# Patient Record
Sex: FEMALE | Race: WHITE | NOT HISPANIC OR LATINO | Employment: OTHER | URBAN - METROPOLITAN AREA
[De-identification: names, ages, dates, MRNs, and addresses within clinical notes are randomized per-mention and may not be internally consistent; named-entity substitution may affect disease eponyms.]

---

## 2022-01-01 ENCOUNTER — ANESTHESIA (OUTPATIENT)
Dept: ENDOSCOPY | Facility: HOSPITAL | Age: 67
End: 2022-01-01
Payer: MEDICARE

## 2022-01-01 ENCOUNTER — TELEPHONE (OUTPATIENT)
Dept: HEPATOLOGY | Facility: CLINIC | Age: 67
End: 2022-01-01
Payer: MEDICARE

## 2022-01-01 ENCOUNTER — HOSPITAL ENCOUNTER (OUTPATIENT)
Dept: RADIOLOGY | Facility: HOSPITAL | Age: 67
Discharge: HOME | End: 2022-05-05
Attending: INTERNAL MEDICINE
Payer: MEDICARE

## 2022-01-01 ENCOUNTER — TELEPHONE (OUTPATIENT)
Dept: ENDOSCOPY | Facility: HOSPITAL | Age: 67
End: 2022-01-01
Payer: MEDICARE

## 2022-01-01 ENCOUNTER — TELEPHONE (OUTPATIENT)
Dept: HEPATOLOGY | Facility: CLINIC | Age: 67
End: 2022-01-01

## 2022-01-01 ENCOUNTER — TELEPHONE (OUTPATIENT)
Dept: TRANSPLANT | Facility: CLINIC | Age: 67
End: 2022-01-01
Payer: MEDICARE

## 2022-01-01 ENCOUNTER — OFFICE VISIT (OUTPATIENT)
Dept: HEPATOLOGY | Facility: CLINIC | Age: 67
End: 2022-01-01
Payer: MEDICARE

## 2022-01-01 ENCOUNTER — HOSPITAL ENCOUNTER (OUTPATIENT)
Dept: RADIOLOGY | Facility: HOSPITAL | Age: 67
Discharge: HOME | End: 2022-05-10
Attending: INTERNAL MEDICINE
Payer: MEDICARE

## 2022-01-01 ENCOUNTER — HOSPITAL ENCOUNTER (INPATIENT)
Facility: HOSPITAL | Age: 67
LOS: 14 days | Discharge: HOSPICE | DRG: 871 | End: 2022-09-02
Attending: EMERGENCY MEDICINE | Admitting: HOSPITALIST
Payer: MEDICARE

## 2022-01-01 ENCOUNTER — PATIENT MESSAGE (OUTPATIENT)
Dept: PHARMACY | Facility: HOSPITAL | Age: 67
End: 2022-01-01
Payer: MEDICARE

## 2022-01-01 ENCOUNTER — TELEPHONE (OUTPATIENT)
Dept: PHARMACY | Facility: CLINIC | Age: 67
End: 2022-01-01
Payer: MEDICARE

## 2022-01-01 ENCOUNTER — ANESTHESIA EVENT (OUTPATIENT)
Dept: ENDOSCOPY | Facility: HOSPITAL | Age: 67
End: 2022-01-01
Payer: MEDICARE

## 2022-01-01 ENCOUNTER — HOSPITAL ENCOUNTER (INPATIENT)
Facility: HOSPITAL | Age: 67
LOS: 38 days | Discharge: NURSING, REHAB OR LONG-TERM CARE | DRG: 432 | End: 2022-08-05
Attending: EMERGENCY MEDICINE | Admitting: INTERNAL MEDICINE
Payer: MEDICARE

## 2022-01-01 ENCOUNTER — HOSPITAL ENCOUNTER (OUTPATIENT)
Facility: HOSPITAL | Age: 67
Discharge: HOME | End: 2022-06-23
Attending: INTERNAL MEDICINE | Admitting: INTERNAL MEDICINE
Payer: MEDICARE

## 2022-01-01 VITALS
DIASTOLIC BLOOD PRESSURE: 56 MMHG | HEIGHT: 60 IN | TEMPERATURE: 98 F | WEIGHT: 179 LBS | BODY MASS INDEX: 35.14 KG/M2 | RESPIRATION RATE: 18 BRPM | OXYGEN SATURATION: 99 % | SYSTOLIC BLOOD PRESSURE: 110 MMHG | HEART RATE: 80 BPM

## 2022-01-01 VITALS
OXYGEN SATURATION: 100 % | BODY MASS INDEX: 23.6 KG/M2 | DIASTOLIC BLOOD PRESSURE: 53 MMHG | RESPIRATION RATE: 14 BRPM | SYSTOLIC BLOOD PRESSURE: 101 MMHG | TEMPERATURE: 97 F | WEIGHT: 125 LBS | HEART RATE: 61 BPM | HEIGHT: 61 IN

## 2022-01-01 VITALS
TEMPERATURE: 98 F | HEIGHT: 60 IN | SYSTOLIC BLOOD PRESSURE: 140 MMHG | BODY MASS INDEX: 41.62 KG/M2 | WEIGHT: 212 LBS | HEART RATE: 73 BPM | RESPIRATION RATE: 18 BRPM | OXYGEN SATURATION: 97 % | DIASTOLIC BLOOD PRESSURE: 63 MMHG

## 2022-01-01 VITALS
TEMPERATURE: 99 F | DIASTOLIC BLOOD PRESSURE: 65 MMHG | HEART RATE: 104 BPM | OXYGEN SATURATION: 96 % | SYSTOLIC BLOOD PRESSURE: 141 MMHG | BODY MASS INDEX: 36.14 KG/M2 | HEIGHT: 60 IN | WEIGHT: 184.06 LBS | RESPIRATION RATE: 18 BRPM

## 2022-01-01 DIAGNOSIS — M79.89 LEG SWELLING: ICD-10-CM

## 2022-01-01 DIAGNOSIS — N17.9 AKI (ACUTE KIDNEY INJURY): ICD-10-CM

## 2022-01-01 DIAGNOSIS — R65.21 SEPTIC SHOCK: ICD-10-CM

## 2022-01-01 DIAGNOSIS — R60.1 ANASARCA: Primary | ICD-10-CM

## 2022-01-01 DIAGNOSIS — D63.8 ANEMIA, CHRONIC DISEASE: ICD-10-CM

## 2022-01-01 DIAGNOSIS — R06.02 SOB (SHORTNESS OF BREATH): ICD-10-CM

## 2022-01-01 DIAGNOSIS — R68.89 SUSPECTED DEEP TISSUE INJURY: ICD-10-CM

## 2022-01-01 DIAGNOSIS — K76.82 HEPATIC ENCEPHALOPATHY: ICD-10-CM

## 2022-01-01 DIAGNOSIS — R29.898 WEAKNESS OF BOTH ARMS: Primary | ICD-10-CM

## 2022-01-01 DIAGNOSIS — K76.9 LIVER DISEASE, UNSPECIFIED: ICD-10-CM

## 2022-01-01 DIAGNOSIS — R07.9 CHEST PAIN: ICD-10-CM

## 2022-01-01 DIAGNOSIS — K70.30 ALCOHOLIC CIRRHOSIS, UNSPECIFIED WHETHER ASCITES PRESENT: ICD-10-CM

## 2022-01-01 DIAGNOSIS — K74.60 CIRRHOSIS: ICD-10-CM

## 2022-01-01 DIAGNOSIS — R74.8 ELEVATED LIVER ENZYMES: Primary | ICD-10-CM

## 2022-01-01 DIAGNOSIS — M62.84 SARCOPENIA: ICD-10-CM

## 2022-01-01 DIAGNOSIS — R41.82 ALTERED MENTAL STATUS, UNSPECIFIED ALTERED MENTAL STATUS TYPE: ICD-10-CM

## 2022-01-01 DIAGNOSIS — K74.60 HEPATIC CIRRHOSIS, UNSPECIFIED HEPATIC CIRRHOSIS TYPE, UNSPECIFIED WHETHER ASCITES PRESENT: Primary | ICD-10-CM

## 2022-01-01 DIAGNOSIS — F10.10 ALCOHOL ABUSE: ICD-10-CM

## 2022-01-01 DIAGNOSIS — K70.31 ALCOHOLIC CIRRHOSIS OF LIVER WITH ASCITES: ICD-10-CM

## 2022-01-01 DIAGNOSIS — K76.6 PORTAL HYPERTENSION: ICD-10-CM

## 2022-01-01 DIAGNOSIS — G93.40 ACUTE ENCEPHALOPATHY: ICD-10-CM

## 2022-01-01 DIAGNOSIS — I50.20 HFREF (HEART FAILURE WITH REDUCED EJECTION FRACTION): ICD-10-CM

## 2022-01-01 DIAGNOSIS — R53.81 DEBILITY: ICD-10-CM

## 2022-01-01 DIAGNOSIS — R60.9 SWELLING: ICD-10-CM

## 2022-01-01 DIAGNOSIS — A41.9 SEPTIC SHOCK: ICD-10-CM

## 2022-01-01 LAB
ABO + RH BLD: NORMAL
ALBUMIN SERPL BCP-MCNC: 1.4 G/DL (ref 3.5–5.2)
ALBUMIN SERPL BCP-MCNC: 1.5 G/DL (ref 3.5–5.2)
ALBUMIN SERPL BCP-MCNC: 1.6 G/DL (ref 3.5–5.2)
ALBUMIN SERPL BCP-MCNC: 1.7 G/DL (ref 3.5–5.2)
ALBUMIN SERPL BCP-MCNC: 1.8 G/DL (ref 3.5–5.2)
ALBUMIN SERPL BCP-MCNC: 1.8 G/DL (ref 3.5–5.2)
ALBUMIN SERPL BCP-MCNC: 1.9 G/DL (ref 3.5–5.2)
ALBUMIN SERPL BCP-MCNC: 2 G/DL (ref 3.5–5.2)
ALBUMIN SERPL BCP-MCNC: 2 G/DL (ref 3.5–5.2)
ALBUMIN SERPL BCP-MCNC: 2.1 G/DL (ref 3.5–5.2)
ALBUMIN SERPL BCP-MCNC: 2.2 G/DL (ref 3.5–5.2)
ALBUMIN SERPL BCP-MCNC: 2.2 G/DL (ref 3.5–5.2)
ALBUMIN SERPL BCP-MCNC: 2.3 G/DL (ref 3.5–5.2)
ALBUMIN SERPL BCP-MCNC: 2.4 G/DL (ref 3.5–5.2)
ALBUMIN SERPL BCP-MCNC: 2.5 G/DL (ref 3.5–5.2)
ALBUMIN SERPL BCP-MCNC: 2.6 G/DL (ref 3.5–5.2)
ALBUMIN SERPL BCP-MCNC: 2.7 G/DL (ref 3.5–5.2)
ALBUMIN SERPL BCP-MCNC: 2.7 G/DL (ref 3.5–5.2)
ALLENS TEST: ABNORMAL
ALP SERPL-CCNC: 102 U/L (ref 55–135)
ALP SERPL-CCNC: 104 U/L (ref 55–135)
ALP SERPL-CCNC: 105 U/L (ref 55–135)
ALP SERPL-CCNC: 109 U/L (ref 55–135)
ALP SERPL-CCNC: 113 U/L (ref 55–135)
ALP SERPL-CCNC: 132 U/L (ref 55–135)
ALP SERPL-CCNC: 147 U/L (ref 55–135)
ALP SERPL-CCNC: 160 U/L (ref 55–135)
ALP SERPL-CCNC: 45 U/L (ref 55–135)
ALP SERPL-CCNC: 56 U/L (ref 55–135)
ALP SERPL-CCNC: 58 U/L (ref 55–135)
ALP SERPL-CCNC: 60 U/L (ref 55–135)
ALP SERPL-CCNC: 62 U/L (ref 55–135)
ALP SERPL-CCNC: 62 U/L (ref 55–135)
ALP SERPL-CCNC: 63 U/L (ref 55–135)
ALP SERPL-CCNC: 64 U/L (ref 55–135)
ALP SERPL-CCNC: 65 U/L (ref 55–135)
ALP SERPL-CCNC: 67 U/L (ref 55–135)
ALP SERPL-CCNC: 68 U/L (ref 55–135)
ALP SERPL-CCNC: 70 U/L (ref 55–135)
ALP SERPL-CCNC: 71 U/L (ref 55–135)
ALP SERPL-CCNC: 71 U/L (ref 55–135)
ALP SERPL-CCNC: 72 U/L (ref 55–135)
ALP SERPL-CCNC: 73 U/L (ref 55–135)
ALP SERPL-CCNC: 74 U/L (ref 55–135)
ALP SERPL-CCNC: 74 U/L (ref 55–135)
ALP SERPL-CCNC: 75 U/L (ref 55–135)
ALP SERPL-CCNC: 81 U/L (ref 55–135)
ALP SERPL-CCNC: 82 U/L (ref 55–135)
ALP SERPL-CCNC: 83 U/L (ref 55–135)
ALP SERPL-CCNC: 84 U/L (ref 55–135)
ALP SERPL-CCNC: 84 U/L (ref 55–135)
ALP SERPL-CCNC: 86 U/L (ref 55–135)
ALP SERPL-CCNC: 86 U/L (ref 55–135)
ALP SERPL-CCNC: 88 U/L (ref 55–135)
ALP SERPL-CCNC: 88 U/L (ref 55–135)
ALP SERPL-CCNC: 89 U/L (ref 55–135)
ALP SERPL-CCNC: 91 U/L (ref 55–135)
ALP SERPL-CCNC: 91 U/L (ref 55–135)
ALP SERPL-CCNC: 93 U/L (ref 55–135)
ALP SERPL-CCNC: 94 U/L (ref 55–135)
ALP SERPL-CCNC: 95 U/L (ref 55–135)
ALP SERPL-CCNC: 95 U/L (ref 55–135)
ALT SERPL W/O P-5'-P-CCNC: 15 U/L (ref 10–44)
ALT SERPL W/O P-5'-P-CCNC: 17 U/L (ref 10–44)
ALT SERPL W/O P-5'-P-CCNC: 18 U/L (ref 10–44)
ALT SERPL W/O P-5'-P-CCNC: 19 U/L (ref 10–44)
ALT SERPL W/O P-5'-P-CCNC: 20 U/L (ref 10–44)
ALT SERPL W/O P-5'-P-CCNC: 20 U/L (ref 10–44)
ALT SERPL W/O P-5'-P-CCNC: 21 U/L (ref 10–44)
ALT SERPL W/O P-5'-P-CCNC: 22 U/L (ref 10–44)
ALT SERPL W/O P-5'-P-CCNC: 23 U/L (ref 10–44)
ALT SERPL W/O P-5'-P-CCNC: 24 U/L (ref 10–44)
ALT SERPL W/O P-5'-P-CCNC: 25 U/L (ref 10–44)
ALT SERPL W/O P-5'-P-CCNC: 26 U/L (ref 10–44)
ALT SERPL W/O P-5'-P-CCNC: 27 U/L (ref 10–44)
ALT SERPL W/O P-5'-P-CCNC: 28 U/L (ref 10–44)
ALT SERPL W/O P-5'-P-CCNC: 28 U/L (ref 10–44)
ALT SERPL W/O P-5'-P-CCNC: 29 U/L (ref 10–44)
ALT SERPL W/O P-5'-P-CCNC: 29 U/L (ref 10–44)
ALT SERPL W/O P-5'-P-CCNC: 32 U/L (ref 10–44)
ALT SERPL W/O P-5'-P-CCNC: 32 U/L (ref 10–44)
ALT SERPL W/O P-5'-P-CCNC: 44 U/L (ref 10–44)
ALT SERPL W/O P-5'-P-CCNC: 57 U/L (ref 10–44)
ALT SERPL W/O P-5'-P-CCNC: 69 U/L (ref 10–44)
AMMONIA PLAS-SCNC: 105 UMOL/L (ref 10–50)
AMMONIA PLAS-SCNC: 31 UMOL/L (ref 10–50)
AMMONIA PLAS-SCNC: 43 UMOL/L (ref 10–50)
AMMONIA PLAS-SCNC: 47 UMOL/L (ref 10–50)
AMMONIA PLAS-SCNC: 51 UMOL/L (ref 10–50)
AMMONIA PLAS-SCNC: 58 UMOL/L (ref 10–50)
AMMONIA PLAS-SCNC: 66 UMOL/L (ref 10–50)
AMPHET+METHAMPHET UR QL: NEGATIVE
ANA SER QL IF: NORMAL
ANION GAP SERPL CALC-SCNC: 10 MMOL/L (ref 8–16)
ANION GAP SERPL CALC-SCNC: 10 MMOL/L (ref 8–16)
ANION GAP SERPL CALC-SCNC: 11 MMOL/L (ref 8–16)
ANION GAP SERPL CALC-SCNC: 11 MMOL/L (ref 8–16)
ANION GAP SERPL CALC-SCNC: 3 MMOL/L (ref 8–16)
ANION GAP SERPL CALC-SCNC: 4 MMOL/L (ref 8–16)
ANION GAP SERPL CALC-SCNC: 4 MMOL/L (ref 8–16)
ANION GAP SERPL CALC-SCNC: 5 MMOL/L (ref 8–16)
ANION GAP SERPL CALC-SCNC: 6 MMOL/L (ref 8–16)
ANION GAP SERPL CALC-SCNC: 7 MMOL/L (ref 8–16)
ANION GAP SERPL CALC-SCNC: 8 MMOL/L (ref 8–16)
ANION GAP SERPL CALC-SCNC: 9 MMOL/L (ref 8–16)
ANISOCYTOSIS BLD QL SMEAR: SLIGHT
APPEARANCE FLD: CLEAR
APPEARANCE FLD: NORMAL
APTT BLDCRRT: 82.2 SEC (ref 21–32)
ASCENDING AORTA: 3.15 CM
ASCENDING AORTA: 3.18 CM
AST SERPL-CCNC: 30 U/L (ref 10–40)
AST SERPL-CCNC: 30 U/L (ref 10–40)
AST SERPL-CCNC: 31 U/L (ref 10–40)
AST SERPL-CCNC: 32 U/L (ref 10–40)
AST SERPL-CCNC: 34 U/L (ref 10–40)
AST SERPL-CCNC: 35 U/L (ref 10–40)
AST SERPL-CCNC: 36 U/L (ref 10–40)
AST SERPL-CCNC: 38 U/L (ref 10–40)
AST SERPL-CCNC: 40 U/L (ref 10–40)
AST SERPL-CCNC: 41 U/L (ref 10–40)
AST SERPL-CCNC: 41 U/L (ref 10–40)
AST SERPL-CCNC: 42 U/L (ref 10–40)
AST SERPL-CCNC: 42 U/L (ref 10–40)
AST SERPL-CCNC: 43 U/L (ref 10–40)
AST SERPL-CCNC: 44 U/L (ref 10–40)
AST SERPL-CCNC: 45 U/L (ref 10–40)
AST SERPL-CCNC: 45 U/L (ref 10–40)
AST SERPL-CCNC: 46 U/L (ref 10–40)
AST SERPL-CCNC: 47 U/L (ref 10–40)
AST SERPL-CCNC: 48 U/L (ref 10–40)
AST SERPL-CCNC: 48 U/L (ref 10–40)
AST SERPL-CCNC: 54 U/L (ref 10–40)
AST SERPL-CCNC: 54 U/L (ref 10–40)
AST SERPL-CCNC: 56 U/L (ref 10–40)
AST SERPL-CCNC: 61 U/L (ref 10–40)
AST SERPL-CCNC: 62 U/L (ref 10–40)
AST SERPL-CCNC: 63 U/L (ref 10–40)
AST SERPL-CCNC: 64 U/L (ref 10–40)
AST SERPL-CCNC: 73 U/L (ref 10–40)
AST SERPL-CCNC: 82 U/L (ref 10–40)
AST SERPL-CCNC: 94 U/L (ref 10–40)
AST SERPL-CCNC: 99 U/L (ref 10–40)
AV INDEX (PROSTH): 0.45
AV INDEX (PROSTH): 0.58
AV MEAN GRADIENT: 6 MMHG
AV MEAN GRADIENT: 9 MMHG
AV PEAK GRADIENT: 13 MMHG
AV PEAK GRADIENT: 17 MMHG
AV VALVE AREA: 1.42 CM2
AV VALVE AREA: 2.33 CM2
AV VELOCITY RATIO: 0.39
AV VELOCITY RATIO: 0.6
BACTERIA #/AREA URNS AUTO: ABNORMAL /HPF
BACTERIA BLD CULT: ABNORMAL
BACTERIA BLD CULT: NORMAL
BACTERIA CSF CULT: NO GROWTH
BACTERIA FLD CULT: NORMAL
BACTERIA SPEC AEROBE CULT: NO GROWTH
BACTERIA SPEC ANAEROBE CULT: NORMAL
BACTERIA STL CULT: NORMAL
BACTERIA STL CULT: NORMAL
BACTERIA UR CULT: ABNORMAL
BARBITURATES UR QL SCN>200 NG/ML: NEGATIVE
BASO STIPL BLD QL SMEAR: ABNORMAL
BASOPHILS # BLD AUTO: 0.02 K/UL (ref 0–0.2)
BASOPHILS # BLD AUTO: 0.02 K/UL (ref 0–0.2)
BASOPHILS # BLD AUTO: 0.03 K/UL (ref 0–0.2)
BASOPHILS # BLD AUTO: 0.03 K/UL (ref 0–0.2)
BASOPHILS # BLD AUTO: 0.04 K/UL (ref 0–0.2)
BASOPHILS # BLD AUTO: 0.05 K/UL (ref 0–0.2)
BASOPHILS # BLD AUTO: 0.06 K/UL (ref 0–0.2)
BASOPHILS # BLD AUTO: 0.07 K/UL (ref 0–0.2)
BASOPHILS # BLD AUTO: 0.08 K/UL (ref 0–0.2)
BASOPHILS # BLD AUTO: 0.08 K/UL (ref 0–0.2)
BASOPHILS NFR BLD: 0.2 % (ref 0–1.9)
BASOPHILS NFR BLD: 0.4 % (ref 0–1.9)
BASOPHILS NFR BLD: 0.5 % (ref 0–1.9)
BASOPHILS NFR BLD: 0.6 % (ref 0–1.9)
BASOPHILS NFR BLD: 0.7 % (ref 0–1.9)
BASOPHILS NFR BLD: 0.8 % (ref 0–1.9)
BASOPHILS NFR BLD: 0.9 % (ref 0–1.9)
BASOPHILS NFR BLD: 1 % (ref 0–1.9)
BASOPHILS NFR BLD: 1 % (ref 0–1.9)
BASOPHILS NFR BLD: 1.1 % (ref 0–1.9)
BENZODIAZ UR QL SCN>200 NG/ML: ABNORMAL
BILIRUB SERPL-MCNC: 0.8 MG/DL (ref 0.1–1)
BILIRUB SERPL-MCNC: 1.1 MG/DL (ref 0.1–1)
BILIRUB SERPL-MCNC: 1.2 MG/DL (ref 0.1–1)
BILIRUB SERPL-MCNC: 1.3 MG/DL (ref 0.1–1)
BILIRUB SERPL-MCNC: 1.4 MG/DL (ref 0.1–1)
BILIRUB SERPL-MCNC: 1.4 MG/DL (ref 0.1–1)
BILIRUB SERPL-MCNC: 1.5 MG/DL (ref 0.1–1)
BILIRUB SERPL-MCNC: 1.6 MG/DL (ref 0.1–1)
BILIRUB SERPL-MCNC: 1.7 MG/DL (ref 0.1–1)
BILIRUB SERPL-MCNC: 1.8 MG/DL (ref 0.1–1)
BILIRUB SERPL-MCNC: 1.8 MG/DL (ref 0.1–1)
BILIRUB SERPL-MCNC: 1.9 MG/DL (ref 0.1–1)
BILIRUB SERPL-MCNC: 2 MG/DL (ref 0.1–1)
BILIRUB SERPL-MCNC: 2.1 MG/DL (ref 0.1–1)
BILIRUB SERPL-MCNC: 2.2 MG/DL (ref 0.1–1)
BILIRUB SERPL-MCNC: 2.3 MG/DL (ref 0.1–1)
BILIRUB SERPL-MCNC: 2.3 MG/DL (ref 0.1–1)
BILIRUB SERPL-MCNC: 2.4 MG/DL (ref 0.1–1)
BILIRUB SERPL-MCNC: 2.5 MG/DL (ref 0.1–1)
BILIRUB SERPL-MCNC: 3.2 MG/DL (ref 0.1–1)
BILIRUB UR QL STRIP: NEGATIVE
BLD GP AB SCN CELLS X3 SERPL QL: NORMAL
BLD PROD TYP BPU: NORMAL
BLOOD UNIT EXPIRATION DATE: NORMAL
BLOOD UNIT TYPE CODE: 600
BLOOD UNIT TYPE CODE: 6200
BLOOD UNIT TYPE: NORMAL
BNP SERPL-MCNC: 2659 PG/ML (ref 0–99)
BNP SERPL-MCNC: 57 PG/ML (ref 0–99)
BODY FLD TYPE: NORMAL
BODY FLD TYPE: NORMAL
BODY FLUID SOURCE, LDH: NORMAL
BSA FOR ECHO PROCEDURE: 1.56 M2
BSA FOR ECHO PROCEDURE: 1.99 M2
BUN SERPL-MCNC: 10 MG/DL (ref 8–23)
BUN SERPL-MCNC: 12 MG/DL (ref 8–23)
BUN SERPL-MCNC: 14 MG/DL (ref 8–23)
BUN SERPL-MCNC: 15 MG/DL (ref 8–23)
BUN SERPL-MCNC: 16 MG/DL (ref 8–23)
BUN SERPL-MCNC: 17 MG/DL (ref 8–23)
BUN SERPL-MCNC: 17 MG/DL (ref 8–23)
BUN SERPL-MCNC: 18 MG/DL (ref 8–23)
BUN SERPL-MCNC: 19 MG/DL (ref 8–23)
BUN SERPL-MCNC: 20 MG/DL (ref 8–23)
BUN SERPL-MCNC: 21 MG/DL (ref 8–23)
BUN SERPL-MCNC: 21 MG/DL (ref 8–23)
BUN SERPL-MCNC: 22 MG/DL (ref 8–23)
BUN SERPL-MCNC: 23 MG/DL (ref 8–23)
BUN SERPL-MCNC: 24 MG/DL (ref 8–23)
BUN SERPL-MCNC: 24 MG/DL (ref 8–23)
BUN SERPL-MCNC: 25 MG/DL (ref 8–23)
BUN SERPL-MCNC: 25 MG/DL (ref 8–23)
BUN SERPL-MCNC: 26 MG/DL (ref 8–23)
BUN SERPL-MCNC: 26 MG/DL (ref 8–23)
BUN SERPL-MCNC: 27 MG/DL (ref 8–23)
BUN SERPL-MCNC: 29 MG/DL (ref 8–23)
BUN SERPL-MCNC: 30 MG/DL (ref 8–23)
BUN SERPL-MCNC: 31 MG/DL (ref 8–23)
BUN SERPL-MCNC: 31 MG/DL (ref 8–23)
BUN SERPL-MCNC: 38 MG/DL (ref 8–23)
BUN SERPL-MCNC: 40 MG/DL (ref 8–23)
BUN SERPL-MCNC: 41 MG/DL (ref 8–23)
BUN SERPL-MCNC: 58 MG/DL (ref 8–23)
BUN SERPL-MCNC: 72 MG/DL (ref 8–23)
BUN SERPL-MCNC: 9 MG/DL (ref 8–23)
BUN SERPL-MCNC: 91 MG/DL (ref 8–23)
BURR CELLS BLD QL SMEAR: ABNORMAL
BURR CELLS BLD QL SMEAR: ABNORMAL
BZE UR QL SCN: NEGATIVE
C DIFF GDH STL QL: NEGATIVE
C DIFF TOX A+B STL QL IA: NEGATIVE
CALCIUM SERPL-MCNC: 6.2 MG/DL (ref 8.7–10.5)
CALCIUM SERPL-MCNC: 7.3 MG/DL (ref 8.7–10.5)
CALCIUM SERPL-MCNC: 7.6 MG/DL (ref 8.7–10.5)
CALCIUM SERPL-MCNC: 7.7 MG/DL (ref 8.7–10.5)
CALCIUM SERPL-MCNC: 7.7 MG/DL (ref 8.7–10.5)
CALCIUM SERPL-MCNC: 7.8 MG/DL (ref 8.7–10.5)
CALCIUM SERPL-MCNC: 7.8 MG/DL (ref 8.7–10.5)
CALCIUM SERPL-MCNC: 7.9 MG/DL (ref 8.7–10.5)
CALCIUM SERPL-MCNC: 8 MG/DL (ref 8.7–10.5)
CALCIUM SERPL-MCNC: 8.1 MG/DL (ref 8.7–10.5)
CALCIUM SERPL-MCNC: 8.2 MG/DL (ref 8.7–10.5)
CALCIUM SERPL-MCNC: 8.3 MG/DL (ref 8.7–10.5)
CALCIUM SERPL-MCNC: 8.4 MG/DL (ref 8.7–10.5)
CALCIUM SERPL-MCNC: 8.4 MG/DL (ref 8.7–10.5)
CALCIUM SERPL-MCNC: 8.5 MG/DL (ref 8.7–10.5)
CALCIUM SERPL-MCNC: 8.5 MG/DL (ref 8.7–10.5)
CALCIUM SERPL-MCNC: 8.6 MG/DL (ref 8.7–10.5)
CALCIUM SERPL-MCNC: 8.7 MG/DL (ref 8.7–10.5)
CALCIUM SERPL-MCNC: 8.8 MG/DL (ref 8.7–10.5)
CALCIUM SERPL-MCNC: 8.8 MG/DL (ref 8.7–10.5)
CALCIUM SERPL-MCNC: 8.9 MG/DL (ref 8.7–10.5)
CALCIUM SERPL-MCNC: 8.9 MG/DL (ref 8.7–10.5)
CALCIUM SERPL-MCNC: 9 MG/DL (ref 8.7–10.5)
CALCIUM SERPL-MCNC: 9.1 MG/DL (ref 8.7–10.5)
CALCIUM SERPL-MCNC: 9.2 MG/DL (ref 8.7–10.5)
CALCIUM SERPL-MCNC: 9.3 MG/DL (ref 8.7–10.5)
CALCIUM SERPL-MCNC: 9.5 MG/DL (ref 8.7–10.5)
CALCIUM SERPL-MCNC: 9.7 MG/DL (ref 8.7–10.5)
CANNABINOIDS UR QL SCN: NEGATIVE
CHLORIDE SERPL-SCNC: 100 MMOL/L (ref 95–110)
CHLORIDE SERPL-SCNC: 101 MMOL/L (ref 95–110)
CHLORIDE SERPL-SCNC: 102 MMOL/L (ref 95–110)
CHLORIDE SERPL-SCNC: 103 MMOL/L (ref 95–110)
CHLORIDE SERPL-SCNC: 104 MMOL/L (ref 95–110)
CHLORIDE SERPL-SCNC: 105 MMOL/L (ref 95–110)
CHLORIDE SERPL-SCNC: 106 MMOL/L (ref 95–110)
CHLORIDE SERPL-SCNC: 107 MMOL/L (ref 95–110)
CHLORIDE SERPL-SCNC: 109 MMOL/L (ref 95–110)
CHLORIDE SERPL-SCNC: 113 MMOL/L (ref 95–110)
CHLORIDE SERPL-SCNC: 115 MMOL/L (ref 95–110)
CHLORIDE SERPL-SCNC: 96 MMOL/L (ref 95–110)
CHLORIDE SERPL-SCNC: 96 MMOL/L (ref 95–110)
CHLORIDE SERPL-SCNC: 97 MMOL/L (ref 95–110)
CHLORIDE SERPL-SCNC: 98 MMOL/L (ref 95–110)
CHLORIDE SERPL-SCNC: 99 MMOL/L (ref 95–110)
CK SERPL-CCNC: 76 U/L (ref 20–180)
CLARITY CSF: CLEAR
CLARITY UR REFRACT.AUTO: ABNORMAL
CO2 SERPL-SCNC: 12 MMOL/L (ref 23–29)
CO2 SERPL-SCNC: 19 MMOL/L (ref 23–29)
CO2 SERPL-SCNC: 19 MMOL/L (ref 23–29)
CO2 SERPL-SCNC: 21 MMOL/L (ref 23–29)
CO2 SERPL-SCNC: 22 MMOL/L (ref 23–29)
CO2 SERPL-SCNC: 23 MMOL/L (ref 23–29)
CO2 SERPL-SCNC: 24 MMOL/L (ref 23–29)
CO2 SERPL-SCNC: 25 MMOL/L (ref 23–29)
CO2 SERPL-SCNC: 26 MMOL/L (ref 23–29)
CO2 SERPL-SCNC: 27 MMOL/L (ref 23–29)
CO2 SERPL-SCNC: 28 MMOL/L (ref 23–29)
CO2 SERPL-SCNC: 29 MMOL/L (ref 23–29)
CO2 SERPL-SCNC: 30 MMOL/L (ref 23–29)
CODING SYSTEM: NORMAL
COLOR CSF: COLORLESS
COLOR FLD: YELLOW
COLOR FLD: YELLOW
COLOR UR AUTO: ABNORMAL
COLOR UR AUTO: YELLOW
CORTIS SERPL-MCNC: 12.1 UG/DL (ref 4.3–22.4)
CREAT SERPL-MCNC: 0.7 MG/DL (ref 0.5–1.4)
CREAT SERPL-MCNC: 0.9 MG/DL (ref 0.5–1.4)
CREAT SERPL-MCNC: 1 MG/DL (ref 0.5–1.4)
CREAT SERPL-MCNC: 1.1 MG/DL (ref 0.5–1.4)
CREAT SERPL-MCNC: 1.2 MG/DL (ref 0.5–1.4)
CREAT SERPL-MCNC: 1.3 MG/DL (ref 0.5–1.4)
CREAT SERPL-MCNC: 1.4 MG/DL (ref 0.5–1.4)
CREAT SERPL-MCNC: 1.5 MG/DL (ref 0.5–1.4)
CREAT SERPL-MCNC: 1.5 MG/DL (ref 0.5–1.4)
CREAT SERPL-MCNC: 1.6 MG/DL (ref 0.5–1.4)
CREAT SERPL-MCNC: 1.8 MG/DL (ref 0.5–1.4)
CREAT SERPL-MCNC: 1.9 MG/DL (ref 0.5–1.4)
CREAT UR-MCNC: 20 MG/DL (ref 15–325)
CTP QC/QA: YES
CV ECHO LV RWT: 0.34 CM
CV ECHO LV RWT: 0.43 CM
DELSYS: ABNORMAL
DIFFERENTIAL METHOD: ABNORMAL
DISPENSE STATUS: NORMAL
DOP CALC AO PEAK VEL: 1.78 M/S
DOP CALC AO PEAK VEL: 2.09 M/S
DOP CALC AO VTI: 36.74 CM
DOP CALC AO VTI: 48.65 CM
DOP CALC LVOT AREA: 3.1 CM2
DOP CALC LVOT AREA: 4 CM2
DOP CALC LVOT DIAMETER: 2 CM
DOP CALC LVOT DIAMETER: 2.26 CM
DOP CALC LVOT PEAK VEL: 0.69 M/S
DOP CALC LVOT PEAK VEL: 1.26 M/S
DOP CALC LVOT STROKE VOLUME: 113.51 CM3
DOP CALC LVOT STROKE VOLUME: 52.19 CM3
DOP CALCLVOT PEAK VEL VTI: 16.62 CM
DOP CALCLVOT PEAK VEL VTI: 28.31 CM
E WAVE DECELERATION TIME: 237.92 MSEC
E WAVE DECELERATION TIME: 240.41 MSEC
E/A RATIO: 0.78
E/A RATIO: 1.14
E/E' RATIO: 12.89 M/S
E/E' RATIO: 14 M/S
ECHO LV POSTERIOR WALL: 0.86 CM (ref 0.6–1.1)
ECHO LV POSTERIOR WALL: 1.08 CM (ref 0.6–1.1)
EJECTION FRACTION: 52 %
EJECTION FRACTION: 65 %
EOSINOPHIL # BLD AUTO: 0.1 K/UL (ref 0–0.5)
EOSINOPHIL # BLD AUTO: 0.2 K/UL (ref 0–0.5)
EOSINOPHIL # BLD AUTO: 0.3 K/UL (ref 0–0.5)
EOSINOPHIL # BLD AUTO: 0.4 K/UL (ref 0–0.5)
EOSINOPHIL # BLD AUTO: 0.5 K/UL (ref 0–0.5)
EOSINOPHIL # BLD AUTO: 0.6 K/UL (ref 0–0.5)
EOSINOPHIL NFR BLD: 0.7 % (ref 0–8)
EOSINOPHIL NFR BLD: 0.8 % (ref 0–8)
EOSINOPHIL NFR BLD: 1 % (ref 0–8)
EOSINOPHIL NFR BLD: 1.6 % (ref 0–8)
EOSINOPHIL NFR BLD: 2 % (ref 0–8)
EOSINOPHIL NFR BLD: 2 % (ref 0–8)
EOSINOPHIL NFR BLD: 2.3 % (ref 0–8)
EOSINOPHIL NFR BLD: 2.4 % (ref 0–8)
EOSINOPHIL NFR BLD: 2.4 % (ref 0–8)
EOSINOPHIL NFR BLD: 2.5 % (ref 0–8)
EOSINOPHIL NFR BLD: 2.6 % (ref 0–8)
EOSINOPHIL NFR BLD: 3.2 % (ref 0–8)
EOSINOPHIL NFR BLD: 3.3 % (ref 0–8)
EOSINOPHIL NFR BLD: 3.3 % (ref 0–8)
EOSINOPHIL NFR BLD: 3.4 % (ref 0–8)
EOSINOPHIL NFR BLD: 3.4 % (ref 0–8)
EOSINOPHIL NFR BLD: 3.6 % (ref 0–8)
EOSINOPHIL NFR BLD: 3.8 % (ref 0–8)
EOSINOPHIL NFR BLD: 3.9 % (ref 0–8)
EOSINOPHIL NFR BLD: 4 % (ref 0–8)
EOSINOPHIL NFR BLD: 4 % (ref 0–8)
EOSINOPHIL NFR BLD: 4.1 % (ref 0–8)
EOSINOPHIL NFR BLD: 4.1 % (ref 0–8)
EOSINOPHIL NFR BLD: 4.2 % (ref 0–8)
EOSINOPHIL NFR BLD: 4.2 % (ref 0–8)
EOSINOPHIL NFR BLD: 4.5 % (ref 0–8)
EOSINOPHIL NFR BLD: 4.6 % (ref 0–8)
EOSINOPHIL NFR BLD: 4.6 % (ref 0–8)
EOSINOPHIL NFR BLD: 4.8 % (ref 0–8)
EOSINOPHIL NFR BLD: 4.9 % (ref 0–8)
EOSINOPHIL NFR BLD: 5 % (ref 0–8)
EOSINOPHIL NFR BLD: 5 % (ref 0–8)
EOSINOPHIL NFR BLD: 5.2 % (ref 0–8)
EOSINOPHIL NFR BLD: 5.3 % (ref 0–8)
EOSINOPHIL NFR BLD: 5.4 % (ref 0–8)
EOSINOPHIL NFR BLD: 5.5 % (ref 0–8)
EOSINOPHIL NFR BLD: 5.5 % (ref 0–8)
EOSINOPHIL NFR BLD: 5.6 % (ref 0–8)
EOSINOPHIL NFR BLD: 5.6 % (ref 0–8)
EOSINOPHIL NFR BLD: 5.8 % (ref 0–8)
EOSINOPHIL NFR BLD: 6.1 % (ref 0–8)
EOSINOPHIL NFR BLD: 6.2 % (ref 0–8)
EOSINOPHIL NFR BLD: 6.5 % (ref 0–8)
EOSINOPHIL NFR BLD: 6.7 % (ref 0–8)
EOSINOPHIL NFR BLD: 6.8 % (ref 0–8)
EOSINOPHIL NFR BLD: 8 % (ref 0–8)
ERYTHROCYTE [DISTWIDTH] IN BLOOD BY AUTOMATED COUNT: 16.8 % (ref 11.5–14.5)
ERYTHROCYTE [DISTWIDTH] IN BLOOD BY AUTOMATED COUNT: 17.1 % (ref 11.5–14.5)
ERYTHROCYTE [DISTWIDTH] IN BLOOD BY AUTOMATED COUNT: 17.2 % (ref 11.5–14.5)
ERYTHROCYTE [DISTWIDTH] IN BLOOD BY AUTOMATED COUNT: 17.3 % (ref 11.5–14.5)
ERYTHROCYTE [DISTWIDTH] IN BLOOD BY AUTOMATED COUNT: 17.3 % (ref 11.5–14.5)
ERYTHROCYTE [DISTWIDTH] IN BLOOD BY AUTOMATED COUNT: 17.4 % (ref 11.5–14.5)
ERYTHROCYTE [DISTWIDTH] IN BLOOD BY AUTOMATED COUNT: 17.5 % (ref 11.5–14.5)
ERYTHROCYTE [DISTWIDTH] IN BLOOD BY AUTOMATED COUNT: 17.6 % (ref 11.5–14.5)
ERYTHROCYTE [DISTWIDTH] IN BLOOD BY AUTOMATED COUNT: 17.7 % (ref 11.5–14.5)
ERYTHROCYTE [DISTWIDTH] IN BLOOD BY AUTOMATED COUNT: 17.7 % (ref 11.5–14.5)
ERYTHROCYTE [DISTWIDTH] IN BLOOD BY AUTOMATED COUNT: 17.8 % (ref 11.5–14.5)
ERYTHROCYTE [DISTWIDTH] IN BLOOD BY AUTOMATED COUNT: 17.9 % (ref 11.5–14.5)
ERYTHROCYTE [DISTWIDTH] IN BLOOD BY AUTOMATED COUNT: 17.9 % (ref 11.5–14.5)
ERYTHROCYTE [DISTWIDTH] IN BLOOD BY AUTOMATED COUNT: 18 % (ref 11.5–14.5)
ERYTHROCYTE [DISTWIDTH] IN BLOOD BY AUTOMATED COUNT: 18 % (ref 11.5–14.5)
ERYTHROCYTE [DISTWIDTH] IN BLOOD BY AUTOMATED COUNT: 18.1 % (ref 11.5–14.5)
ERYTHROCYTE [DISTWIDTH] IN BLOOD BY AUTOMATED COUNT: 18.2 % (ref 11.5–14.5)
ERYTHROCYTE [DISTWIDTH] IN BLOOD BY AUTOMATED COUNT: 18.4 % (ref 11.5–14.5)
ERYTHROCYTE [DISTWIDTH] IN BLOOD BY AUTOMATED COUNT: 18.6 % (ref 11.5–14.5)
ERYTHROCYTE [DISTWIDTH] IN BLOOD BY AUTOMATED COUNT: 18.6 % (ref 11.5–14.5)
ERYTHROCYTE [DISTWIDTH] IN BLOOD BY AUTOMATED COUNT: 18.8 % (ref 11.5–14.5)
ERYTHROCYTE [DISTWIDTH] IN BLOOD BY AUTOMATED COUNT: 18.8 % (ref 11.5–14.5)
ERYTHROCYTE [DISTWIDTH] IN BLOOD BY AUTOMATED COUNT: 18.9 % (ref 11.5–14.5)
ERYTHROCYTE [DISTWIDTH] IN BLOOD BY AUTOMATED COUNT: 19 % (ref 11.5–14.5)
ERYTHROCYTE [DISTWIDTH] IN BLOOD BY AUTOMATED COUNT: 19 % (ref 11.5–14.5)
ERYTHROCYTE [DISTWIDTH] IN BLOOD BY AUTOMATED COUNT: 19.1 % (ref 11.5–14.5)
ERYTHROCYTE [DISTWIDTH] IN BLOOD BY AUTOMATED COUNT: 19.2 % (ref 11.5–14.5)
ERYTHROCYTE [DISTWIDTH] IN BLOOD BY AUTOMATED COUNT: 19.2 % (ref 11.5–14.5)
ERYTHROCYTE [DISTWIDTH] IN BLOOD BY AUTOMATED COUNT: 19.3 % (ref 11.5–14.5)
ERYTHROCYTE [DISTWIDTH] IN BLOOD BY AUTOMATED COUNT: 19.3 % (ref 11.5–14.5)
ERYTHROCYTE [DISTWIDTH] IN BLOOD BY AUTOMATED COUNT: 19.4 % (ref 11.5–14.5)
ERYTHROCYTE [DISTWIDTH] IN BLOOD BY AUTOMATED COUNT: 19.4 % (ref 11.5–14.5)
ERYTHROCYTE [DISTWIDTH] IN BLOOD BY AUTOMATED COUNT: 19.7 % (ref 11.5–14.5)
ERYTHROCYTE [DISTWIDTH] IN BLOOD BY AUTOMATED COUNT: 19.8 % (ref 11.5–14.5)
ERYTHROCYTE [DISTWIDTH] IN BLOOD BY AUTOMATED COUNT: 19.9 % (ref 11.5–14.5)
ERYTHROCYTE [DISTWIDTH] IN BLOOD BY AUTOMATED COUNT: 21 % (ref 11.5–14.5)
ERYTHROCYTE [SEDIMENTATION RATE] IN BLOOD BY PHOTOMETRIC METHOD: 62 MM/HR (ref 0–36)
ERYTHROCYTE [SEDIMENTATION RATE] IN BLOOD BY WESTERGREN METHOD: 24 MM/H
EST. GFR  (AFRICAN AMERICAN): 31 ML/MIN/1.73 M^2
EST. GFR  (AFRICAN AMERICAN): 38.2 ML/MIN/1.73 M^2
EST. GFR  (AFRICAN AMERICAN): 41.3 ML/MIN/1.73 M^2
EST. GFR  (AFRICAN AMERICAN): 44.9 ML/MIN/1.73 M^2
EST. GFR  (AFRICAN AMERICAN): 49.1 ML/MIN/1.73 M^2
EST. GFR  (AFRICAN AMERICAN): 54 ML/MIN/1.73 M^2
EST. GFR  (AFRICAN AMERICAN): >60 ML/MIN/1.73 M^2
EST. GFR  (NO RACE VARIABLE): 30.5 ML/MIN/1.73 M^2
EST. GFR  (NO RACE VARIABLE): 38 ML/MIN/1.73 M^2
EST. GFR  (NO RACE VARIABLE): 41.2 ML/MIN/1.73 M^2
EST. GFR  (NO RACE VARIABLE): 41.2 ML/MIN/1.73 M^2
EST. GFR  (NO RACE VARIABLE): 45.1 ML/MIN/1.73 M^2
EST. GFR  (NO RACE VARIABLE): 49.6 ML/MIN/1.73 M^2
EST. GFR  (NO RACE VARIABLE): 55.1 ML/MIN/1.73 M^2
EST. GFR  (NO RACE VARIABLE): 55.1 ML/MIN/1.73 M^2
EST. GFR  (NO RACE VARIABLE): >60 ML/MIN/1.73 M^2
EST. GFR  (NON AFRICAN AMERICAN): 26.9 ML/MIN/1.73 M^2
EST. GFR  (NON AFRICAN AMERICAN): 33.1 ML/MIN/1.73 M^2
EST. GFR  (NON AFRICAN AMERICAN): 35.8 ML/MIN/1.73 M^2
EST. GFR  (NON AFRICAN AMERICAN): 38.9 ML/MIN/1.73 M^2
EST. GFR  (NON AFRICAN AMERICAN): 42.6 ML/MIN/1.73 M^2
EST. GFR  (NON AFRICAN AMERICAN): 46.9 ML/MIN/1.73 M^2
EST. GFR  (NON AFRICAN AMERICAN): 52.1 ML/MIN/1.73 M^2
EST. GFR  (NON AFRICAN AMERICAN): 58.4 ML/MIN/1.73 M^2
ETHANOL SERPL-MCNC: <10 MG/DL
FERRITIN SERPL-MCNC: 41 NG/ML (ref 20–300)
FIBRINOGEN PPP-MCNC: 112 MG/DL (ref 182–400)
FINAL PATHOLOGIC DIAGNOSIS: NORMAL
FLOW: 5
FRACTIONAL SHORTENING: 25 % (ref 28–44)
FRACTIONAL SHORTENING: 38 % (ref 28–44)
GLUCOSE CSF-MCNC: 83 MG/DL (ref 40–70)
GLUCOSE SERPL-MCNC: 100 MG/DL (ref 70–110)
GLUCOSE SERPL-MCNC: 100 MG/DL (ref 70–110)
GLUCOSE SERPL-MCNC: 101 MG/DL (ref 70–110)
GLUCOSE SERPL-MCNC: 102 MG/DL (ref 70–110)
GLUCOSE SERPL-MCNC: 104 MG/DL (ref 70–110)
GLUCOSE SERPL-MCNC: 105 MG/DL (ref 70–110)
GLUCOSE SERPL-MCNC: 106 MG/DL (ref 70–110)
GLUCOSE SERPL-MCNC: 108 MG/DL (ref 70–110)
GLUCOSE SERPL-MCNC: 108 MG/DL (ref 70–110)
GLUCOSE SERPL-MCNC: 109 MG/DL (ref 70–110)
GLUCOSE SERPL-MCNC: 111 MG/DL (ref 70–110)
GLUCOSE SERPL-MCNC: 111 MG/DL (ref 70–110)
GLUCOSE SERPL-MCNC: 112 MG/DL (ref 70–110)
GLUCOSE SERPL-MCNC: 112 MG/DL (ref 70–110)
GLUCOSE SERPL-MCNC: 113 MG/DL (ref 70–110)
GLUCOSE SERPL-MCNC: 114 MG/DL (ref 70–110)
GLUCOSE SERPL-MCNC: 115 MG/DL (ref 70–110)
GLUCOSE SERPL-MCNC: 115 MG/DL (ref 70–110)
GLUCOSE SERPL-MCNC: 116 MG/DL (ref 70–110)
GLUCOSE SERPL-MCNC: 116 MG/DL (ref 70–110)
GLUCOSE SERPL-MCNC: 117 MG/DL (ref 70–110)
GLUCOSE SERPL-MCNC: 119 MG/DL (ref 70–110)
GLUCOSE SERPL-MCNC: 119 MG/DL (ref 70–110)
GLUCOSE SERPL-MCNC: 120 MG/DL (ref 70–110)
GLUCOSE SERPL-MCNC: 121 MG/DL (ref 70–110)
GLUCOSE SERPL-MCNC: 122 MG/DL (ref 70–110)
GLUCOSE SERPL-MCNC: 122 MG/DL (ref 70–110)
GLUCOSE SERPL-MCNC: 127 MG/DL (ref 70–110)
GLUCOSE SERPL-MCNC: 144 MG/DL (ref 70–110)
GLUCOSE SERPL-MCNC: 147 MG/DL (ref 70–110)
GLUCOSE SERPL-MCNC: 152 MG/DL (ref 70–110)
GLUCOSE SERPL-MCNC: 155 MG/DL (ref 70–110)
GLUCOSE SERPL-MCNC: 159 MG/DL (ref 70–110)
GLUCOSE SERPL-MCNC: 163 MG/DL (ref 70–110)
GLUCOSE SERPL-MCNC: 75 MG/DL (ref 70–110)
GLUCOSE SERPL-MCNC: 79 MG/DL (ref 70–110)
GLUCOSE SERPL-MCNC: 79 MG/DL (ref 70–110)
GLUCOSE SERPL-MCNC: 86 MG/DL (ref 70–110)
GLUCOSE SERPL-MCNC: 89 MG/DL (ref 70–110)
GLUCOSE SERPL-MCNC: 94 MG/DL (ref 70–110)
GLUCOSE SERPL-MCNC: 96 MG/DL (ref 70–110)
GLUCOSE SERPL-MCNC: 97 MG/DL (ref 70–110)
GLUCOSE SERPL-MCNC: 98 MG/DL (ref 70–110)
GLUCOSE SERPL-MCNC: 99 MG/DL (ref 70–110)
GLUCOSE UR QL STRIP: NEGATIVE
GRAM STN SPEC: NORMAL
HCO3 UR-SCNC: 19.3 MMOL/L (ref 24–28)
HCO3 UR-SCNC: 21.5 MMOL/L (ref 24–28)
HCO3 UR-SCNC: 21.9 MMOL/L (ref 24–28)
HCO3 UR-SCNC: 26 MMOL/L (ref 24–28)
HCO3 UR-SCNC: 30 MMOL/L (ref 24–28)
HCT VFR BLD AUTO: 18.5 % (ref 37–48.5)
HCT VFR BLD AUTO: 19.1 % (ref 37–48.5)
HCT VFR BLD AUTO: 20.1 % (ref 37–48.5)
HCT VFR BLD AUTO: 21 % (ref 37–48.5)
HCT VFR BLD AUTO: 21.2 % (ref 37–48.5)
HCT VFR BLD AUTO: 21.5 % (ref 37–48.5)
HCT VFR BLD AUTO: 22.1 % (ref 37–48.5)
HCT VFR BLD AUTO: 22.4 % (ref 37–48.5)
HCT VFR BLD AUTO: 22.6 % (ref 37–48.5)
HCT VFR BLD AUTO: 22.6 % (ref 37–48.5)
HCT VFR BLD AUTO: 22.7 % (ref 37–48.5)
HCT VFR BLD AUTO: 23 % (ref 37–48.5)
HCT VFR BLD AUTO: 23.1 % (ref 37–48.5)
HCT VFR BLD AUTO: 23.3 % (ref 37–48.5)
HCT VFR BLD AUTO: 23.4 % (ref 37–48.5)
HCT VFR BLD AUTO: 23.4 % (ref 37–48.5)
HCT VFR BLD AUTO: 23.5 % (ref 37–48.5)
HCT VFR BLD AUTO: 23.9 % (ref 37–48.5)
HCT VFR BLD AUTO: 23.9 % (ref 37–48.5)
HCT VFR BLD AUTO: 24 % (ref 37–48.5)
HCT VFR BLD AUTO: 24.1 % (ref 37–48.5)
HCT VFR BLD AUTO: 24.1 % (ref 37–48.5)
HCT VFR BLD AUTO: 24.2 % (ref 37–48.5)
HCT VFR BLD AUTO: 24.2 % (ref 37–48.5)
HCT VFR BLD AUTO: 24.5 % (ref 37–48.5)
HCT VFR BLD AUTO: 24.5 % (ref 37–48.5)
HCT VFR BLD AUTO: 24.6 % (ref 37–48.5)
HCT VFR BLD AUTO: 24.7 % (ref 37–48.5)
HCT VFR BLD AUTO: 24.8 % (ref 37–48.5)
HCT VFR BLD AUTO: 25 % (ref 37–48.5)
HCT VFR BLD AUTO: 25.1 % (ref 37–48.5)
HCT VFR BLD AUTO: 25.3 % (ref 37–48.5)
HCT VFR BLD AUTO: 25.4 % (ref 37–48.5)
HCT VFR BLD AUTO: 25.5 % (ref 37–48.5)
HCT VFR BLD AUTO: 25.6 % (ref 37–48.5)
HCT VFR BLD AUTO: 25.8 % (ref 37–48.5)
HCT VFR BLD AUTO: 25.9 % (ref 37–48.5)
HCT VFR BLD AUTO: 25.9 % (ref 37–48.5)
HCT VFR BLD AUTO: 26.1 % (ref 37–48.5)
HCT VFR BLD AUTO: 26.2 % (ref 37–48.5)
HCT VFR BLD AUTO: 26.5 % (ref 37–48.5)
HCT VFR BLD AUTO: 27.2 % (ref 37–48.5)
HCT VFR BLD AUTO: 27.5 % (ref 37–48.5)
HCT VFR BLD AUTO: 28 % (ref 37–48.5)
HCT VFR BLD AUTO: 28.7 % (ref 37–48.5)
HCT VFR BLD AUTO: 29.6 % (ref 37–48.5)
HCT VFR BLD AUTO: 30.5 % (ref 37–48.5)
HCT VFR BLD AUTO: 30.7 % (ref 37–48.5)
HGB BLD-MCNC: 10.1 G/DL (ref 12–16)
HGB BLD-MCNC: 6.2 G/DL (ref 12–16)
HGB BLD-MCNC: 6.2 G/DL (ref 12–16)
HGB BLD-MCNC: 6.3 G/DL (ref 12–16)
HGB BLD-MCNC: 6.7 G/DL (ref 12–16)
HGB BLD-MCNC: 6.9 G/DL (ref 12–16)
HGB BLD-MCNC: 6.9 G/DL (ref 12–16)
HGB BLD-MCNC: 7 G/DL (ref 12–16)
HGB BLD-MCNC: 7.3 G/DL (ref 12–16)
HGB BLD-MCNC: 7.4 G/DL (ref 12–16)
HGB BLD-MCNC: 7.5 G/DL (ref 12–16)
HGB BLD-MCNC: 7.7 G/DL (ref 12–16)
HGB BLD-MCNC: 7.7 G/DL (ref 12–16)
HGB BLD-MCNC: 7.8 G/DL (ref 12–16)
HGB BLD-MCNC: 7.8 G/DL (ref 12–16)
HGB BLD-MCNC: 7.9 G/DL (ref 12–16)
HGB BLD-MCNC: 8 G/DL (ref 12–16)
HGB BLD-MCNC: 8 G/DL (ref 12–16)
HGB BLD-MCNC: 8.1 G/DL (ref 12–16)
HGB BLD-MCNC: 8.2 G/DL (ref 12–16)
HGB BLD-MCNC: 8.3 G/DL (ref 12–16)
HGB BLD-MCNC: 8.4 G/DL (ref 12–16)
HGB BLD-MCNC: 8.5 G/DL (ref 12–16)
HGB BLD-MCNC: 8.6 G/DL (ref 12–16)
HGB BLD-MCNC: 8.9 G/DL (ref 12–16)
HGB BLD-MCNC: 8.9 G/DL (ref 12–16)
HGB BLD-MCNC: 9 G/DL (ref 12–16)
HGB BLD-MCNC: 9.1 G/DL (ref 12–16)
HGB BLD-MCNC: 9.6 G/DL (ref 12–16)
HGB BLD-MCNC: 9.6 G/DL (ref 12–16)
HGB BLD-MCNC: 9.8 G/DL (ref 12–16)
HGB UR QL STRIP: ABNORMAL
HGB UR QL STRIP: NEGATIVE
HSV1, PCR, CSF: NEGATIVE
HSV2, PCR, CSF: NEGATIVE
HYALINE CASTS UR QL AUTO: 0 /LPF
HYALINE CASTS UR QL AUTO: 0 /LPF
HYALINE CASTS UR QL AUTO: 49 /LPF
HYALINE CASTS UR QL AUTO: 916 /LPF
HYPOCHROMIA BLD QL SMEAR: ABNORMAL
IMM GRANULOCYTES # BLD AUTO: 0.01 K/UL (ref 0–0.04)
IMM GRANULOCYTES # BLD AUTO: 0.02 K/UL (ref 0–0.04)
IMM GRANULOCYTES # BLD AUTO: 0.03 K/UL (ref 0–0.04)
IMM GRANULOCYTES # BLD AUTO: 0.04 K/UL (ref 0–0.04)
IMM GRANULOCYTES # BLD AUTO: 0.05 K/UL (ref 0–0.04)
IMM GRANULOCYTES # BLD AUTO: 0.06 K/UL (ref 0–0.04)
IMM GRANULOCYTES # BLD AUTO: 0.06 K/UL (ref 0–0.04)
IMM GRANULOCYTES NFR BLD AUTO: 0.2 % (ref 0–0.5)
IMM GRANULOCYTES NFR BLD AUTO: 0.3 % (ref 0–0.5)
IMM GRANULOCYTES NFR BLD AUTO: 0.4 % (ref 0–0.5)
IMM GRANULOCYTES NFR BLD AUTO: 0.5 % (ref 0–0.5)
IMM GRANULOCYTES NFR BLD AUTO: 0.6 % (ref 0–0.5)
IMM GRANULOCYTES NFR BLD AUTO: 0.7 % (ref 0–0.5)
IMM GRANULOCYTES NFR BLD AUTO: 0.8 % (ref 0–0.5)
INR PPP: 1.5 (ref 0.8–1.2)
INR PPP: 1.6 (ref 0.8–1.2)
INR PPP: 1.7 (ref 0.8–1.2)
INR PPP: 1.8 (ref 0.8–1.2)
INR PPP: 1.9 (ref 0.8–1.2)
INR PPP: 1.9 (ref 0.8–1.2)
INR PPP: 2.1 (ref 0.8–1.2)
INTERVENTRICULAR SEPTUM: 0.95 CM (ref 0.6–1.1)
INTERVENTRICULAR SEPTUM: 1.05 CM (ref 0.6–1.1)
IRON SERPL-MCNC: 29 UG/DL (ref 30–160)
KETONES UR QL STRIP: ABNORMAL
KETONES UR QL STRIP: NEGATIVE
LA MAJOR: 5.06 CM
LA MAJOR: 5.51 CM
LA MINOR: 5.04 CM
LA MINOR: 5.22 CM
LA WIDTH: 3.17 CM
LA WIDTH: 3.96 CM
LACTATE SERPL-SCNC: 1.6 MMOL/L (ref 0.5–2.2)
LACTATE SERPL-SCNC: 1.9 MMOL/L (ref 0.5–2.2)
LACTATE SERPL-SCNC: 1.9 MMOL/L (ref 0.5–2.2)
LACTATE SERPL-SCNC: 2.1 MMOL/L (ref 0.5–2.2)
LACTATE SERPL-SCNC: 2.3 MMOL/L (ref 0.5–2.2)
LACTATE SERPL-SCNC: 2.3 MMOL/L (ref 0.5–2.2)
LACTATE SERPL-SCNC: 2.8 MMOL/L (ref 0.5–2.2)
LACTATE SERPL-SCNC: 3.3 MMOL/L (ref 0.5–2.2)
LACTATE SERPL-SCNC: 3.3 MMOL/L (ref 0.5–2.2)
LACTATE SERPL-SCNC: 3.5 MMOL/L (ref 0.5–2.2)
LACTATE SERPL-SCNC: 5.1 MMOL/L (ref 0.5–2.2)
LACTATE SERPL-SCNC: 6 MMOL/L (ref 0.5–2.2)
LACTATE SERPL-SCNC: 6.6 MMOL/L (ref 0.5–2.2)
LACTATE SERPL-SCNC: 8.4 MMOL/L (ref 0.5–2.2)
LDH FLD L TO P-CCNC: 35 U/L
LDH SERPL L TO P-CCNC: 187 U/L (ref 110–260)
LEFT ATRIUM SIZE: 3.16 CM
LEFT ATRIUM SIZE: 3.72 CM
LEFT ATRIUM VOLUME INDEX MOD: 19.8 ML/M2
LEFT ATRIUM VOLUME INDEX: 28.9 ML/M2
LEFT ATRIUM VOLUME INDEX: 34 ML/M2
LEFT ATRIUM VOLUME MOD: 37.43 CM3
LEFT ATRIUM VOLUME: 44.83 CM3
LEFT ATRIUM VOLUME: 64.35 CM3
LEFT INTERNAL DIMENSION IN SYSTOLE: 3.08 CM (ref 2.1–4)
LEFT INTERNAL DIMENSION IN SYSTOLE: 3.75 CM (ref 2.1–4)
LEFT VENTRICLE DIASTOLIC VOLUME INDEX: 62.65 ML/M2
LEFT VENTRICLE DIASTOLIC VOLUME INDEX: 77.17 ML/M2
LEFT VENTRICLE DIASTOLIC VOLUME: 118.41 ML
LEFT VENTRICLE DIASTOLIC VOLUME: 119.62 ML
LEFT VENTRICLE MASS INDEX: 104 G/M2
LEFT VENTRICLE MASS INDEX: 105 G/M2
LEFT VENTRICLE SYSTOLIC VOLUME INDEX: 19.8 ML/M2
LEFT VENTRICLE SYSTOLIC VOLUME INDEX: 38.8 ML/M2
LEFT VENTRICLE SYSTOLIC VOLUME: 37.37 ML
LEFT VENTRICLE SYSTOLIC VOLUME: 60.12 ML
LEFT VENTRICULAR INTERNAL DIMENSION IN DIASTOLE: 5 CM (ref 3.5–6)
LEFT VENTRICULAR INTERNAL DIMENSION IN DIASTOLE: 5.03 CM (ref 3.5–6)
LEFT VENTRICULAR MASS: 160.97 G
LEFT VENTRICULAR MASS: 198.17 G
LEUKOCYTE ESTERASE UR QL STRIP: ABNORMAL
LEVETIRACETAM SERPL-MCNC: 58.4 UG/ML (ref 3–60)
LEVETIRACETAM SERPL-MCNC: 98.9 UG/ML (ref 3–60)
LV LATERAL E/E' RATIO: 12.89 M/S
LV LATERAL E/E' RATIO: 13 M/S
LV SEPTAL E/E' RATIO: 12.89 M/S
LV SEPTAL E/E' RATIO: 15.17 M/S
LYMPHOCYTES # BLD AUTO: 0.2 K/UL (ref 1–4.8)
LYMPHOCYTES # BLD AUTO: 1 K/UL (ref 1–4.8)
LYMPHOCYTES # BLD AUTO: 1.3 K/UL (ref 1–4.8)
LYMPHOCYTES # BLD AUTO: 1.4 K/UL (ref 1–4.8)
LYMPHOCYTES # BLD AUTO: 1.5 K/UL (ref 1–4.8)
LYMPHOCYTES # BLD AUTO: 1.6 K/UL (ref 1–4.8)
LYMPHOCYTES # BLD AUTO: 1.7 K/UL (ref 1–4.8)
LYMPHOCYTES # BLD AUTO: 1.7 K/UL (ref 1–4.8)
LYMPHOCYTES # BLD AUTO: 1.8 K/UL (ref 1–4.8)
LYMPHOCYTES # BLD AUTO: 1.9 K/UL (ref 1–4.8)
LYMPHOCYTES # BLD AUTO: 2 K/UL (ref 1–4.8)
LYMPHOCYTES # BLD AUTO: 2.1 K/UL (ref 1–4.8)
LYMPHOCYTES NFR BLD: 15.9 % (ref 18–48)
LYMPHOCYTES NFR BLD: 19 % (ref 18–48)
LYMPHOCYTES NFR BLD: 19.1 % (ref 18–48)
LYMPHOCYTES NFR BLD: 19.2 % (ref 18–48)
LYMPHOCYTES NFR BLD: 19.7 % (ref 18–48)
LYMPHOCYTES NFR BLD: 2 % (ref 18–48)
LYMPHOCYTES NFR BLD: 20 % (ref 18–48)
LYMPHOCYTES NFR BLD: 20.7 % (ref 18–48)
LYMPHOCYTES NFR BLD: 20.8 % (ref 18–48)
LYMPHOCYTES NFR BLD: 20.9 % (ref 18–48)
LYMPHOCYTES NFR BLD: 21.5 % (ref 18–48)
LYMPHOCYTES NFR BLD: 21.5 % (ref 18–48)
LYMPHOCYTES NFR BLD: 21.8 % (ref 18–48)
LYMPHOCYTES NFR BLD: 21.8 % (ref 18–48)
LYMPHOCYTES NFR BLD: 21.9 % (ref 18–48)
LYMPHOCYTES NFR BLD: 22 % (ref 18–48)
LYMPHOCYTES NFR BLD: 22.1 % (ref 18–48)
LYMPHOCYTES NFR BLD: 22.6 % (ref 18–48)
LYMPHOCYTES NFR BLD: 22.7 % (ref 18–48)
LYMPHOCYTES NFR BLD: 23.1 % (ref 18–48)
LYMPHOCYTES NFR BLD: 23.9 % (ref 18–48)
LYMPHOCYTES NFR BLD: 24 % (ref 18–48)
LYMPHOCYTES NFR BLD: 24.3 % (ref 18–48)
LYMPHOCYTES NFR BLD: 24.5 % (ref 18–48)
LYMPHOCYTES NFR BLD: 24.6 % (ref 18–48)
LYMPHOCYTES NFR BLD: 24.8 % (ref 18–48)
LYMPHOCYTES NFR BLD: 24.8 % (ref 18–48)
LYMPHOCYTES NFR BLD: 24.9 % (ref 18–48)
LYMPHOCYTES NFR BLD: 25 % (ref 18–48)
LYMPHOCYTES NFR BLD: 25.1 % (ref 18–48)
LYMPHOCYTES NFR BLD: 25.5 % (ref 18–48)
LYMPHOCYTES NFR BLD: 25.5 % (ref 18–48)
LYMPHOCYTES NFR BLD: 25.6 % (ref 18–48)
LYMPHOCYTES NFR BLD: 25.7 % (ref 18–48)
LYMPHOCYTES NFR BLD: 25.8 % (ref 18–48)
LYMPHOCYTES NFR BLD: 25.9 % (ref 18–48)
LYMPHOCYTES NFR BLD: 26 % (ref 18–48)
LYMPHOCYTES NFR BLD: 26.3 % (ref 18–48)
LYMPHOCYTES NFR BLD: 27.9 % (ref 18–48)
LYMPHOCYTES NFR BLD: 28 % (ref 18–48)
LYMPHOCYTES NFR BLD: 28.6 % (ref 18–48)
LYMPHOCYTES NFR BLD: 28.7 % (ref 18–48)
LYMPHOCYTES NFR BLD: 28.8 % (ref 18–48)
LYMPHOCYTES NFR BLD: 29.6 % (ref 18–48)
LYMPHOCYTES NFR BLD: 30.4 % (ref 18–48)
LYMPHOCYTES NFR BLD: 32.7 % (ref 18–48)
LYMPHOCYTES NFR BLD: 33 % (ref 18–48)
LYMPHOCYTES NFR BLD: 34.4 % (ref 18–48)
LYMPHOCYTES NFR CSF MANUAL: 8 % (ref 40–80)
LYMPHOCYTES NFR FLD MANUAL: 46 %
LYMPHOCYTES NFR FLD MANUAL: 64 %
Lab: NORMAL
MAGNESIUM SERPL-MCNC: 0.9 MG/DL (ref 1.6–2.6)
MAGNESIUM SERPL-MCNC: 1.1 MG/DL (ref 1.6–2.6)
MAGNESIUM SERPL-MCNC: 1.3 MG/DL (ref 1.6–2.6)
MAGNESIUM SERPL-MCNC: 1.4 MG/DL (ref 1.6–2.6)
MAGNESIUM SERPL-MCNC: 1.5 MG/DL (ref 1.6–2.6)
MAGNESIUM SERPL-MCNC: 1.6 MG/DL (ref 1.6–2.6)
MAGNESIUM SERPL-MCNC: 1.7 MG/DL (ref 1.6–2.6)
MAGNESIUM SERPL-MCNC: 1.8 MG/DL (ref 1.6–2.6)
MAGNESIUM SERPL-MCNC: 1.8 MG/DL (ref 1.6–2.6)
MAGNESIUM SERPL-MCNC: 2 MG/DL (ref 1.6–2.6)
MAGNESIUM SERPL-MCNC: 2.1 MG/DL (ref 1.6–2.6)
MAGNESIUM SERPL-MCNC: 2.2 MG/DL (ref 1.6–2.6)
MAGNESIUM SERPL-MCNC: 2.3 MG/DL (ref 1.6–2.6)
MAGNESIUM SERPL-MCNC: 2.4 MG/DL (ref 1.6–2.6)
MAGNESIUM SERPL-MCNC: 2.5 MG/DL (ref 1.6–2.6)
MAGNESIUM SERPL-MCNC: 2.7 MG/DL (ref 1.6–2.6)
MAGNESIUM SERPL-MCNC: 2.7 MG/DL (ref 1.6–2.6)
MAGNESIUM SERPL-MCNC: 3 MG/DL (ref 1.6–2.6)
MCH RBC QN AUTO: 29.2 PG (ref 27–31)
MCH RBC QN AUTO: 29.7 PG (ref 27–31)
MCH RBC QN AUTO: 29.8 PG (ref 27–31)
MCH RBC QN AUTO: 29.9 PG (ref 27–31)
MCH RBC QN AUTO: 30.1 PG (ref 27–31)
MCH RBC QN AUTO: 30.2 PG (ref 27–31)
MCH RBC QN AUTO: 30.3 PG (ref 27–31)
MCH RBC QN AUTO: 30.4 PG (ref 27–31)
MCH RBC QN AUTO: 30.5 PG (ref 27–31)
MCH RBC QN AUTO: 30.6 PG (ref 27–31)
MCH RBC QN AUTO: 30.6 PG (ref 27–31)
MCH RBC QN AUTO: 30.7 PG (ref 27–31)
MCH RBC QN AUTO: 30.8 PG (ref 27–31)
MCH RBC QN AUTO: 30.9 PG (ref 27–31)
MCH RBC QN AUTO: 31 PG (ref 27–31)
MCH RBC QN AUTO: 31.1 PG (ref 27–31)
MCH RBC QN AUTO: 31.2 PG (ref 27–31)
MCH RBC QN AUTO: 31.3 PG (ref 27–31)
MCH RBC QN AUTO: 31.5 PG (ref 27–31)
MCH RBC QN AUTO: 32.1 PG (ref 27–31)
MCHC RBC AUTO-ENTMCNC: 31.2 G/DL (ref 32–36)
MCHC RBC AUTO-ENTMCNC: 31.3 G/DL (ref 32–36)
MCHC RBC AUTO-ENTMCNC: 31.5 G/DL (ref 32–36)
MCHC RBC AUTO-ENTMCNC: 31.6 G/DL (ref 32–36)
MCHC RBC AUTO-ENTMCNC: 31.6 G/DL (ref 32–36)
MCHC RBC AUTO-ENTMCNC: 31.8 G/DL (ref 32–36)
MCHC RBC AUTO-ENTMCNC: 32.1 G/DL (ref 32–36)
MCHC RBC AUTO-ENTMCNC: 32.2 G/DL (ref 32–36)
MCHC RBC AUTO-ENTMCNC: 32.2 G/DL (ref 32–36)
MCHC RBC AUTO-ENTMCNC: 32.3 G/DL (ref 32–36)
MCHC RBC AUTO-ENTMCNC: 32.3 G/DL (ref 32–36)
MCHC RBC AUTO-ENTMCNC: 32.4 G/DL (ref 32–36)
MCHC RBC AUTO-ENTMCNC: 32.5 G/DL (ref 32–36)
MCHC RBC AUTO-ENTMCNC: 32.5 G/DL (ref 32–36)
MCHC RBC AUTO-ENTMCNC: 32.7 G/DL (ref 32–36)
MCHC RBC AUTO-ENTMCNC: 32.8 G/DL (ref 32–36)
MCHC RBC AUTO-ENTMCNC: 32.9 G/DL (ref 32–36)
MCHC RBC AUTO-ENTMCNC: 33 G/DL (ref 32–36)
MCHC RBC AUTO-ENTMCNC: 33 G/DL (ref 32–36)
MCHC RBC AUTO-ENTMCNC: 33.1 G/DL (ref 32–36)
MCHC RBC AUTO-ENTMCNC: 33.2 G/DL (ref 32–36)
MCHC RBC AUTO-ENTMCNC: 33.2 G/DL (ref 32–36)
MCHC RBC AUTO-ENTMCNC: 33.3 G/DL (ref 32–36)
MCHC RBC AUTO-ENTMCNC: 33.4 G/DL (ref 32–36)
MCHC RBC AUTO-ENTMCNC: 33.5 G/DL (ref 32–36)
MCHC RBC AUTO-ENTMCNC: 33.6 G/DL (ref 32–36)
MCHC RBC AUTO-ENTMCNC: 33.9 G/DL (ref 32–36)
MCHC RBC AUTO-ENTMCNC: 34.2 G/DL (ref 32–36)
MCHC RBC AUTO-ENTMCNC: 34.7 G/DL (ref 32–36)
MCHC RBC AUTO-ENTMCNC: 34.9 G/DL (ref 32–36)
MCHC RBC AUTO-ENTMCNC: 35 G/DL (ref 32–36)
MCV RBC AUTO: 87 FL (ref 82–98)
MCV RBC AUTO: 88 FL (ref 82–98)
MCV RBC AUTO: 90 FL (ref 82–98)
MCV RBC AUTO: 91 FL (ref 82–98)
MCV RBC AUTO: 92 FL (ref 82–98)
MCV RBC AUTO: 93 FL (ref 82–98)
MCV RBC AUTO: 94 FL (ref 82–98)
MCV RBC AUTO: 95 FL (ref 82–98)
MCV RBC AUTO: 96 FL (ref 82–98)
MCV RBC AUTO: 97 FL (ref 82–98)
MESOTHL CELL NFR FLD MANUAL: 3 %
METHADONE UR QL SCN>300 NG/ML: NEGATIVE
MICROSCOPIC COMMENT: ABNORMAL
MODE: ABNORMAL
MODE: ABNORMAL
MONOCYTES # BLD AUTO: 0 K/UL (ref 0.3–1)
MONOCYTES # BLD AUTO: 0.6 K/UL (ref 0.3–1)
MONOCYTES # BLD AUTO: 0.7 K/UL (ref 0.3–1)
MONOCYTES # BLD AUTO: 0.8 K/UL (ref 0.3–1)
MONOCYTES # BLD AUTO: 0.9 K/UL (ref 0.3–1)
MONOCYTES # BLD AUTO: 1 K/UL (ref 0.3–1)
MONOCYTES # BLD AUTO: 1.1 K/UL (ref 0.3–1)
MONOCYTES # BLD AUTO: 1.2 K/UL (ref 0.3–1)
MONOCYTES # BLD AUTO: 1.3 K/UL (ref 0.3–1)
MONOCYTES # BLD AUTO: 1.4 K/UL (ref 0.3–1)
MONOCYTES # BLD AUTO: 1.5 K/UL (ref 0.3–1)
MONOCYTES # BLD AUTO: 1.6 K/UL (ref 0.3–1)
MONOCYTES # BLD AUTO: 1.7 K/UL (ref 0.3–1)
MONOCYTES # BLD AUTO: 1.7 K/UL (ref 0.3–1)
MONOCYTES NFR BLD: 0.2 % (ref 4–15)
MONOCYTES NFR BLD: 10 % (ref 4–15)
MONOCYTES NFR BLD: 10.3 % (ref 4–15)
MONOCYTES NFR BLD: 10.5 % (ref 4–15)
MONOCYTES NFR BLD: 11.1 % (ref 4–15)
MONOCYTES NFR BLD: 11.5 % (ref 4–15)
MONOCYTES NFR BLD: 11.5 % (ref 4–15)
MONOCYTES NFR BLD: 11.7 % (ref 4–15)
MONOCYTES NFR BLD: 12 % (ref 4–15)
MONOCYTES NFR BLD: 12.2 % (ref 4–15)
MONOCYTES NFR BLD: 12.5 % (ref 4–15)
MONOCYTES NFR BLD: 13.2 % (ref 4–15)
MONOCYTES NFR BLD: 13.4 % (ref 4–15)
MONOCYTES NFR BLD: 13.4 % (ref 4–15)
MONOCYTES NFR BLD: 13.6 % (ref 4–15)
MONOCYTES NFR BLD: 13.8 % (ref 4–15)
MONOCYTES NFR BLD: 14 % (ref 4–15)
MONOCYTES NFR BLD: 14 % (ref 4–15)
MONOCYTES NFR BLD: 14.1 % (ref 4–15)
MONOCYTES NFR BLD: 14.3 % (ref 4–15)
MONOCYTES NFR BLD: 14.4 % (ref 4–15)
MONOCYTES NFR BLD: 14.9 % (ref 4–15)
MONOCYTES NFR BLD: 15.1 % (ref 4–15)
MONOCYTES NFR BLD: 15.2 % (ref 4–15)
MONOCYTES NFR BLD: 15.2 % (ref 4–15)
MONOCYTES NFR BLD: 15.6 % (ref 4–15)
MONOCYTES NFR BLD: 16.2 % (ref 4–15)
MONOCYTES NFR BLD: 16.4 % (ref 4–15)
MONOCYTES NFR BLD: 16.5 % (ref 4–15)
MONOCYTES NFR BLD: 17.1 % (ref 4–15)
MONOCYTES NFR BLD: 17.4 % (ref 4–15)
MONOCYTES NFR BLD: 17.6 % (ref 4–15)
MONOCYTES NFR BLD: 18 % (ref 4–15)
MONOCYTES NFR BLD: 18.1 % (ref 4–15)
MONOCYTES NFR BLD: 18.3 % (ref 4–15)
MONOCYTES NFR BLD: 18.3 % (ref 4–15)
MONOCYTES NFR BLD: 18.4 % (ref 4–15)
MONOCYTES NFR BLD: 18.5 % (ref 4–15)
MONOCYTES NFR BLD: 18.6 % (ref 4–15)
MONOCYTES NFR BLD: 18.8 % (ref 4–15)
MONOCYTES NFR BLD: 19 % (ref 4–15)
MONOCYTES NFR BLD: 19 % (ref 4–15)
MONOCYTES NFR BLD: 19.1 % (ref 4–15)
MONOCYTES NFR BLD: 19.3 % (ref 4–15)
MONOCYTES NFR BLD: 19.6 % (ref 4–15)
MONOCYTES NFR BLD: 19.9 % (ref 4–15)
MONOCYTES NFR BLD: 20.6 % (ref 4–15)
MONOCYTES NFR BLD: 20.9 % (ref 4–15)
MONOS+MACROS NFR CSF MANUAL: 11 % (ref 15–45)
MONOS+MACROS NFR FLD MANUAL: 19 %
MONOS+MACROS NFR FLD MANUAL: 30 %
MV A" WAVE DURATION": 12.94 MSEC
MV PEAK A VEL: 1.02 M/S
MV PEAK A VEL: 1.16 M/S
MV PEAK E VEL: 0.91 M/S
MV PEAK E VEL: 1.16 M/S
MV STENOSIS PRESSURE HALF TIME: 69 MS
MV STENOSIS PRESSURE HALF TIME: 69.72 MS
MV VALVE AREA P 1/2 METHOD: 3.16 CM2
MV VALVE AREA P 1/2 METHOD: 3.19 CM2
NEUTROPHILS # BLD AUTO: 2.5 K/UL (ref 1.8–7.7)
NEUTROPHILS # BLD AUTO: 2.6 K/UL (ref 1.8–7.7)
NEUTROPHILS # BLD AUTO: 2.8 K/UL (ref 1.8–7.7)
NEUTROPHILS # BLD AUTO: 2.9 K/UL (ref 1.8–7.7)
NEUTROPHILS # BLD AUTO: 3 K/UL (ref 1.8–7.7)
NEUTROPHILS # BLD AUTO: 3.1 K/UL (ref 1.8–7.7)
NEUTROPHILS # BLD AUTO: 3.2 K/UL (ref 1.8–7.7)
NEUTROPHILS # BLD AUTO: 3.3 K/UL (ref 1.8–7.7)
NEUTROPHILS # BLD AUTO: 3.3 K/UL (ref 1.8–7.7)
NEUTROPHILS # BLD AUTO: 3.4 K/UL (ref 1.8–7.7)
NEUTROPHILS # BLD AUTO: 3.4 K/UL (ref 1.8–7.7)
NEUTROPHILS # BLD AUTO: 3.5 K/UL (ref 1.8–7.7)
NEUTROPHILS # BLD AUTO: 3.5 K/UL (ref 1.8–7.7)
NEUTROPHILS # BLD AUTO: 3.6 K/UL (ref 1.8–7.7)
NEUTROPHILS # BLD AUTO: 3.7 K/UL (ref 1.8–7.7)
NEUTROPHILS # BLD AUTO: 3.8 K/UL (ref 1.8–7.7)
NEUTROPHILS # BLD AUTO: 3.9 K/UL (ref 1.8–7.7)
NEUTROPHILS # BLD AUTO: 4 K/UL (ref 1.8–7.7)
NEUTROPHILS # BLD AUTO: 4 K/UL (ref 1.8–7.7)
NEUTROPHILS # BLD AUTO: 4.1 K/UL (ref 1.8–7.7)
NEUTROPHILS # BLD AUTO: 4.1 K/UL (ref 1.8–7.7)
NEUTROPHILS # BLD AUTO: 4.2 K/UL (ref 1.8–7.7)
NEUTROPHILS # BLD AUTO: 4.3 K/UL (ref 1.8–7.7)
NEUTROPHILS # BLD AUTO: 4.4 K/UL (ref 1.8–7.7)
NEUTROPHILS # BLD AUTO: 4.5 K/UL (ref 1.8–7.7)
NEUTROPHILS # BLD AUTO: 4.5 K/UL (ref 1.8–7.7)
NEUTROPHILS # BLD AUTO: 4.6 K/UL (ref 1.8–7.7)
NEUTROPHILS # BLD AUTO: 4.6 K/UL (ref 1.8–7.7)
NEUTROPHILS # BLD AUTO: 4.7 K/UL (ref 1.8–7.7)
NEUTROPHILS # BLD AUTO: 4.8 K/UL (ref 1.8–7.7)
NEUTROPHILS # BLD AUTO: 4.9 K/UL (ref 1.8–7.7)
NEUTROPHILS # BLD AUTO: 5 K/UL (ref 1.8–7.7)
NEUTROPHILS # BLD AUTO: 5.3 K/UL (ref 1.8–7.7)
NEUTROPHILS # BLD AUTO: 5.4 K/UL (ref 1.8–7.7)
NEUTROPHILS # BLD AUTO: 7.8 K/UL (ref 1.8–7.7)
NEUTROPHILS NFR BLD: 44.2 % (ref 38–73)
NEUTROPHILS NFR BLD: 46.2 % (ref 38–73)
NEUTROPHILS NFR BLD: 47.9 % (ref 38–73)
NEUTROPHILS NFR BLD: 48 % (ref 38–73)
NEUTROPHILS NFR BLD: 49.3 % (ref 38–73)
NEUTROPHILS NFR BLD: 49.7 % (ref 38–73)
NEUTROPHILS NFR BLD: 49.8 % (ref 38–73)
NEUTROPHILS NFR BLD: 50.1 % (ref 38–73)
NEUTROPHILS NFR BLD: 50.2 % (ref 38–73)
NEUTROPHILS NFR BLD: 50.4 % (ref 38–73)
NEUTROPHILS NFR BLD: 50.6 % (ref 38–73)
NEUTROPHILS NFR BLD: 50.7 % (ref 38–73)
NEUTROPHILS NFR BLD: 51.1 % (ref 38–73)
NEUTROPHILS NFR BLD: 51.3 % (ref 38–73)
NEUTROPHILS NFR BLD: 51.4 % (ref 38–73)
NEUTROPHILS NFR BLD: 51.9 % (ref 38–73)
NEUTROPHILS NFR BLD: 52.1 % (ref 38–73)
NEUTROPHILS NFR BLD: 52.1 % (ref 38–73)
NEUTROPHILS NFR BLD: 52.2 % (ref 38–73)
NEUTROPHILS NFR BLD: 52.2 % (ref 38–73)
NEUTROPHILS NFR BLD: 53 % (ref 38–73)
NEUTROPHILS NFR BLD: 53 % (ref 38–73)
NEUTROPHILS NFR BLD: 53.3 % (ref 38–73)
NEUTROPHILS NFR BLD: 53.5 % (ref 38–73)
NEUTROPHILS NFR BLD: 53.9 % (ref 38–73)
NEUTROPHILS NFR BLD: 53.9 % (ref 38–73)
NEUTROPHILS NFR BLD: 54.1 % (ref 38–73)
NEUTROPHILS NFR BLD: 54.2 % (ref 38–73)
NEUTROPHILS NFR BLD: 54.6 % (ref 38–73)
NEUTROPHILS NFR BLD: 54.9 % (ref 38–73)
NEUTROPHILS NFR BLD: 54.9 % (ref 38–73)
NEUTROPHILS NFR BLD: 55 % (ref 38–73)
NEUTROPHILS NFR BLD: 55.4 % (ref 38–73)
NEUTROPHILS NFR BLD: 55.7 % (ref 38–73)
NEUTROPHILS NFR BLD: 56 % (ref 38–73)
NEUTROPHILS NFR BLD: 56.1 % (ref 38–73)
NEUTROPHILS NFR BLD: 56.4 % (ref 38–73)
NEUTROPHILS NFR BLD: 57.3 % (ref 38–73)
NEUTROPHILS NFR BLD: 57.4 % (ref 38–73)
NEUTROPHILS NFR BLD: 58.1 % (ref 38–73)
NEUTROPHILS NFR BLD: 58.7 % (ref 38–73)
NEUTROPHILS NFR BLD: 59.1 % (ref 38–73)
NEUTROPHILS NFR BLD: 59.9 % (ref 38–73)
NEUTROPHILS NFR BLD: 60.5 % (ref 38–73)
NEUTROPHILS NFR BLD: 61.4 % (ref 38–73)
NEUTROPHILS NFR BLD: 61.8 % (ref 38–73)
NEUTROPHILS NFR BLD: 62.2 % (ref 38–73)
NEUTROPHILS NFR BLD: 63 % (ref 38–73)
NEUTROPHILS NFR BLD: 63.8 % (ref 38–73)
NEUTROPHILS NFR BLD: 63.8 % (ref 38–73)
NEUTROPHILS NFR BLD: 68.9 % (ref 38–73)
NEUTROPHILS NFR BLD: 96 % (ref 38–73)
NEUTROPHILS NFR CSF MANUAL: 81 % (ref 0–6)
NEUTROPHILS NFR FLD MANUAL: 14 %
NEUTROPHILS NFR FLD MANUAL: 24 %
NITRITE UR QL STRIP: NEGATIVE
NITRITE UR QL STRIP: POSITIVE
NRBC BLD-RTO: 0 /100 WBC
NUM UNITS TRANS PACKED RBC: NORMAL
O+P STL MICRO: NORMAL
OPIATES UR QL SCN: NEGATIVE
OVALOCYTES BLD QL SMEAR: ABNORMAL
PATH REV BLD -IMP: NORMAL
PCO2 BLDA: 31.2 MMHG (ref 35–45)
PCO2 BLDA: 32.2 MMHG (ref 35–45)
PCO2 BLDA: 39.1 MMHG (ref 35–45)
PCO2 BLDA: 44.2 MMHG (ref 35–45)
PCO2 BLDA: 44.3 MMHG (ref 35–45)
PCP UR QL SCN>25 NG/ML: NEGATIVE
PETH 16:0/18.1 (POPETH): <10 NG/ML
PETH 16:0/18.2 (PLPETH): <10 NG/ML
PH SMN: 7.29 [PH] (ref 7.35–7.45)
PH SMN: 7.36 [PH] (ref 7.35–7.45)
PH SMN: 7.38 [PH] (ref 7.35–7.45)
PH SMN: 7.4 [PH] (ref 7.35–7.45)
PH SMN: 7.58 [PH] (ref 7.35–7.45)
PH UR STRIP: 5 [PH] (ref 5–8)
PH UR STRIP: 5 [PH] (ref 5–8)
PH UR STRIP: 6 [PH] (ref 5–8)
PH UR STRIP: 7 [PH] (ref 5–8)
PH UR STRIP: 7 [PH] (ref 5–8)
PHOSPHATE SERPL-MCNC: 2 MG/DL (ref 2.7–4.5)
PHOSPHATE SERPL-MCNC: 2.1 MG/DL (ref 2.7–4.5)
PHOSPHATE SERPL-MCNC: 2.3 MG/DL (ref 2.7–4.5)
PHOSPHATE SERPL-MCNC: 2.7 MG/DL (ref 2.7–4.5)
PHOSPHATE SERPL-MCNC: 2.9 MG/DL (ref 2.7–4.5)
PHOSPHATE SERPL-MCNC: 3 MG/DL (ref 2.7–4.5)
PHOSPHATE SERPL-MCNC: 3.1 MG/DL (ref 2.7–4.5)
PHOSPHATE SERPL-MCNC: 3.2 MG/DL (ref 2.7–4.5)
PHOSPHATE SERPL-MCNC: 3.2 MG/DL (ref 2.7–4.5)
PHOSPHATE SERPL-MCNC: 3.3 MG/DL (ref 2.7–4.5)
PHOSPHATE SERPL-MCNC: 3.4 MG/DL (ref 2.7–4.5)
PHOSPHATE SERPL-MCNC: 3.5 MG/DL (ref 2.7–4.5)
PHOSPHATE SERPL-MCNC: 3.6 MG/DL (ref 2.7–4.5)
PHOSPHATE SERPL-MCNC: 3.6 MG/DL (ref 2.7–4.5)
PHOSPHATE SERPL-MCNC: 3.7 MG/DL (ref 2.7–4.5)
PHOSPHATE SERPL-MCNC: 3.8 MG/DL (ref 2.7–4.5)
PHOSPHATE SERPL-MCNC: 3.9 MG/DL (ref 2.7–4.5)
PHOSPHATE SERPL-MCNC: 4 MG/DL (ref 2.7–4.5)
PHOSPHATE SERPL-MCNC: 4.1 MG/DL (ref 2.7–4.5)
PHOSPHATE SERPL-MCNC: 4.1 MG/DL (ref 2.7–4.5)
PHOSPHATE SERPL-MCNC: 4.3 MG/DL (ref 2.7–4.5)
PHOSPHATE SERPL-MCNC: 4.4 MG/DL (ref 2.7–4.5)
PHOSPHATE SERPL-MCNC: 4.5 MG/DL (ref 2.7–4.5)
PISA TR MAX VEL: 2.39 M/S
PISA TR MAX VEL: 2.56 M/S
PLATELET # BLD AUTO: 100 K/UL (ref 150–450)
PLATELET # BLD AUTO: 100 K/UL (ref 150–450)
PLATELET # BLD AUTO: 101 K/UL (ref 150–450)
PLATELET # BLD AUTO: 102 K/UL (ref 150–450)
PLATELET # BLD AUTO: 102 K/UL (ref 150–450)
PLATELET # BLD AUTO: 103 K/UL (ref 150–450)
PLATELET # BLD AUTO: 105 K/UL (ref 150–450)
PLATELET # BLD AUTO: 108 K/UL (ref 150–450)
PLATELET # BLD AUTO: 111 K/UL (ref 150–450)
PLATELET # BLD AUTO: 112 K/UL (ref 150–450)
PLATELET # BLD AUTO: 113 K/UL (ref 150–450)
PLATELET # BLD AUTO: 117 K/UL (ref 150–450)
PLATELET # BLD AUTO: 118 K/UL (ref 150–450)
PLATELET # BLD AUTO: 123 K/UL (ref 150–450)
PLATELET # BLD AUTO: 128 K/UL (ref 150–450)
PLATELET # BLD AUTO: 135 K/UL (ref 150–450)
PLATELET # BLD AUTO: 154 K/UL (ref 150–450)
PLATELET # BLD AUTO: 55 K/UL (ref 150–450)
PLATELET # BLD AUTO: 55 K/UL (ref 150–450)
PLATELET # BLD AUTO: 56 K/UL (ref 150–450)
PLATELET # BLD AUTO: 58 K/UL (ref 150–450)
PLATELET # BLD AUTO: 60 K/UL (ref 150–450)
PLATELET # BLD AUTO: 61 K/UL (ref 150–450)
PLATELET # BLD AUTO: 64 K/UL (ref 150–450)
PLATELET # BLD AUTO: 64 K/UL (ref 150–450)
PLATELET # BLD AUTO: 65 K/UL (ref 150–450)
PLATELET # BLD AUTO: 67 K/UL (ref 150–450)
PLATELET # BLD AUTO: 67 K/UL (ref 150–450)
PLATELET # BLD AUTO: 72 K/UL (ref 150–450)
PLATELET # BLD AUTO: 74 K/UL (ref 150–450)
PLATELET # BLD AUTO: 79 K/UL (ref 150–450)
PLATELET # BLD AUTO: 79 K/UL (ref 150–450)
PLATELET # BLD AUTO: 80 K/UL (ref 150–450)
PLATELET # BLD AUTO: 82 K/UL (ref 150–450)
PLATELET # BLD AUTO: 82 K/UL (ref 150–450)
PLATELET # BLD AUTO: 85 K/UL (ref 150–450)
PLATELET # BLD AUTO: 86 K/UL (ref 150–450)
PLATELET # BLD AUTO: 87 K/UL (ref 150–450)
PLATELET # BLD AUTO: 87 K/UL (ref 150–450)
PLATELET # BLD AUTO: 89 K/UL (ref 150–450)
PLATELET # BLD AUTO: 89 K/UL (ref 150–450)
PLATELET # BLD AUTO: 91 K/UL (ref 150–450)
PLATELET # BLD AUTO: 94 K/UL (ref 150–450)
PLATELET # BLD AUTO: 95 K/UL (ref 150–450)
PLATELET # BLD AUTO: 96 K/UL (ref 150–450)
PLATELET # BLD AUTO: 97 K/UL (ref 150–450)
PLATELET # BLD AUTO: 98 K/UL (ref 150–450)
PLATELET BLD QL SMEAR: ABNORMAL
PMV BLD AUTO: 10 FL (ref 9.2–12.9)
PMV BLD AUTO: 10.1 FL (ref 9.2–12.9)
PMV BLD AUTO: 10.2 FL (ref 9.2–12.9)
PMV BLD AUTO: 10.3 FL (ref 9.2–12.9)
PMV BLD AUTO: 10.3 FL (ref 9.2–12.9)
PMV BLD AUTO: 10.4 FL (ref 9.2–12.9)
PMV BLD AUTO: 10.5 FL (ref 9.2–12.9)
PMV BLD AUTO: 10.6 FL (ref 9.2–12.9)
PMV BLD AUTO: 10.6 FL (ref 9.2–12.9)
PMV BLD AUTO: 10.7 FL (ref 9.2–12.9)
PMV BLD AUTO: 10.8 FL (ref 9.2–12.9)
PMV BLD AUTO: 10.9 FL (ref 9.2–12.9)
PMV BLD AUTO: 10.9 FL (ref 9.2–12.9)
PMV BLD AUTO: 11 FL (ref 9.2–12.9)
PMV BLD AUTO: 11.1 FL (ref 9.2–12.9)
PMV BLD AUTO: 11.1 FL (ref 9.2–12.9)
PMV BLD AUTO: 11.3 FL (ref 9.2–12.9)
PMV BLD AUTO: 11.3 FL (ref 9.2–12.9)
PMV BLD AUTO: 11.4 FL (ref 9.2–12.9)
PMV BLD AUTO: 11.5 FL (ref 9.2–12.9)
PMV BLD AUTO: 11.6 FL (ref 9.2–12.9)
PMV BLD AUTO: 11.7 FL (ref 9.2–12.9)
PMV BLD AUTO: 11.9 FL (ref 9.2–12.9)
PMV BLD AUTO: 12.2 FL (ref 9.2–12.9)
PMV BLD AUTO: 12.5 FL (ref 9.2–12.9)
PMV BLD AUTO: 9.3 FL (ref 9.2–12.9)
PMV BLD AUTO: 9.4 FL (ref 9.2–12.9)
PMV BLD AUTO: 9.5 FL (ref 9.2–12.9)
PMV BLD AUTO: 9.5 FL (ref 9.2–12.9)
PMV BLD AUTO: 9.6 FL (ref 9.2–12.9)
PMV BLD AUTO: 9.7 FL (ref 9.2–12.9)
PMV BLD AUTO: 9.7 FL (ref 9.2–12.9)
PMV BLD AUTO: 9.8 FL (ref 9.2–12.9)
PMV BLD AUTO: 9.9 FL (ref 9.2–12.9)
PMV BLD AUTO: 9.9 FL (ref 9.2–12.9)
PO2 BLDA: 102 MMHG (ref 80–100)
PO2 BLDA: 144 MMHG (ref 80–100)
PO2 BLDA: 43 MMHG (ref 40–60)
PO2 BLDA: 58 MMHG (ref 80–100)
PO2 BLDA: 59 MMHG (ref 80–100)
POC BE: -4 MMOL/L
POC BE: -5 MMOL/L
POC BE: -6 MMOL/L
POC BE: 1 MMOL/L
POC BE: 8 MMOL/L
POC SATURATED O2: 73 % (ref 95–100)
POC SATURATED O2: 89 % (ref 95–100)
POC SATURATED O2: 94 % (ref 95–100)
POC SATURATED O2: 98 % (ref 95–100)
POC SATURATED O2: 99 % (ref 95–100)
POC TCO2: 20 MMOL/L (ref 23–27)
POC TCO2: 23 MMOL/L (ref 23–27)
POC TCO2: 23 MMOL/L (ref 24–29)
POC TCO2: 27 MMOL/L (ref 23–27)
POC TCO2: 31 MMOL/L (ref 23–27)
POCT GLUCOSE: 103 MG/DL (ref 70–110)
POCT GLUCOSE: 104 MG/DL (ref 70–110)
POCT GLUCOSE: 107 MG/DL (ref 70–110)
POCT GLUCOSE: 109 MG/DL (ref 70–110)
POCT GLUCOSE: 110 MG/DL (ref 70–110)
POCT GLUCOSE: 110 MG/DL (ref 70–110)
POCT GLUCOSE: 111 MG/DL (ref 70–110)
POCT GLUCOSE: 111 MG/DL (ref 70–110)
POCT GLUCOSE: 114 MG/DL (ref 70–110)
POCT GLUCOSE: 114 MG/DL (ref 70–110)
POCT GLUCOSE: 115 MG/DL (ref 70–110)
POCT GLUCOSE: 116 MG/DL (ref 70–110)
POCT GLUCOSE: 117 MG/DL (ref 70–110)
POCT GLUCOSE: 117 MG/DL (ref 70–110)
POCT GLUCOSE: 118 MG/DL (ref 70–110)
POCT GLUCOSE: 120 MG/DL (ref 70–110)
POCT GLUCOSE: 120 MG/DL (ref 70–110)
POCT GLUCOSE: 121 MG/DL (ref 70–110)
POCT GLUCOSE: 122 MG/DL (ref 70–110)
POCT GLUCOSE: 123 MG/DL (ref 70–110)
POCT GLUCOSE: 125 MG/DL (ref 70–110)
POCT GLUCOSE: 126 MG/DL (ref 70–110)
POCT GLUCOSE: 127 MG/DL (ref 70–110)
POCT GLUCOSE: 132 MG/DL (ref 70–110)
POCT GLUCOSE: 133 MG/DL (ref 70–110)
POCT GLUCOSE: 136 MG/DL (ref 70–110)
POCT GLUCOSE: 137 MG/DL (ref 70–110)
POCT GLUCOSE: 137 MG/DL (ref 70–110)
POCT GLUCOSE: 140 MG/DL (ref 70–110)
POCT GLUCOSE: 141 MG/DL (ref 70–110)
POCT GLUCOSE: 142 MG/DL (ref 70–110)
POCT GLUCOSE: 142 MG/DL (ref 70–110)
POCT GLUCOSE: 144 MG/DL (ref 70–110)
POCT GLUCOSE: 144 MG/DL (ref 70–110)
POCT GLUCOSE: 148 MG/DL (ref 70–110)
POCT GLUCOSE: 150 MG/DL (ref 70–110)
POCT GLUCOSE: 150 MG/DL (ref 70–110)
POCT GLUCOSE: 151 MG/DL (ref 70–110)
POCT GLUCOSE: 151 MG/DL (ref 70–110)
POCT GLUCOSE: 157 MG/DL (ref 70–110)
POCT GLUCOSE: 168 MG/DL (ref 70–110)
POCT GLUCOSE: 170 MG/DL (ref 70–110)
POCT GLUCOSE: 172 MG/DL (ref 70–110)
POCT GLUCOSE: 173 MG/DL (ref 70–110)
POCT GLUCOSE: 175 MG/DL (ref 70–110)
POCT GLUCOSE: 209 MG/DL (ref 70–110)
POCT GLUCOSE: 216 MG/DL (ref 70–110)
POCT GLUCOSE: 63 MG/DL (ref 70–110)
POCT GLUCOSE: 74 MG/DL (ref 70–110)
POCT GLUCOSE: 87 MG/DL (ref 70–110)
POCT GLUCOSE: 88 MG/DL (ref 70–110)
POCT GLUCOSE: 90 MG/DL (ref 70–110)
POCT GLUCOSE: 90 MG/DL (ref 70–110)
POCT GLUCOSE: 91 MG/DL (ref 70–110)
POCT GLUCOSE: 92 MG/DL (ref 70–110)
POCT GLUCOSE: 95 MG/DL (ref 70–110)
POCT GLUCOSE: 99 MG/DL (ref 70–110)
POIKILOCYTOSIS BLD QL SMEAR: SLIGHT
POLYCHROMASIA BLD QL SMEAR: ABNORMAL
POTASSIUM SERPL-SCNC: 2.8 MMOL/L (ref 3.5–5.1)
POTASSIUM SERPL-SCNC: 3.2 MMOL/L (ref 3.5–5.1)
POTASSIUM SERPL-SCNC: 3.6 MMOL/L (ref 3.5–5.1)
POTASSIUM SERPL-SCNC: 3.6 MMOL/L (ref 3.5–5.1)
POTASSIUM SERPL-SCNC: 3.7 MMOL/L (ref 3.5–5.1)
POTASSIUM SERPL-SCNC: 3.8 MMOL/L (ref 3.5–5.1)
POTASSIUM SERPL-SCNC: 3.9 MMOL/L (ref 3.5–5.1)
POTASSIUM SERPL-SCNC: 4 MMOL/L (ref 3.5–5.1)
POTASSIUM SERPL-SCNC: 4.1 MMOL/L (ref 3.5–5.1)
POTASSIUM SERPL-SCNC: 4.1 MMOL/L (ref 3.5–5.1)
POTASSIUM SERPL-SCNC: 4.2 MMOL/L (ref 3.5–5.1)
POTASSIUM SERPL-SCNC: 4.3 MMOL/L (ref 3.5–5.1)
POTASSIUM SERPL-SCNC: 4.4 MMOL/L (ref 3.5–5.1)
POTASSIUM SERPL-SCNC: 4.5 MMOL/L (ref 3.5–5.1)
POTASSIUM SERPL-SCNC: 4.5 MMOL/L (ref 3.5–5.1)
POTASSIUM SERPL-SCNC: 4.6 MMOL/L (ref 3.5–5.1)
POTASSIUM SERPL-SCNC: 4.7 MMOL/L (ref 3.5–5.1)
POTASSIUM SERPL-SCNC: 4.8 MMOL/L (ref 3.5–5.1)
POTASSIUM SERPL-SCNC: 4.8 MMOL/L (ref 3.5–5.1)
POTASSIUM SERPL-SCNC: 4.9 MMOL/L (ref 3.5–5.1)
POTASSIUM SERPL-SCNC: 5 MMOL/L (ref 3.5–5.1)
POTASSIUM SERPL-SCNC: 5.1 MMOL/L (ref 3.5–5.1)
POTASSIUM SERPL-SCNC: 5.2 MMOL/L (ref 3.5–5.1)
PROT CSF-MCNC: 39 MG/DL (ref 15–40)
PROT FLD-MCNC: 1 G/DL
PROT SERPL-MCNC: 4.5 G/DL (ref 6–8.4)
PROT SERPL-MCNC: 4.6 G/DL (ref 6–8.4)
PROT SERPL-MCNC: 4.7 G/DL (ref 6–8.4)
PROT SERPL-MCNC: 4.9 G/DL (ref 6–8.4)
PROT SERPL-MCNC: 5.1 G/DL (ref 6–8.4)
PROT SERPL-MCNC: 5.1 G/DL (ref 6–8.4)
PROT SERPL-MCNC: 5.2 G/DL (ref 6–8.4)
PROT SERPL-MCNC: 5.3 G/DL (ref 6–8.4)
PROT SERPL-MCNC: 5.4 G/DL (ref 6–8.4)
PROT SERPL-MCNC: 5.5 G/DL (ref 6–8.4)
PROT SERPL-MCNC: 5.6 G/DL (ref 6–8.4)
PROT SERPL-MCNC: 5.7 G/DL (ref 6–8.4)
PROT SERPL-MCNC: 5.7 G/DL (ref 6–8.4)
PROT SERPL-MCNC: 5.8 G/DL (ref 6–8.4)
PROT SERPL-MCNC: 5.9 G/DL (ref 6–8.4)
PROT SERPL-MCNC: 5.9 G/DL (ref 6–8.4)
PROT SERPL-MCNC: 6 G/DL (ref 6–8.4)
PROT SERPL-MCNC: 6 G/DL (ref 6–8.4)
PROT SERPL-MCNC: 6.1 G/DL (ref 6–8.4)
PROT SERPL-MCNC: 6.2 G/DL (ref 6–8.4)
PROT SERPL-MCNC: 6.3 G/DL (ref 6–8.4)
PROT SERPL-MCNC: 6.4 G/DL (ref 6–8.4)
PROT SERPL-MCNC: 6.5 G/DL (ref 6–8.4)
PROT SERPL-MCNC: 6.7 G/DL (ref 6–8.4)
PROT SERPL-MCNC: 6.8 G/DL (ref 6–8.4)
PROT SERPL-MCNC: 6.9 G/DL (ref 6–8.4)
PROT SERPL-MCNC: 6.9 G/DL (ref 6–8.4)
PROT SERPL-MCNC: 7.2 G/DL (ref 6–8.4)
PROT UR QL STRIP: ABNORMAL
PROT UR QL STRIP: NEGATIVE
PROT UR QL STRIP: NEGATIVE
PROTHROMBIN TIME: 15.5 SEC (ref 9–12.5)
PROTHROMBIN TIME: 15.6 SEC (ref 9–12.5)
PROTHROMBIN TIME: 15.6 SEC (ref 9–12.5)
PROTHROMBIN TIME: 15.8 SEC (ref 9–12.5)
PROTHROMBIN TIME: 15.9 SEC (ref 9–12.5)
PROTHROMBIN TIME: 16.8 SEC (ref 9–12.5)
PROTHROMBIN TIME: 16.8 SEC (ref 9–12.5)
PROTHROMBIN TIME: 16.9 SEC (ref 9–12.5)
PROTHROMBIN TIME: 17 SEC (ref 9–12.5)
PROTHROMBIN TIME: 17 SEC (ref 9–12.5)
PROTHROMBIN TIME: 17.3 SEC (ref 9–12.5)
PROTHROMBIN TIME: 17.7 SEC (ref 9–12.5)
PROTHROMBIN TIME: 18.4 SEC (ref 9–12.5)
PROTHROMBIN TIME: 18.8 SEC (ref 9–12.5)
PROTHROMBIN TIME: 18.8 SEC (ref 9–12.5)
PROTHROMBIN TIME: 20.7 SEC (ref 9–12.5)
PULM VEIN S/D RATIO: 2.09
PV PEAK D VEL: 0.35 M/S
PV PEAK S VEL: 0.73 M/S
QEF: 48 %
RA MAJOR: 4.53 CM
RA MAJOR: 5.04 CM
RA PRESSURE: 3 MMHG
RA PRESSURE: 3 MMHG
RA WIDTH: 3.67 CM
RA WIDTH: 4.01 CM
RBC # BLD AUTO: 2 M/UL (ref 4–5.4)
RBC # BLD AUTO: 2.05 M/UL (ref 4–5.4)
RBC # BLD AUTO: 2.06 M/UL (ref 4–5.4)
RBC # BLD AUTO: 2.21 M/UL (ref 4–5.4)
RBC # BLD AUTO: 2.22 M/UL (ref 4–5.4)
RBC # BLD AUTO: 2.23 M/UL (ref 4–5.4)
RBC # BLD AUTO: 2.35 M/UL (ref 4–5.4)
RBC # BLD AUTO: 2.38 M/UL (ref 4–5.4)
RBC # BLD AUTO: 2.42 M/UL (ref 4–5.4)
RBC # BLD AUTO: 2.42 M/UL (ref 4–5.4)
RBC # BLD AUTO: 2.43 M/UL (ref 4–5.4)
RBC # BLD AUTO: 2.46 M/UL (ref 4–5.4)
RBC # BLD AUTO: 2.47 M/UL (ref 4–5.4)
RBC # BLD AUTO: 2.48 M/UL (ref 4–5.4)
RBC # BLD AUTO: 2.53 M/UL (ref 4–5.4)
RBC # BLD AUTO: 2.53 M/UL (ref 4–5.4)
RBC # BLD AUTO: 2.54 M/UL (ref 4–5.4)
RBC # BLD AUTO: 2.54 M/UL (ref 4–5.4)
RBC # BLD AUTO: 2.55 M/UL (ref 4–5.4)
RBC # BLD AUTO: 2.56 M/UL (ref 4–5.4)
RBC # BLD AUTO: 2.57 M/UL (ref 4–5.4)
RBC # BLD AUTO: 2.59 M/UL (ref 4–5.4)
RBC # BLD AUTO: 2.59 M/UL (ref 4–5.4)
RBC # BLD AUTO: 2.6 M/UL (ref 4–5.4)
RBC # BLD AUTO: 2.62 M/UL (ref 4–5.4)
RBC # BLD AUTO: 2.62 M/UL (ref 4–5.4)
RBC # BLD AUTO: 2.63 M/UL (ref 4–5.4)
RBC # BLD AUTO: 2.65 M/UL (ref 4–5.4)
RBC # BLD AUTO: 2.66 M/UL (ref 4–5.4)
RBC # BLD AUTO: 2.68 M/UL (ref 4–5.4)
RBC # BLD AUTO: 2.7 M/UL (ref 4–5.4)
RBC # BLD AUTO: 2.71 M/UL (ref 4–5.4)
RBC # BLD AUTO: 2.71 M/UL (ref 4–5.4)
RBC # BLD AUTO: 2.72 M/UL (ref 4–5.4)
RBC # BLD AUTO: 2.73 M/UL (ref 4–5.4)
RBC # BLD AUTO: 2.74 M/UL (ref 4–5.4)
RBC # BLD AUTO: 2.75 M/UL (ref 4–5.4)
RBC # BLD AUTO: 2.76 M/UL (ref 4–5.4)
RBC # BLD AUTO: 2.77 M/UL (ref 4–5.4)
RBC # BLD AUTO: 2.77 M/UL (ref 4–5.4)
RBC # BLD AUTO: 2.78 M/UL (ref 4–5.4)
RBC # BLD AUTO: 2.78 M/UL (ref 4–5.4)
RBC # BLD AUTO: 2.82 M/UL (ref 4–5.4)
RBC # BLD AUTO: 2.82 M/UL (ref 4–5.4)
RBC # BLD AUTO: 2.86 M/UL (ref 4–5.4)
RBC # BLD AUTO: 2.96 M/UL (ref 4–5.4)
RBC # BLD AUTO: 3.02 M/UL (ref 4–5.4)
RBC # BLD AUTO: 3.04 M/UL (ref 4–5.4)
RBC # BLD AUTO: 3.07 M/UL (ref 4–5.4)
RBC # BLD AUTO: 3.15 M/UL (ref 4–5.4)
RBC # BLD AUTO: 3.16 M/UL (ref 4–5.4)
RBC # BLD AUTO: 3.32 M/UL (ref 4–5.4)
RBC # CSF: 248 /CU MM
RBC #/AREA URNS AUTO: 1 /HPF (ref 0–4)
RBC #/AREA URNS AUTO: 17 /HPF (ref 0–4)
RBC #/AREA URNS AUTO: 37 /HPF (ref 0–4)
RBC #/AREA URNS AUTO: >100 /HPF (ref 0–4)
RBC #/AREA URNS AUTO: >100 /HPF (ref 0–4)
RIGHT VENTRICULAR END-DIASTOLIC DIMENSION: 3.09 CM
RIGHT VENTRICULAR END-DIASTOLIC DIMENSION: 3.2 CM
SAMPLE: ABNORMAL
SARS-COV-2 RDRP RESP QL NAA+PROBE: NEGATIVE
SARS-COV-2 RDRP RESP QL NAA+PROBE: NEGATIVE
SATURATED IRON: 19 % (ref 20–50)
SCHISTOCYTES BLD QL SMEAR: ABNORMAL
SCHISTOCYTES BLD QL SMEAR: PRESENT
SINUS: 2.63 CM
SINUS: 2.83 CM
SITE: ABNORMAL
SODIUM SERPL-SCNC: 125 MMOL/L (ref 136–145)
SODIUM SERPL-SCNC: 128 MMOL/L (ref 136–145)
SODIUM SERPL-SCNC: 129 MMOL/L (ref 136–145)
SODIUM SERPL-SCNC: 130 MMOL/L (ref 136–145)
SODIUM SERPL-SCNC: 131 MMOL/L (ref 136–145)
SODIUM SERPL-SCNC: 132 MMOL/L (ref 136–145)
SODIUM SERPL-SCNC: 133 MMOL/L (ref 136–145)
SODIUM SERPL-SCNC: 134 MMOL/L (ref 136–145)
SODIUM SERPL-SCNC: 135 MMOL/L (ref 136–145)
SODIUM SERPL-SCNC: 136 MMOL/L (ref 136–145)
SODIUM SERPL-SCNC: 137 MMOL/L (ref 136–145)
SODIUM SERPL-SCNC: 138 MMOL/L (ref 136–145)
SODIUM SERPL-SCNC: 138 MMOL/L (ref 136–145)
SODIUM SERPL-SCNC: 139 MMOL/L (ref 136–145)
SODIUM SERPL-SCNC: 139 MMOL/L (ref 136–145)
SP GR UR STRIP: 1 (ref 1–1.03)
SP GR UR STRIP: 1.01 (ref 1–1.03)
SP GR UR STRIP: 1.01 (ref 1–1.03)
SP GR UR STRIP: 1.02 (ref 1–1.03)
SP GR UR STRIP: 1.02 (ref 1–1.03)
SP02: 91
SP02: 96
SPECIMEN SOURCE: NORMAL
SPECIMEN VOL CSF: 3.5 ML
SPHEROCYTES BLD QL SMEAR: ABNORMAL
SQUAMOUS #/AREA URNS AUTO: 6 /HPF
STJ: 2.62 CM
STJ: 2.7 CM
STRONGYLOIDES ANTIBODY IGG: NEGATIVE
T4 FREE SERPL-MCNC: 0.8 NG/DL (ref 0.71–1.51)
T4 FREE SERPL-MCNC: 0.96 NG/DL (ref 0.71–1.51)
TARGETS BLD QL SMEAR: ABNORMAL
TDI LATERAL: 0.07 M/S
TDI LATERAL: 0.09 M/S
TDI SEPTAL: 0.06 M/S
TDI SEPTAL: 0.09 M/S
TDI: 0.07 M/S
TDI: 0.09 M/S
TOBRAMYCIN SERPL-MCNC: 7.1 UG/ML
TOTAL IRON BINDING CAPACITY: 152 UG/DL (ref 250–450)
TOXICOLOGY INFORMATION: ABNORMAL
TR MAX PG: 23 MMHG
TR MAX PG: 26 MMHG
TRANS ERYTHROCYTES VOL PATIENT: NORMAL ML
TRANS ERYTHROCYTES VOL PATIENT: NORMAL ML
TRANSFERRIN SERPL-MCNC: 103 MG/DL (ref 200–375)
TRICUSPID ANNULAR PLANE SYSTOLIC EXCURSION: 2.44 CM
TRICUSPID ANNULAR PLANE SYSTOLIC EXCURSION: 2.57 CM
TROPONIN I SERPL DL<=0.01 NG/ML-MCNC: 0.02 NG/ML (ref 0–0.03)
TSH SERPL DL<=0.005 MIU/L-ACNC: 5.64 UIU/ML (ref 0.4–4)
TSH SERPL DL<=0.005 MIU/L-ACNC: 6.54 UIU/ML (ref 0.4–4)
TV REST PULMONARY ARTERY PRESSURE: 26 MMHG
TV REST PULMONARY ARTERY PRESSURE: 29 MMHG
UNIT NUMBER: NORMAL
URN SPEC COLLECT METH UR: ABNORMAL
VANCOMYCIN SERPL-MCNC: 10.9 UG/ML
VANCOMYCIN SERPL-MCNC: 25.6 UG/ML
VANCOMYCIN TROUGH SERPL-MCNC: 18.6 UG/ML (ref 10–22)
VIT B12 SERPL-MCNC: 1202 PG/ML (ref 210–950)
WBC # BLD AUTO: 21.02 K/UL (ref 3.9–12.7)
WBC # BLD AUTO: 26.31 K/UL (ref 3.9–12.7)
WBC # BLD AUTO: 32.42 K/UL (ref 3.9–12.7)
WBC # BLD AUTO: 32.55 K/UL (ref 3.9–12.7)
WBC # BLD AUTO: 35.45 K/UL (ref 3.9–12.7)
WBC # BLD AUTO: 4.86 K/UL (ref 3.9–12.7)
WBC # BLD AUTO: 5.46 K/UL (ref 3.9–12.7)
WBC # BLD AUTO: 5.5 K/UL (ref 3.9–12.7)
WBC # BLD AUTO: 5.64 K/UL (ref 3.9–12.7)
WBC # BLD AUTO: 5.66 K/UL (ref 3.9–12.7)
WBC # BLD AUTO: 5.69 K/UL (ref 3.9–12.7)
WBC # BLD AUTO: 5.69 K/UL (ref 3.9–12.7)
WBC # BLD AUTO: 50.45 K/UL (ref 3.9–12.7)
WBC # BLD AUTO: 55.16 K/UL (ref 3.9–12.7)
WBC # BLD AUTO: 6 K/UL (ref 3.9–12.7)
WBC # BLD AUTO: 6.02 K/UL (ref 3.9–12.7)
WBC # BLD AUTO: 6.1 K/UL (ref 3.9–12.7)
WBC # BLD AUTO: 6.27 K/UL (ref 3.9–12.7)
WBC # BLD AUTO: 6.31 K/UL (ref 3.9–12.7)
WBC # BLD AUTO: 6.45 K/UL (ref 3.9–12.7)
WBC # BLD AUTO: 6.51 K/UL (ref 3.9–12.7)
WBC # BLD AUTO: 6.67 K/UL (ref 3.9–12.7)
WBC # BLD AUTO: 6.69 K/UL (ref 3.9–12.7)
WBC # BLD AUTO: 6.75 K/UL (ref 3.9–12.7)
WBC # BLD AUTO: 6.78 K/UL (ref 3.9–12.7)
WBC # BLD AUTO: 6.8 K/UL (ref 3.9–12.7)
WBC # BLD AUTO: 6.88 K/UL (ref 3.9–12.7)
WBC # BLD AUTO: 6.89 K/UL (ref 3.9–12.7)
WBC # BLD AUTO: 6.94 K/UL (ref 3.9–12.7)
WBC # BLD AUTO: 6.99 K/UL (ref 3.9–12.7)
WBC # BLD AUTO: 7.04 K/UL (ref 3.9–12.7)
WBC # BLD AUTO: 7.06 K/UL (ref 3.9–12.7)
WBC # BLD AUTO: 7.15 K/UL (ref 3.9–12.7)
WBC # BLD AUTO: 7.15 K/UL (ref 3.9–12.7)
WBC # BLD AUTO: 7.3 K/UL (ref 3.9–12.7)
WBC # BLD AUTO: 7.33 K/UL (ref 3.9–12.7)
WBC # BLD AUTO: 7.38 K/UL (ref 3.9–12.7)
WBC # BLD AUTO: 7.41 K/UL (ref 3.9–12.7)
WBC # BLD AUTO: 7.41 K/UL (ref 3.9–12.7)
WBC # BLD AUTO: 7.46 K/UL (ref 3.9–12.7)
WBC # BLD AUTO: 7.54 K/UL (ref 3.9–12.7)
WBC # BLD AUTO: 7.56 K/UL (ref 3.9–12.7)
WBC # BLD AUTO: 7.57 K/UL (ref 3.9–12.7)
WBC # BLD AUTO: 7.85 K/UL (ref 3.9–12.7)
WBC # BLD AUTO: 7.94 K/UL (ref 3.9–12.7)
WBC # BLD AUTO: 8.06 K/UL (ref 3.9–12.7)
WBC # BLD AUTO: 8.08 K/UL (ref 3.9–12.7)
WBC # BLD AUTO: 8.12 K/UL (ref 3.9–12.7)
WBC # BLD AUTO: 8.16 K/UL (ref 3.9–12.7)
WBC # BLD AUTO: 8.2 K/UL (ref 3.9–12.7)
WBC # BLD AUTO: 8.21 K/UL (ref 3.9–12.7)
WBC # BLD AUTO: 8.24 K/UL (ref 3.9–12.7)
WBC # BLD AUTO: 8.25 K/UL (ref 3.9–12.7)
WBC # BLD AUTO: 8.4 K/UL (ref 3.9–12.7)
WBC # BLD AUTO: 8.54 K/UL (ref 3.9–12.7)
WBC # BLD AUTO: 8.75 K/UL (ref 3.9–12.7)
WBC # BLD AUTO: 8.79 K/UL (ref 3.9–12.7)
WBC # BLD AUTO: 9.04 K/UL (ref 3.9–12.7)
WBC # BLD AUTO: 9.13 K/UL (ref 3.9–12.7)
WBC # CSF: 2 /CU MM (ref 0–5)
WBC # FLD: 33 /CU MM
WBC # FLD: 53 /CU MM
WBC #/AREA URNS AUTO: 20 /HPF (ref 0–5)
WBC #/AREA URNS AUTO: >100 /HPF (ref 0–5)
WBC CLUMPS UR QL AUTO: ABNORMAL
YEAST UR QL AUTO: ABNORMAL

## 2022-01-01 PROCEDURE — 83615 LACTATE (LD) (LDH) ENZYME: CPT

## 2022-01-01 PROCEDURE — 99233 PR SUBSEQUENT HOSPITAL CARE,LEVL III: ICD-10-PCS | Mod: NTX,,, | Performed by: NURSE PRACTITIONER

## 2022-01-01 PROCEDURE — 25000003 PHARM REV CODE 250

## 2022-01-01 PROCEDURE — 25000003 PHARM REV CODE 250: Performed by: STUDENT IN AN ORGANIZED HEALTH CARE EDUCATION/TRAINING PROGRAM

## 2022-01-01 PROCEDURE — 36415 COLL VENOUS BLD VENIPUNCTURE: CPT

## 2022-01-01 PROCEDURE — 27000207 HC ISOLATION

## 2022-01-01 PROCEDURE — 97110 THERAPEUTIC EXERCISES: CPT | Mod: CQ

## 2022-01-01 PROCEDURE — 80053 COMPREHEN METABOLIC PANEL: CPT | Mod: NTX

## 2022-01-01 PROCEDURE — 80053 COMPREHEN METABOLIC PANEL: CPT | Performed by: INTERNAL MEDICINE

## 2022-01-01 PROCEDURE — 85025 COMPLETE CBC W/AUTO DIFF WBC: CPT | Performed by: INTERNAL MEDICINE

## 2022-01-01 PROCEDURE — 63600175 PHARM REV CODE 636 W HCPCS: Performed by: STUDENT IN AN ORGANIZED HEALTH CARE EDUCATION/TRAINING PROGRAM

## 2022-01-01 PROCEDURE — 27000207 HC ISOLATION: Mod: NTX

## 2022-01-01 PROCEDURE — 99233 PR SUBSEQUENT HOSPITAL CARE,LEVL III: ICD-10-PCS | Mod: GC,NTX,, | Performed by: HOSPITALIST

## 2022-01-01 PROCEDURE — 25000003 PHARM REV CODE 250: Mod: NTX

## 2022-01-01 PROCEDURE — 25000003 PHARM REV CODE 250: Performed by: INTERNAL MEDICINE

## 2022-01-01 PROCEDURE — 80053 COMPREHEN METABOLIC PANEL: CPT

## 2022-01-01 PROCEDURE — 84466 ASSAY OF TRANSFERRIN: CPT

## 2022-01-01 PROCEDURE — 63600175 PHARM REV CODE 636 W HCPCS

## 2022-01-01 PROCEDURE — 84100 ASSAY OF PHOSPHORUS: CPT | Performed by: INTERNAL MEDICINE

## 2022-01-01 PROCEDURE — 74170 CT ABD WO CNTRST FLWD CNTRST: CPT | Mod: TC

## 2022-01-01 PROCEDURE — 27000221 HC OXYGEN, UP TO 24 HOURS: Mod: NTX

## 2022-01-01 PROCEDURE — P9021 RED BLOOD CELLS UNIT: HCPCS

## 2022-01-01 PROCEDURE — 36415 COLL VENOUS BLD VENIPUNCTURE: CPT | Performed by: INTERNAL MEDICINE

## 2022-01-01 PROCEDURE — 87040 BLOOD CULTURE FOR BACTERIA: CPT | Mod: 59,NTX | Performed by: HOSPITALIST

## 2022-01-01 PROCEDURE — 36415 COLL VENOUS BLD VENIPUNCTURE: CPT | Mod: NTX | Performed by: HOSPITALIST

## 2022-01-01 PROCEDURE — 99291 PR CRITICAL CARE, E/M 30-74 MINUTES: ICD-10-PCS | Mod: GC,NTX,, | Performed by: INTERNAL MEDICINE

## 2022-01-01 PROCEDURE — 94761 N-INVAS EAR/PLS OXIMETRY MLT: CPT | Mod: NTX

## 2022-01-01 PROCEDURE — 20600001 HC STEP DOWN PRIVATE ROOM

## 2022-01-01 PROCEDURE — 86850 RBC ANTIBODY SCREEN: CPT | Performed by: INTERNAL MEDICINE

## 2022-01-01 PROCEDURE — 81001 URINALYSIS AUTO W/SCOPE: CPT | Performed by: EMERGENCY MEDICINE

## 2022-01-01 PROCEDURE — 97530 THERAPEUTIC ACTIVITIES: CPT | Mod: CQ

## 2022-01-01 PROCEDURE — 37799 UNLISTED PX VASCULAR SURGERY: CPT | Mod: NTX

## 2022-01-01 PROCEDURE — 20000000 HC ICU ROOM: Mod: NTX

## 2022-01-01 PROCEDURE — 63600175 PHARM REV CODE 636 W HCPCS: Performed by: NURSE PRACTITIONER

## 2022-01-01 PROCEDURE — 99291 PR CRITICAL CARE, E/M 30-74 MINUTES: ICD-10-PCS | Mod: ,,, | Performed by: INTERNAL MEDICINE

## 2022-01-01 PROCEDURE — 99239 HOSP IP/OBS DSCHRG MGMT >30: CPT | Mod: GC,,, | Performed by: HOSPITALIST

## 2022-01-01 PROCEDURE — 99232 PR SUBSEQUENT HOSPITAL CARE,LEVL II: ICD-10-PCS | Mod: GC,,, | Performed by: HOSPITALIST

## 2022-01-01 PROCEDURE — 83735 ASSAY OF MAGNESIUM: CPT | Mod: NTX

## 2022-01-01 PROCEDURE — 84100 ASSAY OF PHOSPHORUS: CPT | Mod: NTX

## 2022-01-01 PROCEDURE — 99233 SBSQ HOSP IP/OBS HIGH 50: CPT | Mod: GC,,, | Performed by: HOSPITALIST

## 2022-01-01 PROCEDURE — 30200315 PPD INTRADERMAL TEST REV CODE 302: Performed by: INTERNAL MEDICINE

## 2022-01-01 PROCEDURE — 99233 PR SUBSEQUENT HOSPITAL CARE,LEVL III: ICD-10-PCS | Mod: ,,, | Performed by: INTERNAL MEDICINE

## 2022-01-01 PROCEDURE — 87040 BLOOD CULTURE FOR BACTERIA: CPT | Mod: NTX

## 2022-01-01 PROCEDURE — 63600175 PHARM REV CODE 636 W HCPCS: Mod: NTX

## 2022-01-01 PROCEDURE — 85025 COMPLETE CBC W/AUTO DIFF WBC: CPT | Mod: 91 | Performed by: INTERNAL MEDICINE

## 2022-01-01 PROCEDURE — 99999 PR PBB SHADOW E&M-EST. PATIENT-LVL IV: ICD-10-PCS | Mod: PBBFAC,,, | Performed by: INTERNAL MEDICINE

## 2022-01-01 PROCEDURE — P9047 ALBUMIN (HUMAN), 25%, 50ML: HCPCS | Mod: JG | Performed by: FAMILY MEDICINE

## 2022-01-01 PROCEDURE — 87449 NOS EACH ORGANISM AG IA: CPT | Mod: NTX

## 2022-01-01 PROCEDURE — 99232 PR SUBSEQUENT HOSPITAL CARE,LEVL II: ICD-10-PCS | Mod: ,,, | Performed by: INTERNAL MEDICINE

## 2022-01-01 PROCEDURE — 81001 URINALYSIS AUTO W/SCOPE: CPT

## 2022-01-01 PROCEDURE — C9113 INJ PANTOPRAZOLE SODIUM, VIA: HCPCS

## 2022-01-01 PROCEDURE — 87070 CULTURE OTHR SPECIMN AEROBIC: CPT | Mod: NTX | Performed by: STUDENT IN AN ORGANIZED HEALTH CARE EDUCATION/TRAINING PROGRAM

## 2022-01-01 PROCEDURE — 99223 PR INITIAL HOSPITAL CARE,LEVL III: ICD-10-PCS | Mod: ,,, | Performed by: INTERNAL MEDICINE

## 2022-01-01 PROCEDURE — 63700000 PHARM REV CODE 250 ALT 637 W/O HCPCS: Performed by: INTERNAL MEDICINE

## 2022-01-01 PROCEDURE — 94761 N-INVAS EAR/PLS OXIMETRY MLT: CPT

## 2022-01-01 PROCEDURE — 96372 THER/PROPH/DIAG INJ SC/IM: CPT

## 2022-01-01 PROCEDURE — 99233 SBSQ HOSP IP/OBS HIGH 50: CPT | Mod: ,,, | Performed by: INTERNAL MEDICINE

## 2022-01-01 PROCEDURE — 76705 ECHO EXAM OF ABDOMEN: CPT | Mod: TC

## 2022-01-01 PROCEDURE — 99900035 HC TECH TIME PER 15 MIN (STAT)

## 2022-01-01 PROCEDURE — 99285 EMERGENCY DEPT VISIT HI MDM: CPT | Mod: GC,CS,, | Performed by: EMERGENCY MEDICINE

## 2022-01-01 PROCEDURE — 85610 PROTHROMBIN TIME: CPT

## 2022-01-01 PROCEDURE — 99233 PR SUBSEQUENT HOSPITAL CARE,LEVL III: ICD-10-PCS | Mod: GC,,, | Performed by: HOSPITALIST

## 2022-01-01 PROCEDURE — 93005 ELECTROCARDIOGRAM TRACING: CPT | Mod: NTX

## 2022-01-01 PROCEDURE — 97535 SELF CARE MNGMENT TRAINING: CPT

## 2022-01-01 PROCEDURE — 97530 THERAPEUTIC ACTIVITIES: CPT

## 2022-01-01 PROCEDURE — 99223 1ST HOSP IP/OBS HIGH 75: CPT | Mod: GC,NTX,, | Performed by: INTERNAL MEDICINE

## 2022-01-01 PROCEDURE — 85025 COMPLETE CBC W/AUTO DIFF WBC: CPT

## 2022-01-01 PROCEDURE — 74170 CT ABD WO CNTRST FLWD CNTRST: CPT | Mod: 26,,, | Performed by: RADIOLOGY

## 2022-01-01 PROCEDURE — 63600175 PHARM REV CODE 636 W HCPCS: Mod: JG

## 2022-01-01 PROCEDURE — 51702 INSERT TEMP BLADDER CATH: CPT | Mod: NTX

## 2022-01-01 PROCEDURE — 25500020 PHARM REV CODE 255: Performed by: INTERNAL MEDICINE

## 2022-01-01 PROCEDURE — 99291 CRITICAL CARE FIRST HOUR: CPT | Mod: NTX,,, | Performed by: INTERNAL MEDICINE

## 2022-01-01 PROCEDURE — P9047 ALBUMIN (HUMAN), 25%, 50ML: HCPCS | Mod: JG

## 2022-01-01 PROCEDURE — 97116 GAIT TRAINING THERAPY: CPT

## 2022-01-01 PROCEDURE — 85025 COMPLETE CBC W/AUTO DIFF WBC: CPT | Mod: 91,NTX

## 2022-01-01 PROCEDURE — 99233 SBSQ HOSP IP/OBS HIGH 50: CPT | Mod: NTX,,, | Performed by: INTERNAL MEDICINE

## 2022-01-01 PROCEDURE — 95816 EEG AWAKE AND DROWSY: CPT | Mod: NTX

## 2022-01-01 PROCEDURE — 99232 PR SUBSEQUENT HOSPITAL CARE,LEVL II: ICD-10-PCS | Mod: GC,NTX,, | Performed by: HOSPITALIST

## 2022-01-01 PROCEDURE — 80202 ASSAY OF VANCOMYCIN: CPT | Mod: NTX | Performed by: INTERNAL MEDICINE

## 2022-01-01 PROCEDURE — 87177 OVA AND PARASITES SMEARS: CPT | Mod: NTX | Performed by: INTERNAL MEDICINE

## 2022-01-01 PROCEDURE — 84100 ASSAY OF PHOSPHORUS: CPT

## 2022-01-01 PROCEDURE — 86682 HELMINTH ANTIBODY: CPT | Mod: NTX | Performed by: INTERNAL MEDICINE

## 2022-01-01 PROCEDURE — 83735 ASSAY OF MAGNESIUM: CPT | Mod: NTX | Performed by: HOSPITALIST

## 2022-01-01 PROCEDURE — 99232 SBSQ HOSP IP/OBS MODERATE 35: CPT | Mod: ,,, | Performed by: PSYCHIATRY & NEUROLOGY

## 2022-01-01 PROCEDURE — 95720 EEG PHY/QHP EA INCR W/VEEG: CPT | Mod: NTX,,, | Performed by: PSYCHIATRY & NEUROLOGY

## 2022-01-01 PROCEDURE — 63600175 PHARM REV CODE 636 W HCPCS: Performed by: INTERNAL MEDICINE

## 2022-01-01 PROCEDURE — 86920 COMPATIBILITY TEST SPIN: CPT

## 2022-01-01 PROCEDURE — 99291 PR CRITICAL CARE, E/M 30-74 MINUTES: ICD-10-PCS | Mod: 25,GC,NTX, | Performed by: INTERNAL MEDICINE

## 2022-01-01 PROCEDURE — 36600 WITHDRAWAL OF ARTERIAL BLOOD: CPT

## 2022-01-01 PROCEDURE — 80048 BASIC METABOLIC PNL TOTAL CA: CPT | Mod: NTX | Performed by: HOSPITALIST

## 2022-01-01 PROCEDURE — 36415 COLL VENOUS BLD VENIPUNCTURE: CPT | Mod: NTX

## 2022-01-01 PROCEDURE — 74170 CT ABDOMEN W WO CONTRAST: ICD-10-PCS | Mod: 26,,, | Performed by: RADIOLOGY

## 2022-01-01 PROCEDURE — 97162 PT EVAL MOD COMPLEX 30 MIN: CPT | Mod: NTX

## 2022-01-01 PROCEDURE — 99223 PR INITIAL HOSPITAL CARE,LEVL III: ICD-10-PCS | Mod: GC,NTX,, | Performed by: INTERNAL MEDICINE

## 2022-01-01 PROCEDURE — 87186 SC STD MICRODIL/AGAR DIL: CPT | Performed by: STUDENT IN AN ORGANIZED HEALTH CARE EDUCATION/TRAINING PROGRAM

## 2022-01-01 PROCEDURE — 87070 CULTURE OTHR SPECIMN AEROBIC: CPT

## 2022-01-01 PROCEDURE — 99223 PR INITIAL HOSPITAL CARE,LEVL III: ICD-10-PCS | Mod: NTX,,, | Performed by: PSYCHIATRY & NEUROLOGY

## 2022-01-01 PROCEDURE — 93010 ELECTROCARDIOGRAM REPORT: CPT | Mod: NTX,,, | Performed by: INTERNAL MEDICINE

## 2022-01-01 PROCEDURE — 51701 INSERT BLADDER CATHETER: CPT | Mod: NTX

## 2022-01-01 PROCEDURE — 95714 VEEG EA 12-26 HR UNMNTR: CPT

## 2022-01-01 PROCEDURE — 51702 INSERT TEMP BLADDER CATH: CPT

## 2022-01-01 PROCEDURE — 63600175 PHARM REV CODE 636 W HCPCS: Mod: NTX | Performed by: NURSE PRACTITIONER

## 2022-01-01 PROCEDURE — 95720 EEG PHY/QHP EA INCR W/VEEG: CPT | Mod: ,,, | Performed by: PSYCHIATRY & NEUROLOGY

## 2022-01-01 PROCEDURE — 80048 BASIC METABOLIC PNL TOTAL CA: CPT | Mod: NTX,XB | Performed by: STUDENT IN AN ORGANIZED HEALTH CARE EDUCATION/TRAINING PROGRAM

## 2022-01-01 PROCEDURE — 83735 ASSAY OF MAGNESIUM: CPT | Mod: 91 | Performed by: NURSE PRACTITIONER

## 2022-01-01 PROCEDURE — 80048 BASIC METABOLIC PNL TOTAL CA: CPT | Mod: XB | Performed by: INTERNAL MEDICINE

## 2022-01-01 PROCEDURE — 99233 SBSQ HOSP IP/OBS HIGH 50: CPT | Mod: GC,,, | Performed by: INTERNAL MEDICINE

## 2022-01-01 PROCEDURE — 87077 CULTURE AEROBIC IDENTIFY: CPT | Mod: NTX | Performed by: EMERGENCY MEDICINE

## 2022-01-01 PROCEDURE — 99232 SBSQ HOSP IP/OBS MODERATE 35: CPT | Mod: ,,, | Performed by: INTERNAL MEDICINE

## 2022-01-01 PROCEDURE — 82140 ASSAY OF AMMONIA: CPT

## 2022-01-01 PROCEDURE — 43235 EGD DIAGNOSTIC BRUSH WASH: CPT | Mod: ,,, | Performed by: INTERNAL MEDICINE

## 2022-01-01 PROCEDURE — 85652 RBC SED RATE AUTOMATED: CPT | Mod: NTX | Performed by: HOSPITALIST

## 2022-01-01 PROCEDURE — 87088 URINE BACTERIA CULTURE: CPT | Performed by: STUDENT IN AN ORGANIZED HEALTH CARE EDUCATION/TRAINING PROGRAM

## 2022-01-01 PROCEDURE — 81001 URINALYSIS AUTO W/SCOPE: CPT | Mod: NTX

## 2022-01-01 PROCEDURE — 99232 SBSQ HOSP IP/OBS MODERATE 35: CPT | Mod: GC,NTX,, | Performed by: EMERGENCY MEDICINE

## 2022-01-01 PROCEDURE — 99900035 HC TECH TIME PER 15 MIN (STAT): Mod: NTX

## 2022-01-01 PROCEDURE — 99233 PR SUBSEQUENT HOSPITAL CARE,LEVL III: ICD-10-PCS | Mod: NTX,,, | Performed by: INTERNAL MEDICINE

## 2022-01-01 PROCEDURE — 25000003 PHARM REV CODE 250: Performed by: HOSPITALIST

## 2022-01-01 PROCEDURE — 85027 COMPLETE CBC AUTOMATED: CPT | Mod: NTX

## 2022-01-01 PROCEDURE — 89051 BODY FLUID CELL COUNT: CPT

## 2022-01-01 PROCEDURE — 11000001 HC ACUTE MED/SURG PRIVATE ROOM: Mod: NTX

## 2022-01-01 PROCEDURE — 25000003 PHARM REV CODE 250: Mod: NTX | Performed by: INTERNAL MEDICINE

## 2022-01-01 PROCEDURE — 89051 BODY FLUID CELL COUNT: CPT | Performed by: HOSPITALIST

## 2022-01-01 PROCEDURE — 83735 ASSAY OF MAGNESIUM: CPT | Mod: NTX | Performed by: INTERNAL MEDICINE

## 2022-01-01 PROCEDURE — 99222 1ST HOSP IP/OBS MODERATE 55: CPT | Mod: ,,, | Performed by: NURSE PRACTITIONER

## 2022-01-01 PROCEDURE — G0378 HOSPITAL OBSERVATION PER HR: HCPCS | Mod: NTX

## 2022-01-01 PROCEDURE — 99233 SBSQ HOSP IP/OBS HIGH 50: CPT | Mod: NTX,,, | Performed by: REGISTERED NURSE

## 2022-01-01 PROCEDURE — 93010 EKG 12-LEAD: ICD-10-PCS | Mod: NTX,,, | Performed by: INTERNAL MEDICINE

## 2022-01-01 PROCEDURE — 87086 URINE CULTURE/COLONY COUNT: CPT | Mod: NTX

## 2022-01-01 PROCEDURE — 85025 COMPLETE CBC W/AUTO DIFF WBC: CPT | Performed by: NURSE PRACTITIONER

## 2022-01-01 PROCEDURE — 25000242 PHARM REV CODE 250 ALT 637 W/ HCPCS: Mod: NTX | Performed by: STUDENT IN AN ORGANIZED HEALTH CARE EDUCATION/TRAINING PROGRAM

## 2022-01-01 PROCEDURE — 83735 ASSAY OF MAGNESIUM: CPT

## 2022-01-01 PROCEDURE — 63600175 PHARM REV CODE 636 W HCPCS: Mod: NTX | Performed by: STUDENT IN AN ORGANIZED HEALTH CARE EDUCATION/TRAINING PROGRAM

## 2022-01-01 PROCEDURE — 95700 EEG CONT REC W/VID EEG TECH: CPT

## 2022-01-01 PROCEDURE — 25000242 PHARM REV CODE 250 ALT 637 W/ HCPCS

## 2022-01-01 PROCEDURE — 82550 ASSAY OF CK (CPK): CPT | Mod: NTX | Performed by: HOSPITALIST

## 2022-01-01 PROCEDURE — 85025 COMPLETE CBC W/AUTO DIFF WBC: CPT | Performed by: EMERGENCY MEDICINE

## 2022-01-01 PROCEDURE — 36556 INSERT NON-TUNNEL CV CATH: CPT | Mod: NTX

## 2022-01-01 PROCEDURE — 87186 SC STD MICRODIL/AGAR DIL: CPT | Mod: NTX | Performed by: EMERGENCY MEDICINE

## 2022-01-01 PROCEDURE — 83605 ASSAY OF LACTIC ACID: CPT | Mod: NTX | Performed by: STUDENT IN AN ORGANIZED HEALTH CARE EDUCATION/TRAINING PROGRAM

## 2022-01-01 PROCEDURE — 36430 TRANSFUSION BLD/BLD COMPNT: CPT

## 2022-01-01 PROCEDURE — 97112 NEUROMUSCULAR REEDUCATION: CPT

## 2022-01-01 PROCEDURE — 87040 BLOOD CULTURE FOR BACTERIA: CPT | Mod: 59,NTX

## 2022-01-01 PROCEDURE — 82140 ASSAY OF AMMONIA: CPT | Mod: NTX | Performed by: HOSPITALIST

## 2022-01-01 PROCEDURE — 94640 AIRWAY INHALATION TREATMENT: CPT | Mod: NTX

## 2022-01-01 PROCEDURE — 99231 SBSQ HOSP IP/OBS SF/LOW 25: CPT | Mod: GC,,, | Performed by: HOSPITALIST

## 2022-01-01 PROCEDURE — 97535 SELF CARE MNGMENT TRAINING: CPT | Mod: NTX

## 2022-01-01 PROCEDURE — 85025 COMPLETE CBC W/AUTO DIFF WBC: CPT | Mod: 91

## 2022-01-01 PROCEDURE — 63600175 PHARM REV CODE 636 W HCPCS: Mod: NTX | Performed by: INTERNAL MEDICINE

## 2022-01-01 PROCEDURE — 93976 US LIVER WITH DOPPLER: ICD-10-PCS | Mod: 26,,, | Performed by: STUDENT IN AN ORGANIZED HEALTH CARE EDUCATION/TRAINING PROGRAM

## 2022-01-01 PROCEDURE — 99223 1ST HOSP IP/OBS HIGH 75: CPT | Mod: NTX,,, | Performed by: NURSE PRACTITIONER

## 2022-01-01 PROCEDURE — C9113 INJ PANTOPRAZOLE SODIUM, VIA: HCPCS | Performed by: INTERNAL MEDICINE

## 2022-01-01 PROCEDURE — 99291 PR CRITICAL CARE, E/M 30-74 MINUTES: ICD-10-PCS | Mod: NTX,,, | Performed by: INTERNAL MEDICINE

## 2022-01-01 PROCEDURE — 83735 ASSAY OF MAGNESIUM: CPT | Performed by: INTERNAL MEDICINE

## 2022-01-01 PROCEDURE — 99222 1ST HOSP IP/OBS MODERATE 55: CPT | Mod: GC,,, | Performed by: PSYCHIATRY & NEUROLOGY

## 2022-01-01 PROCEDURE — 82140 ASSAY OF AMMONIA: CPT | Mod: NTX

## 2022-01-01 PROCEDURE — 63600175 PHARM REV CODE 636 W HCPCS: Mod: JG | Performed by: FAMILY MEDICINE

## 2022-01-01 PROCEDURE — 87086 URINE CULTURE/COLONY COUNT: CPT

## 2022-01-01 PROCEDURE — 92610 EVALUATE SWALLOWING FUNCTION: CPT

## 2022-01-01 PROCEDURE — 84443 ASSAY THYROID STIM HORMONE: CPT | Performed by: STUDENT IN AN ORGANIZED HEALTH CARE EDUCATION/TRAINING PROGRAM

## 2022-01-01 PROCEDURE — 63600175 PHARM REV CODE 636 W HCPCS: Performed by: HOSPITALIST

## 2022-01-01 PROCEDURE — 99356 PR PROLONGED SERV,INPATIENT,1ST HR: CPT | Mod: NTX,,, | Performed by: EMERGENCY MEDICINE

## 2022-01-01 PROCEDURE — 85347 COAGULATION TIME ACTIVATED: CPT | Mod: NTX

## 2022-01-01 PROCEDURE — 99233 SBSQ HOSP IP/OBS HIGH 50: CPT | Mod: NTX,,, | Performed by: NURSE PRACTITIONER

## 2022-01-01 PROCEDURE — 11000001 HC ACUTE MED/SURG PRIVATE ROOM

## 2022-01-01 PROCEDURE — 85610 PROTHROMBIN TIME: CPT | Performed by: STUDENT IN AN ORGANIZED HEALTH CARE EDUCATION/TRAINING PROGRAM

## 2022-01-01 PROCEDURE — 99499 NO LOS: ICD-10-PCS | Mod: NTX,,, | Performed by: NURSE PRACTITIONER

## 2022-01-01 PROCEDURE — 36555 INSERT NON-TUNNEL CV CATH: CPT | Mod: NTX

## 2022-01-01 PROCEDURE — 83605 ASSAY OF LACTIC ACID: CPT | Mod: 91,NTX | Performed by: STUDENT IN AN ORGANIZED HEALTH CARE EDUCATION/TRAINING PROGRAM

## 2022-01-01 PROCEDURE — 84100 ASSAY OF PHOSPHORUS: CPT | Mod: NTX | Performed by: INTERNAL MEDICINE

## 2022-01-01 PROCEDURE — 95813 EEG EXTND MNTR 61-119 MIN: CPT

## 2022-01-01 PROCEDURE — 99223 PR INITIAL HOSPITAL CARE,LEVL III: ICD-10-PCS | Mod: NTX,,, | Performed by: NURSE PRACTITIONER

## 2022-01-01 PROCEDURE — 95816 EEG AWAKE AND DROWSY: CPT | Mod: 26,NTX,, | Performed by: PSYCHIATRY & NEUROLOGY

## 2022-01-01 PROCEDURE — 85025 COMPLETE CBC W/AUTO DIFF WBC: CPT | Mod: NTX

## 2022-01-01 PROCEDURE — 95718 PR EEG, W/VIDEO, CONT RECORD, I&R, 2-12 HRS: ICD-10-PCS | Mod: ,,, | Performed by: PSYCHIATRY & NEUROLOGY

## 2022-01-01 PROCEDURE — P9016 RBC LEUKOCYTES REDUCED: HCPCS | Mod: NTX

## 2022-01-01 PROCEDURE — 93976 VASCULAR STUDY: CPT | Mod: TC

## 2022-01-01 PROCEDURE — 87077 CULTURE AEROBIC IDENTIFY: CPT | Performed by: STUDENT IN AN ORGANIZED HEALTH CARE EDUCATION/TRAINING PROGRAM

## 2022-01-01 PROCEDURE — 97110 THERAPEUTIC EXERCISES: CPT

## 2022-01-01 PROCEDURE — 94799 UNLISTED PULMONARY SVC/PX: CPT | Mod: NTX

## 2022-01-01 PROCEDURE — 80048 BASIC METABOLIC PNL TOTAL CA: CPT | Mod: XB

## 2022-01-01 PROCEDURE — 63700000 PHARM REV CODE 250 ALT 637 W/O HCPCS

## 2022-01-01 PROCEDURE — 99231 PR SUBSEQUENT HOSPITAL CARE,LEVL I: ICD-10-PCS | Mod: GC,,, | Performed by: HOSPITALIST

## 2022-01-01 PROCEDURE — 88108 CYTOPATH CONCENTRATE TECH: CPT | Mod: 26,NTX,, | Performed by: PATHOLOGY

## 2022-01-01 PROCEDURE — P9012 CRYOPRECIPITATE EACH UNIT: HCPCS | Performed by: STUDENT IN AN ORGANIZED HEALTH CARE EDUCATION/TRAINING PROGRAM

## 2022-01-01 PROCEDURE — 82803 BLOOD GASES ANY COMBINATION: CPT | Mod: NTX

## 2022-01-01 PROCEDURE — 96374 THER/PROPH/DIAG INJ IV PUSH: CPT

## 2022-01-01 PROCEDURE — 99285 EMERGENCY DEPT VISIT HI MDM: CPT | Mod: CS,,, | Performed by: EMERGENCY MEDICINE

## 2022-01-01 PROCEDURE — 99223 1ST HOSP IP/OBS HIGH 75: CPT | Mod: NTX,,, | Performed by: PSYCHIATRY & NEUROLOGY

## 2022-01-01 PROCEDURE — 76705 ECHO EXAM OF ABDOMEN: CPT | Mod: 26,XS,, | Performed by: STUDENT IN AN ORGANIZED HEALTH CARE EDUCATION/TRAINING PROGRAM

## 2022-01-01 PROCEDURE — 83735 ASSAY OF MAGNESIUM: CPT | Mod: 91,NTX

## 2022-01-01 PROCEDURE — 25500020 PHARM REV CODE 255: Performed by: HOSPITALIST

## 2022-01-01 PROCEDURE — 85025 COMPLETE CBC W/AUTO DIFF WBC: CPT | Performed by: STUDENT IN AN ORGANIZED HEALTH CARE EDUCATION/TRAINING PROGRAM

## 2022-01-01 PROCEDURE — 84100 ASSAY OF PHOSPHORUS: CPT | Performed by: STUDENT IN AN ORGANIZED HEALTH CARE EDUCATION/TRAINING PROGRAM

## 2022-01-01 PROCEDURE — 25000003 PHARM REV CODE 250: Mod: NTX | Performed by: NURSE PRACTITIONER

## 2022-01-01 PROCEDURE — 99291 CRITICAL CARE FIRST HOUR: CPT | Mod: ,,, | Performed by: INTERNAL MEDICINE

## 2022-01-01 PROCEDURE — 82728 ASSAY OF FERRITIN: CPT

## 2022-01-01 PROCEDURE — 99205 OFFICE O/P NEW HI 60 MIN: CPT | Mod: S$PBB,,, | Performed by: INTERNAL MEDICINE

## 2022-01-01 PROCEDURE — U0002 COVID-19 LAB TEST NON-CDC: HCPCS | Performed by: STUDENT IN AN ORGANIZED HEALTH CARE EDUCATION/TRAINING PROGRAM

## 2022-01-01 PROCEDURE — 99231 PR SUBSEQUENT HOSPITAL CARE,LEVL I: ICD-10-PCS | Mod: NTX,,, | Performed by: INTERNAL MEDICINE

## 2022-01-01 PROCEDURE — 99232 PR SUBSEQUENT HOSPITAL CARE,LEVL II: ICD-10-PCS | Mod: GC,NTX,, | Performed by: EMERGENCY MEDICINE

## 2022-01-01 PROCEDURE — 82140 ASSAY OF AMMONIA: CPT | Performed by: EMERGENCY MEDICINE

## 2022-01-01 PROCEDURE — 80053 COMPREHEN METABOLIC PANEL: CPT | Mod: NTX | Performed by: INTERNAL MEDICINE

## 2022-01-01 PROCEDURE — 37000008 HC ANESTHESIA 1ST 15 MINUTES: Performed by: INTERNAL MEDICINE

## 2022-01-01 PROCEDURE — 99232 SBSQ HOSP IP/OBS MODERATE 35: CPT | Mod: ,,, | Performed by: NURSE PRACTITIONER

## 2022-01-01 PROCEDURE — 99222 PR INITIAL HOSPITAL CARE,LEVL II: ICD-10-PCS | Mod: ,,, | Performed by: NURSE PRACTITIONER

## 2022-01-01 PROCEDURE — 95813 EEG EXTND MNTR 61-119 MIN: CPT | Mod: 26,,, | Performed by: PSYCHIATRY & NEUROLOGY

## 2022-01-01 PROCEDURE — 80053 COMPREHEN METABOLIC PANEL: CPT | Mod: 91 | Performed by: NURSE PRACTITIONER

## 2022-01-01 PROCEDURE — 99356 PR PROLONGED SERV,INPATIENT,1ST HR: ICD-10-PCS | Mod: NTX,,, | Performed by: EMERGENCY MEDICINE

## 2022-01-01 PROCEDURE — 95718 EEG PHYS/QHP 2-12 HR W/VEEG: CPT | Mod: ,,, | Performed by: PSYCHIATRY & NEUROLOGY

## 2022-01-01 PROCEDURE — 25500020 PHARM REV CODE 255: Mod: NTX | Performed by: EMERGENCY MEDICINE

## 2022-01-01 PROCEDURE — 85384 FIBRINOGEN ACTIVITY: CPT | Performed by: STUDENT IN AN ORGANIZED HEALTH CARE EDUCATION/TRAINING PROGRAM

## 2022-01-01 PROCEDURE — 99499 UNLISTED E&M SERVICE: CPT | Mod: NTX,,, | Performed by: NURSE PRACTITIONER

## 2022-01-01 PROCEDURE — 99291 CRITICAL CARE FIRST HOUR: CPT | Mod: 25,GC,NTX, | Performed by: INTERNAL MEDICINE

## 2022-01-01 PROCEDURE — 87075 CULTR BACTERIA EXCEPT BLOOD: CPT | Performed by: HOSPITALIST

## 2022-01-01 PROCEDURE — 99285 PR EMERGENCY DEPT VISIT,LEVEL V: ICD-10-PCS | Mod: CS,,, | Performed by: EMERGENCY MEDICINE

## 2022-01-01 PROCEDURE — 87088 URINE BACTERIA CULTURE: CPT

## 2022-01-01 PROCEDURE — 82803 BLOOD GASES ANY COMBINATION: CPT

## 2022-01-01 PROCEDURE — 87086 URINE CULTURE/COLONY COUNT: CPT | Performed by: EMERGENCY MEDICINE

## 2022-01-01 PROCEDURE — 83735 ASSAY OF MAGNESIUM: CPT | Mod: 91,NTX | Performed by: STUDENT IN AN ORGANIZED HEALTH CARE EDUCATION/TRAINING PROGRAM

## 2022-01-01 PROCEDURE — 99232 SBSQ HOSP IP/OBS MODERATE 35: CPT | Mod: GC,,, | Performed by: HOSPITALIST

## 2022-01-01 PROCEDURE — 80200 ASSAY OF TOBRAMYCIN: CPT | Mod: NTX | Performed by: INTERNAL MEDICINE

## 2022-01-01 PROCEDURE — 93976 VASCULAR STUDY: CPT | Mod: 26,,, | Performed by: STUDENT IN AN ORGANIZED HEALTH CARE EDUCATION/TRAINING PROGRAM

## 2022-01-01 PROCEDURE — 99205 PR OFFICE/OUTPT VISIT, NEW, LEVL V, 60-74 MIN: ICD-10-PCS | Mod: S$PBB,,, | Performed by: INTERNAL MEDICINE

## 2022-01-01 PROCEDURE — 83605 ASSAY OF LACTIC ACID: CPT | Performed by: STUDENT IN AN ORGANIZED HEALTH CARE EDUCATION/TRAINING PROGRAM

## 2022-01-01 PROCEDURE — 87205 SMEAR GRAM STAIN: CPT | Mod: 59,NTX | Performed by: STUDENT IN AN ORGANIZED HEALTH CARE EDUCATION/TRAINING PROGRAM

## 2022-01-01 PROCEDURE — 99223 1ST HOSP IP/OBS HIGH 75: CPT | Mod: NTX,,, | Performed by: EMERGENCY MEDICINE

## 2022-01-01 PROCEDURE — 36415 COLL VENOUS BLD VENIPUNCTURE: CPT | Performed by: STUDENT IN AN ORGANIZED HEALTH CARE EDUCATION/TRAINING PROGRAM

## 2022-01-01 PROCEDURE — 84484 ASSAY OF TROPONIN QUANT: CPT | Mod: NTX | Performed by: STUDENT IN AN ORGANIZED HEALTH CARE EDUCATION/TRAINING PROGRAM

## 2022-01-01 PROCEDURE — 86901 BLOOD TYPING SEROLOGIC RH(D): CPT | Performed by: STUDENT IN AN ORGANIZED HEALTH CARE EDUCATION/TRAINING PROGRAM

## 2022-01-01 PROCEDURE — 85027 COMPLETE CBC AUTOMATED: CPT | Mod: 91,NTX

## 2022-01-01 PROCEDURE — 99232 PR SUBSEQUENT HOSPITAL CARE,LEVL II: ICD-10-PCS | Mod: ,,, | Performed by: PSYCHIATRY & NEUROLOGY

## 2022-01-01 PROCEDURE — 99222 1ST HOSP IP/OBS MODERATE 55: CPT | Mod: ,,, | Performed by: INTERNAL MEDICINE

## 2022-01-01 PROCEDURE — 87106 FUNGI IDENTIFICATION YEAST: CPT

## 2022-01-01 PROCEDURE — 80202 ASSAY OF VANCOMYCIN: CPT | Mod: NTX | Performed by: EMERGENCY MEDICINE

## 2022-01-01 PROCEDURE — 99233 PR SUBSEQUENT HOSPITAL CARE,LEVL III: ICD-10-PCS | Mod: GC,,, | Performed by: INTERNAL MEDICINE

## 2022-01-01 PROCEDURE — 99291 CRITICAL CARE FIRST HOUR: CPT | Mod: GC,NTX,, | Performed by: INTERNAL MEDICINE

## 2022-01-01 PROCEDURE — 87088 URINE BACTERIA CULTURE: CPT | Mod: NTX | Performed by: EMERGENCY MEDICINE

## 2022-01-01 PROCEDURE — 36556 PR INSERT NON-TUNNEL CV CATH 5+ YRS OLD: ICD-10-PCS | Mod: GC,NTX,, | Performed by: INTERNAL MEDICINE

## 2022-01-01 PROCEDURE — 99291 PR CRITICAL CARE, E/M 30-74 MINUTES: ICD-10-PCS | Mod: GC,NTX,, | Performed by: EMERGENCY MEDICINE

## 2022-01-01 PROCEDURE — 99000 SPECIMEN HANDLING OFFICE-LAB: CPT | Mod: NTX | Performed by: STUDENT IN AN ORGANIZED HEALTH CARE EDUCATION/TRAINING PROGRAM

## 2022-01-01 PROCEDURE — 83605 ASSAY OF LACTIC ACID: CPT | Mod: 91 | Performed by: INTERNAL MEDICINE

## 2022-01-01 PROCEDURE — 20000000 HC ICU ROOM

## 2022-01-01 PROCEDURE — 97164 PT RE-EVAL EST PLAN CARE: CPT

## 2022-01-01 PROCEDURE — 99223 1ST HOSP IP/OBS HIGH 75: CPT | Mod: ,,, | Performed by: INTERNAL MEDICINE

## 2022-01-01 PROCEDURE — 86920 COMPATIBILITY TEST SPIN: CPT | Mod: NTX

## 2022-01-01 PROCEDURE — 97168 OT RE-EVAL EST PLAN CARE: CPT

## 2022-01-01 PROCEDURE — 85027 COMPLETE CBC AUTOMATED: CPT | Mod: NTX | Performed by: INTERNAL MEDICINE

## 2022-01-01 PROCEDURE — 80053 COMPREHEN METABOLIC PANEL: CPT | Performed by: EMERGENCY MEDICINE

## 2022-01-01 PROCEDURE — 25000003 PHARM REV CODE 250: Mod: NTX | Performed by: STUDENT IN AN ORGANIZED HEALTH CARE EDUCATION/TRAINING PROGRAM

## 2022-01-01 PROCEDURE — 95816 PR EEG,W/AWAKE & DROWSY RECORD: ICD-10-PCS | Mod: 26,NTX,, | Performed by: PSYCHIATRY & NEUROLOGY

## 2022-01-01 PROCEDURE — 84439 ASSAY OF FREE THYROXINE: CPT | Performed by: STUDENT IN AN ORGANIZED HEALTH CARE EDUCATION/TRAINING PROGRAM

## 2022-01-01 PROCEDURE — 95720 PR EEG, W/VIDEO, CONT RECORD, I&R, >12<26 HRS: ICD-10-PCS | Mod: ,,, | Performed by: PSYCHIATRY & NEUROLOGY

## 2022-01-01 PROCEDURE — 80177 DRUG SCRN QUAN LEVETIRACETAM: CPT | Mod: NTX

## 2022-01-01 PROCEDURE — 82800 BLOOD PH: CPT | Mod: NTX

## 2022-01-01 PROCEDURE — 36415 COLL VENOUS BLD VENIPUNCTURE: CPT | Performed by: HOSPITALIST

## 2022-01-01 PROCEDURE — 81001 URINALYSIS AUTO W/SCOPE: CPT | Performed by: STUDENT IN AN ORGANIZED HEALTH CARE EDUCATION/TRAINING PROGRAM

## 2022-01-01 PROCEDURE — 84157 ASSAY OF PROTEIN OTHER: CPT

## 2022-01-01 PROCEDURE — 36620 INSERTION CATHETER ARTERY: CPT | Mod: 59,GC,NTX, | Performed by: INTERNAL MEDICINE

## 2022-01-01 PROCEDURE — 99233 PR SUBSEQUENT HOSPITAL CARE,LEVL III: ICD-10-PCS | Mod: ,,,

## 2022-01-01 PROCEDURE — 97530 THERAPEUTIC ACTIVITIES: CPT | Mod: NTX

## 2022-01-01 PROCEDURE — 87086 URINE CULTURE/COLONY COUNT: CPT | Mod: NTX | Performed by: EMERGENCY MEDICINE

## 2022-01-01 PROCEDURE — C1751 CATH, INF, PER/CENT/MIDLINE: HCPCS | Mod: NTX

## 2022-01-01 PROCEDURE — 83605 ASSAY OF LACTIC ACID: CPT | Mod: NTX

## 2022-01-01 PROCEDURE — 86965 POOLING BLOOD PLATELETS: CPT | Performed by: STUDENT IN AN ORGANIZED HEALTH CARE EDUCATION/TRAINING PROGRAM

## 2022-01-01 PROCEDURE — 25500020 PHARM REV CODE 255: Mod: NTX | Performed by: SURGERY

## 2022-01-01 PROCEDURE — 88108 PR  CYTOPATH FLUIDS,CONCENTRATN,INTERP: ICD-10-PCS | Mod: 26,NTX,, | Performed by: PATHOLOGY

## 2022-01-01 PROCEDURE — 87205 SMEAR GRAM STAIN: CPT

## 2022-01-01 PROCEDURE — 87077 CULTURE AEROBIC IDENTIFY: CPT | Mod: 59,NTX

## 2022-01-01 PROCEDURE — 89051 BODY FLUID CELL COUNT: CPT | Mod: NTX | Performed by: STUDENT IN AN ORGANIZED HEALTH CARE EDUCATION/TRAINING PROGRAM

## 2022-01-01 PROCEDURE — 27000221 HC OXYGEN, UP TO 24 HOURS

## 2022-01-01 PROCEDURE — 93005 ELECTROCARDIOGRAM TRACING: CPT

## 2022-01-01 PROCEDURE — 87088 URINE BACTERIA CULTURE: CPT | Performed by: EMERGENCY MEDICINE

## 2022-01-01 PROCEDURE — 99222 PR INITIAL HOSPITAL CARE,LEVL II: ICD-10-PCS | Mod: GC,,, | Performed by: PSYCHIATRY & NEUROLOGY

## 2022-01-01 PROCEDURE — 27000249 HC VAPOTHERM CIRCUIT: Mod: NTX

## 2022-01-01 PROCEDURE — 99233 SBSQ HOSP IP/OBS HIGH 50: CPT | Mod: GC,NTX,, | Performed by: HOSPITALIST

## 2022-01-01 PROCEDURE — 99233 SBSQ HOSP IP/OBS HIGH 50: CPT | Mod: ,,, | Performed by: PSYCHIATRY & NEUROLOGY

## 2022-01-01 PROCEDURE — 25500020 PHARM REV CODE 255: Mod: NTX | Performed by: HOSPITALIST

## 2022-01-01 PROCEDURE — 82945 GLUCOSE OTHER FLUID: CPT | Mod: NTX | Performed by: STUDENT IN AN ORGANIZED HEALTH CARE EDUCATION/TRAINING PROGRAM

## 2022-01-01 PROCEDURE — 99233 SBSQ HOSP IP/OBS HIGH 50: CPT | Mod: ,,,

## 2022-01-01 PROCEDURE — 87186 SC STD MICRODIL/AGAR DIL: CPT | Mod: NTX

## 2022-01-01 PROCEDURE — 93010 EKG 12-LEAD: ICD-10-PCS | Mod: ,,, | Performed by: INTERNAL MEDICINE

## 2022-01-01 PROCEDURE — 80321 ALCOHOLS BIOMARKERS 1OR 2: CPT | Performed by: HOSPITALIST

## 2022-01-01 PROCEDURE — 99231 SBSQ HOSP IP/OBS SF/LOW 25: CPT | Mod: NTX,,, | Performed by: INTERNAL MEDICINE

## 2022-01-01 PROCEDURE — 88108 CYTOPATH CONCENTRATE TECH: CPT | Mod: NTX | Performed by: PATHOLOGY

## 2022-01-01 PROCEDURE — 94760 N-INVAS EAR/PLS OXIMETRY 1: CPT

## 2022-01-01 PROCEDURE — 99285 EMERGENCY DEPT VISIT HI MDM: CPT | Mod: 25

## 2022-01-01 PROCEDURE — 97112 NEUROMUSCULAR REEDUCATION: CPT | Mod: CQ

## 2022-01-01 PROCEDURE — 95813 PR EEG, EXTENDED, 61-119 MINS: ICD-10-PCS | Mod: 26,,, | Performed by: PSYCHIATRY & NEUROLOGY

## 2022-01-01 PROCEDURE — 37000009 HC ANESTHESIA EA ADD 15 MINS: Performed by: INTERNAL MEDICINE

## 2022-01-01 PROCEDURE — 87046 STOOL CULTR AEROBIC BACT EA: CPT | Mod: 59,NTX | Performed by: INTERNAL MEDICINE

## 2022-01-01 PROCEDURE — 36430 TRANSFUSION BLD/BLD COMPNT: CPT | Mod: NTX

## 2022-01-01 PROCEDURE — 85610 PROTHROMBIN TIME: CPT | Performed by: EMERGENCY MEDICINE

## 2022-01-01 PROCEDURE — A9585 GADOBUTROL INJECTION: HCPCS | Mod: NTX | Performed by: EMERGENCY MEDICINE

## 2022-01-01 PROCEDURE — 87205 SMEAR GRAM STAIN: CPT | Performed by: HOSPITALIST

## 2022-01-01 PROCEDURE — 87045 FECES CULTURE AEROBIC BACT: CPT | Mod: NTX | Performed by: INTERNAL MEDICINE

## 2022-01-01 PROCEDURE — 83735 ASSAY OF MAGNESIUM: CPT | Performed by: STUDENT IN AN ORGANIZED HEALTH CARE EDUCATION/TRAINING PROGRAM

## 2022-01-01 PROCEDURE — 85060 BLOOD SMEAR INTERPRETATION: CPT | Mod: NTX,,, | Performed by: PATHOLOGY

## 2022-01-01 PROCEDURE — 99223 1ST HOSP IP/OBS HIGH 75: CPT | Mod: AI,GC,NTX, | Performed by: HOSPITALIST

## 2022-01-01 PROCEDURE — 43235 EGD DIAGNOSTIC BRUSH WASH: CPT | Performed by: INTERNAL MEDICINE

## 2022-01-01 PROCEDURE — 99232 SBSQ HOSP IP/OBS MODERATE 35: CPT | Mod: GC,NTX,, | Performed by: HOSPITALIST

## 2022-01-01 PROCEDURE — 82077 ASSAY SPEC XCP UR&BREATH IA: CPT | Performed by: EMERGENCY MEDICINE

## 2022-01-01 PROCEDURE — 83880 ASSAY OF NATRIURETIC PEPTIDE: CPT | Performed by: EMERGENCY MEDICINE

## 2022-01-01 PROCEDURE — 99222 PR INITIAL HOSPITAL CARE,LEVL II: ICD-10-PCS | Mod: ,,, | Performed by: PSYCHIATRY & NEUROLOGY

## 2022-01-01 PROCEDURE — 99232 PR SUBSEQUENT HOSPITAL CARE,LEVL II: ICD-10-PCS | Mod: ,,, | Performed by: NURSE PRACTITIONER

## 2022-01-01 PROCEDURE — 85730 THROMBOPLASTIN TIME PARTIAL: CPT | Performed by: STUDENT IN AN ORGANIZED HEALTH CARE EDUCATION/TRAINING PROGRAM

## 2022-01-01 PROCEDURE — 36620 PR INSERT CATH,ART,PERCUT,SHORTTERM: ICD-10-PCS | Mod: 59,GC,NTX, | Performed by: INTERNAL MEDICINE

## 2022-01-01 PROCEDURE — 92526 ORAL FUNCTION THERAPY: CPT

## 2022-01-01 PROCEDURE — A9585 GADOBUTROL INJECTION: HCPCS | Mod: NTX | Performed by: HOSPITALIST

## 2022-01-01 PROCEDURE — 83605 ASSAY OF LACTIC ACID: CPT | Mod: 91,NTX

## 2022-01-01 PROCEDURE — 80307 DRUG TEST PRSMV CHEM ANLYZR: CPT | Mod: NTX | Performed by: EMERGENCY MEDICINE

## 2022-01-01 PROCEDURE — 86580 TB INTRADERMAL TEST: CPT | Performed by: INTERNAL MEDICINE

## 2022-01-01 PROCEDURE — 97161 PT EVAL LOW COMPLEX 20 MIN: CPT

## 2022-01-01 PROCEDURE — 63600175 PHARM REV CODE 636 W HCPCS: Performed by: NURSE ANESTHETIST, CERTIFIED REGISTERED

## 2022-01-01 PROCEDURE — 87529 HSV DNA AMP PROBE: CPT | Mod: NTX | Performed by: STUDENT IN AN ORGANIZED HEALTH CARE EDUCATION/TRAINING PROGRAM

## 2022-01-01 PROCEDURE — 99999 PR PBB SHADOW E&M-EST. PATIENT-LVL IV: CPT | Mod: PBBFAC,,, | Performed by: INTERNAL MEDICINE

## 2022-01-01 PROCEDURE — 86901 BLOOD TYPING SEROLOGIC RH(D): CPT

## 2022-01-01 PROCEDURE — 99223 PR INITIAL HOSPITAL CARE,LEVL III: ICD-10-PCS | Mod: NTX,,, | Performed by: EMERGENCY MEDICINE

## 2022-01-01 PROCEDURE — 87040 BLOOD CULTURE FOR BACTERIA: CPT | Performed by: INTERNAL MEDICINE

## 2022-01-01 PROCEDURE — 36556 INSERT NON-TUNNEL CV CATH: CPT | Mod: GC,NTX,, | Performed by: INTERNAL MEDICINE

## 2022-01-01 PROCEDURE — 87070 CULTURE OTHR SPECIMN AEROBIC: CPT | Performed by: HOSPITALIST

## 2022-01-01 PROCEDURE — 99239 PR HOSPITAL DISCHARGE DAY,>30 MIN: ICD-10-PCS | Mod: GC,,, | Performed by: HOSPITALIST

## 2022-01-01 PROCEDURE — 87086 URINE CULTURE/COLONY COUNT: CPT | Performed by: STUDENT IN AN ORGANIZED HEALTH CARE EDUCATION/TRAINING PROGRAM

## 2022-01-01 PROCEDURE — 87040 BLOOD CULTURE FOR BACTERIA: CPT | Performed by: STUDENT IN AN ORGANIZED HEALTH CARE EDUCATION/TRAINING PROGRAM

## 2022-01-01 PROCEDURE — 25500020 PHARM REV CODE 255: Mod: NTX | Performed by: INTERNAL MEDICINE

## 2022-01-01 PROCEDURE — 99291 CRITICAL CARE FIRST HOUR: CPT | Mod: GC,NTX,, | Performed by: EMERGENCY MEDICINE

## 2022-01-01 PROCEDURE — 76705 US LIVER WITH DOPPLER: ICD-10-PCS | Mod: 26,XS,, | Performed by: STUDENT IN AN ORGANIZED HEALTH CARE EDUCATION/TRAINING PROGRAM

## 2022-01-01 PROCEDURE — P9045 ALBUMIN (HUMAN), 5%, 250 ML: HCPCS | Mod: JG

## 2022-01-01 PROCEDURE — 99222 PR INITIAL HOSPITAL CARE,LEVL II: ICD-10-PCS | Mod: ,,, | Performed by: INTERNAL MEDICINE

## 2022-01-01 PROCEDURE — 83615 LACTATE (LD) (LDH) ENZYME: CPT | Performed by: INTERNAL MEDICINE

## 2022-01-01 PROCEDURE — 43235 PR EGD, FLEX, DIAGNOSTIC: ICD-10-PCS | Mod: ,,, | Performed by: INTERNAL MEDICINE

## 2022-01-01 PROCEDURE — 99285 PR EMERGENCY DEPT VISIT,LEVEL V: ICD-10-PCS | Mod: GC,CS,, | Performed by: EMERGENCY MEDICINE

## 2022-01-01 PROCEDURE — 99222 1ST HOSP IP/OBS MODERATE 55: CPT | Mod: ,,, | Performed by: PSYCHIATRY & NEUROLOGY

## 2022-01-01 PROCEDURE — 95720 PR EEG, W/VIDEO, CONT RECORD, I&R, >12<26 HRS: ICD-10-PCS | Mod: NTX,,, | Performed by: PSYCHIATRY & NEUROLOGY

## 2022-01-01 PROCEDURE — 82140 ASSAY OF AMMONIA: CPT | Performed by: STUDENT IN AN ORGANIZED HEALTH CARE EDUCATION/TRAINING PROGRAM

## 2022-01-01 PROCEDURE — 99232 SBSQ HOSP IP/OBS MODERATE 35: CPT | Mod: ,,, | Performed by: FAMILY MEDICINE

## 2022-01-01 PROCEDURE — 99233 SBSQ HOSP IP/OBS HIGH 50: CPT | Mod: GC,NTX,, | Performed by: EMERGENCY MEDICINE

## 2022-01-01 PROCEDURE — 99223 PR INITIAL HOSPITAL CARE,LEVL III: ICD-10-PCS | Mod: AI,GC,, | Performed by: HOSPITALIST

## 2022-01-01 PROCEDURE — 99233 PR SUBSEQUENT HOSPITAL CARE,LEVL III: ICD-10-PCS | Mod: NTX,,, | Performed by: REGISTERED NURSE

## 2022-01-01 PROCEDURE — 87427 SHIGA-LIKE TOXIN AG IA: CPT | Mod: 59,NTX | Performed by: INTERNAL MEDICINE

## 2022-01-01 PROCEDURE — 27100171 HC OXYGEN HIGH FLOW UP TO 24 HOURS: Mod: NTX

## 2022-01-01 PROCEDURE — 99233 PR SUBSEQUENT HOSPITAL CARE,LEVL III: ICD-10-PCS | Mod: ,,, | Performed by: PSYCHIATRY & NEUROLOGY

## 2022-01-01 PROCEDURE — 87088 URINE BACTERIA CULTURE: CPT | Mod: NTX

## 2022-01-01 PROCEDURE — 83880 ASSAY OF NATRIURETIC PEPTIDE: CPT | Mod: NTX | Performed by: STUDENT IN AN ORGANIZED HEALTH CARE EDUCATION/TRAINING PROGRAM

## 2022-01-01 PROCEDURE — 82140 ASSAY OF AMMONIA: CPT | Performed by: HOSPITALIST

## 2022-01-01 PROCEDURE — 86038 ANTINUCLEAR ANTIBODIES: CPT | Mod: NTX | Performed by: HOSPITALIST

## 2022-01-01 PROCEDURE — 84439 ASSAY OF FREE THYROXINE: CPT | Mod: NTX

## 2022-01-01 PROCEDURE — 63600175 PHARM REV CODE 636 W HCPCS: Mod: NTX | Performed by: EMERGENCY MEDICINE

## 2022-01-01 PROCEDURE — 99232 PR SUBSEQUENT HOSPITAL CARE,LEVL II: ICD-10-PCS | Mod: ,,, | Performed by: FAMILY MEDICINE

## 2022-01-01 PROCEDURE — 99214 OFFICE O/P EST MOD 30 MIN: CPT | Mod: PBBFAC | Performed by: INTERNAL MEDICINE

## 2022-01-01 PROCEDURE — 85610 PROTHROMBIN TIME: CPT | Performed by: INTERNAL MEDICINE

## 2022-01-01 PROCEDURE — 93010 ELECTROCARDIOGRAM REPORT: CPT | Mod: ,,, | Performed by: INTERNAL MEDICINE

## 2022-01-01 PROCEDURE — 83605 ASSAY OF LACTIC ACID: CPT | Mod: 91,NTX | Performed by: HOSPITALIST

## 2022-01-01 PROCEDURE — 99223 PR INITIAL HOSPITAL CARE,LEVL III: ICD-10-PCS | Mod: AI,GC,NTX, | Performed by: HOSPITALIST

## 2022-01-01 PROCEDURE — 97165 OT EVAL LOW COMPLEX 30 MIN: CPT

## 2022-01-01 PROCEDURE — 62270 PR SPINAL PUNCTURE,LUMBAR,DIAGNOSTIC: ICD-10-PCS | Mod: GC,NTX,, | Performed by: INTERNAL MEDICINE

## 2022-01-01 PROCEDURE — 84157 ASSAY OF PROTEIN OTHER: CPT | Mod: NTX | Performed by: STUDENT IN AN ORGANIZED HEALTH CARE EDUCATION/TRAINING PROGRAM

## 2022-01-01 PROCEDURE — 99285 EMERGENCY DEPT VISIT HI MDM: CPT | Mod: 25,NTX

## 2022-01-01 PROCEDURE — 97167 OT EVAL HIGH COMPLEX 60 MIN: CPT | Mod: NTX

## 2022-01-01 PROCEDURE — 84443 ASSAY THYROID STIM HORMONE: CPT | Mod: NTX

## 2022-01-01 PROCEDURE — 99233 PR SUBSEQUENT HOSPITAL CARE,LEVL III: ICD-10-PCS | Mod: GC,NTX,, | Performed by: EMERGENCY MEDICINE

## 2022-01-01 PROCEDURE — 82607 VITAMIN B-12: CPT

## 2022-01-01 PROCEDURE — 82533 TOTAL CORTISOL: CPT | Mod: NTX | Performed by: HOSPITALIST

## 2022-01-01 PROCEDURE — 87209 SMEAR COMPLEX STAIN: CPT | Mod: NTX | Performed by: INTERNAL MEDICINE

## 2022-01-01 PROCEDURE — 62270 DX LMBR SPI PNXR: CPT | Mod: GC,NTX,, | Performed by: INTERNAL MEDICINE

## 2022-01-01 PROCEDURE — 95714 VEEG EA 12-26 HR UNMNTR: CPT | Mod: NTX

## 2022-01-01 PROCEDURE — 99223 1ST HOSP IP/OBS HIGH 75: CPT | Mod: AI,GC,, | Performed by: HOSPITALIST

## 2022-01-01 PROCEDURE — 86850 RBC ANTIBODY SCREEN: CPT | Mod: NTX

## 2022-01-01 PROCEDURE — 25000003 PHARM REV CODE 250: Performed by: NURSE PRACTITIONER

## 2022-01-01 PROCEDURE — 81001 URINALYSIS AUTO W/SCOPE: CPT | Mod: NTX | Performed by: EMERGENCY MEDICINE

## 2022-01-01 PROCEDURE — 85060 PATHOLOGIST REVIEW: ICD-10-PCS | Mod: NTX,,, | Performed by: PATHOLOGY

## 2022-01-01 PROCEDURE — 95700 EEG CONT REC W/VID EEG TECH: CPT | Mod: NTX

## 2022-01-01 PROCEDURE — U0002 COVID-19 LAB TEST NON-CDC: HCPCS | Mod: NTX | Performed by: EMERGENCY MEDICINE

## 2022-01-01 RX ORDER — SODIUM CHLORIDE 0.9 % (FLUSH) 0.9 %
10 SYRINGE (ML) INJECTION EVERY 12 HOURS PRN
Status: DISCONTINUED | OUTPATIENT
Start: 2022-01-01 | End: 2022-01-01 | Stop reason: HOSPADM

## 2022-01-01 RX ORDER — ACETAMINOPHEN 650 MG/1
650 SUPPOSITORY RECTAL EVERY 6 HOURS PRN
Status: DISCONTINUED | OUTPATIENT
Start: 2022-01-01 | End: 2022-01-01 | Stop reason: HOSPADM

## 2022-01-01 RX ORDER — ALBUMIN HUMAN 250 G/1000ML
SOLUTION INTRAVENOUS
Status: COMPLETED | OUTPATIENT
Start: 2022-01-01 | End: 2022-01-01

## 2022-01-01 RX ORDER — FUROSEMIDE 40 MG/1
40 TABLET ORAL DAILY
Status: DISCONTINUED | OUTPATIENT
Start: 2022-01-01 | End: 2022-01-01

## 2022-01-01 RX ORDER — IPRATROPIUM BROMIDE AND ALBUTEROL SULFATE 2.5; .5 MG/3ML; MG/3ML
3 SOLUTION RESPIRATORY (INHALATION)
Status: DISCONTINUED | OUTPATIENT
Start: 2022-01-01 | End: 2022-01-01 | Stop reason: HOSPADM

## 2022-01-01 RX ORDER — POLYETHYLENE GLYCOL 3350 17 G/17G
17 POWDER, FOR SOLUTION ORAL 2 TIMES DAILY
Status: DISCONTINUED | OUTPATIENT
Start: 2022-01-01 | End: 2022-01-01

## 2022-01-01 RX ORDER — LACTULOSE 10 G/15ML
30 SOLUTION ORAL 3 TIMES DAILY
Status: DISCONTINUED | OUTPATIENT
Start: 2022-01-01 | End: 2022-01-01

## 2022-01-01 RX ORDER — LACTULOSE 10 G/15ML
20 SOLUTION ORAL 3 TIMES DAILY
Status: DISCONTINUED | OUTPATIENT
Start: 2022-01-01 | End: 2022-01-01

## 2022-01-01 RX ORDER — POLYETHYLENE GLYCOL 3350 17 G/17G
17 POWDER, FOR SOLUTION ORAL
Status: DISCONTINUED | OUTPATIENT
Start: 2022-01-01 | End: 2022-01-01

## 2022-01-01 RX ORDER — ENOXAPARIN SODIUM 100 MG/ML
40 INJECTION SUBCUTANEOUS EVERY 24 HOURS
Status: DISCONTINUED | OUTPATIENT
Start: 2022-01-01 | End: 2022-01-01

## 2022-01-01 RX ORDER — PANTOPRAZOLE SODIUM 40 MG/10ML
80 INJECTION, POWDER, LYOPHILIZED, FOR SOLUTION INTRAVENOUS ONCE
Status: COMPLETED | OUTPATIENT
Start: 2022-01-01 | End: 2022-01-01

## 2022-01-01 RX ORDER — LEVETIRACETAM 750 MG/1
750 TABLET ORAL 2 TIMES DAILY
Status: DISCONTINUED | OUTPATIENT
Start: 2022-01-01 | End: 2022-01-01

## 2022-01-01 RX ORDER — OXYCODONE HYDROCHLORIDE 5 MG/1
5 TABLET ORAL EVERY 8 HOURS PRN
Status: DISCONTINUED | OUTPATIENT
Start: 2022-01-01 | End: 2022-01-01 | Stop reason: HOSPADM

## 2022-01-01 RX ORDER — NALOXONE HCL 0.4 MG/ML
0.02 VIAL (ML) INJECTION
Status: DISCONTINUED | OUTPATIENT
Start: 2022-01-01 | End: 2022-01-01 | Stop reason: HOSPADM

## 2022-01-01 RX ORDER — INSULIN ASPART 100 [IU]/ML
0-5 INJECTION, SOLUTION INTRAVENOUS; SUBCUTANEOUS
Status: DISCONTINUED | OUTPATIENT
Start: 2022-01-01 | End: 2022-01-01 | Stop reason: HOSPADM

## 2022-01-01 RX ORDER — MEROPENEM AND SODIUM CHLORIDE 1 G/50ML
1 INJECTION, SOLUTION INTRAVENOUS ONCE
Status: COMPLETED | OUTPATIENT
Start: 2022-01-01 | End: 2022-01-01

## 2022-01-01 RX ORDER — PANTOPRAZOLE SODIUM 40 MG/10ML
40 INJECTION, POWDER, LYOPHILIZED, FOR SOLUTION INTRAVENOUS EVERY 12 HOURS
Status: DISCONTINUED | OUTPATIENT
Start: 2022-01-01 | End: 2022-01-01

## 2022-01-01 RX ORDER — NYSTATIN 100000 [USP'U]/G
POWDER TOPICAL
Status: ON HOLD | COMMUNITY
Start: 2022-01-01 | End: 2022-01-01

## 2022-01-01 RX ORDER — LACTULOSE 10 G/15ML
20 SOLUTION ORAL 2 TIMES DAILY
Status: DISCONTINUED | OUTPATIENT
Start: 2022-01-01 | End: 2022-01-01

## 2022-01-01 RX ORDER — LEVETIRACETAM 750 MG/1
750 TABLET ORAL 2 TIMES DAILY
Qty: 60 TABLET | Refills: 11 | OUTPATIENT
Start: 2022-01-01 | End: 2023-07-22

## 2022-01-01 RX ORDER — ALUMINUM HYDROXIDE, MAGNESIUM HYDROXIDE, AND SIMETHICONE 2400; 240; 2400 MG/30ML; MG/30ML; MG/30ML
30 SUSPENSION ORAL EVERY 6 HOURS PRN
Qty: 100 ML | Refills: 0 | OUTPATIENT
Start: 2022-01-01 | End: 2023-07-22

## 2022-01-01 RX ORDER — HYDROCODONE BITARTRATE AND ACETAMINOPHEN 500; 5 MG/1; MG/1
TABLET ORAL
Status: DISCONTINUED | OUTPATIENT
Start: 2022-01-01 | End: 2022-01-01 | Stop reason: HOSPADM

## 2022-01-01 RX ORDER — MAGNESIUM SULFATE HEPTAHYDRATE 40 MG/ML
2 INJECTION, SOLUTION INTRAVENOUS ONCE
Status: COMPLETED | OUTPATIENT
Start: 2022-01-01 | End: 2022-01-01

## 2022-01-01 RX ORDER — FUROSEMIDE 40 MG/1
40 TABLET ORAL 2 TIMES DAILY
Qty: 60 TABLET | Refills: 2 | Status: ON HOLD
Start: 2022-01-01 | End: 2022-01-01

## 2022-01-01 RX ORDER — PANTOPRAZOLE SODIUM 40 MG/1
40 TABLET, DELAYED RELEASE ORAL DAILY
Status: DISCONTINUED | OUTPATIENT
Start: 2022-01-01 | End: 2022-01-01

## 2022-01-01 RX ORDER — SODIUM CHLORIDE 9 MG/ML
INJECTION, SOLUTION INTRAVENOUS CONTINUOUS
Status: DISCONTINUED | OUTPATIENT
Start: 2022-01-01 | End: 2022-01-01 | Stop reason: HOSPADM

## 2022-01-01 RX ORDER — MINOCYCLINE HYDROCHLORIDE 100 MG/1
100 CAPSULE ORAL 2 TIMES DAILY
Status: ON HOLD | COMMUNITY
End: 2022-01-01

## 2022-01-01 RX ORDER — PROPOFOL 10 MG/ML
VIAL (ML) INTRAVENOUS
Status: DISCONTINUED | OUTPATIENT
Start: 2022-01-01 | End: 2022-01-01

## 2022-01-01 RX ORDER — FUROSEMIDE 40 MG/1
40 TABLET ORAL 2 TIMES DAILY
Status: DISCONTINUED | OUTPATIENT
Start: 2022-01-01 | End: 2022-01-01

## 2022-01-01 RX ORDER — PROCHLORPERAZINE EDISYLATE 5 MG/ML
10 INJECTION INTRAMUSCULAR; INTRAVENOUS EVERY 6 HOURS PRN
Status: DISCONTINUED | OUTPATIENT
Start: 2022-01-01 | End: 2022-01-01 | Stop reason: HOSPADM

## 2022-01-01 RX ORDER — POTASSIUM CHLORIDE 20 MEQ/1
40 TABLET, EXTENDED RELEASE ORAL ONCE
Status: COMPLETED | OUTPATIENT
Start: 2022-01-01 | End: 2022-01-01

## 2022-01-01 RX ORDER — ONDANSETRON 8 MG/1
8 TABLET, ORALLY DISINTEGRATING ORAL EVERY 8 HOURS PRN
Status: DISCONTINUED | OUTPATIENT
Start: 2022-01-01 | End: 2022-01-01 | Stop reason: HOSPADM

## 2022-01-01 RX ORDER — LACTULOSE 10 G/15ML
20 SOLUTION ORAL DAILY
Status: CANCELLED | OUTPATIENT
Start: 2022-01-01

## 2022-01-01 RX ORDER — AMOXICILLIN 250 MG
1 CAPSULE ORAL 2 TIMES DAILY
Status: DISCONTINUED | OUTPATIENT
Start: 2022-01-01 | End: 2022-01-01

## 2022-01-01 RX ORDER — MAGNESIUM SULFATE HEPTAHYDRATE 40 MG/ML
2 INJECTION, SOLUTION INTRAVENOUS ONCE
Status: DISCONTINUED | OUTPATIENT
Start: 2022-01-01 | End: 2022-01-01

## 2022-01-01 RX ORDER — GLUCAGON 1 MG
1 KIT INJECTION
Status: DISCONTINUED | OUTPATIENT
Start: 2022-01-01 | End: 2022-01-01 | Stop reason: HOSPADM

## 2022-01-01 RX ORDER — FUROSEMIDE 10 MG/ML
40 INJECTION INTRAMUSCULAR; INTRAVENOUS ONCE
Status: COMPLETED | OUTPATIENT
Start: 2022-01-01 | End: 2022-01-01

## 2022-01-01 RX ORDER — CLOBAZAM 2.5 MG/ML
10 SUSPENSION ORAL DAILY
Status: DISCONTINUED | OUTPATIENT
Start: 2022-01-01 | End: 2022-01-01

## 2022-01-01 RX ORDER — BUMETANIDE 1 MG/1
2 TABLET ORAL 3 TIMES DAILY
Status: DISCONTINUED | OUTPATIENT
Start: 2022-01-01 | End: 2022-01-01

## 2022-01-01 RX ORDER — CLOBAZAM 10 MG/1
10 TABLET ORAL DAILY
Status: DISCONTINUED | OUTPATIENT
Start: 2022-01-01 | End: 2022-01-01 | Stop reason: HOSPADM

## 2022-01-01 RX ORDER — ACETAMINOPHEN 500 MG
5 TABLET ORAL NIGHTLY
Status: DISCONTINUED | OUTPATIENT
Start: 2022-01-01 | End: 2022-01-01

## 2022-01-01 RX ORDER — LACTULOSE 10 G/15ML
20 SOLUTION ORAL 2 TIMES DAILY
Status: DISCONTINUED | OUTPATIENT
Start: 2022-01-01 | End: 2022-01-01 | Stop reason: HOSPADM

## 2022-01-01 RX ORDER — HYDROCODONE BITARTRATE AND ACETAMINOPHEN 500; 5 MG/1; MG/1
TABLET ORAL
Status: DISCONTINUED | OUTPATIENT
Start: 2022-01-01 | End: 2022-01-01

## 2022-01-01 RX ORDER — LORAZEPAM 2 MG/ML
2 INJECTION INTRAMUSCULAR ONCE
Status: DISCONTINUED | OUTPATIENT
Start: 2022-01-01 | End: 2022-01-01

## 2022-01-01 RX ORDER — FUROSEMIDE 10 MG/ML
40 INJECTION INTRAMUSCULAR; INTRAVENOUS
Status: DISCONTINUED | OUTPATIENT
Start: 2022-01-01 | End: 2022-01-01

## 2022-01-01 RX ORDER — PHENYLEPHRINE HCL IN 0.9% NACL 20MG/250ML
PLASTIC BAG, INJECTION (ML) INTRAVENOUS
Status: COMPLETED
Start: 2022-01-01 | End: 2022-01-01

## 2022-01-01 RX ORDER — ACETAMINOPHEN 500 MG
5 TABLET ORAL NIGHTLY
COMMUNITY
Start: 2022-01-01 | End: 2022-01-01 | Stop reason: HOSPADM

## 2022-01-01 RX ORDER — IBUPROFEN 200 MG
16 TABLET ORAL
Status: DISCONTINUED | OUTPATIENT
Start: 2022-01-01 | End: 2022-01-01

## 2022-01-01 RX ORDER — CIPROFLOXACIN 500 MG/1
500 TABLET ORAL EVERY 12 HOURS
Status: COMPLETED | OUTPATIENT
Start: 2022-01-01 | End: 2022-01-01

## 2022-01-01 RX ORDER — MUPIROCIN 20 MG/G
OINTMENT TOPICAL 2 TIMES DAILY
Status: COMPLETED | OUTPATIENT
Start: 2022-01-01 | End: 2022-01-01

## 2022-01-01 RX ORDER — SODIUM,POTASSIUM PHOSPHATES 280-250MG
2 POWDER IN PACKET (EA) ORAL EVERY 4 HOURS
Status: COMPLETED | OUTPATIENT
Start: 2022-01-01 | End: 2022-01-01

## 2022-01-01 RX ORDER — FUROSEMIDE 10 MG/ML
40 INJECTION INTRAMUSCULAR; INTRAVENOUS DAILY
Status: DISCONTINUED | OUTPATIENT
Start: 2022-01-01 | End: 2022-01-01

## 2022-01-01 RX ORDER — CLOBAZAM 2.5 MG/ML
10 SUSPENSION ORAL 2 TIMES DAILY
Status: DISCONTINUED | OUTPATIENT
Start: 2022-01-01 | End: 2022-01-01

## 2022-01-01 RX ORDER — SCOLOPAMINE TRANSDERMAL SYSTEM 1 MG/1
1 PATCH, EXTENDED RELEASE TRANSDERMAL
Status: DISCONTINUED | OUTPATIENT
Start: 2022-01-01 | End: 2022-01-01 | Stop reason: HOSPADM

## 2022-01-01 RX ORDER — HEPARIN SODIUM 5000 [USP'U]/ML
5000 INJECTION, SOLUTION INTRAVENOUS; SUBCUTANEOUS 2 TIMES DAILY
Status: DISCONTINUED | OUTPATIENT
Start: 2022-01-01 | End: 2022-01-01 | Stop reason: HOSPADM

## 2022-01-01 RX ORDER — ONDANSETRON 2 MG/ML
8 INJECTION INTRAMUSCULAR; INTRAVENOUS EVERY 8 HOURS PRN
Qty: 120 ML | Refills: 0 | Status: SHIPPED | OUTPATIENT
Start: 2022-01-01 | End: 2022-09-09

## 2022-01-01 RX ORDER — FUROSEMIDE 10 MG/ML
40 INJECTION INTRAMUSCULAR; INTRAVENOUS 3 TIMES DAILY
Status: DISCONTINUED | OUTPATIENT
Start: 2022-01-01 | End: 2022-01-01

## 2022-01-01 RX ORDER — LEVETIRACETAM 500 MG/5ML
750 INJECTION, SOLUTION, CONCENTRATE INTRAVENOUS EVERY 12 HOURS
Status: DISCONTINUED | OUTPATIENT
Start: 2022-01-01 | End: 2022-01-01

## 2022-01-01 RX ORDER — BUMETANIDE 1 MG/1
1 TABLET ORAL 2 TIMES DAILY
Qty: 60 TABLET | Refills: 11
Start: 2022-01-01 | End: 2022-01-01 | Stop reason: HOSPADM

## 2022-01-01 RX ORDER — CHOLESTYRAMINE 4 G/4.8G
4 POWDER, FOR SUSPENSION ORAL DAILY
Status: ON HOLD | COMMUNITY
Start: 2022-01-01 | End: 2022-01-01

## 2022-01-01 RX ORDER — LACTULOSE 10 G/15ML
20 SOLUTION ORAL; RECTAL 3 TIMES DAILY
Status: ON HOLD | COMMUNITY
Start: 2022-01-01 | End: 2022-01-01 | Stop reason: HOSPADM

## 2022-01-01 RX ORDER — LANOLIN ALCOHOL/MO/W.PET/CERES
800 CREAM (GRAM) TOPICAL ONCE
Status: COMPLETED | OUTPATIENT
Start: 2022-01-01 | End: 2022-01-01

## 2022-01-01 RX ORDER — GADOBUTROL 604.72 MG/ML
9 INJECTION INTRAVENOUS
Status: COMPLETED | OUTPATIENT
Start: 2022-01-01 | End: 2022-01-01

## 2022-01-01 RX ORDER — NOREPINEPHRINE BITARTRATE 1 MG/ML
INJECTION, SOLUTION INTRAVENOUS
Status: DISPENSED
Start: 2022-01-01 | End: 2022-01-01

## 2022-01-01 RX ORDER — IPRATROPIUM BROMIDE AND ALBUTEROL SULFATE 2.5; .5 MG/3ML; MG/3ML
3 SOLUTION RESPIRATORY (INHALATION)
Status: DISCONTINUED | OUTPATIENT
Start: 2022-01-01 | End: 2022-01-01

## 2022-01-01 RX ORDER — LEVETIRACETAM 750 MG/1
750 TABLET ORAL 2 TIMES DAILY
Qty: 60 TABLET | Refills: 11 | Status: CANCELLED | OUTPATIENT
Start: 2022-01-01 | End: 2023-08-01

## 2022-01-01 RX ORDER — MAGNESIUM SULFATE HEPTAHYDRATE 40 MG/ML
2 INJECTION, SOLUTION INTRAVENOUS
Status: COMPLETED | OUTPATIENT
Start: 2022-01-01 | End: 2022-01-01

## 2022-01-01 RX ORDER — LEVETIRACETAM 750 MG/1
750 TABLET ORAL 2 TIMES DAILY
Qty: 60 TABLET | Refills: 2 | Status: ON HOLD
Start: 2022-01-01 | End: 2022-01-01

## 2022-01-01 RX ORDER — IBUPROFEN 200 MG
24 TABLET ORAL
Status: DISCONTINUED | OUTPATIENT
Start: 2022-01-01 | End: 2022-01-01

## 2022-01-01 RX ORDER — THIAMINE HCL 100 MG
100 TABLET ORAL DAILY
Status: DISCONTINUED | OUTPATIENT
Start: 2022-01-01 | End: 2022-01-01

## 2022-01-01 RX ORDER — CLOBAZAM 10 MG/1
10 TABLET ORAL DAILY
Status: DISCONTINUED | OUTPATIENT
Start: 2022-01-01 | End: 2022-01-01

## 2022-01-01 RX ORDER — BUMETANIDE 1 MG/1
1 TABLET ORAL 2 TIMES DAILY
Status: DISCONTINUED | OUTPATIENT
Start: 2022-01-01 | End: 2022-01-01

## 2022-01-01 RX ORDER — FLUCONAZOLE 200 MG/1
200 TABLET ORAL DAILY
Qty: 5 TABLET | Refills: 0
Start: 2022-01-01 | End: 2022-01-01

## 2022-01-01 RX ORDER — PROPOFOL 10 MG/ML
VIAL (ML) INTRAVENOUS CONTINUOUS PRN
Status: DISCONTINUED | OUTPATIENT
Start: 2022-01-01 | End: 2022-01-01

## 2022-01-01 RX ORDER — LACTULOSE 10 G/15ML
30 SOLUTION ORAL 3 TIMES DAILY
Qty: 496 ML | Refills: 0
Start: 2022-01-01 | End: 2022-01-01 | Stop reason: HOSPADM

## 2022-01-01 RX ORDER — LIDOCAINE HYDROCHLORIDE 10 MG/ML
INJECTION INFILTRATION; PERINEURAL
Status: DISPENSED
Start: 2022-01-01 | End: 2022-01-01

## 2022-01-01 RX ORDER — CLOBAZAM 10 MG/1
10 TABLET ORAL DAILY
Qty: 30 EACH | Refills: 5 | Status: ON HOLD
Start: 2022-01-01 | End: 2022-01-01

## 2022-01-01 RX ORDER — SPIRONOLACTONE 100 MG/1
100 TABLET, FILM COATED ORAL DAILY
Status: DISCONTINUED | OUTPATIENT
Start: 2022-01-01 | End: 2022-01-01

## 2022-01-01 RX ORDER — SPIRONOLACTONE 50 MG/1
100 TABLET, FILM COATED ORAL ONCE
Status: COMPLETED | OUTPATIENT
Start: 2022-01-01 | End: 2022-01-01

## 2022-01-01 RX ORDER — TALC
6 POWDER (GRAM) TOPICAL NIGHTLY PRN
Status: DISCONTINUED | OUTPATIENT
Start: 2022-01-01 | End: 2022-01-01 | Stop reason: HOSPADM

## 2022-01-01 RX ORDER — LANOLIN ALCOHOL/MO/W.PET/CERES
400 CREAM (GRAM) TOPICAL
Status: COMPLETED | OUTPATIENT
Start: 2022-01-01 | End: 2022-01-01

## 2022-01-01 RX ORDER — IBUPROFEN 200 MG
16 TABLET ORAL
Status: DISCONTINUED | OUTPATIENT
Start: 2022-01-01 | End: 2022-01-01 | Stop reason: HOSPADM

## 2022-01-01 RX ORDER — SPIRONOLACTONE 50 MG/1
150 TABLET, FILM COATED ORAL DAILY
Qty: 90 TABLET | Refills: 11
Start: 2022-01-01 | End: 2022-01-01 | Stop reason: HOSPADM

## 2022-01-01 RX ORDER — FOLIC ACID 1 MG/1
1 TABLET ORAL DAILY
Status: DISCONTINUED | OUTPATIENT
Start: 2022-01-01 | End: 2022-01-01

## 2022-01-01 RX ORDER — FUROSEMIDE 20 MG/1
20 TABLET ORAL 2 TIMES DAILY
Status: ON HOLD | COMMUNITY
End: 2022-01-01 | Stop reason: HOSPADM

## 2022-01-01 RX ORDER — LACTULOSE 10 G/15ML
10 SOLUTION ORAL 3 TIMES DAILY
Status: DISCONTINUED | OUTPATIENT
Start: 2022-01-01 | End: 2022-01-01

## 2022-01-01 RX ORDER — FLUDROCORTISONE ACETATE 0.1 MG/1
100 TABLET ORAL DAILY
Status: DISCONTINUED | OUTPATIENT
Start: 2022-01-01 | End: 2022-01-01

## 2022-01-01 RX ORDER — FLUCONAZOLE 200 MG/1
200 TABLET ORAL DAILY
Refills: 0
Start: 2022-01-01 | End: 2022-01-01 | Stop reason: SDUPTHER

## 2022-01-01 RX ORDER — SPIRONOLACTONE 50 MG/1
100 TABLET, FILM COATED ORAL DAILY
Status: DISCONTINUED | OUTPATIENT
Start: 2022-01-01 | End: 2022-01-01 | Stop reason: HOSPADM

## 2022-01-01 RX ORDER — LIDOCAINE HYDROCHLORIDE 10 MG/ML
INJECTION INFILTRATION; PERINEURAL
Status: COMPLETED
Start: 2022-01-01 | End: 2022-01-01

## 2022-01-01 RX ORDER — FUROSEMIDE 40 MG/1
40 TABLET ORAL 2 TIMES DAILY
Qty: 60 TABLET | Refills: 11 | Status: CANCELLED | OUTPATIENT
Start: 2022-01-01 | End: 2023-08-02

## 2022-01-01 RX ORDER — LACTULOSE 10 G/15ML
20 SOLUTION ORAL DAILY
Status: DISCONTINUED | OUTPATIENT
Start: 2022-01-01 | End: 2022-01-01

## 2022-01-01 RX ORDER — OMEPRAZOLE 40 MG/1
40 CAPSULE, DELAYED RELEASE ORAL DAILY
Status: ON HOLD | COMMUNITY
End: 2022-01-01

## 2022-01-01 RX ORDER — PANTOPRAZOLE SODIUM 40 MG/1
40 TABLET, DELAYED RELEASE ORAL DAILY
Status: DISCONTINUED | OUTPATIENT
Start: 2022-01-01 | End: 2022-01-01 | Stop reason: HOSPADM

## 2022-01-01 RX ORDER — MAGNESIUM SULFATE 1 G/100ML
1 INJECTION INTRAVENOUS ONCE
Status: DISCONTINUED | OUTPATIENT
Start: 2022-01-01 | End: 2022-01-01

## 2022-01-01 RX ORDER — ONDANSETRON 2 MG/ML
8 INJECTION INTRAMUSCULAR; INTRAVENOUS EVERY 8 HOURS PRN
Status: DISCONTINUED | OUTPATIENT
Start: 2022-01-01 | End: 2022-01-01 | Stop reason: HOSPADM

## 2022-01-01 RX ORDER — LEVETIRACETAM 100 MG/ML
750 SOLUTION ORAL 2 TIMES DAILY
Status: DISCONTINUED | OUTPATIENT
Start: 2022-01-01 | End: 2022-01-01

## 2022-01-01 RX ORDER — LACTULOSE 10 G/15ML
20 SOLUTION ORAL 2 TIMES DAILY
Status: ON HOLD
Start: 2022-01-01 | End: 2022-01-01

## 2022-01-01 RX ORDER — NOREPINEPHRINE BITARTRATE/D5W 4MG/250ML
0-3 PLASTIC BAG, INJECTION (ML) INTRAVENOUS CONTINUOUS
Status: DISCONTINUED | OUTPATIENT
Start: 2022-01-01 | End: 2022-01-01

## 2022-01-01 RX ORDER — ALBUMIN HUMAN 50 G/1000ML
25 SOLUTION INTRAVENOUS 2 TIMES DAILY
Status: DISCONTINUED | OUTPATIENT
Start: 2022-01-01 | End: 2022-01-01

## 2022-01-01 RX ORDER — SPIRONOLACTONE 100 MG/1
100 TABLET, FILM COATED ORAL DAILY
Qty: 30 TABLET | Refills: 2 | Status: ON HOLD
Start: 2022-01-01 | End: 2022-01-01

## 2022-01-01 RX ORDER — ALBUMIN HUMAN 250 G/1000ML
25 SOLUTION INTRAVENOUS 2 TIMES DAILY
Status: DISCONTINUED | OUTPATIENT
Start: 2022-01-01 | End: 2022-01-01

## 2022-01-01 RX ORDER — CLOBAZAM 10 MG/1
10 TABLET ORAL DAILY
Qty: 30 EACH | Refills: 5 | Status: CANCELLED | OUTPATIENT
Start: 2022-01-01 | End: 2023-01-29

## 2022-01-01 RX ORDER — MEROPENEM AND SODIUM CHLORIDE 1 G/50ML
1 INJECTION, SOLUTION INTRAVENOUS
Status: DISCONTINUED | OUTPATIENT
Start: 2022-01-01 | End: 2022-01-01

## 2022-01-01 RX ORDER — SPIRONOLACTONE 50 MG/1
150 TABLET, FILM COATED ORAL DAILY
Status: DISCONTINUED | OUTPATIENT
Start: 2022-01-01 | End: 2022-01-01

## 2022-01-01 RX ORDER — NAPROXEN SODIUM 220 MG/1
81 TABLET, FILM COATED ORAL DAILY
Status: DISCONTINUED | OUTPATIENT
Start: 2022-01-01 | End: 2022-01-01

## 2022-01-01 RX ORDER — NALOXONE HCL 0.4 MG/ML
0.02 VIAL (ML) INJECTION
Status: DISCONTINUED | OUTPATIENT
Start: 2022-01-01 | End: 2022-01-01

## 2022-01-01 RX ORDER — PANTOPRAZOLE SODIUM 40 MG/1
40 FOR SUSPENSION ORAL DAILY
Status: DISCONTINUED | OUTPATIENT
Start: 2022-01-01 | End: 2022-01-01

## 2022-01-01 RX ORDER — LACTULOSE 10 G/15ML
10 SOLUTION ORAL 2 TIMES DAILY
Status: DISCONTINUED | OUTPATIENT
Start: 2022-01-01 | End: 2022-01-01

## 2022-01-01 RX ORDER — FOLIC ACID 1 MG/1
1 TABLET ORAL DAILY
Status: DISCONTINUED | OUTPATIENT
Start: 2022-01-01 | End: 2022-01-01 | Stop reason: HOSPADM

## 2022-01-01 RX ORDER — ACETAMINOPHEN 500 MG
5 TABLET ORAL NIGHTLY
Status: ON HOLD | COMMUNITY
Start: 2022-01-01 | End: 2022-01-01 | Stop reason: SDUPTHER

## 2022-01-01 RX ORDER — LORAZEPAM 2 MG/ML
0.5 INJECTION INTRAMUSCULAR EVERY 30 MIN PRN
Status: DISCONTINUED | OUTPATIENT
Start: 2022-01-01 | End: 2022-01-01 | Stop reason: HOSPADM

## 2022-01-01 RX ORDER — CIPROFLOXACIN 500 MG/1
500 TABLET ORAL EVERY 12 HOURS
Qty: 4 TABLET | Refills: 1 | Status: CANCELLED | OUTPATIENT
Start: 2022-01-01 | End: 2022-01-01

## 2022-01-01 RX ORDER — FUROSEMIDE 80 MG/1
80 TABLET ORAL 3 TIMES DAILY
Status: DISCONTINUED | OUTPATIENT
Start: 2022-01-01 | End: 2022-01-01

## 2022-01-01 RX ORDER — SODIUM,POTASSIUM PHOSPHATES 280-250MG
2 POWDER IN PACKET (EA) ORAL ONCE
Status: COMPLETED | OUTPATIENT
Start: 2022-01-01 | End: 2022-01-01

## 2022-01-01 RX ORDER — NOREPINEPHRINE BITARTRATE/D5W 4MG/250ML
PLASTIC BAG, INJECTION (ML) INTRAVENOUS
Status: COMPLETED
Start: 2022-01-01 | End: 2022-01-01

## 2022-01-01 RX ORDER — CHOLESTYRAMINE 4 G/9G
1 POWDER, FOR SUSPENSION ORAL DAILY
Status: DISCONTINUED | OUTPATIENT
Start: 2022-01-01 | End: 2022-01-01

## 2022-01-01 RX ORDER — SPIRONOLACTONE 50 MG/1
100 TABLET, FILM COATED ORAL DAILY
Status: DISCONTINUED | OUTPATIENT
Start: 2022-01-01 | End: 2022-01-01

## 2022-01-01 RX ORDER — FLUCONAZOLE 200 MG/1
200 TABLET ORAL DAILY
Qty: 7 TABLET | Refills: 0 | Status: CANCELLED | OUTPATIENT
Start: 2022-01-01 | End: 2022-01-01

## 2022-01-01 RX ORDER — TALC
6 POWDER (GRAM) TOPICAL NIGHTLY
Status: DISCONTINUED | OUTPATIENT
Start: 2022-01-01 | End: 2022-01-01

## 2022-01-01 RX ORDER — ONDANSETRON 8 MG/1
8 TABLET, ORALLY DISINTEGRATING ORAL EVERY 8 HOURS PRN
Status: DISCONTINUED | OUTPATIENT
Start: 2022-01-01 | End: 2022-01-01

## 2022-01-01 RX ORDER — POLYETHYLENE GLYCOL 3350 17 G/17G
17 POWDER, FOR SOLUTION ORAL DAILY
Status: DISCONTINUED | OUTPATIENT
Start: 2022-01-01 | End: 2022-01-01

## 2022-01-01 RX ORDER — GADOBUTROL 604.72 MG/ML
6 INJECTION INTRAVENOUS
Status: COMPLETED | OUTPATIENT
Start: 2022-01-01 | End: 2022-01-01

## 2022-01-01 RX ORDER — POTASSIUM CHLORIDE 20 MEQ/1
40 TABLET, EXTENDED RELEASE ORAL
Status: COMPLETED | OUTPATIENT
Start: 2022-01-01 | End: 2022-01-01

## 2022-01-01 RX ORDER — LIDOCAINE HYDROCHLORIDE 20 MG/ML
INJECTION, SOLUTION EPIDURAL; INFILTRATION; INTRACAUDAL; PERINEURAL
Status: DISCONTINUED | OUTPATIENT
Start: 2022-01-01 | End: 2022-01-01

## 2022-01-01 RX ORDER — TALC
6 POWDER (GRAM) TOPICAL NIGHTLY PRN
Status: DISCONTINUED | OUTPATIENT
Start: 2022-01-01 | End: 2022-01-01

## 2022-01-01 RX ORDER — ACETAMINOPHEN 325 MG/1
650 TABLET ORAL DAILY PRN
Status: ON HOLD | COMMUNITY
End: 2022-01-01 | Stop reason: HOSPADM

## 2022-01-01 RX ORDER — PANTOPRAZOLE SODIUM 40 MG/10ML
40 INJECTION, POWDER, LYOPHILIZED, FOR SOLUTION INTRAVENOUS 2 TIMES DAILY
Status: DISCONTINUED | OUTPATIENT
Start: 2022-01-01 | End: 2022-01-01

## 2022-01-01 RX ORDER — MAGNESIUM SULFATE HEPTAHYDRATE 40 MG/ML
2 INJECTION, SOLUTION INTRAVENOUS
Status: DISPENSED | OUTPATIENT
Start: 2022-01-01 | End: 2022-01-01

## 2022-01-01 RX ORDER — DIAZEPAM 10 MG/2ML
10 INJECTION INTRAMUSCULAR ONCE
Status: COMPLETED | OUTPATIENT
Start: 2022-01-01 | End: 2022-01-01

## 2022-01-01 RX ORDER — GLUCAGON 1 MG
1 KIT INJECTION
Status: DISCONTINUED | OUTPATIENT
Start: 2022-01-01 | End: 2022-01-01

## 2022-01-01 RX ORDER — NOREPINEPHRINE BITARTRATE/D5W 4MG/250ML
0-.2 PLASTIC BAG, INJECTION (ML) INTRAVENOUS CONTINUOUS
Status: DISCONTINUED | OUTPATIENT
Start: 2022-01-01 | End: 2022-01-01

## 2022-01-01 RX ORDER — CLOBAZAM 2.5 MG/ML
10 SUSPENSION ORAL ONCE
Status: COMPLETED | OUTPATIENT
Start: 2022-01-01 | End: 2022-01-01

## 2022-01-01 RX ORDER — LANOLIN ALCOHOL/MO/W.PET/CERES
100 CREAM (GRAM) TOPICAL DAILY
Status: ON HOLD | COMMUNITY
End: 2022-01-01

## 2022-01-01 RX ORDER — FUROSEMIDE 40 MG/1
40 TABLET ORAL 2 TIMES DAILY
Status: DISCONTINUED | OUTPATIENT
Start: 2022-01-01 | End: 2022-01-01 | Stop reason: HOSPADM

## 2022-01-01 RX ORDER — LACTULOSE 10 G/15ML
30 SOLUTION ORAL
Status: DISCONTINUED | OUTPATIENT
Start: 2022-01-01 | End: 2022-01-01

## 2022-01-01 RX ORDER — ALUMINUM HYDROXIDE, MAGNESIUM HYDROXIDE, AND SIMETHICONE 2400; 240; 2400 MG/30ML; MG/30ML; MG/30ML
30 SUSPENSION ORAL EVERY 6 HOURS PRN
Status: DISCONTINUED | OUTPATIENT
Start: 2022-01-01 | End: 2022-01-01 | Stop reason: HOSPADM

## 2022-01-01 RX ORDER — ACETAMINOPHEN 325 MG/1
650 TABLET ORAL EVERY 4 HOURS PRN
Status: DISCONTINUED | OUTPATIENT
Start: 2022-01-01 | End: 2022-01-01 | Stop reason: HOSPADM

## 2022-01-01 RX ORDER — HEPARIN SODIUM 5000 [USP'U]/ML
5000 INJECTION, SOLUTION INTRAVENOUS; SUBCUTANEOUS EVERY 8 HOURS
Status: DISCONTINUED | OUTPATIENT
Start: 2022-01-01 | End: 2022-01-01

## 2022-01-01 RX ORDER — LEVETIRACETAM 500 MG/5ML
1000 INJECTION, SOLUTION, CONCENTRATE INTRAVENOUS ONCE
Status: COMPLETED | OUTPATIENT
Start: 2022-01-01 | End: 2022-01-01

## 2022-01-01 RX ORDER — CALCIUM GLUCONATE 20 MG/ML
1 INJECTION, SOLUTION INTRAVENOUS ONCE
Status: COMPLETED | OUTPATIENT
Start: 2022-01-01 | End: 2022-01-01

## 2022-01-01 RX ORDER — MAGNESIUM SULFATE HEPTAHYDRATE 40 MG/ML
2 INJECTION, SOLUTION INTRAVENOUS
Status: ACTIVE | OUTPATIENT
Start: 2022-01-01 | End: 2022-01-01

## 2022-01-01 RX ORDER — AMOXICILLIN 250 MG
1 CAPSULE ORAL DAILY PRN
Status: DISCONTINUED | OUTPATIENT
Start: 2022-01-01 | End: 2022-01-01

## 2022-01-01 RX ORDER — SODIUM CHLORIDE 0.9 % (FLUSH) 0.9 %
10 SYRINGE (ML) INJECTION
Status: DISCONTINUED | OUTPATIENT
Start: 2022-01-01 | End: 2022-01-01

## 2022-01-01 RX ORDER — FLUCONAZOLE 100 MG/1
200 TABLET ORAL DAILY
Status: DISCONTINUED | OUTPATIENT
Start: 2022-01-01 | End: 2022-01-01 | Stop reason: HOSPADM

## 2022-01-01 RX ORDER — FUROSEMIDE 10 MG/ML
80 INJECTION INTRAMUSCULAR; INTRAVENOUS
Status: COMPLETED | OUTPATIENT
Start: 2022-01-01 | End: 2022-01-01

## 2022-01-01 RX ORDER — LEVETIRACETAM 250 MG/1
750 TABLET ORAL 2 TIMES DAILY
Status: DISCONTINUED | OUTPATIENT
Start: 2022-01-01 | End: 2022-01-01 | Stop reason: HOSPADM

## 2022-01-01 RX ORDER — SODIUM CHLORIDE 0.9 % (FLUSH) 0.9 %
10 SYRINGE (ML) INJECTION EVERY 12 HOURS PRN
Status: DISCONTINUED | OUTPATIENT
Start: 2022-01-01 | End: 2022-01-01

## 2022-01-01 RX ORDER — PROCHLORPERAZINE EDISYLATE 5 MG/ML
5 INJECTION INTRAMUSCULAR; INTRAVENOUS EVERY 6 HOURS PRN
Status: DISCONTINUED | OUTPATIENT
Start: 2022-01-01 | End: 2022-01-01

## 2022-01-01 RX ORDER — MORPHINE SULFATE 2 MG/ML
2 INJECTION, SOLUTION INTRAMUSCULAR; INTRAVENOUS
Status: DISCONTINUED | OUTPATIENT
Start: 2022-01-01 | End: 2022-01-01 | Stop reason: HOSPADM

## 2022-01-01 RX ORDER — BUMETANIDE 1 MG/1
2 TABLET ORAL 2 TIMES DAILY
Status: DISCONTINUED | OUTPATIENT
Start: 2022-01-01 | End: 2022-01-01

## 2022-01-01 RX ORDER — THIAMINE HCL 100 MG
100 TABLET ORAL DAILY
Status: DISCONTINUED | OUTPATIENT
Start: 2022-01-01 | End: 2022-01-01 | Stop reason: HOSPADM

## 2022-01-01 RX ORDER — FLUCONAZOLE 2 MG/ML
200 INJECTION, SOLUTION INTRAVENOUS
Status: DISCONTINUED | OUTPATIENT
Start: 2022-01-01 | End: 2022-01-01

## 2022-01-01 RX ORDER — SCOLOPAMINE TRANSDERMAL SYSTEM 1 MG/1
1 PATCH, EXTENDED RELEASE TRANSDERMAL
Qty: 4 PATCH | Refills: 0 | Status: SHIPPED | OUTPATIENT
Start: 2022-01-01

## 2022-01-01 RX ORDER — IBUPROFEN 200 MG
24 TABLET ORAL
Status: DISCONTINUED | OUTPATIENT
Start: 2022-01-01 | End: 2022-01-01 | Stop reason: HOSPADM

## 2022-01-01 RX ORDER — ALBUMIN HUMAN 250 G/1000ML
SOLUTION INTRAVENOUS
Status: DISPENSED
Start: 2022-01-01 | End: 2022-01-01

## 2022-01-01 RX ORDER — PROCHLORPERAZINE EDISYLATE 5 MG/ML
5 INJECTION INTRAMUSCULAR; INTRAVENOUS EVERY 6 HOURS PRN
Status: DISCONTINUED | OUTPATIENT
Start: 2022-01-01 | End: 2022-01-01 | Stop reason: HOSPADM

## 2022-01-01 RX ORDER — POTASSIUM CHLORIDE 20 MEQ/1
40 TABLET, EXTENDED RELEASE ORAL 2 TIMES DAILY
Status: DISCONTINUED | OUTPATIENT
Start: 2022-01-01 | End: 2022-01-01

## 2022-01-01 RX ORDER — LACTULOSE 10 G/15ML
15 SOLUTION ORAL 2 TIMES DAILY
Status: DISCONTINUED | OUTPATIENT
Start: 2022-01-01 | End: 2022-01-01

## 2022-01-01 RX ORDER — LACTULOSE 10 G/15ML
30 SOLUTION ORAL EVERY 6 HOURS
Status: DISCONTINUED | OUTPATIENT
Start: 2022-01-01 | End: 2022-01-01

## 2022-01-01 RX ORDER — SODIUM CHLORIDE 0.9 % (FLUSH) 0.9 %
10 SYRINGE (ML) INJECTION
Status: DISCONTINUED | OUTPATIENT
Start: 2022-01-01 | End: 2022-01-01 | Stop reason: HOSPADM

## 2022-01-01 RX ORDER — LACTULOSE 10 G/15ML
15 SOLUTION ORAL 3 TIMES DAILY
Status: DISCONTINUED | OUTPATIENT
Start: 2022-01-01 | End: 2022-01-01

## 2022-01-01 RX ORDER — INSULIN ASPART 100 [IU]/ML
0-5 INJECTION, SOLUTION INTRAVENOUS; SUBCUTANEOUS
Status: DISCONTINUED | OUTPATIENT
Start: 2022-01-01 | End: 2022-01-01

## 2022-01-01 RX ORDER — SODIUM,POTASSIUM PHOSPHATES 280-250MG
1 POWDER IN PACKET (EA) ORAL
Status: DISCONTINUED | OUTPATIENT
Start: 2022-01-01 | End: 2022-01-01

## 2022-01-01 RX ORDER — PROCHLORPERAZINE EDISYLATE 5 MG/ML
10 INJECTION INTRAMUSCULAR; INTRAVENOUS EVERY 6 HOURS PRN
Qty: 80 ML | Refills: 0 | Status: SHIPPED | OUTPATIENT
Start: 2022-01-01 | End: 2022-09-19

## 2022-01-01 RX ADMIN — Medication 6 MG: at 08:08

## 2022-01-01 RX ADMIN — FOLIC ACID 1 MG: 1 TABLET ORAL at 08:07

## 2022-01-01 RX ADMIN — MEROPENEM 2 G: 1 INJECTION INTRAVENOUS at 08:08

## 2022-01-01 RX ADMIN — LACTULOSE 20 G: 10 SOLUTION ORAL at 08:08

## 2022-01-01 RX ADMIN — ENOXAPARIN SODIUM 40 MG: 100 INJECTION SUBCUTANEOUS at 04:07

## 2022-01-01 RX ADMIN — PANTOPRAZOLE SODIUM 40 MG: 40 GRANULE, DELAYED RELEASE ORAL at 09:07

## 2022-01-01 RX ADMIN — HYDROCORTISONE SODIUM SUCCINATE 100 MG: 100 INJECTION, POWDER, FOR SOLUTION INTRAMUSCULAR; INTRAVENOUS at 02:08

## 2022-01-01 RX ADMIN — LEVETIRACETAM 750 MG: 250 TABLET, FILM COATED ORAL at 10:08

## 2022-01-01 RX ADMIN — BUMETANIDE 2 MG: 1 TABLET ORAL at 02:07

## 2022-01-01 RX ADMIN — CIPROFLOXACIN HYDROCHLORIDE 500 MG: 500 TABLET, FILM COATED ORAL at 09:08

## 2022-01-01 RX ADMIN — FUROSEMIDE 40 MG: 40 TABLET ORAL at 09:07

## 2022-01-01 RX ADMIN — CHOLESTYRAMINE 4 G: 4 POWDER, FOR SUSPENSION ORAL at 09:07

## 2022-01-01 RX ADMIN — FUROSEMIDE 40 MG: 10 INJECTION, SOLUTION INTRAMUSCULAR; INTRAVENOUS at 04:06

## 2022-01-01 RX ADMIN — FUROSEMIDE 40 MG: 40 TABLET ORAL at 06:07

## 2022-01-01 RX ADMIN — ALBUMIN HUMAN 25 G: 0.25 SOLUTION INTRAVENOUS at 09:07

## 2022-01-01 RX ADMIN — CHLOROTHIAZIDE SODIUM 500 MG: 500 INJECTION, POWDER, LYOPHILIZED, FOR SOLUTION INTRAVENOUS at 12:06

## 2022-01-01 RX ADMIN — RIFAXIMIN 550 MG: 550 TABLET ORAL at 09:07

## 2022-01-01 RX ADMIN — LACTULOSE 10 G: 10 SOLUTION ORAL at 08:07

## 2022-01-01 RX ADMIN — LACTULOSE 30 G: 20 SOLUTION ORAL at 11:07

## 2022-01-01 RX ADMIN — LACTULOSE 15 G: 20 SOLUTION ORAL at 09:07

## 2022-01-01 RX ADMIN — PANTOPRAZOLE SODIUM 40 MG: 40 TABLET, DELAYED RELEASE ORAL at 11:07

## 2022-01-01 RX ADMIN — LACTULOSE 15 G: 20 SOLUTION ORAL at 03:07

## 2022-01-01 RX ADMIN — HEPARIN SODIUM 5000 UNITS: 5000 INJECTION INTRAVENOUS; SUBCUTANEOUS at 08:08

## 2022-01-01 RX ADMIN — LEVETIRACETAM 750 MG: 100 INJECTION, SOLUTION, CONCENTRATE INTRAVENOUS at 11:07

## 2022-01-01 RX ADMIN — MELATONIN TAB 3 MG 6 MG: 3 TAB at 10:07

## 2022-01-01 RX ADMIN — LACTULOSE 10 G: 10 SOLUTION ORAL at 09:07

## 2022-01-01 RX ADMIN — PANTOPRAZOLE SODIUM 40 MG: 40 TABLET, DELAYED RELEASE ORAL at 09:08

## 2022-01-01 RX ADMIN — HEPARIN SODIUM 5000 UNITS: 5000 INJECTION INTRAVENOUS; SUBCUTANEOUS at 05:08

## 2022-01-01 RX ADMIN — SENNOSIDES AND DOCUSATE SODIUM 1 TABLET: 50; 8.6 TABLET ORAL at 08:07

## 2022-01-01 RX ADMIN — MUPIROCIN: 20 OINTMENT TOPICAL at 08:07

## 2022-01-01 RX ADMIN — THIAMINE HCL TAB 100 MG 100 MG: 100 TAB at 09:07

## 2022-01-01 RX ADMIN — FOLIC ACID 1 MG: 1 TABLET ORAL at 08:06

## 2022-01-01 RX ADMIN — NOREPINEPHRINE BITARTRATE 0.08 MCG/KG/MIN: 4 INJECTION, SOLUTION INTRAVENOUS at 05:08

## 2022-01-01 RX ADMIN — CLOBAZAM 10 MG: 10 TABLET ORAL at 11:08

## 2022-01-01 RX ADMIN — ENOXAPARIN SODIUM 40 MG: 100 INJECTION SUBCUTANEOUS at 05:07

## 2022-01-01 RX ADMIN — HEPARIN SODIUM 5000 UNITS: 5000 INJECTION INTRAVENOUS; SUBCUTANEOUS at 10:08

## 2022-01-01 RX ADMIN — FUROSEMIDE 40 MG: 40 TABLET ORAL at 09:08

## 2022-01-01 RX ADMIN — RIFAXIMIN 550 MG: 550 TABLET ORAL at 12:07

## 2022-01-01 RX ADMIN — RIFAXIMIN 550 MG: 550 TABLET ORAL at 08:08

## 2022-01-01 RX ADMIN — LEVETIRACETAM 750 MG: 250 TABLET, FILM COATED ORAL at 09:07

## 2022-01-01 RX ADMIN — MUPIROCIN: 20 OINTMENT TOPICAL at 09:07

## 2022-01-01 RX ADMIN — LACTULOSE 20 G: 10 SOLUTION ORAL at 03:08

## 2022-01-01 RX ADMIN — POLYETHYLENE GLYCOL 3350 17 G: 17 POWDER, FOR SOLUTION ORAL at 08:07

## 2022-01-01 RX ADMIN — RIFAXIMIN 550 MG: 550 TABLET ORAL at 09:08

## 2022-01-01 RX ADMIN — FUROSEMIDE 80 MG: 80 TABLET ORAL at 08:07

## 2022-01-01 RX ADMIN — PIPERACILLIN SODIUM AND TAZOBACTAM SODIUM 4.5 G: 4; .5 INJECTION, POWDER, LYOPHILIZED, FOR SOLUTION INTRAVENOUS at 05:08

## 2022-01-01 RX ADMIN — SENNOSIDES AND DOCUSATE SODIUM 1 TABLET: 50; 8.6 TABLET ORAL at 09:07

## 2022-01-01 RX ADMIN — CLOBAZAM 10 MG: 10 TABLET ORAL at 10:08

## 2022-01-01 RX ADMIN — LEVETIRACETAM 750 MG: 750 TABLET, FILM COATED ORAL at 08:07

## 2022-01-01 RX ADMIN — RIFAXIMIN 550 MG: 550 TABLET ORAL at 08:07

## 2022-01-01 RX ADMIN — NOREPINEPHRINE BITARTRATE 0.16 MCG/KG/MIN: 4 INJECTION, SOLUTION INTRAVENOUS at 02:08

## 2022-01-01 RX ADMIN — CLOBAZAM 10 MG: 10 TABLET ORAL at 11:07

## 2022-01-01 RX ADMIN — AMPICILLIN 2 G: 2 INJECTION, POWDER, FOR SOLUTION INTRAMUSCULAR; INTRAVENOUS at 01:07

## 2022-01-01 RX ADMIN — POLYETHYLENE GLYCOL 3350 17 G: 17 POWDER, FOR SOLUTION ORAL at 09:07

## 2022-01-01 RX ADMIN — THIAMINE HCL TAB 100 MG 100 MG: 100 TAB at 09:08

## 2022-01-01 RX ADMIN — HEPARIN SODIUM 5000 UNITS: 5000 INJECTION INTRAVENOUS; SUBCUTANEOUS at 11:07

## 2022-01-01 RX ADMIN — NOREPINEPHRINE BITARTRATE 0.08 MCG/KG/MIN: 4 INJECTION, SOLUTION INTRAVENOUS at 04:08

## 2022-01-01 RX ADMIN — VASOPRESSIN 0.04 UNITS/MIN: 20 INJECTION INTRAVENOUS at 05:08

## 2022-01-01 RX ADMIN — CHOLESTYRAMINE 4 G: 4 POWDER, FOR SUSPENSION ORAL at 08:07

## 2022-01-01 RX ADMIN — LACTULOSE 20 G: 20 SOLUTION ORAL at 08:08

## 2022-01-01 RX ADMIN — LACTULOSE 30 G: 20 SOLUTION ORAL at 09:07

## 2022-01-01 RX ADMIN — HEPARIN SODIUM 5000 UNITS: 5000 INJECTION INTRAVENOUS; SUBCUTANEOUS at 09:07

## 2022-01-01 RX ADMIN — FOLIC ACID 1 MG: 1 TABLET ORAL at 10:08

## 2022-01-01 RX ADMIN — Medication 6 MG: at 08:07

## 2022-01-01 RX ADMIN — CHOLESTYRAMINE 4 G: 4 POWDER, FOR SUSPENSION ORAL at 04:07

## 2022-01-01 RX ADMIN — LACTULOSE 30 G: 20 SOLUTION ORAL at 08:07

## 2022-01-01 RX ADMIN — FUROSEMIDE 40 MG: 40 TABLET ORAL at 05:08

## 2022-01-01 RX ADMIN — MELATONIN TAB 3 MG 6 MG: 3 TAB at 09:07

## 2022-01-01 RX ADMIN — MAGNESIUM SULFATE HEPTAHYDRATE 2 G: 40 INJECTION, SOLUTION INTRAVENOUS at 11:07

## 2022-01-01 RX ADMIN — FUROSEMIDE 40 MG: 40 TABLET ORAL at 08:08

## 2022-01-01 RX ADMIN — SENNOSIDES AND DOCUSATE SODIUM 1 TABLET: 50; 8.6 TABLET ORAL at 10:07

## 2022-01-01 RX ADMIN — IOHEXOL 100 ML: 350 INJECTION, SOLUTION INTRAVENOUS at 09:07

## 2022-01-01 RX ADMIN — FLUCONAZOLE 200 MG: 100 TABLET ORAL at 09:08

## 2022-01-01 RX ADMIN — RIFAXIMIN 550 MG: 550 TABLET ORAL at 11:08

## 2022-01-01 RX ADMIN — FUROSEMIDE 40 MG: 40 TABLET ORAL at 06:08

## 2022-01-01 RX ADMIN — DIAZEPAM 10 MG: 5 INJECTION, SOLUTION INTRAMUSCULAR; INTRAVENOUS at 06:07

## 2022-01-01 RX ADMIN — MELATONIN TAB 3 MG 6 MG: 3 TAB at 12:07

## 2022-01-01 RX ADMIN — LEVETIRACETAM 750 MG: 500 SOLUTION ORAL at 08:08

## 2022-01-01 RX ADMIN — FUROSEMIDE 40 MG: 10 INJECTION, SOLUTION INTRAMUSCULAR; INTRAVENOUS at 11:07

## 2022-01-01 RX ADMIN — PANTOPRAZOLE SODIUM 40 MG: 40 TABLET, DELAYED RELEASE ORAL at 08:07

## 2022-01-01 RX ADMIN — MUPIROCIN: 20 OINTMENT TOPICAL at 09:08

## 2022-01-01 RX ADMIN — MELATONIN TAB 3 MG 6 MG: 3 TAB at 08:07

## 2022-01-01 RX ADMIN — PANTOPRAZOLE SODIUM 40 MG: 40 TABLET, DELAYED RELEASE ORAL at 09:07

## 2022-01-01 RX ADMIN — CLOBAZAM 10 MG: 10 TABLET ORAL at 09:08

## 2022-01-01 RX ADMIN — PHYTONADIONE 10 MG: 10 INJECTION, EMULSION INTRAMUSCULAR; INTRAVENOUS; SUBCUTANEOUS at 08:07

## 2022-01-01 RX ADMIN — SPIRONOLACTONE 150 MG: 100 TABLET ORAL at 08:07

## 2022-01-01 RX ADMIN — PANTOPRAZOLE SODIUM 40 MG: 40 INJECTION, POWDER, FOR SOLUTION INTRAVENOUS at 08:07

## 2022-01-01 RX ADMIN — PANTOPRAZOLE SODIUM 40 MG: 40 TABLET, DELAYED RELEASE ORAL at 08:08

## 2022-01-01 RX ADMIN — LACTULOSE 30 G: 20 SOLUTION ORAL at 02:07

## 2022-01-01 RX ADMIN — MAGNESIUM SULFATE HEPTAHYDRATE 2 G: 40 INJECTION, SOLUTION INTRAVENOUS at 06:07

## 2022-01-01 RX ADMIN — MUPIROCIN: 20 OINTMENT TOPICAL at 10:08

## 2022-01-01 RX ADMIN — LEVETIRACETAM 750 MG: 750 TABLET, FILM COATED ORAL at 09:07

## 2022-01-01 RX ADMIN — LACTULOSE 20 G: 10 SOLUTION ORAL at 10:08

## 2022-01-01 RX ADMIN — FLUCONAZOLE 200 MG: 100 TABLET ORAL at 11:08

## 2022-01-01 RX ADMIN — LEVETIRACETAM 750 MG: 750 TABLET, FILM COATED ORAL at 08:08

## 2022-01-01 RX ADMIN — Medication 100 MG: at 09:08

## 2022-01-01 RX ADMIN — IPRATROPIUM BROMIDE AND ALBUTEROL SULFATE 3 ML: 2.5; .5 SOLUTION RESPIRATORY (INHALATION) at 01:08

## 2022-01-01 RX ADMIN — POTASSIUM CHLORIDE 40 MEQ: 1500 TABLET, EXTENDED RELEASE ORAL at 12:07

## 2022-01-01 RX ADMIN — PIPERACILLIN SODIUM AND TAZOBACTAM SODIUM 4.5 G: 4; .5 INJECTION, POWDER, LYOPHILIZED, FOR SOLUTION INTRAVENOUS at 12:08

## 2022-01-01 RX ADMIN — NOREPINEPHRINE BITARTRATE 0.28 MCG/KG/MIN: 4 INJECTION, SOLUTION INTRAVENOUS at 01:08

## 2022-01-01 RX ADMIN — LEVETIRACETAM 750 MG: 500 INJECTION, SOLUTION INTRAVENOUS at 09:07

## 2022-01-01 RX ADMIN — FUROSEMIDE 40 MG: 10 INJECTION, SOLUTION INTRAMUSCULAR; INTRAVENOUS at 12:06

## 2022-01-01 RX ADMIN — LACTULOSE 20 G: 20 SOLUTION ORAL at 02:08

## 2022-01-01 RX ADMIN — FUROSEMIDE 40 MG: 10 INJECTION, SOLUTION INTRAMUSCULAR; INTRAVENOUS at 09:06

## 2022-01-01 RX ADMIN — MAGNESIUM SULFATE HEPTAHYDRATE 2 G: 40 INJECTION, SOLUTION INTRAVENOUS at 09:08

## 2022-01-01 RX ADMIN — FOLIC ACID 1 MG: 1 TABLET ORAL at 09:07

## 2022-01-01 RX ADMIN — CLOBAZAM 10 MG: 10 TABLET ORAL at 09:07

## 2022-01-01 RX ADMIN — SPIRONOLACTONE 100 MG: 50 TABLET, FILM COATED ORAL at 05:07

## 2022-01-01 RX ADMIN — HEPARIN SODIUM 5000 UNITS: 5000 INJECTION INTRAVENOUS; SUBCUTANEOUS at 08:07

## 2022-01-01 RX ADMIN — BUMETANIDE 1 MG: 1 TABLET ORAL at 10:07

## 2022-01-01 RX ADMIN — CHLOROTHIAZIDE SODIUM 500 MG: 500 INJECTION, POWDER, LYOPHILIZED, FOR SOLUTION INTRAVENOUS at 10:06

## 2022-01-01 RX ADMIN — HYDROCORTISONE SODIUM SUCCINATE 100 MG: 100 INJECTION, POWDER, FOR SOLUTION INTRAMUSCULAR; INTRAVENOUS at 01:08

## 2022-01-01 RX ADMIN — ENOXAPARIN SODIUM 40 MG: 100 INJECTION SUBCUTANEOUS at 05:08

## 2022-01-01 RX ADMIN — LACTULOSE 15 G: 20 SOLUTION ORAL at 08:07

## 2022-01-01 RX ADMIN — FOLIC ACID 1 MG: 1 TABLET ORAL at 08:08

## 2022-01-01 RX ADMIN — PANTOPRAZOLE SODIUM 40 MG: 40 TABLET, DELAYED RELEASE ORAL at 09:06

## 2022-01-01 RX ADMIN — PANTOPRAZOLE SODIUM 40 MG: 40 TABLET, DELAYED RELEASE ORAL at 10:07

## 2022-01-01 RX ADMIN — HEPARIN SODIUM 5000 UNITS: 5000 INJECTION INTRAVENOUS; SUBCUTANEOUS at 09:08

## 2022-01-01 RX ADMIN — FUROSEMIDE 40 MG: 40 TABLET ORAL at 11:08

## 2022-01-01 RX ADMIN — ERTAPENEM 1 G: 1 INJECTION INTRAMUSCULAR; INTRAVENOUS at 12:07

## 2022-01-01 RX ADMIN — BUMETANIDE 1 MG: 1 TABLET ORAL at 09:07

## 2022-01-01 RX ADMIN — THIAMINE HCL TAB 100 MG 100 MG: 100 TAB at 11:07

## 2022-01-01 RX ADMIN — ALBUMIN (HUMAN) 50 G: 25 SOLUTION INTRAVENOUS at 02:07

## 2022-01-01 RX ADMIN — HYDROCORTISONE SODIUM SUCCINATE 100 MG: 100 INJECTION, POWDER, FOR SOLUTION INTRAMUSCULAR; INTRAVENOUS at 05:08

## 2022-01-01 RX ADMIN — MEROPENEM AND SODIUM CHLORIDE 1 G: 1 INJECTION, SOLUTION INTRAVENOUS at 07:08

## 2022-01-01 RX ADMIN — SPIRONOLACTONE 100 MG: 50 TABLET, FILM COATED ORAL at 11:07

## 2022-01-01 RX ADMIN — HYDROCORTISONE SODIUM SUCCINATE 100 MG: 100 INJECTION, POWDER, FOR SOLUTION INTRAMUSCULAR; INTRAVENOUS at 06:08

## 2022-01-01 RX ADMIN — LACTULOSE 15 G: 20 SOLUTION ORAL at 09:08

## 2022-01-01 RX ADMIN — SPIRONOLACTONE 100 MG: 50 TABLET ORAL at 08:08

## 2022-01-01 RX ADMIN — NOREPINEPHRINE BITARTRATE 0.06 MCG/KG/MIN: 4 INJECTION, SOLUTION INTRAVENOUS at 11:08

## 2022-01-01 RX ADMIN — SODIUM CHLORIDE 500 ML: 0.9 INJECTION, SOLUTION INTRAVENOUS at 06:08

## 2022-01-01 RX ADMIN — PANTOPRAZOLE SODIUM 40 MG: 40 GRANULE, DELAYED RELEASE ORAL at 10:07

## 2022-01-01 RX ADMIN — MAGNESIUM SULFATE 2 G: 2 INJECTION INTRAVENOUS at 12:08

## 2022-01-01 RX ADMIN — MORPHINE SULFATE 2 MG: 2 INJECTION, SOLUTION INTRAMUSCULAR; INTRAVENOUS at 04:09

## 2022-01-01 RX ADMIN — RIFAXIMIN 550 MG: 550 TABLET ORAL at 11:07

## 2022-01-01 RX ADMIN — ENOXAPARIN SODIUM 40 MG: 100 INJECTION SUBCUTANEOUS at 04:08

## 2022-01-01 RX ADMIN — AMPICILLIN 2 G: 2 INJECTION, POWDER, FOR SOLUTION INTRAMUSCULAR; INTRAVENOUS at 12:07

## 2022-01-01 RX ADMIN — LEVETIRACETAM 750 MG: 750 TABLET, FILM COATED ORAL at 09:08

## 2022-01-01 RX ADMIN — VASOPRESSIN 0.04 UNITS/MIN: 20 INJECTION INTRAVENOUS at 01:08

## 2022-01-01 RX ADMIN — MEROPENEM AND SODIUM CHLORIDE 1 G: 1 INJECTION, SOLUTION INTRAVENOUS at 08:08

## 2022-01-01 RX ADMIN — FUROSEMIDE 40 MG: 40 TABLET ORAL at 08:07

## 2022-01-01 RX ADMIN — LACTULOSE 15 G: 20 SOLUTION ORAL at 11:07

## 2022-01-01 RX ADMIN — DEXTROSE 125 ML: 10 SOLUTION INTRAVENOUS at 05:08

## 2022-01-01 RX ADMIN — CLOBAZAM 10 MG: 2.5 SUSPENSION ORAL at 08:07

## 2022-01-01 RX ADMIN — LEVETIRACETAM 750 MG: 750 TABLET, FILM COATED ORAL at 10:07

## 2022-01-01 RX ADMIN — MAGNESIUM SULFATE HEPTAHYDRATE 2 G: 2 INJECTION, SOLUTION INTRAVENOUS at 02:07

## 2022-01-01 RX ADMIN — OXYCODONE 5 MG: 5 TABLET ORAL at 10:07

## 2022-01-01 RX ADMIN — MORPHINE SULFATE 2 MG: 2 INJECTION, SOLUTION INTRAMUSCULAR; INTRAVENOUS at 08:09

## 2022-01-01 RX ADMIN — CLOBAZAM 10 MG: 2.5 SUSPENSION ORAL at 09:07

## 2022-01-01 RX ADMIN — HYDROCORTISONE SODIUM SUCCINATE 100 MG: 100 INJECTION, POWDER, FOR SOLUTION INTRAMUSCULAR; INTRAVENOUS at 11:08

## 2022-01-01 RX ADMIN — SPIRONOLACTONE 100 MG: 50 TABLET, FILM COATED ORAL at 09:07

## 2022-01-01 RX ADMIN — LACTULOSE 30 G: 20 SOLUTION ORAL at 03:07

## 2022-01-01 RX ADMIN — ENOXAPARIN SODIUM 40 MG: 100 INJECTION SUBCUTANEOUS at 06:07

## 2022-01-01 RX ADMIN — PANTOPRAZOLE SODIUM 40 MG: 40 GRANULE, DELAYED RELEASE ORAL at 08:08

## 2022-01-01 RX ADMIN — SODIUM CHLORIDE, SODIUM LACTATE, POTASSIUM CHLORIDE, AND CALCIUM CHLORIDE 500 ML: .6; .31; .03; .02 INJECTION, SOLUTION INTRAVENOUS at 12:07

## 2022-01-01 RX ADMIN — LEVETIRACETAM 750 MG: 250 TABLET, FILM COATED ORAL at 11:08

## 2022-01-01 RX ADMIN — RIFAXIMIN 550 MG: 550 TABLET ORAL at 10:08

## 2022-01-01 RX ADMIN — Medication 0.14 MCG/KG/MIN: at 06:08

## 2022-01-01 RX ADMIN — FOLIC ACID 1 MG: 1 TABLET ORAL at 09:08

## 2022-01-01 RX ADMIN — Medication 100 MG: at 08:08

## 2022-01-01 RX ADMIN — FLUDROCORTISONE ACETATE 100 MCG: 0.1 TABLET ORAL at 08:08

## 2022-01-01 RX ADMIN — Medication 6 MG: at 10:07

## 2022-01-01 RX ADMIN — GADOBUTROL 9 ML: 604.72 INJECTION INTRAVENOUS at 08:08

## 2022-01-01 RX ADMIN — HEPARIN SODIUM 5000 UNITS: 5000 INJECTION INTRAVENOUS; SUBCUTANEOUS at 02:08

## 2022-01-01 RX ADMIN — FOLIC ACID 1 MG: 1 TABLET ORAL at 10:07

## 2022-01-01 RX ADMIN — AMPICILLIN 2 G: 2 INJECTION, POWDER, FOR SOLUTION INTRAMUSCULAR; INTRAVENOUS at 11:07

## 2022-01-01 RX ADMIN — LACTULOSE 20 G: 10 SOLUTION ORAL at 04:08

## 2022-01-01 RX ADMIN — POTASSIUM BICARBONATE 25 MEQ: 978 TABLET, EFFERVESCENT ORAL at 09:07

## 2022-01-01 RX ADMIN — FUROSEMIDE 40 MG: 10 INJECTION, SOLUTION INTRAMUSCULAR; INTRAVENOUS at 09:07

## 2022-01-01 RX ADMIN — LEVETIRACETAM 750 MG: 250 TABLET, FILM COATED ORAL at 10:07

## 2022-01-01 RX ADMIN — SCOPALAMINE 1 PATCH: 1 PATCH, EXTENDED RELEASE TRANSDERMAL at 03:08

## 2022-01-01 RX ADMIN — SODIUM CHLORIDE, SODIUM LACTATE, POTASSIUM CHLORIDE, AND CALCIUM CHLORIDE 1000 ML: .6; .31; .03; .02 INJECTION, SOLUTION INTRAVENOUS at 06:08

## 2022-01-01 RX ADMIN — LEVETIRACETAM 750 MG: 500 INJECTION, SOLUTION INTRAVENOUS at 08:07

## 2022-01-01 RX ADMIN — THIAMINE HCL TAB 100 MG 100 MG: 100 TAB at 10:07

## 2022-01-01 RX ADMIN — MAGNESIUM SULFATE 2 G: 2 INJECTION INTRAVENOUS at 11:08

## 2022-01-01 RX ADMIN — ERTAPENEM 1 G: 1 INJECTION INTRAMUSCULAR; INTRAVENOUS at 11:08

## 2022-01-01 RX ADMIN — LACTULOSE 20 G: 20 SOLUTION ORAL at 09:08

## 2022-01-01 RX ADMIN — PANTOPRAZOLE SODIUM 40 MG: 40 GRANULE, DELAYED RELEASE ORAL at 11:07

## 2022-01-01 RX ADMIN — LACTULOSE 20 G: 10 SOLUTION ORAL at 09:08

## 2022-01-01 RX ADMIN — POLYETHYLENE GLYCOL 3350 17 G: 17 POWDER, FOR SOLUTION ORAL at 09:08

## 2022-01-01 RX ADMIN — LACTULOSE 30 G: 20 SOLUTION ORAL at 12:07

## 2022-01-01 RX ADMIN — SPIRONOLACTONE 100 MG: 100 TABLET ORAL at 09:07

## 2022-01-01 RX ADMIN — HEPARIN SODIUM 5000 UNITS: 5000 INJECTION INTRAVENOUS; SUBCUTANEOUS at 11:08

## 2022-01-01 RX ADMIN — LEVETIRACETAM 750 MG: 100 INJECTION, SOLUTION, CONCENTRATE INTRAVENOUS at 10:07

## 2022-01-01 RX ADMIN — FLUDROCORTISONE ACETATE 100 MCG: 0.1 TABLET ORAL at 09:08

## 2022-01-01 RX ADMIN — POTASSIUM BICARBONATE 50 MEQ: 978 TABLET, EFFERVESCENT ORAL at 12:07

## 2022-01-01 RX ADMIN — HYDROCORTISONE SODIUM SUCCINATE 100 MG: 100 INJECTION, POWDER, FOR SOLUTION INTRAMUSCULAR; INTRAVENOUS at 10:08

## 2022-01-01 RX ADMIN — BUMETANIDE 1 MG: 1 TABLET ORAL at 08:07

## 2022-01-01 RX ADMIN — PANTOPRAZOLE SODIUM 40 MG: 40 GRANULE, DELAYED RELEASE ORAL at 09:08

## 2022-01-01 RX ADMIN — PANTOPRAZOLE SODIUM 40 MG: 40 INJECTION, POWDER, FOR SOLUTION INTRAVENOUS at 09:07

## 2022-01-01 RX ADMIN — LACTULOSE 20 G: 20 SOLUTION ORAL at 09:06

## 2022-01-01 RX ADMIN — LEVETIRACETAM 750 MG: 500 INJECTION, SOLUTION INTRAVENOUS at 10:07

## 2022-01-01 RX ADMIN — FOLIC ACID 1 MG: 1 TABLET ORAL at 11:08

## 2022-01-01 RX ADMIN — TOBRAMYCIN SULFATE 335 MG: 40 INJECTION, SOLUTION INTRAMUSCULAR; INTRAVENOUS at 02:08

## 2022-01-01 RX ADMIN — MELATONIN TAB 3 MG 6 MG: 3 TAB at 09:06

## 2022-01-01 RX ADMIN — MUPIROCIN: 20 OINTMENT TOPICAL at 08:08

## 2022-01-01 RX ADMIN — MEROPENEM AND SODIUM CHLORIDE 1 G: 1 INJECTION, SOLUTION INTRAVENOUS at 10:08

## 2022-01-01 RX ADMIN — SPIRONOLACTONE 100 MG: 50 TABLET ORAL at 11:08

## 2022-01-01 RX ADMIN — POLYETHYLENE GLYCOL 3350 17 G: 17 POWDER, FOR SOLUTION ORAL at 06:07

## 2022-01-01 RX ADMIN — VASOPRESSIN 0.04 UNITS/MIN: 20 INJECTION INTRAVENOUS at 08:08

## 2022-01-01 RX ADMIN — BUMETANIDE 2 MG: 1 TABLET ORAL at 08:07

## 2022-01-01 RX ADMIN — PIPERACILLIN SODIUM AND TAZOBACTAM SODIUM 4.5 G: 4; .5 INJECTION, POWDER, LYOPHILIZED, FOR SOLUTION INTRAVENOUS at 11:08

## 2022-01-01 RX ADMIN — POTASSIUM & SODIUM PHOSPHATES POWDER PACK 280-160-250 MG 2 PACKET: 280-160-250 PACK at 04:08

## 2022-01-01 RX ADMIN — THIAMINE HCL TAB 100 MG 100 MG: 100 TAB at 09:06

## 2022-01-01 RX ADMIN — SODIUM CHLORIDE 500 ML: 0.9 INJECTION, SOLUTION INTRAVENOUS at 05:08

## 2022-01-01 RX ADMIN — ACETAMINOPHEN 650 MG: 325 TABLET ORAL at 09:07

## 2022-01-01 RX ADMIN — SPIRONOLACTONE 100 MG: 100 TABLET ORAL at 09:06

## 2022-01-01 RX ADMIN — FLUCONAZOLE IN SODIUM CHLORIDE 200 MG: 2 INJECTION, SOLUTION INTRAVENOUS at 11:07

## 2022-01-01 RX ADMIN — POTASSIUM BICARBONATE 50 MEQ: 978 TABLET, EFFERVESCENT ORAL at 05:08

## 2022-01-01 RX ADMIN — MEROPENEM AND SODIUM CHLORIDE 1 G: 1 INJECTION, SOLUTION INTRAVENOUS at 05:08

## 2022-01-01 RX ADMIN — FUROSEMIDE 80 MG: 80 TABLET ORAL at 09:07

## 2022-01-01 RX ADMIN — POTASSIUM CHLORIDE 40 MEQ: 1500 TABLET, EXTENDED RELEASE ORAL at 02:06

## 2022-01-01 RX ADMIN — THIAMINE HCL TAB 100 MG 100 MG: 100 TAB at 08:07

## 2022-01-01 RX ADMIN — LACTULOSE 20 G: 20 SOLUTION ORAL at 11:08

## 2022-01-01 RX ADMIN — LEVETIRACETAM 750 MG: 500 SOLUTION ORAL at 09:08

## 2022-01-01 RX ADMIN — CLOBAZAM 10 MG: 10 TABLET ORAL at 10:07

## 2022-01-01 RX ADMIN — SPIRONOLACTONE 100 MG: 50 TABLET ORAL at 10:08

## 2022-01-01 RX ADMIN — FUROSEMIDE 80 MG: 10 INJECTION, SOLUTION INTRAMUSCULAR; INTRAVENOUS at 02:06

## 2022-01-01 RX ADMIN — HYDROCORTISONE SODIUM SUCCINATE 100 MG: 100 INJECTION, POWDER, FOR SOLUTION INTRAMUSCULAR; INTRAVENOUS at 09:08

## 2022-01-01 RX ADMIN — MAGNESIUM SULFATE 2 G: 2 INJECTION INTRAVENOUS at 06:08

## 2022-01-01 RX ADMIN — ACETAMINOPHEN 650 MG: 650 SUPPOSITORY RECTAL at 06:08

## 2022-01-01 RX ADMIN — NOREPINEPHRINE BITARTRATE 0.2 MCG/KG/MIN: 4 INJECTION, SOLUTION INTRAVENOUS at 07:08

## 2022-01-01 RX ADMIN — PIPERACILLIN SODIUM AND TAZOBACTAM SODIUM 4.5 G: 4; .5 INJECTION, POWDER, LYOPHILIZED, FOR SOLUTION INTRAVENOUS at 06:08

## 2022-01-01 RX ADMIN — NOREPINEPHRINE BITARTRATE 0.14 MCG/KG/MIN: 4 INJECTION, SOLUTION INTRAVENOUS at 06:08

## 2022-01-01 RX ADMIN — NOREPINEPHRINE BITARTRATE 0.1 MCG/KG/MIN: 4 INJECTION, SOLUTION INTRAVENOUS at 12:08

## 2022-01-01 RX ADMIN — LACTULOSE 30 G: 20 SOLUTION ORAL at 06:07

## 2022-01-01 RX ADMIN — THIAMINE HCL TAB 100 MG 100 MG: 100 TAB at 11:08

## 2022-01-01 RX ADMIN — CIPROFLOXACIN HYDROCHLORIDE 500 MG: 500 TABLET, FILM COATED ORAL at 09:07

## 2022-01-01 RX ADMIN — FUROSEMIDE 40 MG: 10 INJECTION, SOLUTION INTRAMUSCULAR; INTRAVENOUS at 02:07

## 2022-01-01 RX ADMIN — CHOLESTYRAMINE 4 G: 4 POWDER, FOR SUSPENSION ORAL at 10:07

## 2022-01-01 RX ADMIN — NOREPINEPHRINE BITARTRATE 0.08 MCG/KG/MIN: 4 INJECTION, SOLUTION INTRAVENOUS at 08:08

## 2022-01-01 RX ADMIN — LACTULOSE 15 G: 20 SOLUTION ORAL at 10:08

## 2022-01-01 RX ADMIN — PROPOFOL 50 MG: 10 INJECTION, EMULSION INTRAVENOUS at 03:06

## 2022-01-01 RX ADMIN — MAGNESIUM SULFATE HEPTAHYDRATE 2 G: 40 INJECTION, SOLUTION INTRAVENOUS at 09:07

## 2022-01-01 RX ADMIN — MELATONIN TAB 3 MG 6 MG: 3 TAB at 11:07

## 2022-01-01 RX ADMIN — SPIRONOLACTONE 100 MG: 50 TABLET ORAL at 09:08

## 2022-01-01 RX ADMIN — LACTULOSE 20 G: 20 SOLUTION ORAL at 08:07

## 2022-01-01 RX ADMIN — PROCHLORPERAZINE EDISYLATE 5 MG: 5 INJECTION INTRAMUSCULAR; INTRAVENOUS at 02:07

## 2022-01-01 RX ADMIN — HEPARIN SODIUM 5000 UNITS: 5000 INJECTION INTRAVENOUS; SUBCUTANEOUS at 06:08

## 2022-01-01 RX ADMIN — FUROSEMIDE 40 MG: 10 INJECTION, SOLUTION INTRAMUSCULAR; INTRAVENOUS at 01:06

## 2022-01-01 RX ADMIN — IPRATROPIUM BROMIDE AND ALBUTEROL SULFATE 3 ML: .5; 2.5 SOLUTION RESPIRATORY (INHALATION) at 01:08

## 2022-01-01 RX ADMIN — Medication 125 MCG/KG/MIN: at 03:06

## 2022-01-01 RX ADMIN — SODIUM ZIRCONIUM CYCLOSILICATE 10 G: 10 POWDER, FOR SUSPENSION ORAL at 08:07

## 2022-01-01 RX ADMIN — CEFTRIAXONE 2 G: 2 INJECTION, SOLUTION INTRAVENOUS at 03:07

## 2022-01-01 RX ADMIN — HEPARIN SODIUM 5000 UNITS: 5000 INJECTION INTRAVENOUS; SUBCUTANEOUS at 01:08

## 2022-01-01 RX ADMIN — Medication 800 MG: at 02:07

## 2022-01-01 RX ADMIN — SENNOSIDES AND DOCUSATE SODIUM 1 TABLET: 50; 8.6 TABLET ORAL at 09:06

## 2022-01-01 RX ADMIN — MAGNESIUM SULFATE HEPTAHYDRATE 2 G: 40 INJECTION, SOLUTION INTRAVENOUS at 05:07

## 2022-01-01 RX ADMIN — FLUCONAZOLE 200 MG: 100 TABLET ORAL at 10:07

## 2022-01-01 RX ADMIN — SPIRONOLACTONE 100 MG: 100 TABLET ORAL at 08:06

## 2022-01-01 RX ADMIN — LIDOCAINE HYDROCHLORIDE: 10 INJECTION, SOLUTION INFILTRATION; PERINEURAL at 01:07

## 2022-01-01 RX ADMIN — SODIUM CHLORIDE 500 ML: 0.9 INJECTION, SOLUTION INTRAVENOUS at 09:07

## 2022-01-01 RX ADMIN — AMPICILLIN 2 G: 2 INJECTION, POWDER, FOR SOLUTION INTRAMUSCULAR; INTRAVENOUS at 02:07

## 2022-01-01 RX ADMIN — BUMETANIDE 2 MG: 1 TABLET ORAL at 09:07

## 2022-01-01 RX ADMIN — Medication 400 MG: at 01:07

## 2022-01-01 RX ADMIN — LEVETIRACETAM 750 MG: 250 TABLET, FILM COATED ORAL at 08:08

## 2022-01-01 RX ADMIN — LACTULOSE 15 G: 20 SOLUTION ORAL at 10:07

## 2022-01-01 RX ADMIN — FUROSEMIDE 40 MG: 40 TABLET ORAL at 11:07

## 2022-01-01 RX ADMIN — CEFTRIAXONE 1 G: 1 INJECTION, SOLUTION INTRAVENOUS at 12:07

## 2022-01-01 RX ADMIN — LACTULOSE 30 G: 20 SOLUTION ORAL at 10:07

## 2022-01-01 RX ADMIN — MAGNESIUM SULFATE 2 G: 2 INJECTION INTRAVENOUS at 04:08

## 2022-01-01 RX ADMIN — FUROSEMIDE 40 MG: 40 TABLET ORAL at 10:07

## 2022-01-01 RX ADMIN — FLUCONAZOLE 200 MG: 100 TABLET ORAL at 08:08

## 2022-01-01 RX ADMIN — NOREPINEPHRINE BITARTRATE 0.1 MCG/KG/MIN: 4 INJECTION, SOLUTION INTRAVENOUS at 06:08

## 2022-01-01 RX ADMIN — FUROSEMIDE 40 MG: 40 TABLET ORAL at 05:07

## 2022-01-01 RX ADMIN — LACTULOSE 20 G: 10 SOLUTION ORAL at 08:07

## 2022-01-01 RX ADMIN — ALBUMIN HUMAN 25 G: 0.25 SOLUTION INTRAVENOUS at 08:07

## 2022-01-01 RX ADMIN — SENNOSIDES AND DOCUSATE SODIUM 1 TABLET: 50; 8.6 TABLET ORAL at 11:07

## 2022-01-01 RX ADMIN — MEROPENEM 2 G: 1 INJECTION INTRAVENOUS at 11:08

## 2022-01-01 RX ADMIN — FLUCONAZOLE IN SODIUM CHLORIDE 200 MG: 2 INJECTION, SOLUTION INTRAVENOUS at 10:07

## 2022-01-01 RX ADMIN — SPIRONOLACTONE 100 MG: 50 TABLET, FILM COATED ORAL at 08:07

## 2022-01-01 RX ADMIN — SPIRONOLACTONE 150 MG: 100 TABLET ORAL at 09:07

## 2022-01-01 RX ADMIN — IOHEXOL 75 ML: 350 INJECTION, SOLUTION INTRAVENOUS at 05:08

## 2022-01-01 RX ADMIN — LACTULOSE 20 G: 10 SOLUTION ORAL at 09:07

## 2022-01-01 RX ADMIN — MEROPENEM 2 G: 1 INJECTION INTRAVENOUS at 10:08

## 2022-01-01 RX ADMIN — RIFAXIMIN 550 MG: 550 TABLET ORAL at 10:07

## 2022-01-01 RX ADMIN — MEROPENEM AND SODIUM CHLORIDE 1 G: 1 INJECTION, SOLUTION INTRAVENOUS at 11:08

## 2022-01-01 RX ADMIN — FUROSEMIDE 40 MG: 10 INJECTION, SOLUTION INTRAMUSCULAR; INTRAVENOUS at 05:06

## 2022-01-01 RX ADMIN — THIAMINE HCL TAB 100 MG 100 MG: 100 TAB at 08:08

## 2022-01-01 RX ADMIN — TUBERCULIN PURIFIED PROTEIN DERIVATIVE 5 UNITS: 5 INJECTION, SOLUTION INTRADERMAL at 10:07

## 2022-01-01 RX ADMIN — CLOBAZAM 10 MG: 2.5 SUSPENSION ORAL at 10:07

## 2022-01-01 RX ADMIN — LEVETIRACETAM 750 MG: 750 TABLET, FILM COATED ORAL at 11:07

## 2022-01-01 RX ADMIN — SENNOSIDES AND DOCUSATE SODIUM 1 TABLET: 50; 8.6 TABLET ORAL at 08:06

## 2022-01-01 RX ADMIN — CIPROFLOXACIN HYDROCHLORIDE 500 MG: 500 TABLET, FILM COATED ORAL at 02:07

## 2022-01-01 RX ADMIN — VANCOMYCIN HYDROCHLORIDE 750 MG: 750 INJECTION, POWDER, LYOPHILIZED, FOR SOLUTION INTRAVENOUS at 08:08

## 2022-01-01 RX ADMIN — SCOPALAMINE 1 PATCH: 1 PATCH, EXTENDED RELEASE TRANSDERMAL at 04:09

## 2022-01-01 RX ADMIN — LEVETIRACETAM 750 MG: 750 TABLET, FILM COATED ORAL at 10:08

## 2022-01-01 RX ADMIN — FUROSEMIDE 40 MG: 10 INJECTION, SOLUTION INTRAMUSCULAR; INTRAVENOUS at 08:07

## 2022-01-01 RX ADMIN — LIDOCAINE HYDROCHLORIDE 50 MG: 20 INJECTION, SOLUTION EPIDURAL; INFILTRATION; INTRACAUDAL; PERINEURAL at 03:06

## 2022-01-01 RX ADMIN — POTASSIUM & SODIUM PHOSPHATES POWDER PACK 280-160-250 MG 2 PACKET: 280-160-250 PACK at 06:08

## 2022-01-01 RX ADMIN — FOLIC ACID 1 MG: 1 TABLET ORAL at 11:07

## 2022-01-01 RX ADMIN — SPIRONOLACTONE 100 MG: 100 TABLET ORAL at 08:07

## 2022-01-01 RX ADMIN — IOHEXOL 75 ML: 350 INJECTION, SOLUTION INTRAVENOUS at 09:05

## 2022-01-01 RX ADMIN — MAGNESIUM SULFATE HEPTAHYDRATE 2 G: 40 INJECTION, SOLUTION INTRAVENOUS at 11:08

## 2022-01-01 RX ADMIN — POTASSIUM & SODIUM PHOSPHATES POWDER PACK 280-160-250 MG 2 PACKET: 280-160-250 PACK at 10:08

## 2022-01-01 RX ADMIN — Medication 6 MG: at 09:07

## 2022-01-01 RX ADMIN — MAGNESIUM SULFATE HEPTAHYDRATE 2 G: 40 INJECTION, SOLUTION INTRAVENOUS at 11:06

## 2022-01-01 RX ADMIN — CLOBAZAM 10 MG: 10 TABLET ORAL at 08:08

## 2022-01-01 RX ADMIN — ERTAPENEM 1 G: 1 INJECTION INTRAMUSCULAR; INTRAVENOUS at 04:07

## 2022-01-01 RX ADMIN — MAGNESIUM SULFATE 2 G: 2 INJECTION INTRAVENOUS at 05:08

## 2022-01-01 RX ADMIN — FUROSEMIDE 80 MG: 80 TABLET ORAL at 02:07

## 2022-01-01 RX ADMIN — LACTULOSE 20 G: 20 SOLUTION ORAL at 10:08

## 2022-01-01 RX ADMIN — MEROPENEM 2 G: 1 INJECTION INTRAVENOUS at 09:08

## 2022-01-01 RX ADMIN — FUROSEMIDE 40 MG: 10 INJECTION, SOLUTION INTRAMUSCULAR; INTRAVENOUS at 10:07

## 2022-01-01 RX ADMIN — SPIRONOLACTONE 100 MG: 50 TABLET ORAL at 10:07

## 2022-01-01 RX ADMIN — ERTAPENEM 1 G: 1 INJECTION INTRAMUSCULAR; INTRAVENOUS at 09:08

## 2022-01-01 RX ADMIN — MAGNESIUM SULFATE HEPTAHYDRATE 2 G: 2 INJECTION, SOLUTION INTRAVENOUS at 12:07

## 2022-01-01 RX ADMIN — MELATONIN TAB 3 MG 6 MG: 3 TAB at 08:06

## 2022-01-01 RX ADMIN — POTASSIUM CHLORIDE 40 MEQ: 1500 TABLET, EXTENDED RELEASE ORAL at 02:07

## 2022-01-01 RX ADMIN — MAGNESIUM SULFATE 2 G: 2 INJECTION INTRAVENOUS at 02:08

## 2022-01-01 RX ADMIN — POTASSIUM & SODIUM PHOSPHATES POWDER PACK 280-160-250 MG 1 PACKET: 280-160-250 PACK at 10:08

## 2022-01-01 RX ADMIN — VANCOMYCIN HYDROCHLORIDE 2000 MG: 500 INJECTION, POWDER, LYOPHILIZED, FOR SOLUTION INTRAVENOUS at 09:07

## 2022-01-01 RX ADMIN — PANTOPRAZOLE SODIUM 40 MG: 40 TABLET, DELAYED RELEASE ORAL at 08:06

## 2022-01-01 RX ADMIN — MUPIROCIN: 20 OINTMENT TOPICAL at 10:07

## 2022-01-01 RX ADMIN — INSULIN ASPART 2 UNITS: 100 INJECTION, SOLUTION INTRAVENOUS; SUBCUTANEOUS at 11:08

## 2022-01-01 RX ADMIN — CLOBAZAM 10 MG: 10 TABLET ORAL at 08:07

## 2022-01-01 RX ADMIN — LACTULOSE 10 G: 10 SOLUTION ORAL at 11:07

## 2022-01-01 RX ADMIN — MAGNESIUM SULFATE 2 G: 2 INJECTION INTRAVENOUS at 01:08

## 2022-01-01 RX ADMIN — VANCOMYCIN HYDROCHLORIDE 1250 MG: 1.25 INJECTION, POWDER, LYOPHILIZED, FOR SOLUTION INTRAVENOUS at 09:07

## 2022-01-01 RX ADMIN — MAGNESIUM SULFATE 2 G: 2 INJECTION INTRAVENOUS at 01:07

## 2022-01-01 RX ADMIN — FUROSEMIDE 40 MG: 10 INJECTION, SOLUTION INTRAMUSCULAR; INTRAVENOUS at 08:06

## 2022-01-01 RX ADMIN — VANCOMYCIN HYDROCHLORIDE 750 MG: 750 INJECTION, POWDER, LYOPHILIZED, FOR SOLUTION INTRAVENOUS at 02:08

## 2022-01-01 RX ADMIN — PANTOPRAZOLE SODIUM 80 MG: 40 INJECTION, POWDER, FOR SOLUTION INTRAVENOUS at 10:07

## 2022-01-01 RX ADMIN — LACTULOSE 20 G: 20 SOLUTION ORAL at 08:06

## 2022-01-01 RX ADMIN — BUMETANIDE 2 MG: 1 TABLET ORAL at 11:07

## 2022-01-01 RX ADMIN — GADOBUTROL 6 ML: 604.72 INJECTION INTRAVENOUS at 09:08

## 2022-01-01 RX ADMIN — PANTOPRAZOLE SODIUM 40 MG: 40 TABLET, DELAYED RELEASE ORAL at 10:08

## 2022-01-01 RX ADMIN — FUROSEMIDE 40 MG: 40 TABLET ORAL at 10:08

## 2022-01-01 RX ADMIN — POTASSIUM CHLORIDE 40 MEQ: 1500 TABLET, EXTENDED RELEASE ORAL at 12:06

## 2022-01-01 RX ADMIN — PSYLLIUM HUSK 1 PACKET: 3.4 POWDER ORAL at 09:07

## 2022-01-01 RX ADMIN — CEFTRIAXONE 1 G: 1 INJECTION, SOLUTION INTRAVENOUS at 10:08

## 2022-01-01 RX ADMIN — PSYLLIUM HUSK 1 PACKET: 3.4 POWDER ORAL at 11:07

## 2022-01-01 RX ADMIN — CLOBAZAM 10 MG: 2.5 SUSPENSION ORAL at 11:07

## 2022-01-01 RX ADMIN — SPIRONOLACTONE 100 MG: 50 TABLET ORAL at 09:07

## 2022-01-01 RX ADMIN — Medication 800 MG: at 10:07

## 2022-01-01 RX ADMIN — FUROSEMIDE 40 MG: 10 INJECTION, SOLUTION INTRAMUSCULAR; INTRAVENOUS at 02:06

## 2022-01-01 RX ADMIN — ALUMINUM HYDROXIDE, MAGNESIUM HYDROXIDE, AND DIMETHICONE 30 ML: 400; 400; 40 SUSPENSION ORAL at 09:07

## 2022-01-01 RX ADMIN — VANCOMYCIN HYDROCHLORIDE 750 MG: 750 INJECTION, POWDER, LYOPHILIZED, FOR SOLUTION INTRAVENOUS at 03:08

## 2022-01-01 RX ADMIN — FLUCONAZOLE 200 MG: 100 TABLET ORAL at 09:07

## 2022-01-01 RX ADMIN — MEROPENEM AND SODIUM CHLORIDE 1 G: 1 INJECTION, SOLUTION INTRAVENOUS at 12:08

## 2022-01-01 RX ADMIN — ERTAPENEM 1 G: 1 INJECTION INTRAMUSCULAR; INTRAVENOUS at 10:08

## 2022-01-01 RX ADMIN — CIPROFLOXACIN HYDROCHLORIDE 500 MG: 500 TABLET, FILM COATED ORAL at 10:08

## 2022-01-01 RX ADMIN — POTASSIUM BICARBONATE 50 MEQ: 978 TABLET, EFFERVESCENT ORAL at 04:08

## 2022-01-01 RX ADMIN — PANTOPRAZOLE SODIUM 40 MG: 40 TABLET, DELAYED RELEASE ORAL at 11:08

## 2022-01-01 RX ADMIN — MEROPENEM AND SODIUM CHLORIDE 1 G: 1 INJECTION, SOLUTION INTRAVENOUS at 03:08

## 2022-01-01 RX ADMIN — MORPHINE SULFATE 2 MG: 2 INJECTION, SOLUTION INTRAMUSCULAR; INTRAVENOUS at 07:09

## 2022-01-01 RX ADMIN — MELATONIN TAB 3 MG 6 MG: 3 TAB at 04:06

## 2022-01-01 RX ADMIN — LEVETIRACETAM 1000 MG: 500 INJECTION, SOLUTION INTRAVENOUS at 10:07

## 2022-01-01 RX ADMIN — FLUCONAZOLE 200 MG: 100 TABLET ORAL at 10:08

## 2022-01-01 RX ADMIN — FOLIC ACID 1 MG: 1 TABLET ORAL at 09:06

## 2022-01-01 RX ADMIN — MAGNESIUM SULFATE HEPTAHYDRATE 2 G: 2 INJECTION, SOLUTION INTRAVENOUS at 04:07

## 2022-01-01 RX ADMIN — LEVETIRACETAM 750 MG: 500 INJECTION, SOLUTION INTRAVENOUS at 11:07

## 2022-01-01 RX ADMIN — CEFTRIAXONE 1 G: 1 INJECTION, SOLUTION INTRAVENOUS at 02:07

## 2022-01-01 RX ADMIN — LACTULOSE 20 G: 20 SOLUTION ORAL at 10:07

## 2022-01-01 RX ADMIN — PANTOPRAZOLE SODIUM 40 MG: 40 TABLET, DELAYED RELEASE ORAL at 01:07

## 2022-01-01 RX ADMIN — LEVETIRACETAM 750 MG: 500 SOLUTION ORAL at 10:08

## 2022-01-01 RX ADMIN — ACETAMINOPHEN 650 MG: 325 TABLET ORAL at 03:07

## 2022-01-01 RX ADMIN — SODIUM CHLORIDE: 0.9 INJECTION, SOLUTION INTRAVENOUS at 01:06

## 2022-01-01 RX ADMIN — LEVETIRACETAM 750 MG: 100 INJECTION, SOLUTION, CONCENTRATE INTRAVENOUS at 09:07

## 2022-01-01 RX ADMIN — LACTULOSE 20 G: 20 SOLUTION ORAL at 03:08

## 2022-01-01 RX ADMIN — LACTULOSE 30 G: 20 SOLUTION ORAL at 04:07

## 2022-01-01 RX ADMIN — LEVETIRACETAM 750 MG: 250 TABLET, FILM COATED ORAL at 11:07

## 2022-01-01 RX ADMIN — CALCIUM GLUCONATE 1 G: 20 INJECTION, SOLUTION INTRAVENOUS at 05:08

## 2022-01-01 RX ADMIN — NOREPINEPHRINE BITARTRATE 0.24 MCG/KG/MIN: 4 INJECTION, SOLUTION INTRAVENOUS at 12:08

## 2022-01-01 RX ADMIN — POLYETHYLENE GLYCOL 3350 17 G: 17 POWDER, FOR SOLUTION ORAL at 08:06

## 2022-01-01 RX ADMIN — Medication 400 MG: at 03:07

## 2022-01-01 RX ADMIN — IOHEXOL 15 ML: 350 INJECTION, SOLUTION INTRAVENOUS at 03:08

## 2022-01-01 RX ADMIN — MORPHINE SULFATE 2 MG: 2 INJECTION, SOLUTION INTRAMUSCULAR; INTRAVENOUS at 12:09

## 2022-01-01 RX ADMIN — LEVETIRACETAM 750 MG: 250 TABLET, FILM COATED ORAL at 09:08

## 2022-01-01 RX ADMIN — LACTULOSE 20 G: 10 SOLUTION ORAL at 01:07

## 2022-01-01 RX ADMIN — HEPARIN SODIUM 5000 UNITS: 5000 INJECTION INTRAVENOUS; SUBCUTANEOUS at 10:07

## 2022-01-01 RX ADMIN — THIAMINE HCL TAB 100 MG 100 MG: 100 TAB at 08:06

## 2022-01-01 RX ADMIN — LACTULOSE 10 G: 10 SOLUTION ORAL at 10:07

## 2022-01-01 RX ADMIN — FUROSEMIDE 40 MG: 10 INJECTION, SOLUTION INTRAMUSCULAR; INTRAVENOUS at 05:07

## 2022-01-01 RX ADMIN — IOHEXOL 15 ML: 350 INJECTION, SOLUTION INTRAVENOUS at 02:08

## 2022-01-01 RX ADMIN — VANCOMYCIN HYDROCHLORIDE 1250 MG: 1.25 INJECTION, POWDER, LYOPHILIZED, FOR SOLUTION INTRAVENOUS at 08:08

## 2022-01-01 RX ADMIN — NOREPINEPHRINE BITARTRATE 0.07 MCG/KG/MIN: 4 INJECTION, SOLUTION INTRAVENOUS at 05:08

## 2022-01-01 RX ADMIN — MEROPENEM AND SODIUM CHLORIDE 1 G: 1 INJECTION, SOLUTION INTRAVENOUS at 12:09

## 2022-01-01 RX ADMIN — POTASSIUM & SODIUM PHOSPHATES POWDER PACK 280-160-250 MG 2 PACKET: 280-160-250 PACK at 02:08

## 2022-01-01 RX ADMIN — THIAMINE HCL TAB 100 MG 100 MG: 100 TAB at 10:08

## 2022-01-01 RX ADMIN — ALBUMIN (HUMAN) 25 G: 12.5 SOLUTION INTRAVENOUS at 10:07

## 2022-01-01 RX ADMIN — PIPERACILLIN SODIUM AND TAZOBACTAM SODIUM 4.5 G: 4; .5 INJECTION, POWDER, LYOPHILIZED, FOR SOLUTION INTRAVENOUS at 07:08

## 2022-01-01 RX ADMIN — LEVETIRACETAM 750 MG: 100 INJECTION, SOLUTION, CONCENTRATE INTRAVENOUS at 08:07

## 2022-01-01 RX ADMIN — LACTULOSE 20 G: 10 SOLUTION ORAL at 05:08

## 2022-01-01 RX ADMIN — POLYETHYLENE GLYCOL 3350 17 G: 17 POWDER, FOR SOLUTION ORAL at 08:08

## 2022-01-01 RX ADMIN — PSYLLIUM HUSK 1 PACKET: 3.4 POWDER ORAL at 01:07

## 2022-01-01 RX ADMIN — LACTULOSE 20 G: 20 SOLUTION ORAL at 11:06

## 2022-04-01 NOTE — LETTER
April 1, 2022    Aleyda Floyd  [address] [facility]  [place] [address]      Dear Aleyda Floyd:    Your doctor has referred you to the [facility]. We are sending this letter to remind you to make an appointment with us to complete the referral process.     Please call us at [phone] to schedule an appointment. We look forward to seeing you soon.     If you received a call and have been scheduled, please disregard this letter.       Sincerely,        [facility]   [address] [place] [id number]  [phone]

## 2022-04-01 NOTE — LETTER
April 1, 2022    Aleyda Floyd  [address] [facility]  [place] [address]      Dear Aleyda Floyd:    Your doctor has referred you to the [facility]. We are sending this letter to remind you to make an appointment with us to complete the referral process.     Please call us at [phone] to schedule an appointment. We look forward to seeing you soon.     If you received a call and have been scheduled, please disregard this letter.       Sincerely,        [facility]   [address] [place] [id number]  [phone]

## 2022-04-01 NOTE — TELEPHONE ENCOUNTER
----- Message from Jennifer Ramirez Miller sent at 4/1/2022 12:07 PM CDT -----  Regarding: Hepatology Referral  Good Afternoon,    Dr. Cage would like to refer the following patient to the Hepatology department. The patients diagnosis is cirrhosis. I have scanned the patients referral and records into .     Please let me know if I can help schedule in any way. Patient and  both deaf I added the sisters number in chart to call to make appt.      Thank you,   [facility] Luis

## 2022-04-01 NOTE — TELEPHONE ENCOUNTER
Pt referred by Dr Cage for elevated lfts ? Cirrhosis.  Called pt , she is deaf and uses video relay for hearing impaired. LVM on that system. Called pts sister, she will give the pt the message. Letter mailed.  Pt can be schedule in the HEPATOLOGY appt.  Recent hospital discharge   Elevated. Lfts.

## 2022-04-07 NOTE — TELEPHONE ENCOUNTER
Pt schd to see Dr. Rodriguez on 4/28/2022. Appt confirmed with pt's sister. Language services requested. COLLETTE OH

## 2022-04-19 NOTE — TELEPHONE ENCOUNTER
----- Message from Sandy Sheldon sent at 4/19/2022  1:06 PM CDT -----  Regarding: FW: EGD/COLONSCOPY    ----- Message -----  From: Re Danielson RN  Sent: 4/19/2022  12:43 PM CDT  To: Sandy Sheldon  Subject: RE: EGD/COLONSCOPY                               There is no referral in system to our department.     Thanks,  CARA Grimaldo  ----- Message -----  From: Sandy Sheldon  Sent: 4/19/2022  12:31 PM CDT  To: [facility] Schedulers  Subject: EGD/COLONSCOPY                                   Pts sister called about scheduling from referral in  to [facility] for EGD AND COLONSCOPY.         Requesting Call back number:[phone] María Elena

## 2022-04-27 NOTE — PROGRESS NOTES
Hepatology Note    PATIENT: Aleyda Floyd  MRN: [id number]  DATE: 4/28/2022    Provider: Hepatologist - Dr Rodriguez  Urgency of review: non-urgent  Referring provider: No ref. provider found    Diagnosis:   1. Elevated liver enzymes    2. Alcohol abuse    3. Hepatic encephalopathy    4. Portal hypertension    5. Sarcopenia    6. Alcoholic cirrhosis, unspecified whether ascites present        Chief complaint:   Chief Complaint   Patient presents with    Hepatic Disease       Subjective:    Initial History: Aleyda Floyd is a 67 y.o. female who was referred to Hepatology Clinic for consultation of ETOH related decompensated cirrhosis    Diagnosis of cirrhosis in 2016 now decompensated with HE. She was told to have fatty liver before. Patient had multiple recent admission in [facility] with UTI and sepsis and Hepatic encephalopathy. Patient was seen by GI while inpatient for management of Cirrhosis. Patient has been recovering from SNIF      4/27/22  She denies recent hematemesis, coffee ground emesis, melena, hematochezia, jaundice, confusion, LE edema, abdominal distension.    Sober since 2/2022    Prior Relevant History:    She  denies hepatotoxic medication    Review of systems:  A review of 12+ systems was conducted with pertinent positive and negative findings documented in HPI with all other systems reviewed and negative.      PFSH:  Past medical, family, and social history reviewed as documented in chart with pertinent positive medical, family, and social history detailed in HPI.    Past Medical History:   Past Medical History:   Diagnosis Date    Hypercholesterolemia     Hypertension        Past Surgical HIstory:   Past Surgical History:   Procedure Laterality Date    DILATION AND CURETTAGE OF UTERUS         Family History:   Family History   Problem Relation Age of Onset    Colon cancer Neg Hx     Ovarian cancer Neg Hx     Breast cancer Maternal Grandmother      She has no known family history of  liver disease     Social History:  reports that she quit smoking about 23 years ago. She has a 15.00 pack-year smoking history. She does not have any smokeless tobacco history on file. She reports current alcohol use.    She has significant history of Alcohol. Quit     She denies history of IV drug use/Tatto  She  denies high-risk sexual contacts, no raw seafood, no sick contacts      Allergies:  Review of patient's allergies indicates:  No Known Allergies    Medications:  Current Outpatient Medications   Medication Sig Dispense Refill    aspirin 81 MG Chew Take 81 mg by mouth once daily.      cholecalciferol, vitamin D3, (VITAMIN D3) 50 mcg (2,000 unit) Cap capsule Take 1 capsule by mouth once daily.      cholestyramine-aspartame (QUESTRAN LIGHT) 4 gram PwPk Take 4 g by mouth.      folic acid (FOLVITE) 1 MG tablet Take 1 mg by mouth once daily.       hydrochlorothiazide (HYDRODIURIL) 25 MG tablet Take 12.5 mg by mouth once daily.       lactulose (CHRONULAC) 10 gram/15 mL solution Take 20 g by mouth.      losartan (COZAAR) 50 MG tablet Take 50 mg by mouth once daily.       melatonin (MELATIN) 5 mg Take 5 mg by mouth nightly.      minocycline (MINOCIN,DYNACIN) 100 MG capsule Take 100 mg by mouth 2 (two) times daily.      nystatin (MYCOSTATIN) powder Please apply to skin folds beneath breasts.      omeprazole (PRILOSEC) 40 MG capsule Take 40 mg by mouth once daily.      thiamine (VITAMIN B-1) 100 MG tablet Take 100 mg by mouth once daily.      wound dressings Pste Apply 35 application topically.      oxycodone-acetaminophen 5-325 mg (PERCOCET) 5-325 mg per tablet Take 1 tablet by mouth every 4 (four) hours as needed. (Patient not taking: No sig reported) 30 tablet 0    simvastatin (ZOCOR) 10 MG tablet Take 10 mg by mouth once daily.        No current facility-administered medications for this visit.       Review of Systems   Constitutional: Negative for fatigue, fever and unexpected weight change.    HENT: Negative for ear pain, nosebleeds and trouble swallowing.    Eyes: Negative for discharge and redness.   Respiratory: Negative for cough and shortness of breath.    Cardiovascular: Negative for palpitations and leg swelling.   Gastrointestinal: Negative for abdominal distention, abdominal pain, diarrhea and vomiting.   Endocrine: Negative for cold intolerance and polyuria.   Genitourinary: Negative for flank pain and hematuria.   Musculoskeletal: Negative for back pain.   Skin: Negative for pallor.   Neurological: Negative for seizures and headaches.   Hematological: Does not bruise/bleed easily.   Psychiatric/Behavioral: Negative for confusion and hallucinations.       Computed MELD-Na score unavailable. Necessary lab results were not found in the last year.  Computed MELD score unavailable. Necessary lab results were not found in the last year.     Objective:      Vitals:   Vitals:    04/28/22 0847   BP: (!) 141/65   BP Location: Right arm   Patient Position: Sitting   BP Method: Medium (Automatic)   Pulse: 104   Resp: 18   Temp: 98.6 °F (37 °C)   TempSrc: Oral   SpO2: 96%   Weight: 83.5 kg (184 lb 1.4 oz)   Height: 5' (1.524 m)       Physical Exam  Constitutional:       Appearance: Normal appearance.   HENT:      Head: Normocephalic and atraumatic.      Right Ear: External ear normal.      Left Ear: External ear normal.      Mouth/Throat:      Mouth: Mucous membranes are moist.   Eyes:      Extraocular Movements: Extraocular movements intact.      Pupils: Pupils are equal, round, and reactive to light.   Cardiovascular:      Rate and Rhythm: Normal rate and regular rhythm.      Pulses: Normal pulses.      Heart sounds: Normal heart sounds.   Pulmonary:      Effort: Pulmonary effort is normal.      Breath sounds: Normal breath sounds.   Abdominal:      General: Bowel sounds are normal. There is no distension.      Palpations: Abdomen is soft. There is no mass.      Tenderness: There is no abdominal  tenderness.   Musculoskeletal:         General: Normal range of motion.      Cervical back: Normal range of motion and neck supple.   Neurological:      General: No focal deficit present.      Mental Status: She is alert and oriented to person, place, and time.         Laboratory Data:  No visits with results within 1 Week(s) from this visit.   Latest known visit with results is:   Hospital Outpatient Visit on 12/05/2013   Component Date Value Ref Range Status    Sodium 12/05/2013 141  136 - 145 mmol/L Final    Potassium 12/05/2013 3.8  3.5 - 5.1 mmol/L Final    Chloride 12/05/2013 101  95 - 110 mmol/L Final    CO2 12/05/2013 30 (A) 23 - 29 mmol/L Final    Glucose 12/05/2013 89  70 - 110 mg/dL Final    BUN 12/05/2013 11  6 - 20 mg/dL Final    Creatinine 12/05/2013 0.7  0.5 - 1.4 mg/dL Final    Calcium 12/05/2013 10.4  8.7 - 10.5 mg/dL Final    Anion Gap 12/05/2013 10  8 - 16 mmol/L Final    eGFR if African American 12/05/2013 >60  >60 mL/min/1.73 m^2 Final    eGFR if non African American 12/05/2013 >60  >60 mL/min/1.73 m^2 Final    Comment: Calculation used to obtain the estimated glomerular filtration                           rate (eGFR) is the CKD-EPI equation. Since race is unknown                            in our information system, the eGFR values for                            -American and Non--American patients are given                            for each creatinine result.    WBC 12/05/2013 8.63  3.90 - 12.70 K/uL Final    RBC 12/05/2013 4.40  4.00 - 5.40 M/uL Final    Hemoglobin 12/05/2013 14.5  12.0 - 16.0 g/dL Final    Hematocrit 12/05/2013 43.6  37.0 - 48.5 % Final    MCV 12/05/2013 99 (A) 82 - 98 fL Final    MCH 12/05/2013 33.0 (A) 27.0 - 31.0 pg Final    MCHC 12/05/2013 33.3  32.0 - 36.0 % Final    RDW 12/05/2013 12.8  11.5 - 14.5 % Final    Platelets 12/05/2013 178  150 - 350 K/uL Final    MPV 12/05/2013 9.6  9.2 - 12.9 fL Final    Gran # (ANC) 12/05/2013 5.8   1.8 - 7.7 K/uL Final    Lymph # 12/05/2013 1.9  1.0 - 4.8 K/uL Final    Mono # 12/05/2013 0.7  0.3 - 1.0 K/uL Final    Eos # 12/05/2013 0.2  0.0 - 0.5 K/uL Final    Baso # 12/05/2013 0.04  0.00 - 0.20 K/uL Final    Gran % 12/05/2013 66.8  38.0 - 73.0 % Final    Lymph % 12/05/2013 22.2  18.0 - 48.0 % Final    Mono % 12/05/2013 8.5  4.0 - 15.0 % Final    Eosinophil % 12/05/2013 1.9  0.0 - 8.0 % Final    Basophil % 12/05/2013 0.5  0.0 - 1.9 % Final    Differential Method 12/05/2013 Automated   Final          I personally reviewed imaging studies and outside records..    Assessment:       1. Elevated liver enzymes    2. Alcohol abuse    3. Hepatic encephalopathy    4. Portal hypertension    5. Sarcopenia    6. Alcoholic cirrhosis, unspecified whether ascites present                 Plan:     Iris was seen today for hepatic disease.    Diagnoses and all orders for this visit:    Elevated liver enzymes    Alcohol abuse    Hepatic encephalopathy  -     AFP Tumor Marker; Future; Expected date: 04/28/2022  -     CBC Auto Differential; Future; Expected date: 04/28/2022  -     Comprehensive Metabolic Panel; Future; Expected date: 04/28/2022  -     US Abdomen Complete with Doppler (xpd); Future; Expected date: 04/28/2022  -     Protime-INR; Future; Expected date: 04/28/2022  -     Alpha-1-Antitrypsin; Future; Expected date: 04/28/2022  -     RASHARD Screen w/Reflex; Future; Expected date: 04/28/2022  -     Antimitochondrial Antibody; Future; Expected date: 04/28/2022  -     Ceruloplasmin; Future; Expected date: 04/28/2022  -     Hepatitis C Antibody; Future; Expected date: 04/28/2022  -     Hepatitis B Surface Antigen; Future; Expected date: 04/28/2022  -     Hepatitis B Core Antibody, Total; Future; Expected date: 04/28/2022  -     Phosphatidylethanol (PETH); Future; Expected date: 04/28/2022  -     Tissue Transglutaminase, IgA; Future; Expected date: 04/28/2022  -     Case Request Endoscopy: EGD  (ESOPHAGOGASTRODUODENOSCOPY)    Portal hypertension  -     AFP Tumor Marker; Future; Expected date: 04/28/2022  -     CBC Auto Differential; Future; Expected date: 04/28/2022  -     Comprehensive Metabolic Panel; Future; Expected date: 04/28/2022  -     US Abdomen Complete with Doppler (xpd); Future; Expected date: 04/28/2022  -     Protime-INR; Future; Expected date: 04/28/2022  -     Alpha-1-Antitrypsin; Future; Expected date: 04/28/2022  -     RASHARD Screen w/Reflex; Future; Expected date: 04/28/2022  -     Antimitochondrial Antibody; Future; Expected date: 04/28/2022  -     Ceruloplasmin; Future; Expected date: 04/28/2022  -     Hepatitis C Antibody; Future; Expected date: 04/28/2022  -     Hepatitis B Surface Antigen; Future; Expected date: 04/28/2022  -     Hepatitis B Core Antibody, Total; Future; Expected date: 04/28/2022  -     Phosphatidylethanol (PETH); Future; Expected date: 04/28/2022  -     Tissue Transglutaminase, IgA; Future; Expected date: 04/28/2022    Sarcopenia    Alcoholic cirrhosis, unspecified whether ascites present  -     AFP Tumor Marker; Future; Expected date: 04/28/2022  -     CBC Auto Differential; Future; Expected date: 04/28/2022  -     Comprehensive Metabolic Panel; Future; Expected date: 04/28/2022  -     US Abdomen Complete with Doppler (xpd); Future; Expected date: 04/28/2022  -     Protime-INR; Future; Expected date: 04/28/2022    Other orders  -     melatonin (MELATIN) 5 mg; Take 5 mg by mouth nightly.  -     nystatin (MYCOSTATIN) powder; Please apply to skin folds beneath breasts.  -     wound dressings Pste; Apply 35 application topically.  -     lactulose (CHRONULAC) 10 gram/15 mL solution; Take 20 g by mouth.  -     cholestyramine-aspartame (QUESTRAN LIGHT) 4 gram PwPk; Take 4 g by mouth.  -     thiamine (VITAMIN B-1) 100 MG tablet; Take 100 mg by mouth once daily.  -     minocycline (MINOCIN,DYNACIN) 100 MG capsule; Take 100 mg by mouth 2 (two) times daily.  -     omeprazole  (PRILOSEC) 40 MG capsule; Take 40 mg by mouth once daily.          --Plan checking serologies to rule out other causes of chronic liver diseases for the sake of thoroughness in work up      --Cirrhosis    Liver disease:                   Alcohol/SMILEY    MELD-Na:                         21  Child-Allred Class:             C    Cirrhosis is decompensated with:    Ascites:                                                      no  Spontaneous bacterial peritonitis:              no  Hepatic Encephalopathy:                           yes  Portal bleeding:                                          no  Hepatocellular carcinoma:                          no    Hepatorenal syndrome:                              no  Hyponatremia:                                            no  Muscle wasting:                                          yes   Portal vein thrombosis:                               no      Transplant status:             not under evaluation  Transplant evaluation not warranted given improving MELD.       UNOS Patient Status  Functional Status: 70% - Cares for self: unable to carry on normal activity or active work  Physical Capacity: No Limitations      --Ascites/Edema: 2 gm Na diet.. SBP Prophylaxis no    - Encephalopathy: Continue lactulose. Titrate to maintain 3-4 bowel movements per day. Continue rifaximin 550mg BID.        - Varices: EGD surveillance as per GI recommendation. Beta blockers no    -Cirrhosis Care  - HCC screening:  Abdominal US and AFP for HCC surveillance every 6 months.  - Immunizations: Recommend HAV and HBV vaccinations if not immune.  - Dexa Scan every 2-3 years    Rest Health Maintenance as per Primary care Physician      Because you have cirrhosis, it is important to attend clinic visits every 6 months with an Ultrasound and blood tests every 6 months to screen for liver cancer (you are at risk of developing liver cancer due to scar tissue in the liver)     Signs and symptoms of worsening liver  disease include jaundice, fluid in the belly (ascites), and confusion/disorientation/slowed thought processes due to hepatic encephalopathy (toxins building up because of liver problems).   You should seek medical attention if any of these things occur.    Also, possible bleeding from esophageal varices (blood vessels in the stomach and foodpipe can burst and cause fatal bleeding).  Therefore, if you have symptoms of vomiting blood, blood in your stool, dark or black stools or vomiting coffee ground vomit, YOU SHOULD GO TO THE EMERGENCY ROOM IMMEDIATELY.      Cirrhosis can increase the risk of liver cancer, liver failure, and death. However, we will watch your liver function score (MELD score) closely with each clinic visit. A normal MELD score is 6, highest is 40. We will check this with every clinic visit. A MELD 15 or higher is when we start to consider transplant because MELD 15 or higher indicates that the liver is not functioning as well        Cirrhosis Counseling  - strict abstinence of alcohol use  - compliance with lactulose and rifaximin if you have developed hepatic encephalopathy (confusion due to high ammonia level)  - avoid non-steroidal anti-inflammatory drugs (NSAIDs) such as ibuprofen, Motrin, naprosyn, Alleve due to the risk of kidney damage  - can take acetaminophen (Tylenol), no more than 2000 mg per day  - low sodium (salt) 2 gram per day diet  - nutrition: 25-30kcal (calorie per body body weight in kilogram) per day  - no need to restrict protein in diet  - high protein diet: 1.2-1.5 gram/kg (protein per body weight in kilogram) per day to prevent muscle mass loss  - avoid fasting  -I recommend you do NOT drive a car or operate machinery currently considering risk of Hepatic encephalopathy  - Late night snack with 50 gram of complex carbohydrates and protein  - resistance exercises for muscle strength  - avoid raw seafoods due to the risk of fatal Vibrio vulnificus infection  - ultrasound or  MRI of the liver every 6 months for liver cancer screening (you are at risk of developing liver cancer due to scar tissue in the liver)  - Upper endoscopy every 1-2 years to screen for varices in the stomach and foodpipe which can burst and cause fatal bleeding        Return to clinic in 6  months.    I have sent communication to the referring physician and/or primary care provider.      Time Statement  A total of 60 minutes were spent   Time Includes preparing to see patient, reviewing  diagnostic studies and records, direct face-to-face visit, completing orders, medications , reconciliation, prescription management, and care coordination    We discussed in depth the nature of the patient's disease, the management plan in details. I have provided the patient with an opportunity to ask questions and have all questions answered to his satisfaction.       Papo Rodriguez MD  Transplant Hepatologist and Gastroenterologist  Hepatology and Liver Transplant  [facility]  [facility]

## 2022-04-28 NOTE — PATIENT INSTRUCTIONS
Because you have cirrhosis, it is important to attend clinic visits every 6 months with an Ultrasound and blood tests every 6 months to screen for liver cancer (you are at risk of developing liver cancer due to scar tissue in the liver)     Signs and symptoms of worsening liver disease include jaundice, fluid in the belly (ascites), and confusion/disorientation/slowed thought processes due to hepatic encephalopathy (toxins building up because of liver problems).   You should seek medical attention if any of these things occur.    Also, possible bleeding from esophageal varices (blood vessels in the stomach and foodpipe can burst and cause fatal bleeding).  Therefore, if you have symptoms of vomiting blood, blood in your stool, dark or black stools or vomiting coffee ground vomit, YOU SHOULD GO TO THE EMERGENCY ROOM IMMEDIATELY.      Cirrhosis can increase the risk of liver cancer, liver failure, and death. However, we will watch your liver function score (MELD score) closely with each clinic visit. A normal MELD score is 6, highest is 40. We will check this with every clinic visit. A MELD 15 or higher is when we start to consider transplant because MELD 15 or higher indicates that the liver is not functioning as well   Cirrhosis Counseling  - strict abstinence of alcohol use  - compliance with lactulose and rifaximin if you have developed hepatic encephalopathy (confusion due to high ammonia level)  - avoid non-steroidal anti-inflammatory drugs (NSAIDs) such as ibuprofen, Motrin, naprosyn, Alleve due to the risk of kidney damage  - can take acetaminophen (Tylenol), no more than 2000 mg per day  - low sodium (salt) 2 gram per day diet  - nutrition: 25-30kcal (calorie per body body weight in kilogram) per day  - no need to restrict protein in diet  - high protein diet: 1.2-1.5 gram/kg (protein per body weight in kilogram) per day to prevent muscle mass loss  - avoid fasting  -I recommend you do NOT drive a car or  operate machinery currently considering risk of Hepatic encephalopathy  - Late night snack with 50 gram of complex carbohydrates and protein  - resistance exercises for muscle strength  - avoid raw seafoods due to the risk of fatal Vibrio vulnificus infection  - ultrasound or MRI of the liver every 6 months for liver cancer screening (you are at risk of developing liver cancer due to scar tissue in the liver)  - Upper endoscopy every 1-2 years to screen for varices in the stomach and foodpipe which can burst and cause fatal bleeding

## 2022-04-28 NOTE — TELEPHONE ENCOUNTER
A sign  was requested today for the patients upcoming ultrasound and labs on 5/5/2022 on the [facility]

## 2022-05-05 NOTE — TELEPHONE ENCOUNTER
----- Message from Quinn Ortega sent at 5/5/2022 11:00 AM CDT -----  Regarding: schedule appt  Patient's sister (Lay) calling to schedule appt for the morning of 5/10/2022         Call: [phone] (Lay)

## 2022-05-05 NOTE — TELEPHONE ENCOUNTER
----- Message from Papo Rodriguez MD sent at 5/5/2022 10:44 AM CDT -----  Please inform that patient needs CT liver

## 2022-05-10 NOTE — TELEPHONE ENCOUNTER
----- Message from Papo Rodriguez MD sent at 5/10/2022 12:37 PM CDT -----  Results reviewed. Plan US in 6 months

## 2022-05-18 NOTE — TELEPHONE ENCOUNTER
----- Message from Flynn Pruett sent at 5/18/2022  4:07 PM CDT -----  Regarding: Speak with office  Contact: Lay Solitario(Sister)  Pt sister is calling to see if appt will be set-up to go over results.        Lay#[phone]

## 2022-05-18 NOTE — TELEPHONE ENCOUNTER
Sister also wanted to know how to move forward about fluid built up from pt's chest on down she's having trouble walking and also moving wanted to know if pt will need a fluid prescription to relieve this issue

## 2022-05-18 NOTE — TELEPHONE ENCOUNTER
----- Message from Flynn Pruett sent at 5/18/2022  4:07 PM CDT -----  Regarding: Speak with office  Contact: Lay Solitario(Sister)  Pt sister is calling to see if appt will be set-up to go over results.        Lay#[phone]

## 2022-05-18 NOTE — TELEPHONE ENCOUNTER
MA contacted pt sis , she wanted to know if pt would have to follow up in order to get results or if they can get a call

## 2022-06-06 NOTE — TELEPHONE ENCOUNTER
----- Message from Quinn Ortega sent at 6/6/2022 11:00 AM CDT -----  Regarding: Patient advice  Patient's sister, Lay, calling to speak to staff for advice regarding patient as she has gained 20 lbs in about a week. There is swelling in left arm and hand. Sister is asking if she needs to go to ER or schedule a paracentesis. A CT scan was done early May to see if she needed Rx but no Rx was given.        Call: [phone] (Mobile)

## 2022-06-07 NOTE — TELEPHONE ENCOUNTER
----- Message from Sonia Oliver sent at 6/7/2022  9:54 AM CDT -----  Regarding: Lay/ /  [phone]  Type: Patient Call Back    Who called: Sister    What is the request in detail:  Patients sister is needing a itzel back for an appt for patient she stated referral was sent by Gilberto.  Thank you    Would the patient rather a call back or a response via [facility]?  Call back    Best call back number:  [phone]        Thank you

## 2022-06-09 NOTE — TELEPHONE ENCOUNTER
----- Message from Re Danielson RN sent at 6/9/2022  3:15 PM CDT -----  Regarding: RE: patient care  Patient is calling in regards to wanting a Colonoscopy order. I spoke to patient sister already. There ar no orders for a colonoscopy just an EGD and she would like colonoscopy orders. She was reaching out to Dr. Rodriguez office in reference to this.    Thanks,  CARA Grimaldo  ----- Message -----  From: Ying Griffith RN  Sent: 6/9/2022   2:52 PM CDT  To: [facility] Endo Schedulers  Subject: FW: patient care                                 Call returned to the sisterLay at [phone]. No Answer. Left VM message.  Checking if the patient is still in [facility] or discharge.   Result notes from [facility] in Care Everywhere.  Will forward to Endoscopy Dept for scheduling, ordered in April 2022.    Ying Theodore  ----- Message -----  From: Addy Porter  Sent: 6/9/2022   2:05 PM CDT  To: [facility] Staff  Subject: patient care                                     Patient sister is calling to speak with staff in regards to her sister. She says provide mentioned an Endoscopy, says that another provider ordered a Colonoscopy as well. Checking to see if Dr Rodriguez is interested in that test as well. Patient suffered severe blood lost, had to received a transfusion.    Contact: [phone]

## 2022-06-09 NOTE — TELEPHONE ENCOUNTER
Call returned to Lay, sister of the patient at [phone]. Aleyda was discharged the same day from the ER visit at [facility]. They treated her for the extra fluid she had and sent home.    I was calling because I want her to have a Colonoscopy as well as an EGD. My PCP at [facility] ordered one when her papers were faxed over.  I think it said she needed a Hepatology Consult at [facility] or [facility], and she needs a GI Consult.    There is an Order be Dr Rodriguez for an EGD only.  This was ordered after the Office Visit.  I will have to send him a message for an Order for a Colonoscopy.    Dr Fernandes at [facility] said he sent a consult in for a Colonoscopy.  Lay said I have the Phone number. Lay given the fax number to GI also.    Dr Michael Chun will be leaving [facility]. You will have to be placed with another Provider in the Clinic. I did not know that. So we will have to do that soon.    I will send the message to  Dr Rodriguez.

## 2022-06-10 NOTE — TELEPHONE ENCOUNTER
Call placed to Lay, sister of the patient at [phone].  Message relayed to Lay no colonoscopy orders. The EGD has been ordered per Dr Rodriguez.    The appointment for Iris to establish care with a new Provider will be on Wed 7/13/22 at 8 am.           Lay asked if we had a later appt? Yes we do, but it will be in Aug. No, just leave this appt and I will let them know.  Lay voiced understanding.     requested for the above appt date and time.  Appt reminder placed in the mail.

## 2022-06-10 NOTE — TELEPHONE ENCOUNTER
Call placed to Lay, sister of the patient at [phone].  Message relayed to Lay no colonoscopy orders. The EGD has been ordered per Dr Rodriguez.     The appointment for Iris to establish care with a new Provider will be on Wed 7/13/22 at 8 am.           Lay asked if we had a later appt? Yes we do, but it will be in Aug. No, just leave this appt and I will let them know.  Lay voiced understanding.      requested for the above appt date and time.  Appt reminder placed in the mail.

## 2022-06-23 NOTE — TRANSFER OF CARE
Anesthesia Transfer of Care Note    Patient: Aleyda LEE Fraluis manuelineaalfonso    Procedure(s) Performed: Procedure(s) (LRB):  EGD (ESOPHAGOGASTRODUODENOSCOPY) (N/A)    Patient location: PACU    Anesthesia Type: general    Transport from OR: Transported from OR on room air with adequate spontaneous ventilation    Post pain: adequate analgesia    Post assessment: no apparent anesthetic complications    Post vital signs: stable    Level of consciousness: awake    Nausea/Vomiting: no nausea/vomiting    Complications: none    Transfer of care protocol was followed      Last vitals:   Visit Vitals  BP )99/54 (BP Location: Left arm, Patient Position: Lying)   Pulse 90   Temp 36.2 °C (97.2 °F) (Temporal)   Resp 18   Ht 5' (1.524 m)   Wt 96.2 kg (212 lb)   SpO2 100%   Breastfeeding No   BMI 41.40 kg/m²

## 2022-06-23 NOTE — ANESTHESIA PREPROCEDURE EVALUATION
06/23/2022  Pre-operative evaluation for Procedure(s) (LRB):  EGD (ESOPHAGOGASTRODUODENOSCOPY) (N/A)    Aleyda Floyd is a 67 y.o. female     Patient Active Problem List   Diagnosis    Deaf- written communication    Postmenopausal bleeding       Review of patient's allergies indicates:  No Known Allergies    No current facility-administered medications on file prior to encounter.     Current Outpatient Medications on File Prior to Encounter   Medication Sig Dispense Refill    aspirin 81 MG Chew Take 81 mg by mouth once daily.      cholecalciferol, vitamin D3, (VITAMIN D3) 50 mcg (2,000 unit) Cap capsule Take 1 capsule by mouth once daily.      cholestyramine-aspartame (QUESTRAN LIGHT) 4 gram PwPk Take 4 g by mouth.      folic acid (FOLVITE) 1 MG tablet Take 1 mg by mouth once daily.       hydrochlorothiazide (HYDRODIURIL) 25 MG tablet Take 12.5 mg by mouth once daily.       lactulose (CHRONULAC) 10 gram/15 mL solution Take 20 g by mouth.      losartan (COZAAR) 50 MG tablet Take 50 mg by mouth once daily.       melatonin (MELATIN) 5 mg Take 5 mg by mouth nightly.      minocycline (MINOCIN,DYNACIN) 100 MG capsule Take 100 mg by mouth 2 (two) times daily.      nystatin (MYCOSTATIN) powder Please apply to skin folds beneath breasts.      omeprazole (PRILOSEC) 40 MG capsule Take 40 mg by mouth once daily.      oxycodone-acetaminophen 5-325 mg (PERCOCET) 5-325 mg per tablet Take 1 tablet by mouth every 4 (four) hours as needed. (Patient not taking: No sig reported) 30 tablet 0    simvastatin (ZOCOR) 10 MG tablet Take 10 mg by mouth once daily.       thiamine (VITAMIN B-1) 100 MG tablet Take 100 mg by mouth once daily.      wound dressings Pste Apply 35 application topically.         Past Surgical History:   Procedure Laterality Date    DILATION AND CURETTAGE OF UTERUS         Social History      Socioeconomic History    Marital status:    Tobacco Use    Smoking status: Former Smoker     Packs/day: 1.00     Years: 15.00     Pack years: 15.00     Quit date: 1998     Years since quittin.5   Substance and Sexual Activity    Alcohol use: Yes     Comment: 3-4 weekly         CBC:   Recent Labs     22  1154   WBC 5.86   RBC 2.78*   HGB 8.2*   HCT 25.0*   *   MCV 90   MCH 29.5   MCHC 32.8       CMP: No results for input(s): NA, K, CL, CO2, BUN, CREATININE, GLU, MG, PHOS, CALCIUM, ALBUMIN, PROT, ALKPHOS, ALT, AST, BILITOT in the last 72 hours.    INR  No results for input(s): PT, INR, PROTIME, APTT in the last 72 hours.        Diagnostic Studies:      EKD Echo:  Nuclear Stress Test 3/2022  FINDINGS:     Baseline heart rate was 86 beats per minute. Baseline blood pressure was 101/56 mmHg. The patient did not experience any chest pain or shortness of breath during the Lexiscan injection.     The control electrocardiogram shows normal sinus rhythm with q waves present in V1-V5 concerning for old anteroseptal infarct. During the study no abnormal ST segment shifts were seen. No arrhythmias occurred.     On scintigraphic imaging, there is normal and homogeneous perfusion of all left ventricular wall segments at rest as well as with stress. On the quantitative gated SPECT study, there is normal myocardial thickening and contractility of all left ventricular wall segments. The calculated left ventricular ejection fraction is 70%.        Pre-op Assessment    I have reviewed the Patient Summary Reports.     I have reviewed the Nursing Notes. I have reviewed the NPO Status.      Review of Systems  Social:  Social Alcohol Use, Former Smoker    EENT/Dental:EENT/Dental Normal   Cardiovascular:   Hypertension hyperlipidemia    Pulmonary:  Pulmonary Normal    Renal/:  Renal/ Normal     Hepatic/GI:   Liver Disease, (ETOH cirrhosis, portal htn, hx of hepatic encephalopathy)     Musculoskeletal:  Musculoskeletal Normal    Neurological:  Neurology Normal    Endocrine:  Obesity / BMI > 30  Psych:  Psychiatric Normal           Physical Exam  General: Well nourished, Cooperative, Alert and Oriented    Airway:  Mallampati: III   Mouth Opening: Normal  Neck ROM: Normal ROM    Dental:  Intact    Skin:  Petechiae, Hematoma, Decreased Turgor  Non pitting edema bl upper extremities.       Anesthesia Plan  Type of Anesthesia, risks & benefits discussed:    Anesthesia Type: Gen Natural Airway  Intra-op Monitoring Plan: Standard ASA Monitors  Induction:  IV  Informed Consent: Informed consent signed with the Patient and all parties understand the risks and agree with anesthesia plan.  All questions answered.   ASA Score: 3  Day of Surgery Review of History & Physical: H&P Update referred to the surgeon/provider.  Anesthesia Plan Notes: Difficult IV access 2/2 non pitting edema and decreased skin turgor.     Ready For Surgery From Anesthesia Perspective.     .

## 2022-06-23 NOTE — PROVATION PATIENT INSTRUCTIONS
Discharge Summary/Instructions after an Endoscopic Procedure  Patient Name: Aleyda Floyd  Patient MRN: [id number]  Patient YOB: 1955  Thursday, June 23, 2022  Sean Perry MD  Dear patient,  As a result of recent federal legislation (The Federal Cures Act), you may   receive lab or pathology results from your procedure in your [facility]   account before your physician is able to contact you. Your physician or   their representative will relay the results to you with their   recommendations at their soonest availability.  Thank you,  RESTRICTIONS:  During your procedure today, you received medications for sedation.  These   medications may affect your judgment, balance and coordination.  Therefore,   for 24 hours, you have the following restrictions:   - DO NOT drive a car, operate machinery, make legal/financial decisions,   sign important papers or drink alcohol.    ACTIVITY:  Today: no heavy lifting, straining or running due to procedural   sedation/anesthesia.  The following day: return to full activity including work.  DIET:  Eat and drink normally unless instructed otherwise.     TREATMENT FOR COMMON SIDE EFFECTS:  - Mild abdominal pain, nausea, belching, bloating or excessive gas:  rest,   eat lightly and use a heating pad.  - Sore Throat: treat with throat lozenges and/or gargle with warm salt   water.  - Because air was used during the procedure, expelling large amounts of air   from your rectum or belching is normal.  - If a bowel prep was taken, you may not have a bowel movement for 1-3 days.    This is normal.  SYMPTOMS TO WATCH FOR AND REPORT TO YOUR PHYSICIAN:  1. Abdominal pain or bloating, other than gas cramps.  2. Chest pain.  3. Back pain.  4. Signs of infection such as: chills or fever occurring within 24 hours   after the procedure.  5. Rectal bleeding, which would show as bright red, maroon, or black stools.   (A tablespoon of blood from the rectum is not serious, especially  if   hemorrhoids are present.)  6. Vomiting.  7. Weakness or dizziness.  GO DIRECTLY TO THE NEAREST EMERGENCY ROOM IF YOU HAVE ANY OF THE FOLLOWING:      Difficulty breathing              Chills and/or fever over 101 F   Persistent vomiting and/or vomiting blood   Severe abdominal pain   Severe chest pain   Black, tarry stools   Bleeding- more than one tablespoon   Any other symptom or condition that you feel may need urgent attention  Your doctor recommends these additional instructions:  If any biopsies were taken, your doctors clinic will contact you in 1 to 2   weeks with any results.  - Patient has a contact number available for emergencies.  The signs and   symptoms of potential delayed complications were discussed with the   patient.  Return to normal activities tomorrow.  Written discharge   instructions were provided to the patient.   - Discharge patient to home.   - Resume previous diet.   - Continue present medications.   - Repeat upper endoscopy in 1 year for surveillance.   For questions, problems or results please call your physician - Sean Perry MD at Work:  [phone].  [facility] [place] [facility], EMERGENCY ROOM PHONE NUMBER: [phone]  IF A COMPLICATION OR EMERGENCY SITUATION ARISES AND YOU ARE UNABLE TO REACH   YOUR PHYSICIAN - GO DIRECTLY TO THE EMERGENCY ROOM.  Sean Perry MD  6/23/2022 3:13:45 PM  This report has been verified and signed electronically.  Dear patient,  As a result of recent federal legislation (The Federal Cures Act), you may   receive lab or pathology results from your procedure in your [facility]   account before your physician is able to contact you. Your physician or   their representative will relay the results to you with their   recommendations at their soonest availability.  Thank you,  PROVATION

## 2022-06-23 NOTE — ANESTHESIA POSTPROCEDURE EVALUATION
Anesthesia Post Evaluation    Patient: Aleyda LEE Framurphy    Procedure(s) Performed: Procedure(s) (LRB):  EGD (ESOPHAGOGASTRODUODENOSCOPY) (N/A)    Final Anesthesia Type: general      Patient location during evaluation: GI PACU  Patient participation: Yes- Able to Participate  Level of consciousness: awake and alert  Post-procedure vital signs: reviewed and stable  Pain management: adequate  Airway patency: patent    PONV status at discharge: No PONV  Anesthetic complications: no      Cardiovascular status: blood pressure returned to baseline  Respiratory status: unassisted  Hydration status: euvolemic  Follow-up not needed.          Vitals Value Taken Time   /63 06/23/22 1551   Temp 36.6 °C (97.9 °F) 06/23/22 1518   Pulse 73 06/23/22 1551   Resp 18 06/23/22 1551   SpO2 97 % 06/23/22 1551         Event Time   Out of Recovery 16:08:14         Pain/Rhonda Score: Rhonda Score: 10 (6/23/2022  3:18 PM)

## 2022-06-23 NOTE — PLAN OF CARE
Written and verbal discharge instructions given to patient.  Patient verbalizes understanding and has no questions at this time.

## 2022-06-23 NOTE — H&P
Short Stay Endoscopy History and Physical    PCP - Elvie Fernandes MD    Procedure - EGD  Sedation: GA  ASA - per anesthesia  Mallampati - per anesthesia  History of Anesthesia problems - no  Family history Anesthesia problems -  no     HPI:  This is a 67 y.o. female here for evaluation of : Portal hypertension, Cirrhosis    Reflux - no  Dysphagia - no  Abdominal pain - no  Diarrhea - no    ROS:  Constitutional: No fevers, chills, No weight loss  ENT: No allergies  CV: No chest pain  Pulm: No cough, No shortness of breath  Ophtho: No vision changes  GI: see HPI  Medical History:  has a past medical history of Hypercholesterolemia and Hypertension.    Surgical History:  has a past surgical history that includes Dilation and curettage of uterus.    Family History: family history includes Breast cancer in her maternal grandmother.. Otherwise no colon cancer, inflammatory bowel disease, or GI malignancies.    Social History:  reports that she quit smoking about 23 years ago. She has a 15.00 pack-year smoking history. She does not have any smokeless tobacco history on file. She reports current alcohol use.    Review of patient's allergies indicates:  No Known Allergies    Medications:   No medications prior to admission.       Objective Findings:    Vital Signs: Per nursing notes.    Physical Exam:  General Appearance: Well appearing in no acute distress  Head:   Normocephalic, without obvious abnormality  Eyes:    No scleral icterus  Airway: Open  Neck: No restriction in mobility  Lungs: CTA bilaterally in anterior and posterior fields, no wheezes, no crackles.  Heart:  Regular rate and rhythm, S1, S2 normal, no murmurs heard  Abdomen: Soft, non tender, non distended      Labs:  Lab Results   Component Value Date    WBC 6.11 05/05/2022    HGB 9.6 (L) 05/05/2022    HCT 29.2 (L) 05/05/2022     (L) 05/05/2022    ALT 27 05/05/2022    AST 42 (H) 05/05/2022     (L) 05/05/2022    K 3.3 (L) 05/05/2022      05/05/2022    CREATININE 1.3 05/05/2022    BUN 8 05/05/2022    CO2 22 (L) 05/05/2022    INR 1.8 (H) 05/05/2022    HGBA1C 4.2 (L) 03/04/2022         I have explained the risks and benefits of endoscopy procedures to the patient including but not limited to bleeding, perforation, infection, and death.    Thank you so much for allowing me to participate in the care of Aleyda Perry MD

## 2022-06-28 PROBLEM — I10 PRIMARY HYPERTENSION: Status: ACTIVE | Noted: 2022-01-01

## 2022-06-28 PROBLEM — K70.31 ALCOHOLIC CIRRHOSIS OF LIVER WITH ASCITES: Status: ACTIVE | Noted: 2022-01-01

## 2022-06-28 PROBLEM — D63.8 ANEMIA, CHRONIC DISEASE: Status: ACTIVE | Noted: 2022-01-01

## 2022-06-28 PROBLEM — D69.6 THROMBOCYTOPENIA: Status: ACTIVE | Noted: 2022-01-01

## 2022-06-28 PROBLEM — R60.1 ANASARCA: Status: ACTIVE | Noted: 2022-01-01

## 2022-06-28 NOTE — SUBJECTIVE & OBJECTIVE
Past Medical History:   Diagnosis Date    Hypercholesterolemia     Hypertension        Past Surgical History:   Procedure Laterality Date    DILATION AND CURETTAGE OF UTERUS      ESOPHAGOGASTRODUODENOSCOPY N/A 6/23/2022    Procedure: EGD (ESOPHAGOGASTRODUODENOSCOPY);  Surgeon: Sean Perry MD;  Location: [address];  Service: Endoscopy;  Laterality: N/A;  cirrhosis, variceal screening  labs prior  -request sent 6/14  fully vaccinated, instructions emailed to [email or web address]       Review of patient's allergies indicates:  No Known Allergies    No current facility-administered medications on file prior to encounter.     Current Outpatient Medications on File Prior to Encounter   Medication Sig    furosemide (LASIX) 20 MG tablet Take 20 mg by mouth 2 (two) times daily.    lactulose (CHRONULAC) 10 gram/15 mL solution Take 20 g by mouth.    aspirin 81 MG Chew Take 81 mg by mouth once daily.    cholecalciferol, vitamin D3, (VITAMIN D3) 50 mcg (2,000 unit) Cap capsule Take 1 capsule by mouth once daily.    cholestyramine-aspartame (QUESTRAN LIGHT) 4 gram PwPk Take 4 g by mouth.    folic acid (FOLVITE) 1 MG tablet Take 1 mg by mouth once daily.     hydrochlorothiazide (HYDRODIURIL) 25 MG tablet Take 12.5 mg by mouth once daily.     melatonin (MELATIN) 5 mg Take 5 mg by mouth nightly.    minocycline (MINOCIN,DYNACIN) 100 MG capsule Take 100 mg by mouth 2 (two) times daily.    nystatin (MYCOSTATIN) powder Please apply to skin folds beneath breasts.    omeprazole (PRILOSEC) 40 MG capsule Take 40 mg by mouth once daily.    thiamine (VITAMIN B-1) 100 MG tablet Take 100 mg by mouth once daily.    wound dressings Pste Apply 35 application topically.    [DISCONTINUED] losartan (COZAAR) 50 MG tablet Take 50 mg by mouth once daily.     [DISCONTINUED] oxycodone-acetaminophen 5-325 mg (PERCOCET) 5-325 mg per tablet Take 1 tablet by mouth every 4 (four) hours as needed. (Patient not  taking: No sig reported)    [DISCONTINUED] simvastatin (ZOCOR) 10 MG tablet Take 10 mg by mouth once daily.      Family History       Problem Relation (Age of Onset)    Breast cancer Maternal Grandmother          Tobacco Use    Smoking status: Former Smoker     Packs/day: 1.00     Years: 15.00     Pack years: 15.00     Quit date: 1998     Years since quittin.5    Smokeless tobacco: Never Used   Substance and Sexual Activity    Alcohol use: Not Currently    Drug use: Not Currently    Sexual activity: Not on file     Review of Systems   Constitutional:  Negative for chills, fatigue and fever.   HENT:  Negative for ear pain and sinus pain.    Eyes:  Negative for photophobia, pain and visual disturbance.   Respiratory:  Negative for cough and shortness of breath.    Cardiovascular:  Negative for chest pain and leg swelling.   Gastrointestinal:  Positive for abdominal distention. Negative for abdominal pain, blood in stool, constipation, diarrhea, nausea and vomiting.   Endocrine: Negative for polyuria.   Genitourinary:  Negative for difficulty urinating, dysuria, flank pain, pelvic pain and vaginal pain.   Musculoskeletal:  Negative for back pain and neck pain.        Swelling in all extremities   Skin:  Negative for color change and rash.   Neurological:  Positive for weakness. Negative for dizziness, light-headedness, numbness and headaches.   Psychiatric/Behavioral:  Negative for confusion and sleep disturbance.    Objective:     Vital Signs (Most Recent):  Temp: 97.4 °F (36.3 °C) (22 0300)  Pulse: 90 (22 0233)  Resp: 14 (22 0233)  BP: (!) 145/86 (22 0233)  SpO2: 99 % (22 0233)   Vital Signs (24h Range):  Temp:  [97.4 °F (36.3 °C)-98 °F (36.7 °C)] 97.4 °F (36.3 °C)  Pulse:  [88-93] 90  Resp:  [14-20] 14  SpO2:  [98 %-100 %] 99 %  BP: (145-209)/(74-86) 145/86     Weight: 96.2 kg (212 lb)  Body mass index is 38.78 kg/m².    Physical Exam  Constitutional:       General: She is not  in acute distress.     Appearance: She is obese. She is not ill-appearing or diaphoretic.   HENT:      Head: Normocephalic and atraumatic.      Right Ear: External ear normal.      Left Ear: External ear normal.      Nose: Nose normal. No congestion or rhinorrhea.      Mouth/Throat:      Mouth: Mucous membranes are moist.      Pharynx: Oropharynx is clear. No oropharyngeal exudate or posterior oropharyngeal erythema.   Eyes:      General: No scleral icterus.     Extraocular Movements: Extraocular movements intact.      Conjunctiva/sclera: Conjunctivae normal.   Neck:      Vascular: No carotid bruit.   Cardiovascular:      Rate and Rhythm: Normal rate and regular rhythm.      Pulses: Normal pulses.      Heart sounds: Normal heart sounds. No murmur heard.    No friction rub.   Pulmonary:      Effort: Pulmonary effort is normal. No respiratory distress.      Breath sounds: Normal breath sounds. No stridor. No wheezing.   Chest:      Chest wall: No tenderness.   Abdominal:      General: Bowel sounds are normal. There is distension.      Palpations: There is no mass.      Tenderness: There is no right CVA tenderness, left CVA tenderness or guarding.      Comments: Distended with positive fluid wave. No tenderness to palpation   Musculoskeletal:         General: Swelling present. No tenderness or deformity. Normal range of motion.      Cervical back: Normal range of motion. No rigidity or tenderness.      Right lower leg: Edema present.      Left lower leg: Edema present.      Comments: Diffuse edema with some weeping noted in right upper extremity   Skin:     General: Skin is warm and dry.      Capillary Refill: Capillary refill takes less than 2 seconds.      Coloration: Skin is not jaundiced or pale.      Findings: Bruising present. No erythema.      Comments: Bruising noted on bilateral forearms   Neurological:      General: No focal deficit present.      Mental Status: She is alert and oriented to person, place, and  time. Mental status is at baseline.      Motor: No weakness.      Coordination: Coordination normal.   Psychiatric:         Mood and Affect: Mood normal.         Behavior: Behavior normal.         Thought Content: Thought content normal.           Significant Labs: All pertinent labs within the past 24 hours have been reviewed.  CBC:   Recent Labs   Lab 06/28/22 0042   WBC 7.06   HGB 8.5*   HCT 26.2*   *     CMP:   Recent Labs   Lab 06/28/22 0042   *   K 4.3      CO2 19*   *   BUN 9   CREATININE 1.0   CALCIUM 8.6*   PROT 6.9   ALBUMIN 1.9*   BILITOT 1.8*   ALKPHOS 91   AST 45*   ALT 25   ANIONGAP 5*   EGFRNONAA 58.4*       Significant Imaging: I have reviewed all pertinent imaging results/findings within the past 24 hours.

## 2022-06-28 NOTE — CONSULTS
[facility] - Emergency Dept  Gastroenterology  Consult Note    Patient Name: Aleyda Floyd  MRN: [id number]  Admission Date: 6/27/2022  Hospital Length of Stay: 0 days  Code Status: Full Code   Attending Provider: Sosa Morrow MD   Consulting Provider: Vladimir Pritchard MD  Primary Care Physician: Elvie Fernandes MD  Principal Problem:Anasarca    Inpatient consult to Hepatology  Consult performed by: Vladimir Pritchard MD  Consult ordered by: Yaakov Castro MD        Subjective:     HPI:  67 F w/ PMH decompensated EtOH cirrhosis c/b Ascites, deafness, HTN, HLD admitted with anasarca and gross volume overload.  Patient was diagnosed with decompensated cirrhosis 2/2022 and since that time, has been dealing with volume overload since diagnosis.  She reports compliance with diuretics but reports her urine output is not significantly increased.  Reported to ED approximately 40lbs weight gain over the past few months.  In ED, HDS and afebrile.  Labs notable for Cr 1.0, Albumin 1.9, Bili 1.8, AST 45, ALT 25.  Patient was given IV lasix and admitted to hospital medicine for further management of volume overload.  Hepatology consulted for h/o decompensated cirrhosis.      Past Medical History:   Diagnosis Date    Hypercholesterolemia     Hypertension        Past Surgical History:   Procedure Laterality Date    DILATION AND CURETTAGE OF UTERUS      ESOPHAGOGASTRODUODENOSCOPY N/A 6/23/2022    Procedure: EGD (ESOPHAGOGASTRODUODENOSCOPY);  Surgeon: Sean Perry MD;  Location: [address]);  Service: Endoscopy;  Laterality: N/A;  cirrhosis, variceal screening  labs prior  -request sent 6/14  fully vaccinated, instructions emailed to [email or web address]       Review of patient's allergies indicates:  No Known Allergies  Family History       Problem Relation (Age of Onset)    Breast cancer Maternal Grandmother          Tobacco Use    Smoking status: Former Smoker     Packs/day: 1.00      Years: 15.00     Pack years: 15.00     Quit date: 1998     Years since quittin.5    Smokeless tobacco: Never Used   Substance and Sexual Activity    Alcohol use: Not Currently    Drug use: Not Currently    Sexual activity: Not on file     Review of Systems   Constitutional: Negative.    HENT: Negative.     Eyes: Negative.    Respiratory:  Positive for shortness of breath.    Cardiovascular:  Positive for leg swelling.   Gastrointestinal:  Positive for abdominal distention.   Endocrine: Negative.    Genitourinary: Negative.    Musculoskeletal: Negative.    Skin: Negative.    Allergic/Immunologic: Negative.    Neurological: Negative.    Hematological: Negative.    Psychiatric/Behavioral: Negative.     Objective:     Vital Signs (Most Recent):  Temp: 98.3 °F (36.8 °C) (22 1330)  Pulse: 85 (22 1330)  Resp: 20 (22 1330)  BP: (!) 121/55 (22 1330)  SpO2: 100 % (22 1330)   Vital Signs (24h Range):  Temp:  [97.4 °F (36.3 °C)-98.3 °F (36.8 °C)] 98.3 °F (36.8 °C)  Pulse:  [84-93] 85  Resp:  [14-20] 20  SpO2:  [98 %-100 %] 100 %  BP: (121-209)/(54-86) 121/55     Weight: 96.2 kg (212 lb) (22)  Body mass index is 38.78 kg/m².      Intake/Output Summary (Last 24 hours) at 2022 1549  Last data filed at 2022 1501  Gross per 24 hour   Intake --   Output 4850 ml   Net -4850 ml       Lines/Drains/Airways       Peripheral Intravenous Line  Duration                  Peripheral IV - Single Lumen 22 0052 20 G Left Antecubital <1 day                    Physical Exam    Gen: NAD, lying comfortably  HENT: NCAT, oropharynx clear  Eyes: anicteric sclerae, EOMI grossly  Neck: supple, no visible masses/goiter  Cardiac: RRR, no M/R/G, S1/S2 present  Lungs: CTAB, no crackles, no wheezes  Abd: soft, NT/ND, normoactive BS, no rebound  Ext: 3+ LE edema, warm, well perfused  Skin: skin intact on exposed body parts, no visible rashes, lesions  Neuro: A&Ox4, neuro exam grossly  intact, moves all extremities  Psych: appropriate mood, affect      Significant Labs:  CBC:   Recent Labs   Lab 06/28/22 0042   WBC 7.06   HGB 8.5*   HCT 26.2*   *     CMP:   Recent Labs   Lab 06/28/22 0042   *   CALCIUM 8.6*   ALBUMIN 1.9*   PROT 6.9   *   K 4.3   CO2 19*      BUN 9   CREATININE 1.0   ALKPHOS 91   ALT 25   AST 45*   BILITOT 1.8*     Coagulation:   Recent Labs   Lab 06/28/22 0042   INR 1.7*       Significant Imaging:  Imaging results within the past 24 hours have been reviewed.    Assessment/Plan:     Alcoholic cirrhosis of liver with ascites  Patient with decompensated EtOH cirrhosis c/b ascites and volume overload admitted for IV diuresis.  S/p paracentesis with volume removal.    Recommendations:  -f/u paracentesis labs  -trend CBC, CMP, INR  -IV lasix  -consider IV albumin to assist diuresis  -monitor I&Os  -monitor electrolytes while diuresing        Thank you for your consult. I will follow-up with patient. Please contact us if you have any additional questions.    Vladimir Pritchard MD  Gastroenterology  [facility] - Emergency Dept

## 2022-06-28 NOTE — HPI
67 F w/ PMH decompensated EtOH cirrhosis c/b Ascites, deafness, HTN, HLD admitted with anasarca and gross volume overload.  Patient was diagnosed with decompensated cirrhosis 2/2022 and since that time, has been dealing with volume overload since diagnosis.  She reports compliance with diuretics but reports her urine output is not significantly increased.  Reported to ED approximately 40lbs weight gain over the past few months.  In ED, HDS and afebrile.  Labs notable for Cr 1.0, Albumin 1.9, Bili 1.8, AST 45, ALT 25.  Patient was given IV lasix and admitted to hospital medicine for further management of volume overload.  Hepatology consulted for h/o decompensated cirrhosis.

## 2022-06-28 NOTE — ASSESSMENT & PLAN NOTE
67 yoF with alcoholic liver cirrhosis and hypertension admitted for generalized anasarca. Patient's albumin on admission was 1.9. Likely edema related to poor intravascular oncotic pressure 2/2 alcoholic cirrhosis. Patient adherent to home medication. Given history and exam, patient would benefit from diuresis. Dry weight reported as 170s. Patient is currently 212 lbs.     - will diurese with lasix 40 IV BID and aldactone 100 mg qd  - strict I/Os  - daily weights  - low sodium diet with 1500 ml fluid restriction

## 2022-06-28 NOTE — ASSESSMENT & PLAN NOTE
Patient was diagnosed in February. Previous history of chronic alcohol abuse and states she drank about 4-5 glasses of wine per night for over 20 years. Patient is seeing hepatology outpatient. Labs shown in care everywhere.     MELD-Na score: 18 at 6/28/2022 12:42 AM  MELD score: 15 at 6/28/2022 12:42 AM  Calculated from:  Serum Creatinine: 1.0 mg/dL at 6/28/2022 12:42 AM  Serum Sodium: 133 mmol/L at 6/28/2022 12:42 AM  Total Bilirubin: 1.8 mg/dL at 6/28/2022 12:42 AM  INR(ratio): 1.7 at 6/28/2022 12:42 AM  Age: 67 years    - consulted hepatology, appreciate recommendations  - continue lactulose, thiamine, and folate  - IR consulted for paracentesis  - continue to monitor for signs of liver failure  - f/u daily CMPs

## 2022-06-28 NOTE — ED TRIAGE NOTES
Aleyda Fengluis manuelalpeshalfonso, a 67 y.o. female presents to the ED w/ complaint of leg swelling.  Pt recently has had increased swelling that has gotten progressively worse.  Pt was prescribed diuretics but they have not helped.    Triage note:  Chief Complaint   Patient presents with    Leg Swelling     States swelling to legs and arms, Liver cirrhosis patient, had EGD on 6/23. Pt states normally approx 170 pounds but now 212. Denies cp and sob     Review of patient's allergies indicates:  No Known Allergies  Past Medical History:   Diagnosis Date    Hypercholesterolemia     Hypertension

## 2022-06-28 NOTE — ASSESSMENT & PLAN NOTE
Patient with decompensated EtOH cirrhosis c/b ascites and volume overload admitted for IV diuresis.  S/p paracentesis with volume removal.    Recommendations:  -f/u paracentesis labs  -trend CBC, CMP, INR  -IV lasix  -consider IV albumin to assist diuresis  -monitor I&Os  -monitor electrolytes while diuresing

## 2022-06-28 NOTE — HPI
This is a 67 year old deaf lady with alcoholic cirrhosis, and hypertension who presented with generalized swelling.  used to obtain history. Patient states that she had the swelling since her diagnosis of cirrhosis in February. Patient states that 2 days ago, she was able to ambulate but with some difficulty due to the swelling but now  worsening to the point where she is unable to ambulate by herself. Of note, patient had a recent EGD performed earlier last week where they had issues with her IV resulting is significant bruising. Patient also notes that whenever she scratches her skin with her nails, she notices clear fluid coming from the cuts. Patient denies fever, chills, cough, hemoptysis, nausea, vomiting, abdominal pain, yellowing of skin, constipation, dysuria, hematuria, and black/ bloody stools. Patient has baseline loose stools from her lactulose.  She reports compliance with all medications including her lactulose and Lasix. Patient and family declined any confusion or altered mental status. She denies any recent alcohol use with last use in February when she was diagnosed with alcoholic cirrhosis. Patient states that she used to weigh around 170s which she believes is her dry weight and is currently in the 200s.     Upon chart review, patient had an echo at an OSH on 2/25/22 which showed EF 60-65% and some diastolic dysfunction.    In the ED, patient was found to have Hgb of 8.5 and platelets of 113 around baseline. Patient had a UA which showed 1+ leuks and many bacteria. Patient did not complain of dysuria.

## 2022-06-28 NOTE — PROCEDURES
Radiology Post-Procedure Note    Pre Op Diagnosis: Ascites  Post Op Diagnosis: Same    Procedure: Paracentesis    Procedure performed by: Spencer Carroll MD    Written Informed Consent Obtained: Yes  Specimen Removed: YES - serous ascites  Estimated Blood Loss: Minimal    Findings:   Successful paracentesis.  Albumin administered PRN per protocol.    Patient tolerated procedure well.    Liliane Mujica MD  Department of Radiology, PGY-4  Pager: [phone]

## 2022-06-28 NOTE — PLAN OF CARE
Paracentesis complete. Removed 4200 ml. No albumin given. Patient AAOx4, no distress noted, respirations even and unlabored. Pt stable for discharge back to room at this time. Report called to floor RN.

## 2022-06-28 NOTE — ED PROVIDER NOTES
Encounter Date: 6/27/2022       History     Chief Complaint   Patient presents with    Leg Swelling     States swelling to legs and arms, Liver cirrhosis patient, had EGD on 6/23. Pt states normally approx 170 pounds but now 212. Denies cp and sob      67-year-old female with PMH of alcoholic cirrhosis and hypertension presents with anasarca. The body edema is acute on chronic, worse over the past week or 2 following an EGD, constant, progressive, without known relieving factors, and without associated shortness of breath or chest pain. Patient denies fever, chills, cough, hemoptysis, nausea, vomiting, abdominal pain, yellowing of skin, constipation, dysuria, hematuria, and black/ bloody stools.  Patient has baseline loose stools from her lactulose.  She reports compliance with all medications including her lactulose and Lasix.  Patient and family declined any confusion or altered mental status. For the past 2 days, the patient has been unable to ambulate because the swelling so significant.  She also states that she is unable to wipe herself when she goes to the restroom.  Per chart review, the patient weighed 160 lb in 02/2022 and now weighs 212lbs, which she attributes to the swelling. She denies any recent alcohol use.     The history is provided by the patient and a relative. A  was used (ASL).     Review of patient's allergies indicates:  No Known Allergies  Past Medical History:   Diagnosis Date    Hypercholesterolemia     Hypertension      Past Surgical History:   Procedure Laterality Date    DILATION AND CURETTAGE OF UTERUS      ESOPHAGOGASTRODUODENOSCOPY N/A 6/23/2022    Procedure: EGD (ESOPHAGOGASTRODUODENOSCOPY);  Surgeon: Sean Perry MD;  Location: [address]);  Service: Endoscopy;  Laterality: N/A;  cirrhosis, variceal screening  labs prior  -request sent 6/14  fully vaccinated, instructions emailed to [email or web address]      Family History   Problem Relation Age of Onset    Colon cancer Neg Hx     Ovarian cancer Neg Hx     Breast cancer Maternal Grandmother      Social History     Tobacco Use    Smoking status: Former Smoker     Packs/day: 1.00     Years: 15.00     Pack years: 15.00     Quit date: 1998     Years since quittin.5    Smokeless tobacco: Never Used   Substance Use Topics    Alcohol use: Not Currently    Drug use: Not Currently     Review of Systems   Constitutional: Negative for chills and fever.   HENT: Positive for congestion. Negative for sinus pain.    Eyes: Negative for pain and visual disturbance.   Respiratory: Negative for cough and shortness of breath.    Cardiovascular: Positive for leg swelling. Negative for chest pain.   Gastrointestinal: Negative for abdominal pain, constipation, diarrhea, nausea and vomiting.   Endocrine: Negative for polydipsia and polyuria.   Genitourinary: Negative for dysuria and hematuria.   Musculoskeletal: Positive for gait problem. Negative for arthralgias and back pain.   Skin: Negative for color change and rash.        BUE/BLE swelling   Neurological: Negative for dizziness and headaches.       Physical Exam     Initial Vitals [22]   BP Pulse Resp Temp SpO2   (!) 209/82 93 20 98 °F (36.7 °C) 100 %      MAP       --         Physical Exam    Nursing note and vitals reviewed.  Constitutional: She appears well-developed and well-nourished. She is not diaphoretic. No distress.   HENT:   Head: Normocephalic and atraumatic.   Eyes: Conjunctivae and EOM are normal. Pupils are equal, round, and reactive to light.   Neck: Neck supple.   Normal range of motion.  Cardiovascular: Normal rate, regular rhythm, normal heart sounds and intact distal pulses.   No murmur heard.  Pulmonary/Chest: Breath sounds normal. No respiratory distress. She has no wheezes.   No increased WOB or tachypnea. B/l crackles in lower lungs   Abdominal: Abdomen is soft. She exhibits  distension. There is no abdominal tenderness. There is no rebound and no guarding.   Musculoskeletal:         General: Edema present. No tenderness.      Cervical back: Normal range of motion and neck supple.      Comments: Significant edema to b/l upper and lower extremities.      Neurological: She is alert and oriented to person, place, and time. GCS score is 15. GCS eye subscore is 4. GCS verbal subscore is 5. GCS motor subscore is 6.   Skin: Skin is warm and dry. Capillary refill takes less than 2 seconds.   Multiple bruises on b/l arms from prior IV access         ED Course   Procedures  Labs Reviewed   CBC W/ AUTO DIFFERENTIAL - Abnormal; Notable for the following components:       Result Value    RBC 2.82 (*)     Hemoglobin 8.5 (*)     Hematocrit 26.2 (*)     RDW 18.2 (*)     Platelets 113 (*)     Mono # 1.4 (*)     Mono % 19.3 (*)     All other components within normal limits   COMPREHENSIVE METABOLIC PANEL - Abnormal; Notable for the following components:    Sodium 133 (*)     CO2 19 (*)     Glucose 111 (*)     Calcium 8.6 (*)     Albumin 1.9 (*)     Total Bilirubin 1.8 (*)     AST 45 (*)     Anion Gap 5 (*)     eGFR if non  58.4 (*)     All other components within normal limits   URINALYSIS, REFLEX TO URINE CULTURE - Abnormal; Notable for the following components:    Appearance, UA Hazy (*)     Leukocytes, UA 1+ (*)     All other components within normal limits    Narrative:     Specimen Source->Urine   PROTIME-INR - Abnormal; Notable for the following components:    Prothrombin Time 16.8 (*)     INR 1.7 (*)     All other components within normal limits   AMMONIA - Abnormal; Notable for the following components:    Ammonia 51 (*)     All other components within normal limits   URINALYSIS MICROSCOPIC - Abnormal; Notable for the following components:    WBC, UA 20 (*)     Bacteria Many (*)     Hyaline Casts, UA 49 (*)     All other components within normal limits    Narrative:     Specimen  Source->Urine   CULTURE, URINE   ALCOHOL,MEDICAL (ETHANOL)   B-TYPE NATRIURETIC PEPTIDE   MAGNESIUM   PHOSPHORUS   SARS-COV-2 RDRP GENE    Narrative:     This test utilizes isothermal nucleic acid amplification   technology to detect the SARS-CoV-2 RdRp nucleic acid segment.   The analytical sensitivity (limit of detection) is 125 genome   equivalents/mL.   A POSITIVE result implies infection with the SARS-CoV-2 virus;   the patient is presumed to be contagious.     A NEGATIVE result means that SARS-CoV-2 nucleic acids are not   present above the limit of detection. A NEGATIVE result should be   treated as presumptive. It does not rule out the possibility of   COVID-19 and should not be the sole basis for treatment decisions.   If COVID-19 is strongly suspected based on clinical and exposure   history, re-testing using an alternate molecular assay should be   considered.   This test is only for use under the Food and Drug   Administration s Emergency Use Authorization (EUA).   Commercial kits are provided by [employer].   Performance characteristics of the EUA have been independently   verified by [facility] Department of   Pathology and Laboratory Medicine.   _________________________________________________________________   The authorized Fact Sheet for Healthcare Providers and the authorized Fact   Sheet for Patients of the ID NOW COVID-19 are available on the FDA   website:     https://www.fda.gov/media/272750/download  https://www.fda.gov/media/451599/download                  Imaging Results          X-Ray Chest AP Portable (Final result)  Result time 06/28/22 01:02:42    Final result by Matthew Navarrete MD (06/28/22 01:02:42)                 Impression:      Low lung volumes with probable small bilateral pleural effusions and adjacent passive atelectasis, right side greater than left.      Electronically signed by: Matthew Navarrete MD  Date:    06/28/2022  Time:    01:02              "Narrative:    EXAMINATION:  XR CHEST AP PORTABLE    CLINICAL HISTORY:  Provided history is "Chest Pain;  ".    TECHNIQUE:  One view of the chest.    COMPARISON:  11/30/2010.    FINDINGS:  Cardiac wires overlie the chest.  Cardiomediastinal silhouette is magnified by portable technique but is not felt to be enlarged.  Atherosclerotic calcifications overlie the aortic arch.  Right hemidiaphragm is mildly elevated.  There are probable small bilateral pleural effusions with adjacent passive atelectasis, right side greater than left.  Upper lung fields are relatively clear.  No distinct pneumothorax.                                 Medications   furosemide injection 80 mg (80 mg Intravenous Given 6/28/22 0210)     Medical Decision Making:   Initial Assessment:   67 PMH cirrhosis w/acute-on-chronic swelling without associated symptoms, newly unable to ambulate  - labs  - cxr  - admission   Differential Diagnosis:   Decompensated liver failure, CHF, pneumonia, covid, PITO, vascular insufficiency  Clinical Tests:   Lab Tests: Ordered and Reviewed  Radiological Study: Ordered and Reviewed  Medical Tests: Ordered and Reviewed  ED Management:  See ED course            Attending Attestation:   Physician Attestation Statement for Resident:  As the supervising MD   Physician Attestation Statement: I have personally seen and examined this patient.   I agree with the above history. -:   As the supervising MD I agree with the above PE.    As the supervising MD I agree with the above treatment, course, plan, and disposition.  I have reviewed and agree with the residents interpretation of the following: lab data and x-rays.  I have reviewed the following: old records at this facility.                ED Course as of 06/28/22 0252   Tue Jun 28, 2022   0111 CBC auto differential(!)  CBC shows baseline anemia with hgb 8.5 without leukocytosis. Mild thrombocytopenia of 113 [BD]   0116 SARS-CoV-2 RNA, Amplification, Qual: Negative [BD] "   0119 INR(!): 1.7  Stably elevated INR [BD]   0119 X-Ray Chest AP Portable  CXR shows no consolidation consistent with pneumonia but b/l small pleural effusions are present [BD]   0140 Magnesium: 1.7 [BD]   0140 BNP: 57  CHF is unlikely [BD]   0140 Phosphorus: 3.5 [BD]   0221 Ammonia(!): 51 [BD]   0221 Comprehensive metabolic panel(!)  CMP shows no significant electrolyte derangement or impaired renal function. LFTs baseline elevated but not significant [BD]   0222 Alcohol, Serum: <10 [BD]   0251 Pt's workup appears baseline, but given her new inability to ambulate 2/2 anasarca, we will admit to  for additional diuresis and stabilization. Patient agrees with and is comfortable with the plan. Remains hemodynamically stable.  [BD]      ED Course User Index  [BD] Tai العلي MD             Clinical Impression:   Final diagnoses:  [M79.89] Leg swelling  [R60.1] Anasarca (Primary)  [K70.30] Alcoholic cirrhosis, unspecified whether ascites present          ED Disposition Condition    Admit               Tai العلي MD  Resident  06/28/22 0252       Nena Hernandez MD  06/28/22 0311

## 2022-06-28 NOTE — PLAN OF CARE
Patient arrived to [facility] for paracentesis. AAOx4, no distress noted, respirations even and unlabored, will continue to monitor. Allergies reviewed. Waiting for eval and consent for paracentesis procedure.  used.

## 2022-06-28 NOTE — H&P
Radiology History & Physical      SUBJECTIVE:     Chief Complaint: ascites    History of Present Illness:  Aleyda Floyd is a 67 y.o. female who presents for paracentesis.    Past Medical History:   Diagnosis Date    Hypercholesterolemia     Hypertension      Past Surgical History:   Procedure Laterality Date    DILATION AND CURETTAGE OF UTERUS      ESOPHAGOGASTRODUODENOSCOPY N/A 6/23/2022    Procedure: EGD (ESOPHAGOGASTRODUODENOSCOPY);  Surgeon: Sean Perry MD;  Location: [address]);  Service: Endoscopy;  Laterality: N/A;  cirrhosis, variceal screening  labs prior  -request sent 6/14  fully vaccinated, instructions emailed to [email or web address]       Home Meds:   Prior to Admission medications    Medication Sig Start Date End Date Taking? Authorizing Provider   furosemide (LASIX) 20 MG tablet Take 20 mg by mouth 2 (two) times daily.   Yes Historical Provider   lactulose (CHRONULAC) 10 gram/15 mL solution Take 20 g by mouth. 3/31/22 6/29/22 Yes Historical Provider   aspirin 81 MG Chew Take 81 mg by mouth once daily.    Historical Provider   cholecalciferol, vitamin D3, (VITAMIN D3) 50 mcg (2,000 unit) Cap capsule Take 1 capsule by mouth once daily.    Historical Provider   cholestyramine-aspartame (QUESTRAN LIGHT) 4 gram PwPk Take 4 g by mouth. 3/31/22   Historical Provider   folic acid (FOLVITE) 1 MG tablet Take 1 mg by mouth once daily.  7/7/13   Historical Provider   hydrochlorothiazide (HYDRODIURIL) 25 MG tablet Take 12.5 mg by mouth once daily.  10/4/13   Historical Provider   melatonin (MELATIN) 5 mg Take 5 mg by mouth nightly. 4/24/22   Historical Provider   minocycline (MINOCIN,DYNACIN) 100 MG capsule Take 100 mg by mouth 2 (two) times daily.    Historical Provider   nystatin (MYCOSTATIN) powder Please apply to skin folds beneath breasts. 3/31/22 3/31/23  Historical Provider   omeprazole (PRILOSEC) 40 MG capsule Take 40 mg by mouth once daily.     Historical Provider   thiamine (VITAMIN B-1) 100 MG tablet Take 100 mg by mouth once daily.    Historical Provider   wound dressings Pste Apply 35 application topically. 3/31/22 6/29/22  Historical Provider   losartan (COZAAR) 50 MG tablet Take 50 mg by mouth once daily.  8/21/13 6/28/22  Historical Provider   oxycodone-acetaminophen 5-325 mg (PERCOCET) 5-325 mg per tablet Take 1 tablet by mouth every 4 (four) hours as needed.  Patient not taking: No sig reported 12/11/13 6/28/22  Bijal Olvera MD   simvastatin (ZOCOR) 10 MG tablet Take 10 mg by mouth once daily.  10/6/13 6/28/22  Historical Provider     Anticoagulants/Antiplatelets: no anticoagulation    Allergies: Review of patient's allergies indicates:  No Known Allergies  Sedation History:  no adverse reactions    Review of Systems:   Hematological: no known coagulopathies  Respiratory: no shortness of breath  Cardiovascular: no chest pain  Gastrointestinal: no abdominal pain  Genito-Urinary: no dysuria  Musculoskeletal: negative  Neurological: no TIA or stroke symptoms         OBJECTIVE:     Vital Signs (Most Recent)  Temp: 97.8 °F (36.6 °C) (06/28/22 0800)  Pulse: 84 (06/28/22 0800)  Resp: 20 (06/28/22 0800)  BP: (!) 137/54 (06/28/22 0800)  SpO2: 100 % (06/28/22 0800)    Physical Exam:  ASA: n/a  Mallampati: n/a    General: no acute distress  Mental Status: alert and oriented to person, place and time  HEENT: normocephalic, atraumatic  Chest: unlabored breathing  Heart: regular heart rate  Abdomen: distended  Extremity: moves all extremities    Laboratory  Lab Results   Component Value Date    INR 1.7 (H) 06/28/2022       Lab Results   Component Value Date    WBC 7.06 06/28/2022    HGB 8.5 (L) 06/28/2022    HCT 26.2 (L) 06/28/2022    MCV 93 06/28/2022     (L) 06/28/2022      Lab Results   Component Value Date     (H) 06/28/2022     (L) 06/28/2022    K 4.3 06/28/2022     06/28/2022    CO2 19 (L) 06/28/2022    BUN 9 06/28/2022     CREATININE 1.0 06/28/2022    CALCIUM 8.6 (L) 06/28/2022    MG 1.7 06/28/2022    ALT 25 06/28/2022    AST 45 (H) 06/28/2022    ALBUMIN 1.9 (L) 06/28/2022    BILITOT 1.8 (H) 06/28/2022       ASSESSMENT/PLAN:     Sedation Plan: local   Patient will undergo paracentesis.    Liliane Mujica MD  Department of Radiology, PGY-4  Pager: [phone]

## 2022-06-28 NOTE — ASSESSMENT & PLAN NOTE
Patient with Hgb of 8.5 on admit. Upon review of care everywhere, patient had Hgb of 8.4 in all of 2022. Likely due to chronic disease and stable.     - continue to monitor

## 2022-06-28 NOTE — ASSESSMENT & PLAN NOTE
Patient was discontinued from losartan per hepatologist.     - continue to monitor and can add when clinically indicated

## 2022-06-28 NOTE — SUBJECTIVE & OBJECTIVE
Past Medical History:   Diagnosis Date    Hypercholesterolemia     Hypertension        Past Surgical History:   Procedure Laterality Date    DILATION AND CURETTAGE OF UTERUS      ESOPHAGOGASTRODUODENOSCOPY N/A 2022    Procedure: EGD (ESOPHAGOGASTRODUODENOSCOPY);  Surgeon: Sean Perry MD;  Location: [address];  Service: Endoscopy;  Laterality: N/A;  cirrhosis, variceal screening  labs prior  -request sent   fully vaccinated, instructions emailed to [email or web address]       Review of patient's allergies indicates:  No Known Allergies  Family History       Problem Relation (Age of Onset)    Breast cancer Maternal Grandmother          Tobacco Use    Smoking status: Former Smoker     Packs/day: 1.00     Years: 15.00     Pack years: 15.00     Quit date: 1998     Years since quittin.5    Smokeless tobacco: Never Used   Substance and Sexual Activity    Alcohol use: Not Currently    Drug use: Not Currently    Sexual activity: Not on file     Review of Systems   Constitutional: Negative.    HENT: Negative.     Eyes: Negative.    Respiratory:  Positive for shortness of breath.    Cardiovascular:  Positive for leg swelling.   Gastrointestinal:  Positive for abdominal distention.   Endocrine: Negative.    Genitourinary: Negative.    Musculoskeletal: Negative.    Skin: Negative.    Allergic/Immunologic: Negative.    Neurological: Negative.    Hematological: Negative.    Psychiatric/Behavioral: Negative.     Objective:     Vital Signs (Most Recent):  Temp: 98.3 °F (36.8 °C) (22 1330)  Pulse: 85 (22 1330)  Resp: 20 (22 1330)  BP: (!) 121/55 (22 1330)  SpO2: 100 % (22 1330)   Vital Signs (24h Range):  Temp:  [97.4 °F (36.3 °C)-98.3 °F (36.8 °C)] 98.3 °F (36.8 °C)  Pulse:  [84-93] 85  Resp:  [14-20] 20  SpO2:  [98 %-100 %] 100 %  BP: (121-209)/(54-86) 121/55     Weight: 96.2 kg (212 lb) (22)  Body mass index is 38.78  kg/m².      Intake/Output Summary (Last 24 hours) at 6/28/2022 1549  Last data filed at 6/28/2022 1501  Gross per 24 hour   Intake --   Output 4850 ml   Net -4850 ml       Lines/Drains/Airways       Peripheral Intravenous Line  Duration                  Peripheral IV - Single Lumen 06/28/22 0052 20 G Left Antecubital <1 day                    Physical Exam    Gen: NAD, lying comfortably  HENT: NCAT, oropharynx clear  Eyes: anicteric sclerae, EOMI grossly  Neck: supple, no visible masses/goiter  Cardiac: RRR, no M/R/G, S1/S2 present  Lungs: CTAB, no crackles, no wheezes  Abd: soft, NT/ND, normoactive BS, no rebound  Ext: 3+ LE edema, warm, well perfused  Skin: skin intact on exposed body parts, no visible rashes, lesions  Neuro: A&Ox4, neuro exam grossly intact, moves all extremities  Psych: appropriate mood, affect      Significant Labs:  CBC:   Recent Labs   Lab 06/28/22 0042   WBC 7.06   HGB 8.5*   HCT 26.2*   *     CMP:   Recent Labs   Lab 06/28/22 0042   *   CALCIUM 8.6*   ALBUMIN 1.9*   PROT 6.9   *   K 4.3   CO2 19*      BUN 9   CREATININE 1.0   ALKPHOS 91   ALT 25   AST 45*   BILITOT 1.8*     Coagulation:   Recent Labs   Lab 06/28/22 0042   INR 1.7*       Significant Imaging:  Imaging results within the past 24 hours have been reviewed.

## 2022-06-29 NOTE — ASSESSMENT & PLAN NOTE
Patient was diagnosed in February. Previous history of chronic alcohol abuse and states she drank about 4-5 glasses of wine per night for over 20 years. Patient is seeing hepatology outpatient. Labs shown in care everywhere.     MELD-Na score: 20 at 6/29/2022  4:51 AM  MELD score: 17 at 6/29/2022  4:51 AM  Calculated from:  Serum Creatinine: 1.2 mg/dL at 6/29/2022  4:51 AM  Serum Sodium: 133 mmol/L at 6/29/2022  4:51 AM  Total Bilirubin: 1.9 mg/dL at 6/29/2022  4:51 AM  INR(ratio): 1.7 at 6/28/2022 12:42 AM  Age: 67 years    - consulted hepatology, appreciate recommendations  - continue lactulose, thiamine, and folate  - S/p paracentesis with 4.2L removed  - continue to monitor for signs of liver failure  - f/u daily CMPs

## 2022-06-29 NOTE — PLAN OF CARE
[facility] Med Surg  Initial Discharge Assessment       Primary Care Provider: Elvie Fernandes MD    Admission Diagnosis: Anasarca [R60.1]  Leg swelling [M79.89]  Chest pain [R07.9]  Alcoholic cirrhosis, unspecified whether ascites present [K70.30]    Admission Date: 6/27/2022  Expected Discharge Date:     Discharge Barriers Identified: None    Payor: MEDICARE / Plan: MEDICARE PART A & B / Product Type: Government /     Extended Emergency Contact Information  Primary Emergency Contact: Lay Solitario   [facility]  Home Phone: [phone]  Mobile Phone: [phone]  Relation: Sister    Discharge Plan A: Home Health  Discharge Plan B: Home with family      [id number] - [place] - [address] [facility]  [address] [address]  Phone: [phone] Fax: [phone]      Initial Assessment (most recent)     Adult Discharge Assessment - 06/29/22 1325        Discharge Assessment    Assessment Type Discharge Planning Assessment     Confirmed/corrected address, phone number and insurance Yes     Confirmed Demographics Correct on Facesheet     Source of Information family     Reason For Admission Elbert     Lives With spouse     Facility Arrived From: home     Do you expect to return to your current living situation? Yes     Do you have help at home or someone to help you manage your care at home? Yes     Who are your caregiver(s) and their phone number(s)?  Jaime [phone]     Prior to hospitilization cognitive status: Alert/Oriented     Current cognitive status: Alert/Oriented     Walking or Climbing Stairs Difficulty ambulation difficulty, requires equipment     Dressing/Bathing Difficulty bathing difficulty, requires equipment     Equipment Currently Used at Home cane, straight;walker, rolling;bath bench     Patient currently being followed by outpatient case management? No     Do you currently have service(s) that help you manage your care at home? No     Who is going  to help you get home at discharge?  Jaime     How do you get to doctors appointments? family or friend will provide     Are you on dialysis? No     Do you take coumadin? No     Discharge Plan A Home Health     Discharge Plan B Home with family     DME Needed Upon Discharge  other (see comments)   TBD    Discharge Barriers Identified None               CM spoke with patient's sister for DISCHARGE PLANNING ASSESSMENT. Per sister, pt and pt's  are both deaf. They live together in a single family home with 0 steps to porch and point of entry.  Patient was independent with ADLS and DID use DME or in-home assistive equipment (see above).  Pt is not on dialysis or Coumadin, takes medications as prescribed / keeps refilled / has resources for all daily and prescriptive needs.  Preferred pharmacy is [facility]-Agreeable to bedside delivery.  Will have help from  and other immediate family upon discharge.  All questions addressed.  Will continue to follow for course of hospitalization.    *Pt's  will provide transportation home at time of d/c.    Ale Daigle RN   [phone]  Case Management

## 2022-06-29 NOTE — PLAN OF CARE
Patient alert but nonverbal. Patient understands written English. Spouse at bedside. Patient awaitng ultra sound. Awaiting procedure. Denies pain. Call light in reach.

## 2022-06-29 NOTE — PROGRESS NOTES
[facility] Medicine  Progress Note    Patient Name: Aleyda Floyd  MRN: [id number]  Patient Class: IP- Inpatient   Admission Date: 6/27/2022  Length of Stay: 1 days  Attending Physician: Sosa Morrow MD  Primary Care Provider: Elvie Fernandes MD        Subjective:     Principal Problem:Anasarca        HPI:  This is a 67 year old deaf lady with alcoholic cirrhosis, and hypertension who presented with generalized swelling.  used to obtain history. Patient states that she had the swelling since her diagnosis of cirrhosis in February. Patient states that 2 days ago, she was able to ambulate but with some difficulty due to the swelling but now  worsening to the point where she is unable to ambulate by herself. Of note, patient had a recent EGD performed earlier last week where they had issues with her IV resulting is significant bruising. Patient also notes that whenever she scratches her skin with her nails, she notices clear fluid coming from the cuts. Patient denies fever, chills, cough, hemoptysis, nausea, vomiting, abdominal pain, yellowing of skin, constipation, dysuria, hematuria, and black/ bloody stools. Patient has baseline loose stools from her lactulose.  She reports compliance with all medications including her lactulose and Lasix. Patient and family declined any confusion or altered mental status. She denies any recent alcohol use with last use in February when she was diagnosed with alcoholic cirrhosis. Patient states that she used to weigh around 170s which she believes is her dry weight and is currently in the 200s.     Upon chart review, patient had an echo at an OSH on 2/25/22 which showed EF 60-65% and some diastolic dysfunction.    In the ED, patient was found to have Hgb of 8.5 and platelets of 113 around baseline. Patient had a UA which showed 1+ leuks and many bacteria. Patient did not complain of dysuria.       Overview/Hospital Course:  Admitted for  decompensated cirrhosis and anasarca. Began diuresing with IV Lasix and spironolactone. Hepatology involved in care. Paracentesis of -4.2L; ruled out SBP. Increasing diuresis with Diuril.       Interval History: NAEON. Yesterday, paracentesis was performed with 4.2L removed. Diuresis with IV Lasix and spironolactone with suboptimal urine output. Increasing Lasix frequency and adding a dose of Diuril. Inflamed PIV noted and removed.    Review of Systems   Constitutional:  Negative for chills, fatigue and fever.   HENT:  Negative for ear pain and sinus pain.    Eyes:  Negative for photophobia, pain and visual disturbance.   Respiratory:  Negative for cough and shortness of breath.    Cardiovascular:  Negative for chest pain and leg swelling.   Gastrointestinal:  Positive for abdominal distention. Negative for abdominal pain, blood in stool, constipation, diarrhea, nausea and vomiting.   Endocrine: Negative for polyuria.   Genitourinary:  Negative for difficulty urinating, dysuria, flank pain, pelvic pain and vaginal pain.   Musculoskeletal:  Negative for back pain and neck pain.        Swelling in all extremities   Skin:  Negative for color change and rash.   Neurological:  Positive for weakness. Negative for dizziness, light-headedness, numbness and headaches.   Psychiatric/Behavioral:  Negative for confusion and sleep disturbance.    Objective:     Vital Signs (Most Recent):  Temp: 98.2 °F (36.8 °C) (06/29/22 1131)  Pulse: 84 (06/29/22 1131)  Resp: 18 (06/29/22 1131)  BP: 139/60 (06/29/22 1131)  SpO2: 98 % (06/29/22 1131) Vital Signs (24h Range):  Temp:  [97.6 °F (36.4 °C)-98.3 °F (36.8 °C)] 98.2 °F (36.8 °C)  Pulse:  [76-86] 84  Resp:  [18-20] 18  SpO2:  [95 %-100 %] 98 %  BP: (107-148)/(54-64) 139/60     Weight: 94.2 kg (207 lb 10.8 oz)  Body mass index is 40.56 kg/m².    Intake/Output Summary (Last 24 hours) at 6/29/2022 1251  Last data filed at 6/29/2022 1129  Gross per 24 hour   Intake 120 ml   Output 5800 ml    Net -5680 ml      Physical Exam  Constitutional:       General: She is not in acute distress.     Appearance: She is obese. She is not ill-appearing or diaphoretic.   HENT:      Head: Normocephalic and atraumatic.      Right Ear: External ear normal.      Left Ear: External ear normal.      Nose: Nose normal. No congestion or rhinorrhea.      Mouth/Throat:      Mouth: Mucous membranes are moist.      Pharynx: Oropharynx is clear. No oropharyngeal exudate or posterior oropharyngeal erythema.   Eyes:      General: No scleral icterus.     Extraocular Movements: Extraocular movements intact.      Conjunctiva/sclera: Conjunctivae normal.   Neck:      Vascular: No carotid bruit.   Cardiovascular:      Rate and Rhythm: Normal rate and regular rhythm.      Pulses: Normal pulses.      Heart sounds: Normal heart sounds. No murmur heard.    No friction rub.   Pulmonary:      Effort: Pulmonary effort is normal. No respiratory distress.      Breath sounds: Normal breath sounds. No stridor. No wheezing.   Chest:      Chest wall: No tenderness.   Abdominal:      General: Bowel sounds are normal. There is distension.      Palpations: There is no mass.      Tenderness: There is no right CVA tenderness, left CVA tenderness or guarding.      Comments: Distended with positive fluid wave. No tenderness to palpation   Musculoskeletal:         General: Swelling present. No tenderness or deformity. Normal range of motion.      Cervical back: Normal range of motion. No rigidity or tenderness.      Right lower leg: Edema present.      Left lower leg: Edema present.      Comments: Diffuse edema with some weeping noted in right upper extremity   Skin:     General: Skin is warm and dry.      Capillary Refill: Capillary refill takes less than 2 seconds.      Coloration: Skin is not jaundiced or pale.      Findings: Bruising present. No erythema.      Comments: Bruising noted on bilateral forearms   Neurological:      General: No focal deficit  present.      Mental Status: She is alert and oriented to person, place, and time. Mental status is at baseline.      Motor: No weakness.      Coordination: Coordination normal.   Psychiatric:         Mood and Affect: Mood normal.         Behavior: Behavior normal.         Thought Content: Thought content normal.       Significant Labs: All pertinent labs within the past 24 hours have been reviewed.  CBC:   Recent Labs   Lab 06/28/22  0042 06/29/22  0451   WBC 7.06 5.64   HGB 8.5* 7.9*   HCT 26.2* 23.9*   * 86*     CMP:   Recent Labs   Lab 06/28/22  0042 06/29/22  0451   * 133*   K 4.3 3.8    106   CO2 19* 24   * 105   BUN 9 10   CREATININE 1.0 1.2   CALCIUM 8.6* 8.2*   PROT 6.9 5.7*   ALBUMIN 1.9* 1.6*   BILITOT 1.8* 1.9*   ALKPHOS 91 71   AST 45* 34   ALT 25 20   ANIONGAP 5* 3*   EGFRNONAA 58.4* 46.9*       Significant Imaging: I have reviewed all pertinent imaging results/findings within the past 24 hours.      Assessment/Plan:      * Anasarca  67 yoF with alcoholic liver cirrhosis and hypertension admitted for generalized anasarca. Patient's albumin on admission was 1.9. Likely edema related to poor intravascular oncotic pressure 2/2 alcoholic cirrhosis. Patient adherent to home medication. Given history and exam, patient would benefit from diuresis. Dry weight reported as 170s. Patient is currently 212 lbs.     - will diurese with lasix 40mg IV BID and aldactone 100 mg qd  - add 1x lasix 40mg IV with diuril 500mg this afternoon and assess response  - strict I/Os  - daily weights  - low sodium diet with 1500 ml fluid restriction    Thrombocytopenia  Chronic, patient at baseline    Anemia, chronic disease  Patient with Hgb of 8.5 on admit. Upon review of care everywhere, patient had Hgb of 8.4 in all of 2022. Likely due to chronic disease and stable.     - continue to monitor    Alcoholic cirrhosis of liver with ascites  Patient was diagnosed in February. Previous history of chronic  alcohol abuse and states she drank about 4-5 glasses of wine per night for over 20 years. Patient is seeing hepatology outpatient. Labs shown in care everywhere.     MELD-Na score: 20 at 6/29/2022  4:51 AM  MELD score: 17 at 6/29/2022  4:51 AM  Calculated from:  Serum Creatinine: 1.2 mg/dL at 6/29/2022  4:51 AM  Serum Sodium: 133 mmol/L at 6/29/2022  4:51 AM  Total Bilirubin: 1.9 mg/dL at 6/29/2022  4:51 AM  INR(ratio): 1.7 at 6/28/2022 12:42 AM  Age: 67 years    - consulted hepatology, appreciate recommendations  - continue lactulose, thiamine, and folate  - S/p paracentesis with 4.2L removed  - continue to monitor for signs of liver failure  - f/u daily CMPs    Primary hypertension  Patient was discontinued from losartan per hepatologist.     - continue to monitor and can add when clinically indicated    Deaf- written communication  ASL  used for interaction      VTE Risk Mitigation (From admission, onward)         Ordered     IP VTE HIGH RISK PATIENT  Once         06/28/22 0419     Place sequential compression device  Until discontinued         06/28/22 0419     Place sequential compression device  Until discontinued         06/28/22 0415                Discharge Planning   REMA:      Code Status: Full Code   Is the patient medically ready for discharge?:     Reason for patient still in hospital (select all that apply): Treatment                     Charissa Adames MD  Department of Hospital Medicine   [facility]

## 2022-06-29 NOTE — HOSPITAL COURSE
Admitted for decompensated cirrhosis and anasarca. Diuresis began with IV Lasix and spironolactone. Paracentesis of -4.2L; ruled out SBP. Pt stabilized for few days and was ready to be dc'ed when she had a change in mental status. She was found unresponsive with fixed gaze and no pupil response. Code stroke called, CT neg for stroke. Eeg showed seizure-like activity, started on keppra and onfi. Pt transitioned to MICU for higher level of care. On step down, had a UTI and PITO, both of which were treated. She received a repeat paracentesis with 7.5L removed. Her creatinine started improve with administration of albumin and holding diuretics. Diuretics were then restarted and transitioned to Lasix 40mg PO and Aldactone 100mg PO. Patient started to have mild difficulty with mentation again with concerns for recurring UTI. Repeat UCX positive for candida albicans; she was started on 200mg fluconazole on 7/30. Paracentesis labs negative for SBP but treated empirically with rocephin because paracentesis was performed after giving rocephin. Rocephin 1g was transitioned to ciprofloxacin 500mg PO bid.  Patient improved on fluconazole and EEG was negative for any new epileptiform activity.     Dispo to [facility] and follow up with PCP, hepatology, neurology.

## 2022-06-29 NOTE — SUBJECTIVE & OBJECTIVE
Interval History: NAEON. Yesterday, paracentesis was performed with 4.2L removed. Diuresis with IV Lasix and spironolactone with suboptimal urine output. Increasing Lasix frequency and adding a dose of Diuril. Inflamed PIV noted and removed.    Review of Systems   Constitutional:  Negative for chills, fatigue and fever.   HENT:  Negative for ear pain and sinus pain.    Eyes:  Negative for photophobia, pain and visual disturbance.   Respiratory:  Negative for cough and shortness of breath.    Cardiovascular:  Negative for chest pain and leg swelling.   Gastrointestinal:  Positive for abdominal distention. Negative for abdominal pain, blood in stool, constipation, diarrhea, nausea and vomiting.   Endocrine: Negative for polyuria.   Genitourinary:  Negative for difficulty urinating, dysuria, flank pain, pelvic pain and vaginal pain.   Musculoskeletal:  Negative for back pain and neck pain.        Swelling in all extremities   Skin:  Negative for color change and rash.   Neurological:  Positive for weakness. Negative for dizziness, light-headedness, numbness and headaches.   Psychiatric/Behavioral:  Negative for confusion and sleep disturbance.    Objective:     Vital Signs (Most Recent):  Temp: 98.2 °F (36.8 °C) (06/29/22 1131)  Pulse: 84 (06/29/22 1131)  Resp: 18 (06/29/22 1131)  BP: 139/60 (06/29/22 1131)  SpO2: 98 % (06/29/22 1131) Vital Signs (24h Range):  Temp:  [97.6 °F (36.4 °C)-98.3 °F (36.8 °C)] 98.2 °F (36.8 °C)  Pulse:  [76-86] 84  Resp:  [18-20] 18  SpO2:  [95 %-100 %] 98 %  BP: (107-148)/(54-64) 139/60     Weight: 94.2 kg (207 lb 10.8 oz)  Body mass index is 40.56 kg/m².    Intake/Output Summary (Last 24 hours) at 6/29/2022 1251  Last data filed at 6/29/2022 1129  Gross per 24 hour   Intake 120 ml   Output 5800 ml   Net -5680 ml      Physical Exam  Constitutional:       General: She is not in acute distress.     Appearance: She is obese. She is not ill-appearing or diaphoretic.   HENT:      Head:  Normocephalic and atraumatic.      Right Ear: External ear normal.      Left Ear: External ear normal.      Nose: Nose normal. No congestion or rhinorrhea.      Mouth/Throat:      Mouth: Mucous membranes are moist.      Pharynx: Oropharynx is clear. No oropharyngeal exudate or posterior oropharyngeal erythema.   Eyes:      General: No scleral icterus.     Extraocular Movements: Extraocular movements intact.      Conjunctiva/sclera: Conjunctivae normal.   Neck:      Vascular: No carotid bruit.   Cardiovascular:      Rate and Rhythm: Normal rate and regular rhythm.      Pulses: Normal pulses.      Heart sounds: Normal heart sounds. No murmur heard.    No friction rub.   Pulmonary:      Effort: Pulmonary effort is normal. No respiratory distress.      Breath sounds: Normal breath sounds. No stridor. No wheezing.   Chest:      Chest wall: No tenderness.   Abdominal:      General: Bowel sounds are normal. There is distension.      Palpations: There is no mass.      Tenderness: There is no right CVA tenderness, left CVA tenderness or guarding.      Comments: Distended with positive fluid wave. No tenderness to palpation   Musculoskeletal:         General: Swelling present. No tenderness or deformity. Normal range of motion.      Cervical back: Normal range of motion. No rigidity or tenderness.      Right lower leg: Edema present.      Left lower leg: Edema present.      Comments: Diffuse edema with some weeping noted in right upper extremity   Skin:     General: Skin is warm and dry.      Capillary Refill: Capillary refill takes less than 2 seconds.      Coloration: Skin is not jaundiced or pale.      Findings: Bruising present. No erythema.      Comments: Bruising noted on bilateral forearms   Neurological:      General: No focal deficit present.      Mental Status: She is alert and oriented to person, place, and time. Mental status is at baseline.      Motor: No weakness.      Coordination: Coordination normal.    Psychiatric:         Mood and Affect: Mood normal.         Behavior: Behavior normal.         Thought Content: Thought content normal.       Significant Labs: All pertinent labs within the past 24 hours have been reviewed.  CBC:   Recent Labs   Lab 06/28/22 0042 06/29/22  0451   WBC 7.06 5.64   HGB 8.5* 7.9*   HCT 26.2* 23.9*   * 86*     CMP:   Recent Labs   Lab 06/28/22 0042 06/29/22 0451   * 133*   K 4.3 3.8    106   CO2 19* 24   * 105   BUN 9 10   CREATININE 1.0 1.2   CALCIUM 8.6* 8.2*   PROT 6.9 5.7*   ALBUMIN 1.9* 1.6*   BILITOT 1.8* 1.9*   ALKPHOS 91 71   AST 45* 34   ALT 25 20   ANIONGAP 5* 3*   EGFRNONAA 58.4* 46.9*       Significant Imaging: I have reviewed all pertinent imaging results/findings within the past 24 hours.

## 2022-06-29 NOTE — PHARMACY MED REC
"Admission Medication History     The home medication history was taken by Tesha Soto.    You may go to "Admission" then "Reconcile Home Medications" tabs to review and/or act upon these items.      The home medication list has been updated by the Pharmacy department.    Please read ALL comments highlighted in yellow.    Please address this information as you see fit.     Feel free to contact us if you have any questions or require assistance.      The medications listed below were removed from the home medication list. Please reorder if appropriate:  Patient reports no longer taking the following medication(s):   HYDROCHLOROTHIAZIDE 25MG   LOSARTAN 50MG   MINOCYCLINE 100MG   OXYCODONE-ACETAMINOPHEN 5-325MG   SIMVASTATIN 10MG    Medications listed below were obtained from: Patient/family  PTA Medications   Medication Sig    acetaminophen (TYLENOL) 325 MG tablet   Take 650 mg by mouth daily as needed for Pain.    aspirin 81 MG Chew   Take 81 mg by mouth once daily.    cholecalciferol, vitamin D3, (VITAMIN D3) 50 mcg (2,000 unit) Cap capsule   Take 1 capsule by mouth once daily.    cholestyramine-aspartame (QUESTRAN LIGHT) 4 gram PwPk   Take 4 g by mouth once daily.    folic acid (FOLVITE) 1 MG tablet   Take 1 mg by mouth once daily.     furosemide (LASIX) 20 MG tablet   Take 20 mg by mouth 2 (two) times daily.    lactulose (CHRONULAC) 10 gram/15 mL solution   Take 20 g by mouth 3 (three) times daily.    melatonin (MELATIN) 5 mg   Take 5 mg by mouth nightly.    nystatin (MYCOSTATIN) powder   Apply to skin folds beneath breasts as needed    omeprazole (PRILOSEC) 40 MG capsule   Take 40 mg by mouth once daily.    thiamine 100 MG tablet   Take 100 mg by mouth once daily.    wound dressings Pste Apply 35 application topically.         Tesha Soto  [id number]                .          "

## 2022-06-29 NOTE — ASSESSMENT & PLAN NOTE
67 yoF with alcoholic liver cirrhosis and hypertension admitted for generalized anasarca. Patient's albumin on admission was 1.9. Likely edema related to poor intravascular oncotic pressure 2/2 alcoholic cirrhosis. Patient adherent to home medication. Given history and exam, patient would benefit from diuresis. Dry weight reported as 170s. Patient is currently 212 lbs.     - will diurese with lasix 40mg IV BID and aldactone 100 mg qd  - add 1x lasix 40mg IV with diuril 500mg this afternoon and assess response  - f/u echo to assess cardiac function  - strict I/Os  - daily weights  - low sodium diet with 1500 ml fluid restriction

## 2022-06-30 NOTE — SUBJECTIVE & OBJECTIVE
Interval History: NAEON. Better UOP with increased lasix and diuril. Repeat lasix tid and diuril today. Swelling improving significantly.    Review of Systems   Constitutional:  Negative for chills, fatigue and fever.   HENT:  Negative for ear pain and sinus pain.    Eyes:  Negative for photophobia, pain and visual disturbance.   Respiratory:  Negative for cough and shortness of breath.    Cardiovascular:  Negative for chest pain and leg swelling.   Gastrointestinal:  Positive for abdominal distention. Negative for abdominal pain, blood in stool, constipation, diarrhea, nausea and vomiting.   Endocrine: Negative for polyuria.   Genitourinary:  Negative for difficulty urinating, dysuria, flank pain, pelvic pain and vaginal pain.   Musculoskeletal:  Negative for back pain and neck pain.        Swelling in all extremities   Skin:  Negative for color change and rash.   Neurological:  Positive for weakness. Negative for dizziness, light-headedness, numbness and headaches.   Psychiatric/Behavioral:  Negative for confusion and sleep disturbance.    Objective:     Vital Signs (Most Recent):  Temp: 98 °F (36.7 °C) (06/30/22 0700)  Pulse: 88 (06/30/22 0700)  Resp: 16 (06/30/22 0700)  BP: (!) 159/70 (06/30/22 0700)  SpO2: 97 % (06/30/22 0700) Vital Signs (24h Range):  Temp:  [96.3 °F (35.7 °C)-98 °F (36.7 °C)] 98 °F (36.7 °C)  Pulse:  [78-88] 88  Resp:  [16-20] 16  SpO2:  [97 %-99 %] 97 %  BP: (116-159)/(54-70) 159/70     Weight: 93.9 kg (207 lb)  Body mass index is 40.43 kg/m².    Intake/Output Summary (Last 24 hours) at 6/30/2022 1308  Last data filed at 6/30/2022 1200  Gross per 24 hour   Intake --   Output 1700 ml   Net -1700 ml        Physical Exam  Constitutional:       General: She is not in acute distress.     Appearance: She is obese. She is not ill-appearing or diaphoretic.   HENT:      Head: Normocephalic and atraumatic.      Right Ear: External ear normal.      Left Ear: External ear normal.      Nose: Nose normal.  No congestion or rhinorrhea.      Mouth/Throat:      Mouth: Mucous membranes are moist.      Pharynx: Oropharynx is clear. No oropharyngeal exudate or posterior oropharyngeal erythema.   Eyes:      General: No scleral icterus.     Extraocular Movements: Extraocular movements intact.      Conjunctiva/sclera: Conjunctivae normal.   Neck:      Vascular: No carotid bruit.   Cardiovascular:      Rate and Rhythm: Normal rate and regular rhythm.      Pulses: Normal pulses.      Heart sounds: Normal heart sounds. No murmur heard.    No friction rub.   Pulmonary:      Effort: Pulmonary effort is normal. No respiratory distress.      Breath sounds: Normal breath sounds. No stridor. No wheezing.   Chest:      Chest wall: No tenderness.   Abdominal:      General: Bowel sounds are normal. There is distension.      Palpations: There is no mass.      Tenderness: There is no right CVA tenderness, left CVA tenderness or guarding.      Comments: Distended with positive fluid wave. No tenderness to palpation   Musculoskeletal:         General: Swelling present. No tenderness or deformity. Normal range of motion.      Cervical back: Normal range of motion. No rigidity or tenderness.      Right lower leg: Edema present.      Left lower leg: Edema present.      Comments: Diffuse edema with some weeping noted in right upper extremity   Skin:     General: Skin is warm and dry.      Capillary Refill: Capillary refill takes less than 2 seconds.      Coloration: Skin is not jaundiced or pale.      Findings: Bruising present. No erythema.      Comments: Bruising noted on bilateral forearms   Neurological:      General: No focal deficit present.      Mental Status: She is alert and oriented to person, place, and time. Mental status is at baseline.      Motor: No weakness.      Coordination: Coordination normal.   Psychiatric:         Mood and Affect: Mood normal.         Behavior: Behavior normal.         Thought Content: Thought content  normal.       Significant Labs: All pertinent labs within the past 24 hours have been reviewed.  CBC:   Recent Labs   Lab 06/29/22  0451 06/30/22  0330   WBC 5.64 6.00   HGB 7.9* 7.4*   HCT 23.9* 22.1*   PLT 86* 87*       CMP:   Recent Labs   Lab 06/29/22  0451 06/30/22  0330   * 133*   K 3.8 3.2*    105   CO2 24 22*    119*   BUN 10 10   CREATININE 1.2 1.2   CALCIUM 8.2* 7.8*   PROT 5.7* 5.1*   ALBUMIN 1.6* 1.5*   BILITOT 1.9* 1.2*   ALKPHOS 71 95   AST 34 32   ALT 20 19   ANIONGAP 3* 6*   EGFRNONAA 46.9* 46.9*         Significant Imaging: I have reviewed all pertinent imaging results/findings within the past 24 hours.

## 2022-06-30 NOTE — PLAN OF CARE
Problem: Adult Inpatient Plan of Care  Goal: Plan of Care Review  Outcome: Ongoing, Progressing  Goal: Patient-Specific Goal (Individualized)  Outcome: Ongoing, Progressing  Goal: Absence of Hospital-Acquired Illness or Injury  Outcome: Ongoing, Progressing  Goal: Optimal Comfort and Wellbeing  Outcome: Ongoing, Progressing  Goal: Readiness for Transition of Care  Outcome: Ongoing, Progressing     Problem: Skin Injury Risk Increased  Goal: Skin Health and Integrity  Outcome: Ongoing, Progressing     Problem: Bariatric Environmental Safety  Goal: Safety Maintained with Care  Outcome: Ongoing, Progressing     Problem: Fall Injury Risk  Goal: Absence of Fall and Fall-Related Injury  Outcome: Ongoing, Progressing

## 2022-06-30 NOTE — ASSESSMENT & PLAN NOTE
67 yoF with alcoholic liver cirrhosis and hypertension admitted for generalized anasarca. Patient's albumin on admission was 1.9. Likely edema related to poor intravascular oncotic pressure 2/2 alcoholic cirrhosis. Patient adherent to home medication. Given history and exam, patient would benefit from diuresis. Dry weight reported as 170s. Patient is currently 212 lbs.     - Better response. Continue Lasix 40 mg IV TID and Diuril 500 mg IV x1 today  - f/u echo unremarkable  - strict I/Os  - daily weights  - low sodium diet with 1500 ml fluid restriction

## 2022-06-30 NOTE — PROGRESS NOTES
[facility] Medicine  Progress Note    Patient Name: Aleyda Floyd  MRN: [id number]  Patient Class: IP- Inpatient   Admission Date: 6/27/2022  Length of Stay: 2 days  Attending Physician: Jessenia Foster MD  Primary Care Provider: Elvie Fernandes MD        Subjective:     Principal Problem:Anasarca        HPI:  This is a 67 year old deaf lady with alcoholic cirrhosis, and hypertension who presented with generalized swelling.  used to obtain history. Patient states that she had the swelling since her diagnosis of cirrhosis in February. Patient states that 2 days ago, she was able to ambulate but with some difficulty due to the swelling but now  worsening to the point where she is unable to ambulate by herself. Of note, patient had a recent EGD performed earlier last week where they had issues with her IV resulting is significant bruising. Patient also notes that whenever she scratches her skin with her nails, she notices clear fluid coming from the cuts. Patient denies fever, chills, cough, hemoptysis, nausea, vomiting, abdominal pain, yellowing of skin, constipation, dysuria, hematuria, and black/ bloody stools. Patient has baseline loose stools from her lactulose.  She reports compliance with all medications including her lactulose and Lasix. Patient and family declined any confusion or altered mental status. She denies any recent alcohol use with last use in February when she was diagnosed with alcoholic cirrhosis. Patient states that she used to weigh around 170s which she believes is her dry weight and is currently in the 200s.     Upon chart review, patient had an echo at an OSH on 2/25/22 which showed EF 60-65% and some diastolic dysfunction.    In the ED, patient was found to have Hgb of 8.5 and platelets of 113 around baseline. Patient had a UA which showed 1+ leuks and many bacteria. Patient did not complain of dysuria.       Overview/Hospital Course:  Admitted for  decompensated cirrhosis and anasarca. Began diuresing with IV Lasix and spironolactone. Hepatology involved in care. Paracentesis of -4.2L; ruled out SBP. Increasing diuresis with Diuril.       Interval History: NAEON. Better UOP with increased lasix and diuril. Repeat lasix tid and diuril today. Swelling improving significantly.    Review of Systems   Constitutional:  Negative for chills, fatigue and fever.   HENT:  Negative for ear pain and sinus pain.    Eyes:  Negative for photophobia, pain and visual disturbance.   Respiratory:  Negative for cough and shortness of breath.    Cardiovascular:  Negative for chest pain and leg swelling.   Gastrointestinal:  Positive for abdominal distention. Negative for abdominal pain, blood in stool, constipation, diarrhea, nausea and vomiting.   Endocrine: Negative for polyuria.   Genitourinary:  Negative for difficulty urinating, dysuria, flank pain, pelvic pain and vaginal pain.   Musculoskeletal:  Negative for back pain and neck pain.        Swelling in all extremities   Skin:  Negative for color change and rash.   Neurological:  Positive for weakness. Negative for dizziness, light-headedness, numbness and headaches.   Psychiatric/Behavioral:  Negative for confusion and sleep disturbance.    Objective:     Vital Signs (Most Recent):  Temp: 98 °F (36.7 °C) (06/30/22 0700)  Pulse: 88 (06/30/22 0700)  Resp: 16 (06/30/22 0700)  BP: (!) 159/70 (06/30/22 0700)  SpO2: 97 % (06/30/22 0700) Vital Signs (24h Range):  Temp:  [96.3 °F (35.7 °C)-98 °F (36.7 °C)] 98 °F (36.7 °C)  Pulse:  [78-88] 88  Resp:  [16-20] 16  SpO2:  [97 %-99 %] 97 %  BP: (116-159)/(54-70) 159/70     Weight: 93.9 kg (207 lb)  Body mass index is 40.43 kg/m².    Intake/Output Summary (Last 24 hours) at 6/30/2022 1308  Last data filed at 6/30/2022 1200  Gross per 24 hour   Intake --   Output 1700 ml   Net -1700 ml        Physical Exam  Constitutional:       General: She is not in acute distress.     Appearance: She is  obese. She is not ill-appearing or diaphoretic.   HENT:      Head: Normocephalic and atraumatic.      Right Ear: External ear normal.      Left Ear: External ear normal.      Nose: Nose normal. No congestion or rhinorrhea.      Mouth/Throat:      Mouth: Mucous membranes are moist.      Pharynx: Oropharynx is clear. No oropharyngeal exudate or posterior oropharyngeal erythema.   Eyes:      General: No scleral icterus.     Extraocular Movements: Extraocular movements intact.      Conjunctiva/sclera: Conjunctivae normal.   Neck:      Vascular: No carotid bruit.   Cardiovascular:      Rate and Rhythm: Normal rate and regular rhythm.      Pulses: Normal pulses.      Heart sounds: Normal heart sounds. No murmur heard.    No friction rub.   Pulmonary:      Effort: Pulmonary effort is normal. No respiratory distress.      Breath sounds: Normal breath sounds. No stridor. No wheezing.   Chest:      Chest wall: No tenderness.   Abdominal:      General: Bowel sounds are normal. There is distension.      Palpations: There is no mass.      Tenderness: There is no right CVA tenderness, left CVA tenderness or guarding.      Comments: Distended with positive fluid wave. No tenderness to palpation   Musculoskeletal:         General: Swelling present. No tenderness or deformity. Normal range of motion.      Cervical back: Normal range of motion. No rigidity or tenderness.      Right lower leg: Edema present.      Left lower leg: Edema present.      Comments: Diffuse edema with some weeping noted in right upper extremity   Skin:     General: Skin is warm and dry.      Capillary Refill: Capillary refill takes less than 2 seconds.      Coloration: Skin is not jaundiced or pale.      Findings: Bruising present. No erythema.      Comments: Bruising noted on bilateral forearms   Neurological:      General: No focal deficit present.      Mental Status: She is alert and oriented to person, place, and time. Mental status is at baseline.       Motor: No weakness.      Coordination: Coordination normal.   Psychiatric:         Mood and Affect: Mood normal.         Behavior: Behavior normal.         Thought Content: Thought content normal.       Significant Labs: All pertinent labs within the past 24 hours have been reviewed.  CBC:   Recent Labs   Lab 06/29/22 0451 06/30/22  0330   WBC 5.64 6.00   HGB 7.9* 7.4*   HCT 23.9* 22.1*   PLT 86* 87*       CMP:   Recent Labs   Lab 06/29/22 0451 06/30/22  0330   * 133*   K 3.8 3.2*    105   CO2 24 22*    119*   BUN 10 10   CREATININE 1.2 1.2   CALCIUM 8.2* 7.8*   PROT 5.7* 5.1*   ALBUMIN 1.6* 1.5*   BILITOT 1.9* 1.2*   ALKPHOS 71 95   AST 34 32   ALT 20 19   ANIONGAP 3* 6*   EGFRNONAA 46.9* 46.9*         Significant Imaging: I have reviewed all pertinent imaging results/findings within the past 24 hours.      Assessment/Plan:      * Anasarca  67 yoF with alcoholic liver cirrhosis and hypertension admitted for generalized anasarca. Patient's albumin on admission was 1.9. Likely edema related to poor intravascular oncotic pressure 2/2 alcoholic cirrhosis. Patient adherent to home medication. Given history and exam, patient would benefit from diuresis. Dry weight reported as 170s. Patient is currently 212 lbs.     - Better response. Continue Lasix 40 mg IV TID and Diuril 500 mg IV x1 today  - f/u echo unremarkable  - strict I/Os  - daily weights  - low sodium diet with 1500 ml fluid restriction    Thrombocytopenia  Chronic, patient at baseline    Anemia, chronic disease  Patient with Hgb of 8.5 on admit. Upon review of care everywhere, patient had Hgb of 8.4 in all of 2022. Likely due to chronic disease and stable.     - continue to monitor    Alcoholic cirrhosis of liver with ascites  Patient was diagnosed in February. Previous history of chronic alcohol abuse and states she drank about 4-5 glasses of wine per night for over 20 years. Patient is seeing hepatology outpatient. Labs shown in care  everywhere.     MELD-Na score: 18 at 6/30/2022  3:30 AM  MELD score: 15 at 6/30/2022  3:30 AM  Calculated from:  Serum Creatinine: 1.2 mg/dL at 6/30/2022  3:30 AM  Serum Sodium: 133 mmol/L at 6/30/2022  3:30 AM  Total Bilirubin: 1.2 mg/dL at 6/30/2022  3:30 AM  INR(ratio): 1.7 at 6/28/2022 12:42 AM  Age: 67 years    - consulted hepatology, appreciate recommendations for diuresis, labs, f/u outpatient with her hepatologist  - continue lactulose, thiamine, and folate  - S/p paracentesis with 4.2L removed  - continue to monitor for signs of liver failure  - f/u daily CMPs    Primary hypertension  Patient was discontinued from losartan per hepatologist.     - continue to monitor and can add when clinically indicated    Deaf- written communication  ASL  used for interaction      VTE Risk Mitigation (From admission, onward)         Ordered     IP VTE HIGH RISK PATIENT  Once         06/28/22 0419     Place sequential compression device  Until discontinued         06/28/22 0419     Place sequential compression device  Until discontinued         06/28/22 0415                Discharge Planning   REMA: 7/1/2022     Code Status: Full Code   Is the patient medically ready for discharge?:     Reason for patient still in hospital (select all that apply): Patient trending condition  Discharge Plan A: Home Health                  Elly Mendez DO  Department of Hospital Medicine   [facility]

## 2022-06-30 NOTE — ASSESSMENT & PLAN NOTE
Patient was diagnosed in February. Previous history of chronic alcohol abuse and states she drank about 4-5 glasses of wine per night for over 20 years. Patient is seeing hepatology outpatient. Labs shown in care everywhere.     MELD-Na score: 18 at 6/30/2022  3:30 AM  MELD score: 15 at 6/30/2022  3:30 AM  Calculated from:  Serum Creatinine: 1.2 mg/dL at 6/30/2022  3:30 AM  Serum Sodium: 133 mmol/L at 6/30/2022  3:30 AM  Total Bilirubin: 1.2 mg/dL at 6/30/2022  3:30 AM  INR(ratio): 1.7 at 6/28/2022 12:42 AM  Age: 67 years    - consulted hepatology, appreciate recommendations for diuresis, labs, f/u outpatient with her hepatologist  - continue lactulose, thiamine, and folate  - S/p paracentesis with 4.2L removed  - continue to monitor for signs of liver failure  - f/u daily CMPs

## 2022-07-01 NOTE — PLAN OF CARE
Marco Antonio anika - [facility]  Discharge Reassessment    Primary Care Provider: Elvie Fernandes MD    Expected Discharge Date: 7/2/2022    Reassessment (most recent)     Discharge Reassessment - 07/01/22 0859        Discharge Reassessment    Assessment Type Discharge Planning Reassessment     Did the patient's condition or plan change since previous assessment? No     Discharge Plan discussed with: Patient;Spouse/sig other     Communicated REMA with patient/caregiver Yes     Discharge Plan A Home Health     Discharge Plan B Home with family     DME Needed Upon Discharge  none     Discharge Barriers Identified None     Why the patient remains in the hospital Requires continued medical care        Post-Acute Status    Discharge Delays None known at this time

## 2022-07-01 NOTE — PROGRESS NOTES
[facility] Medicine  Progress Note    Patient Name: Aleyda Floyd  MRN: [id number]  Patient Class: IP- Inpatient   Admission Date: 6/27/2022  Length of Stay: 3 days  Attending Physician: Jessenia Foster MD  Primary Care Provider: Elvie Fernandes MD        Subjective:     Principal Problem:Anasarca        HPI:  This is a 67 year old deaf lady with alcoholic cirrhosis, and hypertension who presented with generalized swelling.  used to obtain history. Patient states that she had the swelling since her diagnosis of cirrhosis in February. Patient states that 2 days ago, she was able to ambulate but with some difficulty due to the swelling but now  worsening to the point where she is unable to ambulate by herself. Of note, patient had a recent EGD performed earlier last week where they had issues with her IV resulting is significant bruising. Patient also notes that whenever she scratches her skin with her nails, she notices clear fluid coming from the cuts. Patient denies fever, chills, cough, hemoptysis, nausea, vomiting, abdominal pain, yellowing of skin, constipation, dysuria, hematuria, and black/ bloody stools. Patient has baseline loose stools from her lactulose.  She reports compliance with all medications including her lactulose and Lasix. Patient and family declined any confusion or altered mental status. She denies any recent alcohol use with last use in February when she was diagnosed with alcoholic cirrhosis. Patient states that she used to weigh around 170s which she believes is her dry weight and is currently in the 200s.     Upon chart review, patient had an echo at an OSH on 2/25/22 which showed EF 60-65% and some diastolic dysfunction.    In the ED, patient was found to have Hgb of 8.5 and platelets of 113 around baseline. Patient had a UA which showed 1+ leuks and many bacteria. Patient did not complain of dysuria.       Overview/Hospital Course:  Admitted for  decompensated cirrhosis and anasarca. Began diuresing with IV Lasix and spironolactone. Hepatology involved in care. Paracentesis of -4.2L; ruled out SBP. Increasing diuresis with Diuril. D/c diuril but continue IV lasix. Repleated K and Mag.      Interval History: NAEON. Pt communicated that she is doing better than yesterday. Swelling continues to improve.    Review of Systems   Constitutional:  Negative for chills, fatigue and fever.   HENT:  Negative for ear pain and sinus pain.    Eyes:  Negative for photophobia, pain and visual disturbance.   Respiratory:  Negative for cough and shortness of breath.    Cardiovascular:  Negative for chest pain and leg swelling.   Gastrointestinal:  Positive for abdominal distention. Negative for abdominal pain, blood in stool, constipation, diarrhea, nausea and vomiting.   Endocrine: Negative for polyuria.   Genitourinary:  Negative for difficulty urinating, dysuria, flank pain, pelvic pain and vaginal pain.   Musculoskeletal:  Negative for back pain and neck pain.        Swelling in all extremities   Skin:  Negative for color change and rash.   Neurological:  Negative for dizziness, weakness, light-headedness, numbness and headaches.   Psychiatric/Behavioral:  Negative for confusion and sleep disturbance.    Objective:     Vital Signs (Most Recent):  Temp: 98.2 °F (36.8 °C) (07/01/22 1136)  Pulse: 88 (07/01/22 1136)  Resp: 18 (07/01/22 1136)  BP: (!) 148/63 (07/01/22 1136)  SpO2: 97 % (07/01/22 1136)   Vital Signs (24h Range):  Temp:  [97.9 °F (36.6 °C)-98.6 °F (37 °C)] 98.2 °F (36.8 °C)  Pulse:  [80-94] 88  Resp:  [16-20] 18  SpO2:  [93 %-99 %] 97 %  BP: (134-162)/(60-68) 148/63     Weight: 95.1 kg (209 lb 10.5 oz)  Body mass index is 40.95 kg/m².    Intake/Output Summary (Last 24 hours) at 7/1/2022 1417  Last data filed at 7/1/2022 0800  Gross per 24 hour   Intake 100 ml   Output 1300 ml   Net -1200 ml      Physical Exam  Constitutional:       General: She is not in acute  distress.     Appearance: She is obese. She is not ill-appearing or diaphoretic.   HENT:      Head: Normocephalic and atraumatic.      Mouth/Throat:      Mouth: Mucous membranes are moist.      Pharynx: Oropharynx is clear. No oropharyngeal exudate or posterior oropharyngeal erythema.   Eyes:      General: No scleral icterus.     Extraocular Movements: Extraocular movements intact.      Conjunctiva/sclera: Conjunctivae normal.   Neck:      Vascular: No carotid bruit.   Cardiovascular:      Rate and Rhythm: Normal rate and regular rhythm.      Pulses: Normal pulses.      Heart sounds: Normal heart sounds. No murmur heard.    No friction rub.   Pulmonary:      Effort: Pulmonary effort is normal. No respiratory distress.      Breath sounds: Normal breath sounds. No stridor. No wheezing.   Chest:      Chest wall: No tenderness.   Abdominal:      General: Bowel sounds are normal. There is distension.      Palpations: There is no mass.      Tenderness: There is no right CVA tenderness, left CVA tenderness or guarding.      Comments: Distended. No tenderness to palpation   Musculoskeletal:         General: Swelling present. No tenderness or deformity. Normal range of motion.      Cervical back: Normal range of motion. No rigidity or tenderness.      Right lower leg: Edema present.      Left lower leg: Edema present.      Comments: Diffuse edema with some weeping noted in right upper extremity   Skin:     General: Skin is warm and dry.      Coloration: Skin is not jaundiced or pale.      Findings: No bruising or erythema.      Comments: Bruising noted on bilateral forearms   Neurological:      General: No focal deficit present.      Mental Status: She is alert and oriented to person, place, and time. Mental status is at baseline.      Motor: No weakness.      Coordination: Coordination normal.   Psychiatric:         Mood and Affect: Mood normal.         Behavior: Behavior normal.         Thought Content: Thought content  normal.       Significant Labs: All pertinent labs within the past 24 hours have been reviewed.  CBC:   Recent Labs   Lab 06/30/22  0330 07/01/22  0534   WBC 6.00 6.78   HGB 7.4* 8.2*   HCT 22.1* 24.0*   PLT 87* 100*     CMP:   Recent Labs   Lab 06/30/22  0330 07/01/22  0534   * 134*   K 3.2* 3.6    103   CO2 22* 25   * 106   BUN 10 10   CREATININE 1.2 1.0   CALCIUM 7.8* 8.1*   PROT 5.1* 5.5*   ALBUMIN 1.5* 1.5*   BILITOT 1.2* 1.4*   ALKPHOS 95 113   AST 32 36   ALT 19 21   ANIONGAP 6* 6*   EGFRNONAA 46.9* 58.4*     Recent Lab Results         07/01/22  0534        Albumin 1.5       Alkaline Phosphatase 113       ALT 21       Anion Gap 6       AST 36       Baso # 0.05       Basophil % 0.7       BILIRUBIN TOTAL 1.4  Comment: For infants and newborns, interpretation of results should be based  on gestational age, weight and in agreement with clinical  observations.    Premature Infant recommended reference ranges:  Up to 24 hours.............<8.0 mg/dL  Up to 48 hours............<12.0 mg/dL  3-5 days..................<15.0 mg/dL  6-29 days.................<15.0 mg/dL         BUN 10       Calcium 8.1       Chloride 103       CO2 25       Creatinine 1.0       Differential Method Automated       eGFR if  >60.0       eGFR if non  58.4  Comment: Calculation used to obtain the estimated glomerular filtration  rate (eGFR) is the CKD-EPI equation.          Eos # 0.3       Eosinophil % 4.6       Glucose 106       Gran # (ANC) 3.7       Gran % 54.9       Hematocrit 24.0       Hemoglobin 8.2       Immature Grans (Abs) 0.03  Comment: Mild elevation in immature granulocytes is non specific and   can be seen in a variety of conditions including stress response,   acute inflammation, trauma and pregnancy. Correlation with other   laboratory and clinical findings is essential.         Immature Granulocytes 0.4       Lymph # 1.5       Lymph % 21.8       Magnesium 1.4       MCH 30.1        MCHC 34.2       MCV 88       Mono # 1.2       Mono % 17.6       MPV 10.2       nRBC 0       Phosphorus 3.6       Platelets 100       Potassium 3.6       PROTEIN TOTAL 5.5       RBC 2.72       RDW 18.1       Sodium 134       WBC 6.78               Significant Imaging: I have reviewed all pertinent imaging results/findings within the past 24 hours.      Assessment/Plan:      * Anasarca  67 yoF with alcoholic liver cirrhosis and hypertension admitted for generalized anasarca. Patient's albumin on admission was 1.9. Likely edema related to poor intravascular oncotic pressure 2/2 alcoholic cirrhosis. Patient adherent to home medication. Given history and exam, patient would benefit from diuresis. Dry weight reported as 170s. Patient is currently 209 lbs (down from 212 lbs).     - Better response. Continue Lasix 40 mg IV TID and d/c Diuril 500 mg IV   - f/u echo unremarkable  - strict I/Os  - daily weights  - low sodium diet with 1500 ml fluid restriction    Thrombocytopenia  Chronic, patient at baseline    Anemia, chronic disease  Patient with Hgb of 8.5 on admit. Upon review of care everywhere, patient had Hgb of 8.4 in all of 2022. Likely due to chronic disease and stable. Currently Hgb of 8.2.    - continue to monitor    Alcoholic cirrhosis of liver with ascites  Patient was diagnosed in February. Previous history of chronic alcohol abuse and states she drank about 4-5 glasses of wine per night for over 20 years. Patient is seeing hepatology outpatient. Labs shown in care everywhere.     MELD-Na score: 18 at 6/30/2022  3:30 AM  MELD score: 15 at 6/30/2022  3:30 AM  Calculated from:  Serum Creatinine: 1.2 mg/dL at 6/30/2022  3:30 AM  Serum Sodium: 133 mmol/L at 6/30/2022  3:30 AM  Total Bilirubin: 1.2 mg/dL at 6/30/2022  3:30 AM  INR(ratio): 1.7 at 6/28/2022 12:42 AM  Age: 67 years    - consulted hepatology, appreciate recommendations for diuresis, labs, f/u outpatient with her hepatologist  - continue lactulose,  thiamine, and folate  - S/p paracentesis with 4.2L removed  - continue to monitor for signs of liver failure  - f/u daily CMPs    Primary hypertension  Patient was discontinued from losartan per hepatologist.     - continue to monitor and can add when clinically indicated    Deaf- written communication  ASL  used for interaction as well as written communication    VTE Risk Mitigation (From admission, onward)         Ordered     IP VTE HIGH RISK PATIENT  Once         06/28/22 0419     Place sequential compression device  Until discontinued         06/28/22 0419     Place sequential compression device  Until discontinued         06/28/22 0415                Discharge Planning   REMA: 7/2/2022     Code Status: Full Code   Is the patient medically ready for discharge?:     Reason for patient still in hospital (select all that apply): Treatment  Discharge Plan A: Home Health   Discharge Delays: None known at this time              Juan Miguel MD  Department of Hospital Medicine   [facility]

## 2022-07-01 NOTE — PLAN OF CARE
Patient alert and  able to voice needs. No sign of distress. Respirations even and unlabored. No increase edema. Denies pain. Call light in reach.

## 2022-07-01 NOTE — ASSESSMENT & PLAN NOTE
67 yoF with alcoholic liver cirrhosis and hypertension admitted for generalized anasarca. Patient's albumin on admission was 1.9. Likely edema related to poor intravascular oncotic pressure 2/2 alcoholic cirrhosis. Patient adherent to home medication. Given history and exam, patient would benefit from diuresis. Dry weight reported as 170s. Patient is currently 209 lbs (down from 212 lbs).     - Better response. Continue Lasix 40 mg IV TID and d/c Diuril 500 mg IV   - f/u echo unremarkable  - strict I/Os  - daily weights  - low sodium diet with 1500 ml fluid restriction

## 2022-07-01 NOTE — ASSESSMENT & PLAN NOTE
Patient with Hgb of 8.5 on admit. Upon review of care everywhere, patient had Hgb of 8.4 in all of 2022. Likely due to chronic disease and stable. Currently Hgb of 8.2.    - continue to monitor

## 2022-07-01 NOTE — SUBJECTIVE & OBJECTIVE
Interval History: NAEON. Pt communicated that she is doing better than yesterday. Swelling continues to improve.    Review of Systems   Constitutional:  Negative for chills, fatigue and fever.   HENT:  Negative for ear pain and sinus pain.    Eyes:  Negative for photophobia, pain and visual disturbance.   Respiratory:  Negative for cough and shortness of breath.    Cardiovascular:  Negative for chest pain and leg swelling.   Gastrointestinal:  Positive for abdominal distention. Negative for abdominal pain, blood in stool, constipation, diarrhea, nausea and vomiting.   Endocrine: Negative for polyuria.   Genitourinary:  Negative for difficulty urinating, dysuria, flank pain, pelvic pain and vaginal pain.   Musculoskeletal:  Negative for back pain and neck pain.        Swelling in all extremities   Skin:  Negative for color change and rash.   Neurological:  Negative for dizziness, weakness, light-headedness, numbness and headaches.   Psychiatric/Behavioral:  Negative for confusion and sleep disturbance.    Objective:     Vital Signs (Most Recent):  Temp: 98.2 °F (36.8 °C) (07/01/22 1136)  Pulse: 88 (07/01/22 1136)  Resp: 18 (07/01/22 1136)  BP: (!) 148/63 (07/01/22 1136)  SpO2: 97 % (07/01/22 1136)   Vital Signs (24h Range):  Temp:  [97.9 °F (36.6 °C)-98.6 °F (37 °C)] 98.2 °F (36.8 °C)  Pulse:  [80-94] 88  Resp:  [16-20] 18  SpO2:  [93 %-99 %] 97 %  BP: (134-162)/(60-68) 148/63     Weight: 95.1 kg (209 lb 10.5 oz)  Body mass index is 40.95 kg/m².    Intake/Output Summary (Last 24 hours) at 7/1/2022 1417  Last data filed at 7/1/2022 0800  Gross per 24 hour   Intake 100 ml   Output 1300 ml   Net -1200 ml      Physical Exam  Constitutional:       General: She is not in acute distress.     Appearance: She is obese. She is not ill-appearing or diaphoretic.   HENT:      Head: Normocephalic and atraumatic.      Mouth/Throat:      Mouth: Mucous membranes are moist.      Pharynx: Oropharynx is clear. No oropharyngeal exudate  or posterior oropharyngeal erythema.   Eyes:      General: No scleral icterus.     Extraocular Movements: Extraocular movements intact.      Conjunctiva/sclera: Conjunctivae normal.   Neck:      Vascular: No carotid bruit.   Cardiovascular:      Rate and Rhythm: Normal rate and regular rhythm.      Pulses: Normal pulses.      Heart sounds: Normal heart sounds. No murmur heard.    No friction rub.   Pulmonary:      Effort: Pulmonary effort is normal. No respiratory distress.      Breath sounds: Normal breath sounds. No stridor. No wheezing.   Chest:      Chest wall: No tenderness.   Abdominal:      General: Bowel sounds are normal. There is distension.      Palpations: There is no mass.      Tenderness: There is no right CVA tenderness, left CVA tenderness or guarding.      Comments: Distended. No tenderness to palpation   Musculoskeletal:         General: Swelling present. No tenderness or deformity. Normal range of motion.      Cervical back: Normal range of motion. No rigidity or tenderness.      Right lower leg: Edema present.      Left lower leg: Edema present.      Comments: Diffuse edema with some weeping noted in right upper extremity   Skin:     General: Skin is warm and dry.      Coloration: Skin is not jaundiced or pale.      Findings: No bruising or erythema.      Comments: Bruising noted on bilateral forearms   Neurological:      General: No focal deficit present.      Mental Status: She is alert and oriented to person, place, and time. Mental status is at baseline.      Motor: No weakness.      Coordination: Coordination normal.   Psychiatric:         Mood and Affect: Mood normal.         Behavior: Behavior normal.         Thought Content: Thought content normal.       Significant Labs: All pertinent labs within the past 24 hours have been reviewed.  CBC:   Recent Labs   Lab 06/30/22  0330 07/01/22  0534   WBC 6.00 6.78   HGB 7.4* 8.2*   HCT 22.1* 24.0*   PLT 87* 100*     CMP:   Recent Labs   Lab  06/30/22  0330 07/01/22  0534   * 134*   K 3.2* 3.6    103   CO2 22* 25   * 106   BUN 10 10   CREATININE 1.2 1.0   CALCIUM 7.8* 8.1*   PROT 5.1* 5.5*   ALBUMIN 1.5* 1.5*   BILITOT 1.2* 1.4*   ALKPHOS 95 113   AST 32 36   ALT 19 21   ANIONGAP 6* 6*   EGFRNONAA 46.9* 58.4*     Recent Lab Results         07/01/22  0534        Albumin 1.5       Alkaline Phosphatase 113       ALT 21       Anion Gap 6       AST 36       Baso # 0.05       Basophil % 0.7       BILIRUBIN TOTAL 1.4  Comment: For infants and newborns, interpretation of results should be based  on gestational age, weight and in agreement with clinical  observations.    Premature Infant recommended reference ranges:  Up to 24 hours.............<8.0 mg/dL  Up to 48 hours............<12.0 mg/dL  3-5 days..................<15.0 mg/dL  6-29 days.................<15.0 mg/dL         BUN 10       Calcium 8.1       Chloride 103       CO2 25       Creatinine 1.0       Differential Method Automated       eGFR if  >60.0       eGFR if non  58.4  Comment: Calculation used to obtain the estimated glomerular filtration  rate (eGFR) is the CKD-EPI equation.          Eos # 0.3       Eosinophil % 4.6       Glucose 106       Gran # (ANC) 3.7       Gran % 54.9       Hematocrit 24.0       Hemoglobin 8.2       Immature Grans (Abs) 0.03  Comment: Mild elevation in immature granulocytes is non specific and   can be seen in a variety of conditions including stress response,   acute inflammation, trauma and pregnancy. Correlation with other   laboratory and clinical findings is essential.         Immature Granulocytes 0.4       Lymph # 1.5       Lymph % 21.8       Magnesium 1.4       MCH 30.1       MCHC 34.2       MCV 88       Mono # 1.2       Mono % 17.6       MPV 10.2       nRBC 0       Phosphorus 3.6       Platelets 100       Potassium 3.6       PROTEIN TOTAL 5.5       RBC 2.72       RDW 18.1       Sodium 134       WBC 6.78                Significant Imaging: I have reviewed all pertinent imaging results/findings within the past 24 hours.

## 2022-07-02 NOTE — PLAN OF CARE
Patient alert but nonverbal. Communicate by paper and pen. Family at bedside. No significant changes this shift.

## 2022-07-02 NOTE — PROGRESS NOTES
[facility] Medicine  Progress Note    Patient Name: Aleyda Floyd  MRN: [id number]  Patient Class: IP- Inpatient   Admission Date: 6/27/2022  Length of Stay: 4 days  Attending Physician: Jessenia Foster MD  Primary Care Provider: Elvie Fernandes MD        Subjective:     Principal Problem:Anasarca        HPI:  This is a 67 year old deaf lady with alcoholic cirrhosis, and hypertension who presented with generalized swelling.  used to obtain history. Patient states that she had the swelling since her diagnosis of cirrhosis in February. Patient states that 2 days ago, she was able to ambulate but with some difficulty due to the swelling but now  worsening to the point where she is unable to ambulate by herself. Of note, patient had a recent EGD performed earlier last week where they had issues with her IV resulting is significant bruising. Patient also notes that whenever she scratches her skin with her nails, she notices clear fluid coming from the cuts. Patient denies fever, chills, cough, hemoptysis, nausea, vomiting, abdominal pain, yellowing of skin, constipation, dysuria, hematuria, and black/ bloody stools. Patient has baseline loose stools from her lactulose.  She reports compliance with all medications including her lactulose and Lasix. Patient and family declined any confusion or altered mental status. She denies any recent alcohol use with last use in February when she was diagnosed with alcoholic cirrhosis. Patient states that she used to weigh around 170s which she believes is her dry weight and is currently in the 200s.     Upon chart review, patient had an echo at an OSH on 2/25/22 which showed EF 60-65% and some diastolic dysfunction.    In the ED, patient was found to have Hgb of 8.5 and platelets of 113 around baseline. Patient had a UA which showed 1+ leuks and many bacteria. Patient did not complain of dysuria.       Overview/Hospital Course:  Admitted for  decompensated cirrhosis and anasarca. Began diuresing with IV Lasix and spironolactone. Hepatology involved in care. Paracentesis of -4.2L; ruled out SBP. Increasing diuresis with Diuril. D/c diuril but continue IV lasix. Repleated K and Mag. 1 BM 7/1 enema ordered 7/2      Interval History: NAEON. Pt complained of gassiness and anal pain this AM. Possibly due to constipation/fecal impaction. Plan for patient to have BM and enema ordered.    Review of Systems   Constitutional:  Negative for chills, fatigue and fever.   Eyes:  Negative for photophobia, pain and visual disturbance.   Respiratory:  Negative for cough and shortness of breath.    Cardiovascular:  Negative for chest pain and leg swelling.   Gastrointestinal:  Positive for abdominal distention and rectal pain. Negative for abdominal pain, blood in stool, constipation, diarrhea, nausea and vomiting.   Endocrine: Negative for polyuria.   Genitourinary:  Negative for difficulty urinating, dysuria, flank pain and vaginal pain.   Musculoskeletal:  Negative for back pain and neck pain.        Swelling in all extremities   Skin:  Negative for color change and rash.   Neurological:  Negative for dizziness, weakness, light-headedness, numbness and headaches.   Psychiatric/Behavioral:  Negative for confusion and sleep disturbance.    Objective:     Vital Signs (Most Recent):  Temp: 98.1 °F (36.7 °C) (07/02/22 1211)  Pulse: 91 (07/02/22 1211)  Resp: 16 (07/02/22 1211)  BP: 137/61 (07/02/22 1211)  SpO2: 98 % (07/02/22 1211) Vital Signs (24h Range):  Temp:  [96.3 °F (35.7 °C)-98.6 °F (37 °C)] 98.1 °F (36.7 °C)  Pulse:  [85-99] 91  Resp:  [16-20] 16  SpO2:  [95 %-98 %] 98 %  BP: (121-148)/(55-68) 137/61     Weight: 90 kg (198 lb 6.6 oz)  Body mass index is 38.75 kg/m².    Intake/Output Summary (Last 24 hours) at 7/2/2022 1530  Last data filed at 7/2/2022 0900  Gross per 24 hour   Intake 100 ml   Output 2050 ml   Net -1950 ml      Physical Exam  Constitutional:        General: She is not in acute distress.     Appearance: She is obese. She is not ill-appearing or diaphoretic.   HENT:      Head: Normocephalic and atraumatic.      Mouth/Throat:      Mouth: Mucous membranes are moist.      Pharynx: Oropharynx is clear. No oropharyngeal exudate or posterior oropharyngeal erythema.   Eyes:      General: No scleral icterus.     Extraocular Movements: Extraocular movements intact.   Neck:      Vascular: No carotid bruit.   Cardiovascular:      Rate and Rhythm: Normal rate and regular rhythm.      Pulses: Normal pulses.      Heart sounds: Normal heart sounds. No murmur heard.    No friction rub.   Pulmonary:      Effort: Pulmonary effort is normal. No respiratory distress.      Breath sounds: Normal breath sounds. No stridor. No wheezing.   Chest:      Chest wall: No tenderness.   Abdominal:      General: Bowel sounds are normal. There is distension.      Palpations: There is no mass.      Tenderness: There is no right CVA tenderness, left CVA tenderness or guarding.      Hernia: A hernia is present.      Comments: Distended. No tenderness to palpation  Small supraumbilical hernia   Musculoskeletal:         General: Swelling present. No tenderness or deformity. Normal range of motion.      Cervical back: Normal range of motion. No rigidity or tenderness.      Right lower leg: Edema present.      Left lower leg: Edema present.      Comments: Diffuse edema with some weeping noted in right upper extremity   Skin:     General: Skin is warm and dry.      Coloration: Skin is not jaundiced or pale.      Findings: No bruising or erythema.      Comments: Bruising noted on bilateral forearms   Neurological:      General: No focal deficit present.      Mental Status: She is alert and oriented to person, place, and time. Mental status is at baseline.      Motor: No weakness.      Coordination: Coordination normal.   Psychiatric:         Mood and Affect: Mood normal.         Behavior: Behavior  normal.         Thought Content: Thought content normal.       Significant Labs: All pertinent labs within the past 24 hours have been reviewed.  CBC:   Recent Labs   Lab 07/01/22  0534 07/02/22  0405   WBC 6.78 8.54   HGB 8.2* 8.6*   HCT 24.0* 25.9*   * 111*     CMP:   Recent Labs   Lab 07/01/22  0534 07/02/22  0405   * 136   K 3.6 3.9    102   CO2 25 27    121*   BUN 10 12   CREATININE 1.0 1.1   CALCIUM 8.1* 8.2*   PROT 5.5* 6.2   ALBUMIN 1.5* 1.7*   BILITOT 1.4* 1.9*   ALKPHOS 113 105   AST 36 42*   ALT 21 22   ANIONGAP 6* 7*   EGFRNONAA 58.4* 52.1*     Recent Lab Results         07/02/22  0405        Albumin 1.7       Alkaline Phosphatase 105       ALT 22       Anion Gap 7       AST 42       Baso # 0.05       Basophil % 0.6       BILIRUBIN TOTAL 1.9  Comment: For infants and newborns, interpretation of results should be based  on gestational age, weight and in agreement with clinical  observations.    Premature Infant recommended reference ranges:  Up to 24 hours.............<8.0 mg/dL  Up to 48 hours............<12.0 mg/dL  3-5 days..................<15.0 mg/dL  6-29 days.................<15.0 mg/dL         BUN 12       Calcium 8.2       Chloride 102       CO2 27       Creatinine 1.1       Differential Method Automated       eGFR if  >60.0       eGFR if non  52.1  Comment: Calculation used to obtain the estimated glomerular filtration  rate (eGFR) is the CKD-EPI equation.          Eos # 0.3       Eosinophil % 4.0       Glucose 121       Gran # (ANC) 5.3       Gran % 61.8       Hematocrit 25.9       Hemoglobin 8.6       Immature Grans (Abs) 0.02  Comment: Mild elevation in immature granulocytes is non specific and   can be seen in a variety of conditions including stress response,   acute inflammation, trauma and pregnancy. Correlation with other   laboratory and clinical findings is essential.         Immature Granulocytes 0.2       Lymph # 1.6        Lymph % 19.0       Magnesium 1.7       MCH 30.1       MCHC 33.2       MCV 91       Mono # 1.2       Mono % 14.4       MPV 9.6       nRBC 0       Phosphorus 3.4       Platelets 111       Potassium 3.9       PROTEIN TOTAL 6.2       RBC 2.86       RDW 18.6       Sodium 136       WBC 8.54               Significant Imaging: I have reviewed all pertinent imaging results/findings within the past 24 hours.      Assessment/Plan:      * Anasarca  67 yoF with alcoholic liver cirrhosis and hypertension admitted for generalized anasarca. Patient's albumin on admission was 1.9. Likely edema related to poor intravascular oncotic pressure 2/2 alcoholic cirrhosis. Patient adherent to home medication. Given history and exam, patient would benefit from diuresis. Dry weight reported as 170s. Patient is currently 198 lbs (down from 212 lbs).     - Better response. Continue Lasix 40 mg IV TID and d/c Diuril 500 mg IV   - f/u echo unremarkable  - strict I/Os  - daily weights  - low sodium diet with 1500 ml fluid restriction    Thrombocytopenia  Chronic, patient at baseline    Anemia, chronic disease  Patient with Hgb of 8.5 on admit. Upon review of care everywhere, patient had Hgb of 8.4 in all of 2022. Likely due to chronic disease and stable. Currently Hgb of 8.6    - continue to monitor    Alcoholic cirrhosis of liver with ascites  Patient was diagnosed in February. Previous history of chronic alcohol abuse and states she drank about 4-5 glasses of wine per night for over 20 years. Patient is seeing hepatology outpatient. Labs shown in care everywhere.     MELD-Na score: 18 at 6/30/2022  3:30 AM  MELD score: 15 at 6/30/2022  3:30 AM  Calculated from:  Serum Creatinine: 1.2 mg/dL at 6/30/2022  3:30 AM  Serum Sodium: 133 mmol/L at 6/30/2022  3:30 AM  Total Bilirubin: 1.2 mg/dL at 6/30/2022  3:30 AM  INR(ratio): 1.7 at 6/28/2022 12:42 AM  Age: 67 years    - consulted hepatology, appreciate recommendations for diuresis, labs, f/u  outpatient with her hepatologist  - continue lactulose, thiamine, and folate  - S/p paracentesis with 4.2L removed  - continue to monitor for signs of liver failure  - f/u daily CMPs    Primary hypertension  Patient was discontinued from losartan per hepatologist.     - continue to monitor and can add when clinically indicated    Deaf- written communication  ASL  used for interaction as well as written communication      VTE Risk Mitigation (From admission, onward)         Ordered     IP VTE HIGH RISK PATIENT  Once         06/28/22 0419     Place sequential compression device  Until discontinued         06/28/22 0419     Place sequential compression device  Until discontinued         06/28/22 0415                Discharge Planning   REMA: 7/3/2022     Code Status: Full Code   Is the patient medically ready for discharge?:     Reason for patient still in hospital (select all that apply): Treatment  Discharge Plan A: Home Health   Discharge Delays: None known at this time              Juan Miguel MD  Department of Hospital Medicine   [facility]

## 2022-07-02 NOTE — ASSESSMENT & PLAN NOTE
Patient with Hgb of 8.5 on admit. Upon review of care everywhere, patient had Hgb of 8.4 in all of 2022. Likely due to chronic disease and stable. Currently Hgb of 8.6    - continue to monitor

## 2022-07-02 NOTE — SUBJECTIVE & OBJECTIVE
Interval History: NAEON. Pt complained of gassiness and anal pain this AM. Possibly due to constipation/fecal impaction. Plan for patient to have BM and enema ordered.    Review of Systems   Constitutional:  Negative for chills, fatigue and fever.   Eyes:  Negative for photophobia, pain and visual disturbance.   Respiratory:  Negative for cough and shortness of breath.    Cardiovascular:  Negative for chest pain and leg swelling.   Gastrointestinal:  Positive for abdominal distention and rectal pain. Negative for abdominal pain, blood in stool, constipation, diarrhea, nausea and vomiting.   Endocrine: Negative for polyuria.   Genitourinary:  Negative for difficulty urinating, dysuria, flank pain and vaginal pain.   Musculoskeletal:  Negative for back pain and neck pain.        Swelling in all extremities   Skin:  Negative for color change and rash.   Neurological:  Negative for dizziness, weakness, light-headedness, numbness and headaches.   Psychiatric/Behavioral:  Negative for confusion and sleep disturbance.    Objective:     Vital Signs (Most Recent):  Temp: 98.1 °F (36.7 °C) (07/02/22 1211)  Pulse: 91 (07/02/22 1211)  Resp: 16 (07/02/22 1211)  BP: 137/61 (07/02/22 1211)  SpO2: 98 % (07/02/22 1211) Vital Signs (24h Range):  Temp:  [96.3 °F (35.7 °C)-98.6 °F (37 °C)] 98.1 °F (36.7 °C)  Pulse:  [85-99] 91  Resp:  [16-20] 16  SpO2:  [95 %-98 %] 98 %  BP: (121-148)/(55-68) 137/61     Weight: 90 kg (198 lb 6.6 oz)  Body mass index is 38.75 kg/m².    Intake/Output Summary (Last 24 hours) at 7/2/2022 1530  Last data filed at 7/2/2022 0900  Gross per 24 hour   Intake 100 ml   Output 2050 ml   Net -1950 ml      Physical Exam  Constitutional:       General: She is not in acute distress.     Appearance: She is obese. She is not ill-appearing or diaphoretic.   HENT:      Head: Normocephalic and atraumatic.      Mouth/Throat:      Mouth: Mucous membranes are moist.      Pharynx: Oropharynx is clear. No oropharyngeal exudate  or posterior oropharyngeal erythema.   Eyes:      General: No scleral icterus.     Extraocular Movements: Extraocular movements intact.   Neck:      Vascular: No carotid bruit.   Cardiovascular:      Rate and Rhythm: Normal rate and regular rhythm.      Pulses: Normal pulses.      Heart sounds: Normal heart sounds. No murmur heard.    No friction rub.   Pulmonary:      Effort: Pulmonary effort is normal. No respiratory distress.      Breath sounds: Normal breath sounds. No stridor. No wheezing.   Chest:      Chest wall: No tenderness.   Abdominal:      General: Bowel sounds are normal. There is distension.      Palpations: There is no mass.      Tenderness: There is no right CVA tenderness, left CVA tenderness or guarding.      Hernia: A hernia is present.      Comments: Distended. No tenderness to palpation  Small supraumbilical hernia   Musculoskeletal:         General: Swelling present. No tenderness or deformity. Normal range of motion.      Cervical back: Normal range of motion. No rigidity or tenderness.      Right lower leg: Edema present.      Left lower leg: Edema present.      Comments: Diffuse edema with some weeping noted in right upper extremity   Skin:     General: Skin is warm and dry.      Coloration: Skin is not jaundiced or pale.      Findings: No bruising or erythema.      Comments: Bruising noted on bilateral forearms   Neurological:      General: No focal deficit present.      Mental Status: She is alert and oriented to person, place, and time. Mental status is at baseline.      Motor: No weakness.      Coordination: Coordination normal.   Psychiatric:         Mood and Affect: Mood normal.         Behavior: Behavior normal.         Thought Content: Thought content normal.       Significant Labs: All pertinent labs within the past 24 hours have been reviewed.  CBC:   Recent Labs   Lab 07/01/22  0534 07/02/22  0405   WBC 6.78 8.54   HGB 8.2* 8.6*   HCT 24.0* 25.9*   * 111*     CMP:   Recent  Labs   Lab 07/01/22  0534 07/02/22  0405   * 136   K 3.6 3.9    102   CO2 25 27    121*   BUN 10 12   CREATININE 1.0 1.1   CALCIUM 8.1* 8.2*   PROT 5.5* 6.2   ALBUMIN 1.5* 1.7*   BILITOT 1.4* 1.9*   ALKPHOS 113 105   AST 36 42*   ALT 21 22   ANIONGAP 6* 7*   EGFRNONAA 58.4* 52.1*     Recent Lab Results         07/02/22  0405        Albumin 1.7       Alkaline Phosphatase 105       ALT 22       Anion Gap 7       AST 42       Baso # 0.05       Basophil % 0.6       BILIRUBIN TOTAL 1.9  Comment: For infants and newborns, interpretation of results should be based  on gestational age, weight and in agreement with clinical  observations.    Premature Infant recommended reference ranges:  Up to 24 hours.............<8.0 mg/dL  Up to 48 hours............<12.0 mg/dL  3-5 days..................<15.0 mg/dL  6-29 days.................<15.0 mg/dL         BUN 12       Calcium 8.2       Chloride 102       CO2 27       Creatinine 1.1       Differential Method Automated       eGFR if  >60.0       eGFR if non  52.1  Comment: Calculation used to obtain the estimated glomerular filtration  rate (eGFR) is the CKD-EPI equation.          Eos # 0.3       Eosinophil % 4.0       Glucose 121       Gran # (ANC) 5.3       Gran % 61.8       Hematocrit 25.9       Hemoglobin 8.6       Immature Grans (Abs) 0.02  Comment: Mild elevation in immature granulocytes is non specific and   can be seen in a variety of conditions including stress response,   acute inflammation, trauma and pregnancy. Correlation with other   laboratory and clinical findings is essential.         Immature Granulocytes 0.2       Lymph # 1.6       Lymph % 19.0       Magnesium 1.7       MCH 30.1       MCHC 33.2       MCV 91       Mono # 1.2       Mono % 14.4       MPV 9.6       nRBC 0       Phosphorus 3.4       Platelets 111       Potassium 3.9       PROTEIN TOTAL 6.2       RBC 2.86       RDW 18.6       Sodium 136       WBC 8.54                Significant Imaging: I have reviewed all pertinent imaging results/findings within the past 24 hours.

## 2022-07-02 NOTE — ASSESSMENT & PLAN NOTE
67 yoF with alcoholic liver cirrhosis and hypertension admitted for generalized anasarca. Patient's albumin on admission was 1.9. Likely edema related to poor intravascular oncotic pressure 2/2 alcoholic cirrhosis. Patient adherent to home medication. Given history and exam, patient would benefit from diuresis. Dry weight reported as 170s. Patient is currently 198 lbs (down from 212 lbs).     - Better response. Continue Lasix 40 mg IV TID and d/c Diuril 500 mg IV   - f/u echo unremarkable  - strict I/Os  - daily weights  - low sodium diet with 1500 ml fluid restriction

## 2022-07-03 PROBLEM — R53.81 DEBILITY: Status: ACTIVE | Noted: 2022-01-01

## 2022-07-03 NOTE — PROGRESS NOTES
[facility] Medicine  Progress Note    Patient Name: Aleyda Floyd  MRN: [id number]  Patient Class: IP- Inpatient   Admission Date: 6/27/2022  Length of Stay: 5 days  Attending Physician: Jessenia Foster MD  Primary Care Provider: Elvie Fernandes MD        Subjective:     Principal Problem:Anasarca        HPI:  This is a 67 year old deaf lady with alcoholic cirrhosis, and hypertension who presented with generalized swelling.  used to obtain history. Patient states that she had the swelling since her diagnosis of cirrhosis in February. Patient states that 2 days ago, she was able to ambulate but with some difficulty due to the swelling but now  worsening to the point where she is unable to ambulate by herself. Of note, patient had a recent EGD performed earlier last week where they had issues with her IV resulting is significant bruising. Patient also notes that whenever she scratches her skin with her nails, she notices clear fluid coming from the cuts. Patient denies fever, chills, cough, hemoptysis, nausea, vomiting, abdominal pain, yellowing of skin, constipation, dysuria, hematuria, and black/ bloody stools. Patient has baseline loose stools from her lactulose.  She reports compliance with all medications including her lactulose and Lasix. Patient and family declined any confusion or altered mental status. She denies any recent alcohol use with last use in February when she was diagnosed with alcoholic cirrhosis. Patient states that she used to weigh around 170s which she believes is her dry weight and is currently in the 200s.     Upon chart review, patient had an echo at an OSH on 2/25/22 which showed EF 60-65% and some diastolic dysfunction.    In the ED, patient was found to have Hgb of 8.5 and platelets of 113 around baseline. Patient had a UA which showed 1+ leuks and many bacteria. Patient did not complain of dysuria.       Overview/Hospital Course:  Admitted for  decompensated cirrhosis and anasarca. Began diuresing with IV Lasix and spironolactone. Hepatology involved in care. Paracentesis of -4.2L; ruled out SBP. Increasing diuresis with Diuril. D/c diuril but continue IV lasix. Repleated K and Mag. 1 BM 7/1 enema ordered 7/2. Prior to enema pt spontaneously had 1 BM with brown stool and bright red streaks of blood. Denied rectal pain. Inadequate response with lasix PO so started on Bumex 2 mg TID.      Interval History:  Overnight, patient reportedly had one bloody bowel movement, however she reports noticing bright red streaks of blood with brown stool. Denied rectal pain, but complained of L arm swelling which did not have much improvement from day prior.    Review of Systems   Constitutional:  Negative for chills, fatigue and fever.   Eyes:  Negative for photophobia, pain and visual disturbance.   Respiratory:  Negative for cough and shortness of breath.    Cardiovascular:  Negative for chest pain and leg swelling.   Gastrointestinal:  Positive for abdominal distention and rectal pain. Negative for abdominal pain, blood in stool, constipation, diarrhea, nausea and vomiting.        Brown BM with streaks of red blood   Endocrine: Negative for polyuria.   Genitourinary:  Negative for difficulty urinating, dysuria, flank pain and vaginal pain.   Musculoskeletal:  Negative for back pain and neck pain.        Swelling in all extremities   Skin:  Negative for color change and rash.   Neurological:  Negative for dizziness, weakness, light-headedness, numbness and headaches.   Psychiatric/Behavioral:  Negative for confusion and sleep disturbance.    Objective:     Vital Signs (Most Recent):  Temp: 98 °F (36.7 °C) (07/03/22 1538)  Pulse: 79 (07/03/22 1538)  Resp: 16 (07/03/22 1538)  BP: 127/60 (07/03/22 1538)  SpO2: 98 % (07/03/22 1538) Vital Signs (24h Range):  Temp:  [96.1 °F (35.6 °C)-98.7 °F (37.1 °C)] 98 °F (36.7 °C)  Pulse:  [79-95] 79  Resp:  [16-20] 16  SpO2:  [94 %-98  %] 98 %  BP: (127-158)/(60-70) 127/60     Weight: 89.1 kg (196 lb 6.9 oz)  Body mass index is 38.36 kg/m².    Intake/Output Summary (Last 24 hours) at 7/3/2022 1629  Last data filed at 7/3/2022 0516  Gross per 24 hour   Intake 120 ml   Output 901 ml   Net -781 ml      Physical Exam  Vitals reviewed.   Constitutional:       General: She is not in acute distress.     Appearance: She is obese. She is not ill-appearing or diaphoretic.   HENT:      Head: Normocephalic and atraumatic.      Mouth/Throat:      Mouth: Mucous membranes are moist.      Pharynx: Oropharynx is clear. No oropharyngeal exudate or posterior oropharyngeal erythema.   Eyes:      General: No scleral icterus.     Extraocular Movements: Extraocular movements intact.   Neck:      Vascular: No carotid bruit.   Cardiovascular:      Rate and Rhythm: Normal rate and regular rhythm.      Pulses: Normal pulses.      Heart sounds: Normal heart sounds. No murmur heard.    No friction rub.   Pulmonary:      Effort: Pulmonary effort is normal. No respiratory distress.      Breath sounds: Normal breath sounds. No stridor. No wheezing.   Chest:      Chest wall: No tenderness.   Abdominal:      General: Bowel sounds are normal. There is distension.      Palpations: There is no mass.      Tenderness: There is no right CVA tenderness, left CVA tenderness or guarding.      Hernia: A hernia is present.      Comments: Distended. No tenderness to palpation  Small supraumbilical hernia   Musculoskeletal:         General: Swelling present. No tenderness or deformity. Normal range of motion.      Cervical back: Normal range of motion. No rigidity or tenderness.      Right lower leg: Edema present.      Left lower leg: Edema present.      Comments: Diffuse edema   L arm worse than right   Skin:     General: Skin is warm and dry.      Coloration: Skin is not jaundiced or pale.      Findings: No bruising or erythema.      Comments: Bruising noted on bilateral forearms    Neurological:      General: No focal deficit present.      Mental Status: She is alert and oriented to person, place, and time. Mental status is at baseline.      Motor: No weakness.      Coordination: Coordination normal.   Psychiatric:         Mood and Affect: Mood normal.         Behavior: Behavior normal.         Thought Content: Thought content normal.       Significant Labs: All pertinent labs within the past 24 hours have been reviewed.  CBC:   Recent Labs   Lab 07/02/22  0405 07/03/22  0238   WBC 8.54 8.75   HGB 8.6* 8.1*   HCT 25.9* 24.5*   * 101*     CMP:   Recent Labs   Lab 07/02/22  0405 07/03/22  0238    135*   K 3.9 4.0    102   CO2 27 26   * 106   BUN 12 14   CREATININE 1.1 1.2   CALCIUM 8.2* 8.1*   PROT 6.2 5.9*   ALBUMIN 1.7* 1.6*   BILITOT 1.9* 2.0*   ALKPHOS 105 88   AST 42* 41*   ALT 22 21   ANIONGAP 7* 7*   EGFRNONAA 52.1* 46.9*     Recent Lab Results         07/03/22 0238        Albumin 1.6       Alkaline Phosphatase 88       ALT 21       Anion Gap 7       AST 41       Baso # 0.04       Basophil % 0.5       BILIRUBIN TOTAL 2.0  Comment: For infants and newborns, interpretation of results should be based  on gestational age, weight and in agreement with clinical  observations.    Premature Infant recommended reference ranges:  Up to 24 hours.............<8.0 mg/dL  Up to 48 hours............<12.0 mg/dL  3-5 days..................<15.0 mg/dL  6-29 days.................<15.0 mg/dL         BUN 14       Calcium 8.1       Chloride 102       CO2 26       Creatinine 1.2       Differential Method Automated       eGFR if African American 54.0       eGFR if non  46.9  Comment: Calculation used to obtain the estimated glomerular filtration  rate (eGFR) is the CKD-EPI equation.          Eos # 0.5       Eosinophil % 5.5       Glucose 106       Gran # (ANC) 4.7       Gran % 53.9       Hematocrit 24.5       Hemoglobin 8.1       Immature Grans (Abs) 0.03  Comment:  Mild elevation in immature granulocytes is non specific and   can be seen in a variety of conditions including stress response,   acute inflammation, trauma and pregnancy. Correlation with other   laboratory and clinical findings is essential.         Immature Granulocytes 0.3       Lymph # 2.0       Lymph % 22.7       Magnesium 1.6       MCH 29.7       MCHC 33.1       MCV 90       Mono # 1.5       Mono % 17.1       MPV 9.9       nRBC 0       Phosphorus 3.1       Platelets 101       Potassium 4.0       PROTEIN TOTAL 5.9       RBC 2.73       RDW 18.6       Sodium 135       WBC 8.75               Significant Imaging: I have reviewed all pertinent imaging results/findings within the past 24 hours.      Assessment/Plan:      * Anasarca  67 yoF with alcoholic liver cirrhosis and hypertension admitted for generalized anasarca. Patient's albumin on admission was 1.9. Likely edema related to poor intravascular oncotic pressure 2/2 alcoholic cirrhosis. Patient adherent to home medication. Given history and exam, patient would benefit from diuresis. Dry weight reported as 170s. Patient is currently 198 lbs (down from 212 lbs).     - Better response. d/c Diuril 500 mg IV   - f/u echo unremarkable  - strict I/Os  - daily weights  - low sodium diet with 1500 ml fluid restriction  - Lasix 40 mg IV TID switched to 80 mg PO TID on 7/2 however inadequate response, so dc'd lasix and started Bumex 2 mg TID    Debility  -Pt non-mobile for some time in hospital, so PT/OT order placed      Thrombocytopenia  Chronic, patient at baseline    Anemia, chronic disease  Patient with Hgb of 8.5 on admit. Upon review of care everywhere, patient had Hgb of 8.4 in all of 2022. Likely due to chronic disease and stable. Currently Hgb of 8.1    -Bloody Bm but not too concerned about GI bleed  -Attempted to check for rectal bleeding but patient was sleeping, will revisit in the am  - continue to monitor    Alcoholic cirrhosis of liver with  ascites  Patient was diagnosed in February. Previous history of chronic alcohol abuse and states she drank about 4-5 glasses of wine per night for over 20 years. Patient is seeing hepatology outpatient. Labs shown in care everywhere.     MELD-Na score: 18 at 6/30/2022  3:30 AM  MELD score: 15 at 6/30/2022  3:30 AM  Calculated from:  Serum Creatinine: 1.2 mg/dL at 6/30/2022  3:30 AM  Serum Sodium: 133 mmol/L at 6/30/2022  3:30 AM  Total Bilirubin: 1.2 mg/dL at 6/30/2022  3:30 AM  INR(ratio): 1.7 at 6/28/2022 12:42 AM  Age: 67 years    - consulted hepatology, appreciate recommendations for diuresis, labs, f/u outpatient with her hepatologist  - continue lactulose, thiamine, and folate  - S/p paracentesis with 4.2L removed  - continue to monitor for signs of liver failure  - f/u daily CMPs    Primary hypertension  Patient was discontinued from losartan per hepatologist.     - continue to monitor and can add when clinically indicated    Deaf- written communication  ASL  used for interaction as well as written communication      VTE Risk Mitigation (From admission, onward)         Ordered     enoxaparin injection 40 mg  Daily         07/02/22 1613     IP VTE HIGH RISK PATIENT  Once         06/28/22 0419     Place sequential compression device  Until discontinued         06/28/22 0419     Place sequential compression device  Until discontinued         06/28/22 0415                Discharge Planning   REMA: 7/4/2022     Code Status: Full Code   Is the patient medically ready for discharge?:     Reason for patient still in hospital (select all that apply): Treatment  Discharge Plan A: Home Health   Discharge Delays: None known at this time              Juan Miguel MD  Department of Hospital Medicine   [facility]

## 2022-07-03 NOTE — SUBJECTIVE & OBJECTIVE
Interval History:  Overnight, patient reportedly had one bloody bowel movement, however she reports noticing bright red streaks of blood with brown stool. Denied rectal pain, but complained of L arm swelling which did not have much improvement from day prior.    Review of Systems   Constitutional:  Negative for chills, fatigue and fever.   Eyes:  Negative for photophobia, pain and visual disturbance.   Respiratory:  Negative for cough and shortness of breath.    Cardiovascular:  Negative for chest pain and leg swelling.   Gastrointestinal:  Positive for abdominal distention and rectal pain. Negative for abdominal pain, blood in stool, constipation, diarrhea, nausea and vomiting.        Brown BM with streaks of red blood   Endocrine: Negative for polyuria.   Genitourinary:  Negative for difficulty urinating, dysuria, flank pain and vaginal pain.   Musculoskeletal:  Negative for back pain and neck pain.        Swelling in all extremities   Skin:  Negative for color change and rash.   Neurological:  Negative for dizziness, weakness, light-headedness, numbness and headaches.   Psychiatric/Behavioral:  Negative for confusion and sleep disturbance.    Objective:     Vital Signs (Most Recent):  Temp: 98 °F (36.7 °C) (07/03/22 1538)  Pulse: 79 (07/03/22 1538)  Resp: 16 (07/03/22 1538)  BP: 127/60 (07/03/22 1538)  SpO2: 98 % (07/03/22 1538) Vital Signs (24h Range):  Temp:  [96.1 °F (35.6 °C)-98.7 °F (37.1 °C)] 98 °F (36.7 °C)  Pulse:  [79-95] 79  Resp:  [16-20] 16  SpO2:  [94 %-98 %] 98 %  BP: (127-158)/(60-70) 127/60     Weight: 89.1 kg (196 lb 6.9 oz)  Body mass index is 38.36 kg/m².    Intake/Output Summary (Last 24 hours) at 7/3/2022 1628  Last data filed at 7/3/2022 0516  Gross per 24 hour   Intake 120 ml   Output 901 ml   Net -781 ml      Physical Exam  Vitals reviewed.   Constitutional:       General: She is not in acute distress.     Appearance: She is obese. She is not ill-appearing or diaphoretic.   HENT:       Head: Normocephalic and atraumatic.      Mouth/Throat:      Mouth: Mucous membranes are moist.      Pharynx: Oropharynx is clear. No oropharyngeal exudate or posterior oropharyngeal erythema.   Eyes:      General: No scleral icterus.     Extraocular Movements: Extraocular movements intact.   Neck:      Vascular: No carotid bruit.   Cardiovascular:      Rate and Rhythm: Normal rate and regular rhythm.      Pulses: Normal pulses.      Heart sounds: Normal heart sounds. No murmur heard.    No friction rub.   Pulmonary:      Effort: Pulmonary effort is normal. No respiratory distress.      Breath sounds: Normal breath sounds. No stridor. No wheezing.   Chest:      Chest wall: No tenderness.   Abdominal:      General: Bowel sounds are normal. There is distension.      Palpations: There is no mass.      Tenderness: There is no right CVA tenderness, left CVA tenderness or guarding.      Hernia: A hernia is present.      Comments: Distended. No tenderness to palpation  Small supraumbilical hernia   Musculoskeletal:         General: Swelling present. No tenderness or deformity. Normal range of motion.      Cervical back: Normal range of motion. No rigidity or tenderness.      Right lower leg: Edema present.      Left lower leg: Edema present.      Comments: Diffuse edema   L arm worse than right   Skin:     General: Skin is warm and dry.      Coloration: Skin is not jaundiced or pale.      Findings: No bruising or erythema.      Comments: Bruising noted on bilateral forearms   Neurological:      General: No focal deficit present.      Mental Status: She is alert and oriented to person, place, and time. Mental status is at baseline.      Motor: No weakness.      Coordination: Coordination normal.   Psychiatric:         Mood and Affect: Mood normal.         Behavior: Behavior normal.         Thought Content: Thought content normal.       Significant Labs: All pertinent labs within the past 24 hours have been reviewed.  CBC:    Recent Labs   Lab 07/02/22  0405 07/03/22  0238   WBC 8.54 8.75   HGB 8.6* 8.1*   HCT 25.9* 24.5*   * 101*     CMP:   Recent Labs   Lab 07/02/22 0405 07/03/22 0238    135*   K 3.9 4.0    102   CO2 27 26   * 106   BUN 12 14   CREATININE 1.1 1.2   CALCIUM 8.2* 8.1*   PROT 6.2 5.9*   ALBUMIN 1.7* 1.6*   BILITOT 1.9* 2.0*   ALKPHOS 105 88   AST 42* 41*   ALT 22 21   ANIONGAP 7* 7*   EGFRNONAA 52.1* 46.9*     Recent Lab Results         07/03/22 0238        Albumin 1.6       Alkaline Phosphatase 88       ALT 21       Anion Gap 7       AST 41       Baso # 0.04       Basophil % 0.5       BILIRUBIN TOTAL 2.0  Comment: For infants and newborns, interpretation of results should be based  on gestational age, weight and in agreement with clinical  observations.    Premature Infant recommended reference ranges:  Up to 24 hours.............<8.0 mg/dL  Up to 48 hours............<12.0 mg/dL  3-5 days..................<15.0 mg/dL  6-29 days.................<15.0 mg/dL         BUN 14       Calcium 8.1       Chloride 102       CO2 26       Creatinine 1.2       Differential Method Automated       eGFR if African American 54.0       eGFR if non  46.9  Comment: Calculation used to obtain the estimated glomerular filtration  rate (eGFR) is the CKD-EPI equation.          Eos # 0.5       Eosinophil % 5.5       Glucose 106       Gran # (ANC) 4.7       Gran % 53.9       Hematocrit 24.5       Hemoglobin 8.1       Immature Grans (Abs) 0.03  Comment: Mild elevation in immature granulocytes is non specific and   can be seen in a variety of conditions including stress response,   acute inflammation, trauma and pregnancy. Correlation with other   laboratory and clinical findings is essential.         Immature Granulocytes 0.3       Lymph # 2.0       Lymph % 22.7       Magnesium 1.6       MCH 29.7       MCHC 33.1       MCV 90       Mono # 1.5       Mono % 17.1       MPV 9.9       nRBC 0       Phosphorus  3.1       Platelets 101       Potassium 4.0       PROTEIN TOTAL 5.9       RBC 2.73       RDW 18.6       Sodium 135       WBC 8.75               Significant Imaging: I have reviewed all pertinent imaging results/findings within the past 24 hours.

## 2022-07-03 NOTE — TREATMENT PLAN
Hepatology Treatment Plan    Aleyda Floyd is a 67 y.o. female admitted to hospital 6/27/2022 (Hospital Day: 7) due to Anasarca.     Interval History  Overnight, patient reportedly had one bloody bowel movement, however from speaking to patient at bedside she reports experiencing noticing bright red streaks of blood with brown stool. She does not typically look at her stools and is unsure if she's experienced prior episodes; she denies abdominal or rectal pain, nausea, vomiting, dizziness, or lightheadedness. She reports improving extremity swelling since admission. Vital signs normal on room air. Labs notable for stable normocytic anemia (Hgb 8.1 from 8.6) and normal LFT's. On review of I/O's patient is down 6 kg since admission.     Objective  Temp:  [96.1 °F (35.6 °C)-98.7 °F (37.1 °C)] 96.4 °F (35.8 °C) (07/03 1128)  Pulse:  [79-95] 79 (07/03 1128)  BP: (132-158)/(60-70) 137/64 (07/03 1128)  Resp:  [16-20] 16 (07/03 1128)  SpO2:  [94 %-98 %] 98 % (07/03 1128)    General: Alert, Oriented x3, no distress  Neurologic: Asterixis absent  Abdomen: Normoactive bowel sounds. Non-distended. Normal tympany. Soft. Non-tender. No peritoneal signs.    Laboratory    Lab Results   Component Value Date    WBC 8.75 07/03/2022    HGB 8.1 (L) 07/03/2022    HCT 24.5 (L) 07/03/2022    MCV 90 07/03/2022     (L) 07/03/2022       Lab Results   Component Value Date     (L) 07/03/2022    K 4.0 07/03/2022     07/03/2022    CO2 26 07/03/2022    BUN 14 07/03/2022    CREATININE 1.2 07/03/2022    CALCIUM 8.1 (L) 07/03/2022       Lab Results   Component Value Date    ALBUMIN 1.6 (L) 07/03/2022    ALT 21 07/03/2022    AST 41 (H) 07/03/2022    ALKPHOS 88 07/03/2022    BILITOT 2.0 (H) 07/03/2022       Lab Results   Component Value Date    INR 1.7 (H) 06/28/2022    INR 1.7 (H) 06/23/2022    INR 1.8 (H) 05/05/2022       MELD-Na score: 18 at 6/30/2022  3:30 AM  MELD score: 15 at 6/30/2022  3:30 AM  Calculated from:  Serum  Creatinine: 1.2 mg/dL at 6/30/2022  3:30 AM  Serum Sodium: 133 mmol/L at 6/30/2022  3:30 AM  Total Bilirubin: 1.2 mg/dL at 6/30/2022  3:30 AM  INR(ratio): 1.7 at 6/28/2022 12:42 AM  Age: 67 years    Assessment  This is a 67 year old deaf female with PMH significant for ETOH cirrhosis (associated with ascites) who was admitted to [facility] on 06/27 for management of diffuse volume overload. She is status post paracentesis on admission with fluid studies negative for SBP. She has no evidence of DVT or CHF on ECHO contributing to edema. She is status post initiation of IV diuresis with improvement in volume status and weight since admission. She reportedly had an episode of hematochezia on 07/02, however without change in Hgb; etiology likely hemorrhoidal.     Recommendations  - Continue diuresis with Lasix and Spironolactone 100 mg.   - Continue Lactulose TID and Rifaximin BID to maintain 3 soft bowel movements daily.   - Strict I/O's and daily standing weights.   - Low sodium diet and 1.5L fluid restriction.   - Please obtain daily CBC, BMP, LFT, INR      Thank you for involving us in the care of Aleyda Floyd. Please call with any additional concerns or questions.    Gamaliel Estes MD, PGY-V  Gastroenterology Fellow  [facility]

## 2022-07-03 NOTE — ASSESSMENT & PLAN NOTE
67 yoF with alcoholic liver cirrhosis and hypertension admitted for generalized anasarca. Patient's albumin on admission was 1.9. Likely edema related to poor intravascular oncotic pressure 2/2 alcoholic cirrhosis. Patient adherent to home medication. Given history and exam, patient would benefit from diuresis. Dry weight reported as 170s. Patient is currently 198 lbs (down from 212 lbs).     - Better response. d/c Diuril 500 mg IV   - f/u echo unremarkable  - strict I/Os  - daily weights  - low sodium diet with 1500 ml fluid restriction  - Lasix 40 mg IV TID switched to 80 mg PO TID on 7/2 however inadequate response, so dc'd lasix and started Bumex 2 mg TID

## 2022-07-03 NOTE — ASSESSMENT & PLAN NOTE
Patient with Hgb of 8.5 on admit. Upon review of care everywhere, patient had Hgb of 8.4 in all of 2022. Likely due to chronic disease and stable. Currently Hgb of 8.1    -Bloody Bm but not too concerned about GI bleed  -Attempted to check for rectal bleeding but patient was sleeping, will revisit in the am  - continue to monitor

## 2022-07-04 NOTE — ASSESSMENT & PLAN NOTE
67 yoF with alcoholic liver cirrhosis and hypertension admitted for generalized anasarca. Patient's albumin on admission was 1.9. Likely edema related to poor intravascular oncotic pressure 2/2 alcoholic cirrhosis. Patient adherent to home medication. Given history and exam, patient would benefit from diuresis. Dry weight reported as 170s. Patient is currently 198 lbs (down from 212 lbs).     - Better response. d/c Diuril 500 mg IV   - f/u echo unremarkable  - strict I/Os  - daily weights  - low sodium diet with 1500 ml fluid restriction  - Lasix 40 mg IV TID switched to 80 mg PO TID on 7/2 however inadequate response, so dc'd lasix and started Bumex 2 mg TID  - Changed Bumex to 2mg BID and increased spironolactone to 150mg qd

## 2022-07-04 NOTE — PROGRESS NOTES
[facility]  Progress Note    Patient Name: Aleyda Floyd  MRN: [id number]  Patient Class: IP- Inpatient   Admission Date: 6/27/2022  Length of Stay: 6 days  Attending Physician: Jessenia Foster MD  Primary Care Provider: Elvie Fernandes MD        Subjective:     Principal Problem:Anasarca        HPI:  This is a 67 year old deaf lady with alcoholic cirrhosis, and hypertension who presented with generalized swelling.  used to obtain history. Patient states that she had the swelling since her diagnosis of cirrhosis in February. Patient states that 2 days ago, she was able to ambulate but with some difficulty due to the swelling but now  worsening to the point where she is unable to ambulate by herself. Of note, patient had a recent EGD performed earlier last week where they had issues with her IV resulting is significant bruising. Patient also notes that whenever she scratches her skin with her nails, she notices clear fluid coming from the cuts. Patient denies fever, chills, cough, hemoptysis, nausea, vomiting, abdominal pain, yellowing of skin, constipation, dysuria, hematuria, and black/ bloody stools. Patient has baseline loose stools from her lactulose.  She reports compliance with all medications including her lactulose and Lasix. Patient and family declined any confusion or altered mental status. She denies any recent alcohol use with last use in February when she was diagnosed with alcoholic cirrhosis. Patient states that she used to weigh around 170s which she believes is her dry weight and is currently in the 200s.     Upon chart review, patient had an echo at an OSH on 2/25/22 which showed EF 60-65% and some diastolic dysfunction.    In the ED, patient was found to have Hgb of 8.5 and platelets of 113 around baseline. Patient had a UA which showed 1+ leuks and many bacteria. Patient did not complain of dysuria.       Overview/Hospital Course:  Admitted for  decompensated cirrhosis and anasarca. Began diuresing with IV Lasix and spironolactone. Hepatology involved in care. Paracentesis of -4.2L; ruled out SBP. Increasing diuresis with Diuril. D/c diuril but continue IV lasix. Repleated K and Mag. 1 BM 7/1 enema ordered 7/2. Prior to enema pt spontaneously had 1 BM with brown stool and bright red streaks of blood. Denied rectal pain. Inadequate response with lasix PO so started on Bumex 2 mg TID. Edema stable. Will attempt to deescalate Bumex to 2mg BID and increase spironolactone to 150mg po qd. OT/PT consulted and hope for d/c home soon      Interval History: NAEON. Pt denies pain but did complain of rash possibly related to catheter. Counseled pt on what is appropriate for fluid intake post d/c.    Review of Systems   Constitutional:  Negative for chills, fatigue and fever.   Eyes:  Negative for photophobia, pain and visual disturbance.   Respiratory:  Negative for cough and shortness of breath.    Cardiovascular:  Negative for chest pain and leg swelling.   Gastrointestinal:  Positive for abdominal distention. Negative for abdominal pain, blood in stool, constipation, diarrhea, nausea, rectal pain and vomiting.   Endocrine: Negative for polyuria.   Genitourinary:  Negative for difficulty urinating, dysuria, flank pain and vaginal pain.   Musculoskeletal:  Negative for back pain and neck pain.        Swelling in all extremities   Skin:  Negative for color change and rash.   Neurological:  Negative for dizziness, weakness, light-headedness, numbness and headaches.   Psychiatric/Behavioral:  Negative for confusion and sleep disturbance.    Objective:     Vital Signs (Most Recent):  Temp: 98.1 °F (36.7 °C) (07/04/22 1122)  Pulse: 80 (07/04/22 1122)  Resp: 16 (07/04/22 1122)  BP: 134/60 (07/04/22 1122)  SpO2: 97 % (07/04/22 1122) Vital Signs (24h Range):  Temp:  [97.6 °F (36.4 °C)-98.1 °F (36.7 °C)] 98.1 °F (36.7 °C)  Pulse:  [79-90] 80  Resp:  [16-20] 16  SpO2:  [93 %-98  %] 97 %  BP: (127-157)/(60-64) 134/60     Weight: 89.6 kg (197 lb 8.5 oz)  Body mass index is 38.58 kg/m².    Intake/Output Summary (Last 24 hours) at 7/4/2022 1151  Last data filed at 7/4/2022 0700  Gross per 24 hour   Intake 200 ml   Output 1000 ml   Net -800 ml      Physical Exam  Vitals reviewed.   Constitutional:       General: She is not in acute distress.     Appearance: She is obese. She is not ill-appearing or diaphoretic.   HENT:      Head: Normocephalic and atraumatic.      Mouth/Throat:      Mouth: Mucous membranes are moist.      Pharynx: Oropharynx is clear. No oropharyngeal exudate or posterior oropharyngeal erythema.   Eyes:      General: No scleral icterus.     Extraocular Movements: Extraocular movements intact.   Neck:      Vascular: No carotid bruit.   Cardiovascular:      Rate and Rhythm: Normal rate and regular rhythm.      Pulses: Normal pulses.      Heart sounds: Normal heart sounds. No murmur heard.    No friction rub.   Pulmonary:      Effort: Pulmonary effort is normal. No respiratory distress.      Breath sounds: Normal breath sounds. No stridor. No wheezing.   Chest:      Chest wall: No tenderness.   Abdominal:      General: Bowel sounds are normal. There is distension.      Palpations: There is no mass.      Tenderness: There is no right CVA tenderness, left CVA tenderness or guarding.      Hernia: A hernia is present.      Comments: Distended. No tenderness to palpation  Small supraumbilical hernia   Musculoskeletal:         General: Swelling present. No tenderness or deformity. Normal range of motion.      Cervical back: Normal range of motion. No rigidity or tenderness.      Right lower leg: Edema present.      Left lower leg: Edema present.      Comments: Diffuse edema   L arm worse than right   Skin:     General: Skin is warm and dry.      Coloration: Skin is not jaundiced or pale.      Findings: No bruising or erythema.      Comments: Bruising noted on bilateral forearms    Neurological:      General: No focal deficit present.      Mental Status: She is alert and oriented to person, place, and time. Mental status is at baseline.      Motor: No weakness.      Coordination: Coordination normal.   Psychiatric:         Mood and Affect: Mood normal.         Behavior: Behavior normal.         Thought Content: Thought content normal.       Significant Labs: All pertinent labs within the past 24 hours have been reviewed.  CBC:   Recent Labs   Lab 07/03/22 0238 07/04/22  0533   WBC 8.75 8.40   HGB 8.1* 8.3*   HCT 24.5* 23.9*   * 94*     CMP:   Recent Labs   Lab 07/03/22  0238 07/04/22  0533   * 132*   K 4.0 4.2    101   CO2 26 26    105   BUN 14 15   CREATININE 1.2 1.2   CALCIUM 8.1* 8.2*   PROT 5.9* 6.1   ALBUMIN 1.6* 1.7*   BILITOT 2.0* 2.0*   ALKPHOS 88 89   AST 41* 43*   ALT 21 24   ANIONGAP 7* 5*   EGFRNONAA 46.9* 46.9*     Recent Lab Results         07/04/22  0533   07/03/22  2323        Albumin 1.7         Alkaline Phosphatase 89         ALT 24         Anion Gap 5         AST 43         Baso # 0.08         Basophil % 1.0         BILIRUBIN TOTAL 2.0  Comment: For infants and newborns, interpretation of results should be based  on gestational age, weight and in agreement with clinical  observations.    Premature Infant recommended reference ranges:  Up to 24 hours.............<8.0 mg/dL  Up to 48 hours............<12.0 mg/dL  3-5 days..................<15.0 mg/dL  6-29 days.................<15.0 mg/dL           BUN 15         Calcium 8.2         Chloride 101         CO2 26         Creatinine 1.2         Differential Method Automated         eGFR if  54.0         eGFR if non  46.9  Comment: Calculation used to obtain the estimated glomerular filtration  rate (eGFR) is the CKD-EPI equation.            Eos # 0.5         Eosinophil % 5.6         Glucose 105         Gran # (ANC) 4.2         Gran % 50.4         Hematocrit 23.9          Hemoglobin 8.3         Immature Grans (Abs) 0.04  Comment: Mild elevation in immature granulocytes is non specific and   can be seen in a variety of conditions including stress response,   acute inflammation, trauma and pregnancy. Correlation with other   laboratory and clinical findings is essential.           Immature Granulocytes 0.5         Lymph # 2.0         Lymph % 24.0         Magnesium 1.6         MCH 30.2         MCHC 34.7         MCV 87         Mono # 1.6         Mono % 18.5         MPV 10.4         nRBC 0         Phosphorus 3.0         Platelets 94         POCT Glucose   111       Potassium 4.2         PROTEIN TOTAL 6.1         RBC 2.75         RDW 18.0         Sodium 132         WBC 8.40                 Significant Imaging: I have reviewed all pertinent imaging results/findings within the past 24 hours.      Assessment/Plan:      * Anasarca  67 yoF with alcoholic liver cirrhosis and hypertension admitted for generalized anasarca. Patient's albumin on admission was 1.9. Likely edema related to poor intravascular oncotic pressure 2/2 alcoholic cirrhosis. Patient adherent to home medication. Given history and exam, patient would benefit from diuresis. Dry weight reported as 170s. Patient is currently 198 lbs (down from 212 lbs).     - Better response. d/c Diuril 500 mg IV   - f/u echo unremarkable  - strict I/Os  - daily weights  - low sodium diet with 1500 ml fluid restriction  - Lasix 40 mg IV TID switched to 80 mg PO TID on 7/2 however inadequate response, so dc'd lasix and started Bumex 2 mg TID  - Changed Bumex to 2mg BID and increased spironolactone to 150mg qd    Debility  -Pt non-mobile for some time in hospital, so PT/OT order placed  -Pt advised to move around in bed but to only get up with help      Thrombocytopenia  Chronic, patient at baseline    Anemia, chronic disease  Patient with Hgb of 8.5 on admit. Upon review of care everywhere, patient had Hgb of 8.4 in all of 2022. Likely due to  chronic disease and stable. Currently Hgb of 8.3    -Bloody Bm 7/2 but not too concerned about GI bleed  - continue to monitor    Alcoholic cirrhosis of liver with ascites  Patient was diagnosed in February. Previous history of chronic alcohol abuse and states she drank about 4-5 glasses of wine per night for over 20 years. Patient is seeing hepatology outpatient. Labs shown in care everywhere.     MELD-Na score: 18 at 6/30/2022  3:30 AM  MELD score: 15 at 6/30/2022  3:30 AM  Calculated from:  Serum Creatinine: 1.2 mg/dL at 6/30/2022  3:30 AM  Serum Sodium: 133 mmol/L at 6/30/2022  3:30 AM  Total Bilirubin: 1.2 mg/dL at 6/30/2022  3:30 AM  INR(ratio): 1.7 at 6/28/2022 12:42 AM  Age: 67 years    - consulted hepatology, appreciate recommendations for diuresis, labs, f/u outpatient with her hepatologist  - continue lactulose, thiamine, and folate  - S/p paracentesis with 4.2L removed  - continue to monitor for signs of liver failure  - f/u daily CMPs    Primary hypertension  Patient was discontinued from losartan per hepatologist.     - continue to monitor and can add when clinically indicated    Deaf- written communication  ASL  used for interaction as well as written communication      VTE Risk Mitigation (From admission, onward)         Ordered     enoxaparin injection 40 mg  Daily         07/02/22 1613     IP VTE HIGH RISK PATIENT  Once         06/28/22 0419     Place sequential compression device  Until discontinued         06/28/22 0419     Place sequential compression device  Until discontinued         06/28/22 0415                Discharge Planning   REMA: 7/5/2022     Code Status: Full Code   Is the patient medically ready for discharge?:     Reason for patient still in hospital (select all that apply): Treatment  Discharge Plan A: Home Health   Discharge Delays: None known at this time              Juan Miguel MD  Department of Hospital Medicine   [facility]

## 2022-07-04 NOTE — SUBJECTIVE & OBJECTIVE
Interval History: NAEON. Pt denies pain but did complain of rash possibly related to catheter. Counseled pt on what is appropriate for fluid intake post d/c.    Review of Systems   Constitutional:  Negative for chills, fatigue and fever.   Eyes:  Negative for photophobia, pain and visual disturbance.   Respiratory:  Negative for cough and shortness of breath.    Cardiovascular:  Negative for chest pain and leg swelling.   Gastrointestinal:  Positive for abdominal distention. Negative for abdominal pain, blood in stool, constipation, diarrhea, nausea, rectal pain and vomiting.   Endocrine: Negative for polyuria.   Genitourinary:  Negative for difficulty urinating, dysuria, flank pain and vaginal pain.   Musculoskeletal:  Negative for back pain and neck pain.        Swelling in all extremities   Skin:  Negative for color change and rash.   Neurological:  Negative for dizziness, weakness, light-headedness, numbness and headaches.   Psychiatric/Behavioral:  Negative for confusion and sleep disturbance.    Objective:     Vital Signs (Most Recent):  Temp: 98.1 °F (36.7 °C) (07/04/22 1122)  Pulse: 80 (07/04/22 1122)  Resp: 16 (07/04/22 1122)  BP: 134/60 (07/04/22 1122)  SpO2: 97 % (07/04/22 1122) Vital Signs (24h Range):  Temp:  [97.6 °F (36.4 °C)-98.1 °F (36.7 °C)] 98.1 °F (36.7 °C)  Pulse:  [79-90] 80  Resp:  [16-20] 16  SpO2:  [93 %-98 %] 97 %  BP: (127-157)/(60-64) 134/60     Weight: 89.6 kg (197 lb 8.5 oz)  Body mass index is 38.58 kg/m².    Intake/Output Summary (Last 24 hours) at 7/4/2022 1151  Last data filed at 7/4/2022 0700  Gross per 24 hour   Intake 200 ml   Output 1000 ml   Net -800 ml      Physical Exam  Vitals reviewed.   Constitutional:       General: She is not in acute distress.     Appearance: She is obese. She is not ill-appearing or diaphoretic.   HENT:      Head: Normocephalic and atraumatic.      Mouth/Throat:      Mouth: Mucous membranes are moist.      Pharynx: Oropharynx is clear. No oropharyngeal  exudate or posterior oropharyngeal erythema.   Eyes:      General: No scleral icterus.     Extraocular Movements: Extraocular movements intact.   Neck:      Vascular: No carotid bruit.   Cardiovascular:      Rate and Rhythm: Normal rate and regular rhythm.      Pulses: Normal pulses.      Heart sounds: Normal heart sounds. No murmur heard.    No friction rub.   Pulmonary:      Effort: Pulmonary effort is normal. No respiratory distress.      Breath sounds: Normal breath sounds. No stridor. No wheezing.   Chest:      Chest wall: No tenderness.   Abdominal:      General: Bowel sounds are normal. There is distension.      Palpations: There is no mass.      Tenderness: There is no right CVA tenderness, left CVA tenderness or guarding.      Hernia: A hernia is present.      Comments: Distended. No tenderness to palpation  Small supraumbilical hernia   Musculoskeletal:         General: Swelling present. No tenderness or deformity. Normal range of motion.      Cervical back: Normal range of motion. No rigidity or tenderness.      Right lower leg: Edema present.      Left lower leg: Edema present.      Comments: Diffuse edema   L arm worse than right   Skin:     General: Skin is warm and dry.      Coloration: Skin is not jaundiced or pale.      Findings: No bruising or erythema.      Comments: Bruising noted on bilateral forearms   Neurological:      General: No focal deficit present.      Mental Status: She is alert and oriented to person, place, and time. Mental status is at baseline.      Motor: No weakness.      Coordination: Coordination normal.   Psychiatric:         Mood and Affect: Mood normal.         Behavior: Behavior normal.         Thought Content: Thought content normal.       Significant Labs: All pertinent labs within the past 24 hours have been reviewed.  CBC:   Recent Labs   Lab 07/03/22  0238 07/04/22  0533   WBC 8.75 8.40   HGB 8.1* 8.3*   HCT 24.5* 23.9*   * 94*     CMP:   Recent Labs   Lab  07/03/22  0238 07/04/22  0533   * 132*   K 4.0 4.2    101   CO2 26 26    105   BUN 14 15   CREATININE 1.2 1.2   CALCIUM 8.1* 8.2*   PROT 5.9* 6.1   ALBUMIN 1.6* 1.7*   BILITOT 2.0* 2.0*   ALKPHOS 88 89   AST 41* 43*   ALT 21 24   ANIONGAP 7* 5*   EGFRNONAA 46.9* 46.9*     Recent Lab Results         07/04/22  0533   07/03/22  2323        Albumin 1.7         Alkaline Phosphatase 89         ALT 24         Anion Gap 5         AST 43         Baso # 0.08         Basophil % 1.0         BILIRUBIN TOTAL 2.0  Comment: For infants and newborns, interpretation of results should be based  on gestational age, weight and in agreement with clinical  observations.    Premature Infant recommended reference ranges:  Up to 24 hours.............<8.0 mg/dL  Up to 48 hours............<12.0 mg/dL  3-5 days..................<15.0 mg/dL  6-29 days.................<15.0 mg/dL           BUN 15         Calcium 8.2         Chloride 101         CO2 26         Creatinine 1.2         Differential Method Automated         eGFR if  54.0         eGFR if non  46.9  Comment: Calculation used to obtain the estimated glomerular filtration  rate (eGFR) is the CKD-EPI equation.            Eos # 0.5         Eosinophil % 5.6         Glucose 105         Gran # (ANC) 4.2         Gran % 50.4         Hematocrit 23.9         Hemoglobin 8.3         Immature Grans (Abs) 0.04  Comment: Mild elevation in immature granulocytes is non specific and   can be seen in a variety of conditions including stress response,   acute inflammation, trauma and pregnancy. Correlation with other   laboratory and clinical findings is essential.           Immature Granulocytes 0.5         Lymph # 2.0         Lymph % 24.0         Magnesium 1.6         MCH 30.2         MCHC 34.7         MCV 87         Mono # 1.6         Mono % 18.5         MPV 10.4         nRBC 0         Phosphorus 3.0         Platelets 94         POCT Glucose   111        Potassium 4.2         PROTEIN TOTAL 6.1         RBC 2.75         RDW 18.0         Sodium 132         WBC 8.40                 Significant Imaging: I have reviewed all pertinent imaging results/findings within the past 24 hours.

## 2022-07-04 NOTE — PLAN OF CARE
Patient had no significant changes during the shift. Patient rested comfortably. Patient had a large bowel movement with a small amount of bright red blood from her rectum.

## 2022-07-04 NOTE — PLAN OF CARE
Problem: Occupational Therapy  Goal: Occupational Therapy Goal  Description: Goals to be met by: 7/18/2022     Patient will increase functional independence with ADLs by performing:    Grooming while standing with Minimal Assistance.  Toileting from bedside commode with Minimal Assistance for hygiene and clothing management.   Supine to sit with Minimal Assistance.  Step transfer with Minimal Assistance using RW.  Toilet transfer to bedside commode with Minimal Assistance using RW.    Outcome: Ongoing, Progressing     Evaluation complete; goals and POC established. Recommending SNF upon discharge to return to OF.

## 2022-07-04 NOTE — ASSESSMENT & PLAN NOTE
Patient with Hgb of 8.5 on admit. Upon review of care everywhere, patient had Hgb of 8.4 in all of 2022. Likely due to chronic disease and stable. Currently Hgb of 8.3    -Bloody Bm 7/2 but not too concerned about GI bleed  - continue to monitor

## 2022-07-04 NOTE — PT/OT/SLP EVAL
Occupational Therapy   Evaluation & Treatment    Language Line used 2/2 pt speaks American Sign Language (ASL).   : Walter      Name: Aleyda Floyd  MRN: [id number]  Admitting Diagnosis:  Anasarca    Length of Stay: 6 days    Recommendations:     Discharge Recommendations: nursing facility, skilled  Discharge Equipment Recommendations:  none (TBD at next level of care)  Barriers to discharge:  Other (Comment) (increased assistance)    Plan:     Patient to be seen 4 x/week to address the above listed problems via self-care/home management, therapeutic activities, therapeutic exercises  · Plan of Care Expires: 08/03/22  · Plan of Care Reviewed with: patient      Assessment:     Aleyda Floyd is a 67 y.o. female with a medical diagnosis of Anasarca.  She presents with the following performance deficits affecting function: weakness, impaired endurance, impaired self care skills, impaired functional mobilty, gait instability, impaired balance, impaired fine motor.      Pt mainly limited by generalized deconditioning and fear of falling on this date. Pt requiring moderate - maximal assistance for bed mobility, supervision for sitting EOB x10 mins, and maximal assistance for ADLs (toileting, UE dressing) while at bed level. Pt with good motivation and fair tolerance for therapy. Pt would benefit from SNF OT upon D/C to return to PLOF.    Rehab Prognosis: Good; patient would benefit from acute skilled OT services to address these deficits and reach maximum level of function.         Subjective     Communicated with: CARA Penaloza prior to session.  Patient found HOB elevated with telemetry, PureWick and no family present upon OT entry to room.    Chief Complaint: Leg Swelling (States swelling to legs and arms, Liver cirrhosis patient, had EGD on 6/23. Pt states normally approx 170 pounds but now 212. Denies cp and sob)    Patient/Family Comments/goals: Pt agreed she may benefit from  SNF.    Pain/Comfort:  Pain Rating 1: 0/10  Pain Rating Post-Intervention 1: 0/10    Patients cultural, spiritual, Sabianist conflicts given the current situation: no    Occupational Profile:  Living Environment: Pt lives with her  in a SSH with 0 JESSICA. Pt's bathroom has a WIS with a shower chair in place.  Prior Level of Function: Patient reports needing some assist from her  with mobility & with ADLs. She states that in the home, he typically helps her out of bed and helps her stand up, and is then able to walk using a RW with him behind her (SBA). She uses a wheelchair for community distances. She is able to complete most ADLs herself, though she states he assists with bathing and dressing (fasteners).  Patient uses DME as follows: shower chair, walker, rolling, wheelchair.   DME owned (not currently used): single point cane.  Roles/Responsibilities:   Work: no.   Drive: no.   Managing Medicines/Managing Home: some.   Equipment Used at Home:  cane, straight, shower chair, walker, rolling, wheelchair    Patient reports they will have assistance from  (who is retired) upon discharge.      Objective:     Patient found with: telemetry, PureWick   General Precautions: Standard, deaf, fall, contact   Orthopedic Precautions:N/A   Braces: N/A   Oxygen Device: Room Air  Vitals: /60 (BP Location: Right arm, Patient Position: Lying)   Pulse 80   Temp 98.1 °F (36.7 °C) (Oral)   Resp 16   Ht 5' (1.524 m)   Wt 89.6 kg (197 lb 8.5 oz)   SpO2 97%   Breastfeeding No   BMI 38.58 kg/m²     Cognitive and Psychosocial Function:   · AxOx -- Not formally tested, no signs of disorientation noted   · Follows Commands/attention:follows multi-step commands  · Communication:  clear/fluent  · Memory: No Deficits noted  · Safety awareness/insight to disability: intact   · Mood/Affect/Coping skills/emotional control: Appropriate to situation and Pleasant    Hearing: Impaired: Deaf (speaks ASL)    Vision:   wears glasses     Physical Exam:  Postural examination/scapula alignment:    -       Rounded shoulders     Left UE Right UE   UE Edema absent absent   UE ROM AROM WFL AROM WFL   UE Strength adequate ROM, adequate strength adequate ROM, adequate strength    Strength grasp WFL grasp WFL   Sensation LUE INTACT:WFL RUE INTACT: WFL   Fine Motor Coordination:  LUE IMPAIRED: manipulation of objects RUE IMPAIRED: manipulation of objects   Gross Motor Coordination: LUE INTACT: WFL, limited by fatigue and impaired functional endurance RUE INTACT:WFL, limited by fatigue and impaired functional endurance     Occupational Performance:  Bed Mobility:    · Scooting to HOB in supine: maximal assistance via drawsheet + bridging technique  · Rolling: Stand by assistance with pt performing x2 trials toward L and x1 trial toward R  · Patient completed Supine to Sit with maximal assistance on left side of bed  · Patient completed Sit to Supine with moderate assistance on left side of bed    Functional Mobility/Transfers:   Static Sitting EOB: Supervision  o Pt maintained x10 mins    Activities of Daily Living:  · Upper Body Dressing: maximal assistance to doff/don gown while supine with pt assisting by threading BUEs  · Lower Body Dressing: maximal assistance to don clean brief while supine with pt assisting by rolling  · Toileting: maximal assistance following BM in brief with OT performing pericare and clothing management and pt assisting by rolling      AMPAC 6 Click ADL:  AMPAC Total Score: 16    Treatment & Education:  - Educated on role of OT, POC, and goals for this hospital stay  - Emphasized importance of OOB ax and level of staff assistance required for transfers and functional mobility (maximal assistance for bed mobility)  - Encouraged pt to alert OT of personal self-care goals and/or comfort-related concerns during future OT sessions  - Pt denies any further questions, concerns, or requests at this time  - Whiteboard  updated        Patient left HOB elevated with all lines intact, call button in reach, bed alarm on and  present    GOALS:   Multidisciplinary Problems     Occupational Therapy Goals        Problem: Occupational Therapy    Goal Priority Disciplines Outcome Interventions   Occupational Therapy Goal     OT, PT/OT Ongoing, Progressing    Description: Goals to be met by: 7/18/2022     Patient will increase functional independence with ADLs by performing:    Grooming while standing with Minimal Assistance.  Toileting from bedside commode with Minimal Assistance for hygiene and clothing management.   Supine to sit with Minimal Assistance.  Step transfer with Minimal Assistance using RW.  Toilet transfer to bedside commode with Minimal Assistance using RW.                       History:     Past Medical History:   Diagnosis Date    Hypercholesterolemia     Hypertension          Past Surgical History:   Procedure Laterality Date    DILATION AND CURETTAGE OF UTERUS      ESOPHAGOGASTRODUODENOSCOPY N/A 6/23/2022    Procedure: EGD (ESOPHAGOGASTRODUODENOSCOPY);  Surgeon: Sean Perry MD;  Location: [address]);  Service: Endoscopy;  Laterality: N/A;  cirrhosis, variceal screening  labs prior  -request sent 6/14  fully vaccinated, instructions emailed to [email or web address]         Time Tracking:     OT Date of Treatment: 07/04/22  OT Start Time: 1236  OT Stop Time: 1331  OT Total Time (min): 55 min  Additional staff present: N/A      Billable Minutes:Evaluation 15  Self Care/Home Management 30  Therapeutic Activity 10      7/4/2022

## 2022-07-04 NOTE — ASSESSMENT & PLAN NOTE
-Pt non-mobile for some time in hospital, so PT/OT order placed  -Pt advised to move around in bed but to only get up with help

## 2022-07-05 NOTE — SUBJECTIVE & OBJECTIVE
Interval History: Anasarca continues to improve. Good UOP. Keep Bumex 2 mg BID and spironolactone 150 mg daily. PT /OT recs SNF, placement pending.    Review of Systems   Constitutional:  Negative for chills, fatigue and fever.   Eyes:  Negative for photophobia, pain and visual disturbance.   Respiratory:  Negative for cough and shortness of breath.    Cardiovascular:  Negative for chest pain and leg swelling.   Gastrointestinal:  Positive for abdominal distention. Negative for abdominal pain, blood in stool, constipation, diarrhea, nausea, rectal pain and vomiting.   Endocrine: Negative for polyuria.   Genitourinary:  Negative for difficulty urinating, dysuria, flank pain and vaginal pain.   Musculoskeletal:  Negative for back pain and neck pain.        Swelling in all extremities   Skin:  Negative for color change and rash.   Neurological:  Negative for dizziness, weakness, light-headedness, numbness and headaches.   Psychiatric/Behavioral:  Negative for confusion and sleep disturbance.    Objective:     Vital Signs (Most Recent):  Temp: 98.2 °F (36.8 °C) (07/05/22 0836)  Pulse: 98 (07/05/22 0836)  Resp: 18 (07/05/22 0836)  BP: (!) 119/57 (07/05/22 0836)  SpO2: (!) 92 % (07/05/22 0836) Vital Signs (24h Range):  Temp:  [97.6 °F (36.4 °C)-98.2 °F (36.8 °C)] 98.2 °F (36.8 °C)  Pulse:  [83-98] 98  Resp:  [16-20] 18  SpO2:  [92 %-97 %] 92 %  BP: (119-150)/(57-72) 119/57     Weight: 89.6 kg (197 lb 8.5 oz)  Body mass index is 38.58 kg/m².    Intake/Output Summary (Last 24 hours) at 7/5/2022 1420  Last data filed at 7/5/2022 0700  Gross per 24 hour   Intake 300 ml   Output 1160 ml   Net -860 ml        Physical Exam  Vitals reviewed.   Constitutional:       General: She is not in acute distress.     Appearance: She is obese. She is not ill-appearing or diaphoretic.   HENT:      Head: Normocephalic and atraumatic.      Mouth/Throat:      Mouth: Mucous membranes are moist.      Pharynx: Oropharynx is clear. No oropharyngeal  exudate or posterior oropharyngeal erythema.   Eyes:      General: No scleral icterus.     Extraocular Movements: Extraocular movements intact.   Neck:      Vascular: No carotid bruit.   Cardiovascular:      Rate and Rhythm: Normal rate and regular rhythm.      Pulses: Normal pulses.      Heart sounds: Normal heart sounds. No murmur heard.    No friction rub.   Pulmonary:      Effort: Pulmonary effort is normal. No respiratory distress.      Breath sounds: Normal breath sounds. No stridor. No wheezing.   Chest:      Chest wall: No tenderness.   Abdominal:      General: Bowel sounds are normal. There is distension.      Palpations: There is no mass.      Tenderness: There is no right CVA tenderness, left CVA tenderness or guarding.      Hernia: A hernia is present.      Comments: Distended. No tenderness to palpation  Small supraumbilical hernia   Musculoskeletal:         General: Swelling present. No tenderness or deformity. Normal range of motion.      Cervical back: Normal range of motion. No rigidity or tenderness.      Right lower leg: Edema present.      Left lower leg: Edema present.      Comments: Diffuse edema   L arm worse than right   Skin:     General: Skin is warm and dry.      Coloration: Skin is not jaundiced or pale.      Findings: No bruising or erythema.      Comments: Bruising noted on bilateral forearms   Neurological:      General: No focal deficit present.      Mental Status: She is alert and oriented to person, place, and time. Mental status is at baseline.      Motor: No weakness.      Coordination: Coordination normal.   Psychiatric:         Mood and Affect: Mood normal.         Behavior: Behavior normal.         Thought Content: Thought content normal.       Significant Labs: All pertinent labs within the past 24 hours have been reviewed.  CBC:   Recent Labs   Lab 07/04/22  0533 07/05/22  0511   WBC 8.40 8.25   HGB 8.3* 7.8*   HCT 23.9* 24.2*   PLT 94* 102*       CMP:   Recent Labs   Lab  07/04/22  0533 07/05/22  0511   * 134*   K 4.2 4.1    100   CO2 26 27    104   BUN 15 16   CREATININE 1.2 1.3   CALCIUM 8.2* 8.3*   PROT 6.1 6.0   ALBUMIN 1.7* 1.6*   BILITOT 2.0* 1.9*   ALKPHOS 89 86   AST 43* 36   ALT 24 21   ANIONGAP 5* 7*   EGFRNONAA 46.9* 42.6*       Recent Lab Results         07/05/22  0511        Albumin 1.6       Alkaline Phosphatase 86       ALT 21       Anion Gap 7       AST 36       Baso # 0.05       Basophil % 0.6       BILIRUBIN TOTAL 1.9  Comment: For infants and newborns, interpretation of results should be based  on gestational age, weight and in agreement with clinical  observations.    Premature Infant recommended reference ranges:  Up to 24 hours.............<8.0 mg/dL  Up to 48 hours............<12.0 mg/dL  3-5 days..................<15.0 mg/dL  6-29 days.................<15.0 mg/dL         BUN 16       Calcium 8.3       Chloride 100       CO2 27       Creatinine 1.3       Differential Method Automated       eGFR if African American 49.1       eGFR if non  42.6  Comment: Calculation used to obtain the estimated glomerular filtration  rate (eGFR) is the CKD-EPI equation.          Eos # 0.4       Eosinophil % 5.0       Glucose 104       Gran # (ANC) 4.2       Gran % 50.7       Hematocrit 24.2       Hemoglobin 7.8       Immature Grans (Abs) 0.02  Comment: Mild elevation in immature granulocytes is non specific and   can be seen in a variety of conditions including stress response,   acute inflammation, trauma and pregnancy. Correlation with other   laboratory and clinical findings is essential.         Immature Granulocytes 0.2       Lymph # 2.0       Lymph % 23.9       Magnesium 1.6       MCH 29.8       MCHC 32.2       MCV 92       Mono # 1.6       Mono % 19.6       MPV 10.4       nRBC 0       Phosphorus 3.1       Platelets 102       Potassium 4.1       PROTEIN TOTAL 6.0       RBC 2.62       RDW 18.8       Sodium 134       WBC 8.25                Significant Imaging: I have reviewed all pertinent imaging results/findings within the past 24 hours.

## 2022-07-05 NOTE — NURSING
Patient recieved Lactulose and got a little choked up. came and gestured coughing. Went to check on patient and patient has an infrequent cough. Patient is breathing fine, no complaints of SOB and no vomiting. Med team 4 notified.  1745: Patient reassessed. Asked (wrote) if the cough was better and patient nodded yes. No further changes noted.

## 2022-07-05 NOTE — PT/OT/SLP EVAL
Physical Therapy Evaluation and Treatment     Patient Name:  Aleyda Floyd  MRN: [id number]    Admit Date: 6/27/2022  Admitting Diagnosis:  Anasarca  Length of Stay: 7 days  Recent Surgery: * No surgery found *      Recommendations:     Discharge Recommendations: Skilled Nursing Facility   Equipment recommendations: none  Barriers to discharge: None Identified     Assessment:     Aleyda Floyd is a 67 y.o. female [place] [facility] [place] with medical diagnosis of Anasarca. Pt presents with weakness, impaired endurance, impaired self care skills, impaired functional mobilty, gait instability, impaired balance, edema. These deficits effect their roles and responsibilities in which they were able to complete prior to admit.  used during session. Pt expressed gratitude as she stated that some providers have been writing phrases on paper instead of using the iPad, which she would prefer. PTA, pt was ambulating with RW at home, with occasional use of SPC. Pt able to sidestep 5 ft to L and R with RW CGA today.  Aleyda Floyd would benefit from acute PT intervention to improve quality of life, focus on recovery of impairments, provide patient/caregiver education, reduce fall risk, and maximize (I) and safety with functional mobility. Once medically stable, recommending pt discharge to Skilled Nursing Facility .  Pt is agreeable to post-acute therapy to address functional mobility deficits    Rehab Prognosis: Good    Plan:     During this hospitalization, patient to be seen 3 x/week to address the identified rehab impairments via therapeutic activities, gait training, therapeutic exercises, neuromuscular re-education and progress towards stated goals.     Plan of Care Expires:  08/04/22  Plan of Care reviewed with: patient    This plan of care has been discussed with the patient/caregiver, who was included in its development and is in agreement with the identified goals and treatment plan.  "    Subjective     Communicated with RN prior to session.  Patient found supine upon PT entry to room, agreeable to evaluation. Pt alone during session.    Chief Complaint: leg swelling      Patient/Family Comments/goals: "I don't know if I will be able to stand up but I will try"    Pain/Comfort:  · Pain Rating 1: 0/10  · Pain Rating Post-Intervention 1: 0/10    Patients cultural, spiritual, Christianity conflicts given the current situation: None identified     Patient History: information obtained from pt     Living Environment: Pt lives with  in single level home  with threshold JESSICA. Bathroom set-up: walk-in shower  Prior Level of Function: required caregiver assist for ADLs including bathing, dressing, toileting; uses a SPC occasionally in the house but primarily walks with a RW  Pt states that she cannot use RW when going into the bathroom so she holds onto the wall when walking in  DME owned: rolling walker, single point cane, shower chair and wheelchair  Support Available/Caregiver Assistance:  assists as needed with ADLs   Pt is not working or driving    Objective:     Patient found with: telemetry, PureWick, bed alarm    Recent Surgery: * No surgery found *    General Precautions: Standard, deaf, fall, contact   Orthopedic Precautions:N/A   Braces: N/A   Oxygen Device: room air      Exams:     Cognition:  · Alert  · Command following: Follows multistep verbal commands  · Communication: clear/fluent; ASL iPad  used     Sensation:   o Light touch sensation: Intact BLEs     Gross Motor Coordination: No deficits noted during functional mobility tasks      Edema/Skin Integrity: Moderate margot LE; Visible skin intact     Postural examination/scapula alignment: Rounded shoulder, Head forward and Increased kyphosis     Lower Extremity Range of Motion:  o Right Lower Extremity: WFL  o Left Lower Extremity: WFL     Lower Extremity Strength:  o Right Lower Extremity: grossly 4/5  o Left " Lower Extremity: grossly 4/5    Functional Mobility:    Bed Mobility:  · Supine > Sit with minimum assistance  · Assistance provided with trunk  · Sit > Supine with minimum assistance  · Assistance with margot LE  · Pt able to use margot UE to pull herself up in bed    Transfers:   · Sit <> Stand Transfer: Contact Guard Assistance x 1 trials from eob with RW AD              Gait:  · Distance: 5 ft R + 5 ft R  · Assistance level: Contact Guard Assistance  · Assistive Device: rolling walker  · Gait Assessment: decreased gait speed    Balance:  · Dynamic Sitting: GOOD: Maintains balance through MODERATE excursions of active trunk movement   · Sat eob for ~15 minutes - initially requiring Miriam for balance with progression to SBA  · Standing:  · Static: FAIR+: Takes MINIMAL challenges from all directions   · Dynamic: FAIR: Needs CONTACT GUARD during gait    Outcome Measure: AM-PAC 6 CLICK MOBILITY  Total Score:17     Patient/Caregiver Education:     Therapist educated pt/caregiver regarding:    PT POC and goals for therapy    Safety with mobility and fall risk    Instruction on use of call button and importance of calling nursing staff for assistance with mobility    Time provided for therapeutic counseling and discussion of current health disposition. All questions answered to satisfaction, within scope of PT practice    Discussion with pt regarding discharge options. Pt is agreeable to additional skilled therapy at discharge prior to returning home to address functional mobility deficits    Patient/caregiver able to verbalize understanding and expressed no further questions this visit; will follow-up with pt/caregiver during current admit for additional questions/concerns within scope of practice.     White board updated.     Patient left supine with all lines intact, call button in reach and bed alarm on.    GOALS:   Multidisciplinary Problems     Physical Therapy Goals        Problem: Physical Therapy    Goal Priority  Disciplines Outcome Goal Variances Interventions   Physical Therapy Goal     PT, PT/OT Ongoing, Progressing     Description: Goals to be met by: 22    Patient will increase functional independence with mobility by performin. Supine to sit with Stand-by Assistance  2. Sit to stand transfer with Supervision  3. Bed to chair transfer with Supervision using LRAD  4. Gait  x 100 feet with Stand-by Assistance using LRAD   5. Ascend/Descend 6 inch curb step with Contact Guard Assistance using LRAD  6. Lower extremity exercise program x30 reps per handout, with independence                       History:     Past Medical History:   Diagnosis Date    Hypercholesterolemia     Hypertension        Past Surgical History:   Procedure Laterality Date    DILATION AND CURETTAGE OF UTERUS      ESOPHAGOGASTRODUODENOSCOPY N/A 2022    Procedure: EGD (ESOPHAGOGASTRODUODENOSCOPY);  Surgeon: Sean Perry MD;  Location: [address]);  Service: Endoscopy;  Laterality: N/A;  cirrhosis, variceal screening  labs prior  -request sent   fully vaccinated, instructions emailed to [email or web address]       Time Tracking:     PT Received On: 22  PT Start Time: 1352     PT Stop Time: 1438  PT Total Time (min): 46 min     Billable Minutes: Evaluation 8, Gait Training 15 and Therapeutic Activity 23    2022

## 2022-07-05 NOTE — ASSESSMENT & PLAN NOTE
-Pt non-mobile for some time in hospital, so PT/OT order placed  -Pt advised to move around in bed but to only get up with help  - PT /OT recs SNF, placement pending.

## 2022-07-05 NOTE — TREATMENT PLAN
Hepatology Treatment Plan    Aleyda Floyd is a 67 y.o. female admitted to hospital 6/27/2022 (Hospital Day: 9) due to Anasarca.     Interval History  Overnight, no acute events. Patient had 2 BMs yesterday which the patient was not sure was bloody. She denies fever, chills, abdominal pain, or worsening abdominal distention.      Objective  Temp:  [97.6 °F (36.4 °C)-98.2 °F (36.8 °C)] 98.2 °F (36.8 °C) (07/05 0836)  Pulse:  [83-98] 98 (07/05 0836)  BP: (119-150)/(57-72) 119/57 (07/05 0836)  Resp:  [16-20] 18 (07/05 0836)  SpO2:  [92 %-97 %] 92 % (07/05 0836)    General: Alert, Oriented x3, no distress. Patient communicated by mouthign words since she is hearing impaired.  Neurologic: Asterixis absent  Abdomen: Normoactive bowel sounds. Non-distended. Normal tympany. Soft. Non-tender. No peritoneal signs.    Laboratory    Lab Results   Component Value Date    WBC 8.25 07/05/2022    HGB 7.8 (L) 07/05/2022    HCT 24.2 (L) 07/05/2022    MCV 92 07/05/2022     (L) 07/05/2022       Lab Results   Component Value Date     (L) 07/05/2022    K 4.1 07/05/2022     07/05/2022    CO2 27 07/05/2022    BUN 16 07/05/2022    CREATININE 1.3 07/05/2022    CALCIUM 8.3 (L) 07/05/2022       Lab Results   Component Value Date    ALBUMIN 1.6 (L) 07/05/2022    ALT 21 07/05/2022    AST 36 07/05/2022    ALKPHOS 86 07/05/2022    BILITOT 1.9 (H) 07/05/2022       Lab Results   Component Value Date    INR 1.7 (H) 06/28/2022    INR 1.7 (H) 06/23/2022    INR 1.8 (H) 05/05/2022       MELD-Na score: 18 at 6/30/2022  3:30 AM  MELD score: 15 at 6/30/2022  3:30 AM  Calculated from:  Serum Creatinine: 1.2 mg/dL at 6/30/2022  3:30 AM  Serum Sodium: 133 mmol/L at 6/30/2022  3:30 AM  Total Bilirubin: 1.2 mg/dL at 6/30/2022  3:30 AM  INR(ratio): 1.7 at 6/28/2022 12:42 AM  Age: 67 years    Assessment    #EtOH cirrhosis, decompensated  #ascitis, secondary to above  - MELD: 18  - Patient has lost 7 Kgs since admission, net -13L since  admission  - AST: 21, ALT: 36, T. Bilirubin: 1.9    #hematochezia, likely hemorrhoidal  - Hb stable    Plan  - Outpatient transplant and addiction psych evaluation  - Continue diuresis with Lasix 40 mg and Spironolactone 100 mg.   - Continue Lactulose TID and Rifaximin BID to maintain 3 soft bowel movements daily.   - Strict I/O's and daily standing weights.   - Low sodium diet and 1.5L fluid restriction.   - Please obtain daily CBC, BMP, LFT, INR      Thank you for involving us in the care of Aleyda Floyd. Please call with any additional concerns or questions.    Ricci Roman MD, PGY-IV  Gastroenterology Fellow  [facility]

## 2022-07-05 NOTE — PLAN OF CARE
[facility]  Discharge Reassessment    Primary Care Provider: Elvie Fernandes MD    Expected Discharge Date: 7/5/2022    Reassessment (most recent)     Discharge Reassessment - 07/05/22 0930        Discharge Reassessment    Assessment Type Discharge Planning Reassessment     Did the patient's condition or plan change since previous assessment? Yes     Discharge Plan discussed with: Spouse/sig other;Patient     Communicated REMA with patient/caregiver Yes     Discharge Plan A Skilled Nursing Facility     Discharge Plan B Home Health     DME Needed Upon Discharge  none     Discharge Barriers Identified None     Why the patient remains in the hospital Placement issues        Post-Acute Status    Post-Acute Authorization Placement     Post-Acute Placement Status Referrals Sent     Hospital Resources/Appts/Education Provided Post-Acute resouces added to AVS     Discharge Delays None known at this time

## 2022-07-05 NOTE — PLAN OF CARE
Pt and spouse asleep when Sw arrived to see if Pt will agree to SNF placement, will return and follow.

## 2022-07-05 NOTE — ASSESSMENT & PLAN NOTE
67 yoF with alcoholic liver cirrhosis and hypertension admitted for generalized anasarca. Patient's albumin on admission was 1.9. Likely edema related to poor intravascular oncotic pressure 2/2 alcoholic cirrhosis. Patient adherent to home medication. Given history and exam, patient would benefit from diuresis. Dry weight reported as 170s. Patient is currently 198 lbs (down from 212 lbs).     - Better response. d/c Diuril 500 mg IV   - f/u echo unremarkable  - strict I/Os  - daily weights  - low sodium diet with 1500 ml fluid restriction  - Lasix 40 mg IV TID switched to 80 mg PO TID on 7/2 however inadequate response, so dc'd lasix and started Bumex 2 mg TID  - Anasarca continues to improve. Good UOP. Keep Bumex 2 mg BID and spironolactone 150 mg daily.  - Plan for f/u with her hepatologist on d/c

## 2022-07-05 NOTE — PLAN OF CARE
PT evaluation complete - see note for details. POC and goals established.    Problem: Physical Therapy  Goal: Physical Therapy Goal  Description: Goals to be met by: 22    Patient will increase functional independence with mobility by performin. Supine to sit with Stand-by Assistance  2. Sit to stand transfer with Supervision  3. Bed to chair transfer with Supervision using LRAD  4. Gait  x 100 feet with Stand-by Assistance using LRAD   5. Ascend/Descend 6 inch curb step with Contact Guard Assistance using LRAD  6. Lower extremity exercise program x30 reps per handout, with independence    Outcome: Ongoing, Progressing     2022

## 2022-07-06 PROBLEM — N17.9 AKI (ACUTE KIDNEY INJURY): Status: ACTIVE | Noted: 2022-01-01

## 2022-07-06 NOTE — PROGRESS NOTES
[facility] Medicine  Progress Note    Patient Name: Aleyda Floyd  MRN: [id number]  Patient Class: IP- Inpatient   Admission Date: 6/27/2022  Length of Stay: 8 days  Attending Physician: Jessenia Foster MD  Primary Care Provider: Elvie Fernandes MD        Subjective:     Principal Problem:Anasarca        HPI:  This is a 67 year old deaf lady with alcoholic cirrhosis, and hypertension who presented with generalized swelling.  used to obtain history. Patient states that she had the swelling since her diagnosis of cirrhosis in February. Patient states that 2 days ago, she was able to ambulate but with some difficulty due to the swelling but now  worsening to the point where she is unable to ambulate by herself. Of note, patient had a recent EGD performed earlier last week where they had issues with her IV resulting is significant bruising. Patient also notes that whenever she scratches her skin with her nails, she notices clear fluid coming from the cuts. Patient denies fever, chills, cough, hemoptysis, nausea, vomiting, abdominal pain, yellowing of skin, constipation, dysuria, hematuria, and black/ bloody stools. Patient has baseline loose stools from her lactulose.  She reports compliance with all medications including her lactulose and Lasix. Patient and family declined any confusion or altered mental status. She denies any recent alcohol use with last use in February when she was diagnosed with alcoholic cirrhosis. Patient states that she used to weigh around 170s which she believes is her dry weight and is currently in the 200s.     Upon chart review, patient had an echo at an OSH on 2/25/22 which showed EF 60-65% and some diastolic dysfunction.    In the ED, patient was found to have Hgb of 8.5 and platelets of 113 around baseline. Patient had a UA which showed 1+ leuks and many bacteria. Patient did not complain of dysuria.       Overview/Hospital Course:  Admitted for  decompensated cirrhosis and anasarca. Began diuresing with IV Lasix and spironolactone. Hepatology involved in care. Paracentesis of -4.2L; ruled out SBP. Increasing diuresis with Diuril. D/c diuril but continue IV lasix. Repleated K and Mag. 1 BM 7/1 enema ordered 7/2. Prior to enema pt spontaneously had 1 BM with brown stool and bright red streaks of blood. Denied rectal pain. Inadequate response with lasix PO so started on Bumex 2 mg TID. Edema stable. Will attempt to deescalate Bumex to 2mg BID and increase spironolactone to 150mg po qd. Bumex deescalated to 1mg BID due to steadily rising Cr (1.4, 1.0 on admission). OT/PT consulted, and recommend d/c to SNF.      Interval History: NAEON. Pt states that she has no complaints other than her rash on her R buttocks. Explained to patient the need for d/c to SNF after hospital and pt communicated understanding.    Review of Systems   Constitutional:  Negative for chills, fatigue and fever.   Eyes:  Negative for photophobia, pain and visual disturbance.   Respiratory:  Negative for cough and shortness of breath.    Cardiovascular:  Negative for chest pain and leg swelling.   Gastrointestinal:  Positive for abdominal distention. Negative for abdominal pain, blood in stool, constipation, diarrhea, nausea, rectal pain and vomiting.   Endocrine: Negative for polyuria.   Genitourinary:  Negative for difficulty urinating, dysuria, flank pain and vaginal pain.   Musculoskeletal:  Negative for back pain and neck pain.        Swelling in all extremities   Skin:  Positive for rash. Negative for color change.   Neurological:  Negative for dizziness, weakness, light-headedness, numbness and headaches.   Psychiatric/Behavioral:  Negative for confusion and sleep disturbance.    Objective:     Vital Signs (Most Recent):  Temp: 98 °F (36.7 °C) (07/06/22 0802)  Pulse: 86 (07/06/22 0802)  Resp: 18 (07/06/22 0802)  BP: 114/66 (07/06/22 0802)  SpO2: (!) 94 % (07/06/22 0802)   Vital Signs  (24h Range):  Temp:  [98 °F (36.7 °C)-98.6 °F (37 °C)] 98 °F (36.7 °C)  Pulse:  [] 86  Resp:  [16-18] 18  SpO2:  [94 %-98 %] 94 %  BP: (114-143)/(58-66) 114/66     Weight: 90.3 kg (199 lb 1.2 oz)  Body mass index is 38.88 kg/m².    Intake/Output Summary (Last 24 hours) at 7/6/2022 1217  Last data filed at 7/6/2022 0600  Gross per 24 hour   Intake 120 ml   Output 1300 ml   Net -1180 ml      Physical Exam  Vitals reviewed.   Constitutional:       General: She is not in acute distress.     Appearance: She is obese. She is not ill-appearing or diaphoretic.   HENT:      Head: Normocephalic and atraumatic.      Mouth/Throat:      Mouth: Mucous membranes are moist.      Pharynx: Oropharynx is clear. No oropharyngeal exudate or posterior oropharyngeal erythema.   Eyes:      General: No scleral icterus.     Extraocular Movements: Extraocular movements intact.   Neck:      Vascular: No carotid bruit.   Cardiovascular:      Rate and Rhythm: Normal rate and regular rhythm.      Pulses: Normal pulses.      Heart sounds: Normal heart sounds. No murmur heard.    No friction rub.   Pulmonary:      Effort: Pulmonary effort is normal. No respiratory distress.      Breath sounds: Normal breath sounds. No stridor. No wheezing.   Chest:      Chest wall: No tenderness.   Abdominal:      General: Bowel sounds are normal. There is distension.      Palpations: There is no mass.      Tenderness: There is no right CVA tenderness, left CVA tenderness or guarding.      Hernia: A hernia is present.      Comments: Distended. No tenderness to palpation  Small supraumbilical hernia   Musculoskeletal:         General: Swelling present. No tenderness or deformity. Normal range of motion.      Cervical back: Normal range of motion. No rigidity or tenderness.      Right lower leg: Edema present.      Left lower leg: Edema present.      Comments: Diffuse edema   L arm worse than right   Skin:     General: Skin is warm and dry.      Coloration:  Skin is not jaundiced or pale.      Findings: No bruising or erythema.      Comments: Bruising noted on bilateral forearms   Neurological:      General: No focal deficit present.      Mental Status: She is alert and oriented to person, place, and time. Mental status is at baseline.      Motor: No weakness.      Coordination: Coordination normal.   Psychiatric:         Mood and Affect: Mood normal.         Behavior: Behavior normal.         Thought Content: Thought content normal.       Significant Labs: All pertinent labs within the past 24 hours have been reviewed.  CBC:   Recent Labs   Lab 07/05/22 0511 07/06/22  0303   WBC 8.25 8.08   HGB 7.8* 7.9*   HCT 24.2* 24.6*   * 105*     CMP:   Recent Labs   Lab 07/05/22 0511 07/06/22  0303   * 133*   K 4.1 4.0    100   CO2 27 28    115*   BUN 16 17   CREATININE 1.3 1.4   CALCIUM 8.3* 8.4*   PROT 6.0 6.3   ALBUMIN 1.6* 1.7*   BILITOT 1.9* 2.0*   ALKPHOS 86 88   AST 36 40   ALT 21 23   ANIONGAP 7* 5*   EGFRNONAA 42.6* 38.9*     Recent Lab Results         07/06/22 0303        Albumin 1.7       Alkaline Phosphatase 88       ALT 23       Anion Gap 5       AST 40       Baso # 0.07       Basophil % 0.9       BILIRUBIN TOTAL 2.0  Comment: For infants and newborns, interpretation of results should be based  on gestational age, weight and in agreement with clinical  observations.    Premature Infant recommended reference ranges:  Up to 24 hours.............<8.0 mg/dL  Up to 48 hours............<12.0 mg/dL  3-5 days..................<15.0 mg/dL  6-29 days.................<15.0 mg/dL         BUN 17       Calcium 8.4       Chloride 100       CO2 28       Creatinine 1.4       Differential Method Automated       eGFR if African American 44.9       eGFR if non  38.9  Comment: Calculation used to obtain the estimated glomerular filtration  rate (eGFR) is the CKD-EPI equation.          Eos # 0.4       Eosinophil % 4.8       Glucose 115        Gran # (ANC) 4.1       Gran % 50.1       Hematocrit 24.6       Hemoglobin 7.9       Immature Grans (Abs) 0.03  Comment: Mild elevation in immature granulocytes is non specific and   can be seen in a variety of conditions including stress response,   acute inflammation, trauma and pregnancy. Correlation with other   laboratory and clinical findings is essential.         Immature Granulocytes 0.4       Lymph # 2.0       Lymph % 25.0       Magnesium 1.7       MCH 29.2       MCHC 32.1       MCV 91       Mono # 1.5       Mono % 18.8       MPV 10.5       nRBC 0       Phosphorus 3.0       Platelets 105       Potassium 4.0       PROTEIN TOTAL 6.3       RBC 2.71       RDW 19.0       Sodium 133       WBC 8.08               Significant Imaging: I have reviewed all pertinent imaging results/findings within the past 24 hours.      Assessment/Plan:      * Anasarca  67 yoF with alcoholic liver cirrhosis and hypertension admitted for generalized anasarca. Patient's albumin on admission was 1.9. Likely edema related to poor intravascular oncotic pressure 2/2 alcoholic cirrhosis. Patient adherent to home medication. Given history and exam, patient would benefit from diuresis. Dry weight reported as 170s. Patient is currently 199 lbs (down from 212 lbs).     - Better response. d/c Diuril 500 mg IV   - f/u echo unremarkable  - strict I/Os  - daily weights  - low sodium diet with 1500 ml fluid restriction  - Lasix 40 mg IV TID switched to 80 mg PO TID on 7/2 however inadequate response, so dc'd lasix and started Bumex 2 mg TID  - Anasarca continues to improve. Good UOP. Bumex decreased to 1 mg BID and continue spironolactone 150 mg daily.  - Plan for f/u with her hepatologist on d/c    PITO (acute kidney injury)  - Pt Cr of 1.0 on admission and 1.4 on 7/6  - Bumex 2mg BID decreased to 1mg BID  - keep spironolactone 150mg BID  - monitor    Debility  -Pt non-mobile for some time in hospital, so PT/OT order placed  -Pt advised to move  around in bed but to only get up with help  - PT /OT recs SNF, placement pending.      Thrombocytopenia  Chronic, patient at baseline    Anemia, chronic disease  Patient with Hgb of 8.5 on admit. Upon review of care everywhere, patient had Hgb of 8.4 in all of 2022. Likely due to chronic disease and stable.    -Bloody Bm 7/2 but not too concerned about GI bleed  - continue to monitor    Alcoholic cirrhosis of liver with ascites  Patient was diagnosed in February. Previous history of chronic alcohol abuse and states she drank about 4-5 glasses of wine per night for over 20 years. Patient is seeing hepatology outpatient. Labs shown in care everywhere.     MELD-Na score: 18 at 6/30/2022  3:30 AM  MELD score: 15 at 6/30/2022  3:30 AM  Calculated from:  Serum Creatinine: 1.2 mg/dL at 6/30/2022  3:30 AM  Serum Sodium: 133 mmol/L at 6/30/2022  3:30 AM  Total Bilirubin: 1.2 mg/dL at 6/30/2022  3:30 AM  INR(ratio): 1.7 at 6/28/2022 12:42 AM  Age: 67 years    - consulted hepatology, appreciate recommendations for diuresis, labs, f/u outpatient with her hepatologist  - continue lactulose, thiamine, and folate  - S/p paracentesis with 4.2L removed  - continue to monitor for signs of liver failure  - f/u daily CMPs    Primary hypertension  Patient was discontinued from losartan per hepatologist.     - continue to monitor and can add when clinically indicated    Deaf- written communication  ASL  used for interaction as well as written communication      VTE Risk Mitigation (From admission, onward)         Ordered     enoxaparin injection 40 mg  Daily         07/02/22 1613     IP VTE HIGH RISK PATIENT  Once         06/28/22 0419     Place sequential compression device  Until discontinued         06/28/22 0419     Place sequential compression device  Until discontinued         06/28/22 0415                Discharge Planning   REMA: 7/6/2022     Code Status: Full Code   Is the patient medically ready for discharge?: Yes     Reason for patient still in hospital (select all that apply): Pending disposition  Discharge Plan A: Skilled Nursing Facility   Discharge Delays: None known at this time              Juan Miguel MD  Department of Hospital Medicine   [facility]

## 2022-07-06 NOTE — ASSESSMENT & PLAN NOTE
Patient with Hgb of 8.5 on admit. Upon review of care everywhere, patient had Hgb of 8.4 in all of 2022. Likely due to chronic disease and stable.    -Bloody Bm 7/2 but not too concerned about GI bleed  - continue to monitor

## 2022-07-06 NOTE — ASSESSMENT & PLAN NOTE
- Pt Cr of 1.0 on admission and 1.4 on 7/6  - Bumex 2mg BID decreased to 1mg BID  - keep spironolactone 150mg BID  - monitor

## 2022-07-06 NOTE — SUBJECTIVE & OBJECTIVE
Interval History: NAEON. Pt states that she has no complaints other than her rash on her R buttocks. Explained to patient the need for d/c to SNF after hospital and pt communicated understanding.    Review of Systems   Constitutional:  Negative for chills, fatigue and fever.   Eyes:  Negative for photophobia, pain and visual disturbance.   Respiratory:  Negative for cough and shortness of breath.    Cardiovascular:  Negative for chest pain and leg swelling.   Gastrointestinal:  Positive for abdominal distention. Negative for abdominal pain, blood in stool, constipation, diarrhea, nausea, rectal pain and vomiting.   Endocrine: Negative for polyuria.   Genitourinary:  Negative for difficulty urinating, dysuria, flank pain and vaginal pain.   Musculoskeletal:  Negative for back pain and neck pain.        Swelling in all extremities   Skin:  Positive for rash. Negative for color change.   Neurological:  Negative for dizziness, weakness, light-headedness, numbness and headaches.   Psychiatric/Behavioral:  Negative for confusion and sleep disturbance.    Objective:     Vital Signs (Most Recent):  Temp: 98 °F (36.7 °C) (07/06/22 0802)  Pulse: 86 (07/06/22 0802)  Resp: 18 (07/06/22 0802)  BP: 114/66 (07/06/22 0802)  SpO2: (!) 94 % (07/06/22 0802)   Vital Signs (24h Range):  Temp:  [98 °F (36.7 °C)-98.6 °F (37 °C)] 98 °F (36.7 °C)  Pulse:  [] 86  Resp:  [16-18] 18  SpO2:  [94 %-98 %] 94 %  BP: (114-143)/(58-66) 114/66     Weight: 90.3 kg (199 lb 1.2 oz)  Body mass index is 38.88 kg/m².    Intake/Output Summary (Last 24 hours) at 7/6/2022 1217  Last data filed at 7/6/2022 0600  Gross per 24 hour   Intake 120 ml   Output 1300 ml   Net -1180 ml      Physical Exam  Vitals reviewed.   Constitutional:       General: She is not in acute distress.     Appearance: She is obese. She is not ill-appearing or diaphoretic.   HENT:      Head: Normocephalic and atraumatic.      Mouth/Throat:      Mouth: Mucous membranes are moist.       Pharynx: Oropharynx is clear. No oropharyngeal exudate or posterior oropharyngeal erythema.   Eyes:      General: No scleral icterus.     Extraocular Movements: Extraocular movements intact.   Neck:      Vascular: No carotid bruit.   Cardiovascular:      Rate and Rhythm: Normal rate and regular rhythm.      Pulses: Normal pulses.      Heart sounds: Normal heart sounds. No murmur heard.    No friction rub.   Pulmonary:      Effort: Pulmonary effort is normal. No respiratory distress.      Breath sounds: Normal breath sounds. No stridor. No wheezing.   Chest:      Chest wall: No tenderness.   Abdominal:      General: Bowel sounds are normal. There is distension.      Palpations: There is no mass.      Tenderness: There is no right CVA tenderness, left CVA tenderness or guarding.      Hernia: A hernia is present.      Comments: Distended. No tenderness to palpation  Small supraumbilical hernia   Musculoskeletal:         General: Swelling present. No tenderness or deformity. Normal range of motion.      Cervical back: Normal range of motion. No rigidity or tenderness.      Right lower leg: Edema present.      Left lower leg: Edema present.      Comments: Diffuse edema   L arm worse than right   Skin:     General: Skin is warm and dry.      Coloration: Skin is not jaundiced or pale.      Findings: No bruising or erythema.      Comments: Bruising noted on bilateral forearms   Neurological:      General: No focal deficit present.      Mental Status: She is alert and oriented to person, place, and time. Mental status is at baseline.      Motor: No weakness.      Coordination: Coordination normal.   Psychiatric:         Mood and Affect: Mood normal.         Behavior: Behavior normal.         Thought Content: Thought content normal.       Significant Labs: All pertinent labs within the past 24 hours have been reviewed.  CBC:   Recent Labs   Lab 07/05/22  0511 07/06/22  0303   WBC 8.25 8.08   HGB 7.8* 7.9*   HCT 24.2* 24.6*    * 105*     CMP:   Recent Labs   Lab 07/05/22  0511 07/06/22  0303   * 133*   K 4.1 4.0    100   CO2 27 28    115*   BUN 16 17   CREATININE 1.3 1.4   CALCIUM 8.3* 8.4*   PROT 6.0 6.3   ALBUMIN 1.6* 1.7*   BILITOT 1.9* 2.0*   ALKPHOS 86 88   AST 36 40   ALT 21 23   ANIONGAP 7* 5*   EGFRNONAA 42.6* 38.9*     Recent Lab Results         07/06/22  0303        Albumin 1.7       Alkaline Phosphatase 88       ALT 23       Anion Gap 5       AST 40       Baso # 0.07       Basophil % 0.9       BILIRUBIN TOTAL 2.0  Comment: For infants and newborns, interpretation of results should be based  on gestational age, weight and in agreement with clinical  observations.    Premature Infant recommended reference ranges:  Up to 24 hours.............<8.0 mg/dL  Up to 48 hours............<12.0 mg/dL  3-5 days..................<15.0 mg/dL  6-29 days.................<15.0 mg/dL         BUN 17       Calcium 8.4       Chloride 100       CO2 28       Creatinine 1.4       Differential Method Automated       eGFR if African American 44.9       eGFR if non  38.9  Comment: Calculation used to obtain the estimated glomerular filtration  rate (eGFR) is the CKD-EPI equation.          Eos # 0.4       Eosinophil % 4.8       Glucose 115       Gran # (ANC) 4.1       Gran % 50.1       Hematocrit 24.6       Hemoglobin 7.9       Immature Grans (Abs) 0.03  Comment: Mild elevation in immature granulocytes is non specific and   can be seen in a variety of conditions including stress response,   acute inflammation, trauma and pregnancy. Correlation with other   laboratory and clinical findings is essential.         Immature Granulocytes 0.4       Lymph # 2.0       Lymph % 25.0       Magnesium 1.7       MCH 29.2       MCHC 32.1       MCV 91       Mono # 1.5       Mono % 18.8       MPV 10.5       nRBC 0       Phosphorus 3.0       Platelets 105       Potassium 4.0       PROTEIN TOTAL 6.3       RBC 2.71       RDW 19.0        Sodium 133       WBC 8.08               Significant Imaging: I have reviewed all pertinent imaging results/findings within the past 24 hours.

## 2022-07-06 NOTE — ASSESSMENT & PLAN NOTE
67 yoF with alcoholic liver cirrhosis and hypertension admitted for generalized anasarca. Patient's albumin on admission was 1.9. Likely edema related to poor intravascular oncotic pressure 2/2 alcoholic cirrhosis. Patient adherent to home medication. Given history and exam, patient would benefit from diuresis. Dry weight reported as 170s. Patient is currently 199 lbs (down from 212 lbs).     - Better response. d/c Diuril 500 mg IV   - f/u echo unremarkable  - strict I/Os  - daily weights  - low sodium diet with 1500 ml fluid restriction  - Lasix 40 mg IV TID switched to 80 mg PO TID on 7/2 however inadequate response, so dc'd lasix and started Bumex 2 mg TID  - Anasarca continues to improve. Good UOP. Bumex decreased to 1 mg BID and continue spironolactone 150 mg daily.  - Plan for f/u with her hepatologist on d/c

## 2022-07-06 NOTE — TREATMENT PLAN
Hepatology Treatment Plan    Aleyda Floyd is a 67 y.o. female admitted to hospital 6/27/2022 (Hospital Day: 10) due to Anasarca.     Interval History  Chart review shows evaluation by PT/OT. Discharge is being planned to a SNF. Cholestyramine was restarted.     Objective  Temp:  [98 °F (36.7 °C)-98.6 °F (37 °C)] 98 °F (36.7 °C) (07/06 0802)  Pulse:  [] 86 (07/06 0802)  BP: (114-143)/(58-66) 114/66 (07/06 0802)  Resp:  [16-18] 18 (07/06 0802)  SpO2:  [94 %-98 %] 94 % (07/06 0802)      Laboratory    Lab Results   Component Value Date    WBC 8.08 07/06/2022    HGB 7.9 (L) 07/06/2022    HCT 24.6 (L) 07/06/2022    MCV 91 07/06/2022     (L) 07/06/2022       Lab Results   Component Value Date     (L) 07/06/2022    K 4.0 07/06/2022     07/06/2022    CO2 28 07/06/2022    BUN 17 07/06/2022    CREATININE 1.4 07/06/2022    CALCIUM 8.4 (L) 07/06/2022       Lab Results   Component Value Date    ALBUMIN 1.7 (L) 07/06/2022    ALT 23 07/06/2022    AST 40 07/06/2022    ALKPHOS 88 07/06/2022    BILITOT 2.0 (H) 07/06/2022       Lab Results   Component Value Date    INR 1.7 (H) 06/28/2022    INR 1.7 (H) 06/23/2022    INR 1.8 (H) 05/05/2022       MELD-Na score: 18 at 6/30/2022  3:30 AM  MELD score: 15 at 6/30/2022  3:30 AM  Calculated from:  Serum Creatinine: 1.2 mg/dL at 6/30/2022  3:30 AM  Serum Sodium: 133 mmol/L at 6/30/2022  3:30 AM  Total Bilirubin: 1.2 mg/dL at 6/30/2022  3:30 AM  INR(ratio): 1.7 at 6/28/2022 12:42 AM  Age: 67 years    Assessment    #EtOH cirrhosis, decompensated  #ascitis, secondary to above  - MELD: 18  - Patient has lost 7 Kgs since admission, net -13L since admission  - AST: 40, ALT: 23, T. Bilirubin: 2.0    #hematochezia, likely hemorrhoidal  - Hb stable at 7.9    Plan  - Outpatient transplant and addiction psych evaluation  - Continue diuresis with Lasix 40 mg and Spironolactone 100 mg.   - Continue Lactulose TID and Rifaximin BID to maintain 3 soft bowel movements daily.   -  Strict I/O's and daily standing weights.   - Low sodium diet and 1.5L fluid restriction.   - Please obtain daily CBC, BMP, LFT, INR      Thank you for involving us in the care of Aleyda Floyd. Please call with any additional concerns or questions.    Ricci Roman MD, PGY-IV  Gastroenterology Fellow  [facility]

## 2022-07-06 NOTE — PT/OT/SLP PROGRESS
Occupational Therapy      Patient Name:  Aleyda Donatoineaalfonso   MRN:  [id number]    Patient not seen today secondary to Other (Comment) (treatment defered, remains appropriate for OT). Will follow-up 07/07/22.    7/6/2022

## 2022-07-07 NOTE — NURSING
Upon removing I.V in left lower arm. I noticed patient skin was dented due to pressure against skin with IV and coban. Catheter tip was still in place. Patient reported discomfort prior to doctors approval to remove IV. Consulted wound care to assess wound and possible treatment. Wound is wrapped with coban and gauze to control bleeding.

## 2022-07-07 NOTE — PLAN OF CARE
Patient had an uneventful day  Problem: Adult Inpatient Plan of Care  Goal: Plan of Care Review  Outcome: Ongoing, Progressing  Goal: Patient-Specific Goal (Individualized)  Outcome: Ongoing, Progressing  Goal: Absence of Hospital-Acquired Illness or Injury  Outcome: Ongoing, Progressing  Goal: Optimal Comfort and Wellbeing  Outcome: Ongoing, Progressing  Goal: Readiness for Transition of Care  Outcome: Ongoing, Progressing     Problem: Skin Injury Risk Increased  Goal: Skin Health and Integrity  Outcome: Ongoing, Progressing     Problem: Bariatric Environmental Safety  Goal: Safety Maintained with Care  Outcome: Ongoing, Progressing     Problem: Fall Injury Risk  Goal: Absence of Fall and Fall-Related Injury  Outcome: Ongoing, Progressing     Problem: Infection  Goal: Absence of Infection Signs and Symptoms  Outcome: Ongoing, Progressing     Problem: Fluid and Electrolyte Imbalance (Acute Kidney Injury/Impairment)  Goal: Fluid and Electrolyte Balance  Outcome: Ongoing, Progressing     Problem: Oral Intake Inadequate (Acute Kidney Injury/Impairment)  Goal: Optimal Nutrition Intake  Outcome: Ongoing, Progressing     Problem: Renal Function Impairment (Acute Kidney Injury/Impairment)  Goal: Effective Renal Function  Outcome: Ongoing, Progressing     Problem: Impaired Wound Healing  Goal: Optimal Wound Healing  Outcome: Ongoing, Progressing

## 2022-07-07 NOTE — ASSESSMENT & PLAN NOTE
- Pt Cr of 1.0 on admission and 1.4 on 7/7  - Bumex 2mg BID decreased to 1mg BID  - keep spironolactone 150mg BID  - monitor

## 2022-07-07 NOTE — TREATMENT PLAN
Hepatology Treatment Plan    Aleyda Floyd is a 67 y.o. female admitted to hospital 6/27/2022 (Hospital Day: 11) due to Anasarca.     Interval History  Chart review shows Bumex dose decreased to 1 mg BID.   Patient had an output of 1300 cc and was a net -1100 cc yesterday    Objective  Temp:  [96.2 °F (35.7 °C)-98.3 °F (36.8 °C)] 98.3 °F (36.8 °C) (07/07 0744)  Pulse:  [78-87] 87 (07/07 0744)  BP: (108-160)/(50-69) 135/62 (07/07 0744)  Resp:  [16-18] 17 (07/07 0744)  SpO2:  [92 %-97 %] 96 % (07/07 0744)      Laboratory    Lab Results   Component Value Date    WBC 7.04 07/07/2022    HGB 8.4 (L) 07/07/2022    HCT 24.8 (L) 07/07/2022    MCV 92 07/07/2022    PLT 97 (L) 07/07/2022       Lab Results   Component Value Date     (L) 07/07/2022    K 3.9 07/07/2022    CL 99 07/07/2022    CO2 28 07/07/2022    BUN 19 07/07/2022    CREATININE 1.4 07/07/2022    CALCIUM 8.4 (L) 07/07/2022       Lab Results   Component Value Date    ALBUMIN 1.6 (L) 07/07/2022    ALT 25 07/07/2022    AST 40 07/07/2022    ALKPHOS 91 07/07/2022    BILITOT 2.0 (H) 07/07/2022       Lab Results   Component Value Date    INR 1.7 (H) 06/28/2022    INR 1.7 (H) 06/23/2022    INR 1.8 (H) 05/05/2022       MELD-Na score: 18 at 6/30/2022  3:30 AM  MELD score: 15 at 6/30/2022  3:30 AM  Calculated from:  Serum Creatinine: 1.2 mg/dL at 6/30/2022  3:30 AM  Serum Sodium: 133 mmol/L at 6/30/2022  3:30 AM  Total Bilirubin: 1.2 mg/dL at 6/30/2022  3:30 AM  INR(ratio): 1.7 at 6/28/2022 12:42 AM  Age: 67 years    Assessment    #EtOH cirrhosis, decompensated  #ascitis, secondary to above  - MELD: 18  - Patient has lost 7 Kgs since admission, net -13L since admission  - AST: 40, ALT: 25, T. Bilirubin: 2.0    #hematochezia, likely hemorrhoidal  - Hb stable at 7.9>8.4    Plan  - Outpatient transplant and addiction psych evaluation  - Continue diuresis.   - Continue Lactulose TID and Rifaximin BID to maintain 3 soft bowel movements daily.   - Strict I/O's and daily  standing weights.   - Low sodium diet and 1.5L fluid restriction.   - Please obtain daily CBC, BMP, LFT, INR      Thank you for involving us in the care of Aleyda Floyd. Please call with any additional concerns or questions.    Ricci Roman MD, PGY-IV  Gastroenterology Fellow  [facility]

## 2022-07-07 NOTE — PROGRESS NOTES
[facility] Medicine  Progress Note    Patient Name: Aleyda Floyd  MRN: [id number]  Patient Class: IP- Inpatient   Admission Date: 6/27/2022  Length of Stay: 9 days  Attending Physician: Tena Brizuela MD  Primary Care Provider: Elvie Fernandes MD        Subjective:     Principal Problem:Anasarca        HPI:  This is a 67 year old deaf lady with alcoholic cirrhosis, and hypertension who presented with generalized swelling.  used to obtain history. Patient states that she had the swelling since her diagnosis of cirrhosis in February. Patient states that 2 days ago, she was able to ambulate but with some difficulty due to the swelling but now  worsening to the point where she is unable to ambulate by herself. Of note, patient had a recent EGD performed earlier last week where they had issues with her IV resulting is significant bruising. Patient also notes that whenever she scratches her skin with her nails, she notices clear fluid coming from the cuts. Patient denies fever, chills, cough, hemoptysis, nausea, vomiting, abdominal pain, yellowing of skin, constipation, dysuria, hematuria, and black/ bloody stools. Patient has baseline loose stools from her lactulose.  She reports compliance with all medications including her lactulose and Lasix. Patient and family declined any confusion or altered mental status. She denies any recent alcohol use with last use in February when she was diagnosed with alcoholic cirrhosis. Patient states that she used to weigh around 170s which she believes is her dry weight and is currently in the 200s.     Upon chart review, patient had an echo at an OSH on 2/25/22 which showed EF 60-65% and some diastolic dysfunction.    In the ED, patient was found to have Hgb of 8.5 and platelets of 113 around baseline. Patient had a UA which showed 1+ leuks and many bacteria. Patient did not complain of dysuria.       Overview/Hospital Course:  Admitted for  decompensated cirrhosis and anasarca. Began diuresing with IV Lasix and spironolactone. Hepatology involved in care. Paracentesis of -4.2L; ruled out SBP. Increasing diuresis with Diuril. D/c diuril but continue IV lasix. Repleated K and Mag. 1 BM 7/1 enema ordered 7/2. Prior to enema pt spontaneously had 1 BM with brown stool and bright red streaks of blood. Denied rectal pain. Inadequate response with lasix PO so started on Bumex 2 mg TID. Edema stable. Will attempt to deescalate Bumex to 2mg BID and increase spironolactone to 150mg po qd. Bumex deescalated to 1mg BID due to steadily rising Cr (1.4, 1.0 on admission). OT/PT consulted, and recommend d/c to SNF.      Interval History: NAEON. Pt states that she is doing well. She endorses bowel movements last night and yesterday afternoon. Pt complains of issues with IV in L arm, so nurse will remove for pt comfort.    Review of Systems   Constitutional:  Negative for chills, fatigue and fever.   Eyes:  Negative for photophobia, pain and visual disturbance.   Respiratory:  Negative for cough and shortness of breath.    Cardiovascular:  Negative for chest pain and leg swelling.   Gastrointestinal:  Positive for abdominal distention. Negative for abdominal pain, blood in stool, constipation, diarrhea, nausea, rectal pain and vomiting.   Endocrine: Negative for polyuria.   Genitourinary:  Negative for difficulty urinating, dysuria, flank pain and vaginal pain.   Musculoskeletal:  Negative for back pain and neck pain.        Swelling in all extremities   Skin:  Positive for rash. Negative for color change.   Neurological:  Negative for dizziness, weakness, light-headedness, numbness and headaches.   Psychiatric/Behavioral:  Negative for confusion and sleep disturbance.    Objective:     Vital Signs (Most Recent):  Temp: 98 °F (36.7 °C) (07/07/22 1142)  Pulse: 83 (07/07/22 1142)  Resp: 18 (07/07/22 1142)  BP: (!) 143/65 (07/07/22 1142)  SpO2: 97 % (07/07/22 1142)   Vital  Signs (24h Range):  Temp:  [96.2 °F (35.7 °C)-98.3 °F (36.8 °C)] 98 °F (36.7 °C)  Pulse:  [78-87] 83  Resp:  [16-18] 18  SpO2:  [92 %-97 %] 97 %  BP: (108-160)/(50-69) 143/65     Weight: 90.3 kg (199 lb 1.2 oz)  Body mass index is 38.88 kg/m².    Intake/Output Summary (Last 24 hours) at 7/7/2022 1402  Last data filed at 7/6/2022 1800  Gross per 24 hour   Intake --   Output 900 ml   Net -900 ml      Physical Exam  Vitals reviewed.   Constitutional:       General: She is not in acute distress.     Appearance: She is obese. She is not ill-appearing or diaphoretic.   HENT:      Head: Normocephalic and atraumatic.      Mouth/Throat:      Mouth: Mucous membranes are moist.      Pharynx: Oropharynx is clear. No oropharyngeal exudate or posterior oropharyngeal erythema.   Eyes:      General: No scleral icterus.     Extraocular Movements: Extraocular movements intact.   Neck:      Vascular: No carotid bruit.   Cardiovascular:      Rate and Rhythm: Normal rate and regular rhythm.      Pulses: Normal pulses.      Heart sounds: Normal heart sounds. No murmur heard.    No friction rub.   Pulmonary:      Effort: Pulmonary effort is normal. No respiratory distress.      Breath sounds: Normal breath sounds. No stridor. No wheezing.   Chest:      Chest wall: No tenderness.   Abdominal:      General: Bowel sounds are normal. There is distension.      Palpations: There is no mass.      Tenderness: There is no right CVA tenderness, left CVA tenderness or guarding.      Hernia: A hernia is present.      Comments: Distended. No tenderness to palpation  Small supraumbilical hernia   Musculoskeletal:         General: Swelling present. No tenderness or deformity. Normal range of motion.      Cervical back: Normal range of motion. No rigidity or tenderness.      Right lower leg: Edema present.      Left lower leg: Edema present.      Comments: Diffuse edema   L arm worse than right   Skin:     General: Skin is warm and dry.      Coloration:  Skin is not jaundiced or pale.      Findings: No bruising or erythema.      Comments: Bruising noted on bilateral forearms   Neurological:      General: No focal deficit present.      Mental Status: She is alert and oriented to person, place, and time. Mental status is at baseline.      Motor: No weakness.      Coordination: Coordination normal.   Psychiatric:         Mood and Affect: Mood normal.         Behavior: Behavior normal.         Thought Content: Thought content normal.       Significant Labs: All pertinent labs within the past 24 hours have been reviewed.  CBC:   Recent Labs   Lab 07/06/22 0303 07/07/22 0514   WBC 8.08 7.04   HGB 7.9* 8.4*   HCT 24.6* 24.8*   * 97*     CMP:   Recent Labs   Lab 07/06/22 0303 07/07/22 0514   * 133*   K 4.0 3.9    99   CO2 28 28   * 111*   BUN 17 19   CREATININE 1.4 1.4   CALCIUM 8.4* 8.4*   PROT 6.3 6.3   ALBUMIN 1.7* 1.6*   BILITOT 2.0* 2.0*   ALKPHOS 88 91   AST 40 40   ALT 23 25   ANIONGAP 5* 6*   EGFRNONAA 38.9* 38.9*       Significant Imaging: I have reviewed all pertinent imaging results/findings within the past 24 hours.      Assessment/Plan:      * Anasarca  67 yoF with alcoholic liver cirrhosis and hypertension admitted for generalized anasarca. Patient's albumin on admission was 1.9. Likely edema related to poor intravascular oncotic pressure 2/2 alcoholic cirrhosis. Patient adherent to home medication. Given history and exam, patient would benefit from diuresis. Dry weight reported as 170s. Patient is currently 199 lbs (down from 212 lbs).     - Better response. d/c Diuril 500 mg IV   - f/u echo unremarkable  - strict I/Os  - daily weights  - low sodium diet with 1500 ml fluid restriction  - Lasix 40 mg IV TID switched to 80 mg PO TID on 7/2 however inadequate response, so dc'd lasix and started Bumex 2 mg TID  - Anasarca continues to improve. Good UOP. Bumex decreased to 1 mg BID and continue spironolactone 150 mg daily.  - Plan for  f/u with her hepatologist on d/c    PITO (acute kidney injury)  - Pt Cr of 1.0 on admission and 1.4 on 7/7  - Bumex 2mg BID decreased to 1mg BID  - keep spironolactone 150mg BID  - monitor    Debility  -Pt non-mobile for some time in hospital, so PT/OT order placed  -Pt advised to move around in bed but to only get up with help  - PT /OT recs SNF, placement pending.      Thrombocytopenia  Chronic, patient at baseline    Anemia, chronic disease  Patient with Hgb of 8.5 on admit. Upon review of care everywhere, patient had Hgb of 8.4 in all of 2022. Likely due to chronic disease and stable.    -Bloody Bm 7/2 but not too concerned about GI bleed  - continue to monitor    Alcoholic cirrhosis of liver with ascites  Patient was diagnosed in February. Previous history of chronic alcohol abuse and states she drank about 4-5 glasses of wine per night for over 20 years. Patient is seeing hepatology outpatient. Labs shown in care everywhere.     MELD-Na score: 18 at 6/30/2022  3:30 AM  MELD score: 15 at 6/30/2022  3:30 AM  Calculated from:  Serum Creatinine: 1.2 mg/dL at 6/30/2022  3:30 AM  Serum Sodium: 133 mmol/L at 6/30/2022  3:30 AM  Total Bilirubin: 1.2 mg/dL at 6/30/2022  3:30 AM  INR(ratio): 1.7 at 6/28/2022 12:42 AM  Age: 67 years    - consulted hepatology, appreciate recommendations for diuresis, labs, f/u outpatient with her hepatologist  - continue lactulose, thiamine, and folate  - S/p paracentesis with 4.2L removed  - continue to monitor for signs of liver failure  - f/u daily CMPs    Primary hypertension  Patient was discontinued from losartan per hepatologist.     - continue to monitor and can add when clinically indicated    Deaf- written communication  ASL  used for interaction as well as written communication      VTE Risk Mitigation (From admission, onward)         Ordered     enoxaparin injection 40 mg  Daily         07/02/22 1613     IP VTE HIGH RISK PATIENT  Once         06/28/22 0972     Place  sequential compression device  Until discontinued         06/28/22 0419     Place sequential compression device  Until discontinued         06/28/22 0415                Discharge Planning   REMA: 7/8/2022     Code Status: Full Code   Is the patient medically ready for discharge?: Yes    Reason for patient still in hospital (select all that apply): Pending disposition  Discharge Plan A: Skilled Nursing Facility   Discharge Delays: None known at this time              Juan Miguel MD  Department of Hospital Medicine   [facility]

## 2022-07-07 NOTE — SUBJECTIVE & OBJECTIVE
Interval History: NAEON. Pt states that she is doing well. She endorses bowel movements last night and yesterday afternoon. Pt complains of issues with IV in L arm, so nurse will remove for pt comfort.    Review of Systems   Constitutional:  Negative for chills, fatigue and fever.   Eyes:  Negative for photophobia, pain and visual disturbance.   Respiratory:  Negative for cough and shortness of breath.    Cardiovascular:  Negative for chest pain and leg swelling.   Gastrointestinal:  Positive for abdominal distention. Negative for abdominal pain, blood in stool, constipation, diarrhea, nausea, rectal pain and vomiting.   Endocrine: Negative for polyuria.   Genitourinary:  Negative for difficulty urinating, dysuria, flank pain and vaginal pain.   Musculoskeletal:  Negative for back pain and neck pain.        Swelling in all extremities   Skin:  Positive for rash. Negative for color change.   Neurological:  Negative for dizziness, weakness, light-headedness, numbness and headaches.   Psychiatric/Behavioral:  Negative for confusion and sleep disturbance.    Objective:     Vital Signs (Most Recent):  Temp: 98 °F (36.7 °C) (07/07/22 1142)  Pulse: 83 (07/07/22 1142)  Resp: 18 (07/07/22 1142)  BP: (!) 143/65 (07/07/22 1142)  SpO2: 97 % (07/07/22 1142)   Vital Signs (24h Range):  Temp:  [96.2 °F (35.7 °C)-98.3 °F (36.8 °C)] 98 °F (36.7 °C)  Pulse:  [78-87] 83  Resp:  [16-18] 18  SpO2:  [92 %-97 %] 97 %  BP: (108-160)/(50-69) 143/65     Weight: 90.3 kg (199 lb 1.2 oz)  Body mass index is 38.88 kg/m².    Intake/Output Summary (Last 24 hours) at 7/7/2022 1402  Last data filed at 7/6/2022 1800  Gross per 24 hour   Intake --   Output 900 ml   Net -900 ml      Physical Exam  Vitals reviewed.   Constitutional:       General: She is not in acute distress.     Appearance: She is obese. She is not ill-appearing or diaphoretic.   HENT:      Head: Normocephalic and atraumatic.      Mouth/Throat:      Mouth: Mucous membranes are moist.       Pharynx: Oropharynx is clear. No oropharyngeal exudate or posterior oropharyngeal erythema.   Eyes:      General: No scleral icterus.     Extraocular Movements: Extraocular movements intact.   Neck:      Vascular: No carotid bruit.   Cardiovascular:      Rate and Rhythm: Normal rate and regular rhythm.      Pulses: Normal pulses.      Heart sounds: Normal heart sounds. No murmur heard.    No friction rub.   Pulmonary:      Effort: Pulmonary effort is normal. No respiratory distress.      Breath sounds: Normal breath sounds. No stridor. No wheezing.   Chest:      Chest wall: No tenderness.   Abdominal:      General: Bowel sounds are normal. There is distension.      Palpations: There is no mass.      Tenderness: There is no right CVA tenderness, left CVA tenderness or guarding.      Hernia: A hernia is present.      Comments: Distended. No tenderness to palpation  Small supraumbilical hernia   Musculoskeletal:         General: Swelling present. No tenderness or deformity. Normal range of motion.      Cervical back: Normal range of motion. No rigidity or tenderness.      Right lower leg: Edema present.      Left lower leg: Edema present.      Comments: Diffuse edema   L arm worse than right   Skin:     General: Skin is warm and dry.      Coloration: Skin is not jaundiced or pale.      Findings: No bruising or erythema.      Comments: Bruising noted on bilateral forearms   Neurological:      General: No focal deficit present.      Mental Status: She is alert and oriented to person, place, and time. Mental status is at baseline.      Motor: No weakness.      Coordination: Coordination normal.   Psychiatric:         Mood and Affect: Mood normal.         Behavior: Behavior normal.         Thought Content: Thought content normal.       Significant Labs: All pertinent labs within the past 24 hours have been reviewed.  CBC:   Recent Labs   Lab 07/06/22  0303 07/07/22  0514   WBC 8.08 7.04   HGB 7.9* 8.4*   HCT 24.6*  24.8*   * 97*     CMP:   Recent Labs   Lab 07/06/22  0303 07/07/22  0514   * 133*   K 4.0 3.9    99   CO2 28 28   * 111*   BUN 17 19   CREATININE 1.4 1.4   CALCIUM 8.4* 8.4*   PROT 6.3 6.3   ALBUMIN 1.7* 1.6*   BILITOT 2.0* 2.0*   ALKPHOS 88 91   AST 40 40   ALT 23 25   ANIONGAP 5* 6*   EGFRNONAA 38.9* 38.9*       Significant Imaging: I have reviewed all pertinent imaging results/findings within the past 24 hours.

## 2022-07-08 NOTE — ASSESSMENT & PLAN NOTE
-Diuresis  with Lasix. Now on Bumex.  -Monitor I/O.  -On Low Na with fluid restriction of 1500ml.  -Per HM note, will FU with hepatologist outpt.

## 2022-07-08 NOTE — PLAN OF CARE
Recommendation/Intervention: Continue low sodium .diet fluid restriction per MD. Education complete, RD following  Goals: PO to meet 85% of EEN by next RD fu  Nutrition Goal Status: new  Communication of RD Recs: other (comment) (POC)     Assessment and Plan  Knowledge deficit concerning low sodium diet as patient reports no previous education on the topic.     New     Plan  Nutrition education- 2 gm sodium, fluid restricted 1.5 L 7/7/22  Mineral modified diet - 2 gram sodium  Collaboration with other providers

## 2022-07-08 NOTE — PLAN OF CARE
[facility]    FACILITY TRANSFER ORDERS      Patient Name: Aleyda Floyd  YOB: 1955    PCP: Elvie Fernandes MD   PCP Address: [address] [place] [address]  PCP Phone Number: [phone]  PCP Fax: [phone]    Encounter Date: 07/08/2022    Admit to: SNF    Vital Signs:  Routine    Diagnoses:   Active Hospital Problems    Diagnosis  POA    *Anasarca [R60.1]  Unknown    PITO (acute kidney injury) [N17.9]  Unknown    Debility [R53.81]  Unknown    Primary hypertension [I10]  Unknown    Alcoholic cirrhosis of liver with ascites [K70.31]  Unknown    Anemia, chronic disease [D63.8]  Unknown    Thrombocytopenia [D69.6]  Unknown    Deaf- written communication [H91.90]  Yes      Resolved Hospital Problems   No resolved problems to display.       Allergies:Review of patient's allergies indicates:  No Known Allergies    Diet: fluid restriction: 1.5 L /24 hr and 2 gram sodium diet    Activities: Activity as tolerated    Goals of Care Treatment Preferences:  Code Status: Full Code      Nursing: vital signs per facility protocol     Labs: n/a    CONSULTS:    Physical Therapy to evaluate and treat.  and Occupational Therapy to evaluate and treat.    MISCELLANEOUS CARE:  Routine Skin for Bedridden Patients: Apply moisture barrier cream to all skin folds and wet areas in perineal area daily and after baths and all bowel movements.    WOUND CARE ORDERS  None    Medications: Review discharge medications with patient and family and provide education.      Current Discharge Medication List      START taking these medications    Details   bumetanide (BUMEX) 1 MG tablet Take 1 tablet (1 mg total) by mouth 2 (two) times a day.  Qty: 60 tablet, Refills: 11      spironolactone (ALDACTONE) 50 MG tablet Take 3 tablets (150 mg total) by mouth once daily.  Qty: 90 tablet, Refills: 11    Comments: .         CONTINUE these medications which have CHANGED    Details   lactulose  (CHRONULAC) 20 gram/30 mL Soln Take 45 mLs (30 g total) by mouth 3 (three) times daily.  Qty: 496 mL, Refills: 0      melatonin (MELATIN) 5 mg Take 1-2 tablet (5-10 mg total) by mouth nightly prn sleep.         CONTINUE these medications which have NOT CHANGED    Details   acetaminophen (TYLENOL) 325 MG tablet Take 650 mg by mouth daily as needed for Pain.      aspirin 81 MG Chew Take 81 mg by mouth once daily.      cholecalciferol, vitamin D3, (VITAMIN D3) 50 mcg (2,000 unit) Cap capsule Take 1 capsule by mouth once daily.      cholestyramine-aspartame (QUESTRAN LIGHT) 4 gram PwPk Take 4 g by mouth once daily.      folic acid (FOLVITE) 1 MG tablet Take 1 mg by mouth once daily.       nystatin (MYCOSTATIN) powder Apply to skin folds beneath breasts as needed      omeprazole (PRILOSEC) 40 MG capsule Take 40 mg by mouth once daily.      thiamine 100 MG tablet Take 100 mg by mouth once daily.         STOP taking these medications       furosemide (LASIX) 20 MG tablet Comments:   Reason for Stopping:         losartan (COZAAR) 50 MG tablet Comments:   Reason for Stopping:         oxycodone-acetaminophen 5-325 mg (PERCOCET) 5-325 mg per tablet Comments:   Reason for Stopping:         simvastatin (ZOCOR) 10 MG tablet Comments:   Reason for Stopping:         wound dressings Pste Comments:   Reason for Stopping:                  Immunizations Administered as of 7/8/2022     No immunizations on file.          This patient has had both covid vaccinations    Some patients may experience side effects after vaccination.  These may include fever, headache, muscle or joint aches.  Most symptoms resolve with 24-48 hours and do not require urgent medical evaluation unless they persist for more than 72 hours or symptoms are concerning for an unrelated medical condition.          _________________________________  Elly Mendez,   07/08/2022

## 2022-07-08 NOTE — CONSULTS
[facility]  Physical Medicine & Rehab  Consult Note    Patient Name: Aleyda Floyd  MRN: [id number]  Admission Date: 6/27/2022  Hospital Length of Stay: 10 days  Attending Physician: Tena Brizuela MD  Consults  Subjective:     Principal Problem: Anasarca    HPI: Per chart review, This is a 67 year old deaf lady with alcoholic cirrhosis, and hypertension who presented with generalized swelling.  used to obtain history. Patient states that she had the swelling since her diagnosis of cirrhosis in February. Patient states that 2 days ago, she was able to ambulate but with some difficulty due to the swelling but now  worsening to the point where she is unable to ambulate by herself. Of note, patient had a recent EGD performed earlier last week where they had issues with her IV resulting is significant bruising. Patient also notes that whenever she scratches her skin with her nails, she notices clear fluid coming from the cuts. Patient denies fever, chills, cough, hemoptysis, nausea, vomiting, abdominal pain, yellowing of skin, constipation, dysuria, hematuria, and black/ bloody stools. Patient has baseline loose stools from her lactulose.  She reports compliance with all medications including her lactulose and Lasix. Patient and family declined any confusion or altered mental status. She denies any recent alcohol use with last use in February when she was diagnosed with alcoholic cirrhosis. Pt was diuresed with Lasix and now deescalated to  Bumex and spironolactone. Pt had Paracentesis completed with 4.2 L out. PM & R was consulted to evaluate pt for rehab.    Functional History: Patient lives  with spouse in a story home with threshold  to enter.  Prior to admission, required caregiver assist for ADLs including bathing, dressing, toileting; uses a SPC occasionally in the house but primarily walks with a RW.  DME: rolling walker, single point cane, shower chair and wheelchair.      Hospital  Course: OT- 07/04    Bed Mobility:    · Scooting to HOB in supine: maximal assistance via drawsheet + bridging technique  · Rolling: Stand by assistance with pt performing x2 trials toward L and x1 trial toward R  · Patient completed Supine to Sit with maximal assistance on left side of bed  · Patient completed Sit to Supine with moderate assistance on left side of bed     Functional Mobility/Transfers:  1. Static Sitting EOB: Supervision  ? Pt maintained x10 mins     Activities of Daily Living:  · Upper Body Dressing: maximal assistance to doff/don gown while supine with pt assisting by threading BUEs  · Lower Body Dressing: maximal assistance to don clean brief while supine with pt assisting by rolling  · Toileting: maximal assistance     PT- 07/05    Bed Mobility:  1. Supine > Sit with minimum assistance  ? Assistance provided with trunk  2. Sit > Supine with minimum assistance  ? Assistance with margot LE  ? Pt able to use margot UE to pull herself up in bed     Transfers:   · Sit <> Stand Transfer: Contact Guard Assistance x 1 trials from eob with RW AD              Gait:  · Distance: 5 ft R + 5 ft R  · Assistance level: Contact Guard Assistance  · Assistive Device: rolling walker  · Gait Assessment: decreased gait speed     Balance:  1. Dynamic Sitting: GOOD: Maintains balance through MODERATE excursions of active trunk movement   § Sat eob for ~15 minutes - initially requiring Miriam for balance with progression to SBA      Past Medical History:   Diagnosis Date    Hypercholesterolemia     Hypertension      Past Surgical History:   Procedure Laterality Date    DILATION AND CURETTAGE OF UTERUS      ESOPHAGOGASTRODUODENOSCOPY N/A 6/23/2022    Procedure: EGD (ESOPHAGOGASTRODUODENOSCOPY);  Surgeon: Sean Perry MD;  Location: [address];  Service: Endoscopy;  Laterality: N/A;  cirrhosis, variceal screening  labs prior  -request sent 6/14  fully vaccinated, instructions emailed  to [email or web address]     Review of patient's allergies indicates:  No Known Allergies    Scheduled Medications:    bumetanide  1 mg Oral BID    cholestyramine  1 packet Oral Daily    enoxaparin  40 mg Subcutaneous Daily    folic acid  1 mg Oral Daily    lactulose  30 g Oral TID    melatonin  6 mg Oral Nightly    pantoprazole  40 mg Oral Daily    polyethylene glycol  17 g Oral Daily    senna-docusate 8.6-50 mg  1 tablet Oral BID    spironolactone  150 mg Oral Daily    thiamine  100 mg Oral Daily       PRN Medications: acetaminophen, dextrose 10%, dextrose 10%, glucagon (human recombinant), glucose, glucose, insulin aspart U-100, melatonin, naloxone, ondansetron, prochlorperazine, sodium chloride 0.9%, sodium chloride 0.9%    Family History       Problem Relation (Age of Onset)    Breast cancer Maternal Grandmother          Tobacco Use    Smoking status: Former Smoker     Packs/day: 1.00     Years: 15.00     Pack years: 15.00     Quit date: 1998     Years since quittin.6    Smokeless tobacco: Never Used   Substance and Sexual Activity    Alcohol use: Not Currently    Drug use: Not Currently    Sexual activity: Not on file     Review of Systems   Constitutional:  Positive for activity change.   Gastrointestinal:  Positive for constipation.   Musculoskeletal:  Positive for gait problem.   Neurological:  Negative for dizziness.   Objective:     Vital Signs (Most Recent):  Temp: 98 °F (36.7 °C) (22)  Pulse: 89 (22)  Resp: 18 (22)  BP: (!) 122/57 (22)  SpO2: 99 % (22)      Vital Signs (24h Range):  Temp:  [97.9 °F (36.6 °C)-98.1 °F (36.7 °C)] 98 °F (36.7 °C)  Pulse:  [83-90] 89  Resp:  [18-20] 18  SpO2:  [90 %-99 %] 99 %  BP: (122-157)/(57-78) 122/57     Body mass index is 38.88 kg/m².    Physical Exam  Constitutional:       Appearance: Normal appearance. She is obese.   Eyes:      Extraocular Movements: Extraocular movements  intact.   Pulmonary:      Effort: Pulmonary effort is normal.   Abdominal:      General: There is distension.      Palpations: Abdomen is soft.   Musculoskeletal:         General: Swelling present.      Cervical back: Normal range of motion and neck supple.   Neurological:      General: No focal deficit present.      Mental Status: She is alert and oriented to person, place, and time.      GCS: GCS eye subscore is 4. GCS verbal subscore is 5. GCS motor subscore is 6.      Motor: Weakness present.      Gait: Gait abnormal.   Psychiatric:         Attention and Perception: Attention normal.         Mood and Affect: Mood normal.         Behavior: Behavior normal.         Thought Content: Thought content normal.      Comments: Hearing impaired. Used  services to communicate with pt.     NEUROLOGICAL EXAMINATION:     MENTAL STATUS   Oriented to person, place, and time.     Diagnostic Results: Labs: Reviewed    Assessment/Plan:     * Anasarca  -Diuresis  with Lasix. Now on Bumex.  -Monitor I/O.  -On Low Na with fluid restriction of 1500ml.  -Per  note, will FU with hepatologist outpt.     PITO (acute kidney injury)  -Continue Spironolactone and Bumex as per .  -Monitor CMP.    Debility  See hospital course for functional status.    Recommendations  -  Encourage mobility, OOB in chair, and early ambulation as appropriate  -  PT/OT evaluate and treat  -  Pain management  -  DVT prophylaxis if appropriate   -  Monitor for and prevent skin breakdown and pressure ulcers  · Early mobility, repositioning/weight shifting every 20-30 minutes when sitting, turn patient every 2 hours, proper mattress/overlay and chair cushioning, pressure relief/heel protector boots      Thrombocytopenia  -Monitor PLT closely.     Anemia, chronic disease  - Stable H and H. Monitor CBC daily.     Alcoholic cirrhosis of liver with ascites  -S/P paracentesis with 4.2 L removed  -Monitor CMP.  -FU with out pt hepatologist.     Primary  hypertension  -Monitor BP closely.     Deaf- written communication  -ASL needed to communicate with pt.     PM&R Recommendation:     At this time, the PM&R team has reviewed this patient's ongoing medical case including inpatient diagnosis, medical history, clinical examination, labs, vitals, current social and functional history to provide the post-acute recommendation as follows:     RECOMMENDATIONS:Skilled nursing facility due to patient poor tolerance for consistent therapy/poor potential for improvement with rehabilitation, once medically stable.      We will  Sign off.    Thank you for your consult.     Hoda Tate NP  Department of Physical Medicine & Rehab  [facility]

## 2022-07-08 NOTE — CONSULTS
Inpatient consult to Physical Medicine Rehab  Consult performed by: Hoda Tate NP  Consult ordered by: Tena Brizuela MD      Consult received.  Reviewed patient history and current admission.  PM&R following.  Hoda Tate NP  Physical Medicine & Rehabilitation   07/08/2022

## 2022-07-08 NOTE — PLAN OF CARE
Sw left message with Sho with [facility]  at  to see if pt has been accepted and we can transfer today, will follow. Rehab recs's SNF, [facility] have a bed until Monday. Requested Booster Vaccine prior to dc, ordered, will follow. IM team updated.

## 2022-07-08 NOTE — PROGRESS NOTES
[facility] Medicine  Progress Note    Patient Name: Aleyda Floyd  MRN: [id number]  Patient Class: IP- Inpatient   Admission Date: 6/27/2022  Length of Stay: 10 days  Attending Physician: Tena Brizuela MD  Primary Care Provider: Elvie Fernandes MD        Subjective:     Principal Problem:Anasarca        HPI:  This is a 67 year old deaf lady with alcoholic cirrhosis, and hypertension who presented with generalized swelling.  used to obtain history. Patient states that she had the swelling since her diagnosis of cirrhosis in February. Patient states that 2 days ago, she was able to ambulate but with some difficulty due to the swelling but now  worsening to the point where she is unable to ambulate by herself. Of note, patient had a recent EGD performed earlier last week where they had issues with her IV resulting is significant bruising. Patient also notes that whenever she scratches her skin with her nails, she notices clear fluid coming from the cuts. Patient denies fever, chills, cough, hemoptysis, nausea, vomiting, abdominal pain, yellowing of skin, constipation, dysuria, hematuria, and black/ bloody stools. Patient has baseline loose stools from her lactulose.  She reports compliance with all medications including her lactulose and Lasix. Patient and family declined any confusion or altered mental status. She denies any recent alcohol use with last use in February when she was diagnosed with alcoholic cirrhosis. Patient states that she used to weigh around 170s which she believes is her dry weight and is currently in the 200s.     Upon chart review, patient had an echo at an OSH on 2/25/22 which showed EF 60-65% and some diastolic dysfunction.    In the ED, patient was found to have Hgb of 8.5 and platelets of 113 around baseline. Patient had a UA which showed 1+ leuks and many bacteria. Patient did not complain of dysuria.       Overview/Hospital Course:  Admitted for  decompensated cirrhosis and anasarca. Began diuresing with IV Lasix and spironolactone. Hepatology involved in care. Paracentesis of -4.2L; ruled out SBP. Increasing diuresis with Diuril. D/c diuril but continue IV lasix. Repleated K and Mag. 1 BM 7/1 enema ordered 7/2. Prior to enema pt spontaneously had 1 BM with brown stool and bright red streaks of blood. Denied rectal pain. Inadequate response with lasix PO so started on Bumex 2 mg TID. Edema stable. Will attempt to deescalate Bumex to 2mg BID and increase spironolactone to 150mg po qd. Bumex deescalated to 1mg BID due to steadily rising Cr (1.4, 1.0 on admission). OT/PT consulted, and recommend d/c to SNF.      Interval History: NAEON. Pt doing well with no complaints.    Review of Systems   Constitutional:  Negative for chills, fatigue and fever.   Eyes:  Negative for photophobia, pain and visual disturbance.   Respiratory:  Negative for cough and shortness of breath.    Cardiovascular:  Negative for chest pain and leg swelling.   Gastrointestinal:  Positive for abdominal distention. Negative for abdominal pain, blood in stool, constipation, diarrhea, nausea, rectal pain and vomiting.   Endocrine: Negative for polyuria.   Genitourinary:  Negative for difficulty urinating, dysuria, flank pain and vaginal pain.   Musculoskeletal:  Negative for back pain and neck pain.        Swelling in all extremities   Skin:  Positive for rash. Negative for color change.   Neurological:  Negative for dizziness, weakness, light-headedness, numbness and headaches.   Psychiatric/Behavioral:  Negative for confusion and sleep disturbance.    Objective:     Vital Signs (Most Recent):  Temp: 98.1 °F (36.7 °C) (07/08/22 1259)  Pulse: 81 (07/08/22 1259)  Resp: 18 (07/08/22 0918)  BP: (!) 126/59 (07/08/22 1259)  SpO2: 97 % (07/08/22 1259)   Vital Signs (24h Range):  Temp:  [97.9 °F (36.6 °C)-98.1 °F (36.7 °C)] 98.1 °F (36.7 °C)  Pulse:  [81-90] 81  Resp:  [18-20] 18  SpO2:  [90 %-99 %]  97 %  BP: (122-157)/(57-78) 126/59     Weight: 90.3 kg (199 lb 1.2 oz)  Body mass index is 38.88 kg/m².    Intake/Output Summary (Last 24 hours) at 7/8/2022 1536  Last data filed at 7/8/2022 1300  Gross per 24 hour   Intake --   Output 800 ml   Net -800 ml      Physical Exam  Vitals reviewed.   Constitutional:       General: She is not in acute distress.     Appearance: She is obese. She is not ill-appearing or diaphoretic.   HENT:      Head: Normocephalic and atraumatic.      Mouth/Throat:      Mouth: Mucous membranes are moist.      Pharynx: Oropharynx is clear. No oropharyngeal exudate or posterior oropharyngeal erythema.   Eyes:      General: No scleral icterus.     Extraocular Movements: Extraocular movements intact.   Neck:      Vascular: No carotid bruit.   Cardiovascular:      Rate and Rhythm: Normal rate and regular rhythm.      Pulses: Normal pulses.      Heart sounds: Normal heart sounds. No murmur heard.    No friction rub.   Pulmonary:      Effort: Pulmonary effort is normal. No respiratory distress.      Breath sounds: Normal breath sounds. No stridor. No wheezing.   Chest:      Chest wall: No tenderness.   Abdominal:      General: Bowel sounds are normal. There is distension.      Palpations: There is no mass.      Tenderness: There is no right CVA tenderness, left CVA tenderness or guarding.      Hernia: A hernia is present.      Comments: Distended. No tenderness to palpation  Small supraumbilical hernia   Musculoskeletal:         General: Swelling present. No tenderness or deformity. Normal range of motion.      Cervical back: Normal range of motion. No rigidity or tenderness.      Right lower leg: Edema present.      Left lower leg: Edema present.      Comments: Diffuse edema   L arm worse than right   Skin:     General: Skin is warm and dry.      Coloration: Skin is not jaundiced or pale.      Findings: No bruising or erythema.      Comments: Bruising noted on bilateral forearms   Neurological:       General: No focal deficit present.      Mental Status: She is alert and oriented to person, place, and time. Mental status is at baseline.      Motor: No weakness.      Coordination: Coordination normal.   Psychiatric:         Mood and Affect: Mood normal.         Behavior: Behavior normal.         Thought Content: Thought content normal.       Significant Labs: All pertinent labs within the past 24 hours have been reviewed.  CBC:   Recent Labs   Lab 07/07/22  0514 07/08/22  0349   WBC 7.04 7.41   HGB 8.4* 8.2*   HCT 24.8* 25.3*   PLT 97* 113*     CMP:   Recent Labs   Lab 07/07/22  0514 07/08/22  0349   * 133*   K 3.9 3.7   CL 99 100   CO2 28 26   * 101   BUN 19 19   CREATININE 1.4 1.3   CALCIUM 8.4* 8.5*   PROT 6.3 6.4   ALBUMIN 1.6* 1.7*   BILITOT 2.0* 2.2*   ALKPHOS 91 93   AST 40 38   ALT 25 24   ANIONGAP 6* 7*   EGFRNONAA 38.9* 42.6*     Recent Lab Results         07/08/22  0349        Albumin 1.7       Alkaline Phosphatase 93       ALT 24       Anion Gap 7       AST 38       Baso # 0.06       Basophil % 0.8       BILIRUBIN TOTAL 2.2  Comment: For infants and newborns, interpretation of results should be based  on gestational age, weight and in agreement with clinical  observations.    Premature Infant recommended reference ranges:  Up to 24 hours.............<8.0 mg/dL  Up to 48 hours............<12.0 mg/dL  3-5 days..................<15.0 mg/dL  6-29 days.................<15.0 mg/dL         BUN 19       Calcium 8.5       Chloride 100       CO2 26       Creatinine 1.3       Differential Method Automated       eGFR if African American 49.1       eGFR if non  42.6  Comment: Calculation used to obtain the estimated glomerular filtration  rate (eGFR) is the CKD-EPI equation.          Eos # 0.3       Eosinophil % 4.5       Glucose 101       Gran # (ANC) 3.6       Gran % 47.9       Hematocrit 25.3       Hemoglobin 8.2       Immature Grans (Abs) 0.03  Comment: Mild elevation in  immature granulocytes is non specific and   can be seen in a variety of conditions including stress response,   acute inflammation, trauma and pregnancy. Correlation with other   laboratory and clinical findings is essential.         Immature Granulocytes 0.4       Lymph # 1.9       Lymph % 25.5       Magnesium 1.6       MCH 29.7       MCHC 32.4       MCV 92       Mono # 1.6       Mono % 20.9       MPV 11.1       nRBC 0       Phosphorus 3.4       Platelets 113       Potassium 3.7       PROTEIN TOTAL 6.4       RBC 2.76       RDW 18.9       Sodium 133       WBC 7.41               Significant Imaging: I have reviewed all pertinent imaging results/findings within the past 24 hours.      Assessment/Plan:      * Anasarca  67 yoF with alcoholic liver cirrhosis and hypertension admitted for generalized anasarca. Patient's albumin on admission was 1.9. Likely edema related to poor intravascular oncotic pressure 2/2 alcoholic cirrhosis. Patient adherent to home medication. Given history and exam, patient would benefit from diuresis. Dry weight reported as 170s. Patient is currently 199 lbs (down from 212 lbs).     - Better response. d/c Diuril 500 mg IV   - f/u echo unremarkable  - strict I/Os  - daily weights  - low sodium diet with 1500 ml fluid restriction  - Lasix 40 mg IV TID switched to 80 mg PO TID on 7/2 however inadequate response, so dc'd lasix and started Bumex 2 mg TID  - Anasarca continues to improve. Good UOP. Bumex decreased to 1 mg BID and continue spironolactone 150 mg daily.  - Plan for f/u with her hepatologist on d/c    PITO (acute kidney injury)  - Pt Cr of 1.0 on admission and 1.4 on 7/7  - Bumex 2mg BID decreased to 1mg BID  - keep spironolactone 150mg BID  - monitor    Debility  -Pt non-mobile for some time in hospital, so PT/OT order placed  -Pt advised to move around in bed but to only get up with help  - PT /OT recs SNF, placement pending.      Thrombocytopenia  Chronic, patient at  baseline    Anemia, chronic disease  Patient with Hgb of 8.5 on admit. Upon review of care everywhere, patient had Hgb of 8.4 in all of 2022. Likely due to chronic disease and stable.    -Bloody Bm 7/2 but not too concerned about GI bleed  - continue to monitor    Alcoholic cirrhosis of liver with ascites  Patient was diagnosed in February. Previous history of chronic alcohol abuse and states she drank about 4-5 glasses of wine per night for over 20 years. Patient is seeing hepatology outpatient. Labs shown in care everywhere.     MELD-Na score: 18 at 6/30/2022  3:30 AM  MELD score: 15 at 6/30/2022  3:30 AM  Calculated from:  Serum Creatinine: 1.2 mg/dL at 6/30/2022  3:30 AM  Serum Sodium: 133 mmol/L at 6/30/2022  3:30 AM  Total Bilirubin: 1.2 mg/dL at 6/30/2022  3:30 AM  INR(ratio): 1.7 at 6/28/2022 12:42 AM  Age: 67 years    - consulted hepatology, appreciate recommendations for diuresis, labs, f/u outpatient with her hepatologist  - continue lactulose, thiamine, and folate  - S/p paracentesis with 4.2L removed  - continue to monitor for signs of liver failure  - f/u daily CMPs    Primary hypertension  Patient was discontinued from losartan per hepatologist.     - continue to monitor and can add when clinically indicated    Deaf- written communication  ASL  used for interaction as well as written communication      VTE Risk Mitigation (From admission, onward)         Ordered     enoxaparin injection 40 mg  Daily         07/02/22 1613     IP VTE HIGH RISK PATIENT  Once         06/28/22 0419     Place sequential compression device  Until discontinued         06/28/22 0419     Place sequential compression device  Until discontinued         06/28/22 0415                Discharge Planning   REMA: 7/11/2022     Code Status: Full Code   Is the patient medically ready for discharge?: Yes    Reason for patient still in hospital (select all that apply): Pending disposition  Discharge Plan A: Skilled Nursing  Facility   Discharge Delays: None known at this time              Juan Miguel MD  Department of Hospital Medicine   [facility]

## 2022-07-08 NOTE — PROGRESS NOTES
07/08/22 1020        Altered Skin Integrity 07/07/22 1416 Left anterior;lower Arm #1   Date First Assessed/Time First Assessed: 07/07/22 1416   Side: Left  Orientation: anterior;lower  Location: Arm  Wound Number: #1  Is this injury device related?: No   Wound Image    Description of Altered Skin Integrity Intact skin with non-blanchable redness of localized area   Dressing Appearance Open to air   Drainage Amount None   Tissue loss description Not applicable   Wound Length (cm) 1.2 cm   Wound Width (cm) 1 cm   Wound Depth (cm) 0 cm   Wound Volume (cm^3) 0 cm^3   Wound Surface Area (cm^2) 1.2 cm^2   Wound consult performed.  No orders at this time.  Will monitor indurated area.  Denies pain.  Utilized  to communicate with pt.  Safety maintained.

## 2022-07-08 NOTE — PT/OT/SLP PROGRESS
Occupational Therapy   Treatment     iPad  used 2/2 pt speaks American Sign Language (ASL).   : Vanesa  ID#: [id number]    Name: Aleyda Floyd  MRN: [id number]  Admitting Diagnosis:  Anasarca    Length of Stay: 10 days    Recommendations:     Discharge Recommendations: nursing facility, skilled  Discharge Equipment Recommendations:   (TBD at next level of care)  Barriers to discharge:   (increased assistance)      Plan:     Patient to be seen 4 x/week to address the above listed problems via self-care/home management, therapeutic activities, therapeutic exercises  · Plan of Care Expires: 08/03/22  · Plan of Care Reviewed with: patient      Assessment:     Aleyda Floyd is a 67 y.o. female with a medical diagnosis of Anasarca.  She presents with the following performance deficits affecting function: weakness, impaired endurance, impaired self care skills, impaired functional mobilty, gait instability, impaired balance, edema, impaired cardiopulmonary response to activity.      Pt mainly limited by impaired ax tolerance on this date. Pt requiring minimum assistance - moderate assistance for bed mobility, minimum assistance and and extra time for functional mobility (side steps) with hand-held assist, and setup assistance for ADLs (grooming) while seated EOB. Pt continues to progress toward OT goals. Pt with good motivation and fair tolerance for therapy. Pt would benefit from SNF OT upon D/C to return to [facility].    Rehab Prognosis: Good; patient would benefit from acute skilled OT services to address these deficits and reach maximum level of function.         Subjective     Communicated with: CARA Alves prior to session.  Patient found HOB elevated with Heath and RN present upon OT entry to room.    Chief Complaint: Leg Swelling (States swelling to legs and arms, Liver cirrhosis patient, had EGD on 6/23. Pt states normally approx 170 pounds but now 212. Denies cp and sob)    Patient/Family  Comments/goals: Pt stated it was hard work to get out of bed.    Pain/Comfort:  · Pain Rating 1: 0/10  · Pain Rating Post-Intervention 1: 0/10      Objective:     Patient found with: Heath   General Precautions: Standard, deaf, fall, contact   Orthopedic Precautions:N/A   Braces: N/A   Oxygen Device: Room Air  Vitals: BP (!) 122/57   Pulse 89   Temp 98 °F (36.7 °C) (Oral)   Resp 18   Ht 5' (1.524 m)   Wt 90.3 kg (199 lb 1.2 oz)   SpO2 99%   Breastfeeding No   BMI 38.88 kg/m²       Occupational Performance:  Bed Mobility:     Scooting to HOB in supine: minimum assistance via bridging technique   Patient completed Supine to Sit with moderate assistance on left side of bed   Scooting anteriorly to EOB to have both feet planted on floor: maximal assistance   Patient completed Sit to Supine with minimum assistance on left side of bed    Functional Mobility/Transfers:   Static Sitting EOB: Stand by assistance  o Pt with posterior lean requiring cueing to correct   Patient completed Sit <> Stand Transfer with minimum assistance  with  hand-held assist    Static Standing Balance: Minimum assistance   Functional Mobility: Pt ambulated 4 small side steps toward L with minimum assistance and increased time with hand-held assist   o No  or c/o dizziness   o SOB noted    Activities of Daily Living:   Grooming: setup assistance to comb hair and perform oral hygiene while seated EOB   Upper Body Dressing: moderate assistance to doff/don hospital gown while HOB elevated    AMPAC 6 Click ADL:  AMPAC Total Score: 16      Treatment & Education:  - Educated on role of OT, POC, and goals for this hospital stay  - Emphasized importance of OOB ax and level of staff assistance required for transfers and functional mobility (minimum assistance for side steps at EOB)  - Encouraged pt to alert OT of personal self-care goals and/or comfort-related concerns during future OT sessions  - Pt denies any further questions,  concerns, or requests at this time  - Whiteboard updated        Patient left HOB elevated with all lines intact, call button in reach and wound care present    GOALS:   Multidisciplinary Problems     Occupational Therapy Goals        Problem: Occupational Therapy    Goal Priority Disciplines Outcome Interventions   Occupational Therapy Goal     OT, PT/OT Ongoing, Progressing    Description: Goals to be met by: 7/18/2022     Patient will increase functional independence with ADLs by performing:    Grooming while standing with Minimal Assistance.  Toileting from bedside commode with Minimal Assistance for hygiene and clothing management.   Supine to sit with Minimal Assistance.  Step transfer with Minimal Assistance using RW.  Toilet transfer to bedside commode with Minimal Assistance using RW.                       Time Tracking:     OT Date of Treatment: 07/08/22  OT Start Time: 0950  OT Stop Time: 1020  OT Total Time (min): 30 min  Additional staff present: RN, Wound Care intermittently    Billable Minutes:Self Care/Home Management 15  Therapeutic Activity 15      7/8/2022

## 2022-07-08 NOTE — PROGRESS NOTES
[facility] Med S[facility]  Adult Nutrition  Progress Note    SUMMARY   Recommendations  Recommendation/Intervention: Continue low sodium .diet fluid restriction per MD. Education complete, RD following  Goals: PO to meet 85% of EEN by next RD fu  Nutrition Goal Status: new  Communication of RD Recs: other (comment) (POC)    Assessment and Plan  Knowledge deficit concerning low sodium diet as patient reports no previous education on the topic.    New    Plan  Nutrition education- 2 gm sodium, fluid restricted 1.5 L 7/7/22  Mineral modified diet - 2 gram sodium  Collaboration with other providers        Malnutrition Assessment 7/7/22     Skin (Micronutrient): edema, wounds unhealed  Hair/Scalp (Micronutrient): dry  Neck/Chest (Micronutrient): muscle wasting  Musculoskeletal/Lower Extremities: muscle wasting, edema           Orbital Region (Subcutaneous Fat Loss): well nourished  Upper Arm Region (Subcutaneous Fat Loss): well nourished  Thoracic and Lumbar Region: well nourished   [place] Region (Muscle Loss): mild depletion  Clavicle Bone Region (Muscle Loss): mild depletion  Clavicle and Acromion Bone Region (Muscle Loss): mild depletion  Anterior Thigh Region (Muscle Loss): mild depletion   Edema (Fluid Accumulation): 3-->moderate             Reason for Assessment    Reason For Assessment: length of stay  Diagnosis: liver disease (anasarca)  Relevant Medical History: Alcoholic Cirrhosis, obesity, deaf,HLD, HTN  Interdisciplinary Rounds: did not attend  General Information Comments: admitted with swelling, wound to arm, lives with spouse, communicates in writing. Low sodium diet presented to patient and also description on 1500 ml fluid restriciton. She does not follow any diet at home. written materials left in room. Patient lives with spouse, recent diagnosis of cirrhosis last winter. NFPE showing edema and age related changes to muscle stores in obese female.  Nutrition Discharge Planning: Dc on low sodium diet, fluid  limit per MD    Nutrition Risk Screen    Nutrition Risk Screen: no indicators present    Nutrition/Diet History    Patient Reported Diet/Restrictions/Preferences: general  Spiritual, Cultural Beliefs, Christianity Practices, Values that Affect Care: no  Food Allergies: NKFA  Factors Affecting Nutritional Intake: None identified at this time    Anthropometrics    Temp: 97.9 °F (36.6 °C)  Height Method: Stated  Height: 5' (152.4 cm)  Height (inches): 60 in  Weight Method: Bed Scale  Weight: 90.3 kg (199 lb 1.2 oz)  Weight (lb): 199.08 lb  Ideal Body Weight (IBW), Female: 100 lb  % Ideal Body Weight, Female (lb): 199.08 %  BMI (Calculated): 38.9  BMI Grade: 35 - 39.9 - obesity - grade II       Lab/Procedures/Meds    Pertinent Labs Reviewed: reviewed  Pertinent Labs Comments: Ben 20, glucose 111, Ca 8.4, Na 133, Hg 8.4, Hct 24.8  Pertinent Medications Reviewed: reviewed  Pertinent Medications Comments: folic acid, thiamine, bumetanide, lovenox, lactuose, spironolactone, senna-docusate, polyethylene glycol, pantoprazole, cholestyramine         Estimated/Assessed Needs    Weight Used For Calorie Calculations: 90.3 kg (199 lb 1.2 oz)  Energy Calorie Requirements (kcal): 1806  Energy Need Method: Kcal/kg  Protein Requirements: 45-55  Weight Used For Protein Calculations: 45.5 kg (100 lb 3.2 oz) (IBW kg 22/ obesity x 1.0-1.2 g/kg)  Fluid Requirements (mL): 1500 per MD     RDA Method (mL): 1806  CHO Requirement: -      Nutrition Prescription Ordered    Current Diet Order: 2 gram sodium,  Nutrition Order Comments: 1500 ml fluid restriction    Evaluation of Received Nutrient/Fluid Intake    I/O: -5271 to date  Energy Calories Required: meeting needs  Protein Required: meeting needs  Fluid Required: meeting needs  Comments: LBM 7/7  Tolerance: tolerating  % Intake of Estimated Energy Needs: 75 - 100 %  % Meal Intake: 75 - 100 %    Nutrition Risk    Level of Risk/Frequency of Follow-up: low (one time per week)     Monitor and  Evaluation    Food and Nutrient Adminstration: diet order  Knowledge/Beliefs/Attitudes: food and nutrition knowledge/skill  Physical Activity and Function: nutrition-related ADLs and IADLs  Anthropometric Measurements: weight change  Biochemical Data, Medical Tests and Procedures: electrolyte and renal panel, gastrointestinal profile  Nutrition-Focused Physical Findings: overall appearance, skin     Nutrition Follow-Up    RD Follow-up?: Yes

## 2022-07-08 NOTE — HPI
Per chart review, This is a 67 year old deaf lady with alcoholic cirrhosis, and hypertension who presented with generalized swelling.  used to obtain history. Patient states that she had the swelling since her diagnosis of cirrhosis in February. Patient states that 2 days ago, she was able to ambulate but with some difficulty due to the swelling but now  worsening to the point where she is unable to ambulate by herself. Of note, patient had a recent EGD performed earlier last week where they had issues with her IV resulting is significant bruising. Patient also notes that whenever she scratches her skin with her nails, she notices clear fluid coming from the cuts. Patient denies fever, chills, cough, hemoptysis, nausea, vomiting, abdominal pain, yellowing of skin, constipation, dysuria, hematuria, and black/ bloody stools. Patient has baseline loose stools from her lactulose.  She reports compliance with all medications including her lactulose and Lasix. Patient and family declined any confusion or altered mental status. She denies any recent alcohol use with last use in February when she was diagnosed with alcoholic cirrhosis. Pt was diuresed with Lasix and now deescalated to  Bumex and spironolactone. Pt had Paracentesis completed with 4.2 L out. PM & R was consulted to evaluate pt for rehab.    Functional History: Patient lives  with spouse in a story home with threshold  to enter.  Prior to admission, required caregiver assist for ADLs including bathing, dressing, toileting; uses a SPC occasionally in the house but primarily walks with a RW.  DME: rolling walker, single point cane, shower chair and wheelchair.

## 2022-07-08 NOTE — PT/OT/SLP PROGRESS
Physical Therapy   Progress Note    Patient Name:  Aleyda Floyd  MRN: [id number]    Admit Date: 6/27/2022  Admitting Diagnosis:  Anasarca  Length of Stay: 10 days  Recent Surgery: * No surgery found *      Recommendations:     Discharge Recommendations: Skilled Nursing Facility   Equipment recommendations: none  Barriers to discharge: None Identified     Assessment:     Aleyda Floyd is a 67 y.o. female admitted to [facility] on 6/27/2022 with medical diagnosis of Anasarca. Pt presents with weakness, impaired endurance, impaired self care skills, impaired functional mobilty, gait instability, impaired balance, impaired skin, edema. Pt is progressing towards goals, but has not yet reached prior level of function. Pt agreeable to therapy session.  present during session and assisted with holding iPad while ambulating so language line could be used. Increased time spent in room as pt and spouse had questions regarding potential upcoming discharge.  entered at end of session to answer questions related to discharge. Aleyda Floyd would benefit from continued acute PT intervention to improve quality of life, focus on recovery of impairments, provide patient/caregiver education, reduce fall risk, and maximize (I) and safety with functional mobility. Once medically stable, recommending pt discharge to Skilled Nursing Facility .      Rehab Prognosis: Good    Plan:     During this hospitalization, patient to be seen 3 x/week to address the identified rehab impairments via gait training, therapeutic activities, therapeutic exercises, neuromuscular re-education and progress towards stated goals.     Plan of Care Expires:  08/04/22  Plan of Care reviewed with: patient and spouse    This plan of care has been discussed with the patient/caregiver, who was included in its development and is in agreement with the identified goals and treatment plan.     Subjective     Communicated with RN prior to  "session.  Patient found supine upon PT entry to room, agreeable to therapy session. Pt's  present during session.    Patient/Family Comments/goals: "Oh I will try to walk."    Pain/Comfort:  · Pain Rating 1: 0/10  · Pain Rating Post-Intervention 1: 0/10    Patients cultural, spiritual, Shinto conflicts given the current situation: None identified     Objective:     Patient found with: [employer]    General Precautions: Standard, deaf, fall, contact   Orthopedic Precautions:N/A   Braces: N/A   Oxygen Device: room air    Cognition:   Pt is Alert during session.    Therapist provided skilled verbal and tactile cueing to facilitate the following functional mobility tasks. Listed tasks are focused on recovery of impairments and improving pt's independence and efficiency with bed mobility, transfers and ambulation as able.     Bed Mobility:  · Supine > Sit: Minimal Assistance   · Assistance provided at trunk and margot LE  · Sit > Supine: Minimal Assistance   · Assistance provided with margot LE    Transfers:   · Sit <> Stand Transfer: Contact Guard Assistance from eob with RW AD                  Gait:  · Distance: 25 ft  · Assistance level: Contact Guard Assistance with progression to SBA  · Assistive Device: rolling walker  · Gait Assessment: decreased step length  and decreased gait speed   · Encouraged pt to increase step length; pt states it is difficult due to legs feeling "heavy"    Balance:  · Dynamic Sitting: GOOD: Maintains balance through MODERATE excursions of active trunk movement  · Standing:  · Static: FAIR+: Takes MINIMAL challenges from all directions   · Dynamic: FAIR+: Needs CLOSE SUPERVISION during gait and is able to right self with minor LOB    Outcome Measure: AM-PAC 6 CLICK MOBILITY  Total Score:17     Patient/Caregiver Education and Additional Therapeutic Activities/Exercises   Please use language line for patient. Pt and spouse have expressed frustration as it is not consistently used between " providers.    Provided pt/caregiver education regarding:    PT POC and goals for therapy    Safety with mobility and fall risk    Safe management of AD as needed    Energy conservation techniques    Instruction on use of call button and importance of calling nursing staff for assistance with mobility     Patient/caregiver able to verbalize understanding; will follow-up with pt/caregiver during current admit for additional questions/concerns within scope of practice.     White board updated.     Patient left supine with call button in reach, nsg notified and  present.    Goals:     Multidisciplinary Problems     Physical Therapy Goals        Problem: Physical Therapy    Goal Priority Disciplines Outcome Goal Variances Interventions   Physical Therapy Goal     PT, PT/OT Ongoing, Progressing     Description: Goals to be met by: 22    Patient will increase functional independence with mobility by performin. Supine to sit with Stand-by Assistance - not met  2. Sit to stand transfer with Supervision - not met  3. Bed to chair transfer with Supervision using LRAD - not met  4. Gait  x 100 feet with Stand-by Assistance using LRAD - not met  5. Ascend/Descend 6 inch curb step with Contact Guard Assistance using LRAD - not met  6. Lower extremity exercise program x30 reps per handout, with independence - not met                     Time Tracking:       PT Received On: 22  PT Start Time: 1435     PT Stop Time: 1529  PT Total Time (min): 54 min     Billable Minutes: Gait Training 23 and Therapeutic Activity 31    2022

## 2022-07-08 NOTE — HOSPITAL COURSE
OT- 07/04    Bed Mobility:    Scooting to HOB in supine: maximal assistance via drawsheet + bridging technique  Rolling: Stand by assistance with pt performing x2 trials toward L and x1 trial toward R  Patient completed Supine to Sit with maximal assistance on left side of bed  Patient completed Sit to Supine with moderate assistance on left side of bed     Functional Mobility/Transfers:  Static Sitting EOB: Supervision  Pt maintained x10 mins     Activities of Daily Living:  Upper Body Dressing: maximal assistance to doff/don gown while supine with pt assisting by threading BUEs  Lower Body Dressing: maximal assistance to don clean brief while supine with pt assisting by rolling  Toileting: maximal assistance     PT- 07/05    Bed Mobility:  Supine > Sit with minimum assistance  Assistance provided with trunk  Sit > Supine with minimum assistance  Assistance with margot LE  Pt able to use margot UE to pull herself up in bed     Transfers:   Sit <> Stand Transfer: Contact Guard Assistance x 1 trials from eob with RW AD              Gait:  Distance: 5 ft R + 5 ft R  Assistance level: Contact Guard Assistance  Assistive Device: rolling walker  Gait Assessment: decreased gait speed     Balance:  Dynamic Sitting: GOOD: Maintains balance through MODERATE excursions of active trunk movement   Sat eob for ~15 minutes - initially requiring Miriam for balance with progression to SBA      7/19/22: Participated w/ OT. dependent and drawsheet pull via Trendenelenburg position- maxA. Grooming SBA

## 2022-07-08 NOTE — DISCHARGE INSTRUCTIONS
-- Changed Lasix to Bumex 1 mg twice daily. Added spironolactone 150 mg daily  -- Lactulose 45 mls (30 gm) three times daily  -- Follow up with your hepatologist in approximately 2-4 weeks or sooner if fluid accumulating /swelling again

## 2022-07-08 NOTE — PLAN OF CARE
Uneventful night. Denies any pain or discomfort  Problem: Adult Inpatient Plan of Care  Goal: Plan of Care Review  Outcome: Ongoing, Progressing  Goal: Patient-Specific Goal (Individualized)  Outcome: Ongoing, Progressing  Goal: Absence of Hospital-Acquired Illness or Injury  Outcome: Ongoing, Progressing  Goal: Optimal Comfort and Wellbeing  Outcome: Ongoing, Progressing     Problem: Skin Injury Risk Increased  Goal: Skin Health and Integrity  Outcome: Ongoing, Progressing     Problem: Bariatric Environmental Safety  Goal: Safety Maintained with Care  Outcome: Ongoing, Progressing     Problem: Fall Injury Risk  Goal: Absence of Fall and Fall-Related Injury  Outcome: Ongoing, Progressing     Problem: Infection  Goal: Absence of Infection Signs and Symptoms  Outcome: Ongoing, Progressing     Problem: Fluid and Electrolyte Imbalance (Acute Kidney Injury/Impairment)  Goal: Fluid and Electrolyte Balance  Outcome: Ongoing, Progressing     Problem: Renal Function Impairment (Acute Kidney Injury/Impairment)  Goal: Effective Renal Function  Outcome: Ongoing, Progressing   .

## 2022-07-08 NOTE — SUBJECTIVE & OBJECTIVE
Past Medical History:   Diagnosis Date    Hypercholesterolemia     Hypertension      Past Surgical History:   Procedure Laterality Date    DILATION AND CURETTAGE OF UTERUS      ESOPHAGOGASTRODUODENOSCOPY N/A 2022    Procedure: EGD (ESOPHAGOGASTRODUODENOSCOPY);  Surgeon: Sean Perry MD;  Location: [address];  Service: Endoscopy;  Laterality: N/A;  cirrhosis, variceal screening  labs prior  -request sent   fully vaccinated, instructions emailed to [email or web address]     Review of patient's allergies indicates:  No Known Allergies    Scheduled Medications:    bumetanide  1 mg Oral BID    cholestyramine  1 packet Oral Daily    enoxaparin  40 mg Subcutaneous Daily    folic acid  1 mg Oral Daily    lactulose  30 g Oral TID    melatonin  6 mg Oral Nightly    pantoprazole  40 mg Oral Daily    polyethylene glycol  17 g Oral Daily    senna-docusate 8.6-50 mg  1 tablet Oral BID    spironolactone  150 mg Oral Daily    thiamine  100 mg Oral Daily       PRN Medications: acetaminophen, dextrose 10%, dextrose 10%, glucagon (human recombinant), glucose, glucose, insulin aspart U-100, melatonin, naloxone, ondansetron, prochlorperazine, sodium chloride 0.9%, sodium chloride 0.9%    Family History       Problem Relation (Age of Onset)    Breast cancer Maternal Grandmother          Tobacco Use    Smoking status: Former Smoker     Packs/day: 1.00     Years: 15.00     Pack years: 15.00     Quit date: 1998     Years since quittin.6    Smokeless tobacco: Never Used   Substance and Sexual Activity    Alcohol use: Not Currently    Drug use: Not Currently    Sexual activity: Not on file     Review of Systems   Constitutional:  Positive for activity change.   Gastrointestinal:  Positive for constipation.   Musculoskeletal:  Positive for gait problem.   Neurological:  Negative for dizziness.   Objective:     Vital Signs (Most Recent):  Temp: 98 °F (36.7 °C) (22  0918)  Pulse: 89 (07/08/22 0918)  Resp: 18 (07/08/22 0918)  BP: (!) 122/57 (07/08/22 0918)  SpO2: 99 % (07/08/22 0918)      Vital Signs (24h Range):  Temp:  [97.9 °F (36.6 °C)-98.1 °F (36.7 °C)] 98 °F (36.7 °C)  Pulse:  [83-90] 89  Resp:  [18-20] 18  SpO2:  [90 %-99 %] 99 %  BP: (122-157)/(57-78) 122/57     Body mass index is 38.88 kg/m².    Physical Exam  Constitutional:       Appearance: Normal appearance. She is obese.   Eyes:      Extraocular Movements: Extraocular movements intact.   Pulmonary:      Effort: Pulmonary effort is normal.   Abdominal:      General: There is distension.      Palpations: Abdomen is soft.   Musculoskeletal:         General: Swelling present.      Cervical back: Normal range of motion and neck supple.   Neurological:      General: No focal deficit present.      Mental Status: She is alert and oriented to person, place, and time.      GCS: GCS eye subscore is 4. GCS verbal subscore is 5. GCS motor subscore is 6.      Motor: Weakness present.      Gait: Gait abnormal.   Psychiatric:         Attention and Perception: Attention normal.         Mood and Affect: Mood normal.         Behavior: Behavior normal.         Thought Content: Thought content normal.      Comments: Hearing impaired. Used  services to communicate with pt.     NEUROLOGICAL EXAMINATION:     MENTAL STATUS   Oriented to person, place, and time.     Diagnostic Results: Labs: Reviewed

## 2022-07-08 NOTE — SUBJECTIVE & OBJECTIVE
Interval History: NAEON. Pt doing well with no complaints.    Review of Systems   Constitutional:  Negative for chills, fatigue and fever.   Eyes:  Negative for photophobia, pain and visual disturbance.   Respiratory:  Negative for cough and shortness of breath.    Cardiovascular:  Negative for chest pain and leg swelling.   Gastrointestinal:  Positive for abdominal distention. Negative for abdominal pain, blood in stool, constipation, diarrhea, nausea, rectal pain and vomiting.   Endocrine: Negative for polyuria.   Genitourinary:  Negative for difficulty urinating, dysuria, flank pain and vaginal pain.   Musculoskeletal:  Negative for back pain and neck pain.        Swelling in all extremities   Skin:  Positive for rash. Negative for color change.   Neurological:  Negative for dizziness, weakness, light-headedness, numbness and headaches.   Psychiatric/Behavioral:  Negative for confusion and sleep disturbance.    Objective:     Vital Signs (Most Recent):  Temp: 98.1 °F (36.7 °C) (07/08/22 1259)  Pulse: 81 (07/08/22 1259)  Resp: 18 (07/08/22 0918)  BP: (!) 126/59 (07/08/22 1259)  SpO2: 97 % (07/08/22 1259)   Vital Signs (24h Range):  Temp:  [97.9 °F (36.6 °C)-98.1 °F (36.7 °C)] 98.1 °F (36.7 °C)  Pulse:  [81-90] 81  Resp:  [18-20] 18  SpO2:  [90 %-99 %] 97 %  BP: (122-157)/(57-78) 126/59     Weight: 90.3 kg (199 lb 1.2 oz)  Body mass index is 38.88 kg/m².    Intake/Output Summary (Last 24 hours) at 7/8/2022 1536  Last data filed at 7/8/2022 1300  Gross per 24 hour   Intake --   Output 800 ml   Net -800 ml      Physical Exam  Vitals reviewed.   Constitutional:       General: She is not in acute distress.     Appearance: She is obese. She is not ill-appearing or diaphoretic.   HENT:      Head: Normocephalic and atraumatic.      Mouth/Throat:      Mouth: Mucous membranes are moist.      Pharynx: Oropharynx is clear. No oropharyngeal exudate or posterior oropharyngeal erythema.   Eyes:      General: No scleral  icterus.     Extraocular Movements: Extraocular movements intact.   Neck:      Vascular: No carotid bruit.   Cardiovascular:      Rate and Rhythm: Normal rate and regular rhythm.      Pulses: Normal pulses.      Heart sounds: Normal heart sounds. No murmur heard.    No friction rub.   Pulmonary:      Effort: Pulmonary effort is normal. No respiratory distress.      Breath sounds: Normal breath sounds. No stridor. No wheezing.   Chest:      Chest wall: No tenderness.   Abdominal:      General: Bowel sounds are normal. There is distension.      Palpations: There is no mass.      Tenderness: There is no right CVA tenderness, left CVA tenderness or guarding.      Hernia: A hernia is present.      Comments: Distended. No tenderness to palpation  Small supraumbilical hernia   Musculoskeletal:         General: Swelling present. No tenderness or deformity. Normal range of motion.      Cervical back: Normal range of motion. No rigidity or tenderness.      Right lower leg: Edema present.      Left lower leg: Edema present.      Comments: Diffuse edema   L arm worse than right   Skin:     General: Skin is warm and dry.      Coloration: Skin is not jaundiced or pale.      Findings: No bruising or erythema.      Comments: Bruising noted on bilateral forearms   Neurological:      General: No focal deficit present.      Mental Status: She is alert and oriented to person, place, and time. Mental status is at baseline.      Motor: No weakness.      Coordination: Coordination normal.   Psychiatric:         Mood and Affect: Mood normal.         Behavior: Behavior normal.         Thought Content: Thought content normal.       Significant Labs: All pertinent labs within the past 24 hours have been reviewed.  CBC:   Recent Labs   Lab 07/07/22  0514 07/08/22  0349   WBC 7.04 7.41   HGB 8.4* 8.2*   HCT 24.8* 25.3*   PLT 97* 113*     CMP:   Recent Labs   Lab 07/07/22  0514 07/08/22  0349   * 133*   K 3.9 3.7   CL 99 100   CO2 28 26    * 101   BUN 19 19   CREATININE 1.4 1.3   CALCIUM 8.4* 8.5*   PROT 6.3 6.4   ALBUMIN 1.6* 1.7*   BILITOT 2.0* 2.2*   ALKPHOS 91 93   AST 40 38   ALT 25 24   ANIONGAP 6* 7*   EGFRNONAA 38.9* 42.6*     Recent Lab Results         07/08/22  0349        Albumin 1.7       Alkaline Phosphatase 93       ALT 24       Anion Gap 7       AST 38       Baso # 0.06       Basophil % 0.8       BILIRUBIN TOTAL 2.2  Comment: For infants and newborns, interpretation of results should be based  on gestational age, weight and in agreement with clinical  observations.    Premature Infant recommended reference ranges:  Up to 24 hours.............<8.0 mg/dL  Up to 48 hours............<12.0 mg/dL  3-5 days..................<15.0 mg/dL  6-29 days.................<15.0 mg/dL         BUN 19       Calcium 8.5       Chloride 100       CO2 26       Creatinine 1.3       Differential Method Automated       eGFR if African American 49.1       eGFR if non  42.6  Comment: Calculation used to obtain the estimated glomerular filtration  rate (eGFR) is the CKD-EPI equation.          Eos # 0.3       Eosinophil % 4.5       Glucose 101       Gran # (ANC) 3.6       Gran % 47.9       Hematocrit 25.3       Hemoglobin 8.2       Immature Grans (Abs) 0.03  Comment: Mild elevation in immature granulocytes is non specific and   can be seen in a variety of conditions including stress response,   acute inflammation, trauma and pregnancy. Correlation with other   laboratory and clinical findings is essential.         Immature Granulocytes 0.4       Lymph # 1.9       Lymph % 25.5       Magnesium 1.6       MCH 29.7       MCHC 32.4       MCV 92       Mono # 1.6       Mono % 20.9       MPV 11.1       nRBC 0       Phosphorus 3.4       Platelets 113       Potassium 3.7       PROTEIN TOTAL 6.4       RBC 2.76       RDW 18.9       Sodium 133       WBC 7.41               Significant Imaging: I have reviewed all pertinent imaging results/findings within  the past 24 hours.

## 2022-07-09 PROBLEM — E83.42 HYPOMAGNESEMIA: Status: ACTIVE | Noted: 2022-01-01

## 2022-07-09 NOTE — TREATMENT PLAN
Hepatology Treatment Plan    Aleyda Floyd is a 67 y.o. female admitted to hospital 6/27/2022 (Hospital Day: 13) due to Anasarca.     Interval History  - Chart review shows Bumex dose decreased to 1 mg BID.   - Patient had an output of 1400 cc  - Patient working with PT and is pending placement at [facility]       Objective  Temp:  [96.6 °F (35.9 °C)-98.1 °F (36.7 °C)] 96.7 °F (35.9 °C) (07/09 0746)  Pulse:  [81-93] 88 (07/09 0746)  BP: (122-157)/(57-69) 156/67 (07/09 0746)  Resp:  [14-18] 14 (07/09 0746)  SpO2:  [94 %-99 %] 98 % (07/09 0746)      Laboratory    Lab Results   Component Value Date    WBC 7.54 07/09/2022    HGB 7.9 (L) 07/09/2022    HCT 24.0 (L) 07/09/2022    MCV 90 07/09/2022     (L) 07/09/2022       Lab Results   Component Value Date     (L) 07/09/2022    K 3.6 07/09/2022    CL 99 07/09/2022    CO2 27 07/09/2022    BUN 20 07/09/2022    CREATININE 1.4 07/09/2022    CALCIUM 8.3 (L) 07/09/2022       Lab Results   Component Value Date    ALBUMIN 1.6 (L) 07/09/2022    ALT 25 07/09/2022    AST 36 07/09/2022    ALKPHOS 94 07/09/2022    BILITOT 2.1 (H) 07/09/2022       Lab Results   Component Value Date    INR 1.7 (H) 06/28/2022    INR 1.7 (H) 06/23/2022    INR 1.8 (H) 05/05/2022       MELD-Na score: 18 at 6/30/2022  3:30 AM  MELD score: 15 at 6/30/2022  3:30 AM  Calculated from:  Serum Creatinine: 1.2 mg/dL at 6/30/2022  3:30 AM  Serum Sodium: 133 mmol/L at 6/30/2022  3:30 AM  Total Bilirubin: 1.2 mg/dL at 6/30/2022  3:30 AM  INR(ratio): 1.7 at 6/28/2022 12:42 AM  Age: 67 years    Assessment    #EtOH cirrhosis, decompensated  #ascitis, secondary to above  - MELD: 18  - Patient has been diuresed significantly since admission.   - AST: 36, ALT: 25, T. Bilirubin: 2.1    #hematochezia, likely hemorrhoidal  - Hb stable at 7.9    Plan  - Outpatient hepatology and addiction psych evaluation.  - Continue diuresis.   - Continue Lactulose TID and Rifaximin BID to maintain 3 soft bowel movements daily.   -  Strict I/O's and daily standing weights.   - Low sodium diet and 1.5L fluid restriction.   - Please obtain daily CBC, BMP, LFT, INR      Thank you for involving us in the care of Aleyda Floyd. Please call with any additional concerns or questions.    Ricci Roman MD, PGY-IV  Gastroenterology Fellow  [facility]

## 2022-07-09 NOTE — ASSESSMENT & PLAN NOTE
- Pt Cr of 1.0 on admission   - Cr trended up to 1.4 today  - Will continue bumex and aldactone  - Monitor CMP daily

## 2022-07-09 NOTE — PROGRESS NOTES
[facility] Medicine  Progress Note    Patient Name: Aleyda Floyd  MRN: [id number]  Patient Class: IP- Inpatient   Admission Date: 6/27/2022  Length of Stay: 11 days  Attending Physician: Tena Brizuela MD  Primary Care Provider: Elvie Fernandes MD        Subjective:     Principal Problem:Anasarca        HPI:  This is a 67 year old deaf lady with alcoholic cirrhosis, and hypertension who presented with generalized swelling.  used to obtain history. Patient states that she had the swelling since her diagnosis of cirrhosis in February. Patient states that 2 days ago, she was able to ambulate but with some difficulty due to the swelling but now  worsening to the point where she is unable to ambulate by herself. Of note, patient had a recent EGD performed earlier last week where they had issues with her IV resulting is significant bruising. Patient also notes that whenever she scratches her skin with her nails, she notices clear fluid coming from the cuts. Patient denies fever, chills, cough, hemoptysis, nausea, vomiting, abdominal pain, yellowing of skin, constipation, dysuria, hematuria, and black/ bloody stools. Patient has baseline loose stools from her lactulose.  She reports compliance with all medications including her lactulose and Lasix. Patient and family declined any confusion or altered mental status. She denies any recent alcohol use with last use in February when she was diagnosed with alcoholic cirrhosis. Patient states that she used to weigh around 170s which she believes is her dry weight and is currently in the 200s.     Upon chart review, patient had an echo at an OSH on 2/25/22 which showed EF 60-65% and some diastolic dysfunction.    In the ED, patient was found to have Hgb of 8.5 and platelets of 113 around baseline. Patient had a UA which showed 1+ leuks and many bacteria. Patient did not complain of dysuria.       Overview/Hospital Course:  Admitted for  decompensated cirrhosis and anasarca. Began diuresing with IV Lasix and spironolactone. Hepatology involved in care. Paracentesis of -4.2L; ruled out SBP. Increasing diuresis with Diuril. D/c diuril but continue IV lasix. Repleated K and Mag. 1 BM 7/1 enema ordered 7/2. Prior to enema pt spontaneously had 1 BM with brown stool and bright red streaks of blood. Denied rectal pain. Inadequate response with lasix PO so started on Bumex 2 mg TID. Edema stable. Will attempt to deescalate Bumex to 2mg BID and increase spironolactone to 150mg po qd. Bumex deescalated to 1mg BID due to steadily rising Cr (1.4, 1.0 on admission). OT/PT consulted, and recommend d/c to SNF.      Interval History: No acute events over night. No changes in volume status.    Review of Systems   Constitutional:  Negative for chills, fatigue and fever.   Eyes:  Negative for photophobia, pain and visual disturbance.   Respiratory:  Negative for cough and shortness of breath.    Cardiovascular:  Negative for chest pain and leg swelling.   Gastrointestinal:  Positive for abdominal distention. Negative for abdominal pain, blood in stool, constipation, diarrhea, nausea, rectal pain and vomiting.   Endocrine: Negative for polyuria.   Genitourinary:  Negative for difficulty urinating, dysuria, flank pain and vaginal pain.   Musculoskeletal:  Negative for back pain and neck pain.        Swelling in all extremities   Skin:  Negative for color change and rash.   Neurological:  Negative for dizziness, weakness, light-headedness, numbness and headaches.   Psychiatric/Behavioral:  Negative for confusion and sleep disturbance.    Objective:     Vital Signs (Most Recent):  Temp: 96.7 °F (35.9 °C) (07/09/22 0746)  Pulse: 88 (07/09/22 0746)  Resp: 14 (07/09/22 0746)  BP: (!) 156/67 (07/09/22 0746)  SpO2: 98 % (07/09/22 0746)   Vital Signs (24h Range):  Temp:  [96.6 °F (35.9 °C)-98.1 °F (36.7 °C)] 96.7 °F (35.9 °C)  Pulse:  [81-93] 88  Resp:  [14-18] 14  SpO2:  [94  %-98 %] 98 %  BP: (126-157)/(59-69) 156/67     Weight: 90.3 kg (199 lb 1.2 oz)  Body mass index is 38.88 kg/m².    Intake/Output Summary (Last 24 hours) at 7/9/2022 1155  Last data filed at 7/9/2022 0600  Gross per 24 hour   Intake --   Output 1400 ml   Net -1400 ml      Physical Exam  Constitutional:       General: She is not in acute distress.     Appearance: She is obese. She is not ill-appearing.   HENT:      Head: Normocephalic and atraumatic.      Mouth/Throat:      Mouth: Mucous membranes are moist.   Eyes:      General: No scleral icterus.     Extraocular Movements: Extraocular movements intact.   Cardiovascular:      Rate and Rhythm: Normal rate and regular rhythm.      Pulses: Normal pulses.      Heart sounds: Normal heart sounds. No murmur heard.    No friction rub.   Pulmonary:      Effort: Pulmonary effort is normal. No respiratory distress.      Breath sounds: Normal breath sounds. No wheezing.   Abdominal:      General: Bowel sounds are normal. There is distension.      Hernia: A hernia is present.      Comments: Distended. No tenderness to palpation  Small supraumbilical hernia   Musculoskeletal:         General: Swelling present. No tenderness or deformity. Normal range of motion.      Right lower leg: Edema present.      Left lower leg: Edema present.      Comments: Diffuse edema   L arm worse than right   Skin:     General: Skin is warm and dry.      Coloration: Skin is not jaundiced or pale.      Findings: No bruising or erythema.   Neurological:      General: No focal deficit present.      Mental Status: She is alert and oriented to person, place, and time. Mental status is at baseline.      Motor: No weakness.      Coordination: Coordination normal.   Psychiatric:         Mood and Affect: Mood normal.         Behavior: Behavior normal.         Thought Content: Thought content normal.       Significant Labs: All pertinent labs within the past 24 hours have been reviewed.  CBC:   Recent Labs    Lab 07/08/22  0349 07/09/22  0626   WBC 7.41 7.54   HGB 8.2* 7.9*   HCT 25.3* 24.0*   * 108*     CMP:   Recent Labs   Lab 07/08/22  0349 07/09/22  0626   * 131*   K 3.7 3.6    99   CO2 26 27    117*   BUN 19 20   CREATININE 1.3 1.4   CALCIUM 8.5* 8.3*   PROT 6.4 6.0   ALBUMIN 1.7* 1.6*   BILITOT 2.2* 2.1*   ALKPHOS 93 94   AST 38 36   ALT 24 25   ANIONGAP 7* 5*   EGFRNONAA 42.6* 38.9*       Significant Imaging: I have reviewed all pertinent imaging results/findings within the past 24 hours.      Assessment/Plan:      * Anasarca  67 yoF with alcoholic liver cirrhosis and hypertension admitted for generalized anasarca. Patient's albumin on admission was 1.9. Likely edema related to poor intravascular oncotic pressure 2/2 alcoholic cirrhosis. Patient adherent to home medication. Given history and exam, patient would benefit from diuresis. Dry weight reported as 170s. Patient is currently 199 lbs (down from 212 lbs).     - Better response. d/c Diuril 500 mg IV   - f/u echo unremarkable  - strict I/Os  - daily weights  - low sodium diet with 1500 ml fluid restriction  - Lasix 40 mg IV TID switched to 80 mg PO TID on 7/2 however inadequate response, so dc'd lasix and started Bumex 2 mg TID  - Good UOP. Bumex decreased to 1 mg BID and continue spironolactone 150 mg daily.  - Plan for f/u with her hepatologist on d/c    Hypomagnesemia  Monitor daily and replete prn      PITO (acute kidney injury)  - Pt Cr of 1.0 on admission   - Cr trended up to 1.4 today  - Will continue bumex and aldactone  - Monitor CMP daily    Debility  -Pt non-mobile for some time in hospital, so PT/OT order placed  -Pt advised to move around in bed but to only get up with help  - PT /OT recs SNF, placement pending.      Thrombocytopenia  Chronic, patient at baseline    Anemia, chronic disease  Patient with Hgb of 8.5 on admit. Upon review of care everywhere, patient had Hgb of 8.4 in all of 2022. Likely due to chronic  disease and stable.    -Bloody Bm 7/2 but not too concerned about GI bleed  - continue to monitor    Alcoholic cirrhosis of liver with ascites  Patient was diagnosed in February. Previous history of chronic alcohol abuse and states she drank about 4-5 glasses of wine per night for over 20 years. Patient is seeing hepatology outpatient. Labs shown in care everywhere.     MELD-Na score: 18 at 6/30/2022  3:30 AM  MELD score: 15 at 6/30/2022  3:30 AM  Calculated from:  Serum Creatinine: 1.2 mg/dL at 6/30/2022  3:30 AM  Serum Sodium: 133 mmol/L at 6/30/2022  3:30 AM  Total Bilirubin: 1.2 mg/dL at 6/30/2022  3:30 AM  INR(ratio): 1.7 at 6/28/2022 12:42 AM  Age: 67 years    - consulted hepatology, appreciate recommendations for diuresis, labs, f/u outpatient with her hepatologist  - continue lactulose, thiamine, and folate  - S/p paracentesis with 4.2L removed  - continue to monitor for signs of liver failure  - f/u daily CMPs    Primary hypertension  Patient was discontinued from losartan per hepatologist.     - continue to monitor and can add when clinically indicated    Deaf- written communication  ASL  used for interaction as well as written communication      VTE Risk Mitigation (From admission, onward)         Ordered     enoxaparin injection 40 mg  Daily         07/02/22 1613     IP VTE HIGH RISK PATIENT  Once         06/28/22 0419     Place sequential compression device  Until discontinued         06/28/22 0419     Place sequential compression device  Until discontinued         06/28/22 0415                Discharge Planning   REMA: 7/11/2022     Code Status: Full Code   Is the patient medically ready for discharge?: Yes    Reason for patient still in hospital (select all that apply): Pending disposition  Discharge Plan A: Skilled Nursing Facility   Discharge Delays: None known at this time              Maris Butts DO  Department of Hospital Medicine   [facility]

## 2022-07-09 NOTE — PLAN OF CARE
Problem: Adult Inpatient Plan of Care  Goal: Plan of Care Review  Outcome: Ongoing, Progressing  Goal: Patient-Specific Goal (Individualized)  Outcome: Ongoing, Progressing  Goal: Absence of Hospital-Acquired Illness or Injury  Outcome: Ongoing, Progressing  Goal: Optimal Comfort and Wellbeing  Outcome: Ongoing, Progressing  Goal: Readiness for Transition of Care  Outcome: Ongoing, Progressing     Problem: Skin Injury Risk Increased  Goal: Skin Health and Integrity  Outcome: Ongoing, Progressing     Problem: Bariatric Environmental Safety  Goal: Safety Maintained with Care  Outcome: Ongoing, Progressing     Problem: Fall Injury Risk  Goal: Absence of Fall and Fall-Related Injury  Outcome: Ongoing, Progressing     Problem: Infection  Goal: Absence of Infection Signs and Symptoms  Outcome: Ongoing, Progressing     Problem: Fluid and Electrolyte Imbalance (Acute Kidney Injury/Impairment)  Goal: Fluid and Electrolyte Balance  Outcome: Ongoing, Progressing     Problem: Oral Intake Inadequate (Acute Kidney Injury/Impairment)  Goal: Optimal Nutrition Intake  Outcome: Ongoing, Progressing     Problem: Renal Function Impairment (Acute Kidney Injury/Impairment)  Goal: Effective Renal Function  Outcome: Ongoing, Progressing     Problem: Impaired Wound Healing  Goal: Optimal Wound Healing  Outcome: Ongoing, Progressing

## 2022-07-09 NOTE — SUBJECTIVE & OBJECTIVE
Interval History: No acute events over night. No changes in volume status.    Review of Systems   Constitutional:  Negative for chills, fatigue and fever.   Eyes:  Negative for photophobia, pain and visual disturbance.   Respiratory:  Negative for cough and shortness of breath.    Cardiovascular:  Negative for chest pain and leg swelling.   Gastrointestinal:  Positive for abdominal distention. Negative for abdominal pain, blood in stool, constipation, diarrhea, nausea, rectal pain and vomiting.   Endocrine: Negative for polyuria.   Genitourinary:  Negative for difficulty urinating, dysuria, flank pain and vaginal pain.   Musculoskeletal:  Negative for back pain and neck pain.        Swelling in all extremities   Skin:  Negative for color change and rash.   Neurological:  Negative for dizziness, weakness, light-headedness, numbness and headaches.   Psychiatric/Behavioral:  Negative for confusion and sleep disturbance.    Objective:     Vital Signs (Most Recent):  Temp: 96.7 °F (35.9 °C) (07/09/22 0746)  Pulse: 88 (07/09/22 0746)  Resp: 14 (07/09/22 0746)  BP: (!) 156/67 (07/09/22 0746)  SpO2: 98 % (07/09/22 0746)   Vital Signs (24h Range):  Temp:  [96.6 °F (35.9 °C)-98.1 °F (36.7 °C)] 96.7 °F (35.9 °C)  Pulse:  [81-93] 88  Resp:  [14-18] 14  SpO2:  [94 %-98 %] 98 %  BP: (126-157)/(59-69) 156/67     Weight: 90.3 kg (199 lb 1.2 oz)  Body mass index is 38.88 kg/m².    Intake/Output Summary (Last 24 hours) at 7/9/2022 1155  Last data filed at 7/9/2022 0600  Gross per 24 hour   Intake --   Output 1400 ml   Net -1400 ml      Physical Exam  Constitutional:       General: She is not in acute distress.     Appearance: She is obese. She is not ill-appearing.   HENT:      Head: Normocephalic and atraumatic.      Mouth/Throat:      Mouth: Mucous membranes are moist.   Eyes:      General: No scleral icterus.     Extraocular Movements: Extraocular movements intact.   Cardiovascular:      Rate and Rhythm: Normal rate and regular  rhythm.      Pulses: Normal pulses.      Heart sounds: Normal heart sounds. No murmur heard.    No friction rub.   Pulmonary:      Effort: Pulmonary effort is normal. No respiratory distress.      Breath sounds: Normal breath sounds. No wheezing.   Abdominal:      General: Bowel sounds are normal. There is distension.      Hernia: A hernia is present.      Comments: Distended. No tenderness to palpation  Small supraumbilical hernia   Musculoskeletal:         General: Swelling present. No tenderness or deformity. Normal range of motion.      Right lower leg: Edema present.      Left lower leg: Edema present.      Comments: Diffuse edema   L arm worse than right   Skin:     General: Skin is warm and dry.      Coloration: Skin is not jaundiced or pale.      Findings: No bruising or erythema.   Neurological:      General: No focal deficit present.      Mental Status: She is alert and oriented to person, place, and time. Mental status is at baseline.      Motor: No weakness.      Coordination: Coordination normal.   Psychiatric:         Mood and Affect: Mood normal.         Behavior: Behavior normal.         Thought Content: Thought content normal.       Significant Labs: All pertinent labs within the past 24 hours have been reviewed.  CBC:   Recent Labs   Lab 07/08/22  0349 07/09/22  0626   WBC 7.41 7.54   HGB 8.2* 7.9*   HCT 25.3* 24.0*   * 108*     CMP:   Recent Labs   Lab 07/08/22  0349 07/09/22  0626   * 131*   K 3.7 3.6    99   CO2 26 27    117*   BUN 19 20   CREATININE 1.3 1.4   CALCIUM 8.5* 8.3*   PROT 6.4 6.0   ALBUMIN 1.7* 1.6*   BILITOT 2.2* 2.1*   ALKPHOS 93 94   AST 38 36   ALT 24 25   ANIONGAP 7* 5*   EGFRNONAA 42.6* 38.9*       Significant Imaging: I have reviewed all pertinent imaging results/findings within the past 24 hours.

## 2022-07-09 NOTE — ASSESSMENT & PLAN NOTE
67 yoF with alcoholic liver cirrhosis and hypertension admitted for generalized anasarca. Patient's albumin on admission was 1.9. Likely edema related to poor intravascular oncotic pressure 2/2 alcoholic cirrhosis. Patient adherent to home medication. Given history and exam, patient would benefit from diuresis. Dry weight reported as 170s. Patient is currently 199 lbs (down from 212 lbs).     - Better response. d/c Diuril 500 mg IV   - f/u echo unremarkable  - strict I/Os  - daily weights  - low sodium diet with 1500 ml fluid restriction  - Lasix 40 mg IV TID switched to 80 mg PO TID on 7/2 however inadequate response, so dc'd lasix and started Bumex 2 mg TID  - Good UOP. Bumex decreased to 1 mg BID and continue spironolactone 150 mg daily.  - Plan for f/u with her hepatologist on d/c

## 2022-07-10 PROBLEM — G93.40 ACUTE ENCEPHALOPATHY: Status: ACTIVE | Noted: 2022-01-01

## 2022-07-10 NOTE — SUBJECTIVE & OBJECTIVE
Past Medical History:   Diagnosis Date    Hypercholesterolemia     Hypertension        Past Surgical History:   Procedure Laterality Date    DILATION AND CURETTAGE OF UTERUS      ESOPHAGOGASTRODUODENOSCOPY N/A 2022    Procedure: EGD (ESOPHAGOGASTRODUODENOSCOPY);  Surgeon: Sean Perry MD;  Location: [address];  Service: Endoscopy;  Laterality: N/A;  cirrhosis, variceal screening  labs prior  -request sent   fully vaccinated, instructions emailed to [email or web address]       Review of patient's allergies indicates:  No Known Allergies      No current facility-administered medications on file prior to encounter.     Current Outpatient Medications on File Prior to Encounter   Medication Sig    acetaminophen (TYLENOL) 325 MG tablet Take 650 mg by mouth daily as needed for Pain.    aspirin 81 MG Chew Take 81 mg by mouth once daily.    cholecalciferol, vitamin D3, (VITAMIN D3) 50 mcg (2,000 unit) Cap capsule Take 1 capsule by mouth once daily.    cholestyramine-aspartame (QUESTRAN LIGHT) 4 gram PwPk Take 4 g by mouth once daily.    folic acid (FOLVITE) 1 MG tablet Take 1 mg by mouth once daily.     nystatin (MYCOSTATIN) powder Apply to skin folds beneath breasts as needed    omeprazole (PRILOSEC) 40 MG capsule Take 40 mg by mouth once daily.    thiamine 100 MG tablet Take 100 mg by mouth once daily.    [DISCONTINUED] furosemide (LASIX) 20 MG tablet Take 20 mg by mouth 2 (two) times daily.    [DISCONTINUED] losartan (COZAAR) 50 MG tablet Take 50 mg by mouth once daily.     [DISCONTINUED] simvastatin (ZOCOR) 10 MG tablet Take 10 mg by mouth once daily.      Family History       Problem Relation (Age of Onset)    Breast cancer Maternal Grandmother          Tobacco Use    Smoking status: Former Smoker     Packs/day: 1.00     Years: 15.00     Pack years: 15.00     Quit date: 1998     Years since quittin.6    Smokeless tobacco: Never Used   Substance and Sexual  Activity    Alcohol use: Not Currently    Drug use: Not Currently    Sexual activity: Not on file     Review of Systems   Constitutional:  Negative for chills and fever.   HENT:  Negative for rhinorrhea and sneezing.    Eyes:  Positive for visual disturbance (chronic). Negative for discharge and redness.   Respiratory:  Negative for cough and wheezing.    Gastrointestinal:  Negative for diarrhea and vomiting.   Skin:  Negative for pallor and rash.   Neurological:  Positive for speech difficulty.   Psychiatric/Behavioral:  Positive for confusion and decreased concentration.    Objective:     Vital Signs (Most Recent):  Temp: 98.2 °F (36.8 °C) (07/10/22 0812)  Pulse: (!) 116 (07/10/22 1100)  Resp: 18 (07/10/22 1100)  BP: (!) 140/67 (07/10/22 1100)  SpO2: 100 % (07/10/22 1100)   Vital Signs (24h Range):  Temp:  [96.6 °F (35.9 °C)-98.2 °F (36.8 °C)] 98.2 °F (36.8 °C)  Pulse:  [] 116  Resp:  [16-23] 18  SpO2:  [94 %-100 %] 100 %  BP: (117-159)/(58-71) 140/67     Weight: 87.3 kg (192 lb 7.4 oz)  Body mass index is 37.59 kg/m².    Physical Exam  Constitutional:       Comments: obtunded   HENT:      Head: Normocephalic and atraumatic.      Mouth/Throat:      Mouth: Mucous membranes are moist.      Pharynx: Oropharynx is clear.   Eyes:      General: No scleral icterus.     Conjunctiva/sclera: Conjunctivae normal.   Pulmonary:      Effort: Pulmonary effort is normal.      Breath sounds: No wheezing.   Abdominal:      General: Abdomen is flat. There is no distension.      Tenderness: There is no abdominal tenderness.   Musculoskeletal:         General: No tenderness or deformity.   Skin:     General: Skin is warm and dry.      Coloration: Skin is not jaundiced.   Neurological:      Mental Status: She is disoriented.      Deep Tendon Reflexes: Reflexes normal.      Reflex Scores:       Tricep reflexes are 2+ on the right side and 2+ on the left side.       Bicep reflexes are 2+ on the right side and 2+ on the left side.        Patellar reflexes are 2+ on the right side and 2+ on the left side.      NEUROLOGICAL EXAMINATION:     MENTAL STATUS   Disoriented to person.   Disoriented to place.   Disoriented to time.   Attention: decreased. Concentration: decreased.   Speech: mute   Level of consciousness: drowsy ,  responsive to painful stimuli       Patient opening eyes spontaneously, not looking around  Not following commands or verbalizing anything       MOTOR EXAM        Responsive to painful stimuli but not locaizing or withdrawing from/to the pain     REFLEXES     Reflexes   Right biceps: 2+  Left biceps: 2+  Right triceps: 2+  Left triceps: 2+  Right patellar: 2+  Left patellar: 2+    SENSORY EXAM        Responsive to pain     Significant Labs: All pertinent lab results from the past 24 hours have been reviewed.    Significant Imaging: I have reviewed all pertinent imaging results/findings within the past 24 hours.

## 2022-07-10 NOTE — HPI
Per hospital medicine H and P on 6/28/22:   Ms. Floyd is a 68 yo deaf patient with alcoholic cirrhosis, and hypertension who presented with generalized swelling.  used to obtain history. Patient states that she had the swelling since her diagnosis of cirrhosis in February. Patient states that 2 days ago, she was able to ambulate but with some difficulty due to the swelling but now  worsening to the point where she is unable to ambulate by herself. Of note, patient had a recent EGD performed earlier last week where they had issues with her IV resulting is significant bruising. Patient also notes that whenever she scratches her skin with her nails, she notices clear fluid coming from the cuts. Patient denies fever, chills, cough, hemoptysis, nausea, vomiting, abdominal pain, yellowing of skin, constipation, dysuria, hematuria, and black/ bloody stools. Patient has baseline loose stools from her lactulose.  She reports compliance with all medications including her lactulose and Lasix. Patient and family declined any confusion or altered mental status. She denies any recent alcohol use with last use in February when she was diagnosed with alcoholic cirrhosis. Patient states that she used to weigh around 170s which she believes is her dry weight and is currently in the 200s.     Hospital course prior to ICU admission per hospital medicine : Admitted for decompensated cirrhosis and anasarca. Began diuresing with IV Lasix and spironolactone. Hepatology involved in care. Paracentesis of -4.2L; ruled out SBP. Increasing diuresis with Diuril. D/c diuril but continue IV lasix. Repleated K and Mag. 1 BM 7/1 enema ordered 7/2. Prior to enema pt spontaneously had 1 BM with brown stool and bright red streaks of blood. Denied rectal pain. Inadequate response with lasix PO so started on Bumex 2 mg TID. Edema stable. Will attempt to deescalate Bumex to 2mg BID and increase spironolactone to 150mg po qd. Bumex  deescalated to 1mg BID due to steadily rising Cr (1.4, 1.0 on admission). OT/PT consulted, and recommend d/c to SNF.      7/10: Rapid response team consulted for altered mental status. She was previously interactive and alert and this am was obtunded. Stroke code was called and CTA head and neck obtained. No evidence of acute stroke/thrombosis. She then had some unusual eye deviation and rigidity and general neurology was consulted. Due to her profound encephalopathy and concern for the need for likely aggressive seizure vs hepatic encephalopathy treatment she was admitted to the ICU.

## 2022-07-10 NOTE — ASSESSMENT & PLAN NOTE
Toxic metabolic workup, general neurology consult for further recommendation.  DD of acute ischemic stroke is low  CT head & CTA head & neck reviewed

## 2022-07-10 NOTE — HPI
Ms Floyd is a 67 yoF with bilateral chronic deafness, alcoholic cirrhosis, and hypertension who [place] [facility]. The patient and family declined any confusion or altered mental status on admission and the patient was able to communicate with a writing board over the course of her hospital course. She denied any recent alcohol use with last use in February when she was diagnosed with alcoholic cirrhosis. The patient was treated for decompensated cirrhosis and anasarca with IV Lasix, spironolactone and paracentesis. The patient was pending discharge to SNF but was noted to have had change in cognition on 7/10 AM for which neurology was consulted. On exam, the patient was noted to have be unable to communicate with writing board or follow commands. She was initially not opening eyes spontaneously on exam but was later doing so when re-evaluated while still not following commands or communicating.

## 2022-07-10 NOTE — ASSESSMENT & PLAN NOTE
Patient and family are deaf and use American Sign Language or written communication.   Please utilize  when possible to ensure accurate and efficient communication with the family.   Her sister is hearing and is listed as first contact for emergent calls but please text the  and son if they are needed or for consents.   Please do not text the  anything that should be communicated in person.

## 2022-07-10 NOTE — NURSING
Rn walked in on morning rounds, the patient was lying in bed with her head turned to the side and she appeared to be snoring. RN touched patient to wake her. Pt did not wake, RN did a sternal rub and patient made grunting noises. RN lifted pts arm and patient opened her eyes but did not make eye contact and did not appear to see RN. Staff report patient was responsive and oriented x4 yesterday-last night. MD notified, charge nurse notified, rapid response called to investigate. Code stroke was called, pt was sent to CT. Patient returned to the unit, primary team put in orders for pt to be transferred to [id number].     Report called to [facility] [place].     Patient left the unit via her bed, accompanied by rapid response team and Primary team members.

## 2022-07-10 NOTE — PROCEDURES
EEG prelim  14:04 p.m.-15:54 p.m.    Background:  Continuous, relatively symmetric, disorganized theta/delta activity with nearly continuous runs of generalized discharges with a triphasic morphology at approximately 1-1.5 hz.    Impression:  Moderate to moderate-severe encephalopathy. This pattern meets criteria for the ictal/interictal continuum.  Although nonspecific, this pattern is frequently seen in the setting of renal/hepatic dysfunction as well as a with anoxia and a variety of toxins/medications.  There are no pushbutton activations and no discrete electrographic seizures.  Depending on the clinical scenario, consider a treatment trial of 2 mg IV lorazepam and assess for evidence of electrographic and clinical improvement.  Avoid all proconvulsive medications and continue to address any underlying metabolic derangements as you are doing.    Please hit the EEG button with any clinical activity concerning for seizures and describe what you see.    Full report after the completion of the study.    Isabel Spain MD PhD  Neurology-Epilepsy  [facility].

## 2022-07-10 NOTE — HPI
67-year-old, woman with decompensated alcoholic cirrhosis , HTN, b/l deafness (can communicate through sign language), currently admitted to medicine service for anasarca receiving IV lasix/spironolactone and s/p paracentesis, pending placement to SNF.  Today around 0800 she was noted to have a change in mental status where she was drowsy but arousable & not responding to any stimuli, later around 0830, she was noted to have left sided eye deviation (eyes open/pupils equal & reactive) & R sided tonic posturing, she was also exhibiting generalized weakness and some asterixis were noted, unknown last known well (but was previously seen fine and communicating in writing & through ). Stroke code was called, NIHSS:22. CT Head -ve for any acute intracranial process & CTA head & neck -ve for any LVO. Patient intermittently was coming in & out of her ongoing spells, moving around her extremities on her own and correcting her gaze. Differential of acute ischemic stroke remained low at this point, her clinical presentation of encephalopathy/ spells may have a metabolic cause. General neurology was recommended on board.

## 2022-07-10 NOTE — CONSULTS
[facility] - Cardiac Medical ICU  Neurology  Consult Note    Patient Name: Aleyda Floyd  MRN: [id number]  Admission Date: 6/27/2022  Hospital Length of Stay: 12 days  Code Status: Full Code   Attending Provider: Tena Brizuela MD   Consulting Provider: Savage Cole MD  Primary Care Physician: Elvie Fernandes MD  Principal Problem:Anasarca    Inpatient consult to Neurology  Consult performed by: Savage Cole MD  Consult ordered by: Juan Miguel MD         Subjective:     Chief Complaint:  AMS     HPI:   Ms Floyd is a 67 yoF with bilateral chronic deafness, alcoholic cirrhosis, and hypertension who presented to [facility] [place] with generalized swelling. The patient and family declined any confusion or altered mental status on admission and the patient was able to communicate with a writing board over the course of her hospital course. She denied any recent alcohol use with last use in February when she was diagnosed with alcoholic cirrhosis. The patient was treated for decompensated cirrhosis and anasarca with IV Lasix, spironolactone and paracentesis. The patient was pending discharge to SNF but was noted to have had change in cognition on 7/10 AM for which neurology was consulted. On exam, the patient was noted to have be unable to communicate with writing board or follow commands. She was initially not opening eyes spontaneously on exam but was later doing so when re-evaluated while still not following commands or communicating.          Past Medical History:   Diagnosis Date    Hypercholesterolemia     Hypertension        Past Surgical History:   Procedure Laterality Date    DILATION AND CURETTAGE OF UTERUS      ESOPHAGOGASTRODUODENOSCOPY N/A 6/23/2022    Procedure: EGD (ESOPHAGOGASTRODUODENOSCOPY);  Surgeon: Sean Perry MD;  Location: [address]);  Service: Endoscopy;  Laterality: N/A;  cirrhosis, variceal screening  labs prior  -request sent 6/14  fully vaccinated,  instructions emailed to [email or web address]       Review of patient's allergies indicates:  No Known Allergies      No current facility-administered medications on file prior to encounter.     Current Outpatient Medications on File Prior to Encounter   Medication Sig    acetaminophen (TYLENOL) 325 MG tablet Take 650 mg by mouth daily as needed for Pain.    aspirin 81 MG Chew Take 81 mg by mouth once daily.    cholecalciferol, vitamin D3, (VITAMIN D3) 50 mcg (2,000 unit) Cap capsule Take 1 capsule by mouth once daily.    cholestyramine-aspartame (QUESTRAN LIGHT) 4 gram PwPk Take 4 g by mouth once daily.    folic acid (FOLVITE) 1 MG tablet Take 1 mg by mouth once daily.     nystatin (MYCOSTATIN) powder Apply to skin folds beneath breasts as needed    omeprazole (PRILOSEC) 40 MG capsule Take 40 mg by mouth once daily.    thiamine 100 MG tablet Take 100 mg by mouth once daily.    [DISCONTINUED] furosemide (LASIX) 20 MG tablet Take 20 mg by mouth 2 (two) times daily.    [DISCONTINUED] losartan (COZAAR) 50 MG tablet Take 50 mg by mouth once daily.     [DISCONTINUED] simvastatin (ZOCOR) 10 MG tablet Take 10 mg by mouth once daily.      Family History       Problem Relation (Age of Onset)    Breast cancer Maternal Grandmother          Tobacco Use    Smoking status: Former Smoker     Packs/day: 1.00     Years: 15.00     Pack years: 15.00     Quit date: 1998     Years since quittin.6    Smokeless tobacco: Never Used   Substance and Sexual Activity    Alcohol use: Not Currently    Drug use: Not Currently    Sexual activity: Not on file     Review of Systems   Constitutional:  Negative for chills and fever.   HENT:  Negative for rhinorrhea and sneezing.    Eyes:  Positive for visual disturbance (chronic). Negative for discharge and redness.   Respiratory:  Negative for cough and wheezing.    Gastrointestinal:  Negative for diarrhea and vomiting.   Skin:  Negative for pallor and rash.    Neurological:  Positive for speech difficulty.   Psychiatric/Behavioral:  Positive for confusion and decreased concentration.    Objective:     Vital Signs (Most Recent):  Temp: 98.2 °F (36.8 °C) (07/10/22 0812)  Pulse: (!) 116 (07/10/22 1100)  Resp: 18 (07/10/22 1100)  BP: (!) 140/67 (07/10/22 1100)  SpO2: 100 % (07/10/22 1100)   Vital Signs (24h Range):  Temp:  [96.6 °F (35.9 °C)-98.2 °F (36.8 °C)] 98.2 °F (36.8 °C)  Pulse:  [] 116  Resp:  [16-23] 18  SpO2:  [94 %-100 %] 100 %  BP: (117-159)/(58-71) 140/67     Weight: 87.3 kg (192 lb 7.4 oz)  Body mass index is 37.59 kg/m².    Physical Exam  Constitutional:       Comments: obtunded   HENT:      Head: Normocephalic and atraumatic.      Mouth/Throat:      Mouth: Mucous membranes are moist.      Pharynx: Oropharynx is clear.   Eyes:      General: No scleral icterus.     Conjunctiva/sclera: Conjunctivae normal.   Pulmonary:      Effort: Pulmonary effort is normal.      Breath sounds: No wheezing.   Abdominal:      General: Abdomen is flat. There is no distension.      Tenderness: There is no abdominal tenderness.   Musculoskeletal:         General: No tenderness or deformity.   Skin:     General: Skin is warm and dry.      Coloration: Skin is not jaundiced.   Neurological:      Mental Status: She is disoriented.      Deep Tendon Reflexes: Reflexes normal.      Reflex Scores:       Tricep reflexes are 2+ on the right side and 2+ on the left side.       Bicep reflexes are 2+ on the right side and 2+ on the left side.       Patellar reflexes are 2+ on the right side and 2+ on the left side.      NEUROLOGICAL EXAMINATION:     MENTAL STATUS   Disoriented to person.   Disoriented to place.   Disoriented to time.   Attention: decreased. Concentration: decreased.   Speech: mute   Level of consciousness: drowsy ,  responsive to painful stimuli       Patient opening eyes spontaneously, not looking around  Not following commands or verbalizing anything       MOTOR EXAM         Responsive to painful stimuli but not locaizing or withdrawing from/to the pain     REFLEXES     Reflexes   Right biceps: 2+  Left biceps: 2+  Right triceps: 2+  Left triceps: 2+  Right patellar: 2+  Left patellar: 2+    SENSORY EXAM        Responsive to pain     Significant Labs: All pertinent lab results from the past 24 hours have been reviewed.    Significant Imaging: I have reviewed all pertinent imaging results/findings within the past 24 hours.    Assessment and Plan:     * Anasarca  See above    Acute encephalopathy  Patient with deafness and alcoholic cirrhosis presented to [facility] for decompensated cirrhosis requiring fluid management over hospitalization. Patient had been able to communicate over hospitalization using board. Notable encephalopathy on 7/10 AM for which neurology was consulted    This patients sudden encephalopathy is concerning given her prior baseline. No witnessed seizure activity and no prior history of epilepsy. The patients exam is poor and could resemble a postictal state, although with lower concern at this time for nonconvulsive status epilepticus. Etiology of her current encephalopathy is most likely due to her UA findings concerning for a possible infection. She additionally has had a downtrending sodium which may be contributing as well. No other clear metabolic finding which may be contributing at this time. Prior hyperammonemia has been treated.    Recommend treating underlying UTI  Repeat NH4 and treat as indicated  MRI brain ordered, will follow up  Recommend EEG overnight following MRI completion. Techs aware.  Spoke with primary team regarding these recommendations  Please contact with any questions or concerns  Will continue to follow patient    Debility  See above    Alcoholic cirrhosis of liver with ascites  See above    Deaf- written communication  See above        VTE Risk Mitigation (From admission, onward)         Ordered     enoxaparin injection 40 mg  Daily          07/02/22 1613     IP VTE HIGH RISK PATIENT  Once         06/28/22 0419     Place sequential compression device  Until discontinued         06/28/22 0419     Place sequential compression device  Until discontinued         06/28/22 0415                Thank you for your consult. I will follow-up with patient. Please contact us if you have any additional questions.    Savage Cole MD  Neurology  [facility]

## 2022-07-10 NOTE — CONSULTS
[facility] - Cardiac Medical ICU  Vascular Neurology  Comprehensive Stroke Center  Consult Note    Inpatient consult to Vascular (Stroke) Neurology  Consult performed by: Virginia Balderrama MD  Consult ordered by: Elly Mendez DO        Assessment/Plan:     Patient is a 67 y.o. year old female with:    Acute encephalopathy  Toxic metabolic workup, general neurology consult for further recommendation.  DD of acute ischemic stroke is low  CT head & CTA head & neck reviewed     Debility  Likely due to toxic metabolic encephalopathy & decompensated cirrhosis        STROKE DOCUMENTATION     Acute Stroke Times   Last Known Normal Date: 07/10/22  Last Known Normal Time:  (unknown)  Symptom Onset Date: 07/10/22  Symptom Onset Time:  (unknown)  Stroke Team Called Date: 07/10/22  Stroke Team Called Time: 0830  Stroke Team Arrival Time: 0832  CT Interpretation Time: 0850  Alteplase Recommended: No  CTA Interpretation Time: 0858  Thrombectomy Recommended: No    NIH Scale:  Interval: baseline  1a. Level of Consciousness: 1-->Not alert, but arousable by minor stimulation to obey, answer, or respond  1b. LOC Questions: 2-->Answers neither question correctly  1c. LOC Commands: 2-->Performs neither task correctly  2. Best Gaze: 2-->Forced deviation, or total gaze paresis not overcome by the oculocephalic maneuver  3. Visual: 0-->No visual loss  4. Facial Palsy: 0-->Normal symmetrical movements  5a. Motor Arm, Left: 3-->No effort against gravity, limb falls  5b. Motor Arm, Right: 3-->No effort against gravity, limb falls  6a. Motor Leg, Left: 3-->No effort against gravity, leg falls to bed immediately  6b. Motor Leg, Right: 3-->No effort against gravity, leg falls to bed immediately  7. Limb Ataxia: 0-->Absent  8. Sensory: 0-->Normal, no sensory loss  9. Best Language: 3-->Mute, global aphasia, no usable speech or auditory comprehension  10. Dysarthria: (UN) Intubated or other physical barrier  11. Extinction and Inattention (formerly  Neglect): 0-->No abnormality  Total (NIH Stroke Scale): 22    Modified [place] Score: 2      Thrombolysis Candidate?No (unknown LKW, low DD of stroke, decompensated cirrhosis)  Delays to Thrombolysis?  N/A  Interventional Revascularization Candidate?   Is the patient eligible for mechanical endovascular reperfusion ([facility])? N/A  Delays to Thrombectomy? No LVO, not a candiadte  Hemorrhagic change of an Ischemic Stroke: Does this patient have an ischemic stroke with hemorrhagic changes? No    Subjective:     History of Present Illness:  67-year-old, woman with decompensated alcoholic cirrhosis , HTN, b/l deafness (can communicate through sign language), currently admitted to medicine service for anasarca receiving IV lasix/spironolactone and s/p paracentesis, pending placement to SNF.  Today around 0800 she was noted to have a change in mental status where she was drowsy but arousable & not responding to any stimuli, later around 0830, she was noted to have left sided eye deviation (eyes open/pupils equal & reactive) & R sided tonic posturing, she was also exhibiting generalized weakness and some asterixis were noted, unknown last known well (but was previously seen fine and communicating in writing & through ). Stroke code was called, NIHSS:22. CT Head -ve for any acute intracranial process & CTA head & neck -ve for any LVO. Patient intermittently was coming in & out of her ongoing spells, moving around her extremities on her own and correcting her gaze. Differential of acute ischemic stroke remained low at this point, her clinical presentation of encephalopathy/ spells may have a metabolic cause. General neurology was recommended on board.       Past Medical History:   Diagnosis Date    Hypercholesterolemia     Hypertension        Past Surgical History:   Procedure Laterality Date    DILATION AND CURETTAGE OF UTERUS      ESOPHAGOGASTRODUODENOSCOPY N/A 6/23/2022    Procedure: EGD  (ESOPHAGOGASTRODUODENOSCOPY);  Surgeon: Sean Perry MD;  Location: [facility] ([address] [place] [address]);  Service: Endoscopy;  Laterality: N/A;  cirrhosis, variceal screening  labs prior  -request sent   fully vaccinated, instructions emailed to [email or web address]       Review of patient's allergies indicates:  No Known Allergies      No current facility-administered medications on file prior to encounter.     Current Outpatient Medications on File Prior to Encounter   Medication Sig    acetaminophen (TYLENOL) 325 MG tablet Take 650 mg by mouth daily as needed for Pain.    aspirin 81 MG Chew Take 81 mg by mouth once daily.    cholecalciferol, vitamin D3, (VITAMIN D3) 50 mcg (2,000 unit) Cap capsule Take 1 capsule by mouth once daily.    cholestyramine-aspartame (QUESTRAN LIGHT) 4 gram PwPk Take 4 g by mouth once daily.    folic acid (FOLVITE) 1 MG tablet Take 1 mg by mouth once daily.     nystatin (MYCOSTATIN) powder Apply to skin folds beneath breasts as needed    omeprazole (PRILOSEC) 40 MG capsule Take 40 mg by mouth once daily.    thiamine 100 MG tablet Take 100 mg by mouth once daily.    [DISCONTINUED] furosemide (LASIX) 20 MG tablet Take 20 mg by mouth 2 (two) times daily.    [DISCONTINUED] losartan (COZAAR) 50 MG tablet Take 50 mg by mouth once daily.     [DISCONTINUED] simvastatin (ZOCOR) 10 MG tablet Take 10 mg by mouth once daily.      Family History       Problem Relation (Age of Onset)    Breast cancer Maternal Grandmother          Tobacco Use    Smoking status: Former Smoker     Packs/day: 1.00     Years: 15.00     Pack years: 15.00     Quit date: 1998     Years since quittin.6    Smokeless tobacco: Never Used   Substance and Sexual Activity    Alcohol use: Not Currently    Drug use: Not Currently    Sexual activity: Not on file     Review of Systems   Constitutional:  Negative for chills and fever.   HENT:  Negative for rhinorrhea and sneezing.     Eyes:  Positive for visual disturbance (chronic). Negative for discharge and redness.   Respiratory:  Negative for cough and wheezing.    Gastrointestinal:  Negative for diarrhea and vomiting.   Skin:  Negative for pallor and rash.   Neurological:  Positive for speech difficulty.   Psychiatric/Behavioral:  Positive for confusion and decreased concentration.    Objective:     Vital Signs (Most Recent):  Temp: 97.5 °F (36.4 °C) (07/10/22 1100)  Pulse: (!) 113 (07/10/22 1400)  Resp: 13 (07/10/22 1400)  BP: 134/62 (07/10/22 1400)  SpO2: 100 % (07/10/22 1400)   Vital Signs (24h Range):  Temp:  [96.6 °F (35.9 °C)-98.2 °F (36.8 °C)] 97.5 °F (36.4 °C)  Pulse:  [] 113  Resp:  [13-23] 13  SpO2:  [94 %-100 %] 100 %  BP: (117-170)/(58-81) 134/62     Weight: 87.3 kg (192 lb 7.4 oz)  Body mass index is 37.59 kg/m².    Physical Exam  Constitutional:       Comments: obtunded   HENT:      Head: Normocephalic and atraumatic.      Mouth/Throat:      Mouth: Mucous membranes are moist.      Pharynx: Oropharynx is clear.   Eyes:      General: No scleral icterus.     Conjunctiva/sclera: Conjunctivae normal.   Pulmonary:      Effort: Pulmonary effort is normal.      Breath sounds: No wheezing.   Abdominal:      General: Abdomen is flat. There is no distension.      Tenderness: There is no abdominal tenderness.   Musculoskeletal:         General: No tenderness or deformity.   Skin:     General: Skin is warm and dry.      Coloration: Skin is not jaundiced.   Neurological:      Mental Status: She is disoriented.      Deep Tendon Reflexes: Reflexes normal.      Reflex Scores:       Tricep reflexes are 2+ on the right side and 2+ on the left side.       Bicep reflexes are 2+ on the right side and 2+ on the left side.       Patellar reflexes are 2+ on the right side and 2+ on the left side.      NEUROLOGICAL EXAMINATION:     MENTAL STATUS   Disoriented to person.   Disoriented to place.   Disoriented to time.   Attention: decreased.  Concentration: decreased.   Speech: mute   Level of consciousness: drowsy ,  responsive to painful stimuli       Patient opening eyes spontaneously, not looking around  Not following commands or verbalizing anything       MOTOR EXAM        Responsive to painful stimuli but not locaizing or withdrawing from/to the pain     REFLEXES     Reflexes   Right biceps: 2+  Left biceps: 2+  Right triceps: 2+  Left triceps: 2+  Right patellar: 2+  Left patellar: 2+    SENSORY EXAM        Responsive to pain     Significant Labs: All pertinent lab results from the past 24 hours have been reviewed.    Significant Imaging: I have reviewed all pertinent imaging results/findings within the past 24 hours.      Virginia Balderrama MD  Comprehensive Stroke Center  Department of Vascular Neurology   [facility] Cardiac Medical ICU

## 2022-07-10 NOTE — SUBJECTIVE & OBJECTIVE
Past Medical History:   Diagnosis Date    Hypercholesterolemia     Hypertension        Past Surgical History:   Procedure Laterality Date    DILATION AND CURETTAGE OF UTERUS      ESOPHAGOGASTRODUODENOSCOPY N/A 2022    Procedure: EGD (ESOPHAGOGASTRODUODENOSCOPY);  Surgeon: Sean Perry MD;  Location: [address];  Service: Endoscopy;  Laterality: N/A;  cirrhosis, variceal screening  labs prior  -request sent   fully vaccinated, instructions emailed to [email or web address]       Review of patient's allergies indicates:  No Known Allergies      No current facility-administered medications on file prior to encounter.     Current Outpatient Medications on File Prior to Encounter   Medication Sig    acetaminophen (TYLENOL) 325 MG tablet Take 650 mg by mouth daily as needed for Pain.    aspirin 81 MG Chew Take 81 mg by mouth once daily.    cholecalciferol, vitamin D3, (VITAMIN D3) 50 mcg (2,000 unit) Cap capsule Take 1 capsule by mouth once daily.    cholestyramine-aspartame (QUESTRAN LIGHT) 4 gram PwPk Take 4 g by mouth once daily.    folic acid (FOLVITE) 1 MG tablet Take 1 mg by mouth once daily.     nystatin (MYCOSTATIN) powder Apply to skin folds beneath breasts as needed    omeprazole (PRILOSEC) 40 MG capsule Take 40 mg by mouth once daily.    thiamine 100 MG tablet Take 100 mg by mouth once daily.    [DISCONTINUED] furosemide (LASIX) 20 MG tablet Take 20 mg by mouth 2 (two) times daily.    [DISCONTINUED] losartan (COZAAR) 50 MG tablet Take 50 mg by mouth once daily.     [DISCONTINUED] simvastatin (ZOCOR) 10 MG tablet Take 10 mg by mouth once daily.      Family History       Problem Relation (Age of Onset)    Breast cancer Maternal Grandmother          Tobacco Use    Smoking status: Former Smoker     Packs/day: 1.00     Years: 15.00     Pack years: 15.00     Quit date: 1998     Years since quittin.6    Smokeless tobacco: Never Used    Substance and Sexual Activity    Alcohol use: Not Currently    Drug use: Not Currently    Sexual activity: Not on file     Review of Systems   Constitutional:  Negative for chills and fever.   HENT:  Negative for rhinorrhea and sneezing.    Eyes:  Positive for visual disturbance (chronic). Negative for discharge and redness.   Respiratory:  Negative for cough and wheezing.    Gastrointestinal:  Negative for diarrhea and vomiting.   Skin:  Negative for pallor and rash.   Neurological:  Positive for speech difficulty.   Psychiatric/Behavioral:  Positive for confusion and decreased concentration.    Objective:     Vital Signs (Most Recent):  Temp: 97.5 °F (36.4 °C) (07/10/22 1100)  Pulse: (!) 113 (07/10/22 1400)  Resp: 13 (07/10/22 1400)  BP: 134/62 (07/10/22 1400)  SpO2: 100 % (07/10/22 1400)   Vital Signs (24h Range):  Temp:  [96.6 °F (35.9 °C)-98.2 °F (36.8 °C)] 97.5 °F (36.4 °C)  Pulse:  [] 113  Resp:  [13-23] 13  SpO2:  [94 %-100 %] 100 %  BP: (117-170)/(58-81) 134/62     Weight: 87.3 kg (192 lb 7.4 oz)  Body mass index is 37.59 kg/m².    Physical Exam  Constitutional:       Comments: obtunded   HENT:      Head: Normocephalic and atraumatic.      Mouth/Throat:      Mouth: Mucous membranes are moist.      Pharynx: Oropharynx is clear.   Eyes:      General: No scleral icterus.     Conjunctiva/sclera: Conjunctivae normal.   Pulmonary:      Effort: Pulmonary effort is normal.      Breath sounds: No wheezing.   Abdominal:      General: Abdomen is flat. There is no distension.      Tenderness: There is no abdominal tenderness.   Musculoskeletal:         General: No tenderness or deformity.   Skin:     General: Skin is warm and dry.      Coloration: Skin is not jaundiced.   Neurological:      Mental Status: She is disoriented.      Deep Tendon Reflexes: Reflexes normal.      Reflex Scores:       Tricep reflexes are 2+ on the right side and 2+ on the left side.       Bicep reflexes are 2+ on the right side and  2+ on the left side.       Patellar reflexes are 2+ on the right side and 2+ on the left side.      NEUROLOGICAL EXAMINATION:     MENTAL STATUS   Disoriented to person.   Disoriented to place.   Disoriented to time.   Attention: decreased. Concentration: decreased.   Speech: mute   Level of consciousness: drowsy ,  responsive to painful stimuli       Patient opening eyes spontaneously, not looking around  Not following commands or verbalizing anything       MOTOR EXAM        Responsive to painful stimuli but not locaizing or withdrawing from/to the pain     REFLEXES     Reflexes   Right biceps: 2+  Left biceps: 2+  Right triceps: 2+  Left triceps: 2+  Right patellar: 2+  Left patellar: 2+    SENSORY EXAM        Responsive to pain     Significant Labs: All pertinent lab results from the past 24 hours have been reviewed.    Significant Imaging: I have reviewed all pertinent imaging results/findings within the past 24 hours.

## 2022-07-10 NOTE — RESPIRATORY THERAPY
RAPID RESPONSE RESPIRATORY THERAPY FOLLOW-UP NOTE       Followed up with patient for proactive rounding. Rapid RN called to get ABG.  ABG obtained.  Patient moved to MICU 6084. Plan of care reviewed with primary RTMarta.  Please call Rapid Response RT, Raven Webber, RRT at [phone] with any questions or concerns.

## 2022-07-10 NOTE — ASSESSMENT & PLAN NOTE
Patient with deafness and alcoholic cirrhosis presented to [facility] for decompensated cirrhosis requiring fluid management over hospitalization. Patient had been able to communicate over hospitalization using board. Notable encephalopathy on 7/10 AM for which neurology was consulted    This patients sudden encephalopathy is concerning given her prior baseline. No witnessed seizure activity and no prior history of epilepsy. The patients exam is poor and could resemble a postictal state, although with lower concern at this time for nonconvulsive status epilepticus. Etiology of her current encephalopathy is most likely due to her UA findings concerning for a possible infection. She additionally has had a downtrending sodium which may be contributing as well. No other clear metabolic finding which may be contributing at this time. Prior hyperammonemia has been treated.    Recommend treating underlying UTI  Repeat NH4 and treat as indicated  MRI brain ordered, will follow up  Recommend EEG overnight following MRI completion. Techs aware.  Spoke with primary team regarding these recommendations  Please contact with any questions or concerns  Will continue to follow patient

## 2022-07-10 NOTE — ASSESSMENT & PLAN NOTE
Hepatology has evaluated while inpatient  - plan to follow up outpatient  - will also need addiction psych eval outpatient  - ammonia is 100 today- will increase lactulose to ensure it is not contributing to her altered mental status   - continue current regimen and will increase lactulose with elevated ammonia and altered mental status

## 2022-07-10 NOTE — PLAN OF CARE
CMICU DAILY GOALS   MRI done, continuous EEG results pending. Patient non responsive, no improvement throughout shift. 2 mg of diazepam given to see if there is change in the EEG waveforms.    A: Awake    RASS: Goal -    Actual - RASS (Brizuela Agitation-Sedation Scale): -1-->drowsy   Restraint necessity:    B: Breathe   SBT: Not intubated   C: Coordinate A & B, analgesics/sedatives   Pain: managed    SAT: Not intubated  D: Delirium   CAM-ICU: Overall CAM-ICU: Positive  E: Early(intubated/ Progressive (non-intubated) Mobility   MOVE Screen: Fail   Activity: Activity Management: Patient unable to perform activities  FAS: Feeding/Nutrition   Diet order: Diet/Nutrition Received: NPO,    T: Thrombus   DVT prophylaxis: VTE Required Core Measure: Pharmacological prophylaxis initiated/maintained  H: HOB Elevation   Head of Bed (HOB) Positioning: HOB at 30-45 degrees  U: Ulcer Prophylaxis   GI: yes  G: Glucose control   managed    S: Skin   Bathing/Skin Care: electrode patches/site rotation  Device Skin Pressure Protection: absorbent pad utilized/changed, adhesive use limited, pressure points protected, skin-to-skin areas padded, skin-to-device areas padded  Pressure Reduction Devices: foam padding utilized, specialty bed utilized  Pressure Reduction Techniques: weight shift assistance provided, pressure points protected  Skin Protection: adhesive use limited, incontinence pads utilized, tubing/devices free from skin contact, skin-to-device areas padded, skin-to-skin areas padded  B: Bowel Function   no issues   I: Indwelling Catheters   Beckham necessity:     CVC necessity: No  D: De-escalation Antibiotics   Yes    Family/Goals of care/Code Status   Code Status: Full Code    24H Vital Sign Range  Temp:  [96.6 °F (35.9 °C)-98.2 °F (36.8 °C)]   Pulse:  []   Resp:  [11-23]   BP: (117-170)/(58-81)   SpO2:  [94 %-100 %]      Shift Events   See above.    VS and assessment per flow sheet, patient progressing towards goals as  tolerated, plan of care reviewed with family, all concerns addressed, will continue to monitor.    Noe Cabrera

## 2022-07-10 NOTE — RESPIRATORY THERAPY
RAPID RESPONSE RESPIRATORY THERAPY STROKE CODE NOTE             Code Status: Full Code   : 1955  Bed: 609/609 A:   MRN: [id number]  Time page Received: 0837  Time Rapid Response RT at Bedside: 08  Time Rapid Response RT left Bedside: 0847    SITUATION    Evaluated patient for: Stroke Code     BACKGROUND    Why is the patient in the hospital?: Anasarca    Patient has a past medical history of Hypercholesterolemia and Hypertension.    24 Hours Vitals Range:  Temp:  [96.6 °F (35.9 °C)-98.2 °F (36.8 °C)]   Pulse:  []   Resp:  [16-20]   BP: (138-159)/(58-68)   SpO2:  [94 %-98 %]     Labs:    Recent Labs     22  0349 22  0626 07/10/22  0535   * 131* 131*   K 3.7 3.6 3.9    99 97   CO2 26 27 26   CREATININE 1.3 1.4 1.2    117* 112*   PHOS 3.4 3.5 3.2   MG 1.6 1.4* 1.4*        No results for input(s): PH, PCO2, PO2, HCO3, POCSATURATED, BE in the last 72 hours.    ASSESSMENT/INTERVENTIONS    Last VS   Temp: 98.2 °F (36.8 °C) (07/10 0812)  Pulse: 105 (07/10 0812)  Resp: 18 (07/10 0812)  BP: 150/65 (07/10 0812)  SpO2: 95 % (07/10 0812)    Level of Consciousness: Level of Consciousness (AVPU): alert  Respiratory Effort:    Expansion/Accessory Muscle Usage: Expansion/Accessory Muscles/Retractions: expansion symmetric, no retractions, no use of accessory muscles  All Lung Field Breath Sounds: All Lung Fields Breath Sounds: Anterior:, Lateral:  SHAUNA Breath Sounds: wheezes, expiratory  RUL Breath Sounds: wheezes, expiratory  O2 Device/Concentration:  ,  , O2 Device (Oxygen Therapy): room air  NIPPV: No  Surgical airway: No  ETCO2 monitored:    Ambu at bedside: Ambu bag with the patient?: Yes, Adult Ambu    Active Orders   Respiratory Care    Pulse Oximetry Q4H     Frequency: Q4H     Number of Occurrences: Until Specified       RECOMMENDATIONS    We recommend: RRT Recs: Continue POC per primary team.    FOLLOW-UP    Please call back the Rapid Response RT, Raven Webber, RRT at x [phone] for  any questions or concerns.

## 2022-07-10 NOTE — SUBJECTIVE & OBJECTIVE
Past Medical History:   Diagnosis Date    Hypercholesterolemia     Hypertension        Past Surgical History:   Procedure Laterality Date    DILATION AND CURETTAGE OF UTERUS      ESOPHAGOGASTRODUODENOSCOPY N/A 2022    Procedure: EGD (ESOPHAGOGASTRODUODENOSCOPY);  Surgeon: Sean Perry MD;  Location: [address]);  Service: Endoscopy;  Laterality: N/A;  cirrhosis, variceal screening  labs prior  -request sent   fully vaccinated, instructions emailed to [email or web address]       Review of patient's allergies indicates:  No Known Allergies    Family History       Problem Relation (Age of Onset)    Breast cancer Maternal Grandmother          Tobacco Use    Smoking status: Former Smoker     Packs/day: 1.00     Years: 15.00     Pack years: 15.00     Quit date: 1998     Years since quittin.6    Smokeless tobacco: Never Used   Substance and Sexual Activity    Alcohol use: Not Currently    Drug use: Not Currently    Sexual activity: Not on file      Review of Systems   Unable to perform ROS: Acuity of condition   Objective:     Vital Signs (Most Recent):  Temp: 97.5 °F (36.4 °C) (07/10/22 1100)  Pulse: (!) 116 (07/10/22 1700)  Resp: 15 (07/10/22 1700)  BP: 139/61 (07/10/22 1700)  SpO2: 100 % (07/10/22 1700)   Vital Signs (24h Range):  Temp:  [96.6 °F (35.9 °C)-98.2 °F (36.8 °C)] 97.5 °F (36.4 °C)  Pulse:  [] 116  Resp:  [11-23] 15  SpO2:  [94 %-100 %] 100 %  BP: (117-170)/(58-81) 139/61   Weight: 87.3 kg (192 lb 7.4 oz)  Body mass index is 37.59 kg/m².      Intake/Output Summary (Last 24 hours) at 7/10/2022 1739  Last data filed at 7/10/2022 0600  Gross per 24 hour   Intake --   Output 600 ml   Net -600 ml       Physical Exam  Vitals and nursing note reviewed.   Constitutional:       Appearance: She is obese. She is ill-appearing. She is not toxic-appearing or diaphoretic.   HENT:      Head: Normocephalic and atraumatic.      Nose: Nose normal.       Mouth/Throat:      Mouth: Mucous membranes are dry.      Pharynx: Oropharynx is clear.   Eyes:      Pupils: Pupils are equal, round, and reactive to light.      Comments: Head turned to the left with upward eye gaze    Cardiovascular:      Rate and Rhythm: Normal rate and regular rhythm.      Pulses: Normal pulses.      Heart sounds: Murmur heard.   Pulmonary:      Effort: Pulmonary effort is normal. No respiratory distress.      Breath sounds: Normal breath sounds. No wheezing or rales.   Abdominal:      General: Bowel sounds are normal. There is distension.      Palpations: Abdomen is soft.      Tenderness: There is no abdominal tenderness. There is no guarding.   Musculoskeletal:         General: Swelling present. No tenderness or signs of injury.      Cervical back: No rigidity or tenderness (no tnederness that we can tell but moves away from touch intemittently).   Skin:     General: Skin is warm and dry.      Capillary Refill: Capillary refill takes less than 2 seconds.      Coloration: Skin is jaundiced.      Findings: No bruising or lesion.   Neurological:      Comments: Intermittent rigidity and laxity on upper extremities and neck    Psychiatric:         Mood and Affect: Mood normal.       Vents:     Lines/Drains/Airways       None                 Significant Labs:    CBC/Anemia Profile:  Recent Labs   Lab 07/10/22  0535 07/10/22  1033 07/10/22  1656   WBC 7.15 6.94 7.94   HGB 8.6* 9.1* 9.0*   HCT 25.6* 27.2* 27.5*   * 135* 154   MCV 91 90 91   RDW 19.3* 19.1* 21.0*        Chemistries:  Recent Labs   Lab 07/09/22  0626 07/10/22  0535 07/10/22  1033   * 131* 129*   K 3.6 3.9 4.3   CL 99 97 96   CO2 27 26 25   BUN 20 19 19   CREATININE 1.4 1.2 1.2   CALCIUM 8.3* 8.6* 8.5*   ALBUMIN 1.6* 1.7* 1.8*   PROT 6.0 6.7 7.2   BILITOT 2.1* 2.3* 2.5*   ALKPHOS 94 104 102   ALT 25 28 32   AST 36 41* 54*   MG 1.4* 1.4* 1.5*   PHOS 3.5 3.2  --        All pertinent labs within the past 24 hours have been  reviewed.    Significant Imaging: I have reviewed all pertinent imaging results/findings within the past 24 hours.

## 2022-07-10 NOTE — CARE UPDATE
RAPID RESPONSE NURSE STROKE CODE NOTE         Admit Date: 2022  LOS: 12  Code Status: Full Code   Date of Consult: 07/10/2022  : 1955  Age: 67 y.o.  Weight:   Wt Readings from Last 1 Encounters:   22 87.3 kg (192 lb 7.4 oz)     Sex: female  Race: White   Bed: [address] [place] [address] A:   MRN: [id number]  Time Rapid Response Team page Received: 0836  Time Rapid Response Team at Bedside: 0838  Time Rapid Response Team left Bedside: 1000  Was the patient discharged from an ICU this admission? No   Was the patient discharged from a PACU within last 24 hours? No   Did the patient receive conscious sedation/general anesthesia in last 24 hours? No  Was the patient in the ED within the past 24 hours? No  Was the patient on NIPPV within the past 24 hours? No   Did this progress into an ARC or CPA: no  Attending Physician: Tena Brizuela MD  Primary Service: [facility] 4       SITUATION    Notified by bedside RN via phone call, overhead page  Called to evaluate the patient for Neuro    BACKGROUND    Why is the patient in the hospital?: Anasaa  Patient has a past medical history of Hypercholesterolemia and Hypertension.    ASSESSMENT    Last VS: /71 (BP Location: Left arm, Patient Position: Lying)   Pulse (!) 113   Temp 98.2 °F (36.8 °C) (Oral)   Resp 18   Ht 5' (1.524 m)   Wt 87.3 kg (192 lb 7.4 oz)   SpO2 95%   Breastfeeding No   BMI 37.59 kg/m²     24H Vital Sign Range:  Temp:  [96.6 °F (35.9 °C)-98.2 °F (36.8 °C)]   Pulse:  []   Resp:  [16-20]   BP: (117-159)/(58-71)   SpO2:  [94 %-98 %]     Last know well time: unknown, AM RN started her shift with changes not mentioned in report    Glucose time:833   Glucose result: 114    Physical Exam    Time Stroke Code initiated: 838  Stroke team Arrival time:839  Stroke Code activation triggers: upward gaze, decreased responsiveness  Vascular Neurology provider you spoke with: Dr. Franco    Time arrived at CT: 845  Time CT completed:855    VAN positive  or negative: negative    tPA decision: no  tPA bolus (mg and time):  tPA infusion (mg and time):  tPA end time:    IR decision: no  IR arrival:  IR end time:     RECOMMENDATIONS    We recommend: IV started, CT negative, and neuro consulted and at bedside.  EEG, labs, and ABG ordered.  CCS called to bedside to evaluate patient. Patient transferred to MICU 6084.    FOLLOW-UP/PLAN    Call the Rapid Response Nurse, Lynda Mckay RN at [phone] for additional questions or concerns.    PHYSICIAN ESCALATION    Orders received and case discussed with Dr. Brizuela, Dr. Gonzalez    Disposition: Tx in ICU bed 6084.

## 2022-07-10 NOTE — H&P
[facility]  Critical Care Medicine  History & Physical    Patient Name: Aleyda Floyd  MRN: [id number]  Admission Date: 6/27/2022  Hospital Length of Stay: 12 days  Code Status: Full Code  Attending Physician: Lianna Gonzalez MD  Primary Care Provider: Elvie Fernandes MD   Principal Problem: Anasarca    Subjective:     HPI:  Per hospital medicine H and P on 6/28/22:   Ms. Floyd is a 68 yo deaf patient with alcoholic cirrhosis, and hypertension who presented with generalized swelling.  used to obtain history. Patient states that she had the swelling since her diagnosis of cirrhosis in February. Patient states that 2 days ago, she was able to ambulate but with some difficulty due to the swelling but now  worsening to the point where she is unable to ambulate by herself. Of note, patient had a recent EGD performed earlier last week where they had issues with her IV resulting is significant bruising. Patient also notes that whenever she scratches her skin with her nails, she notices clear fluid coming from the cuts. Patient denies fever, chills, cough, hemoptysis, nausea, vomiting, abdominal pain, yellowing of skin, constipation, dysuria, hematuria, and black/ bloody stools. Patient has baseline loose stools from her lactulose.  She reports compliance with all medications including her lactulose and Lasix. Patient and family declined any confusion or altered mental status. She denies any recent alcohol use with last use in February when she was diagnosed with alcoholic cirrhosis. Patient states that she used to weigh around 170s which she believes is her dry weight and is currently in the 200s.     Hospital course prior to ICU admission per hospital medicine : Admitted for decompensated cirrhosis and anasarca. Began diuresing with IV Lasix and spironolactone. Hepatology involved in care. Paracentesis of -4.2L; ruled out SBP. Increasing diuresis with Diuril. D/c diuril but continue  IV lasix. Repleated K and Mag. 1 BM  enema ordered . Prior to enema pt spontaneously had 1 BM with brown stool and bright red streaks of blood. Denied rectal pain. Inadequate response with lasix PO so started on Bumex 2 mg TID. Edema stable. Will attempt to deescalate Bumex to 2mg BID and increase spironolactone to 150mg po qd. Bumex deescalated to 1mg BID due to steadily rising Cr (1.4, 1.0 on admission). OT/PT consulted, and recommend d/c to SNF.      7/10: Rapid response team consulted for altered mental status. She was previously interactive and alert and this am was obtunded. Stroke code was called and CTA head and neck obtained. No evidence of acute stroke/thrombosis. She then had some unusual eye deviation and rigidity and general neurology was consulted. Due to her profound encephalopathy and concern for the need for likely aggressive seizure vs hepatic encephalopathy treatment she was admitted to the ICU.       Hospital/ICU Course:  No notes on file     Past Medical History:   Diagnosis Date    Hypercholesterolemia     Hypertension        Past Surgical History:   Procedure Laterality Date    DILATION AND CURETTAGE OF UTERUS      ESOPHAGOGASTRODUODENOSCOPY N/A 2022    Procedure: EGD (ESOPHAGOGASTRODUODENOSCOPY);  Surgeon: Sean Perry MD;  Location: [address]);  Service: Endoscopy;  Laterality: N/A;  cirrhosis, variceal screening  labs prior  -request sent   fully vaccinated, instructions emailed to [email or web address]       Review of patient's allergies indicates:  No Known Allergies    Family History       Problem Relation (Age of Onset)    Breast cancer Maternal Grandmother          Tobacco Use    Smoking status: Former Smoker     Packs/day: 1.00     Years: 15.00     Pack years: 15.00     Quit date: 1998     Years since quittin.6    Smokeless tobacco: Never Used   Substance and Sexual Activity    Alcohol use: Not Currently     Drug use: Not Currently    Sexual activity: Not on file      Review of Systems   Unable to perform ROS: Acuity of condition   Objective:     Vital Signs (Most Recent):  Temp: 97.5 °F (36.4 °C) (07/10/22 1100)  Pulse: (!) 116 (07/10/22 1700)  Resp: 15 (07/10/22 1700)  BP: 139/61 (07/10/22 1700)  SpO2: 100 % (07/10/22 1700)   Vital Signs (24h Range):  Temp:  [96.6 °F (35.9 °C)-98.2 °F (36.8 °C)] 97.5 °F (36.4 °C)  Pulse:  [] 116  Resp:  [11-23] 15  SpO2:  [94 %-100 %] 100 %  BP: (117-170)/(58-81) 139/61   Weight: 87.3 kg (192 lb 7.4 oz)  Body mass index is 37.59 kg/m².      Intake/Output Summary (Last 24 hours) at 7/10/2022 1739  Last data filed at 7/10/2022 0600  Gross per 24 hour   Intake --   Output 600 ml   Net -600 ml       Physical Exam  Vitals and nursing note reviewed.   Constitutional:       Appearance: She is obese. She is ill-appearing. She is not toxic-appearing or diaphoretic.   HENT:      Head: Normocephalic and atraumatic.      Nose: Nose normal.      Mouth/Throat:      Mouth: Mucous membranes are dry.      Pharynx: Oropharynx is clear.   Eyes:      Pupils: Pupils are equal, round, and reactive to light.      Comments: Head turned to the left with upward eye gaze    Cardiovascular:      Rate and Rhythm: Normal rate and regular rhythm.      Pulses: Normal pulses.      Heart sounds: Murmur heard.   Pulmonary:      Effort: Pulmonary effort is normal. No respiratory distress.      Breath sounds: Normal breath sounds. No wheezing or rales.   Abdominal:      General: Bowel sounds are normal. There is distension.      Palpations: Abdomen is soft.      Tenderness: There is no abdominal tenderness. There is no guarding.   Musculoskeletal:         General: Swelling present. No tenderness or signs of injury.      Cervical back: No rigidity or tenderness (no tnederness that we can tell but moves away from touch intemittently).   Skin:     General: Skin is warm and dry.      Capillary Refill: Capillary refill  takes less than 2 seconds.      Coloration: Skin is jaundiced.      Findings: No bruising or lesion.   Neurological:      Comments: Intermittent rigidity and laxity on upper extremities and neck    Psychiatric:         Mood and Affect: Mood normal.       Vents:     Lines/Drains/Airways       None                 Significant Labs:    CBC/Anemia Profile:  Recent Labs   Lab 07/10/22  0535 07/10/22  1033 07/10/22  1656   WBC 7.15 6.94 7.94   HGB 8.6* 9.1* 9.0*   HCT 25.6* 27.2* 27.5*   * 135* 154   MCV 91 90 91   RDW 19.3* 19.1* 21.0*        Chemistries:  Recent Labs   Lab 07/09/22  0626 07/10/22  0535 07/10/22  1033   * 131* 129*   K 3.6 3.9 4.3   CL 99 97 96   CO2 27 26 25   BUN 20 19 19   CREATININE 1.4 1.2 1.2   CALCIUM 8.3* 8.6* 8.5*   ALBUMIN 1.6* 1.7* 1.8*   PROT 6.0 6.7 7.2   BILITOT 2.1* 2.3* 2.5*   ALKPHOS 94 104 102   ALT 25 28 32   AST 36 41* 54*   MG 1.4* 1.4* 1.5*   PHOS 3.5 3.2  --        All pertinent labs within the past 24 hours have been reviewed.    Significant Imaging: I have reviewed all pertinent imaging results/findings within the past 24 hours.    Assessment/Plan:     Neuro  Acute encephalopathy  Concern for seizure activity currently, on continuous eeg, epilepsy is aware and following  - giving diazepam 10 mg x 1 and monitor for response.   - MRI read consistent with a possible chronic process and recommends follow up in 6 months  - no evidence of acute stroke or cerebral edema  - no evidence of new infection  - treating for possible hepatic encephalopathy as above.     ENT  Deaf- written communication  Patient and family are deaf and use American Sign Language or written communication.   Please utilize  when possible to ensure accurate and efficient communication with the family.   Her sister is hearing and is listed as first contact for emergent calls but please text the  and son if they are needed or for consents.   Please do not text the  anything that  should be communicated in person.     Cardiac/Vascular  Primary hypertension  NG tube in place, will continue home meds and monitor vitals     Renal/  PITO (acute kidney injury)  Improved since admission, monitor Is and Os     Hematology  Thrombocytopenia  Also stable for now, secondary to liver disease    Oncology  Anemia, chronic disease  Stable but will continue to monitor daily     GI  Alcoholic cirrhosis of liver with ascites  Hepatology has evaluated while inpatient  - plan to follow up outpatient  - will also need addiction psych eval outpatient  - ammonia is 100 today- will increase lactulose to ensure it is not contributing to her altered mental status   - continue current regimen and will increase lactulose with elevated ammonia and altered mental status     Other  * Anasarca  Secondary to liver disease, continue diuresis and treatment for her decompensated cirrhosis     Critical Care Time: 60 minutes  Critical secondary to Patient has a condition that poses threat to life and bodily function: acute mental status change and concern for status epilepticus      Critical care was time spent personally by me on the following activities: development of treatment plan with patient or surrogate and bedside caregivers, discussions with consultants, evaluation of patient's response to treatment, examination of patient, ordering and performing treatments and interventions, ordering and review of laboratory studies, ordering and review of radiographic studies, pulse oximetry, re-evaluation of patient's condition. This critical care time did not overlap with that of any other provider or involve time for any procedures.     Lianna Gonzalez MD  Critical Care Medicine  [facility] - Cardiac Medical ICU

## 2022-07-10 NOTE — TREATMENT PLAN
Hepatology Treatment Plan    Aleyda Floyd is a 67 y.o. female admitted to hospital 6/27/2022 (Hospital Day: 14) due to Anasarca.     Interval History  - Chart review shows Bumex switched to PO  - Patient had an output of 600 cc. Noted slight improvement in S. Cr to 1.2.   - Patient working with PT and is pending placement at        Objective  Temp:  [96.6 °F (35.9 °C)-98.2 °F (36.8 °C)] 98.2 °F (36.8 °C) (07/10 0812)  Pulse:  [] 117 (07/10 1024)  BP: (117-159)/(58-71) 124/71 (07/10 0930)  Resp:  [16-23] 23 (07/10 1024)  SpO2:  [94 %-100 %] 100 % (07/10 1024)      Laboratory    Lab Results   Component Value Date    WBC 6.94 07/10/2022    HGB 9.1 (L) 07/10/2022    HCT 27.2 (L) 07/10/2022    MCV 90 07/10/2022     (L) 07/10/2022       Lab Results   Component Value Date     (L) 07/10/2022    K 3.9 07/10/2022    CL 97 07/10/2022    CO2 26 07/10/2022    BUN 19 07/10/2022    CREATININE 1.2 07/10/2022    CALCIUM 8.6 (L) 07/10/2022       Lab Results   Component Value Date    ALBUMIN 1.7 (L) 07/10/2022    ALT 28 07/10/2022    AST 41 (H) 07/10/2022    ALKPHOS 104 07/10/2022    BILITOT 2.3 (H) 07/10/2022       Lab Results   Component Value Date    INR 1.7 (H) 06/28/2022    INR 1.7 (H) 06/23/2022    INR 1.8 (H) 05/05/2022       MELD-Na score: 18 at 6/30/2022  3:30 AM  MELD score: 15 at 6/30/2022  3:30 AM  Calculated from:  Serum Creatinine: 1.2 mg/dL at 6/30/2022  3:30 AM  Serum Sodium: 133 mmol/L at 6/30/2022  3:30 AM  Total Bilirubin: 1.2 mg/dL at 6/30/2022  3:30 AM  INR(ratio): 1.7 at 6/28/2022 12:42 AM  Age: 67 years    Assessment    #EtOH cirrhosis, decompensated  #ascitis, secondary to above  - MELD: 18  - Patient has been diuresed significantly since admission.   - Na:131, Cr: 1.2, T. Bilirubin: 2.1    #hematochezia, likely hemorrhoidal  - Hb stable at 7.9    Plan  - Outpatient hepatology and addiction psych evaluation.  - please measure INR.  - Continue diuresis.   - Continue Lactulose TID and  Rifaximin BID to maintain 3 soft bowel movements daily.   - Strict I/O's and daily standing weights.   - Low sodium diet and 1.5L fluid restriction.   - Please obtain daily CBC, BMP, LFT, INR      Thank you for involving us in the care of Aleyda ROSA Mariel. Please call with any additional concerns or questions.    Ricci Roman MD, PGY-IV  Gastroenterology Fellow  [facility]

## 2022-07-10 NOTE — ASSESSMENT & PLAN NOTE
Concern for seizure activity currently, on continuous eeg, epilepsy is aware and following  - giving diazepam 10 mg x 1 and monitor for response.   - MRI read consistent with a possible chronic process and recommends follow up in 6 months  - no evidence of acute stroke or cerebral edema  - no evidence of new infection  - treating for possible hepatic encephalopathy as above.

## 2022-07-11 PROBLEM — A49.8 GRAM-POSITIVE COCCI IN CLUSTERS: Status: ACTIVE | Noted: 2022-01-01

## 2022-07-11 PROBLEM — N39.0 UTI (URINARY TRACT INFECTION): Status: ACTIVE | Noted: 2022-01-01

## 2022-07-11 NOTE — PROGRESS NOTES
[facility]  Neurology  Progress Note    Patient Name: Aleyda Floyd  MRN: [id number]  Admission Date: 6/27/2022  Hospital Length of Stay: 13 days  Code Status: Full Code   Attending Provider: Barrie Swann MD  Primary Care Physician: Elvie Fernandes MD   Principal Problem:Anasarca    HPI:   Ms Floyd is a 67 yoF with bilateral chronic deafness, alcoholic cirrhosis, and hypertension who presented to [facility] with generalized swelling. The patient and family declined any confusion or altered mental status on admission and the patient was able to communicate with a writing board over the course of her hospital course. She denied any recent alcohol use with last use in February when she was diagnosed with alcoholic cirrhosis. The patient was treated for decompensated cirrhosis and anasarca with IV Lasix, spironolactone and paracentesis. The patient was pending discharge to SNF but was noted to have had change in cognition on 7/10 AM for which neurology was consulted. On exam, the patient was noted to have be unable to communicate with writing board or follow commands. She was initially not opening eyes spontaneously on exam but was later doing so when re-evaluated while still not following commands or communicating.         Overview/Hospital Course:  No notes on file        Subjective:     Interval History: No events overnight. Patient afebrile. EEG concerning for moderate to severe encephalopathy in the setting of hepatic dysfunction and generalized periodic discharges with triphasic morphology. Patient has NG tube.     Current Neurological Medications: Keppra 1 g load, 750mg bid, Clobazam 10mg    Current Facility-Administered Medications   Medication Dose Route Frequency Provider Last Rate Last Admin    acetaminophen tablet 650 mg  650 mg Oral Q4H PRN Yaakov Castro MD        bumetanide tablet 1 mg  1 mg Oral BID Jessenia Foster MD   1 mg at 07/11/22 0902    cefTRIAXone (ROCEPHIN) 1 g/50 mL D5W  IVPB  1 g Intravenous Q24H Avelina Pineda NP   Stopped at 07/11/22 0312    cholestyramine 4 gram packet 4 g  1 packet Oral Daily Juan Miguel MD   4 g at 07/11/22 0900    cloBAZam suspension 10 mg  10 mg Oral Daily Savage Cole MD        COVID-19 vac, sy(Pfizer)(PF) (Pfizer COVID-19) 30 mcg/0.3 mL injection 0.3 mL  0.3 mL Intramuscular vaccine x 1 dose Tena Brizuela MD        dextrose 10% bolus 125 mL  12.5 g Intravenous PRJUSTIN Castro MD        dextrose 10% bolus 250 mL  25 g Intravenous PRN Yaakov Castro MD        enoxaparin injection 40 mg  40 mg Subcutaneous Daily Elly G Penfold, DO   40 mg at 07/10/22 1753    folic acid tablet 1 mg  1 mg Oral Daily Yaakov Castro MD   1 mg at 07/11/22 0902    glucagon (human recombinant) injection 1 mg  1 mg Intramuscular PRN Yaakov Castro MD        glucose chewable tablet 16 g  16 g Oral PRN Yaakov Castro MD        glucose chewable tablet 24 g  24 g Oral PRN Yaakov Castro MD        insulin aspart U-100 pen 0-5 Units  0-5 Units Subcutaneous QID (AC + HS) PRJUSTIN Castro MD        lactulose 20 gram/30 mL solution Soln 30 g  30 g Oral TID Elly G Penfold, DO   30 g at 07/11/22 0902    levETIRAcetam injection 750 mg  750 mg Intravenous Q12H Savage Cole MD        melatonin tablet 6 mg  6 mg Oral Nightly PRN Tai العلي MD        mupirocin 2 % ointment   Nasal BID Barrie Swann MD   Given at 07/11/22 0914    naloxone 0.4 mg/mL injection 0.02 mg  0.02 mg Intravenous PRJUSTIN Castro MD        ondansetron disintegrating tablet 8 mg  8 mg Oral Q8H PRJUSTIN Castro MD        pantoprazole EC tablet 40 mg  40 mg Oral Daily Yaakov Castro MD   40 mg at 07/11/22 0902    polyethylene glycol packet 17 g  17 g Oral Daily Elly G Penfold, DO   17 g at 07/11/22 0902    prochlorperazine injection Soln 5 mg  5 mg Intravenous Q6H PRN MD Henrik Nunezna-docusate 8.6-50 mg per tablet 1 tablet  1 tablet Oral BID Elly G Penfold, DO   1 tablet at  07/11/22 0902    sodium chloride 0.9% flush 10 mL  10 mL Intravenous PRN Tai العلي MD        sodium chloride 0.9% flush 10 mL  10 mL Intravenous Q12H PRN Yaakov Castro MD        spironolactone tablet 150 mg  150 mg Oral Daily Juan Miguel MD   150 mg at 07/11/22 0902    thiamine tablet 100 mg  100 mg Oral Daily Yaakov Castro MD   100 mg at 07/11/22 0902       Review of Systems   Constitutional:  Negative for chills and fever.   HENT:  Negative for rhinorrhea and trouble swallowing.    Eyes:  Negative for discharge.   Respiratory:  Negative for cough and shortness of breath.    Cardiovascular:  Negative for chest pain and leg swelling.   Gastrointestinal:  Negative for abdominal pain and nausea.   Genitourinary:  Negative for hematuria.   Musculoskeletal:  Negative for arthralgias and myalgias.   Neurological:  Negative for tremors and headaches.   Hematological:  Bruises/bleeds easily.   Psychiatric/Behavioral:  Positive for confusion. Negative for agitation.    Objective:     Vital Signs (Most Recent):  Temp: 97.7 °F (36.5 °C) (07/11/22 0700)  Pulse: (!) 111 (07/11/22 1000)  Resp: 13 (07/11/22 1000)  BP: (!) 110/57 (07/11/22 1000)  SpO2: 100 % (07/11/22 1000)   Vital Signs (24h Range):  Temp:  [97.6 °F (36.4 °C)-98.2 °F (36.8 °C)] 97.7 °F (36.5 °C)  Pulse:  [] 111  Resp:  [10-20] 13  SpO2:  [100 %] 100 %  BP: (107-172)/(55-81) 110/57     Weight: 87 kg (191 lb 12.8 oz)  Body mass index is 37.46 kg/m².    Physical Exam  Constitutional:       Comments: Patient deaf in both ears.    HENT:      Head: Normocephalic and atraumatic.   Eyes:      Conjunctiva/sclera: Conjunctivae normal.      Pupils: Pupils are equal, round, and reactive to light.   Abdominal:      General: There is distension.   Musculoskeletal:      Cervical back: Normal range of motion.   Skin:     Findings: Bruising present.   Neurological:      Mental Status: She is disoriented.      Deep Tendon Reflexes:      Reflex Scores:        Tricep reflexes are 1+ on the right side and 1+ on the left side.       Bicep reflexes are 1+ on the right side and 1+ on the left side.       Brachioradialis reflexes are 1+ on the right side and 1+ on the left side.      NEUROLOGICAL EXAMINATION:     MENTAL STATUS        Patient is alert. Is deaf in both ears. Is able to identify a pen.      CRANIAL NERVES     CN III, IV, VI   Pupils are equal, round, and reactive to light.  Right pupil: Size: 3 mm. Shape: regular. Reactivity: brisk.   Left pupil: Size: 3 mm. Shape: regular. Reactivity: brisk.     CN V   Facial sensation intact.     CN VII   Facial expression full, symmetric.     MOTOR EXAM   Muscle bulk: normal  Overall muscle tone: normal    Strength   Right biceps: 3/5  Left biceps: 3/5  Right triceps: 3/5  Left triceps: 3/5    REFLEXES     Reflexes   Right brachioradialis: 1+  Left brachioradialis: 1+  Right biceps: 1+  Left biceps: 1+  Right triceps: 1+  Left triceps: 1+    SENSORY EXAM   Light touch normal.        Responds to light touch and painful stimuli.     GAIT AND COORDINATION        deferred     Significant Labs: All pertinent lab results from the past 24 hours have been reviewed.    Significant Imaging: I have reviewed all pertinent imaging results/findings within the past 24 hours.    Assessment and Plan:     * Anasarca  See above    Acute encephalopathy  Patient with deafness and alcoholic cirrhosis [place] [facility] [place]ens[place] requiring fluid management over hospitalization. Patient had been able to communicate over hospitalization using board. Notable encephalopathy on 7/10 AM for which neurology was consulted    This patients sudden encephalopathy is concerning given her prior baseline. No witnessed seizure activity and no prior history of epilepsy. The patients exam is poor and could resemble a postictal state, although with lower concern at this time for nonconvulsive status epilepticus. Etiology of her current encephalopathy  is most likely due to her UA findings concerning for a possible infection. She additionally has had a downtrending sodium which may be contributing as well. No other clear metabolic finding which may be contributing at this time. Prior hyperammonemia has been treated.    Plan:  - Continued treatment of underlying UTI with rocephin 1g, prior UCX 6/29 showing lactobacillus, pending UCX  - Ammonia 105, likely secondary to hepatic dysfunction, patient on spirinolactone, lactulose, and cholestyramine   - MRI brain showed possible chronic hemorrhage vs. Vascular malformation in the left thalamus, recommended f/u in 6 months  - EEG with generalized periodic discharges and triphasic morphology concering for status epilepticus  - Will start 1g keppra load with 750mg bid thereafter  - Given liver disease will also add Onfi 10mg, other common AED alternatives are contraindicated in the setting of hepatic dysfunction  - to consider LP after monitoring patient on current medication regimen     Please contact with any questions or concerns  Will continue to follow patient    Debility  See above    Alcoholic cirrhosis of liver with ascites  See above    Deaf- written communication  See above        VTE Risk Mitigation (From admission, onward)         Ordered     enoxaparin injection 40 mg  Daily         07/02/22 1613     IP VTE HIGH RISK PATIENT  Once         06/28/22 0419     Place sequential compression device  Until discontinued         06/28/22 0419     Place sequential compression device  Until discontinued         06/28/22 0415                Lele Posadas MD  Neurology  [facility] Card[facility]

## 2022-07-11 NOTE — PLAN OF CARE
[facility]  Discharge Reassessment    Primary Care Provider: Elvie Fernandes MD    Expected Discharge Date: 7/12/2022     Patient medically ready to discharge per MD. Patient's  to sign papers at [email or web address] today, and discharge tomorrow.    Reassessment (most recent)     Discharge Reassessment - 07/11/22 1044        Discharge Reassessment    Assessment Type Discharge Planning Reassessment     Did the patient's condition or plan change since previous assessment? No     Discharge Plan discussed with: Spouse/sig other;Patient     Name(s) and Number(s) Jaime Floyd () [phone]     Discharge Plan A Skilled Nursing Facility     Discharge Plan B Home with family;Home Health     DME Needed Upon Discharge  other (see comments)   TBD    Discharge Barriers Identified None        Post-Acute Status    Post-Acute Authorization Placement     Post-Acute Placement Status Pending Bed Availability     Discharge Delays None known at this time               Joselyn Bedoya RN     [phone]

## 2022-07-11 NOTE — PT/OT/SLP DISCHARGE
Occupational Therapy Discharge Summary    Aleyda Floyd  MRN: [id number]   Principal Problem: Anasarca      Patient Discharged from acute Occupational Therapy on 7/11 2* emergent transfer to ICU. New OT orders needed when medically stable to resume care.    Prev Recc: [facility]    07/11/2022

## 2022-07-11 NOTE — PLAN OF CARE
CMICU DAILY GOALS       A: Awake    RASS: Goal -    Actual - RASS (Brizuela Agitation-Sedation Scale): -1-->drowsy   Restraint necessity:    B: Breathe   SBT: Not intubated   C: Coordinate A & B, analgesics/sedatives   Pain: managed    SAT: Not intubated  D: Delirium   CAM-ICU: Overall CAM-ICU: Positive  E: Early(intubated/ Progressive (non-intubated) Mobility   MOVE Screen: Fail   Activity: Activity Management: Patient unable to perform activities  FAS: Feeding/Nutrition   Diet order: Diet/Nutrition Received: NPO,    T: Thrombus   DVT prophylaxis: VTE Required Core Measure: Pharmacological prophylaxis initiated/maintained  H: HOB Elevation   Head of Bed (HOB) Positioning: HOB at 30-45 degrees  U: Ulcer Prophylaxis   GI: no  G: Glucose control   managed    S: Skin   Bathing/Skin Care: bath, complete, dressed/undressed, incontinence care, linen changed  Device Skin Pressure Protection: absorbent pad utilized/changed, adhesive use limited, positioning supports utilized, pressure points protected, skin-to-device areas padded, skin-to-skin areas padded  Pressure Reduction Devices: specialty bed utilized, pressure-redistributing mattress utilized, positioning supports utilized, heel offloading device utilized, foam padding utilized  Pressure Reduction Techniques: weight shift assistance provided  Skin Protection: adhesive use limited, incontinence pads utilized, skin-to-device areas padded, skin-to-skin areas padded, tubing/devices free from skin contact  B: Bowel Function   no issues   I: Indwelling Catheters   Beckham necessity:      Urethral Catheter 07/10/22 1200-Reason for Continuing Urinary Catheterization: Critically ill in ICU and requiring hourly monitoring of intake/output   CVC necessity: No  D: De-escalation Antibiotics   Yes    Family/Goals of care/Code Status   Code Status: Full Code    24H Vital Sign Range  Temp:  [97.5 °F (36.4 °C)-98.2 °F (36.8 °C)]   Pulse:  []   Resp:  [10-23]   BP:  (107-172)/(55-81)   SpO2:  [95 %-100 %]      Shift Events   No acute events throughout shift. Antibiotics started. Mg replaced.     VS and assessment per flow sheet, patient progressing towards goals as tolerated, plan of care reviewed with  Aleyda Floyd , all concerns addressed, will continue to monitor.    Karen Lomeli

## 2022-07-11 NOTE — PROCEDURES
EEG REPORT      Aleyda Floyd  [id number]  1955    DATE OF SERVICE: 7/10/2022     -1    METHODOLOGY      Extended electroencephalographic recording is made while the patient is ambulatory and continuing normal daily activities.  Electrodes are placed according to the International 10-20 placement system and included T1 and T2 electrode placement.  Twenty four (24) channels of digital signal (sampling rate of 512/sec) was simultaneously recorded from the scalp including EKG and eye monitors.  Recording band pass was 0.1 to 100 hz and all data was stored digitally on the recorder.  The patient is instructed to press an event button when clinical symptoms occur and write the symptoms into a diary. Activation procedures which include photic stimulation, hyperventilation and instructing patients to perform simple task are done in selected patients.        The EEG is displayed on a monitor screen and can be reformatted into different montages for evaluation.  The entire recoding is submitted for computer assisted analysis to detect spike and electrographic seizure activity.  The entire recording is visually reviewed and the times identified by computer analysis as being spikes or seizures are reviewed again.  Compresses spectral analysis (CSA) is also performed on the activity recorded from each individual channel.  This is displayed as a power display of frequencies from 0 to 30 Hz over time.   The CSA analysis is done and displayed continuously.  This is reviewed for asymmetries in power between homologous areas of the scalp and for presence of changes in power which canbe seen when seizures occur.  Sections of suspected abnormalities on the CSA is then compared with the original EEG recording.  .     [employer] software was also utilized in the review of this study.  This software suite analyzes the EEG recording in multiple domains.  Coherence and rhythmicity is computed to identify EEG sections which may  contain organized seizures.  Each channel undergoes analysis to detect presence of spike and sharp waves which have special and morphological characteristic of epileptic activity.  The routine EEG recording is converted from spacial into frequency domain.  This is then displayed comparing homologous areas to identify areas of significant asymmetry.  Algorithm to identify non-cortically generated artifact is used to separate eye movement, EMG and other artifact from the EEG     Recording Times  Start on 7/10/2022  Stop on 7/11/2022    A total of 16:54:17 hours of EEG was recorded.      EEG FINDINGS:  Background activity:   The background rhythm was characterized by generalized polymorphic delta.   Symmetry and continuity: the background was continuous and symmetric     Sleep:   No sleep transients although there is cycling of the background consistent with state change.    Activation procedures:  NA    Abnormal activity:   There are abundant generalized periodic discharges with triphasic morphology (triphasics) which wax and wane occasionally approaching 1-2Hz.    IMPRESSION:   Abnormal EEG due to moderate generalized cerebral dysfunction as is commonly seen in a wide variety of states of toxic and metabolic derangement.  No electrographic seizures.      David Monteiro MD  Neurology-Epilepsy.  [facility].

## 2022-07-11 NOTE — SUBJECTIVE & OBJECTIVE
Subjective:     Interval History: No events overnight. Patient afebrile. EEG concerning for moderate to severe encephalopathy in the setting of hepatic dysfunction and generalized periodic discharges with triphasic morphology. Patient has NG tube.     Current Neurological Medications: Keppra 1 g load, 750mg bid, Clobazam 10mg    Current Facility-Administered Medications   Medication Dose Route Frequency Provider Last Rate Last Admin    acetaminophen tablet 650 mg  650 mg Oral Q4H PRJUSTIN Castro MD        bumetanide tablet 1 mg  1 mg Oral BID Jessenia Foster MD   1 mg at 07/11/22 0902    cefTRIAXone (ROCEPHIN) 1 g/50 mL D5W IVPB  1 g Intravenous Q24H Avelina Pineda NP   Stopped at 07/11/22 0312    cholestyramine 4 gram packet 4 g  1 packet Oral Daily Juan Miguel MD   4 g at 07/11/22 0900    cloBAZam suspension 10 mg  10 mg Oral Daily Savage Cole MD        COVID-19 vac, sy(Pfizer)(PF) (Pfizer COVID-19) 30 mcg/0.3 mL injection 0.3 mL  0.3 mL Intramuscular vaccine x 1 dose Tena Brizuela MD        dextrose 10% bolus 125 mL  12.5 g Intravenous PRJUSTIN Castro MD        dextrose 10% bolus 250 mL  25 g Intravenous PRN Yaakov Castro MD        enoxaparin injection 40 mg  40 mg Subcutaneous Daily Elly G Penfold, DO   40 mg at 07/10/22 1753    folic acid tablet 1 mg  1 mg Oral Daily Yaakov Castro MD   1 mg at 07/11/22 0902    glucagon (human recombinant) injection 1 mg  1 mg Intramuscular PRJUSTIN Castro MD        glucose chewable tablet 16 g  16 g Oral PRJUSTIN Castro MD        glucose chewable tablet 24 g  24 g Oral PRJUSTIN Castro MD        insulin aspart U-100 pen 0-5 Units  0-5 Units Subcutaneous QID (AC + HS) PRJUSTIN Castro MD        lactulose 20 gram/30 mL solution Soln 30 g  30 g Oral TID Elly G Penfold, DO   30 g at 07/11/22 0902    levETIRAcetam injection 750 mg  750 mg Intravenous Q12H Savage Cole MD        melatonin tablet 6 mg  6 mg Oral Nightly PRN Tai العلي MD         mupirocin 2 % ointment   Nasal BID Barrie Swann MD   Given at 07/11/22 0914    naloxone 0.4 mg/mL injection 0.02 mg  0.02 mg Intravenous PRN Yaakov Castro MD        ondansetron disintegrating tablet 8 mg  8 mg Oral Q8H PRN Yaakov Castro MD        pantoprazole EC tablet 40 mg  40 mg Oral Daily Yaakov Castro MD   40 mg at 07/11/22 0902    polyethylene glycol packet 17 g  17 g Oral Daily Elly G Penfold, DO   17 g at 07/11/22 0902    prochlorperazine injection Soln 5 mg  5 mg Intravenous Q6H PRN Yaakov Castro MD        senna-docusate 8.6-50 mg per tablet 1 tablet  1 tablet Oral BID Elly G Penfold, DO   1 tablet at 07/11/22 0902    sodium chloride 0.9% flush 10 mL  10 mL Intravenous PRN Tai العلي MD        sodium chloride 0.9% flush 10 mL  10 mL Intravenous Q12H PRN Yaakov Castro MD        spironolactone tablet 150 mg  150 mg Oral Daily Juan Miguel MD   150 mg at 07/11/22 0902    thiamine tablet 100 mg  100 mg Oral Daily Yaakov Castro MD   100 mg at 07/11/22 0902       Review of Systems   Constitutional:  Negative for chills and fever.   HENT:  Negative for rhinorrhea and trouble swallowing.    Eyes:  Negative for discharge.   Respiratory:  Negative for cough and shortness of breath.    Cardiovascular:  Negative for chest pain and leg swelling.   Gastrointestinal:  Negative for abdominal pain and nausea.   Genitourinary:  Negative for hematuria.   Musculoskeletal:  Negative for arthralgias and myalgias.   Neurological:  Negative for tremors and headaches.   Hematological:  Bruises/bleeds easily.   Psychiatric/Behavioral:  Positive for confusion. Negative for agitation.    Objective:     Vital Signs (Most Recent):  Temp: 97.7 °F (36.5 °C) (07/11/22 0700)  Pulse: (!) 111 (07/11/22 1000)  Resp: 13 (07/11/22 1000)  BP: (!) 110/57 (07/11/22 1000)  SpO2: 100 % (07/11/22 1000)   Vital Signs (24h Range):  Temp:  [97.6 °F (36.4 °C)-98.2 °F (36.8 °C)] 97.7 °F (36.5 °C)  Pulse:  [] 111  Resp:  [10-20] 13  SpO2:  [100  %] 100 %  BP: (107-172)/(55-81) 110/57     Weight: 87 kg (191 lb 12.8 oz)  Body mass index is 37.46 kg/m².    Physical Exam  Constitutional:       Comments: Patient deaf in both ears.    HENT:      Head: Normocephalic and atraumatic.   Eyes:      Conjunctiva/sclera: Conjunctivae normal.      Pupils: Pupils are equal, round, and reactive to light.   Abdominal:      General: There is distension.   Musculoskeletal:      Cervical back: Normal range of motion.   Skin:     Findings: Bruising present.   Neurological:      Mental Status: She is disoriented.      Deep Tendon Reflexes:      Reflex Scores:       Tricep reflexes are 1+ on the right side and 1+ on the left side.       Bicep reflexes are 1+ on the right side and 1+ on the left side.       Brachioradialis reflexes are 1+ on the right side and 1+ on the left side.      NEUROLOGICAL EXAMINATION:     MENTAL STATUS        Patient is alert. Is deaf in both ears. Is able to identify a pen.      CRANIAL NERVES     CN III, IV, VI   Pupils are equal, round, and reactive to light.  Right pupil: Size: 3 mm. Shape: regular. Reactivity: brisk.   Left pupil: Size: 3 mm. Shape: regular. Reactivity: brisk.     CN V   Facial sensation intact.     CN VII   Facial expression full, symmetric.     MOTOR EXAM   Muscle bulk: normal  Overall muscle tone: normal    Strength   Right biceps: 3/5  Left biceps: 3/5  Right triceps: 3/5  Left triceps: 3/5    REFLEXES     Reflexes   Right brachioradialis: 1+  Left brachioradialis: 1+  Right biceps: 1+  Left biceps: 1+  Right triceps: 1+  Left triceps: 1+    SENSORY EXAM   Light touch normal.        Responds to light touch and painful stimuli.     GAIT AND COORDINATION        deferred     Significant Labs: All pertinent lab results from the past 24 hours have been reviewed.    Significant Imaging: I have reviewed all pertinent imaging results/findings within the past 24 hours.

## 2022-07-11 NOTE — PLAN OF CARE
Sw updated CM/Sw on floor of dc plans, updated Sho with Demetrio MELENDEZ with contacts for both CM/Sw on the floor. 142 and pasrr added to Pt's careport chart.

## 2022-07-11 NOTE — PT/OT/SLP DISCHARGE
Physical Therapy Discharge Summary    Name: Aleyda Floyd  MRN: [id number]   Principal Problem: Elbert     Patient Discharged from acute Physical Therapy on 22 due to transfer to MICU 7/10 with AMS. Stroke code was called but negative .  Please refer to prior PT noted date on 22 for functional status.     Assessment:     Patient has not met goals.    Objective:     GOALS:   Multidisciplinary Problems     Physical Therapy Goals        Problem: Physical Therapy    Goal Priority Disciplines Outcome Goal Variances Interventions   Physical Therapy Goal     PT, PT/OT Ongoing, Progressing     Description: Goals to be met by: 22    Patient will increase functional independence with mobility by performin. Supine to sit with Stand-by Assistance - not met  2. Sit to stand transfer with Supervision - not met  3. Bed to chair transfer with Supervision using LRAD - not met  4. Gait  x 100 feet with Stand-by Assistance using LRAD - not met  5. Ascend/Descend 6 inch curb step with Contact Guard Assistance using LRAD - not met  6. Lower extremity exercise program x30 reps per handout, with independence - not met                     Reasons for Discontinuation of Therapy Services  Transfer to alternate level of care.      Plan:     Patient Discharged to: transferred to MICU with AMS. New orders will be needed when medically stable and therapist of record not available to write discharge summary. .      2022

## 2022-07-11 NOTE — PLAN OF CARE
GENA spoke with Sho [phone] at [facility]. She advised planning on tomorrow receiving patient.  going in today to sign paperwork. CM to follow that patient had Covid booster as requested by Sho at [facility]. GENA notified MD to place order for Covid Booster.        Joselyn Bedoya RN     [phone]

## 2022-07-11 NOTE — TREATMENT PLAN
Hepatology Treatment Plan    Aleyda Floyd is a 67 y.o. female admitted to hospital 6/27/2022 (Hospital Day: 15) due to Anasarca.     Interval History  - Noted acute worsening of mental status yesterday.   - CT head & CTA head & neck were negative.   - There was some concern for seizure like activity. EEG was obtained and patient received diazepam. Keppra has been started.  It was believed her altered mental status was secondary to toxic metabolic encephalopathy.   - Patient continues to receive lactulose through NG tube. Had 1 BM this AM.       Objective  Temp:  [97.6 °F (36.4 °C)-98.6 °F (37 °C)] 98.6 °F (37 °C) (07/11 1100)  Pulse:  [] 82 (07/11 1200)  BP: (107-172)/(55-76) 111/55 (07/11 1200)  Resp:  [10-20] 10 (07/11 1200)  SpO2:  [99 %-100 %] 99 % (07/11 1200)    Constitutional:   Patient deaf in both ears. She was disoriented.   HENT:      Head: Normocephalic and atraumatic.   Eyes:      Conjunctiva/sclera: Conjunctivae normal.      Pupils: Pupils are equal, round, and reactive to light.   Abdominal:      General: There is distension.   Musculoskeletal:      Cervical back: Normal range of motion.   Skin:     Findings: Bruising present.   Neurological:      Mental Status: She is disoriented. Opens eyes to painful stimuli on my exam.    Laboratory      Lab Results   Component Value Date    WBC 8.24 07/11/2022    HGB 8.4 (L) 07/11/2022    HCT 25.6 (L) 07/11/2022    MCV 92 07/11/2022     (L) 07/11/2022       Lab Results   Component Value Date     (L) 07/11/2022    K 4.0 07/11/2022    CL 99 07/11/2022    CO2 26 07/11/2022    BUN 20 07/11/2022    CREATININE 1.0 07/11/2022    CALCIUM 8.3 (L) 07/11/2022       Lab Results   Component Value Date    ALBUMIN 1.7 (L) 07/11/2022    ALT 26 07/11/2022    AST 44 (H) 07/11/2022    ALKPHOS 81 07/11/2022    BILITOT 3.2 (H) 07/11/2022       Lab Results   Component Value Date    INR 1.7 (H) 06/28/2022    INR 1.7 (H) 06/23/2022    INR 1.8 (H) 05/05/2022        MELD-Na score: 18 at 6/30/2022  3:30 AM  MELD score: 15 at 6/30/2022  3:30 AM  Calculated from:  Serum Creatinine: 1.2 mg/dL at 6/30/2022  3:30 AM  Serum Sodium: 133 mmol/L at 6/30/2022  3:30 AM  Total Bilirubin: 1.2 mg/dL at 6/30/2022  3:30 AM  INR(ratio): 1.7 at 6/28/2022 12:42 AM  Age: 67 years    Assessment    #EtOH cirrhosis, decompensated  #ascitis, secondary to above  - MELD: 18  - Patient has been diuresed significantly since admission.   - Na:131, Cr: 1.2, T. Bilirubin: 2.1    #hematochezia, likely hemorrhoidal  - Hb stable at 7.9    #AMS  - likely 2/2 toxic metabolic encephalopathy.   - CT head & CTA head & neck were negative.   - EEG with generalized periodic discharges and triphasic morphology concering for status epilepticus    Plan  - Continue Lactulose TID and Rifaximin BID to maintain 3 soft bowel movements daily.  - Appreciate recs from neurology - they have initiated clobazam and Keppra  - Continue rx of UTI   - Outpatient hepatology and addiction psych evaluation..  - Continue diuresis.     - Strict I/O's and daily standing weights.   - Low sodium diet and 1.5L fluid restriction.   - Please obtain daily CBC, BMP, LFT, INR      Thank you for involving us in the care of Aleyda Floyd. Please call with any additional concerns or questions.    Ricci Roman MD, PGY-IV  Gastroenterology Fellow  [facility]

## 2022-07-11 NOTE — PLAN OF CARE
GENA faxed current MAR to Sho at [facility] as requested. [phone] fx, [phone] ph.    Joselyn Bedoya RN     [phone]

## 2022-07-11 NOTE — ASSESSMENT & PLAN NOTE
Patient with deafness and alcoholic cirrhosis presented to [facility] for decompensated cirrhosis requiring fluid management over hospitalization. Patient had been able to communicate over hospitalization using board. Notable encephalopathy on 7/10 AM for which neurology was consulted    This patients sudden encephalopathy is concerning given her prior baseline. No witnessed seizure activity and no prior history of epilepsy. The patients exam is poor and could resemble a postictal state, although with lower concern at this time for nonconvulsive status epilepticus. Etiology of her current encephalopathy is most likely due to her UA findings concerning for a possible infection. She additionally has had a downtrending sodium which may be contributing as well. No other clear metabolic finding which may be contributing at this time. Prior hyperammonemia has been treated.    Plan:  - Continued treatment of underlying UTI with rocephin 1g, prior UCX 6/29 showing lactobacillus, pending UCX  - Ammonia 105, likely secondary to hepatic dysfunction, patient on spirinolactone, lactulose, and cholestyramine   - MRI brain showed possible chronic hemorrhage vs. 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

## 2022-07-12 NOTE — ASSESSMENT & PLAN NOTE
Elevated ammonia level   Hepatology has evaluated while inpatient  - plan to follow up outpatient  - will also need addiction psych eval outpatient  - Lactulose to 20 gm q6h and started rifaximin 550 mg twice a day  - target 3-4 bm daily    Nasogastric tube suction of bright red blood following epistaxis. Hopefully she just ingested some blood.   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 If persists, then will need to consider EV as etiology

## 2022-07-12 NOTE — PROGRESS NOTES
[facility] - Card[facility] Medical ICU  Critical Care Medicine  Progress Note    Patient Name: Aleyda Floyd  MRN: [id number]  Admission Date: 6/27/2022  Hospital Length of Stay: 13 days  Code Status: Full Code  Attending Provider: Barrie Swann MD  Primary Care Provider: Elvie Fernandes MD   Principal Problem: Anasarca    Subjective:     HPI:  Per hospital medicine H and P on 6/28/22:   Ms. Floyd is a 66 yo deaf patient with alcoholic cirrhosis, and hypertension who presented with generalized swelling.  used to obtain history. Patient states that she had the swelling since her diagnosis of cirrhosis in February. Patient states that 2 days ago, she was able to ambulate but with some difficulty due to the swelling but now  worsening to the point where she is unable to ambulate by herself. Of note, patient had a recent EGD performed earlier last week where they had issues with her IV resulting is significant bruising. Patient also notes that whenever she scratches her skin with her nails, she notices clear fluid coming from the cuts. Patient denies fever, chills, cough, hemoptysis, nausea, vomiting, abdominal pain, yellowing of skin, constipation, dysuria, hematuria, and black/ bloody stools. Patient has baseline loose stools from her lactulose.  She reports compliance with all medications including her lactulose and Lasix. Patient and family declined any confusion or altered mental status. She denies any recent alcohol use with last use in February when she was diagnosed with alcoholic cirrhosis. Patient states that she used to weigh around 170s which she believes is her dry weight and is currently in the 200s.     Hospital course prior to ICU admission per hospital medicine : Admitted for decompensated cirrhosis and anasarca. Began diuresing with IV Lasix and spironolactone. Hepatology involved in care. Paracentesis of -4.2L; ruled out SBP. Increasing diuresis with Diuril. D/c diuril but continue IV lasix.  Repleated K and Mag. 1 BM 7/1 enema ordered 7/2. Prior to enema pt spontaneously had 1 BM with brown stool and bright red streaks of blood. Denied rectal pain. Inadequate response with lasix PO so started on Bumex 2 mg TID. Edema stable. Will attempt to deescalate Bumex to 2mg BID and increase spironolactone to 150mg po qd. Bumex deescalated to 1mg BID due to steadily rising Cr (1.4, 1.0 on admission). OT/PT consulted, and recommend d/c to SNF.      7/10: Rapid response team consulted for altered mental status. She was previously interactive and alert and this am was obtunded. Stroke code was called and CTA head and neck obtained. No evidence of acute stroke/thrombosis. She then had some unusual eye deviation and rigidity and general neurology was consulted. Due to her profound encephalopathy and concern for the need for likely aggressive seizure vs hepatic encephalopathy treatment she was admitted to the ICU.       Hospital/ICU Course:  7/10/22 - Stroke Code called. CTA head and neck without evidence of acute stroke or thrombosis. Had unusual eye deviation and rigidity. Transferred to ICU for seizure vs hepatic encephalopathy. MRI brain without acute pathology. No acute CVA or cerebral edema. cEEG with disorganized theta/delta activity and continuous runs of generalized discharges with triphasic morphology. Consistent with moderate to severe encephalopathy. Meets criteria for ictal/interictal continuum seen in setting of renal/hepatic dysfunction and anoxia, toxin/meds.    7/11/22 - Arosuable and awake today. Able to interact.EEG concerning for seizure, neuro recommended Keppra and Onfi. Levophed uptitrated and patient with + BM. Had epistaxis from around the site of NG tube was turning patient, followed soon by bright red output from NG tube. Hgb remained stable. Started on PPI twice a day.          No new subjective & objective note has been filed under this hospital service since the last note was  generated.      ABG  Recent Labs   Lab 07/10/22  0941   PH 7.577*   PO2 58*   PCO2 32.2*   HCO3 30.0*   BE 8     Assessment/Plan:     Neuro  Acute encephalopathy  Suspicion of Hepatic Encephalopathy and possible Seizures. ABG with alkalosis  Concern for seizure activity on EEG with moderate to severe encephalopathy in the setting of hepatic dysfunction and generalized periodic discharges with triphasic morphology.   - MRI with possible chronic hemorrhage and  recommends follow up in 6 months  - Loaded with Keppra and started on 750 mg BID  - Onfi BID started  - on cEEG  - management of Hepatic Encephalopathy per GI section    ENT  Deaf- written communication  Epistaxis noted today from right nare, site of NG tube. Did cease.  Monitor for now    Deafness:  Patient and family are deaf and use American Sign Language or written communication.   Please utilize  when possible to ensure accurate and efficient communication with the family.   Her sister is hearing and is listed as first contact for emergent calls but please text the  and son if they are needed or for consents.     Cardiac/Vascular  Primary hypertension  NG tube in place  continue home meds and monitor vitals     Renal/  UTI (urinary tract infection)  Positive UA from new kim placed 7/10/22. Hx of ESBL E coli in Feb 2022 at OSH  Uctx growing enterococcus  - changed abx to Ertapenem for now and cover with Vancomycin  - follow up cultures    PITO (acute kidney injury)  Improved since admission, monitor Is and Os     ID  Gram-positive cocci in clusters  Noted on 1 set of aer/anae cultures from 7/10/22. 2nd set so far negative. No clear sign of infection. Could likely be contaminant.  Uctx also from enterococcus species  - start Vancomycin empirically for now and federico escalate quickly if appropriate  - repeat culture sent today    Hematology  Thrombocytopenia  Also stable for now, secondary to liver disease    Oncology  Anemia, chronic  disease  Stable but will continue to monitor daily     GI  Alcoholic cirrhosis of liver with ascites  Elevated ammonia level   Hepatology has evaluated while inpatient  - plan to follow up outpatient  - will also need addiction psych eval outpatient  - Lactulose to 20 gm q6h and started rifaximin 550 mg twice a day  - target 3-4 bm daily    Nasogastric tube suction of bright red blood following epistaxis. Hopefully she just ingested some blood.   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 If persists, then will need to consider EV as etiology        Other  * Anasarca  Secondary to liver disease, continue diuresis and treatment for her decompensated cirrhosis  - on bumex 1 mg BID and Spironolactone 150 mg daily      Critical Care Time: 45 minutes  Critical secondary to Patient has a condition that poses threat to life and bodily function: hepatic encephalopathy, seizure      Critical care was time spent personally by me on the following activities: development of treatment plan with patient or surrogate and bedside caregivers, discussions with consultants, evaluation of patient's response to treatment, examination of patient, ordering and performing treatments and interventions, ordering and review of laboratory studies, ordering and review of radiographic studies, pulse oximetry, re-evaluation of patient's condition. This critical care time did not overlap with that of any other provider or involve time for any procedures.     Barrie Swann MD  Critical Care Medicine  [facility] - Cardiac Medical ICU

## 2022-07-12 NOTE — ASSESSMENT & PLAN NOTE
Epistaxis noted today from right nare, site of NG tube. Did cease.  Monitor for now    Deafness:  Patient and family are deaf and use American Sign Language or written communication.   Please utilize  when possible to ensure accurate and efficient communication with the family.   Her sister is hearing and is listed as first contact for emergent calls but please text the  and son if they are needed or for consents.

## 2022-07-12 NOTE — ASSESSMENT & PLAN NOTE
Positive UA from new kim placed 7/10/22. Hx of ESBL E coli in Feb 2022 at OSH  Uctx growing enterococcus  - On Ertapenem for now and Vancomycin  - follow up cultures

## 2022-07-12 NOTE — TREATMENT PLAN
Hepatology Treatment Plan    Aleyda Floyd is a 67 y.o. female admitted to hospital 6/27/2022 (Hospital Day: 16) due to Anasarca.     Interval History  - Patient's mentation improved somewhat. On Keppra and Onfi.   - Had an episode of epistaxis and blood in NG tube which abated. Hb stable this AM.         Objective  Temp:  [97.5 °F (36.4 °C)-98 °F (36.7 °C)] 97.5 °F (36.4 °C) (07/12 0800)  Pulse:  [74-97] 74 (07/12 1100)  BP: ()/(53-67) 135/60 (07/12 1100)  Resp:  [10-19] 12 (07/12 1100)  SpO2:  [95 %-100 %] 99 % (07/12 1100)        Laboratory      Lab Results   Component Value Date    WBC 8.06 07/12/2022    HGB 7.9 (L) 07/12/2022    HCT 24.0 (L) 07/12/2022    MCV 94 07/12/2022     (L) 07/12/2022       Lab Results   Component Value Date     (L) 07/12/2022    K 3.7 07/12/2022    CL 98 07/12/2022    CO2 26 07/12/2022    BUN 19 07/12/2022    CREATININE 1.2 07/12/2022    CALCIUM 8.3 (L) 07/12/2022       Lab Results   Component Value Date    ALBUMIN 1.7 (L) 07/12/2022    ALT 26 07/12/2022    AST 45 (H) 07/12/2022    ALKPHOS 70 07/12/2022    BILITOT 2.4 (H) 07/12/2022       Lab Results   Component Value Date    INR 1.5 (H) 07/11/2022    INR 1.7 (H) 06/28/2022    INR 1.7 (H) 06/23/2022       MELD-Na score: 20 at 7/12/2022  4:14 AM  MELD score: 16 at 7/12/2022  4:14 AM  Calculated from:  Serum Creatinine: 1.2 mg/dL at 7/12/2022  4:14 AM  Serum Sodium: 132 mmol/L at 7/12/2022  4:14 AM  Total Bilirubin: 2.4 mg/dL at 7/12/2022  4:14 AM  INR(ratio): 1.5 at 7/11/2022  3:50 PM  Age: 67 years    Assessment    #EtOH cirrhosis, decompensated  #ascitis, secondary to above  - MELD: 18>20  - Patient has been diuresed significantly since admission.   - Na: 132, Cr: 1.2, T. Bilirubin: 2.4, INR: 1.5    #hematochezia, resolved  #blood in NG tube,single episode  - Hb stable at 7.9 g/dl      #AMS  - likely 2/2 toxic metabolic encephalopathy.   - CT head & CTA head & neck were negative.   - EEG with generalized  periodic discharges and triphasic morphology concering for status epilepticus    Plan  - Patient will eventually require outpatient liver tx eval. Outpatient hepatology and addiction psych evaluation.  - Continue IV PPI  - Continue Lactulose TID and Rifaximin BID to maintain 3 soft bowel movements daily.  - Appreciate recs from neurology - they have initiated clobazam and Keppra  - Continue rx of UTI  - Continue diuresis.   - Strict I/O's and daily standing weights.   - Low sodium diet and 1.5L fluid restriction.   - Please obtain daily CBC, BMP, LFT, INR      Thank you for involving us in the care of Aleyda Floyd. Please call with any additional concerns or questions.    Ricci Roman MD, PGY-IV  Gastroenterology Fellow  [facility]

## 2022-07-12 NOTE — SUBJECTIVE & OBJECTIVE
Subjective:     Interval History: Patient is doing better today. She is able to communicate via sign language interpretor. Patient did have episode of epistaxis from NG tube which has now resolved. Patient is afebrile. Blood culture positive for coagulase negative staphylococcus and ucx for enterococcus. Patient now on two antibiotics, ertapenem 1g and vancomycin 15mg/kg (1,250mg).     Current Neurological Medications: Keppra 750mg bid, Onfi 10mg bid    Current Facility-Administered Medications   Medication Dose Route Frequency Provider Last Rate Last Admin    acetaminophen tablet 650 mg  650 mg Oral Q4H PRJUSTIN Castro MD        bumetanide tablet 1 mg  1 mg Oral BID Jessenia Foster MD   1 mg at 07/12/22 0837    cholestyramine 4 gram packet 4 g  1 packet Oral Daily Juan Miguel MD   4 g at 07/12/22 0845    cloBAZam suspension 10 mg  10 mg Oral BID Savage Cole MD   10 mg at 07/12/22 0845    COVID-19 vac, sy(Pfizer)(PF) (Pfizer COVID-19) 30 mcg/0.3 mL injection 0.3 mL  0.3 mL Intramuscular vaccine x 1 dose Tena Brizuela MD        dextrose 10% bolus 125 mL  12.5 g Intravenous PRJUSTIN Castro MD        dextrose 10% bolus 250 mL  25 g Intravenous PRJUSTIN Castro MD        ertapenem (INVANZ) 1 g in sodium chloride 0.9% 100 mL IVPB  1 g Intravenous Q24H Barrie Swann MD   Stopped at 07/11/22 1316    folic acid tablet 1 mg  1 mg Oral Daily Yaakov Castro MD   1 mg at 07/12/22 0837    glucagon (human recombinant) injection 1 mg  1 mg Intramuscular PRJUSTIN Castro MD        glucose chewable tablet 16 g  16 g Oral RASHAD Castro MD        glucose chewable tablet 24 g  24 g Oral RASHAD Castro MD        insulin aspart U-100 pen 0-5 Units  0-5 Units Subcutaneous QID (AC + HS) RASHAD Castro MD        lactulose 20 gram/30 mL solution Soln 30 g  30 g Oral Q6H Barrie Swann MD   30 g at 07/12/22 1216    levETIRAcetam injection 750 mg  750 mg Intravenous Q12H Savage Cole MD   750 mg at 07/12/22 0838     melatonin tablet 6 mg  6 mg Oral Nightly PRN Tai العلي MD        mupirocin 2 % ointment   Nasal BID Barrie Swann MD   Given at 07/12/22 0859    naloxone 0.4 mg/mL injection 0.02 mg  0.02 mg Intravenous PRN Yaakov Castro MD        ondansetron disintegrating tablet 8 mg  8 mg Oral Q8H PRN Yaakov Castro MD        pantoprazole injection 40 mg  40 mg Intravenous BID Barrie Swann MD   40 mg at 07/12/22 0838    prochlorperazine injection Soln 5 mg  5 mg Intravenous Q6H PRN Yaakov Castro MD        rifAXIMin tablet 550 mg  550 mg Per NG tube BID Barrie Swann MD   550 mg at 07/12/22 0837    sodium chloride 0.9% flush 10 mL  10 mL Intravenous PRN Tai العلي MD        sodium chloride 0.9% flush 10 mL  10 mL Intravenous Q12H PRN Yaakov Castro MD        spironolactone tablet 150 mg  150 mg Oral Daily Juan Miguel MD   150 mg at 07/12/22 0837    thiamine tablet 100 mg  100 mg Oral Daily Yaakov Castro MD   100 mg at 07/12/22 0837    vancomycin - pharmacy to dose   Intravenous pharmacy to manage frequency Barrie Swann MD        vancomycin 1.25 g in dextrose 5% 250 mL IVPB (ready to mix)  15 mg/kg Intravenous Q24H Barrie Swann MD           Review of Systems   Constitutional:  Negative for chills and fever.   HENT:  Positive for nosebleeds. Negative for rhinorrhea and trouble swallowing.         Epistaxis now resolved.   Eyes:  Negative for discharge.   Respiratory:  Negative for cough and shortness of breath.    Cardiovascular:  Negative for chest pain and leg swelling.   Gastrointestinal:  Negative for abdominal pain and nausea.   Genitourinary:  Negative for dysuria and hematuria.   Musculoskeletal:  Negative for arthralgias and myalgias.   Neurological:  Negative for tremors and headaches.   Psychiatric/Behavioral:  Negative for agitation and confusion.    Objective:     Vital Signs (Most Recent):  Temp: 97.5 °F (36.4 °C) (07/12/22 0800)  Pulse: 74 (07/12/22 1100)  Resp: 12 (07/12/22 1100)  BP: 135/60 (07/12/22 1100)  SpO2: 99 %  (07/12/22 1100) Vital Signs (24h Range):  Temp:  [97.5 °F (36.4 °C)-98 °F (36.7 °C)] 97.5 °F (36.4 °C)  Pulse:  [74-97] 74  Resp:  [10-19] 12  SpO2:  [95 %-100 %] 99 %  BP: ()/(53-67) 135/60     Weight: 83 kg (182 lb 15.7 oz)  Body mass index is 35.74 kg/m².    Physical Exam  HENT:      Head: Normocephalic and atraumatic.   Eyes:      Extraocular Movements: Extraocular movements intact.      Conjunctiva/sclera: Conjunctivae normal.      Pupils: Pupils are equal, round, and reactive to light.   Pulmonary:      Effort: Pulmonary effort is normal.   Musculoskeletal:      Cervical back: Normal range of motion.   Skin:     Findings: Bruising present.   Neurological:      Mental Status: She is alert and oriented to person, place, and time.      Cranial Nerves: Cranial nerves 2-12 are intact.      Deep Tendon Reflexes:      Reflex Scores:       Bicep reflexes are 1+ on the right side and 1+ on the left side.       Brachioradialis reflexes are 1+ on the right side and 1+ on the left side.      NEUROLOGICAL EXAMINATION:     MENTAL STATUS   Oriented to person, place, and time.     CRANIAL NERVES   Cranial nerves II through XII intact.     CN III, IV, VI   Pupils are equal, round, and reactive to light.    MOTOR EXAM   Muscle bulk: normal  Overall muscle tone: normal    Strength   Right deltoid: 4/5  Left deltoid: 4/5  Right biceps: 4/5  Left biceps: 4/5  Right triceps: 4/5  Left triceps: 4/5    REFLEXES     Reflexes   Right brachioradialis: 1+  Left brachioradialis: 1+  Right biceps: 1+  Left biceps: 1+    SENSORY EXAM   Light touch normal.     GAIT AND COORDINATION        Not assessed      Significant Labs: All pertinent lab results from the past 24 hours have been reviewed.    Significant Imaging: I have reviewed all pertinent imaging results/findings within the past 24 hours.

## 2022-07-12 NOTE — ASSESSMENT & PLAN NOTE
Secondary to liver disease, continue diuresis and treatment for her decompensated cirrhosis  - on bumex 1 mg BID and Spironolactone 150 mg daily

## 2022-07-12 NOTE — PLAN OF CARE
GENA spoke with Sho [phone] at [facility] this am. CM gave update on patient, that patient is not medically to discharge. Patient may step down first when medically ready before discharge.       Joselyn Bedoya RN     [phone]

## 2022-07-12 NOTE — PROCEDURES
DATE: 7/11/22    EEG NUMBER: FH -2    REFERRING PHYSICIAN:  Dr. Swann      This EEG was performed to assess for subclinical seizures      ELECTROENCEPHALOGRAM REPORT     METHODOLOGY:  Electroencephalographic (EEG) recording is with electrodes placed according to the International 10-20 placement system.  Thirty two (32) channels of digital signal are simultaneously recorded from the scalp and may include EKG, EMG, and/or eye monitors.   Recording band pass was 0.1 to 512 hz.  Digital video recording of the patient is simultaneously recorded with the EEG.  The nursing staff report clinical symptoms and may press an event button when the patient has symptoms of clinical interest to the treating physicians.  EEG and video recording is stored and archived in digital format.  The entire recording is visually reviewed, and the times identified by computer analysis as being spikes or seizures are reviewed again.  Activation procedures which include photic stimulation, hyperventilation and instructing patients to perform simple task are done in selected patients.   Compresses spectral analysis (CSA) is also performed on the activity recorded from each individual channel.  This is displayed as a power display of frequencies from 0 to 30 Hz over time.   The CSA analysis is done and displayed continuously.  This is reviewed for asymmetries in power between homologous areas of the scalp and for presence of changes in power which can be seen when seizures occur.  Sections of suspected abnormalities on the CSA is then compared with the original EEG recording.                [employer] software was also utilized in the review of this study.  This software suite analyzes the EEG recording in multiple domains.  Coherence and rhythmicity is computed to identify EEG sections which may contain organized seizures.  Each channel undergoes analysis to detect presence of spike and sharp waves which have special and morphological  characteristic of epileptic activity.  The routine EEG recording is converted from spacial into frequency domain.  This is then displayed comparing homologous areas to identify areas of significant asymmetry.  Algorithm to identify non-cortically generated artifact is used to separate eye movement, EMG and other artifact from the EEG.     Recording times  Start on July 11, 2022 at hours 7 minute 1 seconds 15  End on July 12, 2022 at hours 7 minutes 0 seconds 5  The total time of EEG recording for the study was 23 hrs and 53 minutes     EEG FINDINGS:  The recording was obtained with a number of standard bipolar and referential montages during wakefulness, drowsiness and sleep.  In the alert state, the posterior background rhythm was a symmetric 5-7 Hz activity, which reacted symmetrically to eye opening.  Activation procedures were not performed.  Mixed generalized synchronous semi rhythmical delta activity was noted.  The background was punctuated by 1-2 hertz generalized triphasic morphology waves..  During drowsiness, the background rhythm waxed and waned and there were periods of slowing.  During stage II sleep, symmetric V waves and sleep spindles were noted. There were no interictal epileptiform abnormalities and no clinical or electrographic seizures were recorded.    The EKG channel revealed a sinus rhythm.     IMPRESSION:  This is an abnormal EEG during wakefulness, drowsiness and sleep. Diffuse slowing was noted.  1-2 hertz generalized triphasic morphology waves were noted which were periodic.     CLINICAL CORRELATION:  The patient is a 67 year-old female who is being evaluated for altered sensorium. The patient is currently maintained on Keppra and onfi. This is an abnormal EEG during wakefulness, drowsiness and sleep.  The overall degree of disorganization and slowing for given age is suggestive of a moderate to severe encephalopathy, likely a toxic metabolic encephalopathy.  The presence of triphasic  waves further supports the diagnosis of a generalized metabolic encephalopathy and possible boundary syndrome. There is no evidence of an epileptic process on this recording.  No seizures were recorded during this study.

## 2022-07-12 NOTE — SUBJECTIVE & OBJECTIVE
Interval History/Significant Events:   Arosuable and awake today. Able to interact.EEG concerning for seizure, neuro recommended Keppra and Onfi. Levophed uptitrated and patient with + BM. Had epistaxis from around the site of NG tube was turning patient, followed soon by bright red output from NG tube. Hgb remained stable. Started on PPI twice a day.    Review of Systems   Unable to perform ROS: Patient nonverbal   Objective:     Vital Signs (Most Recent):  Temp: 98 °F (36.7 °C) (07/11/22 1500)  Pulse: 93 (07/11/22 1800)  Resp: 10 (07/11/22 1800)  BP: 124/60 (07/11/22 1800)  SpO2: 99 % (07/11/22 1800) Vital Signs (24h Range):  Temp:  [97.7 °F (36.5 °C)-98.6 °F (37 °C)] 98 °F (36.7 °C)  Pulse:  [] 93  Resp:  [10-20] 10  SpO2:  [98 %-100 %] 99 %  BP: (107-172)/(53-76) 124/60   Weight: 87 kg (191 lb 12.8 oz)  Body mass index is 37.46 kg/m².      Intake/Output Summary (Last 24 hours) at 7/11/2022 1916  Last data filed at 7/11/2022 1800  Gross per 24 hour   Intake 583.41 ml   Output 1815 ml   Net -1231.59 ml       Physical Exam  Constitutional:       Comments: Drowsy but arosuable. EEG leads present   Eyes:      Extraocular Movements: Extraocular movements intact.      Pupils: Pupils are equal, round, and reactive to light.   Cardiovascular:      Rate and Rhythm: Normal rate and regular rhythm.   Pulmonary:      Effort: Pulmonary effort is normal.      Breath sounds: Normal breath sounds.   Abdominal:      Comments: Distended, non-tender   Musculoskeletal:      Cervical back: Normal range of motion.      Comments: 2+ edema to mid shin   Skin:     General: Skin is warm.   Neurological:      Comments: Able to move all extremities this morning       Vents:     Lines/Drains/Airways       Drain  Duration                  NG/OG Tube 07/10/22 1500 Left nostril 1 day         Urethral Catheter 07/10/22 1200 1 day         Rectal Tube 07/11/22 1400 fecal management system <1 day              Peripheral Intravenous Line   Duration                  Peripheral IV - Single Lumen 07/10/22 1500 20 G Left Forearm 1 day         Peripheral IV - Single Lumen 07/10/22 1500 20 G Right Forearm 1 day                  Significant Labs:    CBC/Anemia Profile:  Recent Labs   Lab 07/10/22  1656 07/11/22  0345 07/11/22  1550   WBC 7.94 8.24 8.79   HGB 9.0* 8.4* 8.3*   HCT 27.5* 25.6* 25.5*    117* 118*   MCV 91 92 92   RDW 21.0* 19.4* 19.9*        Chemistries:  Recent Labs   Lab 07/10/22  0535 07/10/22  1033 07/10/22  1800 07/11/22  0345   * 129* 132* 133*   K 3.9 4.3 4.0 4.0   CL 97 96 96 99   CO2 26 25 25 26   BUN 19 19 20 20   CREATININE 1.2 1.2 1.1 1.0   CALCIUM 8.6* 8.5* 8.6* 8.3*   ALBUMIN 1.7* 1.8*  --  1.7*   PROT 6.7 7.2  --  6.3   BILITOT 2.3* 2.5*  --  3.2*   ALKPHOS 104 102  --  81   ALT 28 32  --  26   AST 41* 54*  --  44*   MG 1.4* 1.5*  --  1.5*   PHOS 3.2  --   --  3.8         Significant Imaging:  I have reviewed all pertinent imaging results/findings within the past 24 hours.

## 2022-07-12 NOTE — HOSPITAL COURSE
Stroke Code called. CTA head and neck without evidence of acute stroke or thrombosis. Had unusual eye deviation and rigidity. Transferred to ICU for seizure vs hepatic encephalopathy. MRI brain without acute pathology. No acute CVA or cerebral edema. cEEG with disorganized theta/delta activity and continuous runs of generalized discharges with triphasic morphology. Consistent with moderate to severe encephalopathy. Meets criteria for ictal/interictal continuum seen in setting of renal/hepatic dysfunction and anoxia, toxin/meds. On 7/11 now arousable and awake. Able to interact.EEG concerning for seizure, neuro recommended Keppra and Onfi. Levophed uptitrated and patient with + BM. Had epistaxis from around the site of NG tube was turning patient, followed soon by bright red output from NG tube. Hgb remained stable. Started on PPI twice a day.  Remains awake and able to sign with ASL interpretor.  Stable for stepdown to  on 7/13.  Due to increasing drowsiness Neuro contacted and recommending decreasing Onfi to once daily.

## 2022-07-12 NOTE — CONSULTS
Nutrition consult received regarding TF recommendations.  RD following, please see note from 7/7 for full assessment.    Recommendations:  1) When medically feasible, initiate enteral nutrition via NGT. Rec'd Impact Peptide @ 40 mL/hr to provide 1440 kcals, 90 g of protein, 739 mL fluid.  2) RD to monitor & follow-up.    Thanks!  MS Damaris, RD, LDN

## 2022-07-12 NOTE — ASSESSMENT & PLAN NOTE
Suspicion of Hepatic Encephalopathy and possible Seizures. ABG with alkalosis  Concern for seizure activity on EEG with moderate to severe encephalopathy in the setting of hepatic dysfunction and generalized periodic discharges with triphasic morphology.   - MRI with possible chronic hemorrhage and  recommends follow up in 6 months  - Loaded with Keppra and started on 750 mg BID  - Onfi BID started  - on cEEG  - management of Hepatic Encephalopathy per GI section

## 2022-07-12 NOTE — PLAN OF CARE
[facility] DAILY GOALS       A: Awake    RASS: Goal - RASS Goal: 0-->alert and calm  Actual - RASS (Brizuela Agitation-Sedation Scale): 0-->alert and calm   Restraint necessity:    B: Breathe   SBT: not intubated   C: Coordinate A & B, analgesics/sedatives   Pain: managed    SAT: not intubated  D: Delirium   CAM-ICU: Overall CAM-ICU: Negative  E: Early(intubated/ Progressive (non-intubated) Mobility   MOVE Screen: fail   Activity: Activity Management: Patient unable to perform activities  FAS: Feeding/Nutrition   Diet order: Diet/Nutrition Received: NPO,    T: Thrombus   DVT prophylaxis: VTE Required Core Measure: Pharmacological prophylaxis initiated/maintained  H: HOB Elevation   Head of Bed (HOB) Positioning: HOB at 30-45 degrees  U: Ulcer Prophylaxis   GI: yes  G: Glucose control   managed    S: Skin   Bathing/Skin Care: bath, complete, linen changed, incontinence care  Device Skin Pressure Protection: adhesive use limited, absorbent pad utilized/changed, skin-to-skin areas padded, skin-to-device areas padded, pressure points protected  Pressure Reduction Devices: heel offloading device utilized, foam padding utilized, specialty bed utilized  Pressure Reduction Techniques: weight shift assistance provided  Skin Protection: adhesive use limited, incontinence pads utilized, transparent dressing maintained, tubing/devices free from skin contact  B: Bowel Function   Flexi seal LBM 7/12/22   I: Indwelling Catheters   Beckham necessity:      Urethral Catheter 07/10/22 1200-Reason for Continuing Urinary Catheterization: Critically ill in ICU and requiring hourly monitoring of intake/output   CVC necessity: none  D: De-escalation Antibiotics   yes    Family/Goals of care/Code Status   Code Status: Full Code    24H Vital Sign Range  Temp:  [97.6 °F (36.4 °C)-98.6 °F (37 °C)]   Pulse:  []   Resp:  [10-20]   BP: ()/(53-70)   SpO2:  [95 %-100 %]      Shift Events   NAEON, pt remained on EEG over night no signs of any  seizure activity noted. NGT bloody drainage no longer noted. Tolerating NGT meds no residual noted.       Nguyễn Matamoros, LORETAN, RN, CCRN

## 2022-07-12 NOTE — ASSESSMENT & PLAN NOTE
Patient with deafness and alcoholic cirrhosis [place] [facility] [place] requiring fluid management over hospitalization. Patient had been able to communicate over hospitalization using board. Notable encephalopathy on 7/10 AM for which neurology was consulted    This patients sudden encephalopathy is concerning given her prior baseline. No witnessed seizure activity and no prior history of epilepsy. The patients exam is poor and could resemble a postictal state, although with lower concern at this time for nonconvulsive status epilepticus. Etiology of her current encephalopathy is most likely due to her UA findings concerning for a possible infection. She additionally has had a downtrending sodium which may be contributing as well. No other clear metabolic finding which may be contributing at this time. Prior hyperammonemia has been treated.    Plan:  - bcx positive for coagulase negative staph, ucx positive for enterococcus, patient now on ertapenem 1g and vancomycin 15mg/kg bid  - Ammonia 105, likely secondary to hepatic dysfunction, patient on spirinolactone, lactulose, and cholestyramine   - MRI brain showed possible chronic hemorrhage vs. Vascular malformation in the left thalamus, recommended f/u in 6 months  - Yesterday's EEG showed generalized periodic discharges and triphasic morphology concering for status epilepticus  - Continue Keppra 750mg bid, Onfi 10mg bid  - Will not pursue LP now given improvement of patient's mentation, however will consider LP If repeat physical exam and labs are concerning for declining mentation    Please contact with any questions or concerns  Will continue to follow patient

## 2022-07-12 NOTE — PROGRESS NOTES
Pharmacokinetic Initial Assessment: IV Vancomycin    Assessment/Plan:    Initiate intravenous vancomycin with loading dose of 2000 mg once followed by a maintenance dose of vancomycin 1250 mg IV every 12 hours  Desired empiric serum trough concentration is 10 to 20 mcg/mL  Draw vancomycin trough level 60 min prior to fourth dose on 07/13/2022 at approximately 0800.  Pharmacy will continue to follow and monitor vancomycin.      Please contact pharmacy at extension [phone] with any questions regarding this assessment.     Thank you for the consult,   Jaime Benjamin       Patient brief summary:  Aleyda Floyd is a 67 y.o. female initiated on antimicrobial therapy with IV Vancomycin for treatment of suspected urinary tract infection    Drug Allergies:   Review of patient's allergies indicates:  No Known Allergies    Actual Body Weight:   87 kg    Renal Function:   Estimated Creatinine Clearance: 53.5 mL/min (based on SCr of 1 mg/dL).,     Dialysis Method (if applicable):  N/A    CBC (last 72 hours):  Recent Labs   Lab Result Units 07/09/22  0626 07/10/22  0535 07/10/22  1033 07/10/22  1656 07/11/22  0345 07/11/22  1550   WBC K/uL 7.54 7.15 6.94 7.94 8.24 8.79   Hemoglobin g/dL 7.9* 8.6* 9.1* 9.0* 8.4* 8.3*   Hematocrit % 24.0* 25.6* 27.2* 27.5* 25.6* 25.5*   Platelets K/uL 108* 123* 135* 154 117* 118*   Gran % % 50.2 51.4 58.1 59.9 54.1 57.3   Lymph % % 25.6 24.6 20.9 19.1 22.6 20.0   Mono % % 19.6* 20.6* 19.0* 18.8* 19.9* 19.1*   Eosinophil % % 3.4 2.0 0.7 0.8 2.4 2.3   Basophil % % 0.7 0.8 0.7 0.9 0.8 0.7   Differential Method  Automated Automated Automated Automated Automated Automated       Metabolic Panel (last 72 hours):  Recent Labs   Lab Result Units 07/09/22  0626 07/10/22  0535 07/10/22  1033 07/10/22  1104 07/10/22  1800 07/11/22  0345   Sodium mmol/L 131* 131* 129*  --  132* 133*   Potassium mmol/L 3.6 3.9 4.3  --  4.0 4.0   Chloride mmol/L 99 97 96  --  96 99   CO2 mmol/L 27 26 25  --  25 26    Glucose mg/dL 117* 112* 115*  --  106 104   Glucose, UA   --   --   --  Negative  --   --    BUN mg/dL 20 19 19  --  20 20   Creatinine mg/dL 1.4 1.2 1.2  --  1.1 1.0   Albumin g/dL 1.6* 1.7* 1.8*  --   --  1.7*   Total Bilirubin mg/dL 2.1* 2.3* 2.5*  --   --  3.2*   Alkaline Phosphatase U/L 94 104 102  --   --  81   AST U/L 36 41* 54*  --   --  44*   ALT U/L 25 28 32  --   --  26   Magnesium mg/dL 1.4* 1.4* 1.5*  --   --  1.5*   Phosphorus mg/dL 3.5 3.2  --   --   --  3.8       Drug levels (last 3 results):  No results for input(s): VANCOMYCINRA, VANCORANDOM, VANCOMYCINPE, VANCOPEAK, VANCOMYCINTR, VANCOTROUGH in the last 72 hours.    Microbiologic Results:  Microbiology Results (last 7 days)       Procedure Component Value Units Date/Time    Blood culture [[id number]] Collected: 07/10/22 1302    Order Status: Completed Specimen: Blood from Peripheral, Antecubital, Right Updated: 07/11/22 1718     Blood Culture, Routine Gram stain luisa bottle: Gram positive cocci in clusters resembling Staph       Results called to and read back by: Brittanie Pérez RN  07/11/2022  11:53      Gram stain aer bottle: Gram positive cocci in clusters resembling Staph    Narrative:      Collection has been rescheduled by DW1 at 07/10/2022 09:53 Reason:   Doctors and nurses are working with patient  Collection has been rescheduled by DW1 at 07/10/2022 09:53 Reason:   Doctors and nurses are working with patient    Blood culture [[id number]] Collected: 07/11/22 1550    Order Status: Sent Specimen: Blood from Peripheral, Antecubital, Left Updated: 07/11/22 1558    Blood culture [[id number]] Collected: 07/10/22 1311    Order Status: Completed Specimen: Blood from Peripheral, Antecubital, Left Updated: 07/11/22 1412     Blood Culture, Routine No Growth to date      No Growth to date    Narrative:      Collection has been rescheduled by GIOVANNY at 07/10/2022 09:53 Reason:   Doctors and nurses are working with patient  Collection has been rescheduled by DW1  at 07/10/2022 09:53 Reason:   Doctors and nurses are working with patient    Urine culture [[id number]]  (Abnormal) Collected: 07/10/22 1104    Order Status: Completed Specimen: Urine Updated: 07/11/22 1201     Urine Culture, Routine ENTEROCOCCUS SPECIES  >100,000 cfu/ml  Identification and susceptibility pending      Narrative:      Specimen Source->Urine

## 2022-07-12 NOTE — ASSESSMENT & PLAN NOTE
Neurology is being consulted for the second time, in this case for evaluation of contribution of anti-epileptic medication to patient's mentation. Patient with deafness and alcoholic cirrhosis [place] [facility] [place] requiring fluid management over hospitalization. Notable encephalopathy on 7/10 AM for which neurology was previously consulted. No witnessed seizure activity and no prior history of epilepsy. She was evaluated with EEG and though no electrographic seizures were found was started on keppra and onfi. Patient appears to have a UTI picture on U/A and continued hyponatremia.    On exam this afternoon, patient was alert and communicative without signs of significant decline in mentation compared to earlier in admission. Contributors include her suspected ongoing UTI, hospital-induced delirium, as well as Melatonin 6mg qhs, that she has been receiving.    Recommendations:  - consider discontinuing melatonin for tonight  - will re-evaluate patient tomorrow 07/27/22  - Continue Keppra 750mg bid, Onfi 10mg bid for the moment  - Discussed plan and treatments with patient and family using , they voiced understanding of the plan

## 2022-07-12 NOTE — PROGRESS NOTES
[facility]  Neurology  Progress Note    Patient Name: Aleyda Floyd  MRN: [id number]  Admission Date: 6/27/2022  Hospital Length of Stay: 14 days  Code Status: Full Code   Attending Provider: Barrie Swann MD  Primary Care Physician: Elvie Fernandes MD   Principal Problem:Anasarca    HPI:   Ms Floyd is a 67 yoF with bilateral chronic deafness, alcoholic cirrhosis, and hypertension who presented to [facility] with generalized swelling. The patient and family declined any confusion or altered mental status on admission and the patient was able to communicate with a writing board over the course of her hospital course. She denied any recent alcohol use with last use in February when she was diagnosed with alcoholic cirrhosis. The patient was treated for decompensated cirrhosis and anasarca with IV Lasix, spironolactone and paracentesis. The patient was pending discharge to SNF but was noted to have had change in cognition on 7/10 AM for which neurology was consulted. On exam, the patient was noted to have be unable to communicate with writing board or follow commands. She was initially not opening eyes spontaneously on exam but was later doing so when re-evaluated while still not following commands or communicating.         Overview/Hospital Course:  No notes on file        Subjective:     Interval History: Patient is doing better today. She is able to communicate via sign language interpretor. Patient did have episode of epistaxis from NG tube which has now resolved. Patient is afebrile. Blood culture positive for coagulase negative staphylococcus and ucx for enterococcus. Patient now on two antibiotics, ertapenem 1g and vancomycin 15mg/kg (1,250mg).     Current Neurological Medications: Keppra 750mg bid, Onfi 10mg bid    Current Facility-Administered Medications   Medication Dose Route Frequency Provider Last Rate Last Admin    acetaminophen tablet 650 mg  650 mg Oral Q4H PRN Yaakov Castro MD         bumetanide tablet 1 mg  1 mg Oral BID Jessenia Foster MD   1 mg at 07/12/22 0837    cholestyramine 4 gram packet 4 g  1 packet Oral Daily Juan Miguel MD   4 g at 07/12/22 0845    cloBAZam suspension 10 mg  10 mg Oral BID Savage oCle MD   10 mg at 07/12/22 0845    COVID-19 vac, sy(Pfizer)(PF) (Pfizer COVID-19) 30 mcg/0.3 mL injection 0.3 mL  0.3 mL Intramuscular vaccine x 1 dose Tena Brizuela MD        dextrose 10% bolus 125 mL  12.5 g Intravenous PRN Yaakov Castro MD        dextrose 10% bolus 250 mL  25 g Intravenous PRN Yaakov Castro MD        ertapenem (INVANZ) 1 g in sodium chloride 0.9% 100 mL IVPB  1 g Intravenous Q24H Barrie Swann MD   Stopped at 07/11/22 1316    folic acid tablet 1 mg  1 mg Oral Daily Yaakov Castro MD   1 mg at 07/12/22 0837    glucagon (human recombinant) injection 1 mg  1 mg Intramuscular PRN Yaakov Castro MD        glucose chewable tablet 16 g  16 g Oral PRN Yaakov Castro MD        glucose chewable tablet 24 g  24 g Oral PRN Yaakov Castro MD        insulin aspart U-100 pen 0-5 Units  0-5 Units Subcutaneous QID (AC + HS) PRN Yaakov Castro MD        lactulose 20 gram/30 mL solution Soln 30 g  30 g Oral Q6H Barrie Swann MD   30 g at 07/12/22 1216    levETIRAcetam injection 750 mg  750 mg Intravenous Q12H Savage Cole MD   750 mg at 07/12/22 0838    melatonin tablet 6 mg  6 mg Oral Nightly PRN Tai العلي MD        mupirocin 2 % ointment   Nasal BID Barrie Swann MD   Given at 07/12/22 0859    naloxone 0.4 mg/mL injection 0.02 mg  0.02 mg Intravenous PRN Yaakov Castro MD        ondansetron disintegrating tablet 8 mg  8 mg Oral Q8H PRN Yaakov Castro MD        pantoprazole injection 40 mg  40 mg Intravenous BID Barrie Swann MD   40 mg at 07/12/22 0838    prochlorperazine injection Soln 5 mg  5 mg Intravenous Q6H PRN Yaakov Castro MD        rifAXIMin tablet 550 mg  550 mg Per NG tube BID Barrie Swann MD   550 mg at 07/12/22 0837    sodium chloride 0.9% flush 10 mL  10 mL  Intravenous PRN Tai العلي MD        sodium chloride 0.9% flush 10 mL  10 mL Intravenous Q12H PRN Yaakov Castro MD        spironolactone tablet 150 mg  150 mg Oral Daily Juan Miguel MD   150 mg at 07/12/22 0837    thiamine tablet 100 mg  100 mg Oral Daily Yaakov Castro MD   100 mg at 07/12/22 0837    vancomycin - pharmacy to dose   Intravenous pharmacy to manage frequency Barrie Swann MD        vancomycin 1.25 g in dextrose 5% 250 mL IVPB (ready to mix)  15 mg/kg Intravenous Q24H Barrie Swann MD           Review of Systems   Constitutional:  Negative for chills and fever.   HENT:  Positive for nosebleeds. Negative for rhinorrhea and trouble swallowing.         Epistaxis now resolved.   Eyes:  Negative for discharge.   Respiratory:  Negative for cough and shortness of breath.    Cardiovascular:  Negative for chest pain and leg swelling.   Gastrointestinal:  Negative for abdominal pain and nausea.   Genitourinary:  Negative for dysuria and hematuria.   Musculoskeletal:  Negative for arthralgias and myalgias.   Neurological:  Negative for tremors and headaches.   Psychiatric/Behavioral:  Negative for agitation and confusion.    Objective:     Vital Signs (Most Recent):  Temp: 97.5 °F (36.4 °C) (07/12/22 0800)  Pulse: 74 (07/12/22 1100)  Resp: 12 (07/12/22 1100)  BP: 135/60 (07/12/22 1100)  SpO2: 99 % (07/12/22 1100) Vital Signs (24h Range):  Temp:  [97.5 °F (36.4 °C)-98 °F (36.7 °C)] 97.5 °F (36.4 °C)  Pulse:  [74-97] 74  Resp:  [10-19] 12  SpO2:  [95 %-100 %] 99 %  BP: ()/(53-67) 135/60     Weight: 83 kg (182 lb 15.7 oz)  Body mass index is 35.74 kg/m².    Physical Exam  HENT:      Head: Normocephalic and atraumatic.   Eyes:      Extraocular Movements: Extraocular movements intact.      Conjunctiva/sclera: Conjunctivae normal.      Pupils: Pupils are equal, round, and reactive to light.   Pulmonary:      Effort: Pulmonary effort is normal.   Musculoskeletal:      Cervical back: Normal range of motion.    Skin:     Findings: Bruising present.   Neurological:      Mental Status: She is alert and oriented to person, place, and time.      Cranial Nerves: Cranial nerves 2-12 are intact.      Deep Tendon Reflexes:      Reflex Scores:       Bicep reflexes are 1+ on the right side and 1+ on the left side.       Brachioradialis reflexes are 1+ on the right side and 1+ on the left side.      NEUROLOGICAL EXAMINATION:     MENTAL STATUS   Oriented to person, place, and time.     CRANIAL NERVES   Cranial nerves II through XII intact.     CN III, IV, VI   Pupils are equal, round, and reactive to light.    MOTOR EXAM   Muscle bulk: normal  Overall muscle tone: normal    Strength   Right deltoid: 4/5  Left deltoid: 4/5  Right biceps: 4/5  Left biceps: 4/5  Right triceps: 4/5  Left triceps: 4/5    REFLEXES     Reflexes   Right brachioradialis: 1+  Left brachioradialis: 1+  Right biceps: 1+  Left biceps: 1+    SENSORY EXAM   Light touch normal.     GAIT AND COORDINATION        Not assessed      Significant Labs: All pertinent lab results from the past 24 hours have been reviewed.    Significant Imaging: I have reviewed all pertinent imaging results/findings within the past 24 hours.    Assessment and Plan:     * Anasarca  See above    Acute encephalopathy  Patient with deafness and alcoholic cirrhosis presented to [facility] for decompensated cirrhosis requiring fluid management over hospitalization. Patient had been able to communicate over hospitalization using board. Notable encephalopathy on 7/10 AM for which neurology was consulted    This patients sudden encephalopathy is concerning given her prior baseline. No witnessed seizure activity and no prior history of epilepsy. The patients exam is poor and could resemble a postictal state, although with lower concern at this time for nonconvulsive status epilepticus. Etiology of her current encephalopathy is most likely due to her UA findings concerning for a possible infection. She  additionally has had a downtrending sodium which may be contributing as well. No other clear metabolic finding which may be contributing at this time. Prior hyperammonemia has been treated.    - bcx positive for coagulase negative staph, ucx positive for enterococcus, patient now on ertapenem 1g and vancomycin 15mg/kg bid  - Ammonia 105, likely secondary to hepatic dysfunction, patient on spirinolactone, lactulose, and cholestyramine   - MRI brain showed possible chronic hemorrhage vs. Vascular malformation in the left thalamus, recommended f/u in 6 months  - Yesterday's EEG showed generalized periodic discharges and triphasic morphology concering for status epilepticus, with some improvement on 7/12 as well as improvement in mentation    Plan:  - consider discontinuing ertapenem due to possible contribution to eeg findings  - will f/u in outpatient setting message sent to   - Continue Keppra 750mg bid, Onfi 10mg bid  - Will not pursue LP now given improvement of patient's mentation, however will consider LP If repeat physical exam and labs are concerning for declining mentation  - Discussed plan and treatments with patient and family using , they voiced understanding of the plan  - Patient may not need long term AEDs moving forward, will assess in outpatient setting  - Discussed seizure precautions including heights and water as well as no driving 6 months after most recent seizure event    Will sign off at this time  Please contact with any questions or concerns    Debility  See above    Alcoholic cirrhosis of liver with ascites  See above    Deaf- written communication  See above      VTE Risk Mitigation (From admission, onward)         Ordered     IP VTE HIGH RISK PATIENT  Once         06/28/22 0419     Place sequential compression device  Until discontinued         06/28/22 0419     Place sequential compression device  Until discontinued         06/28/22 0415                Lele Posadas,  MD  Neurology  [facility]

## 2022-07-13 NOTE — TREATMENT PLAN
Hepatology Treatment Plan    Aleyda Floyd is a 67 y.o. female admitted to hospital 6/27/2022 (Hospital Day: 17) due to Anasarca.     Interval History  - Conveyed concerns regarding sedation from Onfi use to the neurology team. They will give their recommendations shortly.  - patient already on lactulose and Xifaxin.   -  Had a meeting with Dr. Franco and the family yesterday. Explained that her workup for liver tx would likely be outpatient & that she will require a multi-disciplinary team.         Objective  Temp:  [96.7 °F (35.9 °C)-97.6 °F (36.4 °C)] 97.6 °F (36.4 °C) (07/13 0700)  Pulse:  [67-84] 69 (07/13 0700)  BP: ()/(47-70) 93/50 (07/13 0700)  Resp:  [7-25] 9 (07/13 0700)  SpO2:  [93 %-100 %] 99 % (07/13 0700)        Laboratory      Lab Results   Component Value Date    WBC 6.69 07/13/2022    HGB 7.3 (L) 07/13/2022    HCT 22.6 (L) 07/13/2022    MCV 93 07/13/2022    PLT 91 (L) 07/13/2022       Lab Results   Component Value Date     07/13/2022    K 3.8 07/13/2022     07/13/2022    CO2 29 07/13/2022    BUN 17 07/13/2022    CREATININE 1.2 07/13/2022    CALCIUM 8.1 (L) 07/13/2022       Lab Results   Component Value Date    ALBUMIN 1.4 (L) 07/13/2022    ALT 19 07/13/2022    AST 34 07/13/2022    ALKPHOS 64 07/13/2022    BILITOT 1.6 (H) 07/13/2022       Lab Results   Component Value Date    INR 1.5 (H) 07/11/2022    INR 1.7 (H) 06/28/2022    INR 1.7 (H) 06/23/2022       MELD-Na score: 15 at 7/13/2022  4:56 AM  MELD score: 14 at 7/13/2022  4:56 AM  Calculated from:  Serum Creatinine: 1.2 mg/dL at 7/13/2022  4:56 AM  Serum Sodium: 136 mmol/L at 7/13/2022  4:56 AM  Total Bilirubin: 1.6 mg/dL at 7/13/2022  4:56 AM  INR(ratio): 1.5 at 7/11/2022  3:50 PM  Age: 67 years    Assessment    #EtOH cirrhosis, decompensated  #ascitis, secondary to above  - MELD: 18>20>15  - Patient has been diuresed significantly since admission.   - Na: 136, Cr: 1.2, T. Bilirubin: 1.6, INR: 1.5    #hematochezia,  resolved  #blood in NG tube,single episode  - Hb stable at 7.3 g/dl      #AMS, resolving  #seizure disorder  - likely 2/2 toxic metabolic encephalopathy.   - CT head & CTA head & neck were negative.   - EEG with generalized periodic discharges and triphasic morphology concering for status epilepticus      Plan  - Patient will eventually require outpatient liver tx eval. Patient too frail currently. Must demonstrate mobility and functional capacity. Outpatient hepatology and addiction psych evaluation.  - Await neurology recs on Onfi. In general, would like to avoid sedating drugs in patients with cirrhosis if possible.   - Continue IV PPI. Continue Lactulose TID and Rifaximin BID to maintain 2-3 soft bowel movements daily.  - Continue rx of UTI  - Continue diuresis. Strict I/O's and daily standing weights.   - Low sodium diet and 1.5L fluid restriction.   - Please obtain daily CBC, BMP, LFT, INR      Thank you for involving us in the care of Aleyda Floyd. Please call with any additional concerns or questions.    Ricci Roman MD, PGY-IV  Gastroenterology Fellow  [facility]

## 2022-07-13 NOTE — SUBJECTIVE & OBJECTIVE
Interval History/Significant Events:   See above    Review of Systems   Unable to perform ROS: Patient nonverbal   Objective:     Vital Signs (Most Recent):  Temp: 97.2 °F (36.2 °C) (07/12/22 1500)  Pulse: 71 (07/12/22 1800)  Resp: (!) 9 (07/12/22 1800)  BP: (!) 130/58 (07/12/22 1800)  SpO2: 98 % (07/12/22 1800)   Vital Signs (24h Range):  Temp:  [97 °F (36.1 °C)-97.6 °F (36.4 °C)] 97.2 °F (36.2 °C)  Pulse:  [67-97] 71  Resp:  [9-24] 9  SpO2:  [95 %-100 %] 98 %  BP: ()/(52-67) 130/58   Weight: 83 kg (182 lb 15.7 oz)  Body mass index is 35.74 kg/m².      Intake/Output Summary (Last 24 hours) at 7/12/2022 1920  Last data filed at 7/12/2022 1800  Gross per 24 hour   Intake 1075 ml   Output 2990 ml   Net -1915 ml       Physical Exam  Eyes:      Extraocular Movements: Extraocular movements intact.      Pupils: Pupils are equal, round, and reactive to light.   Cardiovascular:      Rate and Rhythm: Normal rate and regular rhythm.   Pulmonary:      Effort: Pulmonary effort is normal.      Breath sounds: Normal breath sounds.   Abdominal:      Comments: Distended, non-tender   Musculoskeletal:      Cervical back: Normal range of motion.      Comments: 2+ edema to mid shin   Skin:     General: Skin is warm.   Neurological:      Mental Status: She is alert.      Comments: Able to move all extremities this morning       Vents:     Lines/Drains/Airways       Drain  Duration                  NG/OG Tube 07/10/22 1500 Left nostril 2 days         Urethral Catheter 07/10/22 1200 2 days         Rectal Tube 07/11/22 1400 fecal management system 1 day              Peripheral Intravenous Line  Duration                  Peripheral IV - Single Lumen 07/10/22 1500 20 G Left Forearm 2 days         Peripheral IV - Single Lumen 07/10/22 1500 20 G Right Forearm 2 days                  Significant Labs:    CBC/Anemia Profile:  Recent Labs   Lab 07/11/22  1550 07/12/22  0003 07/12/22  0414   WBC 8.79 9.04 8.06   HGB 8.3* 8.0* 7.9*   HCT  25.5* 24.7* 24.0*   * 117* 113*   MCV 92 92 94   RDW 19.9* 19.9* 19.9*        Chemistries:  Recent Labs   Lab 07/11/22  0345 07/12/22  0414   * 132*   K 4.0 3.7   CL 99 98   CO2 26 26   BUN 20 19   CREATININE 1.0 1.2   CALCIUM 8.3* 8.3*   ALBUMIN 1.7* 1.7*   PROT 6.3 6.2   BILITOT 3.2* 2.4*   ALKPHOS 81 70   ALT 26 26   AST 44* 45*   MG 1.5* 1.7   PHOS 3.8 3.7       All pertinent labs within the past 24 hours have been reviewed.    Significant Imaging:  I have reviewed all pertinent imaging results/findings within the past 24 hours.

## 2022-07-13 NOTE — ASSESSMENT & PLAN NOTE
Improved since admission, Cr is 1.2 today    -- Strict I/O  -- Renally dose all medications  -- Avoid nephrotoxins

## 2022-07-13 NOTE — RESIDENT HANDOFF
ICU Transfer of Care Note  Critical Care Medicine    Admit Date: 6/27/2022  LOS: 15    CC: Anasarca    Code Status: Full Code         HPI and Hospital Course:     HPI:  Per hospital medicine H and P on 6/28/22:   Ms. Floyd is a 66 yo deaf patient with alcoholic cirrhosis, and hypertension who presented with generalized swelling.  used to obtain history. Patient states that she had the swelling since her diagnosis of cirrhosis in February. Patient states that 2 days ago, she was able to ambulate but with some difficulty due to the swelling but now  worsening to the point where she is unable to ambulate by herself. Of note, patient had a recent EGD performed earlier last week where they had issues with her IV resulting is significant bruising. Patient also notes that whenever she scratches her skin with her nails, she notices clear fluid coming from the cuts. Patient denies fever, chills, cough, hemoptysis, nausea, vomiting, abdominal pain, yellowing of skin, constipation, dysuria, hematuria, and black/ bloody stools. Patient has baseline loose stools from her lactulose.  She reports compliance with all medications including her lactulose and Lasix. Patient and family declined any confusion or altered mental status. She denies any recent alcohol use with last use in February when she was diagnosed with alcoholic cirrhosis. Patient states that she used to weigh around 170s which she believes is her dry weight and is currently in the 200s.     Hospital course prior to ICU admission per hospital medicine : Admitted for decompensated cirrhosis and anasarca. Began diuresing with IV Lasix and spironolactone. Hepatology involved in care. Paracentesis of -4.2L; ruled out SBP. Increasing diuresis with Diuril. D/c diuril but continue IV lasix. Repleated K and Mag. 1 BM 7/1 enema ordered 7/2. Prior to enema pt spontaneously had 1 BM with brown stool and bright red streaks of blood. Denied rectal pain.  Inadequate response with lasix PO so started on Bumex 2 mg TID. Edema stable. Will attempt to deescalate Bumex to 2mg BID and increase spironolactone to 150mg po qd. Bumex deescalated to 1mg BID due to steadily rising Cr (1.4, 1.0 on admission). OT/PT consulted, and recommend d/c to SNF.      7/10: Rapid response team consulted for altered mental status. She was previously interactive and alert and this am was obtunded. Stroke code was called and CTA head and neck obtained. No evidence of acute stroke/thrombosis. She then had some unusual eye deviation and rigidity and general neurology was consulted. Due to her profound encephalopathy and concern for the need for likely aggressive seizure vs hepatic encephalopathy treatment she was admitted to the ICU.       Hospital/ICU Course:  7/10/22 - Stroke Code called. CTA head and neck without evidence of acute stroke or thrombosis. Had unusual eye deviation and rigidity. Transferred to ICU for seizure vs hepatic encephalopathy. MRI brain without acute pathology. No acute CVA or cerebral edema. cEEG with disorganized theta/delta activity and continuous runs of generalized discharges with triphasic morphology. Consistent with moderate to severe encephalopathy. Meets criteria for ictal/interictal continuum seen in setting of renal/hepatic dysfunction and anoxia, toxin/meds.    7/11/22 - Arosuable and awake today. Able to interact.EEG concerning for seizure, neuro recommended Keppra and Onfi. Levophed uptitrated and patient with + BM. Had epistaxis from around the site of NG tube was turning patient, followed soon by bright red output from NG tube. Hgb remained stable. Started on PPI twice a day.    7/12/22 - awake and responsive. No complaints of any pain. Started on trickle feeds. EEG stopped.     7/13/22 - Awake and able to sign with ASL interpretor.  Stable for stepdown to .          To Follow Up:     -- Will need ampicillin for enterococcus UTI, deescalated from vanc  7/13  -- Still with some drowsiness suspect related to Onfi hepatology reached out to neuro awaiting recs  -- will need outpatient neuro follow up in 6 weeks per recommendations  -- F/U speech therapy, PT/OT recommendations  -- Suspect epistaxis related to NGT manipulation  -- Needs MRI w/wo contrast for eval of liver lesion per hepatology  -- PIOT on 7/14, given 500mL IVF, f/u labs in am and see if cr improved enough for contrasted scan      Discharge Plan:     Will DC to Skilled Nursing.      Call [phone] with questions.     Avelina CHOWDHURY-ACNP  Pulmonary Critical Care Medicine  07/13/2022  2:59 PM

## 2022-07-13 NOTE — ASSESSMENT & PLAN NOTE
Elevated ammonia level   Hepatology has evaluated while inpatient    - plan to follow up outpatient per hepatology.   - will also need addiction psych eval outpatient  - Lactulose to 30 gm TID and rifaximin 550 mg twice a day  - target 3-4 bm daily    Resolved NG tube suction of bright red blood following epistaxis on 7/11/22  Does have EV Grade 1 lower third of esophagus and erythematous gastropathy without active bleeding and no gastric varices noted on EGD from 6/23/22  - PPI 40 mg daily  - Likely due to manipulation of NGT, Hgb remains stable

## 2022-07-13 NOTE — PROGRESS NOTES
Pharmacokinetic Sign-off: IV Vancomycin    Therapy with vancomycin complete and/or consult discontinued by provider.  Pharmacy will sign off, please re-consult as needed.    Marcelle Jiménez, PharmD, BCPS  EXT [phone]

## 2022-07-13 NOTE — PROGRESS NOTES
[facility] - Cardiac Medical [facility]  Critical Care Medicine  Progress Note    Patient Name: Aleyda Floyd  MRN: [id number]  Admission Date: 6/27/2022  Hospital Length of Stay: 14 days  Code Status: Full Code  Attending Provider: Barrie Swann MD  Primary Care Provider: Elvie Fernandes MD   Principal Problem: Anasarca    Subjective:     HPI:  Per hospital medicine H and P on 6/28/22:   Ms. Floyd is a 68 yo deaf patient with alcoholic cirrhosis, and hypertension who presented with generalized swelling.  used to obtain history. Patient states that she had the swelling since her diagnosis of cirrhosis in February. Patient states that 2 days ago, she was able to ambulate but with some difficulty due to the swelling but now  worsening to the point where she is unable to ambulate by herself. Of note, patient had a recent EGD performed earlier last week where they had issues with her IV resulting is significant bruising. Patient also notes that whenever she scratches her skin with her nails, she notices clear fluid coming from the cuts. Patient denies fever, chills, cough, hemoptysis, nausea, vomiting, abdominal pain, yellowing of skin, constipation, dysuria, hematuria, and black/ bloody stools. Patient has baseline loose stools from her lactulose.  She reports compliance with all medications including her lactulose and Lasix. Patient and family declined any confusion or altered mental status. She denies any recent alcohol use with last use in February when she was diagnosed with alcoholic cirrhosis. Patient states that she used to weigh around 170s which she believes is her dry weight and is currently in the 200s.     Hospital course prior to ICU admission per hospital medicine : Admitted for decompensated cirrhosis and anasarca. Began diuresing with IV Lasix and spironolactone. Hepatology involved in care. Paracentesis of -4.2L; ruled out SBP. Increasing diuresis with Diuril. D/c diuril but continue IV lasix.  Repleated K and Mag. 1 BM 7/1 enema ordered 7/2. Prior to enema pt spontaneously had 1 BM with brown stool and bright red streaks of blood. Denied rectal pain. Inadequate response with lasix PO so started on Bumex 2 mg TID. Edema stable. Will attempt to deescalate Bumex to 2mg BID and increase spironolactone to 150mg po qd. Bumex deescalated to 1mg BID due to steadily rising Cr (1.4, 1.0 on admission). OT/PT consulted, and recommend d/c to SNF.      7/10: Rapid response team consulted for altered mental status. She was previously interactive and alert and this am was obtunded. Stroke code was called and CTA head and neck obtained. No evidence of acute stroke/thrombosis. She then had some unusual eye deviation and rigidity and general neurology was consulted. Due to her profound encephalopathy and concern for the need for likely aggressive seizure vs hepatic encephalopathy treatment she was admitted to the ICU.       Hospital/ICU Course:  7/10/22 - Stroke Code called. CTA head and neck without evidence of acute stroke or thrombosis. Had unusual eye deviation and rigidity. Transferred to ICU for seizure vs hepatic encephalopathy. MRI brain without acute pathology. No acute CVA or cerebral edema. cEEG with disorganized theta/delta activity and continuous runs of generalized discharges with triphasic morphology. Consistent with moderate to severe encephalopathy. Meets criteria for ictal/interictal continuum seen in setting of renal/hepatic dysfunction and anoxia, toxin/meds.    7/11/22 - Arosuable and awake today. Able to interact.EEG concerning for seizure, neuro recommended Keppra and Onfi. Levophed uptitrated and patient with + BM. Had epistaxis from around the site of NG tube was turning patient, followed soon by bright red output from NG tube. Hgb remained stable. Started on PPI twice a day.    7/12/22 - awake and responsive. No complaints of any pain. Started on trickle feeds. EEG stopped.       Interval  History/Significant Events:   See above    Review of Systems   Unable to perform ROS: Patient nonverbal   Objective:     Vital Signs (Most Recent):  Temp: 97.2 °F (36.2 °C) (07/12/22 1500)  Pulse: 71 (07/12/22 1800)  Resp: (!) 9 (07/12/22 1800)  BP: (!) 130/58 (07/12/22 1800)  SpO2: 98 % (07/12/22 1800)   Vital Signs (24h Range):  Temp:  [97 °F (36.1 °C)-97.6 °F (36.4 °C)] 97.2 °F (36.2 °C)  Pulse:  [67-97] 71  Resp:  [9-24] 9  SpO2:  [95 %-100 %] 98 %  BP: ()/(52-67) 130/58   Weight: 83 kg (182 lb 15.7 oz)  Body mass index is 35.74 kg/m².      Intake/Output Summary (Last 24 hours) at 7/12/2022 1920  Last data filed at 7/12/2022 1800  Gross per 24 hour   Intake 1075 ml   Output 2990 ml   Net -1915 ml       Physical Exam  Eyes:      Extraocular Movements: Extraocular movements intact.      Pupils: Pupils are equal, round, and reactive to light.   Cardiovascular:      Rate and Rhythm: Normal rate and regular rhythm.   Pulmonary:      Effort: Pulmonary effort is normal.      Breath sounds: Normal breath sounds.   Abdominal:      Comments: Distended, non-tender   Musculoskeletal:      Cervical back: Normal range of motion.      Comments: 2+ edema to mid shin   Skin:     General: Skin is warm.   Neurological:      Mental Status: She is alert.      Comments: Able to move all extremities this morning       Vents:     Lines/Drains/Airways       Drain  Duration                  NG/OG Tube 07/10/22 1500 Left nostril 2 days         Urethral Catheter 07/10/22 1200 2 days         Rectal Tube 07/11/22 1400 fecal management system 1 day              Peripheral Intravenous Line  Duration                  Peripheral IV - Single Lumen 07/10/22 1500 20 G Left Forearm 2 days         Peripheral IV - Single Lumen 07/10/22 1500 20 G Right Forearm 2 days                  Significant Labs:    CBC/Anemia Profile:  Recent Labs   Lab 07/11/22  1550 07/12/22  0003 07/12/22  0414   WBC 8.79 9.04 8.06   HGB 8.3* 8.0* 7.9*   HCT 25.5* 24.7*  24.0*   * 117* 113*   MCV 92 92 94   RDW 19.9* 19.9* 19.9*        Chemistries:  Recent Labs   Lab 07/11/22  0345 07/12/22  0414   * 132*   K 4.0 3.7   CL 99 98   CO2 26 26   BUN 20 19   CREATININE 1.0 1.2   CALCIUM 8.3* 8.3*   ALBUMIN 1.7* 1.7*   PROT 6.3 6.2   BILITOT 3.2* 2.4*   ALKPHOS 81 70   ALT 26 26   AST 44* 45*   MG 1.5* 1.7   PHOS 3.8 3.7       All pertinent labs within the past 24 hours have been reviewed.    Significant Imaging:  I have reviewed all pertinent imaging results/findings within the past 24 hours.      ABG  Recent Labs   Lab 07/10/22  0941   PH 7.577*   PO2 58*   PCO2 32.2*   HCO3 30.0*   BE 8     Assessment/Plan:     Neuro  Acute encephalopathy  Suspicion of Hepatic Encephalopathy and possible Seizures. Clinically improved, awake and alert.  Concern for seizure activity on EEG with moderate to severe encephalopathy in the setting of hepatic dysfunction and generalized periodic discharges with triphasic morphology.   - MRI with possible chronic hemorrhage and  recommends follow up in 6 months  - Keppra 750 mg BID and Onfi 10 mg BID  - management of Hepatic Encephalopathy per GI section    ENT  Deaf- written communication  Epistaxis noted yesterday from right nare, site of NG tube. Did cease.  Monitor for now    Deafness:  Patient and family are deaf and use American Sign Language or written communication.   Please utilize  when possible to ensure accurate and efficient communication with the family.   Her sister is hearing and is listed as first contact for emergent calls but please text the  and son if they are needed or for consents.     Cardiac/Vascular  Primary hypertension  NG tube in place  continue home meds and monitor vitals     Renal/  UTI (urinary tract infection)  Positive UA from new kim placed 7/10/22. Hx of ESBL E coli in Feb 2022 at OSH  [facility] growing enterococcus  - On Ertapenem for now and Vancomycin  - follow up cultures    PITO (acute  kidney injury)  Improved since admission, monitor Is and Os     ID  Gram-positive cocci in clusters  Noted to be a contaminant     Hematology  Thrombocytopenia  Also stable for now, secondary to liver disease    Oncology  Anemia, chronic disease  Stable but will continue to monitor daily     GI  Alcoholic cirrhosis of liver with ascites  Elevated ammonia level   Hepatology has evaluated while inpatient  - plan to follow up outpatient per hepatology.   - will also need addiction psych eval outpatient  - Lactulose to 20 gm q6h and rifaximin 550 mg twice a day  - target 3-4 bm daily    Resolved NG tube suction of bright red blood following epistaxis on 7/11/22  Does have EV Grade 1 lower third of esophagus and erythematous gastropathy without active bleeding and no gastric varices noted on EGD from 6/23/22  - PPI 40 mg IV twice a day for now    Other  * Anasarca  Secondary to liver disease, continue diuresis and treatment for her decompensated cirrhosis  - on bumex 1 mg BID and Spironolactone 150 mg daily      Family at bedside today, including siblings María Elena and Jaime. All questions and concerns addressed.    Critical Care Time: 40 minutes  Critical secondary to Patient has a condition that poses threat to life and bodily function: hepatic encephalopathy, UTI     Critical care was time spent personally by me on the following activities: development of treatment plan with patient or surrogate and bedside caregivers, discussions with consultants, evaluation of patient's response to treatment, examination of patient, ordering and performing treatments and interventions, ordering and review of laboratory studies, ordering and review of radiographic studies, pulse oximetry, re-evaluation of patient's condition. This critical care time did not overlap with that of any other provider or involve time for any procedures.     Barrie Swann MD  Critical Care Medicine  [facility] - Cardiac Medical ICU

## 2022-07-13 NOTE — SUBJECTIVE & OBJECTIVE
Interval History/Significant Events: No acute events overnight.  Awake and alert, speech consulted for swallow eval.  Stable for stepdown to hospital medicine.      Review of Systems   Constitutional:  Negative for fever.   Respiratory:  Negative for shortness of breath.    Cardiovascular:  Negative for chest pain.   Gastrointestinal:  Negative for abdominal pain.   Genitourinary:  Negative for dysuria.   Musculoskeletal:  Positive for back pain.   Psychiatric/Behavioral:  Negative for confusion.    Objective:     Vital Signs (Most Recent):  Temp: 97.4 °F (36.3 °C) (07/13/22 1100)  Pulse: 82 (07/13/22 1300)  Resp: 16 (07/13/22 1300)  BP: (!) 130/56 (07/13/22 1300)  SpO2: 100 % (07/13/22 1300)   Vital Signs (24h Range):  Temp:  [96.7 °F (35.9 °C)-97.6 °F (36.4 °C)] 97.4 °F (36.3 °C)  Pulse:  [67-84] 82  Resp:  [7-25] 16  SpO2:  [93 %-100 %] 100 %  BP: ()/(44-75) 130/56   Weight: 83 kg (182 lb 15.7 oz)  Body mass index is 35.74 kg/m².      Intake/Output Summary (Last 24 hours) at 7/13/2022 1404  Last data filed at 7/13/2022 1300  Gross per 24 hour   Intake 1241.19 ml   Output 2440 ml   Net -1198.81 ml       Physical Exam  Vitals and nursing note reviewed.   Constitutional:       General: She is awake. She is not in acute distress.     Appearance: Normal appearance. She is ill-appearing.   HENT:      Head: Normocephalic.      Mouth/Throat:      Mouth: Mucous membranes are dry.      Pharynx: Oropharynx is clear.   Eyes:      General: Scleral icterus present.      Pupils: Pupils are equal, round, and reactive to light.   Cardiovascular:      Rate and Rhythm: Normal rate and regular rhythm.      Pulses: Normal pulses.      Heart sounds: No murmur heard.    No gallop.   Pulmonary:      Effort: Pulmonary effort is normal. No respiratory distress.      Breath sounds: Normal breath sounds. No wheezing or rales.   Abdominal:      General: There is distension.      Palpations: Abdomen is soft.      Tenderness: There is no  abdominal tenderness.   Genitourinary:     Comments: Beckham  Musculoskeletal:      Cervical back: Normal range of motion.      Right lower leg: Edema present.      Left lower leg: Edema present.   Skin:     General: Skin is warm and dry.      Capillary Refill: Capillary refill takes less than 2 seconds.      Coloration: Skin is jaundiced.   Neurological:      Mental Status: She is alert and oriented to person, place, and time.   Psychiatric:         Mood and Affect: Mood normal.         Behavior: Behavior normal. Behavior is cooperative.       Vents:     Lines/Drains/Airways       Drain  Duration                  Urethral Catheter 07/10/22 1200 3 days         NG/OG Tube 07/10/22 1500 Left nostril 2 days         Rectal Tube 07/11/22 1400 fecal management system 2 days              Peripheral Intravenous Line  Duration                  Peripheral IV - Single Lumen 07/10/22 1500 20 G Left Forearm 2 days         Peripheral IV - Single Lumen 07/10/22 1500 20 G Right Forearm 2 days                  Significant Labs:    CBC/Anemia Profile:  Recent Labs   Lab 07/12/22  0003 07/12/22  0414 07/13/22  0456   WBC 9.04 8.06 6.69   HGB 8.0* 7.9* 7.3*   HCT 24.7* 24.0* 22.6*   * 113* 91*   MCV 92 94 93   RDW 19.9* 19.9* 19.4*        Chemistries:  Recent Labs   Lab 07/12/22  0414 07/13/22  0456   * 136   K 3.7 3.8   CL 98 102   CO2 26 29   BUN 19 17   CREATININE 1.2 1.2   CALCIUM 8.3* 8.1*   ALBUMIN 1.7* 1.4*   PROT 6.2 5.3*   BILITOT 2.4* 1.6*   ALKPHOS 70 64   ALT 26 19   AST 45* 34   MG 1.7 2.0   PHOS 3.7 3.0       All pertinent labs within the past 24 hours have been reviewed.    Significant Imaging:  I have reviewed all pertinent imaging results/findings within the past 24 hours.

## 2022-07-13 NOTE — PLAN OF CARE
Problem: SLP  Goal: SLP Goal  Description: Speech Language Pathology Goals  Goals expected to be met by 7/20/22    1. Pt will participate in ongoing assessment of swallow function to determine safest, least restrictive means of nutrition/hydration  2. Educate Pt and family on aspiration precautions and SLP POC    Outcome: Ongoing, Progressing     SLP Bedside Swallow Evaluation initiated.  present t/o assessment via iPAD.  Pt with minimal PO acceptance 2/2 persistent pain, RN aware and in room to review. REC: cautiously resume thin liquids for comfort provided Pt is awake, alert, upright, attentive to bites/sips, single bites/sips and strict aspiration precautions and ST to continue to follow for ongoing assessment to determine safest, least restrictive diet pending PO acceptance of additional textures/trials.    Please continue to monitor for signs and symptoms of aspiration and discontinue oral feeding should you notice any of the following: watery eyes, reddened facial area, wet vocal quality, increased work of breathing, change in respiratory status, increased congestion, coughing, fever and/or change in level of alertness.    7/13/2022

## 2022-07-13 NOTE — PT/OT/SLP EVAL
Speech Language Pathology Evaluation  Bedside Swallow    Patient Name:  Aleyda Floyd   MRN:  [id number]  Admitting Diagnosis: Anasarca    Recommendations:                 General Recommendations:  Dysphagia therapy  Diet recommendations:  Thins ok for comfort, ST to continue to follow for ongoing swallow assessment pending PO acceptance of advanced textures  Aspiration Precautions: Strict 1:1 assistance with all PO and close monitoring for S/S aspiration, medications via alternative means, 1 bite/sip at a time, Eliminate distractions, Feed only when awake/alert, Frequent oral care, HOB to 90 degrees, Monitor for s/s of aspiration, No straws, Small bites/sips and Strict aspiration precautions  Continue to monitor for signs and symptoms of aspiration and discontinue oral feeding should you notice any of the following: watery eyes, reddened facial area, wet vocal quality, increased work of breathing, change in respiratory status, increased congestion, coughing, fever and/or change in level of alertness  General Precautions: Standard, aspiration, fall  Communication strategies:  provide increased time to answer, go to room if call light pushed and  required     History:     Past Medical History:   Diagnosis Date    Hypercholesterolemia     Hypertension        Past Surgical History:   Procedure Laterality Date    DILATION AND CURETTAGE OF UTERUS      ESOPHAGOGASTRODUODENOSCOPY N/A 6/23/2022    Procedure: EGD (ESOPHAGOGASTRODUODENOSCOPY);  Surgeon: Sean Perry MD;  Location: [address];  Service: Endoscopy;  Laterality: N/A;  cirrhosis, variceal screening  labs prior  -request sent 6/14  fully vaccinated, instructions emailed to [email or web address]       Social History: Patient lives with her Spouse in their home in [place]..    Prior Intubation HX:  None this admission    Modified Barium Swallow: None piror at this facility     Chest X-Rays: 6/28/22: Low  lung volumes with probable small bilateral pleural effusions and adjacent passive atelectasis, right side greater than left.    Prior diet: dental soft textures, thin liquids per Spouse    Occupation/hobbies/homemaking: Spouse explains Pt had some assistance with ADLs from Spouse prior to admit     Subjective     SLP reviewed Pt with RN pre/post session, RN cleared for tx  Pt presents anxious   present via iPad t/o assessment   Pt asks 'Can I have jello?'    Pain/Comfort:  · Pain Rating 1: 5/10  · Location - Orientation 1: generalized  · Location 1: coccyx  · Pain Addressed 1: Distraction, Cessation of Activity, Nurse notified  · Pain Rating Post-Intervention 1: 5/10    Respiratory Status: Room air    Objective:     Oral Musculature Evaluation  · Oral Musculature: WNL  · Dentition: scattered dentition  · Secretion Management: adequate  · Mucosal Quality: adequate  · Mandibular Strength and Mobility: WNL  · Oral Labial Strength and Mobility: WNL  · Lingual Strength and Mobility: WNL  · Volitional Cough: elicited, adequate  · Volitional Swallow: elicited, timely  · Voice Prior to PO Intake: DNA, Patient is Deaf    Bedside Swallow Eval:   Consistencies Assessed:  · Thin liquids : ice chip x1, sips x3     Oral Phase:   · WNL    Pharyngeal Phase:   · delayed throat clear x1 on initial trial    Compensatory Strategies  · single sips/bites     Treatment: Pt found awake in bed with NG in place and Spouse at bedside. RN in and out of room to review Pt and reposition Pt.   present via iPad. HOB elevated. Pt with minimal PO acceptance 2/2 pain. SLP educated Pt and Spouse on SLP role, need for ongoing swallow assessment when accepting of PO trials, definition and risk of aspiration, S/S aspiration for ongoing monitoring and SLP POC. Pt easily distracted 2/2 pain and demonstrated partial understanding. No additional questions. Pt remained upright in bed with RN and Spouse at bedside at end of session.      Assessment:     Aleyda Floyd is a 67 y.o. female with an SLP diagnosis of Dysphagia.  She presents with minimal PO acceptance upon initial assessment 2/2 pain.  Risk of aspiration remains 2/2 decreased endurance and waxing/waning mental status. She would benefit from ongoing swallow assessment to determine safest, least restrictive means nutrition/hydration.     Goals:   Multidisciplinary Problems     SLP Goals        Problem: SLP    Goal Priority Disciplines Outcome   SLP Goal     SLP Ongoing, Progressing   Description: Speech Language Pathology Goals  Goals expected to be met by 7/20/22    1. Pt will participate in ongoing assessment of swallow function to determine safest, least restrictive means of nutrition/hydration  2. Educate Pt and family on aspiration precautions and SLP POC                     Plan:     · Patient to be seen:  4 x/week   · Plan of Care expires:  08/11/22  · Plan of Care reviewed with:  patient, spouse   · SLP Follow-Up:  Yes       Discharge recommendations:  nursing facility, skilled (per Pt progress and PT/OT recs)       Time Tracking:     SLP Treatment Date:   07/13/22  Speech Start Time:  1002  Speech Stop Time:  1023     Speech Total Time (min):  21 min    Billable Minutes: Eval 10  and Self Care/Home Management Training 11    07/13/2022

## 2022-07-13 NOTE — PROGRESS NOTES
Presented to the patient's room to meet with family at the request of RAUL Pineda and Dr. Roman. Patient is deaf and so if her , both use sign language to communicate, so sister was present to interpret, ask questions and answer questions. Met with the family and explained that Hepatology would not be opening an inpatient liver transplant evaluation due to patient not being well enough for this nor would she be strong enough to be transplanted for a liver at this time.  Our recommendation is that she work on becoming stronger through PT/OT as was the plan prior to the seizure and ICU stay then we can follow up in transplant clinic and initiate eval as an outpatient which her insurance has already approved. I reviewed what a MELD score was and explained where her labs were in relation to this.  I showed them the MELD calculator and how to get to it online. I also encouraged them to download the My [facility] to be able to better communicate with providers as they have c/o this being a problem.  Reviewed all the testing,labs, imaging, consults that would be needed and what she may have had, that would be needed as part of the liver transplant evaluation. We discussed prognosis should she not be able to meet the necessary requirements and palliative as an option.  This was something the sister really wants the  to realize. The sister who interprets stated she wanted the patient's  to prepare for the possibility that the patient may not get stronger, may not be able to get through a transplant evaluation, so I did bring up palliative care as the possibility and what we recommend when patients are declined for transplant due to being too frail.

## 2022-07-13 NOTE — PLAN OF CARE
Recommendations     1. As tolerated, increase TF rate (of Impact Peptide) to 40 mL/hr = 1440 kcals, 90 g of protein, 739 mL fluid.  2. RD to monitor & follow-up.     Goals: Meet % EEN, EPN by RD f/u date  Nutrition Goal Status: new  Communication of RD Recs: reviewed with RN

## 2022-07-13 NOTE — ASSESSMENT & PLAN NOTE
Elevated ammonia level   Hepatology has evaluated while inpatient  - plan to follow up outpatient per hepatology.   - will also need addiction psych eval outpatient  - Lactulose to 20 gm q6h and rifaximin 550 mg twice a day  - target 3-4 bm daily    Resolved NG tube suction of bright red blood following epistaxis on 7/11/22  Does have EV Grade 1 lower third of esophagus and erythematous gastropathy without active bleeding and no gastric varices noted on EGD from 6/23/22  - PPI 40 mg IV twice a day for now

## 2022-07-13 NOTE — PROGRESS NOTES
[facility]  Adult Nutrition  Consult Note    SUMMARY     Recommendations    1. As tolerated, increase TF rate (of Impact Peptide) to 40 mL/hr = 1440 kcals, 90 g of protein, 739 mL fluid.  2. RD to monitor & follow-up.    Goals: Meet % EEN, EPN by RD f/u date  Nutrition Goal Status: new  Communication of RD Recs: reviewed with RN    Assessment and Plan    Nutrition Problem  Inadequate oral intake    Related to (etiology):   Inability to consume sufficient energy    Signs and Symptoms (as evidenced by):   NPO    Interventions(treatment strategy):  Collaboration of nutrition care w/ other providers  EN    Nutrition Diagnosis Status:   New    Malnutrition Assessment    Orbital Region (Subcutaneous Fat Loss): well nourished  Upper Arm Region (Subcutaneous Fat Loss): well nourished  Thoracic and Lumbar Region: well nourished   [place] Region (Muscle Loss): mild depletion  Clavicle Bone Region (Muscle Loss): mild depletion  Clavicle and Acromion Bone Region (Muscle Loss): mild depletion  Anterior Thigh Region (Muscle Loss): mild depletion   Edema (Fluid Accumulation): 3-->moderate     Reason for Assessment    Reason For Assessment: RD follow-up  Diagnosis: liver disease (anasarca)  Relevant Medical History: Alcoholic Cirrhosis, obesity, deaf,HLD, HTN  Interdisciplinary Rounds: attended    General Information Comments: NPO, TFs initiated overnight via NGT & pt tolerating thus far. Per RN documentation, prior to NPO status, pt tolerating Low sodium diet w/ 75% PO intake. UBW: 180# per chart review. NFPE complete 7/7 - pt nourished w/ no indicators of malnutrition.  Nutrition Discharge Planning: Pending clinical course    Nutrition/Diet History    Patient Reported Diet/Restrictions/Preferences: general  Spiritual, Cultural Beliefs, Hinduism Practices, Values that Affect Care: no  Food Allergies: NKFA  Factors Affecting Nutritional Intake: NPO    Anthropometrics    Temp: 97.6 °F (36.4 °C)  Height  Method: Stated  Height: 5' (152.4 cm)  Height (inches): 60 in  Weight Method: Bed Scale  Weight: 83 kg (182 lb 15.7 oz)  Weight (lb): 182.98 lb  Ideal Body Weight (IBW), Female: 100 lb  % Ideal Body Weight, Female (lb): 182.98 %  BMI (Calculated): 35.7  BMI Grade: 35 - 39.9 - obesity - grade II    Lab/Procedures/Meds    Pertinent Labs Reviewed: reviewed  Pertinent Labs Comments: GFR 46.9  Pertinent Medications Reviewed: reviewed  Pertinent Medications Comments: Folic acid, Thiamine, Lactulose    Estimated/Assessed Needs    Weight Used For Calorie Calculations: 83 kg (182 lb 15.7 oz)     Energy Calorie Requirements (kcal): 1544 kcal/d  Energy Need Method: [place]-St Jeor (1.2 PAL)     Protein Requirements:  g/d (1-1.2 g/kg)   Weight Used For Protein Calculations: 83 kg (182 lb 15.7 oz)     Fluid Requirements (mL): 1500 per MD  RDA Method (mL): 1544    Nutrition Prescription Ordered    Current Diet Order: NPO  Current Nutrition Support Formula Ordered: Impact Peptide 1.5  Current Nutrition Support Rate Ordered: 30 mL/hr (Goal: 40 mL/hr)    Evaluation of Received Nutrient/Fluid Intake    Enteral Calories (kcal): 1080  Enteral Protein (gm): 68  Enteral (Free Water) Fluid (mL): 554    I/O: -16.5L since 6/29    Energy Calories Required: not meeting needs  Protein Required: not meeting needs  Fluid Required: other (see comments) (Per MD)    Comments: LBM: 7/13    Tolerance: tolerating    Nutrition Risk    Level of Risk/Frequency of Follow-up: low (one time per week)     Monitor and Evaluation    Food and Nutrient Intake: energy intake, food and beverage intake, enteral nutrition intake  Food and Nutrient Adminstration: diet order, enteral and parenteral nutrition administration  Knowledge/Beliefs/Attitudes: food and nutrition knowledge/skill  Physical Activity and Function: nutrition-related ADLs and IADLs  Anthropometric Measurements: weight, weight change  Biochemical Data, Medical Tests and Procedures: lipid  profile, inflammatory profile, electrolyte and renal panel, gastrointestinal profile, glucose/endocrine profile  Nutrition-Focused Physical Findings: overall appearance     Nutrition Follow-Up    RD Follow-up?: Yes

## 2022-07-13 NOTE — ASSESSMENT & PLAN NOTE
Suspicion of Hepatic Encephalopathy and possible Seizures. Clinically improved, awake and alert.  Concern for seizure activity on EEG with moderate to severe encephalopathy in the setting of hepatic dysfunction and generalized periodic discharges with triphasic morphology.   - MRI with possible chronic hemorrhage and  recommends follow up in 6 months  - Keppra 750 mg BID and Onfi 10 mg BID  - management of Hepatic Encephalopathy per GI section

## 2022-07-13 NOTE — ASSESSMENT & PLAN NOTE
Epistaxis noted yesterday from right nare, site of NG tube. Did cease.  Monitor for now    Deafness:  Patient and family are deaf and use American Sign Language or written communication.   Please utilize  when possible to ensure accurate and efficient communication with the family.   Her sister is hearing and is listed as first contact for emergent calls but please text the  and son if they are needed or for consents.

## 2022-07-13 NOTE — ASSESSMENT & PLAN NOTE
Epistaxis noted yesterday from right nare, site of NG tube. Did cease.  Monitor for now.  Likely due to NGT manipulation.      Deafness:  Patient and family are deaf and use American Sign Language or written communication.   Please utilize  when possible to ensure accurate and efficient communication with the family.   Her sister is hearing and is listed as first contact for emergent calls but please text the  and son if they are needed or for consents.

## 2022-07-13 NOTE — PROGRESS NOTES
[facility]  Critical Care Medicine  Progress Note    Patient Name: Aleyda Floyd  MRN: [id number]  Admission Date: 6/27/2022  Hospital Length of Stay: 15 days  Code Status: Full Code  Attending Provider: Barrie Swann MD  Primary Care Provider: Elvie Fernandes MD   Principal Problem: Anasarca    Subjective:     HPI:  Per hospital medicine H and P on 6/28/22:   Ms. Floyd is a 68 yo deaf patient with alcoholic cirrhosis, and hypertension who presented with generalized swelling.  used to obtain history. Patient states that she had the swelling since her diagnosis of cirrhosis in February. Patient states that 2 days ago, she was able to ambulate but with some difficulty due to the swelling but now  worsening to the point where she is unable to ambulate by herself. Of note, patient had a recent EGD performed earlier last week where they had issues with her IV resulting is significant bruising. Patient also notes that whenever she scratches her skin with her nails, she notices clear fluid coming from the cuts. Patient denies fever, chills, cough, hemoptysis, nausea, vomiting, abdominal pain, yellowing of skin, constipation, dysuria, hematuria, and black/ bloody stools. Patient has baseline loose stools from her lactulose.  She reports compliance with all medications including her lactulose and Lasix. Patient and family declined any confusion or altered mental status. She denies any recent alcohol use with last use in February when she was diagnosed with alcoholic cirrhosis. Patient states that she used to weigh around 170s which she believes is her dry weight and is currently in the 200s.     Hospital course prior to ICU admission per hospital medicine : Admitted for decompensated cirrhosis and anasarca. Began diuresing with IV Lasix and spironolactone. Hepatology involved in care. Paracentesis of -4.2L; ruled out SBP. Increasing diuresis with Diuril. D/c diuril but continue IV lasix.  Repleated K and Mag. 1 BM 7/1 enema ordered 7/2. Prior to enema pt spontaneously had 1 BM with brown stool and bright red streaks of blood. Denied rectal pain. Inadequate response with lasix PO so started on Bumex 2 mg TID. Edema stable. Will attempt to deescalate Bumex to 2mg BID and increase spironolactone to 150mg po qd. Bumex deescalated to 1mg BID due to steadily rising Cr (1.4, 1.0 on admission). OT/PT consulted, and recommend d/c to SNF.      7/10: Rapid response team consulted for altered mental status. She was previously interactive and alert and this am was obtunded. Stroke code was called and CTA head and neck obtained. No evidence of acute stroke/thrombosis. She then had some unusual eye deviation and rigidity and general neurology was consulted. Due to her profound encephalopathy and concern for the need for likely aggressive seizure vs hepatic encephalopathy treatment she was admitted to the ICU.       Hospital/ICU Course:  7/10/22 - Stroke Code called. CTA head and neck without evidence of acute stroke or thrombosis. Had unusual eye deviation and rigidity. Transferred to ICU for seizure vs hepatic encephalopathy. MRI brain without acute pathology. No acute CVA or cerebral edema. cEEG with disorganized theta/delta activity and continuous runs of generalized discharges with triphasic morphology. Consistent with moderate to severe encephalopathy. Meets criteria for ictal/interictal continuum seen in setting of renal/hepatic dysfunction and anoxia, toxin/meds.    7/11/22 - Arosuable and awake today. Able to interact.EEG concerning for seizure, neuro recommended Keppra and Onfi. Levophed uptitrated and patient with + BM. Had epistaxis from around the site of NG tube was turning patient, followed soon by bright red output from NG tube. Hgb remained stable. Started on PPI twice a day.    7/12/22 - awake and responsive. No complaints of any pain. Started on trickle feeds. EEG stopped.     7/13/22 - Awake and  able to sign with ASL interpretor.  Stable for stepdown to .        Interval History/Significant Events: No acute events overnight.  Awake and alert, speech consulted for swallow eval.  Stable for stepdown to hospital medicine.      Review of Systems   Constitutional:  Negative for fever.   Respiratory:  Negative for shortness of breath.    Cardiovascular:  Negative for chest pain.   Gastrointestinal:  Negative for abdominal pain.   Genitourinary:  Negative for dysuria.   Musculoskeletal:  Positive for back pain.   Psychiatric/Behavioral:  Negative for confusion.    Objective:     Vital Signs (Most Recent):  Temp: 97.4 °F (36.3 °C) (07/13/22 1100)  Pulse: 82 (07/13/22 1300)  Resp: 16 (07/13/22 1300)  BP: (!) 130/56 (07/13/22 1300)  SpO2: 100 % (07/13/22 1300)   Vital Signs (24h Range):  Temp:  [96.7 °F (35.9 °C)-97.6 °F (36.4 °C)] 97.4 °F (36.3 °C)  Pulse:  [67-84] 82  Resp:  [7-25] 16  SpO2:  [93 %-100 %] 100 %  BP: ()/(44-75) 130/56   Weight: 83 kg (182 lb 15.7 oz)  Body mass index is 35.74 kg/m².      Intake/Output Summary (Last 24 hours) at 7/13/2022 1404  Last data filed at 7/13/2022 1300  Gross per 24 hour   Intake 1241.19 ml   Output 2440 ml   Net -1198.81 ml       Physical Exam  Vitals and nursing note reviewed.   Constitutional:       General: She is awake. She is not in acute distress.     Appearance: Normal appearance. She is ill-appearing.   HENT:      Head: Normocephalic.      Mouth/Throat:      Mouth: Mucous membranes are dry.      Pharynx: Oropharynx is clear.   Eyes:      General: Scleral icterus present.      Pupils: Pupils are equal, round, and reactive to light.   Cardiovascular:      Rate and Rhythm: Normal rate and regular rhythm.      Pulses: Normal pulses.      Heart sounds: No murmur heard.    No gallop.   Pulmonary:      Effort: Pulmonary effort is normal. No respiratory distress.      Breath sounds: Normal breath sounds. No wheezing or rales.   Abdominal:      General: There is  distension.      Palpations: Abdomen is soft.      Tenderness: There is no abdominal tenderness.   Genitourinary:     Comments: [place]  Musculoskeletal:      Cervical back: Normal range of motion.      Right lower leg: Edema present.      Left lower leg: Edema present.   Skin:     General: Skin is warm and dry.      Capillary Refill: Capillary refill takes less than 2 seconds.      Coloration: Skin is jaundiced.   Neurological:      Mental Status: She is alert and oriented to person, place, and time.   Psychiatric:         Mood and Affect: Mood normal.         Behavior: Behavior normal. Behavior is cooperative.       Vents:     Lines/Drains/Airways       Drain  Duration                  Urethral Catheter 07/10/22 1200 3 days         NG/OG Tube 07/10/22 1500 Left nostril 2 days         Rectal Tube 07/11/22 1400 fecal management system 2 days              Peripheral Intravenous Line  Duration                  Peripheral IV - Single Lumen 07/10/22 1500 20 G Left Forearm 2 days         Peripheral IV - Single Lumen 07/10/22 1500 20 G Right Forearm 2 days                  Significant Labs:    CBC/Anemia Profile:  Recent Labs   Lab 07/12/22  0003 07/12/22  0414 07/13/22  0456   WBC 9.04 8.06 6.69   HGB 8.0* 7.9* 7.3*   HCT 24.7* 24.0* 22.6*   * 113* 91*   MCV 92 94 93   RDW 19.9* 19.9* 19.4*        Chemistries:  Recent Labs   Lab 07/12/22  0414 07/13/22  0456   * 136   K 3.7 3.8   CL 98 102   CO2 26 29   BUN 19 17   CREATININE 1.2 1.2   CALCIUM 8.3* 8.1*   ALBUMIN 1.7* 1.4*   PROT 6.2 5.3*   BILITOT 2.4* 1.6*   ALKPHOS 70 64   ALT 26 19   AST 45* 34   MG 1.7 2.0   PHOS 3.7 3.0       All pertinent labs within the past 24 hours have been reviewed.    Significant Imaging:  I have reviewed all pertinent imaging results/findings within the past 24 hours.      ABG  Recent Labs   Lab 07/10/22  0941   PH 7.577*   PO2 58*   PCO2 32.2*   HCO3 30.0*   BE 8     Assessment/Plan:     Neuro  Acute encephalopathy  Suspicion of  Hepatic Encephalopathy and possible Seizures. Clinically improved, awake and alert.  Concern for seizure activity on EEG with moderate to severe encephalopathy in the setting of hepatic dysfunction and generalized periodic discharges with triphasic morphology.     - MRI with possible chronic hemorrhage and  recommends follow up in 6 months  - Keppra 750 mg BID and Onfi 10 mg BID  - management of Hepatic Encephalopathy per GI section    ENT  Deaf- written communication  Epistaxis noted yesterday from right nare, site of NG tube. Did cease.  Monitor for now.  Likely due to NGT manipulation.      Deafness:  Patient and family are deaf and use American Sign Language or written communication.   Please utilize  when possible to ensure accurate and efficient communication with the family.   Her sister is hearing and is listed as first contact for emergent calls but please text the  and son if they are needed or for consents.     Cardiac/Vascular  Primary hypertension  -- continue home meds and monitor vitals     Renal/  UTI (urinary tract infection)  Positive UA from new kim placed 7/10/22. Hx of ESBL E coli in Feb 2022 at OSH  Uctx growing enterococcus    - Change abx to ampicillin   - follow up cultures, NGTD    PITO (acute kidney injury)  Improved since admission, Cr is 1.2 today    -- Strict I/O  -- Renally dose all medications  -- Avoid nephrotoxins    ID  Gram-positive cocci in clusters  Noted to be a contaminant, repeat cx with NGTD    Hematology  Thrombocytopenia  Also stable for now, secondary to liver disease.    -- Continue daily CBC    Oncology  Anemia, chronic disease  Stable but will continue to monitor daily.    -- Continue CBC daily    GI  Alcoholic cirrhosis of liver with ascites  Elevated ammonia level   Hepatology has evaluated while inpatient    - plan to follow up outpatient per hepatology.   - will also need addiction psych eval outpatient  - Lactulose to 30 gm TID and rifaximin  550 mg twice a day  - target 3-4 bm daily    Resolved NG tube suction of bright red blood following epistaxis on 7/11/22  Does have EV Grade 1 lower third of esophagus and erythematous gastropathy without active bleeding and no gastric varices noted on EGD from 6/23/22  - PPI 40 mg daily  - Likely due to manipulation of NGT, Hgb remains stable    Other  * Anasarca  Secondary to liver disease, continue diuresis and treatment for her decompensated cirrhosis  - on bumex 1 mg BID and Spironolactone 150 mg daily    Debility  -- Consult PT/OT/speech for evaluation      I have spent 35 min with this patient, with over 50% of this time spent coordinating care and speaking with the family    Spoke with patient and patient's  at bedside using [employer] interpretor.  Addressed all questions and concerns.       Avelina Pineda NP  Critical Care Medicine  [facility]

## 2022-07-13 NOTE — ASSESSMENT & PLAN NOTE
Positive UA from new kim placed 7/10/22. Hx of ESBL E coli in Feb 2022 at OSH  Uctx growing enterococcus    - Change abx to ampicillin   - follow up cultures, NGTD

## 2022-07-14 NOTE — NURSING
Patient is AAO*4.  Need to communicated by writing, patient's  translate for the patient as well. Had bowel and bladder movement . Bowel was brown in color . Patient was acceptance to care . Call light within reach , Safety precaution maintained. Will continue monitoring.

## 2022-07-14 NOTE — ASSESSMENT & PLAN NOTE
Epistaxis noted yesterday from right nare, site of NG tube. Did cease with no additional episodes of bleeding.    Monitor for now.  Likely due to NGT manipulation.      Deafness:  Patient and family are deaf and use American Sign Language or written communication.   Please utilize  when possible to ensure accurate and efficient communication with the family.   Her sister is hearing and is listed as first contact for emergent calls but please text the  and son if they are needed or for consents.

## 2022-07-14 NOTE — ASSESSMENT & PLAN NOTE
Suspicion of Hepatic Encephalopathy and possible Seizures. Clinically improved, awake and alert.  Concern for seizure activity on EEG with moderate to severe encephalopathy in the setting of hepatic dysfunction and generalized periodic discharges with triphasic morphology.     - MRI with possible chronic hemorrhage and  recommends follow up in 6 months  - Keppra 750 mg BID and Onfi 10 mg daily, decreased from BID per neuro recs due to increased drowsiness  - management of Hepatic Encephalopathy per GI section

## 2022-07-14 NOTE — PT/OT/SLP RE-EVAL
Occupational Therapy   Re-Assessment / Treatment    Patient Name:  Aleyda Floyd   MRN:  [id number]  Admit Date: 6/27/2022  Admitting Diagnosis:  Anasarca   Length of Stay: 16 days  Recent Surgery: * No surgery found *      Hospital Course:  6/27: Admit date  7/4: OT initial evaluation  Re-Eval complete following emergent transfer to ICU    OT recommendation:  SNF  Please refer to OT initial evaluation for PLOF, OP and social history.  Recommendations:     Discharge Recommendations: nursing facility, skilled  Discharge Equipment Recommendations:  other (see comments) (TBD pending progress)  Barriers to discharge:  Inaccessible home environment, Decreased caregiver support    Assessment:     Aleyda Floyd is a 67 y.o. female with a medical diagnosis of Anasarca.  She presents with performance deficits affecting function are weakness, impaired endurance, impaired self care skills, impaired functional mobilty, gait instability, impaired balance, decreased upper extremity function, decreased lower extremity function, decreased safety awareness, decreased coordination, pain, impaired fine motor, impaired skin, impaired coordination, edema, impaired cardiopulmonary response to activity.      Performance deficits affecting function: weakness, impaired endurance, impaired self care skills, impaired functional mobilty, gait instability, impaired balance, decreased upper extremity function, decreased lower extremity function, decreased safety awareness, decreased coordination, pain, impaired fine motor, impaired skin, impaired coordination, edema, impaired cardiopulmonary response to activity    Rehab Prognosis: Fair; patient would benefit from acute skilled OT services to address these deficits and reach maximum level of function.       Plan:     Patient to be seen 4 x/week to address the above listed problems via self-care/home management, therapeutic activities, therapeutic exercises, neuromuscular  "re-education  · Plan of Care Expires: 08/12/22  · Plan of Care Reviewed with: patient, spouse    Subjective     Communicated with RN prior to session.  Patient found HOB elevated upon OT entry to room, agreeable to evaluation.     Chief Complaint: Leg Swelling (States swelling to legs and arms, Liver cirrhosis patient, had EGD on 6/23. Pt states normally approx 170 pounds but now 212. Denies cp and sob)    Patient comments/goals: ASL  utilized, spouse at bedside assisting (also deaf, using ASL). Pt initially resistive to mobility 2* sacral pain, agreeable with max encouragement  Spouse via : "she has pain on her R lower hip/bottom, that's where she had a pressure wound in the past, is it possible that its back?" questions redirected to RN at bedside    Pain/Comfort:  · Pain Rating 1: 6/10  · Location - Side 1: Right  · Location - Orientation 1: lower  · Location 1: sacral spine  · Pain Addressed 1: Pre-medicate for activity, Reposition, Nurse notified (pain at previous pressure wound site)  · Pain Rating Post-Intervention 1: other (see comments) (pt endorsed improvement with mobility, though pain still present unrated)  · Pain Rating 2: other (see comments) (intermittent shoulder pain, unrated, not increased w activity)  · Location - Side 2: Bilateral  · Location - Orientation 2: generalized  · Location 2: shoulder  · Pain Addressed 2: Reposition, Distraction, Nurse notified, Pre-medicate for activity  · Pain Rating Post-Intervention 2: other (see comments) (pt resting comfortably upon OT exit)    Objective:   Patient found with: blood pressure cuff, kim catheter, NG tube, peripheral IV, pulse ox (continuous), telemetry     General Precautions: Standard, Cardiac aspiration, fall, hearing impaired (ASL  required)   Orthopedic Precautions:N/A   Braces: N/A   Respiratory Status:    Room air  Vitals: BP (!) 120/56 (BP Location: Right arm, Patient Position: Lying)   Pulse 96   Temp 98 " °F (36.7 °C) (Oral)   Resp 16   Ht 5' (1.524 m)   Wt 83 kg (182 lb 15.7 oz)   SpO2 99%   Breastfeeding No   BMI 35.74 kg/m²     Cognitive and Psychosocial Function:   · AxOx4 --    · Follows Commands/attention: easily distracted by pain and fatigue and follows one-step commands  · Communication: ASL  utilized  · Memory: No Deficits noted  · Safety awareness/insight to disability: intact and self-limiting 2* pain   · Mood/Affect/Coping skills/emotional control: Appropriate to situation, Cooperative and Anxious    Hearing: Impaired: ASL  required, pt is deaf    Vision:  Intact visual fields    Physical Exam:  Postural examination/scapula alignment:    -       Rounded shoulders  -       Forward head  -       Posterior pelvic tilt  Skin integrity: Visible skin intact and Thin     Left UE Right UE   UE Edema absent absent   UE ROM AAROM WFL AAROM WFL   UE Strength decreased ROM , adequate strength decreased ROM , adequate strength    Strength moderate composite grasp moderate composite grasp   Sensation LUE INTACT:WFL RUE INTACT: WFL   Fine Motor Coordination:  LUE INTACT: WFL RUE INTACT: WFL   Gross Motor Coordination: LUE IMPAIRED: WFL, limited by fatigue and impaired functional endurance RUE IMPAIRED:WFL, limited by fatigue and impaired functional endurance       Occupational Performance:  Bed Mobility:    · Rolling Right: total assistance and of 2 persons  · Supine to Sit: total assistance and of 2 persons  · Sit to Supine: total assistance and of 2 persons  Note: pt requiring physical and verbal cues and assist to participate 2* pain and anxiety re: mobility    Functional Mobility/Transfers:   Static Sitting EOB: Total A,  5 min. Limited engagement in ADLs 2* increased assist/pt concentration required for success and engagement in sitting    Dynamic Sitting EOB: ALIVIA 2* increased fatigue/dizziness. Vitals stable.     Activities of Daily Living:  · Feeding:   NPO, NG in  place  · Grooming: maximal assistance hand over hand washing face, pt able to initiate, limited continued AROM  · Toileting:  Beckham in place      AMPAC 6 Click ADL:  AMPAC Total Score: 11    Treatment & Education:  -OT POC, safety during ADLs and mobility   -Education on energy conservation and task modification to maximize safety and independence  -Questions answered within OT scope of practice.      Patient left with bed in chair position with all lines intact, call button in reach, RN notified and spouse and brother present    GOALS:   Multidisciplinary Problems     Occupational Therapy Goals        Problem: Occupational Therapy    Goal Priority Disciplines Outcome Interventions   Occupational Therapy Goal     OT, PT/OT Ongoing, Progressing    Description: Goals set 7/14 to be met by 7/28    Patient will increase functional independence with ADLs by performing:    Grooming while standing with Minimal Assistance.  Toileting from bedside commode with Minimal Assistance for hygiene and clothing management.   Supine to sit with Minimal Assistance.  Step transfer with Minimal Assistance using RW.  Toilet transfer to bedside commode with Minimal Assistance using RW.                     History:     Past Medical History:   Diagnosis Date    Hypercholesterolemia     Hypertension        Past Surgical History:   Procedure Laterality Date    DILATION AND CURETTAGE OF UTERUS      ESOPHAGOGASTRODUODENOSCOPY N/A 6/23/2022    Procedure: EGD (ESOPHAGOGASTRODUODENOSCOPY);  Surgeon: Sean Perry MD;  Location: [facility] ([address] [place] [address]);  Service: Endoscopy;  Laterality: N/A;  cirrhosis, variceal screening  labs prior  -request sent 6/14  fully vaccinated, instructions emailed to [email or web address]       Time Tracking:     OT Date of Treatment: 07/14/22  OT Start Time: 1312  OT Stop Time: 1343  OT Total Time (min): 31 min  Additional staff present: PT      Billable Minutes:Re-eval 8  Self  Care/Home Management 23 7/14/2022

## 2022-07-14 NOTE — ASSESSMENT & PLAN NOTE
Elevated ammonia level   Hepatology has evaluated while inpatient    - plan to follow up outpatient per hepatology  - will also need addiction psych eval outpatient  - Lactulose to 15 gm TID and rifaximin 550 mg twice a day  - target 3-4 bm daily    Resolved NG tube suction of bright red blood following epistaxis on 7/11/22  Does have EV Grade 1 lower third of esophagus and erythematous gastropathy without active bleeding and no gastric varices noted on EGD from 6/23/22  - PPI 40 mg daily  - Likely due to manipulation of NGT, Hgb remains stable

## 2022-07-14 NOTE — PLAN OF CARE
Problem: Physical Therapy  Goal: Physical Therapy Goal  Description: Goals to be met by: 22    Patient will increase functional independence with mobility by performin. Supine to sit with minimal Assistance - not met  2. Sit to stand transfer with minimal assistance- not met  3. Bed to chair transfer with minimal assistance using LRAD - not met  4. Gait  x 30 feet with minimal  Assistance using LRAD - not met  5. Ascend/Descend 6 inch curb step with Contact Guard Assistance using LRAD - not met  6. Lower extremity exercise program x30 reps per handout, with independence - not met    Outcome: Ongoing, Progressing   Re-evaluation completed and goals appropriate. 2022

## 2022-07-14 NOTE — PLAN OF CARE
[facility]  Discharge Reassessment    Primary Care Provider: Elvie Fernandes MD    Expected Discharge Date: 7/18/2022     Patient not medically ready for discharge per MD.    Reassessment (most recent)     Discharge Reassessment - 07/14/22 1032        Discharge Reassessment    Assessment Type Discharge Planning Reassessment     Did the patient's condition or plan change since previous assessment? No     Discharge Plan discussed with: Spouse/sig other;Patient     Name(s) and Number(s) Jaime Floyd () [phone]     Communicated REMA with patient/caregiver Date not available/Unable to determine     Discharge Plan A Skilled Nursing Facility     Discharge Plan B Home with family;Home Health     DME Needed Upon Discharge  other (see comments)   TBD    Discharge Barriers Identified None     Why the patient remains in the hospital Requires continued medical care        Post-Acute Status    Post-Acute Authorization Placement     Post-Acute Placement Status Pending medical clearance/testing     Discharge Delays None known at this time               Joselyn Bedoya RN     [phone]

## 2022-07-14 NOTE — PLAN OF CARE
Problem: Occupational Therapy  Goal: Occupational Therapy Goal  Description: Goals set 7/14 to be met by 7/28    Patient will increase functional independence with ADLs by performing:    Grooming while standing with Minimal Assistance.  Toileting from bedside commode with Minimal Assistance for hygiene and clothing management.   Supine to sit with Minimal Assistance.  Step transfer with Minimal Assistance using RW.  Toilet transfer to bedside commode with Minimal Assistance using RW.    Outcome: Ongoing, Progressing     ReEval complete. Pt tolerated session well. Initiate OT POC.  Occupational Therapy recommends a Skilled Nursing Facility upon discharge to maximize return to PLOF and address deficits listed above.

## 2022-07-14 NOTE — ASSESSMENT & PLAN NOTE
Positive UA from new kim placed 7/10/22. Hx of ESBL E coli in Feb 2022 at [facility]  [facility]ctx growing enterococcus    - Continue ampicillin   - follow up cultures, NGTD

## 2022-07-14 NOTE — RESPIRATORY THERAPY
RAPID RESPONSE RESPIRATORY CHART CHECK       Chart check completed.  Please  call [phone] for further concerns or assistance.

## 2022-07-14 NOTE — PLAN OF CARE
CMICU DAILY GOALS       A: Awake    RASS: Goal - RASS Goal: 0-->alert and calm  Actual - RASS (Brizuela Agitation-Sedation Scale): -1-->drowsy   Restraint necessity:    B: Breathe   SBT: Not intubated   C: Coordinate A & B, analgesics/sedatives   Pain: managed    SAT: Not intubated  D: Delirium   CAM-ICU: Overall CAM-ICU: Negative  E: Early(intubated/ Progressive (non-intubated) Mobility   MOVE Screen: Pass   Activity: Activity Management: Patient unable to perform activities  FAS: Feeding/Nutrition   Diet order: Diet/Nutrition Received: NPO,    T: Thrombus   DVT prophylaxis: VTE Required Core Measure: Pharmacological prophylaxis initiated/maintained  H: HOB Elevation   Head of Bed (HOB) Positioning: HOB elevated  U: Ulcer Prophylaxis   GI: no  G: Glucose control   managed    S: Skin   Bathing/Skin Care: back care, bath, complete, dressed/undressed, linen changed, shampoo, foot care  Device Skin Pressure Protection: absorbent pad utilized/changed, skin-to-device areas padded, pressure points protected, skin-to-skin areas padded  Pressure Reduction Devices: heel offloading device utilized, specialty bed utilized  Pressure Reduction Techniques: heels elevated off bed, pressure points protected, weight shift assistance provided  Skin Protection: incontinence pads utilized, skin-to-skin areas padded, skin-to-device areas padded, tubing/devices free from skin contact, transparent dressing maintained  B: Bowel Function   diarrhea   I: Indwelling Catheters   Beckham necessity:      Urethral Catheter 07/10/22 1200-Reason for Continuing Urinary Catheterization: Critically ill in ICU and requiring hourly monitoring of intake/output   CVC necessity: No  D: De-escalation Antibiotics   Yes    Family/Goals of care/Code Status   Code Status: Full Code    24H Vital Sign Range  Temp:  [97 °F (36.1 °C)-97.6 °F (36.4 °C)]   Pulse:  [69-97]   Resp:  [9-32]   BP: ()/(44-83)   SpO2:  [89 %-100 %]      Shift Events   No acute events  throughout shift    VS and assessment per flow sheet, patient progressing towards goals as tolerated, plan of care reviewed with family, all concerns addressed, will continue to monitor.    Jane Ro

## 2022-07-14 NOTE — ASSESSMENT & PLAN NOTE
Cr increased today to 1.5.  Holding diuretics, will give 500mL LR.  Large volume stool output over the past few days.  Lactulose  to 15gm TID.      -- Strict I/O  -- Renally dose all medications  -- Avoid nephrotoxins

## 2022-07-14 NOTE — PLAN OF CARE
GENA spoke with Sho [phone] with [facility] for update on patient. Patient is not medically ready to discharge yet. Sho advised she spoke to patient's sister Lay yesterday. Sho advised sister Lay will go in on Monday to sign paperwork. CM to continue to follow for when patient is medically ready to discharge.      Joselyn Bedoya RN     [phone]

## 2022-07-14 NOTE — PROGRESS NOTES
[facility] - Card[facility] Medical [facility]  Critical Care Medicine  Progress Note    Patient Name: Aleyda Floyd  MRN: [id number]  Admission Date: 6/27/2022  Hospital Length of Stay: 16 days  Code Status: Full Code  Attending Provider: Barrie Swann MD  Primary Care Provider: Elvie Fernandes MD   Principal Problem: Anasarca    Subjective:     HPI:  Per hospital medicine H and P on 6/28/22:   Ms. Floyd is a 68 yo deaf patient with alcoholic cirrhosis, and hypertension who presented with generalized swelling.  used to obtain history. Patient states that she had the swelling since her diagnosis of cirrhosis in February. Patient states that 2 days ago, she was able to ambulate but with some difficulty due to the swelling but now  worsening to the point where she is unable to ambulate by herself. Of note, patient had a recent EGD performed earlier last week where they had issues with her IV resulting is significant bruising. Patient also notes that whenever she scratches her skin with her nails, she notices clear fluid coming from the cuts. Patient denies fever, chills, cough, hemoptysis, nausea, vomiting, abdominal pain, yellowing of skin, constipation, dysuria, hematuria, and black/ bloody stools. Patient has baseline loose stools from her lactulose.  She reports compliance with all medications including her lactulose and Lasix. Patient and family declined any confusion or altered mental status. She denies any recent alcohol use with last use in February when she was diagnosed with alcoholic cirrhosis. Patient states that she used to weigh around 170s which she believes is her dry weight and is currently in the 200s.     Hospital course prior to ICU admission per hospital medicine : Admitted for decompensated cirrhosis and anasarca. Began diuresing with IV Lasix and spironolactone. Hepatology involved in care. Paracentesis of -4.2L; ruled out SBP. Increasing diuresis with Diuril. D/c diuril but continue IV lasix.  Repleated K and Mag. 1 BM 7/1 enema ordered 7/2. Prior to enema pt spontaneously had 1 BM with brown stool and bright red streaks of blood. Denied rectal pain. Inadequate response with lasix PO so started on Bumex 2 mg TID. Edema stable. Will attempt to deescalate Bumex to 2mg BID and increase spironolactone to 150mg po qd. Bumex deescalated to 1mg BID due to steadily rising Cr (1.4, 1.0 on admission). OT/PT consulted, and recommend d/c to SNF.      7/10: Rapid response team consulted for altered mental status. She was previously interactive and alert and this am was obtunded. Stroke code was called and CTA head and neck obtained. No evidence of acute stroke/thrombosis. She then had some unusual eye deviation and rigidity and general neurology was consulted. Due to her profound encephalopathy and concern for the need for likely aggressive seizure vs hepatic encephalopathy treatment she was admitted to the ICU.       Hospital/ICU Course:  Stroke Code called. CTA head and neck without evidence of acute stroke or thrombosis. Had unusual eye deviation and rigidity. Transferred to ICU for seizure vs hepatic encephalopathy. MRI brain without acute pathology. No acute CVA or cerebral edema. cEEG with disorganized theta/delta activity and continuous runs of generalized discharges with triphasic morphology. Consistent with moderate to severe encephalopathy. Meets criteria for ictal/interictal continuum seen in setting of renal/hepatic dysfunction and anoxia, toxin/meds. On 7/11 now arousable and awake. Able to interact.EEG concerning for seizure, neuro recommended Keppra and Onfi. Levophed uptitrated and patient with + BM. Had epistaxis from around the site of NG tube was turning patient, followed soon by bright red output from NG tube. Hgb remained stable. Started on PPI twice a day.  Remains awake and able to sign with ASL interpretor.  Stable for stepdown to  on 7/13.  Due to increasing drowsiness Neuro contacted and  recommending decreasing Onfi to once daily.        Interval History/Significant Events: No acute events overnight, waiting for bed on hospital medicine team.  Due to drowsiness neuro recommends decreasing onfi dose.      Review of Systems   Constitutional:  Negative for fever.   Respiratory:  Negative for shortness of breath.    Cardiovascular:  Negative for chest pain.   Gastrointestinal:  Negative for abdominal pain.   Genitourinary:  Negative for dysuria.   Musculoskeletal:  Positive for back pain.   Psychiatric/Behavioral:  Negative for confusion.    Objective:     Vital Signs (Most Recent):  Temp: 97.5 °F (36.4 °C) (07/14/22 0300)  Pulse: 93 (07/14/22 1000)  Resp: 13 (07/14/22 1000)  BP: (!) 106/51 (07/14/22 1000)  SpO2: 95 % (07/14/22 1000)   Vital Signs (24h Range):  Temp:  [97 °F (36.1 °C)-97.5 °F (36.4 °C)] 97.5 °F (36.4 °C)  Pulse:  [76-97] 93  Resp:  [11-32] 13  SpO2:  [89 %-100 %] 95 %  BP: ()/(47-83) 106/51   Weight: 83 kg (182 lb 15.7 oz)  Body mass index is 35.74 kg/m².      Intake/Output Summary (Last 24 hours) at 7/14/2022 1149  Last data filed at 7/14/2022 0600  Gross per 24 hour   Intake 489.48 ml   Output 1900 ml   Net -1410.52 ml       Physical Exam  Vitals and nursing note reviewed.   Constitutional:       General: She is awake. She is not in acute distress.     Appearance: Normal appearance. She is ill-appearing.   HENT:      Head: Normocephalic.      Mouth/Throat:      Mouth: Mucous membranes are dry.      Pharynx: Oropharynx is clear.   Eyes:      General: Scleral icterus present.      Pupils: Pupils are equal, round, and reactive to light.   Cardiovascular:      Rate and Rhythm: Normal rate and regular rhythm.      Pulses: Normal pulses.      Heart sounds: No murmur heard.    No gallop.   Pulmonary:      Effort: Pulmonary effort is normal. No respiratory distress.      Breath sounds: Normal breath sounds. No wheezing or rales.   Abdominal:      General: There is distension.       Palpations: Abdomen is soft.      Tenderness: There is no abdominal tenderness.   Genitourinary:     Comments: Beckham  Musculoskeletal:      Cervical back: Normal range of motion.      Right lower leg: Edema present.      Left lower leg: Edema present.   Skin:     General: Skin is warm and dry.      Capillary Refill: Capillary refill takes less than 2 seconds.      Coloration: Skin is jaundiced.   Neurological:      Mental Status: She is alert and oriented to person, place, and time.   Psychiatric:         Mood and Affect: Mood normal.         Behavior: Behavior normal. Behavior is cooperative.       Vents:     Lines/Drains/Airways       Drain  Duration                  NG/OG Tube 07/10/22 1500 Left nostril 3 days         Urethral Catheter 07/10/22 1200 3 days         Rectal Tube 07/11/22 1400 fecal management system 2 days              Peripheral Intravenous Line  Duration                  Peripheral IV - Single Lumen 07/10/22 1500 20 G Left Forearm 3 days         Peripheral IV - Single Lumen 07/10/22 1500 20 G Right Forearm 3 days                  Significant Labs:    CBC/Anemia Profile:  Recent Labs   Lab 07/13/22  0456 07/14/22  0404   WBC 6.69 6.45   HGB 7.3* 7.0*   HCT 22.6* 22.4*   PLT 91* 103*   MCV 93 95   RDW 19.4* 19.8*        Chemistries:  Recent Labs   Lab 07/13/22  0456 07/14/22  0404    137   K 3.8 4.2    104   CO2 29 30*   BUN 17 21   CREATININE 1.2 1.5*   CALCIUM 8.1* 8.6*   ALBUMIN 1.4* 1.5*   PROT 5.3* 5.5*   BILITOT 1.6* 1.5*   ALKPHOS 64 74   ALT 19 22   AST 34 40   MG 2.0 1.8   PHOS 3.0 3.0       All pertinent labs within the past 24 hours have been reviewed.    Significant Imaging:  I have reviewed all pertinent imaging results/findings within the past 24 hours.      ABG  Recent Labs   Lab 07/10/22  0941   PH 7.577*   PO2 58*   PCO2 32.2*   HCO3 30.0*   BE 8     Assessment/Plan:     Neuro  Acute encephalopathy  Suspicion of Hepatic Encephalopathy and possible Seizures. Clinically  improved, awake and alert.  Concern for seizure activity on EEG with moderate to severe encephalopathy in the setting of hepatic dysfunction and generalized periodic discharges with triphasic morphology.     - MRI with possible chronic hemorrhage and  recommends follow up in 6 months  - Keppra 750 mg BID and Onfi 10 mg daily, decreased from BID per neuro recs due to increased drowsiness  - management of Hepatic Encephalopathy per GI section    ENT  Deaf- written communication  Epistaxis noted yesterday from right nare, site of NG tube. Did cease with no additional episodes of bleeding.    Monitor for now.  Likely due to NGT manipulation.      Deafness:  Patient and family are deaf and use American Sign Language or written communication.   Please utilize  when possible to ensure accurate and efficient communication with the family.   Her sister is hearing and is listed as first contact for emergent calls but please text the  and son if they are needed or for consents.     Cardiac/Vascular  Primary hypertension  -- BP soft on , receiving 500 mL LR and holding diuretics     Renal/  UTI (urinary tract infection)  Positive UA from new kim placed 7/10/22. Hx of ESBL E coli in 2022 at OSH  Uctx growing enterococcus    - Continue ampicillin   - follow up cultures, NGTD    PITO (acute kidney injury)  Cr increased today to 1.5.  Holding diuretics, will give 500mL LR.  Large volume stool output over the past few days.  Lactulose  to 15gm TID.      -- Strict I/O  -- Renally dose all medications  -- Avoid nephrotoxins    ID  Gram-positive cocci in clusters  Noted to be a contaminant, repeat cx with NGTD    Hematology  Thrombocytopenia  Also stable for now, secondary to liver disease.    -- Continue daily CBC    Oncology  Anemia, chronic disease  Stable but will continue to monitor daily.    -- Continue CBC daily    GI  Alcoholic cirrhosis of liver with ascites  Elevated ammonia level    Hepatology has evaluated while inpatient    - plan to follow up outpatient per hepatology  - will also need addiction psych eval outpatient  - Lactulose to 15 gm TID and rifaximin 550 mg twice a day  - target 3-4 bm daily    Resolved NG tube suction of bright red blood following epistaxis on 7/11/22  Does have EV Grade 1 lower third of esophagus and erythematous gastropathy without active bleeding and no gastric varices noted on EGD from 6/23/22  - PPI 40 mg daily  - Likely due to manipulation of NGT, Hgb remains stable    Other  * Anasarca  Secondary to liver disease, continue diuresis and treatment for her decompensated cirrhosis    - on bumex 1 mg BID and Spironolactone 150 mg daily  - Holding diuretics on 7/14 due to PITO, reassess if needed to restart in am    Debility  -- Consult PT/OT/speech for evaluation, follow up recommendations        I have spent 35 min with this patient, with over 50% of this time spent coordinating care and speaking with the family    Plan discussed with Dr. Swann.      Avelina Pineda NP  Critical Care Medicine  [facility] - Cardiac Medical ICU

## 2022-07-14 NOTE — SUBJECTIVE & OBJECTIVE
Interval History/Significant Events: No acute events overnight, waiting for bed on hospital medicine team.  Due to drowsiness neuro recommends decreasing onfi dose.      Review of Systems   Constitutional:  Negative for fever.   Respiratory:  Negative for shortness of breath.    Cardiovascular:  Negative for chest pain.   Gastrointestinal:  Negative for abdominal pain.   Genitourinary:  Negative for dysuria.   Musculoskeletal:  Positive for back pain.   Psychiatric/Behavioral:  Negative for confusion.    Objective:     Vital Signs (Most Recent):  Temp: 97.5 °F (36.4 °C) (07/14/22 0300)  Pulse: 93 (07/14/22 1000)  Resp: 13 (07/14/22 1000)  BP: (!) 106/51 (07/14/22 1000)  SpO2: 95 % (07/14/22 1000)   Vital Signs (24h Range):  Temp:  [97 °F (36.1 °C)-97.5 °F (36.4 °C)] 97.5 °F (36.4 °C)  Pulse:  [76-97] 93  Resp:  [11-32] 13  SpO2:  [89 %-100 %] 95 %  BP: ()/(47-83) 106/51   Weight: 83 kg (182 lb 15.7 oz)  Body mass index is 35.74 kg/m².      Intake/Output Summary (Last 24 hours) at 7/14/2022 1149  Last data filed at 7/14/2022 0600  Gross per 24 hour   Intake 489.48 ml   Output 1900 ml   Net -1410.52 ml       Physical Exam  Vitals and nursing note reviewed.   Constitutional:       General: She is awake. She is not in acute distress.     Appearance: Normal appearance. She is ill-appearing.   HENT:      Head: Normocephalic.      Mouth/Throat:      Mouth: Mucous membranes are dry.      Pharynx: Oropharynx is clear.   Eyes:      General: Scleral icterus present.      Pupils: Pupils are equal, round, and reactive to light.   Cardiovascular:      Rate and Rhythm: Normal rate and regular rhythm.      Pulses: Normal pulses.      Heart sounds: No murmur heard.    No gallop.   Pulmonary:      Effort: Pulmonary effort is normal. No respiratory distress.      Breath sounds: Normal breath sounds. No wheezing or rales.   Abdominal:      General: There is distension.      Palpations: Abdomen is soft.      Tenderness: There is  no abdominal tenderness.   Genitourinary:     Comments: Beckham  Musculoskeletal:      Cervical back: Normal range of motion.      Right lower leg: Edema present.      Left lower leg: Edema present.   Skin:     General: Skin is warm and dry.      Capillary Refill: Capillary refill takes less than 2 seconds.      Coloration: Skin is jaundiced.   Neurological:      Mental Status: She is alert and oriented to person, place, and time.   Psychiatric:         Mood and Affect: Mood normal.         Behavior: Behavior normal. Behavior is cooperative.       Vents:     Lines/Drains/Airways       Drain  Duration                  NG/OG Tube 07/10/22 1500 Left nostril 3 days         Urethral Catheter 07/10/22 1200 3 days         Rectal Tube 07/11/22 1400 fecal management system 2 days              Peripheral Intravenous Line  Duration                  Peripheral IV - Single Lumen 07/10/22 1500 20 G Left Forearm 3 days         Peripheral IV - Single Lumen 07/10/22 1500 20 G Right Forearm 3 days                  Significant Labs:    CBC/Anemia Profile:  Recent Labs   Lab 07/13/22  0456 07/14/22  0404   WBC 6.69 6.45   HGB 7.3* 7.0*   HCT 22.6* 22.4*   PLT 91* 103*   MCV 93 95   RDW 19.4* 19.8*        Chemistries:  Recent Labs   Lab 07/13/22  0456 07/14/22  0404    137   K 3.8 4.2    104   CO2 29 30*   BUN 17 21   CREATININE 1.2 1.5*   CALCIUM 8.1* 8.6*   ALBUMIN 1.4* 1.5*   PROT 5.3* 5.5*   BILITOT 1.6* 1.5*   ALKPHOS 64 74   ALT 19 22   AST 34 40   MG 2.0 1.8   PHOS 3.0 3.0       All pertinent labs within the past 24 hours have been reviewed.    Significant Imaging:  I have reviewed all pertinent imaging results/findings within the past 24 hours.

## 2022-07-14 NOTE — TREATMENT PLAN
Hepatology Treatment Plan    Aleyda Floyd is a 67 y.o. female admitted to hospital 6/27/2022 (Hospital Day: 18) due to Anasarca.     Interval History  - Transplant coordinator Belle Jasso had an extensive discussion yesterday with patient's family about why she was more suitable for an outpatient evaluation rather than an inpatient one.  - patient worked with physical therapy and they recommended placement in a skilled facility.    - she is stable for step-down unit.      - Due to increasing drowsiness, neurology recommended decreasing Onfi to once daily.    - patient still needs work up of liver lesion with MRI prior to tx eval.         Objective  Temp:  [97.4 °F (36.3 °C)-98 °F (36.7 °C)] 98 °F (36.7 °C) (07/14 1101)  Pulse:  [76-99] 90 (07/14 1505)  BP: ()/(47-69) 123/54 (07/14 1505)  Resp:  [10-32] 14 (07/14 1505)  SpO2:  [89 %-99 %] 98 % (07/14 1505)        Laboratory      Lab Results   Component Value Date    WBC 6.45 07/14/2022    HGB 7.0 (L) 07/14/2022    HCT 22.4 (L) 07/14/2022    MCV 95 07/14/2022     (L) 07/14/2022       Lab Results   Component Value Date     07/14/2022    K 4.2 07/14/2022     07/14/2022    CO2 30 (H) 07/14/2022    BUN 21 07/14/2022    CREATININE 1.5 (H) 07/14/2022    CALCIUM 8.6 (L) 07/14/2022       Lab Results   Component Value Date    ALBUMIN 1.5 (L) 07/14/2022    ALT 22 07/14/2022    AST 40 07/14/2022    ALKPHOS 74 07/14/2022    BILITOT 1.5 (H) 07/14/2022       Lab Results   Component Value Date    INR 1.5 (H) 07/11/2022    INR 1.7 (H) 06/28/2022    INR 1.7 (H) 06/23/2022       MELD-Na score: 15 at 7/13/2022  4:56 AM  MELD score: 14 at 7/13/2022  4:56 AM  Calculated from:  Serum Creatinine: 1.2 mg/dL at 7/13/2022  4:56 AM  Serum Sodium: 136 mmol/L at 7/13/2022  4:56 AM  Total Bilirubin: 1.6 mg/dL at 7/13/2022  4:56 AM  INR(ratio): 1.5 at 7/11/2022  3:50 PM  Age: 67 years    Assessment    #EtOH cirrhosis, decompensated  #ascitis, secondary to above  -  MELD: 18>20>15>14  - Patient has been diuresed significantly since admission.   - Na: 136, Cr: 1.2, T. Bilirubin: 1.6, INR: 1.5  - patient will be evaluated for liver tx as outpatient    #hematochezia, resolved  #blood in NG tube,single episode  - Hb stable at 7.3 g/dl    #hyperechoic lesion in liver  - US doppler liver (5/5/22): Hypoechoic lesion in the right hepatic lobe measuring 1.4 x 1.3 x 1.0 cm.  - CT abdomen w/wout contrast (5/10/22): Focal lesion right hepatic lobe seen on ultrasound not seen best advantage on CT exam.  Consideration for MRI liver exam with contrast suggested as clinically indicated versus follow-up ultrasound exam in 6 months.    #AMS, resolving  #seizure disorder  - likely 2/2 toxic metabolic encephalopathy.   - CT head & CTA head & neck were negative.   - EEG with generalized periodic discharges and triphasic morphology concering for status epilepticus    #generalized deconditioning  - Patient too frail currently. Must demonstrate mobility and functional capacity before being evlauated for liver tx.       Plan  - Would recommend obtaining MRI of the abdomen w/wout contrast to evaluate liver lesion.   - Patient will eventually require outpatient liver tx eval. Outpatient hepatology and addiction psych evaluation.  - neurology recommmended decreasing Onfi to Qday from BID. In general, would like to avoid sedating drugs in patients with cirrhosis if possible.   - Continue IV PPI. Continue Lactulose TID and Rifaximin BID to maintain 2-3 soft bowel movements daily.  - Continue rx of UTI  - Continue diuresis. Strict I/O's and daily standing weights.   - Low sodium diet and 1.5L fluid restriction.   - Please obtain daily CBC, BMP, LFT, INR      Thank you for involving us in the care of Aleyda Floyd. Please call with any additional concerns or questions.    Ricci Roman MD, PGY-IV  Gastroenterology Fellow  [facility]

## 2022-07-14 NOTE — ASSESSMENT & PLAN NOTE
Secondary to liver disease, continue diuresis and treatment for her decompensated cirrhosis    - on bumex 1 mg BID and Spironolactone 150 mg daily  - Holding diuretics on 7/14 due to PITO, reassess if needed to restart in am

## 2022-07-14 NOTE — PT/OT/SLP PROGRESS
Speech Language Pathology Treatment    Patient Name:  Aleyda Floyd   MRN:  [id number]  Admitting Diagnosis: Anasarca    Recommendations:                 General Recommendations:  Dysphagia therapy  Diet recommendations:  Puree, Liquid Diet Level: Full liquids, Thin   Aspiration Precautions: upright with all PO, only when vital signs stable, only when awake/alert/attentive, 1 bite/sip at a time, Assistance with meals, Eliminate distractions, Frequent oral care, Meds crushed in puree, Remain upright 30 minutes post meal, Small bites/sips and Strict aspiration precautions   Continue to monitor for signs and symptoms of aspiration and discontinue oral feeding should you notice any of the following: watery eyes, reddened facial area, wet vocal quality, increased work of breathing, change in respiratory status, increased congestion, coughing, fever and/or change in level of alertness.  General Precautions: Standard, aspiration, fall, contact  Communication strategies:  go to room if call light pushed and  required     Subjective     SLP reviewed Pt with RN pre/post session, RN explained Pt tolerating sips of water without difficulty, cleared for tx  Pt presents calm   present via iPad  She explains via  'It is really uncomfortable' re:NG tube    Pain/Comfort:  · Pain Rating 1: other (see comments) (Pt did not rate pain, RN notified)  · Location - Orientation 1: generalized  · Location 1: back  · Pain Addressed 1: Cessation of Activity, Nurse notified    Respiratory Status: Room air    Objective:     Has the patient been evaluated by SLP for swallowing?   Yes  Keep patient NPO? No   Current Respiratory Status:  room air     Pt found awake and alert in bed with NG in place. Spouse at bedside t/o assessment.  present via iPad throughout session. Pt followed simple commands WNL. She denied difficulty with ice and thin liquids consumed prior to SLP entrance to room. Spouse  endorsed Pt report of tolerance of sips of water. Pt with scattered dentition and adequate lingual/labial strength and coordination upon informal review of the oral mechanism.  Pt seen with trials of thin liquids (straw sips x3) and puree (tsp bites x5.) She declined trials of solids this service day. Spouse explained Pt with preference for liquids and soft textures at this time. SLP educated Pt and Spouse on SLP role, diet recommendations, S/S aspiration for ongoing monitoring, dysphagia diets and ongoing SLP POC. Pt and Spouse demonstrated understanding. No additional questions. PT/OT at door to work with Pt as SLP discontinud session. Pt remained upright in bed with Spouse at bedside upon SLP exit.   RN and MD team notified of findings and recommendations upon SLP exit.     Assessment:     Aleyda Floyd is a 67 y.o. female with an SLP diagnosis of Dysphagia.  She presents with risk of aspiration 2/2 waxing/waning alertness.  No overt S/S aspiration with trials accepted this service day. ST to continue to follow for ongoing assessment pending acceptance of advanced textures as tolerated.     Goals:   Multidisciplinary Problems     SLP Goals        Problem: SLP    Goal Priority Disciplines Outcome   SLP Goal     SLP Ongoing, Progressing   Description: Speech Language Pathology Goals  Goals expected to be met by 7/20/22    1. Pt will participate in ongoing assessment of swallow function to determine safest, least restrictive means of nutrition/hydration  2. Educate Pt and family on aspiration precautions and SLP POC                     Plan:     · Patient to be seen:  4 x/week   · Plan of Care expires:  08/11/22  · Plan of Care reviewed with:  patient, spouse   · SLP Follow-Up:  Yes       Discharge recommendations:  nursing facility, skilled     Time Tracking:     SLP Treatment Date:   07/14/22  Speech Start Time:  1255  Speech Stop Time:  1313     Speech Total Time (min):  18 min    Billable Minutes:  Treatment Swallowing Dysfunction 8 and Self Care/Home Management Training 10    07/14/2022

## 2022-07-14 NOTE — PT/OT/SLP RE-EVAL
Physical Therapy Co- Re-evaluation and co-treatment with OT    Patient Name:  Aleyda Floyd   MRN:  [id number]    Recommendations:     Discharge Recommendations:  nursing facility, skilled   Discharge Equipment Recommendations:  (will determine DME needs closer to discharge)   Barriers to discharge: Decreased caregiver support family ma;y not be able to assist at current functional level.     Assessment:     Aleyda Floyd is a 67 y.o. female admitted with a medical diagnosis of Anasarca.  She presents with the following impairments/functional limitations:  impaired endurance, impaired functional mobilty, gait instability, impaired balance, decreased safety awareness, decreased lower extremity function, decreased coordination pt tolerated treatment well but c/o dizziness decreased functional mobility. Pt is very low functional level and will need skilled PT 3x/wk to progress physically. Pt will need SNF placement to maximize rehab potential. Pt was evaluated on 7/5 with anasarca and was transferred to ICU 7/10 with AMS.    SOCIAL : per evaluation, pt lives with her  in 1 story slab and uses RW for mobility. Pt has walk in shower, RW, SC, shower chair and w/c . Pt  will assist after discharge.     Rehab Prognosis:  good; patient would benefit from acute skilled PT services to address these deficits and reach maximum level of function.      Recent Surgery: * No surgery found *      Plan:     During this hospitalization, patient to be seen 3 x/week to address the above listed problems via gait training, therapeutic activities, therapeutic exercises  · Plan of Care Expires:  08/12/22   Plan of Care Reviewed with: patient, spouse (brother)    Subjective     Communicated with nurse prior to session.  Patient found supine with telemetry, pulse ox (continuous), blood pressure cuff, NG tube, kim catheter upon PT entry to room, agreeable to evaluation.      Chief Complaint: pt c/o pain in her buttocks  with treatment and dizziness with sitting on EOB. Ipad was used for  during session.   Patient comments/goals:  goals:pt to return to previous functional level.   Pain/Comfort:  · Pain Rating 1: 6/10 (buttocks)  · Pain Addressed 1: Reposition, Distraction  · Pain Rating Post-Intervention 1: 6/10 (buttocks)    Patients cultural, spiritual, Hinduism conflicts given the current situation: no      Objective:     Patient found with: telemetry, pulse ox (continuous), blood pressure cuff, NG tube, kim catheter     General Precautions: Standard, fall, contact, deaf   Orthopedic Precautions:N/A   Braces:    Respiratory Status: Room air    Exams:  · Cognitive Exam:  Patient is oriented to Person, Place, Time and Situation    Functional Mobility:  · Bed Mobility: pt needed verbal instructions for hand placement and sequencing for functional mobility.     · Rolling Right: total assistance and of 2 persons  · Supine to Sit: total assistance and of 2 persons  · Sit to Supine: total assistance and of 2 persons  ·   · Balance: pt sat on EOB x 5 min with total assist. pt leaned backwards and to R side with sitting.     Due to pt complex medical condition, the skill of 2 licensed therapists is needed to maximize treatment session and progression towards goals    AM-PAC 6 CLICK MOBILITY  Total Score:9       Therapeutic Activities and Exercises:   pt,  and pt brother received verbal instructions in role of PT and POC through .     Patient left with bed in chair position with all lines intact, call button in reach, RN notified and brother and   present.    GOALS:   Multidisciplinary Problems     Physical Therapy Goals        Problem: Physical Therapy    Goal Priority Disciplines Outcome Goal Variances Interventions   Physical Therapy Goal     PT, PT/OT Ongoing, Progressing     Description: Goals to be met by: 8/12/22    Patient will increase functional independence with mobility by  performin. Supine to sit with minimal Assistance - not met  2. Sit to stand transfer with minimal assistance- not met  3. Bed to chair transfer with minimal assistance using LRAD - not met  4. Gait  x 30 feet with minimal  Assistance using LRAD - not met  5. Ascend/Descend 6 inch curb step with Contact Guard Assistance using LRAD - not met  6. Lower extremity exercise program x30 reps per handout, with independence - not met                     History:     Past Medical History:   Diagnosis Date    Hypercholesterolemia     Hypertension        Past Surgical History:   Procedure Laterality Date    DILATION AND CURETTAGE OF UTERUS      ESOPHAGOGASTRODUODENOSCOPY N/A 2022    Procedure: EGD (ESOPHAGOGASTRODUODENOSCOPY);  Surgeon: Sean Perry MD;  Location: [facility] ([address] [place] [address]);  Service: Endoscopy;  Laterality: N/A;  cirrhosis, variceal screening  labs prior  -request sent   fully vaccinated, instructions emailed to [email or web address]       Time Tracking:     PT Received On: 22  PT Start Time: 1312     PT Stop Time: 1343  PT Total Time (min): 31 min     Billable Minutes: Re-eval 10 min and Therapeutic Activity 21 min      2022

## 2022-07-15 NOTE — ASSESSMENT & PLAN NOTE
- Pt Cr of 1.0 on admission   - Cr trended up to 1.4 today  - Will hold  continue bumex and aldactone  - Monitor CMP daily

## 2022-07-15 NOTE — PLAN OF CARE
Problem: Adult Inpatient Plan of Care  Goal: Plan of Care Review  Outcome: Ongoing, Progressing  Goal: Patient-Specific Goal (Individualized)  Outcome: Ongoing, Progressing  Goal: Absence of Hospital-Acquired Illness or Injury  Outcome: Ongoing, Progressing  Goal: Optimal Comfort and Wellbeing  Outcome: Ongoing, Progressing  Goal: Readiness for Transition of Care  Outcome: Ongoing, Progressing     Problem: Skin Injury Risk Increased  Goal: Skin Health and Integrity  Outcome: Ongoing, Progressing     Problem: Bariatric Environmental Safety  Goal: Safety Maintained with Care  Outcome: Ongoing, Progressing     Problem: Fall Injury Risk  Goal: Absence of Fall and Fall-Related Injury  Outcome: Ongoing, Progressing     Problem: Infection  Goal: Absence of Infection Signs and Symptoms  Outcome: Ongoing, Progressing     Problem: Fluid and Electrolyte Imbalance (Acute Kidney Injury/Impairment)  Goal: Fluid and Electrolyte Balance  Outcome: Ongoing, Progressing     Problem: Oral Intake Inadequate (Acute Kidney Injury/Impairment)  Goal: Optimal Nutrition Intake  Outcome: Ongoing, Progressing     Problem: Renal Function Impairment (Acute Kidney Injury/Impairment)  Goal: Effective Renal Function  Outcome: Ongoing, Progressing     Problem: Impaired Wound Healing  Goal: Optimal Wound Healing  Outcome: Ongoing, Progressing   Patient AAO And hearing impaired. Need tablet for sign lang. VSS. 2L NC O2 stat %. Beckham intact rectal tube intact. ST eval, diet advanced. Family at bedside.  Abd ascites. Right hand edema and elevated on pillow. No signs of acute distress noted. POC and safety checks reviewed with family. RYAN.

## 2022-07-15 NOTE — PROGRESS NOTES
[facility] - [facility] ([place] [id number])  [facility] Medicine  Progress Note    Patient Name: Aleyda Floyd  MRN: [id number]  Patient Class: IP- Inpatient   Admission Date: 6/27/2022  Length of Stay: 17 days  Attending Physician: Jessenia Foster MD  Primary Care Provider: Elvie Fernandes MD        Subjective:     Principal Problem:Anasarca        HPI:  This is a 67 year old deaf lady with alcoholic cirrhosis, and hypertension who presented with generalized swelling.  used to obtain history. Patient states that she had the swelling since her diagnosis of cirrhosis in February. Patient states that 2 days ago, she was able to ambulate but with some difficulty due to the swelling but now  worsening to the point where she is unable to ambulate by herself. Of note, patient had a recent EGD performed earlier last week where they had issues with her IV resulting is significant bruising. Patient also notes that whenever she scratches her skin with her nails, she notices clear fluid coming from the cuts. Patient denies fever, chills, cough, hemoptysis, nausea, vomiting, abdominal pain, yellowing of skin, constipation, dysuria, hematuria, and black/ bloody stools. Patient has baseline loose stools from her lactulose.  She reports compliance with all medications including her lactulose and Lasix. Patient and family declined any confusion or altered mental status. She denies any recent alcohol use with last use in February when she was diagnosed with alcoholic cirrhosis. Patient states that she used to weigh around 170s which she believes is her dry weight and is currently in the 200s.     Upon chart review, patient had an echo at an OSH on 2/25/22 which showed EF 60-65% and some diastolic dysfunction.    In the ED, patient was found to have Hgb of 8.5 and platelets of 113 around baseline. Patient had a UA which showed 1+ leuks and many bacteria. Patient did not complain of dysuria.       Overview/Hospital  Course:  Admitted for decompensated cirrhosis and anasarca. Began diuresing with IV Lasix and spironolactone. Hepatology involved in care. Paracentesis of -4.2L; ruled out SBP. Increasing diuresis with Diuril. D/c diuril but continue IV lasix. Repleated K and Mag. 1 BM 7/1 enema ordered 7/2. Prior to enema pt spontaneously had 1 BM with brown stool and bright red streaks of blood. Denied rectal pain. Inadequate response with lasix PO so started on Bumex 2 mg TID. Edema stable. Will attempt to deescalate Bumex to 2mg BID and increase spironolactone to 150mg po qd. Bumex deescalated to 1mg BID due to steadily rising Cr (1.4, 1.0 on admission). OT/PT consulted, and recommend d/c to SNF.      Interval History: NAEON. Patient was lethargic in AM.     Review of Systems   Reason unable to perform ROS: Patient lethargic.   Objective:     Vital Signs (Most Recent):  Temp: 97.8 °F (36.6 °C) (07/15/22 1145)  Pulse: 69 (07/15/22 1416)  Resp: 18 (07/15/22 1145)  BP: (!) 123/59 (07/15/22 1145)  SpO2: 100 % (07/15/22 1145)   Vital Signs (24h Range):  Temp:  [97.4 °F (36.3 °C)-98 °F (36.7 °C)] 97.8 °F (36.6 °C)  Pulse:  [68-88] 69  Resp:  [13-18] 18  SpO2:  [96 %-100 %] 100 %  BP: (116-138)/(52-68) 123/59     Weight: 83 kg (182 lb 15.7 oz)  Body mass index is 35.74 kg/m².    Intake/Output Summary (Last 24 hours) at 7/15/2022 1512  Last data filed at 7/14/2022 1736  Gross per 24 hour   Intake 700 ml   Output 400 ml   Net 300 ml      Physical Exam  Constitutional:       Appearance: She is obese.      Comments: Drowsy but arosuable. EEG leads present   HENT:      Right Ear: External ear normal.      Left Ear: External ear normal.   Eyes:      Extraocular Movements: Extraocular movements intact.      Pupils: Pupils are equal, round, and reactive to light.   Cardiovascular:      Rate and Rhythm: Normal rate and regular rhythm.      Pulses: Normal pulses.      Heart sounds: Normal heart sounds.   Pulmonary:      Effort: Pulmonary effort  is normal.      Breath sounds: Normal breath sounds.   Abdominal:      Comments: Distended, non-tender   Musculoskeletal:      Cervical back: Normal range of motion.      Right lower leg: No edema.      Left lower leg: No edema.      Comments: 2+ edema to mid shin   Skin:     General: Skin is warm.   Neurological:      Comments: Able to move all extremities this morning       Significant Labs: All pertinent labs within the past 24 hours have been reviewed.  CBC:   Recent Labs   Lab 07/14/22  0404 07/15/22  0953   WBC 6.45 8.21   HGB 7.0* 7.9*   HCT 22.4* 25.0*   * 103*     CMP:   Recent Labs   Lab 07/14/22  0404 07/15/22  0953    137   K 4.2 4.4    104   CO2 30* 27   * 122*   BUN 21 25*   CREATININE 1.5* 1.3   CALCIUM 8.6* 8.6*   PROT 5.5* 6.4   ALBUMIN 1.5* 1.7*   BILITOT 1.5* 1.9*   ALKPHOS 74 84   AST 40 54*   ALT 22 27   ANIONGAP 3* 6*   EGFRNONAA 35.8* 42.6*       Significant Imaging: I have reviewed all pertinent imaging results/findings within the past 24 hours.      Assessment/Plan:      * Anasarca  67 yoF with alcoholic liver cirrhosis and hypertension admitted for generalized anasarca. Patient's albumin on admission was 1.9. Likely edema related to poor intravascular oncotic pressure 2/2 alcoholic cirrhosis. Patient adherent to home medication. Given history and exam, patient would benefit from diuresis. Dry weight reported as 170s. Patient is currently 199 lbs (down from 212 lbs).     - Better response. d/c Diuril 500 mg IV   - f/u echo unremarkable  - strict I/Os  - daily weights  - low sodium diet with 1500 ml fluid restriction  - Lasix 40 mg IV TID switched to 80 mg PO TID on 7/2 however inadequate response, so dc'd lasix and started Bumex 2 mg TID  - Good UOP. Bumex and spironolactone held.   - Plan for f/u with her hepatologist on d/c    Gram-positive cocci in clusters  Noted on 1 set of aer/anae cultures from 7/10/22. 2nd set so far negative. No clear sign of infection.  Could likely be contaminant.  Uctx also from enterococcus species  - start Vancomycin empirically---> Ampicillin  - repeat culture sent today        UTI (urinary tract infection)  Positive UA from new kim placed 7/10/22. Hx of ESBL E coli in Feb 2022 at OSH  Uctx growing enterococcus  - changed abx to Ertapenem--->Vancomycin---> Ampicillin   - follow up cultures        Acute encephalopathy  - will lower the dose of lactulose given pt is having loose BM       Hypomagnesemia  Monitor daily and replete prn      PITO (acute kidney injury)  - Pt Cr of 1.0 on admission   - Cr trended up to 1.4 today  - Will hold  continue bumex and aldactone  - Monitor CMP daily    Debility  -Pt non-mobile for some time in hospital, so PT/OT order placed  -Pt advised to move around in bed but to only get up with help  - PT /OT recs SNF, placement pending.      Thrombocytopenia  Chronic, patient at baseline    Anemia, chronic disease  Patient with Hgb of 8.5 on admit. Upon review of care everywhere, patient had Hgb of 8.4 in all of 2022. Likely due to chronic disease and stable.    -Bloody Bm 7/2 but not too concerned about GI bleed  - continue to monitor    Alcoholic cirrhosis of liver with ascites  Patient was diagnosed in February. Previous history of chronic alcohol abuse and states she drank about 4-5 glasses of wine per night for over 20 years. Patient is seeing hepatology outpatient. Labs shown in care everywhere.     MELD-Na score: 18 at 6/30/2022  3:30 AM  MELD score: 15 at 6/30/2022  3:30 AM  Calculated from:  Serum Creatinine: 1.2 mg/dL at 6/30/2022  3:30 AM  Serum Sodium: 133 mmol/L at 6/30/2022  3:30 AM  Total Bilirubin: 1.2 mg/dL at 6/30/2022  3:30 AM  INR(ratio): 1.7 at 6/28/2022 12:42 AM  Age: 67 years    - consulted hepatology, appreciate recommendations for diuresis, labs, f/u outpatient with her hepatologist  - continue lactulose, thiamine, and folate  - S/p paracentesis with 4.2L removed  - continue to monitor for  signs of liver failure  - f/u daily CMPs    Primary hypertension  Patient was discontinued from losartan per hepatologist.     - continue to monitor and can add when clinically indicated    Deaf- written communication  ASL  used for interaction as well as written communication      VTE Risk Mitigation (From admission, onward)         Ordered     enoxaparin injection 40 mg  Daily         07/13/22 1005     IP VTE HIGH RISK PATIENT  Once         06/28/22 0419     Place sequential compression device  Until discontinued         06/28/22 0419     Place sequential compression device  Until discontinued         06/28/22 0415                Discharge Planning   REMA: 7/18/2022     Code Status: Full Code   Is the patient medically ready for discharge?: No    Reason for patient still in hospital (select all that apply): Treatment and Pending disposition  Discharge Plan A: Skilled Nursing Facility   Discharge Delays: None known at this time              Cheryl Vargas DO  Department of Hospital Medicine   [facility] - [facility] ([place] [address])

## 2022-07-15 NOTE — PLAN OF CARE
Patient is accepted to [facility]. Patient expected to be stable to dc on Monday.  .    Anjana Guzmán LMSW  [facility]   [id number]

## 2022-07-15 NOTE — ASSESSMENT & PLAN NOTE
Positive UA from new kim placed 7/10/22. Hx of ESBL E coli in Feb 2022 at OSH  Uctx growing enterococcus  - changed abx to Ertapenem--->Vancomycin---> Ampicillin   - follow up cultures

## 2022-07-15 NOTE — PT/OT/SLP PROGRESS
"Speech Language Pathology Treatment    Patient Name:  Aleyda Floyd   MRN:  [id number]  Admitting Diagnosis: Anasarca    Recommendations:                 General Recommendations:  Dysphagia therapy  Diet recommendations:  Mechanical soft, Liquid Diet Level: Thin   Aspiration Precautions: upright with all PO, only when vital signs stable, only when awake/alert/attentive, 1 bite/sip at a time, Assistance with meals, Eliminate distractions, Frequent oral care, Meds whole 1 at a time, Remain upright 30 minutes post meal, Small bites/sips and Strict aspiration precautions   Continue to monitor for signs and symptoms of aspiration and discontinue oral feeding should you notice any of the following: watery eyes, reddened facial area, wet vocal quality, increased work of breathing, change in respiratory status, increased congestion, coughing, fever and/or change in level of alertness.  General Precautions: Standard, aspiration, fall, hearing impaired (ASL interpretor needed)  Communication strategies:  go to room if call light pushed and  required     Subjective     Patient awake and alert  Communicated with nurse prior to session    Pain/Comfort:  Pain Rating 1: 0/10  Pain Rating Post-Intervention 1: 0/10    Respiratory Status: Room air    Objective:     Has the patient been evaluated by SLP for swallowing?   Yes  Keep patient NPO? No   Current Respiratory Status:  room air     Patient sitting up in bed during session. ALS interpretor used via iPad. Patient demonstrated a strong volitional cough/throat clear prior to trials. She tolerated trials of thin liquids x6 (via straw) and solids x4 with no overt signs of aspiration. She demonstrated prolonged mastication of solids, but with good oral clearance. Interpretor reported "some confused" sign language, but she communicated effectively. SLP educated patient regarding recs. Patient left in bed with call light within reach. Recs communicated to team. "     Assessment:     Aleyda Floyd is a 67 y.o. female with an SLP diagnosis of Dysphagia.  She presents with risk of aspiration 2/2 waxing/waning alertness.  No overt S/S aspiration with trials accepted this service day. ST to continue to follow for ongoing assessment pending acceptance of advanced textures as tolerated.     Goals:   Multidisciplinary Problems     SLP Goals        Problem: SLP    Goal Priority Disciplines Outcome   SLP Goal     SLP Ongoing, Progressing   Description: Speech Language Pathology Goals  Goals expected to be met by 7/20/22    1. Pt will participate in ongoing assessment of swallow function to determine safest, least restrictive means of nutrition/hydration  2. Educate Pt and family on aspiration precautions and SLP POC                     Plan:     · Patient to be seen:  3 x/week   · Plan of Care expires:  08/11/22  · Plan of Care reviewed with:  patient   · SLP Follow-Up:  Yes       Discharge recommendations:  nursing facility, skilled     Time Tracking:     SLP Treatment Date:   07/15/22  Speech Start Time:  0921  Speech Stop Time:  0951     Speech Total Time (min):  30 min    Billable Minutes: Treatment Swallowing Dysfunction 10 and Self Care/Home Management Training 20    Ruben Fletcher CCC-SLP  Speech-Language Pathology  Pager: [phone]   07/15/2022

## 2022-07-15 NOTE — ASSESSMENT & PLAN NOTE
67 yoF with alcoholic liver cirrhosis and hypertension admitted for generalized anasarca. Patient's albumin on admission was 1.9. Likely edema related to poor intravascular oncotic pressure 2/2 alcoholic cirrhosis. Patient adherent to home medication. Given history and exam, patient would benefit from diuresis. Dry weight reported as 170s. Patient is currently 199 lbs (down from 212 lbs).     - Better response. d/c Diuril 500 mg IV   - f/u echo unremarkable  - strict I/Os  - daily weights  - low sodium diet with 1500 ml fluid restriction  - Lasix 40 mg IV TID switched to 80 mg PO TID on 7/2 however inadequate response, so dc'd lasix and started Bumex 2 mg TID  - Good UOP. Bumex and spironolactone held.   - Plan for f/u with her hepatologist on d/c

## 2022-07-15 NOTE — TREATMENT PLAN
Hepatology Treatment Plan    Aleyda Floyd is a 67 y.o. female admitted to hospital 6/27/2022 (Hospital Day: 19) due to Anasarca.     Interval History  - Patient is accepted to [facility]. Patient expected to be dc on Monday.    - patient on IV ampicillin for the treatment of the UTI      - working with speech therapy      Objective  Temp:  [97.4 °F (36.3 °C)-98 °F (36.7 °C)] 97.8 °F (36.6 °C) (07/15 1145)  Pulse:  [68-88] 69 (07/15 1416)  BP: (116-138)/(52-68) 123/59 (07/15 1145)  Resp:  [13-18] 18 (07/15 1145)  SpO2:  [96 %-100 %] 100 % (07/15 1145)        Laboratory      Lab Results   Component Value Date    WBC 8.21 07/15/2022    HGB 7.9 (L) 07/15/2022    HCT 25.0 (L) 07/15/2022    MCV 97 07/15/2022     (L) 07/15/2022       Lab Results   Component Value Date     07/15/2022    K 4.4 07/15/2022     07/15/2022    CO2 27 07/15/2022    BUN 25 (H) 07/15/2022    CREATININE 1.3 07/15/2022    CALCIUM 8.6 (L) 07/15/2022       Lab Results   Component Value Date    ALBUMIN 1.7 (L) 07/15/2022    ALT 27 07/15/2022    AST 54 (H) 07/15/2022    ALKPHOS 84 07/15/2022    BILITOT 1.9 (H) 07/15/2022       Lab Results   Component Value Date    INR 1.5 (H) 07/11/2022    INR 1.7 (H) 06/28/2022    INR 1.7 (H) 06/23/2022       MELD-Na score: 15 at 7/13/2022  4:56 AM  MELD score: 14 at 7/13/2022  4:56 AM  Calculated from:  Serum Creatinine: 1.2 mg/dL at 7/13/2022  4:56 AM  Serum Sodium: 136 mmol/L at 7/13/2022  4:56 AM  Total Bilirubin: 1.6 mg/dL at 7/13/2022  4:56 AM  INR(ratio): 1.5 at 7/11/2022  3:50 PM  Age: 67 years    Assessment    #EtOH cirrhosis, decompensated  #ascitis, secondary to above  - MELD: 18>20>15  - Patient has been diuresed significantly since admission.   - Na: 137, Cr: 1.3, T. Bilirubin: 1.9, INR: 1.5  - patient will be evaluated for liver tx as outpatient    #hematochezia, resolved  #blood in NG tube,single episode  - Hb stable at 7.3 g/dl    #hyperechoic lesion in liver  -  US doppler liver (5/5/22): Hypoechoic lesion in the right hepatic lobe measuring 1.4 x 1.3 x 1.0 cm.  - CT abdomen w/wout contrast (5/10/22): Focal lesion right hepatic lobe seen on ultrasound not seen best advantage on CT exam.  Consideration for MRI liver exam with contrast suggested as clinically indicated versus follow-up ultrasound exam in 6 months.    #AMS, resolving  #seizure disorder  - likely 2/2 toxic metabolic encephalopathy.   - CT head & CTA head & neck were negative.   - EEG with generalized periodic discharges and triphasic morphology concering for status epilepticus    #generalized deconditioning  - Patient too frail currently. Must demonstrate mobility and functional capacity before being evlauated for liver tx.       Plan  - Would recommend obtaining MRI of the abdomen w/wout contrast to evaluate liver lesion. Can be done as outpatient.   - Patient will eventually require outpatient liver tx eval. Outpatient hepatology and addiction psych evaluation.  - neurology recommmended decreasing Onfi to Qday from BID. In general, would like to avoid sedating drugs in patients with cirrhosis if possible.   - Continue IV PPI. Continue Lactulose TID and Rifaximin BID to maintain 2-3 soft bowel movements daily.  - Continue diuresis. Strict I/O's and daily standing weights.   - Low sodium diet and 1.5L fluid restriction.   - Please obtain daily CBC, BMP, LFT, INR      Thank you for involving us in the care of Aleyda Floyd. Please call with any additional concerns or questions.    Ricci Roman MD, PGY-IV  Gastroenterology Fellow  [facility]

## 2022-07-15 NOTE — SUBJECTIVE & OBJECTIVE
Interval History: NAEON. Patient was lethargic in AM.     Review of Systems   Reason unable to perform ROS: Patient lethargic.   Objective:     Vital Signs (Most Recent):  Temp: 97.8 °F (36.6 °C) (07/15/22 1145)  Pulse: 69 (07/15/22 1416)  Resp: 18 (07/15/22 1145)  BP: (!) 123/59 (07/15/22 1145)  SpO2: 100 % (07/15/22 1145)   Vital Signs (24h Range):  Temp:  [97.4 °F (36.3 °C)-98 °F (36.7 °C)] 97.8 °F (36.6 °C)  Pulse:  [68-88] 69  Resp:  [13-18] 18  SpO2:  [96 %-100 %] 100 %  BP: (116-138)/(52-68) 123/59     Weight: 83 kg (182 lb 15.7 oz)  Body mass index is 35.74 kg/m².    Intake/Output Summary (Last 24 hours) at 7/15/2022 1512  Last data filed at 7/14/2022 1736  Gross per 24 hour   Intake 700 ml   Output 400 ml   Net 300 ml      Physical Exam  Constitutional:       Appearance: She is obese.      Comments: Drowsy but arosuable. EEG leads present   HENT:      Right Ear: External ear normal.      Left Ear: External ear normal.   Eyes:      Extraocular Movements: Extraocular movements intact.      Pupils: Pupils are equal, round, and reactive to light.   Cardiovascular:      Rate and Rhythm: Normal rate and regular rhythm.      Pulses: Normal pulses.      Heart sounds: Normal heart sounds.   Pulmonary:      Effort: Pulmonary effort is normal.      Breath sounds: Normal breath sounds.   Abdominal:      Comments: Distended, non-tender   Musculoskeletal:      Cervical back: Normal range of motion.      Right lower leg: No edema.      Left lower leg: No edema.      Comments: 2+ edema to mid shin   Skin:     General: Skin is warm.   Neurological:      Comments: Able to move all extremities this morning       Significant Labs: All pertinent labs within the past 24 hours have been reviewed.  CBC:   Recent Labs   Lab 07/14/22  0404 07/15/22  0953   WBC 6.45 8.21   HGB 7.0* 7.9*   HCT 22.4* 25.0*   * 103*     CMP:   Recent Labs   Lab 07/14/22  0404 07/15/22  0953    137   K 4.2 4.4    104   CO2 30* 27   GLU  112* 122*   BUN 21 25*   CREATININE 1.5* 1.3   CALCIUM 8.6* 8.6*   PROT 5.5* 6.4   ALBUMIN 1.5* 1.7*   BILITOT 1.5* 1.9*   ALKPHOS 74 84   AST 40 54*   ALT 22 27   ANIONGAP 3* 6*   EGFRNONAA 35.8* 42.6*       Significant Imaging: I have reviewed all pertinent imaging results/findings within the past 24 hours.

## 2022-07-15 NOTE — ASSESSMENT & PLAN NOTE
Noted on 1 set of aer/anae cultures from 7/10/22. 2nd set so far negative. No clear sign of infection. Could likely be contaminant.  Uctx also from enterococcus species  - start Vancomycin empirically---> Ampicillin  - repeat culture sent today

## 2022-07-15 NOTE — PLAN OF CARE
Problem: SLP  Goal: SLP Goal  Description: Speech Language Pathology Goals  Goals expected to be met by 7/20/22    1. Pt will participate in ongoing assessment of swallow function to determine safest, least restrictive means of nutrition/hydration  2. Educate Pt and family on aspiration precautions and SLP POC  Outcome: Ongoing, Progressing     Patient seen for ongoing swallow assessment. SLP recommending an upgrade to a Mechanical Soft Diet/Thin Liquids with strict aspiration precautions. Patient stated that she takes whole meds (split in half if larger) with water at baseline.     Ruben Fletcher CCC-SLP  Speech-Language Pathology  Pager: [phone]

## 2022-07-16 NOTE — ASSESSMENT & PLAN NOTE
- will lower the dose of lactulose given pt is having loose BM   - cholestyramine dc'd and metamucil added for stool bulking  - titrate bowel regimen to about 4 BMs per day

## 2022-07-16 NOTE — PROGRESS NOTES
[facility] ([place] [address])  [facility] Medicine  Progress Note    Patient Name: Aleyda Floyd  MRN: [id number]  Patient Class: IP- Inpatient   Admission Date: 6/27/2022  Length of Stay: 18 days  Attending Physician: Jessenia Foster MD  Primary Care Provider: Elvie Fernandes MD        Subjective:     Principal Problem:Anasarca        HPI:  This is a 67 year old deaf lady with alcoholic cirrhosis, and hypertension who presented with generalized swelling.  used to obtain history. Patient states that she had the swelling since her diagnosis of cirrhosis in February. Patient states that 2 days ago, she was able to ambulate but with some difficulty due to the swelling but now  worsening to the point where she is unable to ambulate by herself. Of note, patient had a recent EGD performed earlier last week where they had issues with her IV resulting is significant bruising. Patient also notes that whenever she scratches her skin with her nails, she notices clear fluid coming from the cuts. Patient denies fever, chills, cough, hemoptysis, nausea, vomiting, abdominal pain, yellowing of skin, constipation, dysuria, hematuria, and black/ bloody stools. Patient has baseline loose stools from her lactulose.  She reports compliance with all medications including her lactulose and Lasix. Patient and family declined any confusion or altered mental status. She denies any recent alcohol use with last use in February when she was diagnosed with alcoholic cirrhosis. Patient states that she used to weigh around 170s which she believes is her dry weight and is currently in the 200s.     Upon chart review, patient had an echo at an OSH on 2/25/22 which showed EF 60-65% and some diastolic dysfunction.    In the ED, patient was found to have Hgb of 8.5 and platelets of 113 around baseline. Patient had a UA which showed 1+ leuks and many bacteria. Patient did not complain of dysuria.       Overview/Hospital  Course:  Admitted for decompensated cirrhosis and anasarca. Began diuresing with IV Lasix and spironolactone. Paracentesis of -4.2L; ruled out SBP. Pt stabilized for few days and ready to be dc'ed when she had a change in mental status. Found pt unresponsive with fixed gaze and no pupil response. Code stroke called, CT neg for stroke. Eeg showed seizure-like activity. Pt transitioned to MICU for higher level of care      Interval History: Pt states that she is doing okay this morning. She endorses feeling fatigued and wanting to rest.    Review of Systems   Constitutional:  Positive for fatigue. Negative for chills and fever.   HENT:  Negative for congestion, ear pain and sinus pain.    Eyes:  Negative for pain.   Respiratory:  Negative for cough, chest tightness and shortness of breath.    Gastrointestinal:  Positive for abdominal distention. Negative for nausea and vomiting.   Genitourinary:  Negative for flank pain.   Musculoskeletal:  Negative for back pain.   Skin:  Negative for rash.   Neurological:  Negative for dizziness.   Objective:     Vital Signs (Most Recent):  Temp: 97.8 °F (36.6 °C) (07/16/22 1058)  Pulse: 81 (07/16/22 1058)  Resp: 18 (07/16/22 1058)  BP: (!) 116/55 (07/16/22 1058)  SpO2: 100 % (07/16/22 1058)   Vital Signs (24h Range):  Temp:  [97.1 °F (36.2 °C)-98 °F (36.7 °C)] 97.8 °F (36.6 °C)  Pulse:  [71-88] 81  Resp:  [16-21] 18  SpO2:  [100 %] 100 %  BP: (103-138)/(51-64) 116/55     Weight: 83 kg (182 lb 15.7 oz)  Body mass index is 35.74 kg/m².    Intake/Output Summary (Last 24 hours) at 7/16/2022 1526  Last data filed at 7/16/2022 1310  Gross per 24 hour   Intake 800 ml   Output 1175 ml   Net -375 ml      Physical Exam  Vitals reviewed.   Constitutional:       General: She is not in acute distress.     Appearance: She is obese.   HENT:      Head: Normocephalic and atraumatic.      Right Ear: External ear normal.      Left Ear: External ear normal.      Nose: Nose normal.      Mouth/Throat:       Mouth: Mucous membranes are moist.   Eyes:      Extraocular Movements: Extraocular movements intact.      Pupils: Pupils are equal, round, and reactive to light.   Cardiovascular:      Rate and Rhythm: Normal rate and regular rhythm.      Pulses: Normal pulses.      Heart sounds: Murmur heard.   Pulmonary:      Effort: Pulmonary effort is normal.      Breath sounds: Normal breath sounds.   Abdominal:      General: There is distension.      Palpations: There is no mass.      Hernia: A hernia is present.   Musculoskeletal:         General: Normal range of motion.      Cervical back: Normal range of motion.   Skin:     General: Skin is warm and dry.   Neurological:      General: No focal deficit present.      Mental Status: She is alert and oriented to person, place, and time.   Psychiatric:         Mood and Affect: Mood normal.         Behavior: Behavior normal.       Significant Labs: All pertinent labs within the past 24 hours have been reviewed.  CBC:   Recent Labs   Lab 07/15/22  0953 07/16/22  0635   WBC 8.21 8.12   HGB 7.9* 7.9*   HCT 25.0* 24.6*   * 111*     CMP:   Recent Labs   Lab 07/15/22  0953 07/16/22  0635    133*   K 4.4 4.4    101   CO2 27 28   * 116*   BUN 25* 24*   CREATININE 1.3 1.2   CALCIUM 8.6* 8.3*   PROT 6.4 6.3   ALBUMIN 1.7* 1.6*   BILITOT 1.9* 1.7*   ALKPHOS 84 84   AST 54* 63*   ALT 27 29   ANIONGAP 6* 4*   EGFRNONAA 42.6* 46.9*     Recent Lab Results         07/16/22  0635        Albumin 1.6       Alkaline Phosphatase 84       ALT 29       Anion Gap 4       AST 63       Baso # 0.04       Basophil % 0.5       BILIRUBIN TOTAL 1.7  Comment: For infants and newborns, interpretation of results should be based  on gestational age, weight and in agreement with clinical  observations.    Premature Infant recommended reference ranges:  Up to 24 hours.............<8.0 mg/dL  Up to 48 hours............<12.0 mg/dL  3-5 days..................<15.0 mg/dL  6-29  days.................<15.0 mg/dL         BUN 24       Calcium 8.3       Chloride 101       CO2 28       Creatinine 1.2       Differential Method Automated       eGFR if African American 54.0       eGFR if non  46.9  Comment: Calculation used to obtain the estimated glomerular filtration  rate (eGFR) is the CKD-EPI equation.          Eos # 0.5       Eosinophil % 5.5       Glucose 116       Gran # (ANC) 4.6       Gran % 56.1       Hematocrit 24.6       Hemoglobin 7.9       Immature Grans (Abs) 0.03  Comment: Mild elevation in immature granulocytes is non specific and   can be seen in a variety of conditions including stress response,   acute inflammation, trauma and pregnancy. Correlation with other   laboratory and clinical findings is essential.         Immature Granulocytes 0.4       Lymph # 1.6       Lymph % 19.2       Magnesium 2.0       MCH 31.1       MCHC 32.1       MCV 97       Mono # 1.5       Mono % 18.3       MPV 10.2       nRBC 0       Phosphorus 3.3       Platelets 111       Potassium 4.4       PROTEIN TOTAL 6.3       RBC 2.54       RDW 19.7       Sodium 133       WBC 8.12               Significant Imaging: I have reviewed all pertinent imaging results/findings within the past 24 hours.      Assessment/Plan:      * Anasarca  67 yoF with alcoholic liver cirrhosis and hypertension admitted for generalized anasarca. Patient's albumin on admission was 1.9. Likely edema related to poor intravascular oncotic pressure 2/2 alcoholic cirrhosis. Patient adherent to home medication. Given history and exam, patient would benefit from diuresis. Dry weight reported as 170s. Patient is currently 199 lbs (down from 212 lbs).     - Better response. d/c Diuril 500 mg IV   - f/u echo unremarkable  - strict I/Os  - daily weights  - low sodium diet with 1500 ml fluid restriction  - Lasix 40 mg IV TID switched to 80 mg PO TID on 7/2 however inadequate response, so dc'd lasix and started Bumex 2 mg TID  - Good  UOP. Bumex and spironolactone held.   - Plan for f/u with her hepatologist on d/c  - Pt 100% on 2 L NC; wean as tolerated    Gram-positive cocci in clusters  Noted on 1 set of aer/anae cultures from 7/10/22. 2nd set so far negative. No clear sign of infection. Could likely be contaminant.  Uctx also from enterococcus species  - start Vancomycin empirically---> Ampicillin  - repeat culture sent today        UTI (urinary tract infection)  Positive UA from new kim placed 7/10/22. Hx of ESBL E coli in Feb 2022 at OSH  Uctx growing enterococcus  - changed abx to Ertapenem--->Vancomycin---> Ampicillin   - follow up cultures (E faecalis)        Acute encephalopathy  - will lower the dose of lactulose given pt is having loose BM   - cholestyramine dc'd and metamucil added for stool bulking  - titrate bowel regimen to about 4 BMs per day      Hypomagnesemia  Monitor daily and replete prn      PITO (acute kidney injury)  - Pt Cr of 1.0 on admission   - Cr trended up to 1.4 today  - Will continue to hold bumex and aldactone  - Monitor CMP daily    Debility  -Pt non-mobile for some time in hospital, so PT/OT order placed  -Pt advised to move around in bed but to only get up with help  - PT /OT recs SNF, placement pending.      Thrombocytopenia  Chronic, patient at baseline    Anemia, chronic disease  Patient with Hgb of 8.5 on admit. Upon review of care everywhere, patient had Hgb of 8.4 in all of 2022. Likely due to chronic disease and stable.    -Bloody Bm 7/2 but not too concerned about GI bleed  - continue to monitor  - Stable    Alcoholic cirrhosis of liver with ascites  Patient was diagnosed in February. Previous history of chronic alcohol abuse and states she drank about 4-5 glasses of wine per night for over 20 years. Patient is seeing hepatology outpatient. Labs shown in care everywhere.     MELD-Na score: 15 at 7/13/2022  4:56 AM  MELD score: 14 at 7/13/2022  4:56 AM  Calculated from:  Serum Creatinine: 1.2 mg/dL at  7/13/2022  4:56 AM  Serum Sodium: 136 mmol/L at 7/13/2022  4:56 AM  Total Bilirubin: 1.6 mg/dL at 7/13/2022  4:56 AM  INR(ratio): 1.5 at 7/11/2022  3:50 PM  Age: 67 years    - consulted hepatology, appreciate recommendations for diuresis, labs, f/u outpatient with her hepatologist  - continue lactulose, thiamine, and folate  - S/p paracentesis with 4.2L removed  - continue to monitor for signs of liver failure  - f/u daily CMPs    Primary hypertension  Patient was discontinued from losartan per hepatologist.   - continue to monitor and can add when clinically indicated  - pt removed from tele 7/16    Deaf- written communication  ASL  used for interaction as well as written communication      VTE Risk Mitigation (From admission, onward)         Ordered     enoxaparin injection 40 mg  Daily         07/13/22 1005     IP VTE HIGH RISK PATIENT  Once         06/28/22 0419     Place sequential compression device  Until discontinued         06/28/22 0419     Place sequential compression device  Until discontinued         06/28/22 0415                Discharge Planning   REMA: 7/18/2022     Code Status: Full Code   Is the patient medically ready for discharge?: No    Reason for patient still in hospital (select all that apply): Treatment  Discharge Plan A: Skilled Nursing Facility   Discharge Delays: None known at this time              Juan Miguel MD  Department of Hospital Medicine   [facility] - Intensive Care ([place])

## 2022-07-16 NOTE — NURSING
Patient is AAO*4.Bowel and bladder movement. Patient's  is at the bedside . Frequently position changed .Call light within reach , Safety precaution maintained.Will continue monitoring.

## 2022-07-16 NOTE — ASSESSMENT & PLAN NOTE
Patient was diagnosed in February. Previous history of chronic alcohol abuse and states she drank about 4-5 glasses of wine per night for over 20 years. Patient is seeing hepatology outpatient. Labs shown in care everywhere.     MELD-Na score: 15 at 7/13/2022  4:56 AM  MELD score: 14 at 7/13/2022  4:56 AM  Calculated from:  Serum Creatinine: 1.2 mg/dL at 7/13/2022  4:56 AM  Serum Sodium: 136 mmol/L at 7/13/2022  4:56 AM  Total Bilirubin: 1.6 mg/dL at 7/13/2022  4:56 AM  INR(ratio): 1.5 at 7/11/2022  3:50 PM  Age: 67 years    - consulted hepatology, appreciate recommendations for diuresis, labs, f/u outpatient with her hepatologist  - continue lactulose, thiamine, and folate  - S/p paracentesis with 4.2L removed  - continue to monitor for signs of liver failure  - f/u daily CMPs

## 2022-07-16 NOTE — ASSESSMENT & PLAN NOTE
Positive UA from new kim placed 7/10/22. Hx of ESBL E coli in Feb 2022 at OSH  Uctx growing enterococcus  - changed abx to Ertapenem--->Vancomycin---> Ampicillin   - follow up cultures (E faecalis)

## 2022-07-16 NOTE — SUBJECTIVE & OBJECTIVE
Interval History: Pt states that she is doing okay this morning. She endorses feeling fatigued and wanting to rest.    Review of Systems   Constitutional:  Positive for fatigue. Negative for chills and fever.   HENT:  Negative for congestion, ear pain and sinus pain.    Eyes:  Negative for pain.   Respiratory:  Negative for cough, chest tightness and shortness of breath.    Gastrointestinal:  Positive for abdominal distention. Negative for nausea and vomiting.   Genitourinary:  Negative for flank pain.   Musculoskeletal:  Negative for back pain.   Skin:  Negative for rash.   Neurological:  Negative for dizziness.   Objective:     Vital Signs (Most Recent):  Temp: 97.8 °F (36.6 °C) (07/16/22 1058)  Pulse: 81 (07/16/22 1058)  Resp: 18 (07/16/22 1058)  BP: (!) 116/55 (07/16/22 1058)  SpO2: 100 % (07/16/22 1058)   Vital Signs (24h Range):  Temp:  [97.1 °F (36.2 °C)-98 °F (36.7 °C)] 97.8 °F (36.6 °C)  Pulse:  [71-88] 81  Resp:  [16-21] 18  SpO2:  [100 %] 100 %  BP: (103-138)/(51-64) 116/55     Weight: 83 kg (182 lb 15.7 oz)  Body mass index is 35.74 kg/m².    Intake/Output Summary (Last 24 hours) at 7/16/2022 1526  Last data filed at 7/16/2022 1310  Gross per 24 hour   Intake 800 ml   Output 1175 ml   Net -375 ml      Physical Exam  Vitals reviewed.   Constitutional:       General: She is not in acute distress.     Appearance: She is obese.   HENT:      Head: Normocephalic and atraumatic.      Right Ear: External ear normal.      Left Ear: External ear normal.      Nose: Nose normal.      Mouth/Throat:      Mouth: Mucous membranes are moist.   Eyes:      Extraocular Movements: Extraocular movements intact.      Pupils: Pupils are equal, round, and reactive to light.   Cardiovascular:      Rate and Rhythm: Normal rate and regular rhythm.      Pulses: Normal pulses.      Heart sounds: Murmur heard.   Pulmonary:      Effort: Pulmonary effort is normal.      Breath sounds: Normal breath sounds.   Abdominal:      General:  There is distension.      Palpations: There is no mass.      Hernia: A hernia is present.   Musculoskeletal:         General: Normal range of motion.      Cervical back: Normal range of motion.   Skin:     General: Skin is warm and dry.   Neurological:      General: No focal deficit present.      Mental Status: She is alert and oriented to person, place, and time.   Psychiatric:         Mood and Affect: Mood normal.         Behavior: Behavior normal.       Significant Labs: All pertinent labs within the past 24 hours have been reviewed.  CBC:   Recent Labs   Lab 07/15/22  0953 07/16/22  0635   WBC 8.21 8.12   HGB 7.9* 7.9*   HCT 25.0* 24.6*   * 111*     CMP:   Recent Labs   Lab 07/15/22  0953 07/16/22  0635    133*   K 4.4 4.4    101   CO2 27 28   * 116*   BUN 25* 24*   CREATININE 1.3 1.2   CALCIUM 8.6* 8.3*   PROT 6.4 6.3   ALBUMIN 1.7* 1.6*   BILITOT 1.9* 1.7*   ALKPHOS 84 84   AST 54* 63*   ALT 27 29   ANIONGAP 6* 4*   EGFRNONAA 42.6* 46.9*     Recent Lab Results         07/16/22  0635        Albumin 1.6       Alkaline Phosphatase 84       ALT 29       Anion Gap 4       AST 63       Baso # 0.04       Basophil % 0.5       BILIRUBIN TOTAL 1.7  Comment: For infants and newborns, interpretation of results should be based  on gestational age, weight and in agreement with clinical  observations.    Premature Infant recommended reference ranges:  Up to 24 hours.............<8.0 mg/dL  Up to 48 hours............<12.0 mg/dL  3-5 days..................<15.0 mg/dL  6-29 days.................<15.0 mg/dL         BUN 24       Calcium 8.3       Chloride 101       CO2 28       Creatinine 1.2       Differential Method Automated       eGFR if African American 54.0       eGFR if non  46.9  Comment: Calculation used to obtain the estimated glomerular filtration  rate (eGFR) is the CKD-EPI equation.          Eos # 0.5       Eosinophil % 5.5       Glucose 116       Gran # (ANC) 4.6       Gran  % 56.1       Hematocrit 24.6       Hemoglobin 7.9       Immature Grans (Abs) 0.03  Comment: Mild elevation in immature granulocytes is non specific and   can be seen in a variety of conditions including stress response,   acute inflammation, trauma and pregnancy. Correlation with other   laboratory and clinical findings is essential.         Immature Granulocytes 0.4       Lymph # 1.6       Lymph % 19.2       Magnesium 2.0       MCH 31.1       MCHC 32.1       MCV 97       Mono # 1.5       Mono % 18.3       MPV 10.2       nRBC 0       Phosphorus 3.3       Platelets 111       Potassium 4.4       PROTEIN TOTAL 6.3       RBC 2.54       RDW 19.7       Sodium 133       WBC 8.12               Significant Imaging: I have reviewed all pertinent imaging results/findings within the past 24 hours.

## 2022-07-16 NOTE — PLAN OF CARE
Problem: Adult Inpatient Plan of Care  Goal: Plan of Care Review  Outcome: Ongoing, Progressing  Goal: Patient-Specific Goal (Individualized)  Outcome: Ongoing, Progressing  Goal: Absence of Hospital-Acquired Illness or Injury  Outcome: Ongoing, Progressing  Goal: Optimal Comfort and Wellbeing  Outcome: Ongoing, Progressing  Goal: Readiness for Transition of Care  Outcome: Ongoing, Progressing     Problem: Skin Injury Risk Increased  Goal: Skin Health and Integrity  Outcome: Ongoing, Progressing     Problem: Bariatric Environmental Safety  Goal: Safety Maintained with Care  Outcome: Ongoing, Progressing     Problem: Fall Injury Risk  Goal: Absence of Fall and Fall-Related Injury  Outcome: Ongoing, Progressing     Problem: Infection  Goal: Absence of Infection Signs and Symptoms  Outcome: Ongoing, Progressing     Problem: Fluid and Electrolyte Imbalance (Acute Kidney Injury/Impairment)  Goal: Fluid and Electrolyte Balance  Outcome: Ongoing, Progressing     Problem: Oral Intake Inadequate (Acute Kidney Injury/Impairment)  Goal: Optimal Nutrition Intake  Outcome: Ongoing, Progressing     Problem: Renal Function Impairment (Acute Kidney Injury/Impairment)  Goal: Effective Renal Function  Outcome: Ongoing, Progressing     Problem: Impaired Wound Healing  Goal: Optimal Wound Healing  Outcome: Ongoing, Progressing     Pt AO x3, VSS on 2L; meds crushed and given in applesauce without issue; pt rested throughout shift; UOP measured and documented, little output documented with flexiseal; pt turned throughout shift; no issues to report

## 2022-07-16 NOTE — ASSESSMENT & PLAN NOTE
Patient was discontinued from losartan per hepatologist.   - continue to monitor and can add when clinically indicated  - pt removed from tele 7/16

## 2022-07-16 NOTE — ASSESSMENT & PLAN NOTE
Patient with Hgb of 8.5 on admit. Upon review of care everywhere, patient had Hgb of 8.4 in all of 2022. Likely due to chronic disease and stable.    -Bloody Bm 7/2 but not too concerned about GI bleed  - continue to monitor  - Stable

## 2022-07-16 NOTE — ASSESSMENT & PLAN NOTE
- Pt Cr of 1.0 on admission   - Cr trended up to 1.4 today  - Will continue to hold bumex and aldactone  - Monitor CMP daily

## 2022-07-16 NOTE — ASSESSMENT & PLAN NOTE
67 yoF with alcoholic liver cirrhosis and hypertension admitted for generalized anasarca. Patient's albumin on admission was 1.9. Likely edema related to poor intravascular oncotic pressure 2/2 alcoholic cirrhosis. Patient adherent to home medication. Given history and exam, patient would benefit from diuresis. Dry weight reported as 170s. Patient is currently 199 lbs (down from 212 lbs).     - Better response. d/c Diuril 500 mg IV   - f/u echo unremarkable  - strict I/Os  - daily weights  - low sodium diet with 1500 ml fluid restriction  - Lasix 40 mg IV TID switched to 80 mg PO TID on 7/2 however inadequate response, so dc'd lasix and started Bumex 2 mg TID  - Good UOP. Bumex and spironolactone held.   - Plan for f/u with her hepatologist on d/c  - Pt 100% on 2 L NC; wean as tolerated

## 2022-07-17 NOTE — ASSESSMENT & PLAN NOTE
67 yoF with alcoholic liver cirrhosis and hypertension admitted for generalized anasarca. Patient's albumin on admission was 1.9. Likely edema related to poor intravascular oncotic pressure 2/2 alcoholic cirrhosis. Patient adherent to home medication. Given history and exam, patient would benefit from diuresis. Dry weight reported as 170s. Patient is currently 199 lbs (down from 212 lbs).     - Better response. d/c Diuril 500 mg IV   - f/u echo unremarkable  - strict I/Os  - daily weights  - low sodium diet with 1500 ml fluid restriction  - Lasix 40 mg IV TID switched to 80 mg PO TID on 7/2 however inadequate response, so dc'd lasix and started Bumex 2 mg TID  - Good UOP. Bumex and spironolactone held.   - Plan for f/u with her hepatologist on d/c  - Pt 100% on 2 L NC; wean off supp O2 completely and satting well  - Started 40 mg lasix PO BID and 100 mg spironolactone

## 2022-07-17 NOTE — ASSESSMENT & PLAN NOTE
Noted on 1 set of aer/anae cultures from 7/10/22. 2nd set so far negative. No clear sign of infection. Could likely be contaminant.  Uctx also from enterococcus species  - start Vancomycin empirically---> Ampicillin completed 07/17  - repeat culture sent today

## 2022-07-17 NOTE — SUBJECTIVE & OBJECTIVE
Interval History: NAEON. Pt awoken from sleep and stated she was tired. No other complaints.    Review of Systems   Constitutional:  Positive for fatigue. Negative for chills and fever.   HENT:  Negative for congestion, ear pain and sinus pain.    Eyes:  Negative for pain.   Respiratory:  Negative for cough, chest tightness and shortness of breath.    Gastrointestinal:  Positive for abdominal distention. Negative for nausea and vomiting.   Genitourinary:  Negative for flank pain.   Musculoskeletal:  Negative for back pain.   Skin:  Negative for rash.   Neurological:  Negative for dizziness.   Objective:     Vital Signs (Most Recent):  Temp: 97.7 °F (36.5 °C) (07/17/22 1603)  Pulse: 74 (07/17/22 1603)  Resp: 16 (07/17/22 1603)  BP: (!) 120/58 (07/17/22 1603)  SpO2: 100 % (07/17/22 1603)   Vital Signs (24h Range):  Temp:  [97.6 °F (36.4 °C)-98.2 °F (36.8 °C)] 97.7 °F (36.5 °C)  Pulse:  [71-92] 74  Resp:  [9-18] 16  SpO2:  [94 %-100 %] 100 %  BP: (103-120)/(51-81) 120/58     Weight: 90 kg (198 lb 6.6 oz)  Body mass index is 38.75 kg/m².    Intake/Output Summary (Last 24 hours) at 7/17/2022 1701  Last data filed at 7/17/2022 0500  Gross per 24 hour   Intake 180 ml   Output 300 ml   Net -120 ml      Physical Exam  Vitals reviewed.   Constitutional:       General: She is not in acute distress.     Appearance: She is obese.   HENT:      Head: Normocephalic and atraumatic.      Right Ear: External ear normal.      Left Ear: External ear normal.      Nose: Nose normal.      Mouth/Throat:      Mouth: Mucous membranes are moist.   Eyes:      Extraocular Movements: Extraocular movements intact.      Pupils: Pupils are equal, round, and reactive to light.   Cardiovascular:      Rate and Rhythm: Normal rate and regular rhythm.      Pulses: Normal pulses.      Heart sounds: Murmur heard.   Pulmonary:      Effort: Pulmonary effort is normal.      Breath sounds: Normal breath sounds.   Abdominal:      General: There is distension.       Palpations: There is no mass.      Hernia: A hernia is present.   Musculoskeletal:         General: Normal range of motion.      Cervical back: Normal range of motion.   Skin:     General: Skin is warm and dry.   Neurological:      General: No focal deficit present.      Mental Status: She is alert and oriented to person, place, and time.   Psychiatric:         Mood and Affect: Mood normal.         Behavior: Behavior normal.       Significant Labs: All pertinent labs within the past 24 hours have been reviewed.  CBC:   Recent Labs   Lab 07/16/22  0635 07/17/22  0639   WBC 8.12 7.85   HGB 7.9* 7.5*   HCT 24.6* 23.4*   * 103*     CMP:   Recent Labs   Lab 07/16/22  0635 07/17/22  0639   * 129*   K 4.4 4.4    99   CO2 28 26   * 101   BUN 24* 22   CREATININE 1.2 1.1   CALCIUM 8.3* 8.0*   PROT 6.3 6.1   ALBUMIN 1.6* 1.6*   BILITOT 1.7* 1.6*   ALKPHOS 84 75   AST 63* 56*   ALT 29 28   ANIONGAP 4* 4*   EGFRNONAA 46.9* 52.1*     Recent Lab Results         07/17/22  0639   07/16/22  2105        Albumin 1.6         Alkaline Phosphatase 75         ALT 28         Anion Gap 4         AST 56         Baso # 0.05         Basophil % 0.6         BILIRUBIN TOTAL 1.6  Comment: For infants and newborns, interpretation of results should be based  on gestational age, weight and in agreement with clinical  observations.    Premature Infant recommended reference ranges:  Up to 24 hours.............<8.0 mg/dL  Up to 48 hours............<12.0 mg/dL  3-5 days..................<15.0 mg/dL  6-29 days.................<15.0 mg/dL           BUN 22         Calcium 8.0         Chloride 99         CO2 26         Creatinine 1.1         Differential Method Automated         eGFR if  >60.0         eGFR if non  52.1  Comment: Calculation used to obtain the estimated glomerular filtration  rate (eGFR) is the CKD-EPI equation.            Eos # 0.4         Eosinophil % 4.8         Glucose 101          Gran # (ANC) 4.3         Gran % 54.6         Hematocrit 23.4         Hemoglobin 7.5         Immature Grans (Abs) 0.03  Comment: Mild elevation in immature granulocytes is non specific and   can be seen in a variety of conditions including stress response,   acute inflammation, trauma and pregnancy. Correlation with other   laboratory and clinical findings is essential.           Immature Granulocytes 0.4         Lymph # 1.7         Lymph % 21.5         Magnesium 2.0         MCH 30.4         MCHC 32.1         MCV 95         Mono # 1.4         Mono % 18.1         MPV 10.7         nRBC 0         Phosphorus 3.4         Platelets 103         POCT Glucose   118       Potassium 4.4         PROTEIN TOTAL 6.1         RBC 2.47         RDW 19.2         Sodium 129         WBC 7.85                 Significant Imaging: I have reviewed all pertinent imaging results/findings within the past 24 hours.

## 2022-07-17 NOTE — ASSESSMENT & PLAN NOTE
- will lower the dose of lactulose given pt is having loose BM   - cholestyramine dc'd and metamucil added for stool bulking  - pull rectal tube  - titrate bowel regimen to about 4 BMs per day

## 2022-07-17 NOTE — ASSESSMENT & PLAN NOTE
-Pt non-mobile for some time in hospital, so PT/OT order placed  -Pt advised to move around in bed but to only get up with help  - PT /OT recs SNF, placement hopefully 7/17 pending removal of rectal tube

## 2022-07-17 NOTE — ASSESSMENT & PLAN NOTE
- Pt Cr of 1.0 on admission   - Cr trended up to 1.4 today  - Will continue to hold bumex and aldactone  - Monitor CMP daily  - Cr 1.1 today, at baseline  - will start PO lasix 40 BID and spironolactone 100 mg qd

## 2022-07-17 NOTE — NURSING
Patient is AAO*4.Bowel and bladder movement. Patient's  is at the bedside.Call light within reach , Safety precaution maintained.Will continue monitoring

## 2022-07-17 NOTE — PLAN OF CARE
Problem: Adult Inpatient Plan of Care  Goal: Plan of Care Review  Outcome: Ongoing, Progressing  Goal: Patient-Specific Goal (Individualized)  Outcome: Ongoing, Progressing  Goal: Absence of Hospital-Acquired Illness or Injury  Outcome: Ongoing, Progressing  Goal: Optimal Comfort and Wellbeing  Outcome: Ongoing, Progressing  Goal: Readiness for Transition of Care  Outcome: Ongoing, Progressing     Problem: Bariatric Environmental Safety  Goal: Safety Maintained with Care  Outcome: Ongoing, Progressing     Problem: Fall Injury Risk  Goal: Absence of Fall and Fall-Related Injury  Outcome: Ongoing, Progressing     Problem: Infection  Goal: Absence of Infection Signs and Symptoms  Outcome: Ongoing, Progressing     Problem: Fluid and Electrolyte Imbalance (Acute Kidney Injury/Impairment)  Goal: Fluid and Electrolyte Balance  Outcome: Ongoing, Progressing     Problem: Oral Intake Inadequate (Acute Kidney Injury/Impairment)  Goal: Optimal Nutrition Intake  Outcome: Ongoing, Progressing     Problem: Renal Function Impairment (Acute Kidney Injury/Impairment)  Goal: Effective Renal Function  Outcome: Ongoing, Progressing     Problem: Impaired Wound Healing  Goal: Optimal Wound Healing  Outcome: Ongoing, Progressing

## 2022-07-17 NOTE — PROGRESS NOTES
[facility] - [facility] ([place] [id number])  [facility] Medicine  Progress Note    Patient Name: Aleyda Floyd  MRN: [id number]  Patient Class: IP- Inpatient   Admission Date: 6/27/2022  Length of Stay: 19 days  Attending Physician: Jessenia Foster MD  Primary Care Provider: Elvie Fernandes MD        Subjective:     Principal Problem:Anasarca        HPI:  This is a 67 year old deaf lady with alcoholic cirrhosis, and hypertension who presented with generalized swelling.  used to obtain history. Patient states that she had the swelling since her diagnosis of cirrhosis in February. Patient states that 2 days ago, she was able to ambulate but with some difficulty due to the swelling but now  worsening to the point where she is unable to ambulate by herself. Of note, patient had a recent EGD performed earlier last week where they had issues with her IV resulting is significant bruising. Patient also notes that whenever she scratches her skin with her nails, she notices clear fluid coming from the cuts. Patient denies fever, chills, cough, hemoptysis, nausea, vomiting, abdominal pain, yellowing of skin, constipation, dysuria, hematuria, and black/ bloody stools. Patient has baseline loose stools from her lactulose.  She reports compliance with all medications including her lactulose and Lasix. Patient and family declined any confusion or altered mental status. She denies any recent alcohol use with last use in February when she was diagnosed with alcoholic cirrhosis. Patient states that she used to weigh around 170s which she believes is her dry weight and is currently in the 200s.     Upon chart review, patient had an echo at an OSH on 2/25/22 which showed EF 60-65% and some diastolic dysfunction.    In the ED, patient was found to have Hgb of 8.5 and platelets of 113 around baseline. Patient had a UA which showed 1+ leuks and many bacteria. Patient did not complain of dysuria.       Overview/Hospital  Course:  Admitted for decompensated cirrhosis and anasarca. Began diuresing with IV Lasix and spironolactone. Paracentesis of -4.2L; ruled out SBP. Pt stabilized for few days and ready to be dc'ed when she had a change in mental status. Found pt unresponsive with fixed gaze and no pupil response. Code stroke called, CT neg for stroke. Eeg showed seizure-like activity. Pt transitioned to MICU for higher level of care. On step down, had a UTI and PITO, both of which were treated.       Interval History: NAEON. Pt awoken from sleep and stated she was tired. No other complaints.    Review of Systems   Constitutional:  Positive for fatigue. Negative for chills and fever.   HENT:  Negative for congestion, ear pain and sinus pain.    Eyes:  Negative for pain.   Respiratory:  Negative for cough, chest tightness and shortness of breath.    Gastrointestinal:  Positive for abdominal distention. Negative for nausea and vomiting.   Genitourinary:  Negative for flank pain.   Musculoskeletal:  Negative for back pain.   Skin:  Negative for rash.   Neurological:  Negative for dizziness.   Objective:     Vital Signs (Most Recent):  Temp: 97.7 °F (36.5 °C) (07/17/22 1603)  Pulse: 74 (07/17/22 1603)  Resp: 16 (07/17/22 1603)  BP: (!) 120/58 (07/17/22 1603)  SpO2: 100 % (07/17/22 1603)   Vital Signs (24h Range):  Temp:  [97.6 °F (36.4 °C)-98.2 °F (36.8 °C)] 97.7 °F (36.5 °C)  Pulse:  [71-92] 74  Resp:  [9-18] 16  SpO2:  [94 %-100 %] 100 %  BP: (103-120)/(51-81) 120/58     Weight: 90 kg (198 lb 6.6 oz)  Body mass index is 38.75 kg/m².    Intake/Output Summary (Last 24 hours) at 7/17/2022 1701  Last data filed at 7/17/2022 0500  Gross per 24 hour   Intake 180 ml   Output 300 ml   Net -120 ml      Physical Exam  Vitals reviewed.   Constitutional:       General: She is not in acute distress.     Appearance: She is obese.   HENT:      Head: Normocephalic and atraumatic.      Right Ear: External ear normal.      Left Ear: External ear normal.       Nose: Nose normal.      Mouth/Throat:      Mouth: Mucous membranes are moist.   Eyes:      Extraocular Movements: Extraocular movements intact.      Pupils: Pupils are equal, round, and reactive to light.   Cardiovascular:      Rate and Rhythm: Normal rate and regular rhythm.      Pulses: Normal pulses.      Heart sounds: Murmur heard.   Pulmonary:      Effort: Pulmonary effort is normal.      Breath sounds: Normal breath sounds.   Abdominal:      General: There is distension.      Palpations: There is no mass.      Hernia: A hernia is present.   Musculoskeletal:         General: Normal range of motion.      Cervical back: Normal range of motion.   Skin:     General: Skin is warm and dry.   Neurological:      General: No focal deficit present.      Mental Status: She is alert and oriented to person, place, and time.   Psychiatric:         Mood and Affect: Mood normal.         Behavior: Behavior normal.       Significant Labs: All pertinent labs within the past 24 hours have been reviewed.  CBC:   Recent Labs   Lab 07/16/22  0635 07/17/22  0639   WBC 8.12 7.85   HGB 7.9* 7.5*   HCT 24.6* 23.4*   * 103*     CMP:   Recent Labs   Lab 07/16/22  0635 07/17/22  0639   * 129*   K 4.4 4.4    99   CO2 28 26   * 101   BUN 24* 22   CREATININE 1.2 1.1   CALCIUM 8.3* 8.0*   PROT 6.3 6.1   ALBUMIN 1.6* 1.6*   BILITOT 1.7* 1.6*   ALKPHOS 84 75   AST 63* 56*   ALT 29 28   ANIONGAP 4* 4*   EGFRNONAA 46.9* 52.1*     Recent Lab Results         07/17/22  0639   07/16/22  2105        Albumin 1.6         Alkaline Phosphatase 75         ALT 28         Anion Gap 4         AST 56         Baso # 0.05         Basophil % 0.6         BILIRUBIN TOTAL 1.6  Comment: For infants and newborns, interpretation of results should be based  on gestational age, weight and in agreement with clinical  observations.    Premature Infant recommended reference ranges:  Up to 24 hours.............<8.0 mg/dL  Up to 48  hours............<12.0 mg/dL  3-5 days..................<15.0 mg/dL  6-29 days.................<15.0 mg/dL           BUN 22         Calcium 8.0         Chloride 99         CO2 26         Creatinine 1.1         Differential Method Automated         eGFR if  >60.0         eGFR if non  52.1  Comment: Calculation used to obtain the estimated glomerular filtration  rate (eGFR) is the CKD-EPI equation.            Eos # 0.4         Eosinophil % 4.8         Glucose 101         Gran # (ANC) 4.3         Gran % 54.6         Hematocrit 23.4         Hemoglobin 7.5         Immature Grans (Abs) 0.03  Comment: Mild elevation in immature granulocytes is non specific and   can be seen in a variety of conditions including stress response,   acute inflammation, trauma and pregnancy. Correlation with other   laboratory and clinical findings is essential.           Immature Granulocytes 0.4         Lymph # 1.7         Lymph % 21.5         Magnesium 2.0         MCH 30.4         MCHC 32.1         MCV 95         Mono # 1.4         Mono % 18.1         MPV 10.7         nRBC 0         Phosphorus 3.4         Platelets 103         POCT Glucose   118       Potassium 4.4         PROTEIN TOTAL 6.1         RBC 2.47         RDW 19.2         Sodium 129         WBC 7.85                 Significant Imaging: I have reviewed all pertinent imaging results/findings within the past 24 hours.      Assessment/Plan:      * Anasarca  67 yoF with alcoholic liver cirrhosis and hypertension admitted for generalized anasarca. Patient's albumin on admission was 1.9. Likely edema related to poor intravascular oncotic pressure 2/2 alcoholic cirrhosis. Patient adherent to home medication. Given history and exam, patient would benefit from diuresis. Dry weight reported as 170s. Patient is currently 199 lbs (down from 212 lbs).     - Better response. d/c Diuril 500 mg IV   - f/u echo unremarkable  - strict I/Os  - daily weights  - low  sodium diet with 1500 ml fluid restriction  - Lasix 40 mg IV TID switched to 80 mg PO TID on 7/2 however inadequate response, so dc'd lasix and started Bumex 2 mg TID  - Good UOP. Bumex and spironolactone held.   - Plan for f/u with her hepatologist on d/c  - Pt 100% on 2 L NC; wean off supp O2 completely and satting well  - Started 40 mg lasix PO BID and 100 mg spironolactone    Gram-positive cocci in clusters  Noted on 1 set of aer/anae cultures from 7/10/22. 2nd set so far negative. No clear sign of infection. Could likely be contaminant.  Uctx also from enterococcus species  - start Vancomycin empirically---> Ampicillin completed 07/17  - repeat culture sent today        UTI (urinary tract infection)  Positive UA from new kim placed 7/10/22. Hx of ESBL E coli in Feb 2022 at OSH  Uctx growing enterococcus  - changed abx to Ertapenem--->Vancomycin---> Ampicillin completed 07/17  - follow up cultures (E faecalis)        Acute encephalopathy  - will lower the dose of lactulose given pt is having loose BM   - cholestyramine dc'd and metamucil added for stool bulking  - pull rectal tube  - titrate bowel regimen to about 4 BMs per day      Hypomagnesemia  Monitor daily and replete prn      PITO (acute kidney injury)  - Pt Cr of 1.0 on admission   - Cr trended up to 1.4 today  - Will continue to hold bumex and aldactone  - Monitor CMP daily  - Cr 1.1 today, at baseline  - will start PO lasix 40 BID and spironolactone 100 mg qd    Debility  -Pt non-mobile for some time in hospital, so PT/OT order placed  -Pt advised to move around in bed but to only get up with help  - PT /OT recs SNF, placement hopefully 7/17 pending removal of rectal tube      Thrombocytopenia  Chronic, patient at baseline    Anemia, chronic disease  Patient with Hgb of 8.5 on admit. Upon review of care everywhere, patient had Hgb of 8.4 in all of 2022. Likely due to chronic disease and stable.    -Bloody Bm 7/2 but not too concerned about GI bleed  -  continue to monitor  - Stable    Alcoholic cirrhosis of liver with ascites  Patient was diagnosed in February. Previous history of chronic alcohol abuse and states she drank about 4-5 glasses of wine per night for over 20 years. Patient is seeing hepatology outpatient. Labs shown in care everywhere.     MELD-Na score: 15 at 7/13/2022  4:56 AM  MELD score: 14 at 7/13/2022  4:56 AM  Calculated from:  Serum Creatinine: 1.2 mg/dL at 7/13/2022  4:56 AM  Serum Sodium: 136 mmol/L at 7/13/2022  4:56 AM  Total Bilirubin: 1.6 mg/dL at 7/13/2022  4:56 AM  INR(ratio): 1.5 at 7/11/2022  3:50 PM  Age: 67 years    - consulted hepatology, appreciate recommendations for diuresis, labs, f/u outpatient with her hepatologist  - continue lactulose, thiamine, and folate  - S/p paracentesis with 4.2L removed  - continue to monitor for signs of liver failure  - f/u daily CMPs    Primary hypertension  Patient was discontinued from losartan per hepatologist.   - continue to monitor and can add when clinically indicated  - pt removed from tele 7/16    Deaf- written communication  ASL  used for interaction as well as written communication      VTE Risk Mitigation (From admission, onward)         Ordered     enoxaparin injection 40 mg  Daily         07/13/22 1005     IP VTE HIGH RISK PATIENT  Once         06/28/22 0419     Place sequential compression device  Until discontinued         06/28/22 0419     Place sequential compression device  Until discontinued         06/28/22 0415                Discharge Planning   REMA: 7/18/2022     Code Status: Full Code   Is the patient medically ready for discharge?: No    Reason for patient still in hospital (select all that apply): Treatment and Pending disposition  Discharge Plan A: Skilled Nursing Facility   Discharge Delays: None known at this time              Juan Miguel MD  Department of Hospital Medicine   [facility] - Intensive Care ([place]

## 2022-07-17 NOTE — TREATMENT PLAN
Hepatology Treatment Plan    Aleyda Floyd is a 67 y.o. female admitted to hospital 6/27/2022 (Hospital Day: 21) due to Anasarca.     Interval History  - no acute events noted on chart review.  Patient is awaiting placement.      Objective  Temp:  [97.6 °F (36.4 °C)-98 °F (36.7 °C)] 97.9 °F (36.6 °C) (07/17 0902)  Pulse:  [73-92] 92 (07/17 0902)  BP: (104-112)/(53-81) 111/81 (07/17 0902)  Resp:  [9-14] 14 (07/17 0902)  SpO2:  [100 %] 100 % (07/17 0902)        Laboratory      Lab Results   Component Value Date    WBC 7.85 07/17/2022    HGB 7.5 (L) 07/17/2022    HCT 23.4 (L) 07/17/2022    MCV 95 07/17/2022     (L) 07/17/2022       Lab Results   Component Value Date     (L) 07/17/2022    K 4.4 07/17/2022    CL 99 07/17/2022    CO2 26 07/17/2022    BUN 22 07/17/2022    CREATININE 1.1 07/17/2022    CALCIUM 8.0 (L) 07/17/2022       Lab Results   Component Value Date    ALBUMIN 1.6 (L) 07/17/2022    ALT 28 07/17/2022    AST 56 (H) 07/17/2022    ALKPHOS 75 07/17/2022    BILITOT 1.6 (H) 07/17/2022       Lab Results   Component Value Date    INR 1.5 (H) 07/11/2022    INR 1.7 (H) 06/28/2022    INR 1.7 (H) 06/23/2022       MELD-Na score: 15 at 7/13/2022  4:56 AM  MELD score: 14 at 7/13/2022  4:56 AM  Calculated from:  Serum Creatinine: 1.2 mg/dL at 7/13/2022  4:56 AM  Serum Sodium: 136 mmol/L at 7/13/2022  4:56 AM  Total Bilirubin: 1.6 mg/dL at 7/13/2022  4:56 AM  INR(ratio): 1.5 at 7/11/2022  3:50 PM  Age: 67 years    Assessment    #EtOH cirrhosis, decompensated  #ascitis, secondary to above  - MELD: 18>20>15  - Patient has been diuresed significantly since admission.   - Na: 137, Cr: 1.3, T. Bilirubin: 1.9, INR: 1.5  - patient will be evaluated for liver tx as outpatient    #hematochezia, resolved  #blood in NG tube,single episode  - Hb stable at 7.3 g/dl    #hyperechoic lesion in liver  - US doppler liver (5/5/22): Hypoechoic lesion in the right hepatic lobe measuring 1.4 x 1.3 x 1.0 cm.  - CT abdomen  w/wout contrast (5/10/22): Focal lesion right hepatic lobe seen on ultrasound not seen best advantage on CT exam.  Consideration for MRI liver exam with contrast suggested as clinically indicated versus follow-up ultrasound exam in 6 months.    #AMS, resolving  #seizure disorder  - likely 2/2 toxic metabolic encephalopathy.   - CT head & CTA head & neck were negative.   - EEG with generalized periodic discharges and triphasic morphology concering for status epilepticus    #generalized deconditioning  - Patient too frail currently. Must demonstrate mobility and functional capacity before being evlauated for liver tx.       Plan  - Would recommend obtaining MRI of the abdomen w/wout contrast to evaluate liver lesion. Can be done as outpatient.   - Patient will eventually require outpatient liver tx eval. Outpatient hepatology and addiction psych evaluation.  - neurology recommmended decreasing Onfi to Qday from BID. In general, would like to avoid sedating drugs in patients with cirrhosis if possible.   - Continue IV PPI. Continue Lactulose TID and Rifaximin BID to maintain 2-3 soft bowel movements daily.  - Continue diuresis. Strict I/O's and daily standing weights.   - Low sodium diet and 1.5L fluid restriction.   - Please obtain daily CBC, BMP, LFT, INR      Thank you for involving us in the care of Aleyda Floyd. Please call with any additional concerns or questions.    Ricci Roman MD, PGY-IV  Gastroenterology Fellow  [facility]

## 2022-07-17 NOTE — ASSESSMENT & PLAN NOTE
Positive UA from new kim placed 7/10/22. Hx of ESBL E coli in Feb 2022 at OSH  Uctx growing enterococcus  - changed abx to Ertapenem--->Vancomycin---> Ampicillin completed 07/17  - follow up cultures (E faecalis)

## 2022-07-18 NOTE — NURSING
Pt is AAOx4, VSS, on RA. Rolled well in bed when changing. Has complained of abd pain r/t needing to have BM. After giving lactulose this AM pt felt relief. The pain came back this evening, MD Posadas was notified and orders were placed. Informed pt and  to let someone know if pt does not get any relief. Rectal tube is still in place. Beckham is still in place because when I went to remove it pt was sitting up trying to eat.     On Low Na, dysphagia diet, tolerates well but does not have much of appetite.   IV in L hand.     No significant changes throughout day.

## 2022-07-18 NOTE — PLAN OF CARE
Problem: SLP  Goal: SLP Goal  Description: Speech Language Pathology Goals  Goals expected to be met by 7/20/22    1. Pt will participate in ongoing assessment of swallow function to determine safest, least restrictive means of nutrition/hydration  2. Educate Pt and family on aspiration precautions and SLP POC  Outcome: Met     Patient seen for ongoing assessment. SLP recommending an upgrade to a Dysphagia Soft Diet (Level 6)/Thin Liquids at this time.     Ruben Fletcher CCC-SLP  Speech-Language Pathology  Pager: [phone]

## 2022-07-18 NOTE — PROGRESS NOTES
Hepatology Progress Note    Aleyda Floyd is a 67 y.o. female admitted to hospital 6/27/2022 (Hospital Day: 22) due to Anasarca.     Interval History  - no acute events noted on chart review.  Not currently on low salt diet.PO diuretics restarted.      Objective  Temp:  [97.6 °F (36.4 °C)-98.1 °F (36.7 °C)] 98.1 °F (36.7 °C) (07/18 0400)  Pulse:  [71-87] 87 (07/18 0400)  BP: ()/(47-59) 114/54 (07/18 0400)  Resp:  [10-20] 20 (07/18 0400)  SpO2:  [95 %-100 %] 100 % (07/18 0400)    Gen: NAD, lying comfortably  HENT: NCAT, oropharynx clear  Eyes: anicteric sclerae, EOMI grossly  Neck: supple, no visible masses/goiter  Cardiac: 2+ distal pulses  Lungs:unlabored breathing, symmetrical chest expansion  Abd: soft, NT/ND, normoactive BS, no rebound  Ext: 2+ LE edema, warm, well perfused  Skin: skin intact on exposed body parts, no visible rashes, lesions  Neuro: A&Ox4, neuro exam grossly intact, moves all extremities  Psych: appropriate mood, affect      Laboratory      Lab Results   Component Value Date    WBC 7.46 07/18/2022    HGB 6.9 (L) 07/18/2022    HCT 21.5 (L) 07/18/2022    MCV 96 07/18/2022    PLT 98 (L) 07/18/2022       Lab Results   Component Value Date     (L) 07/18/2022    K 4.6 07/18/2022    CL 99 07/18/2022    CO2 25 07/18/2022    BUN 27 (H) 07/18/2022    CREATININE 1.3 07/18/2022    CALCIUM 7.9 (L) 07/18/2022       Lab Results   Component Value Date    ALBUMIN 1.4 (L) 07/18/2022    ALT 23 07/18/2022    AST 48 (H) 07/18/2022    ALKPHOS 75 07/18/2022    BILITOT 1.3 (H) 07/18/2022       Lab Results   Component Value Date    INR 1.5 (H) 07/11/2022    INR 1.7 (H) 06/28/2022    INR 1.7 (H) 06/23/2022       MELD-Na score: 15 at 7/13/2022  4:56 AM  MELD score: 14 at 7/13/2022  4:56 AM  Calculated from:  Serum Creatinine: 1.2 mg/dL at 7/13/2022  4:56 AM  Serum Sodium: 136 mmol/L at 7/13/2022  4:56 AM  Total Bilirubin: 1.6 mg/dL at 7/13/2022  4:56 AM  INR(ratio): 1.5 at 7/11/2022  3:50 PM  Age: 67  years      Micro and imaging reviewed.    Assessment    #EtOH cirrhosis, decompensated  #ascitis, secondary to above  - MELD: 18>20>15  - Patient has been diuresed significantly since admission.   - Na: 137, Cr: 1.3, T. Bilirubin: 1.9, INR: 1.5  - patient will be evaluated for liver tx as outpatient    #hematochezia, resolved  #blood in NG tube,single episode  - Hb stable at 7.3 g/dl     #hyperechoic lesion in liver  - US doppler liver (5/5/22): Hypoechoic lesion in the right hepatic lobe measuring 1.4 x 1.3 x 1.0 cm.  - CT abdomen w/wout contrast (5/10/22): Focal lesion right hepatic lobe seen on ultrasound not seen best advantage on CT exam.    -MRI liver exam with contrast suggested as outpatient     #AMS, resolving  #seizure disorder  - likely 2/2 toxic metabolic encephalopathy.   - CT head & CTA head & neck were negative.   - EEG with generalized periodic discharges and triphasic morphology concering for status epilepticus    #generalized deconditioning  - Patient too frail currently. Must demonstrate mobility and functional capacity before being evlauated for liver tx.       Plan  - Would recommend obtaining MRI of the abdomen w/wout contrast to evaluate liver lesion. Can be done as outpatient.   - Patient will eventually require outpatient liver tx eval. Outpatient hepatology and addiction psych evaluation.  - Continue IV PPI. Continue Lactulose TID and Rifaximin BID to maintain 2-3 soft bowel movements daily  - Continue diuresis, agree with switch to PO diuretics: lasix 40 mg and spironolactone 100 mg  - If above regimen inadequate, can trial prn metolazone 2.5 mg PO in addition  - Low sodium diet ordered  - Please perform therapeutic paracentesis for symptomatic relief  - No need for fluid restriction at this time given mild hyponatremia  - Please obtain daily CBC, BMP, LFT, INR      Thank you for involving us in the care of Aleyda Floyd. Please call with any additional concerns or  questions.    Pietro Bojorquez MD, PGY-IV  Gastroenterology Fellow  [facility]

## 2022-07-18 NOTE — PLAN OF CARE
Problem: Physical Therapy  Goal: Physical Therapy Goal  Description: Goals to be met by: 22    Patient will increase functional independence with mobility by performin. Supine to sit with minimal Assistance - not met  2. Sit to stand transfer with minimal assistance- not met  3. Bed to chair transfer with minimal assistance using LRAD - not met  4. Gait  x 30 feet with minimal  Assistance using LRAD - not met  5. Ascend/Descend 6 inch curb step with Contact Guard Assistance using LRAD - not met  6. Lower extremity exercise program x30 reps per handout, with independence - not met    Outcome: Ongoing, Progressing.  Patient's mobility continues to be limited, due to generalized weakness, poor postural awareness and control.

## 2022-07-18 NOTE — PLAN OF CARE
[facility] - Intensive Care ([facility])  Discharge Reassessment    Primary Care Provider: Elvie Fernandes MD    Expected Discharge Date: 7/19/2022    Reassessment (most recent)       Discharge Reassessment - 07/18/22 1638          Discharge Reassessment    Assessment Type Discharge Planning Reassessment (P)      Did the patient's condition or plan change since previous assessment? Yes (P)      Communicated REMA with patient/caregiver Yes (P)      Discharge Plan A Skilled Nursing Facility (P)      Discharge Plan B Skilled Nursing Facility (P)      DME Needed Upon Discharge  none (P)                  Patient has an accepting SNF, with [facility]. JAMIE spoke with patient sister reporting that patient is wanting to admit to [facility] post dc. JAMIE has consulted Teresa with [facility] will continue to follow up.      Anjana Guzmán LMSW  [facility]   [phone]

## 2022-07-18 NOTE — PLAN OF CARE
NURSING HOME ORDERS    07/18/2022  [facility]  [facility] - INTENSIVE CARE ([facility] [place])  [address]  [facility] [address]  Dept: [phone]  Loc: [phone]     Admit to Nursing Home:  Skilled Nursing Facility    Diagnoses:  Active Hospital Problems    Diagnosis  POA    *Anasarca [R60.1]  Unknown    UTI (urinary tract infection) [N39.0]  Unknown    Gram-positive cocci in clusters [A49.8]  No    Acute encephalopathy [G93.40]  Unknown    PITO (acute kidney injury) [N17.9]  Unknown    Debility [R53.81]  Unknown    Primary hypertension [I10]  Unknown    Alcoholic cirrhosis of liver with ascites [K70.31]  Unknown    Anemia, chronic disease [D63.8]  Unknown    Thrombocytopenia [D69.6]  Unknown    Deaf- written communication [H91.90]  Yes      Resolved Hospital Problems   No resolved problems to display.       Patient is homebound due to:  Anasarca    Allergies:Review of patient's allergies indicates:  No Known Allergies     Vitals:  Routine    Diet: soft diet , low sodium    Activities:   Avoid heavy lifting and Activity as tolerated    Goals of Care Treatment Preferences:  Code Status: Full Code      Labs:  Per facility protocol     Nursing Precautions:  Aspiration , Fall, Seizure and Pressure ulcer prevention    Consults:   PT to evaluate and treat- 3 times a week, OT to evaluate and treat- 3 times a week, ST to evaluate and treat- 3 times a week and Nutrition to evaluate and recommend diet     Miscellaneous Care: Routine Skin for Bedridden Patients:  Apply moisture barrier cream to all                   Diabetes Care:  Report CBG < 60 or > 350 to physician.      Medications: Discontinue all previous medication orders, if any. See new list below.     Medication List      START taking these medications    bumetanide 1 MG tablet  Commonly known as: BUMEX  Take 1 tablet (1 mg total) by mouth 2 (two) times a day.     spironolactone 50 MG tablet  Commonly known as:  ALDACTONE  Take 3 tablets (150 mg total) by mouth once daily.        CHANGE how you take these medications    lactulose 20 gram/30 mL Soln  Commonly known as: CHRONULAC  Take 45 mLs (30 g total) by mouth 3 (three) times daily.  What changed:   · medication strength  · how much to take     melatonin 5 mg  Commonly known as: MELATIN  Take 1 tablet (5 mg total) by mouth nightly. 1-2 tabs nightly  What changed: additional instructions        CONTINUE taking these medications    acetaminophen 325 MG tablet  Commonly known as: TYLENOL  Take 650 mg by mouth daily as needed for Pain.     aspirin 81 MG Chew  Take 81 mg by mouth once daily.     cholecalciferol (vitamin D3) 50 mcg (2,000 unit) Cap capsule  Commonly known as: VITAMIN D3  Take 1 capsule by mouth once daily.     cholestyramine-aspartame 4 gram Pwpk  Commonly known as: QUESTRAN LIGHT  Take 4 g by mouth once daily.     folic acid 1 MG tablet  Commonly known as: FOLVITE  Take 1 mg by mouth once daily.     nystatin powder  Commonly known as: MYCOSTATIN  Apply to skin folds beneath breasts as needed     omeprazole 40 MG capsule  Commonly known as: PRILOSEC  Take 40 mg by mouth once daily.     thiamine 100 MG tablet  Take 100 mg by mouth once daily.        STOP taking these medications    furosemide 20 MG tablet  Commonly known as: LASIX     losartan 50 MG tablet  Commonly known as: COZAAR     oxyCODONE-acetaminophen 5-325 mg per tablet  Commonly known as: PERCOCET     simvastatin 10 MG tablet  Commonly known as: ZOCOR     wound dressings Pste              Immunizations Administered as of 7/18/2022     No immunizations on file.          This patient has had both covid vaccinations    Some patients may experience side effects after vaccination.  These may include fever, headache, muscle or joint aches.  Most symptoms resolve with 24-48 hours and do not require urgent medical evaluation unless they persist for more than 72 hours or symptoms are concerning for an  unrelated medical condition.          _________________________________  Lele Posadas MD  07/18/2022

## 2022-07-18 NOTE — PLAN OF CARE
Problem: Adult Inpatient Plan of Care  Goal: Plan of Care Review  Outcome: Ongoing, Progressing  Goal: Patient-Specific Goal (Individualized)  Outcome: Ongoing, Progressing  Goal: Absence of Hospital-Acquired Illness or Injury  Outcome: Ongoing, Progressing  Goal: Optimal Comfort and Wellbeing  Outcome: Ongoing, Progressing  Goal: Readiness for Transition of Care  Outcome: Ongoing, Progressing     Problem: Skin Injury Risk Increased  Goal: Skin Health and Integrity  Outcome: Ongoing, Progressing     Problem: Bariatric Environmental Safety  Goal: Safety Maintained with Care  Outcome: Ongoing, Progressing     Problem: Fall Injury Risk  Goal: Absence of Fall and Fall-Related Injury  Outcome: Ongoing, Progressing     Problem: Infection  Goal: Absence of Infection Signs and Symptoms  Outcome: Ongoing, Progressing     Problem: Fluid and Electrolyte Imbalance (Acute Kidney Injury/Impairment)  Goal: Fluid and Electrolyte Balance  Outcome: Ongoing, Progressing

## 2022-07-18 NOTE — PT/OT/SLP PROGRESS
Speech Language Pathology Treatment & Discharge Summary    Patient Name:  Aleyda Floyd   MRN:  [id number]  Admitting Diagnosis: Anasarca    Recommendations:                 General Recommendations:  Follow-up not indicated  Diet recommendations:  Dental Soft, Liquid Diet Level: Thin   Aspiration Precautions: 1 bite/sip at a time, Assistance with meals, Small bites/sips and Standard aspiration precautions   General Precautions: Standard, fall, contact, hearing impaired (ASL interpretor required)  Communication strategies:  provide increased time to answer, go to room if call light pushed and patient communicates with ASL    Subjective     Patient awake and alert  Communicated with nurse prior to session     Pain/Comfort:  · Pain Rating 1: 0/10  · Pain Rating Post-Intervention 1: 0/10    Respiratory Status: Room air    Objective:     Has the patient been evaluated by SLP for swallowing?   Yes  Keep patient NPO? No     Patient seen for ongoing monitoring of diet tolerance. She was sitting up in bed with spouse present. ASL interpretor used via iPad during session. She reported no overt difficulties with current diet though she endorsed frustration with limited options. She tolerated thin liquids x2 with no overt signs of aspiration. Patient's baseline diet most closely resembles dysphagia soft and she is appropriate for upgrade at this time. SLP educated patient and spouse regarding current recs and role of ST and they both verbalized understanding. Patient left in bed with call light within reach.     Assessment:     Aleyda Floyd is a 67 y.o. female who presents with a safe oropharyngeal swallow at this time.     Goals:   Multidisciplinary Problems     SLP Goals     Not on file          Multidisciplinary Problems (Resolved)        Problem: SLP    Goal Priority Disciplines Outcome   SLP Goal   (Resolved)     SLP Met   Description: Speech Language Pathology Goals  Goals expected to be met by 7/20/22    1. Pt  will participate in ongoing assessment of swallow function to determine safest, least restrictive means of nutrition/hydration  2. Educate Pt and family on aspiration precautions and SLP POC                     Plan:     · Patient to be seen:  3 x/week   · Plan of Care expires:  08/11/22  · Plan of Care reviewed with:  patient, spouse   · SLP Follow-Up:  No       Discharge recommendations:  nursing facility, skilled   Barriers to Discharge:  None    Time Tracking:     SLP Treatment Date:   07/18/22  Speech Start Time:  1041  Speech Stop Time:  1059     Speech Total Time (min):  18 min    Billable Minutes: Treatment Swallowing Dysfunction 8 and Self Care/Home Management Training 10    Ruben Fletcher CCC-SLP  Speech-Language Pathology  Pager: [phone]   07/18/2022

## 2022-07-18 NOTE — PT/OT/SLP PROGRESS
"Physical Therapy Treatment    Patient Name:  Aleyda Floyd   MRN:  [id number]    Recommendations:     Discharge Recommendations:  nursing facility, skilled   Discharge Equipment Recommendations:  (will determine DME needs closer to discharge)   Barriers to discharge: Inaccessible home    Assessment:     Aleyda Floyd is a 67 y.o. female admitted with a medical diagnosis of Anasarca.  She presents with the following impairments/functional limitations:  weakness, impaired endurance, impaired self care skills, impaired functional mobility, gait instability, impaired balance, decreased coordination, decreased upper extremity function, decreased lower extremity function, decreased safety awareness, decreased ROM, impaired fine motor, impaired cardiopulmonary response to activity . Patient agreed to all therapeutic activities with Spouse present and assisting with functional mobility. Patient required assistance to sit at EOB due to limited postural control.    Rehab Prognosis: Fair; patient would benefit from acute skilled PT services to address these deficits and reach maximum level of function.    Recent Surgery: * No surgery found *      Plan:     During this hospitalization, patient to be seen 3 x/week to address the identified rehab impairments via gait training, therapeutic activities, therapeutic exercises, neuromuscular re-education and progress toward the following goals:    · Plan of Care Expires:  08/12/22    Subjective     Chief Complaint: weakness  Patient/Family Comments/goals: Spouse, "For his wife to get well"  Pain/Comfort:  · Pain Rating 1: 0/10  · Pain Rating Post-Intervention 1: 0/10      Objective:     Communicated with NSG prior to session.  Patient found HOB elevated with blood pressure cuff, bowel management system, kim catheter, telemetry, pulse ox (continuous) upon PT entry to room.     General Precautions: Standard, hearing impaired, fall   Orthopedic Precautions:N/A   Braces: " N/A  Respiratory Status: Room air     Functional Mobility:  · Bed Mobility:     · Rolling Right: total assistance and of 2 persons  · Scooting: total assistance and of 2 persons  · Supine to Sit: total assistance and of 2 persons  · Sit to Supine: total assistance and of 2 persons  · Balance: poor in sitting      AM-PAC 6 CLICK MOBILITY  Turning over in bed (including adjusting bedclothes, sheets and blankets)?: 2  Sitting down on and standing up from a chair with arms (e.g., wheelchair, bedside commode, etc.): 1  Moving from lying on back to sitting on the side of the bed?: 2  Moving to and from a bed to a chair (including a wheelchair)?: 1  Need to walk in hospital room?: 1  Climbing 3-5 steps with a railing?: 1  Basic Mobility Total Score: 8       Therapeutic Activities and Exercises:   Static sitting balance at EOB x 10 minutes with min to mod assistance. Repositioned in bed for comfort and educated Patient's spouse of how to elevate B heels on pillows to prevent Decubitus on her heels. B LE AAROM/PROM x 25 reps on all available planes of motion.    Patient left HOB elevated with all lines intact, call button in reach and spouse present..    GOALS:   Multidisciplinary Problems     Physical Therapy Goals        Problem: Physical Therapy    Goal Priority Disciplines Outcome Goal Variances Interventions   Physical Therapy Goal     PT, PT/OT Ongoing, Progressing     Description: Goals to be met by: 22    Patient will increase functional independence with mobility by performin. Supine to sit with minimal Assistance - not met  2. Sit to stand transfer with minimal assistance- not met  3. Bed to chair transfer with minimal assistance using LRAD - not met  4. Gait  x 30 feet with minimal  Assistance using LRAD - not met  5. Ascend/Descend 6 inch curb step with Contact Guard Assistance using LRAD - not met  6. Lower extremity exercise program x30 reps per handout, with independence - not met                      Time Tracking:     PT Received On: 07/18/22  PT Start Time: 1426     PT Stop Time: 1455  PT Total Time (min): 29 min     Billable Minutes: Therapeutic Activity 15 and Therapeutic Exercise 14    Treatment Type: Treatment  PT/PTA: PTA     PTA Visit Number: 1     07/18/2022

## 2022-07-19 NOTE — ASSESSMENT & PLAN NOTE
Noted on 1 set of aer/anae cultures from 7/10/22. 2nd set so far negative. No clear sign of infection. Could likely be contaminant.  Uctx also from enterococcus species  - s/p Ampicillin completed 07/17

## 2022-07-19 NOTE — ASSESSMENT & PLAN NOTE
- Pt Cr of 1.0 on admission   - Cr stable 1.3 today, 1.1 baseline  - Will continue to hold bumex and aldactone  - Monitor CMP daily  - continue PO lasix 40 BID and spironolactone 100 mg qd

## 2022-07-19 NOTE — SUBJECTIVE & OBJECTIVE
Interval History: Patient is accompanied by  in room. Patient is deaf and an  was used for American Sign Language (ASL). Patient does experience some abdominal pain and constipation. She is afebrile. She denies, headache, sob, chest pain, and palpitations.      Review of Systems   Constitutional:  Positive for fatigue. Negative for chills and fever.   HENT:  Negative for congestion, ear pain and sinus pain.    Eyes:  Negative for pain.   Respiratory:  Negative for cough, chest tightness and shortness of breath.    Gastrointestinal:  Positive for abdominal distention. Negative for nausea and vomiting.   Genitourinary:  Negative for flank pain.   Musculoskeletal:  Negative for back pain.   Skin:  Negative for rash.   Neurological:  Negative for dizziness.   Objective:     Vital Signs (Most Recent):  Temp: 98.1 °F (36.7 °C) (07/18/22 0400)  Pulse: 87 (07/18/22 0400)  Resp: 20 (07/18/22 0400)  BP: (!) 100/52 (07/18/22 1804)  SpO2: 100 % (07/18/22 0400)   Vital Signs (24h Range):  Temp:  [98.1 °F (36.7 °C)] 98.1 °F (36.7 °C)  Pulse:  [80-87] 87  Resp:  [10-20] 20  SpO2:  [100 %] 100 %  BP: (100-119)/(52-54) 100/52     Weight: 90.9 kg (200 lb 6.4 oz)  Body mass index is 39.14 kg/m².    Intake/Output Summary (Last 24 hours) at 7/18/2022 2208  Last data filed at 7/18/2022 0428  Gross per 24 hour   Intake --   Output 400 ml   Net -400 ml      Physical Exam  Vitals reviewed.   HENT:      Head: Normocephalic and atraumatic.      Right Ear: External ear normal.      Left Ear: External ear normal.      Nose: Nose normal.      Mouth/Throat:      Mouth: Mucous membranes are moist.   Eyes:      Extraocular Movements: Extraocular movements intact.      Pupils: Pupils are equal, round, and reactive to light.   Cardiovascular:      Rate and Rhythm: Normal rate and regular rhythm.      Pulses: Normal pulses.      Heart sounds: Murmur heard.   Pulmonary:      Effort: Pulmonary effort is normal.      Breath sounds:  Normal breath sounds.   Abdominal:      General: There is distension.      Palpations: There is no mass.   Musculoskeletal:         General: Normal range of motion.      Cervical back: Normal range of motion.   Skin:     General: Skin is warm and dry.   Neurological:      General: No focal deficit present.      Mental Status: She is alert and oriented to person, place, and time.   Psychiatric:         Mood and Affect: Mood normal.         Behavior: Behavior normal.       Significant Labs: All pertinent labs within the past 24 hours have been reviewed.    Significant Imaging: I have reviewed all pertinent imaging results/findings within the past 24 hours.

## 2022-07-19 NOTE — ASSESSMENT & PLAN NOTE
Positive UA from new kim placed 7/10/22. Hx of ESBL E coli in Feb 2022 at [facility]  Uctx growing enterococcus  - S/P ampicillin course 07/17

## 2022-07-19 NOTE — SUBJECTIVE & OBJECTIVE
Interval History: AF HDS NAEON. Patient did not receive PRBC as previously ordered until today at close to 1600, which was 24+ hours since order was initially placed. IVF bolus also delayed. Otherwise, no complaints of abdominal pain, new bleeding, or otherwise. Encounter through [facility]  services.    Review of Systems   Constitutional:  Negative for chills, fatigue and fever.   HENT:  Negative for congestion, ear pain and sinus pain.    Eyes:  Negative for pain.   Respiratory:  Negative for cough, chest tightness and shortness of breath.    Gastrointestinal:  Negative for abdominal distention, nausea and vomiting.   Genitourinary:  Negative for flank pain.   Musculoskeletal:  Negative for back pain.   Skin:  Negative for rash.   Neurological:  Negative for dizziness.   Objective:     Vital Signs (Most Recent):  Temp: 96.4 °F (35.8 °C) (07/19/22 1545)  Pulse: 70 (07/19/22 1730)  Resp: 15 (07/19/22 1730)  BP: (!) 129/58 (07/19/22 1730)  SpO2: 100 % (07/19/22 1730)   Vital Signs (24h Range):  Temp:  [96.2 °F (35.7 °C)-98.4 °F (36.9 °C)] 96.4 °F (35.8 °C)  Pulse:  [66-85] 70  Resp:  [11-23] 15  SpO2:  [98 %-100 %] 100 %  BP: ()/(46-58) 129/58     Weight: 90.8 kg (200 lb 2.8 oz)  Body mass index is 39.09 kg/m².    Intake/Output Summary (Last 24 hours) at 7/19/2022 1737  Last data filed at 7/19/2022 1547  Gross per 24 hour   Intake 300 ml   Output --   Net 300 ml      Physical Exam  Vitals reviewed.   HENT:      Head: Normocephalic and atraumatic.      Right Ear: External ear normal.      Left Ear: External ear normal.      Nose: Nose normal.      Mouth/Throat:      Mouth: Mucous membranes are moist.   Eyes:      Extraocular Movements: Extraocular movements intact.      Pupils: Pupils are equal, round, and reactive to light.   Cardiovascular:      Rate and Rhythm: Normal rate and regular rhythm.      Pulses: Normal pulses.      Heart sounds: Murmur heard.   Pulmonary:      Effort: Pulmonary effort is normal.       Breath sounds: Normal breath sounds.   Abdominal:      General: There is distension.      Palpations: There is no mass.      Tenderness: There is no abdominal tenderness.   Musculoskeletal:         General: Normal range of motion.      Cervical back: Normal range of motion.   Skin:     General: Skin is warm and dry.   Neurological:      General: No focal deficit present.      Mental Status: She is alert and oriented to person, place, and time.   Psychiatric:         Mood and Affect: Mood normal.         Behavior: Behavior normal.       Significant Labs: All pertinent labs within the past 24 hours have been reviewed.  CBC:   Recent Labs   Lab 07/18/22  1611 07/19/22  0602 07/19/22  1240   WBC 7.57 8.20 7.30   HGB 6.9* 6.7* 6.2*   HCT 21.0* 21.2* 20.1*   PLT 96* 100* 89*     CMP:   Recent Labs   Lab 07/18/22  0409 07/19/22  0602 07/19/22  1240   * 128* 125*   K 4.6 4.7 4.5   CL 99 98 97   CO2 25 23 23    101 119*   BUN 27* 31* 30*   CREATININE 1.3 1.6* 1.9*   CALCIUM 7.9* 7.7* 7.6*   PROT 5.6* 5.8*  --    ALBUMIN 1.4* 1.5*  --    BILITOT 1.3* 1.3*  --    ALKPHOS 75 82  --    AST 48* 48*  --    ALT 23 27  --    ANIONGAP 6* 7* 5*   EGFRNONAA 42.6* 33.1* 26.9*       Significant Imaging: I have reviewed all pertinent imaging results/findings within the past 24 hours.

## 2022-07-19 NOTE — PLAN OF CARE
Patient referral under review with [facility].      Anjana Guzmán LMSW  [facility]   [phone]

## 2022-07-19 NOTE — ASSESSMENT & PLAN NOTE
Patient with Hgb of 8.5 on admit. Upon review of care everywhere, patient had Hgb of 8.4 in all of 2022. Likely due to chronic disease and stable.    - Hg was trending down (7.9, 7.5, 6.9)  - Ordered 1 U pRBC's, f/u cbc, monitor Hg  - Received written consent from patient and  prior to transfusion

## 2022-07-19 NOTE — PROGRESS NOTES
[facility] - [facility] ([place] [id number])  [facility] Medicine  Progress Note    Patient Name: Aleyda Floyd  MRN: [id number]  Patient Class: IP- Inpatient   Admission Date: 6/27/2022  Length of Stay: 21 days  Attending Physician: Jessenia Foster MD  Primary Care Provider: Elvie Fernandes MD        Subjective:     Principal Problem:Anasarca        HPI:  This is a 67 year old deaf lady with alcoholic cirrhosis, and hypertension who presented with generalized swelling.  used to obtain history. Patient states that she had the swelling since her diagnosis of cirrhosis in February. Patient states that 2 days ago, she was able to ambulate but with some difficulty due to the swelling but now  worsening to the point where she is unable to ambulate by herself. Of note, patient had a recent EGD performed earlier last week where they had issues with her IV resulting is significant bruising. Patient also notes that whenever she scratches her skin with her nails, she notices clear fluid coming from the cuts. Patient denies fever, chills, cough, hemoptysis, nausea, vomiting, abdominal pain, yellowing of skin, constipation, dysuria, hematuria, and black/ bloody stools. Patient has baseline loose stools from her lactulose.  She reports compliance with all medications including her lactulose and Lasix. Patient and family declined any confusion or altered mental status. She denies any recent alcohol use with last use in February when she was diagnosed with alcoholic cirrhosis. Patient states that she used to weigh around 170s which she believes is her dry weight and is currently in the 200s.     Upon chart review, patient had an echo at an OSH on 2/25/22 which showed EF 60-65% and some diastolic dysfunction.    In the ED, patient was found to have Hgb of 8.5 and platelets of 113 around baseline. Patient had a UA which showed 1+ leuks and many bacteria. Patient did not complain of dysuria.       Overview/Hospital  Course:  Admitted for decompensated cirrhosis and anasarca. Began diuresing with IV Lasix and spironolactone. Paracentesis of -4.2L; ruled out SBP. Pt stabilized for few days and ready to be dc'ed when she had a change in mental status. Found pt unresponsive with fixed gaze and no pupil response. Code stroke called, CT neg for stroke. Eeg showed seizure-like activity. Pt transitioned to MICU for higher level of care. On step down, had a UTI and PITO, both of which were treated.       Interval History: AF HDS NAEON. Patient did not receive PRBC as previously ordered until today at close to 1600, which was 24+ hours since order was initially placed. IVF bolus also delayed. Otherwise, no complaints of abdominal pain, new bleeding, or otherwise. Encounter through [facility]  services.    Review of Systems   Constitutional:  Negative for chills, fatigue and fever.   HENT:  Negative for congestion, ear pain and sinus pain.    Eyes:  Negative for pain.   Respiratory:  Negative for cough, chest tightness and shortness of breath.    Gastrointestinal:  Negative for abdominal distention, nausea and vomiting.   Genitourinary:  Negative for flank pain.   Musculoskeletal:  Negative for back pain.   Skin:  Negative for rash.   Neurological:  Negative for dizziness.   Objective:     Vital Signs (Most Recent):  Temp: 96.4 °F (35.8 °C) (07/19/22 1545)  Pulse: 70 (07/19/22 1730)  Resp: 15 (07/19/22 1730)  BP: (!) 129/58 (07/19/22 1730)  SpO2: 100 % (07/19/22 1730)   Vital Signs (24h Range):  Temp:  [96.2 °F (35.7 °C)-98.4 °F (36.9 °C)] 96.4 °F (35.8 °C)  Pulse:  [66-85] 70  Resp:  [11-23] 15  SpO2:  [98 %-100 %] 100 %  BP: ()/(46-58) 129/58     Weight: 90.8 kg (200 lb 2.8 oz)  Body mass index is 39.09 kg/m².    Intake/Output Summary (Last 24 hours) at 7/19/2022 1737  Last data filed at 7/19/2022 1547  Gross per 24 hour   Intake 300 ml   Output --   Net 300 ml      Physical Exam  Vitals reviewed.   HENT:      Head:  Normocephalic and atraumatic.      Right Ear: External ear normal.      Left Ear: External ear normal.      Nose: Nose normal.      Mouth/Throat:      Mouth: Mucous membranes are moist.   Eyes:      Extraocular Movements: Extraocular movements intact.      Pupils: Pupils are equal, round, and reactive to light.   Cardiovascular:      Rate and Rhythm: Normal rate and regular rhythm.      Pulses: Normal pulses.      Heart sounds: Murmur heard.   Pulmonary:      Effort: Pulmonary effort is normal.      Breath sounds: Normal breath sounds.   Abdominal:      General: There is distension.      Palpations: There is no mass.      Tenderness: There is no abdominal tenderness.   Musculoskeletal:         General: Normal range of motion.      Cervical back: Normal range of motion.   Skin:     General: Skin is warm and dry.   Neurological:      General: No focal deficit present.      Mental Status: She is alert and oriented to person, place, and time.   Psychiatric:         Mood and Affect: Mood normal.         Behavior: Behavior normal.       Significant Labs: All pertinent labs within the past 24 hours have been reviewed.  CBC:   Recent Labs   Lab 07/18/22  1611 07/19/22  0602 07/19/22  1240   WBC 7.57 8.20 7.30   HGB 6.9* 6.7* 6.2*   HCT 21.0* 21.2* 20.1*   PLT 96* 100* 89*     CMP:   Recent Labs   Lab 07/18/22  0409 07/19/22  0602 07/19/22  1240   * 128* 125*   K 4.6 4.7 4.5   CL 99 98 97   CO2 25 23 23    101 119*   BUN 27* 31* 30*   CREATININE 1.3 1.6* 1.9*   CALCIUM 7.9* 7.7* 7.6*   PROT 5.6* 5.8*  --    ALBUMIN 1.4* 1.5*  --    BILITOT 1.3* 1.3*  --    ALKPHOS 75 82  --    AST 48* 48*  --    ALT 23 27  --    ANIONGAP 6* 7* 5*   EGFRNONAA 42.6* 33.1* 26.9*       Significant Imaging: I have reviewed all pertinent imaging results/findings within the past 24 hours.      Assessment/Plan:      * Anasarca  67 yoF with alcoholic liver cirrhosis and hypertension admitted for generalized anasarca. Patient's albumin on  admission was 1.9. Likely edema related to poor intravascular oncotic pressure 2/2 alcoholic cirrhosis. Patient adherent to home medication. Given history and exam, patient would benefit from diuresis. Dry weight reported as 170s. Patient is currently 200 lbs 6.4oz (down from 212 lbs).     - Strict I/Os  - Daily weights  - Low sodium diet with 1500 ml fluid restriction  - Plan for f/u with her hepatologist on d/c  - Pt 100% on 2 L NC; wean off supp O2 completely and satting well  - Diuretic regimen held d/t likely pre-renal PITO, will restart diuretic regimen when tolerated, see PITO A/P  - Plan for IR paracentesis    PITO (acute kidney injury)  Presenting with PITO with new sCr 1.6 (baseline sCr 0.9-1.1).   DDx: Decreased PO intake likely resulting in pre-renal etiology    - Holding diuresis (Lasix 40 BID, Aldactone 100 QD)  - Gentle trial of IVF; encourage PO intake  - Trend sCr  - Strict I&Os  - Avoid nephrotoxic agents  - Renally adjust medications      Anemia, chronic disease  Patient with Hgb of 8.5 on admit. Upon review of care everywhere, patient had Hgb of 8.4 in all of 2022. Likely due to chronic disease and stable.    - Hgb downtrending without overt signs of bleeding or other blood per rectum / GI /  tract; likely etiology considering continued chronic disease  - Ordered 1 U pRBC  - Received written consent from patient and  prior to transfusion   - Trend CBC; transfuse Hgb < 7    Gram-positive cocci in clusters  Noted on 1 set of aer/anae cultures from 7/10/22. 2nd set so far negative. No clear sign of infection. Could likely be contaminant.  Uctx also from enterococcus species  - s/p Ampicillin completed 07/17          UTI (urinary tract infection)  Positive UA from new kim placed 7/10/22. Hx of ESBL E coli in Feb 2022 at OSH  Uctx growing enterococcus  - S/P ampicillin course 07/17        Acute encephalopathy  -  dose of lactulose lowered given pt had is having loose BM   - cholestyramine dc'd  and metamucil added for stool bulking  - PEG 17g packet added PRN for contsipation  - plan to pull rectal tube, still in place  - plan to remove kim catheter, still in place  - titrate bowel regimen to about 4 BMs per day      Hypomagnesemia  Monitor daily and replete prn      Debility  - Pt non-mobile for some time in hospital, so PT/OT order placed  - Pt advised to move around in bed but to only get up with help  - PT /OT recs SNF, placement hopefully 7/18 pending removal of rectal tube      Thrombocytopenia  Chronic, patient at baseline    Alcoholic cirrhosis of liver with ascites  Patient was diagnosed in February. Previous history of chronic alcohol abuse and states she drank about 4-5 glasses of wine per night for over 20 years. Patient is seeing hepatology outpatient. Labs shown in care everywhere.     MELD-Na score: 15 at 7/13/2022  4:56 AM  MELD score: 14 at 7/13/2022  4:56 AM  Calculated from:  Serum Creatinine: 1.2 mg/dL at 7/13/2022  4:56 AM  Serum Sodium: 136 mmol/L at 7/13/2022  4:56 AM  Total Bilirubin: 1.6 mg/dL at 7/13/2022  4:56 AM  INR(ratio): 1.5 at 7/11/2022  3:50 PM  Age: 67 years    - Continue lactulose, thiamine, and folate  - Hepatology consulted; appreciate recommendations - outpatient follow up  - Plans for repeat IR paracentesis  - continue to monitor for signs of liver failure  - F/u daily CMPs    Primary hypertension  Patient was discontinued from losartan per hepatologist.   - continue to monitor and can add when clinically indicated  - pt removed from tele 7/16    Deaf- written communication  - ASL  used for interaction as well as written communication    VTE Risk Mitigation (From admission, onward)         Ordered     enoxaparin injection 40 mg  Daily         07/13/22 1005     IP VTE HIGH RISK PATIENT  Once         06/28/22 5539     Place sequential compression device  Until discontinued         06/28/22 0419     Place sequential compression device  Until discontinued          06/28/22 0415                Discharge Planning   REMA: 7/20/2022     Code Status: Full Code   Is the patient medically ready for discharge?: No    Reason for patient still in hospital (select all that apply): Treatment  Discharge Plan A: Skilled Nursing Facility   Discharge Delays: None known at this time              Nate Ellis MD  Department of Hospital Medicine   [facility] - Intensive Care ([place])

## 2022-07-19 NOTE — ASSESSMENT & PLAN NOTE
Presenting with PITO with new sCr 1.6 (baseline sCr 0.9-1.1).   DDx: Decreased PO intake likely resulting in pre-renal etiology    - Holding diuresis (Lasix 40 BID, Aldactone 100 QD)  - Gentle trial of IVF; encourage PO intake  - Trend sCr  - Strict I&Os  - Avoid nephrotoxic agents  - Renally adjust medications

## 2022-07-19 NOTE — CARE UPDATE
RAPID RESPONSE NURSE AI ALERT       AI alert received.    Chart Reviewed: 07/19/2022, 12:55 AM    MRN: [id number]  Bed: 22711/27905 A    Dx: Elbert Floyd has a past medical history of Hypercholesterolemia and Hypertension.    Last VS: BP (!) 100/46   Pulse 71   Temp 98 °F (36.7 °C)   Resp 14   Ht 5' (1.524 m)   Wt 90.9 kg (200 lb 6.4 oz)   SpO2 100%   Breastfeeding No   BMI 39.14 kg/m²     24H Vital Sign Range:  Temp:  [98 °F (36.7 °C)-98.2 °F (36.8 °C)]   Pulse:  [70-87]   Resp:  [11-20]   BP: (100-114)/(46-54)   SpO2:  [100 %]     Level of Consciousness (AVPU): alert    Recent Labs     07/17/22  0639 07/18/22  0409 07/18/22  1611   WBC 7.85 7.46 7.57   HGB 7.5* 6.9* 6.9*   HCT 23.4* 21.5* 21.0*   * 98* 96*       Recent Labs     07/16/22  0635 07/17/22  0639 07/18/22  0409   * 129* 130*   K 4.4 4.4 4.6    99 99   CO2 28 26 25   CREATININE 1.2 1.1 1.3   * 101 100   PHOS 3.3 3.4 3.6   MG 2.0 2.0 2.0        No results for input(s): PH, PCO2, PO2, HCO3, POCSATURATED, BE in the last 72 hours.     OXYGEN:  Flow (L/min): 2     O2 Device (Oxygen Therapy): room air    MEWS score: 2    Bedside RNAmaya contacted. No concerns verbalized at this time. Instructed to call [phone] for further concerns or assistance.    Lele Anglin RN

## 2022-07-19 NOTE — PROGRESS NOTES
[facility] ([place] [address])  [facility] Medicine  Progress Note    Patient Name: Aleyda Floyd  MRN: [id number]  Patient Class: IP- Inpatient   Admission Date: 6/27/2022  Length of Stay: 20 days  Attending Physician: Jessenia Foster MD  Primary Care Provider: Elvie Fernandes MD        Subjective:     Principal Problem:Anasarca        HPI:  This is a 67 year old deaf lady with alcoholic cirrhosis, and hypertension who presented with generalized swelling.  used to obtain history. Patient states that she had the swelling since her diagnosis of cirrhosis in February. Patient states that 2 days ago, she was able to ambulate but with some difficulty due to the swelling but now  worsening to the point where she is unable to ambulate by herself. Of note, patient had a recent EGD performed earlier last week where they had issues with her IV resulting is significant bruising. Patient also notes that whenever she scratches her skin with her nails, she notices clear fluid coming from the cuts. Patient denies fever, chills, cough, hemoptysis, nausea, vomiting, abdominal pain, yellowing of skin, constipation, dysuria, hematuria, and black/ bloody stools. Patient has baseline loose stools from her lactulose.  She reports compliance with all medications including her lactulose and Lasix. Patient and family declined any confusion or altered mental status. She denies any recent alcohol use with last use in February when she was diagnosed with alcoholic cirrhosis. Patient states that she used to weigh around 170s which she believes is her dry weight and is currently in the 200s.     Upon chart review, patient had an echo at an OSH on 2/25/22 which showed EF 60-65% and some diastolic dysfunction.    In the ED, patient was found to have Hgb of 8.5 and platelets of 113 around baseline. Patient had a UA which showed 1+ leuks and many bacteria. Patient did not complain of dysuria.       Overview/Hospital  Course:  Admitted for decompensated cirrhosis and anasarca. Began diuresing with IV Lasix and spironolactone. Paracentesis of -4.2L; ruled out SBP. Pt stabilized for few days and ready to be dc'ed when she had a change in mental status. Found pt unresponsive with fixed gaze and no pupil response. Code stroke called, CT neg for stroke. Eeg showed seizure-like activity. Pt transitioned to MICU for higher level of care. On step down, had a UTI and PITO, both of which were treated.       Interval History: Patient is accompanied by  in room. Patient is deaf and an  was used for American Sign Language (ASL). Patient does experience some abdominal pain and constipation. She is afebrile. She denies, headache, sob, chest pain, and palpitations.      Review of Systems   Constitutional:  Positive for fatigue. Negative for chills and fever.   HENT:  Negative for congestion, ear pain and sinus pain.    Eyes:  Negative for pain.   Respiratory:  Negative for cough, chest tightness and shortness of breath.    Gastrointestinal:  Positive for abdominal distention. Negative for nausea and vomiting.   Genitourinary:  Negative for flank pain.   Musculoskeletal:  Negative for back pain.   Skin:  Negative for rash.   Neurological:  Negative for dizziness.   Objective:     Vital Signs (Most Recent):  Temp: 98.1 °F (36.7 °C) (07/18/22 0400)  Pulse: 87 (07/18/22 0400)  Resp: 20 (07/18/22 0400)  BP: (!) 100/52 (07/18/22 1804)  SpO2: 100 % (07/18/22 0400)   Vital Signs (24h Range):  Temp:  [98.1 °F (36.7 °C)] 98.1 °F (36.7 °C)  Pulse:  [80-87] 87  Resp:  [10-20] 20  SpO2:  [100 %] 100 %  BP: (100-119)/(52-54) 100/52     Weight: 90.9 kg (200 lb 6.4 oz)  Body mass index is 39.14 kg/m².    Intake/Output Summary (Last 24 hours) at 7/18/2022 6080  Last data filed at 7/18/2022 0428  Gross per 24 hour   Intake --   Output 400 ml   Net -400 ml      Physical Exam  Vitals reviewed.   HENT:      Head: Normocephalic and atraumatic.       Right Ear: External ear normal.      Left Ear: External ear normal.      Nose: Nose normal.      Mouth/Throat:      Mouth: Mucous membranes are moist.   Eyes:      Extraocular Movements: Extraocular movements intact.      Pupils: Pupils are equal, round, and reactive to light.   Cardiovascular:      Rate and Rhythm: Normal rate and regular rhythm.      Pulses: Normal pulses.      Heart sounds: Murmur heard.   Pulmonary:      Effort: Pulmonary effort is normal.      Breath sounds: Normal breath sounds.   Abdominal:      General: There is distension.      Palpations: There is no mass.   Musculoskeletal:         General: Normal range of motion.      Cervical back: Normal range of motion.   Skin:     General: Skin is warm and dry.   Neurological:      General: No focal deficit present.      Mental Status: She is alert and oriented to person, place, and time.   Psychiatric:         Mood and Affect: Mood normal.         Behavior: Behavior normal.       Significant Labs: All pertinent labs within the past 24 hours have been reviewed.    Significant Imaging: I have reviewed all pertinent imaging results/findings within the past 24 hours.      Assessment/Plan:      * Anasarca  67 yoF with alcoholic liver cirrhosis and hypertension admitted for generalized anasarca. Patient's albumin on admission was 1.9. Likely edema related to poor intravascular oncotic pressure 2/2 alcoholic cirrhosis. Patient adherent to home medication. Given history and exam, patient would benefit from diuresis. Dry weight reported as 170s. Patient is currently 200 lbs 6.4oz (down from 212 lbs).     - Better response. d/c Diuril 500 mg IV   - f/u echo unremarkable  - strict I/Os  - daily weights  - low sodium diet with 1500 ml fluid restriction  - Lasix 40 mg IV TID switched to 80 mg PO TID on 7/2 however inadequate response, so dc'd lasix and started Bumex 2 mg TID  - Good UOP. Bumex and spironolactone held.   - Plan for f/u with her hepatologist on  d/c  - Pt 100% on 2 L NC; wean off supp O2 completely and satting well  - Started 40 mg lasix PO BID and 100 mg spironolactone    Gram-positive cocci in clusters  Noted on 1 set of aer/anae cultures from 7/10/22. 2nd set so far negative. No clear sign of infection. Could likely be contaminant.  Uctx also from enterococcus species  - started Vancomycin empirically---> Ampicillin completed 07/17  - repeat culture sent yesterday        UTI (urinary tract infection)  Positive UA from new kim placed 7/10/22. Hx of ESBL E coli in Feb 2022 at OSH  Uctx growing enterococcus  - changed abx to Ertapenem--->Vancomycin---> Ampicillin completed 07/17  - follow up cultures (E faecalis)          Acute encephalopathy  -  dose of lactulose lowered given pt had is having loose BM   - cholestyramine dc'd and metamucil added for stool bulking  - PEG 17g packet added PRN for contsipation  - plan to pull rectal tube, still in place  - plan to remove kim catheter, still in place  - titrate bowel regimen to about 4 BMs per day      Hypomagnesemia  Monitor daily and replete prn      PITO (acute kidney injury)  - Pt Cr of 1.0 on admission   - Cr stable 1.3 today, 1.1 baseline  - Will continue to hold bumex and aldactone  - Monitor CMP daily  - continue PO lasix 40 BID and spironolactone 100 mg qd    Debility  - Pt non-mobile for some time in hospital, so PT/OT order placed  - Pt advised to move around in bed but to only get up with help  - PT /OT recs SNF, placement hopefully 7/18 pending removal of rectal tube      Thrombocytopenia  Chronic, patient at baseline    Anemia, chronic disease  Patient with Hgb of 8.5 on admit. Upon review of care everywhere, patient had Hgb of 8.4 in all of 2022. Likely due to chronic disease and stable.    - Hg was trending down (7.9, 7.5, 6.9)  - Ordered 1 U pRBC's, f/u cbc, monitor Hg  - Received written consent from patient and  prior to transfusion     Alcoholic cirrhosis of liver with  ascites  Patient was diagnosed in February. Previous history of chronic alcohol abuse and states she drank about 4-5 glasses of wine per night for over 20 years. Patient is seeing hepatology outpatient. Labs shown in care everywhere.     MELD-Na score: 15 at 7/13/2022  4:56 AM  MELD score: 14 at 7/13/2022  4:56 AM  Calculated from:  Serum Creatinine: 1.2 mg/dL at 7/13/2022  4:56 AM  Serum Sodium: 136 mmol/L at 7/13/2022  4:56 AM  Total Bilirubin: 1.6 mg/dL at 7/13/2022  4:56 AM  INR(ratio): 1.5 at 7/11/2022  3:50 PM  Age: 67 years    - consulted hepatology, appreciate recommendations for diuresis, labs, f/u outpatient with her hepatologist  - continue lactulose, thiamine, and folate  - S/p paracentesis with 4.2L removed  - continue to monitor for signs of liver failure  - f/u daily CMPs    Primary hypertension  Patient was discontinued from losartan per hepatologist.   - continue to monitor and can add when clinically indicated  - pt removed from tele 7/16    Deaf- written communication  - ASL  used for interaction as well as written communication      VTE Risk Mitigation (From admission, onward)         Ordered     enoxaparin injection 40 mg  Daily         07/13/22 1005     IP VTE HIGH RISK PATIENT  Once         06/28/22 0419     Place sequential compression device  Until discontinued         06/28/22 0419     Place sequential compression device  Until discontinued         06/28/22 0415                Discharge Planning   REMA: 7/19/2022     Code Status: Full Code   Is the patient medically ready for discharge?: No    Reason for patient still in hospital (select all that apply): Treatment and PT / OT recommendations  Discharge Plan A: Skilled Nursing Facility   Discharge Delays: None known at this time              Lele Posadas MD  Department of Hospital Medicine   [facility] - Intensive Care ([place]-)

## 2022-07-19 NOTE — PT/OT/SLP PROGRESS
Occupational Therapy Treatment    Patient Name:  Aleyda Floyd   MRN:  [id number]  Admit Date: 6/27/2022  Admitting Diagnosis:  Anasarca   Length of Stay: 21 days  Recent Surgery: * No surgery found *      Recommendations:     Discharge Recommendations: nursing facility, skilled  Discharge Equipment Recommendations:  other (see comments) (TBD (progress pending))  Barriers to discharge:  Inaccessible home environment, Other (Comment) (Increased skilled assistance required)    Plan:     Patient to be seen 4 x/week to address the above listed problems via self-care/home management, therapeutic activities, neuromuscular re-education, therapeutic exercises  · Plan of Care Expires: 08/12/22  · Plan of Care Reviewed with: patient, spouse    Assessment:   Aleyda Floyd is a 67 y.o. female with a medical diagnosis of Anasarca.  She presents with the following performance deficits affecting function: weakness, impaired endurance, impaired self care skills, impaired functional mobility, gait instability, impaired balance, pain, decreased safety awareness, decreased lower extremity function, decreased upper extremity function, decreased coordination, decreased ROM, impaired coordination, impaired skin, edema.      Pt tolerated session fairly this date and was willing to participate.  Belle #[id number] utilized throughout session. Demonstrating continued increased fatigue, impaired endurance, decreased activity tolerance, increased edema, impaired balance, poor trunk stability/postural control, increased weakness, and decreased safety awareness, requiring increased time and assistance to complete functional tasks. Patient tolerated EOB sitting for ~15 minutes while engaged in functional tasks, increased right lateral lean with LUE shoulder elevation, visual and tactile cues provided, unable to maintain correct midline posture for extended periods of time. Patient completed BUE therex and grooming tasks  "seated EOB. Patient is progressing towards established goals, and continues to benefit from acute skilled OT services to increase functional performance and improve quality of life. OT to continue to recommend SNF at discharge to improve pt functional independence and increase patient safety before returning home.      Rehab Prognosis: Fair; patient would benefit from acute skilled OT services to address these deficits and reach maximum level of function.        Subjective   Communicated with: Nurse prior to session.  Patient found HOB elevated with telemetry, pulse ox (continuous), peripheral IV upon OT entry to room. Pt agreeable to participate at this time.    Patient: via , "This feels good" sitting EOB    Pain/Comfort:  · Pain Rating 1: 0/10  · Location - Side 1: Right  · Location - Orientation 1: generalized  · Location 1: hand (at IV site)  · Pain Addressed 1: Distraction, Nurse notified  · Pain Rating Post-Intervention 1: other (see comments) (patient did not rate)    Objective:   Patient found with: telemetry, pulse ox (continuous), peripheral IV   General Precautions: Standard, Cardiac fall, hearing impaired   Orthopedic Precautions:N/A   Braces: N/A   Oxygen Device: Room Air  Vitals: BP (!) 102/58   Pulse 85   Temp 97.4 °F (36.3 °C) (Oral)   Resp 18   Ht 5' (1.524 m)   Wt 90.8 kg (200 lb 2.8 oz)   SpO2 98%   Breastfeeding No   BMI 39.09 kg/m²     Outcome Measures:  [facility] 6 Click ADL: 11    Cognition:   · Alert and Cooperative  · Command following: follows one-step commands  · Communication: ASL    Occupational Performance:  Bed Mobility:    · Patient completed Rolling/Turning to Right with maximal assistance  · Patient completed Supine to Sit with maximal assistance on R side of bed  · Scooting anteriorly to EOB to have both feet planted on floor: maximal assistance  · Patient completed Sit to Supine with maximal assistance and 2 persons on R side of bed  · Scooting to HOB in " supine: dependent and drawsheet pull via Trendenelenburg position    Functional Mobility/Transfers:   Static Sitting EOB: SBA-CGA, increased right lateral lean   Deferred this date due to increased fatigue and weakness      Activities of Daily Living:  · Grooming: stand by assistance to perform oral care, wash face and comb hair seated EOB      AMPA 6 Click ADL:  AMPA Total Score: 11    Treatment & Education:  -Pt and spouse education on OT role and POC.  -Importance of E/OOB activity with staff assistance, emphasis on daily participation  -BUE therex x 10 seated EOB: chest press and overhead press  -Safety during functional transfer and mobility ensured  -Patient provided with education on importance of Bilateral UB/LB integration during functional tasks for improvement in functional performance.   -Education provided/reviewed, questions answered within OT scope of practice.   -Patient demonstrates understanding and learning this date.         Patient left HOB elevated with all lines intact, call button in reach and nurse notified and spouse present    GOALS:   Multidisciplinary Problems     Occupational Therapy Goals        Problem: Occupational Therapy    Goal Priority Disciplines Outcome Interventions   Occupational Therapy Goal     OT, PT/OT Ongoing, Progressing    Description: Goals set 7/14 to be met by 7/28    Patient will increase functional independence with ADLs by performing:    Grooming while standing with Minimal Assistance.  Toileting from bedside commode with Minimal Assistance for hygiene and clothing management.   Supine to sit with Minimal Assistance.  Step transfer with Minimal Assistance using RW.  Toilet transfer to bedside commode with Minimal Assistance using RW.                     Time Tracking:     OT Date of Treatment: 07/19/22  OT Start Time: 1104  OT Stop Time: 1137  OT Total Time (min): 33 min    Billable Minutes:Self Care/Home Management 15  Therapeutic Exercise  18      7/19/2022

## 2022-07-19 NOTE — ASSESSMENT & PLAN NOTE
- Pt non-mobile for some time in hospital, so PT/OT order placed  - Pt advised to move around in bed but to only get up with help  - PT /OT recs SNF, placement hopefully 7/18 pending removal of rectal tube

## 2022-07-19 NOTE — ASSESSMENT & PLAN NOTE
Noted on 1 set of aer/anae cultures from 7/10/22. 2nd set so far negative. No clear sign of infection. Could likely be contaminant.  Uctx also from enterococcus species  - started Vancomycin empirically---> Ampicillin completed 07/17  - repeat culture sent yesterday

## 2022-07-19 NOTE — CARE UPDATE
RAPID RESPONSE NURSE ROUND       Rounding completed with charge RN, [place]. No concerns verbalized at this time. Instructed to call [phone] for further concerns or assistance.

## 2022-07-19 NOTE — ASSESSMENT & PLAN NOTE
67 yoF with alcoholic liver cirrhosis and hypertension admitted for generalized anasarca. Patient's albumin on admission was 1.9. Likely edema related to poor intravascular oncotic pressure 2/2 alcoholic cirrhosis. Patient adherent to home medication. Given history and exam, patient would benefit from diuresis. Dry weight reported as 170s. Patient is currently 200 lbs 6.4oz (down from 212 lbs).     - Better response. d/c Diuril 500 mg IV   - f/u echo unremarkable  - strict I/Os  - daily weights  - low sodium diet with 1500 ml fluid restriction  - Lasix 40 mg IV TID switched to 80 mg PO TID on 7/2 however inadequate response, so dc'd lasix and started Bumex 2 mg TID  - Good UOP. Bumex and spironolactone held.   - Plan for f/u with her hepatologist on d/c  - Pt 100% on 2 L NC; wean off supp O2 completely and satting well  - Started 40 mg lasix PO BID and 100 mg spironolactone

## 2022-07-19 NOTE — ASSESSMENT & PLAN NOTE
Patient with Hgb of 8.5 on admit. Upon review of care everywhere, patient had Hgb of 8.4 in all of 2022. Likely due to chronic disease and stable.    - Hgb downtrending without overt signs of bleeding or other blood per rectum / GI /  tract; likely etiology considering continued chronic disease  - Ordered 1 U pRBC  - Received written consent from patient and  prior to transfusion   - Trend CBC; transfuse Hgb < 7

## 2022-07-19 NOTE — PLAN OF CARE
I have reviewed the chart and discussed Aleyda Floyd with the resident, including the overnight events, results, and assessment and plan. Please refer to the progress note dated 7/18/2022 for information regarding the care of the patient.    67 year old female with history of alcoholic cirrhosis complicated by ascites, HLP and HTN admitted for volume overload. IR consulted, patient underwent IR paracentesis 6/28 with 4.2L removed. TTE 4/29 with EF 65% and normal diastolic function noted. Hepatology consulted, appreciate recommendations. Patient started on IV diuretics (Diuril and Lasix). Patient with upper extremity swelling Doppler 6/29 with no DVT noted. Due to PITO held diuretics, but improvement in renal function noted, restarted Lasix and spironolactone.        Patient noted to be altered 7/10, transferred to ICU. Code stroke called, imaging not indicative of stroke. EEG concerning of seizure, neurology consulted and recommended Keppra and Onfi. Dose adjusted for somnolence.       UCx 6/28 with Lactobacillus, however patient without reported symptoms so deferred antibiotic treatment initially. Repeat UA 7/10 showed Enterococcus faecalis, ertapenem started 7/11, transitioned to ampicillin 7/13, completed 7/17.       Patient with diarrhea. Dc'd cholestyramine, lowered dose of lactulose. Complete ampicillin 7/17. Monitor stool output.     Patient with anemia 7/18 with no active signs of bleed, likely multifactorial related to underlying chronic diseases. Transfused 1 unit PRBC.        HARPREET used for ASL interpretation, all questions answered.      Jessenia Foster M.D.  Department of Hospital Medicine  [facility]

## 2022-07-19 NOTE — PROGRESS NOTES
Hepatology Progress Note    Aleyda Floyd is a 67 y.o. female admitted to hospital 6/27/2022 (Hospital Day: 23) due to Anasarca.     Interval History  - no acute events noted on chart review.  Not currently on low salt diet.PO diuretics restarted.      Objective  Temp:  [97.4 °F (36.3 °C)-98.4 °F (36.9 °C)] 97.4 °F (36.3 °C) (07/19 0730)  Pulse:  [70-85] 85 (07/19 0730)  BP: (100-111)/(46-58) 102/58 (07/19 0730)  Resp:  [11-18] 18 (07/19 0730)  SpO2:  [98 %-100 %] 98 % (07/19 0730)    Gen: NAD, lying comfortably  HENT: NCAT, oropharynx clear  Eyes: anicteric sclerae, EOMI grossly  Neck: supple, no visible masses/goiter  Cardiac: 2+ distal pulses  Lungs:unlabored breathing, symmetrical chest expansion  Abd: soft, NT/ND, normoactive BS, no rebound  Ext: 2+ LE edema, warm, well perfused  Skin: skin intact on exposed body parts, no visible rashes, lesions  Neuro: deaf but communicates fluently via ASL   Psych: appropriate mood, affect      Laboratory      Lab Results   Component Value Date    WBC 8.20 07/19/2022    HGB 6.7 (L) 07/19/2022    HCT 21.2 (L) 07/19/2022    MCV 96 07/19/2022     (L) 07/19/2022       Lab Results   Component Value Date     (L) 07/19/2022    K 4.7 07/19/2022    CL 98 07/19/2022    CO2 23 07/19/2022    BUN 31 (H) 07/19/2022    CREATININE 1.6 (H) 07/19/2022    CALCIUM 7.7 (L) 07/19/2022       Lab Results   Component Value Date    ALBUMIN 1.5 (L) 07/19/2022    ALT 27 07/19/2022    AST 48 (H) 07/19/2022    ALKPHOS 82 07/19/2022    BILITOT 1.3 (H) 07/19/2022       Lab Results   Component Value Date    INR 1.5 (H) 07/11/2022    INR 1.7 (H) 06/28/2022    INR 1.7 (H) 06/23/2022       MELD-Na score: 15 at 7/13/2022  4:56 AM  MELD score: 14 at 7/13/2022  4:56 AM  Calculated from:  Serum Creatinine: 1.2 mg/dL at 7/13/2022  4:56 AM  Serum Sodium: 136 mmol/L at 7/13/2022  4:56 AM  Total Bilirubin: 1.6 mg/dL at 7/13/2022  4:56 AM  INR(ratio): 1.5 at 7/11/2022  3:50 PM  Age: 67  years      Micro and imaging reviewed.    Assessment    #EtOH cirrhosis, decompensated  #ascitis, secondary to above  - MELD: 18>20>15  - Patient has been diuresed significantly since admission.   - Na: 137, Cr: 1.3, T. Bilirubin: 1.9, INR: 1.5  - patient will be evaluated for liver tx as outpatient    #hematochezia, resolved  #blood in NG tube,single episode  - Hb 6.7, slowly downtrending, no overt bleeding reported  - transfusion for Hgb <7    #hyperechoic lesion in liver  - US doppler liver (5/5/22): Hypoechoic lesion in the right hepatic lobe measuring 1.4 x 1.3 x 1.0 cm.  - CT abdomen w/wout contrast (5/10/22): Focal lesion right hepatic lobe seen on ultrasound not seen best advantage on CT exam.    -MRI liver exam with contrast suggested as outpatient    #AMS, resolving  #seizure disorder  - likely 2/2 toxic metabolic encephalopathy.   - CT head & CTA head & neck were negative.   - EEG with generalized periodic discharges and triphasic morphology concering for status epilepticus    #generalized deconditioning  - Patient too frail currently. Must demonstrate mobility and functional capacity before being evlauated for liver tx.   - Still full support per PT but progressing slowly      Plan  - Would recommend obtaining MRI of the abdomen w/wout contrast to evaluate liver lesion. Can be done as outpatient.   - Patient will eventually require outpatient liver tx eval. Outpatient hepatology and addiction psych evaluation.  - Continue IV PPI. Continue Lactulose TID and Rifaximin BID to maintain 2-3 soft bowel movements daily  - Agree with holding diuretics in setting of worsened renal function  -Start IV albumin 25g BID  - Please perform therapeutic paracentesis for symptomatic relief. Would not remove more than 4L at this time.  - No need for fluid restriction at this time given mild hyponatremia  - Low sodium diet  - Please obtain daily CBC, BMP, LFT, INR      Thank you for involving us in the care of Aleyda LEE  Mariel. Please call with any additional concerns or questions.    Pietro Bojorquez MD, PGY-IV  Gastroenterology Fellow  [facility]

## 2022-07-19 NOTE — ASSESSMENT & PLAN NOTE
Patient was diagnosed in February. Previous history of chronic alcohol abuse and states she drank about 4-5 glasses of wine per night for over 20 years. Patient is seeing hepatology outpatient. Labs shown in care everywhere.     MELD-Na score: 15 at 7/13/2022  4:56 AM  MELD score: 14 at 7/13/2022  4:56 AM  Calculated from:  Serum Creatinine: 1.2 mg/dL at 7/13/2022  4:56 AM  Serum Sodium: 136 mmol/L at 7/13/2022  4:56 AM  Total Bilirubin: 1.6 mg/dL at 7/13/2022  4:56 AM  INR(ratio): 1.5 at 7/11/2022  3:50 PM  Age: 67 years    - Continue lactulose, thiamine, and folate  - Hepatology consulted; appreciate recommendations - outpatient follow up  - Plans for repeat IR paracentesis  - continue to monitor for signs of liver failure  - F/u daily CMPs

## 2022-07-19 NOTE — ASSESSMENT & PLAN NOTE
67 yoF with alcoholic liver cirrhosis and hypertension admitted for generalized anasarca. Patient's albumin on admission was 1.9. Likely edema related to poor intravascular oncotic pressure 2/2 alcoholic cirrhosis. Patient adherent to home medication. Given history and exam, patient would benefit from diuresis. Dry weight reported as 170s. Patient is currently 200 lbs 6.4oz (down from 212 lbs).     - Strict I/Os  - Daily weights  - Low sodium diet with 1500 ml fluid restriction  - Plan for f/u with her hepatologist on d/c  - Pt 100% on 2 L NC; wean off supp O2 completely and satting well  - Diuretic regimen held d/t likely pre-renal PITO, will restart diuretic regimen when tolerated, see PITO A/P  - Plan for IR paracentesis

## 2022-07-20 NOTE — PROGRESS NOTES
[facility] ([facility]-)  Adult Nutrition  Consult Note    SUMMARY     Recommendations    1. Continue current Low sodium, Dysphagia soft diet (texture per SLP).  2. RD to monitor & follow-up.    Goals: Meet % EEN, EPN by RD f/u date  Nutrition Goal Status: goal met  Communication of RD Recs: reviewed with RN    Assessment and Plan    Nutrition Problem:  Inadequate oral intake     Related to (etiology):   Inability to consume sufficient energy     Signs and Symptoms (as evidenced by):   NPO     Interventions(treatment strategy):  Collaboration of nutrition care w/ other providers  EN     Nutrition Diagnosis Status:   Resolved    Malnutrition Assessment    Orbital Region (Subcutaneous Fat Loss): well nourished  Upper Arm Region (Subcutaneous Fat Loss): well nourished  Thoracic and Lumbar Region: well nourished   Fellows Region (Muscle Loss): mild depletion  Clavicle Bone Region (Muscle Loss): mild depletion  Clavicle and Acromion Bone Region (Muscle Loss): mild depletion  Anterior Thigh Region (Muscle Loss): mild depletion   Edema (Fluid Accumulation): 3-->moderate     Reason for Assessment    Reason For Assessment: RD follow-up  Diagnosis: liver disease (anasarca)  Relevant Medical History: Alcoholic Cirrhosis, obesity, deaf,HLD, HTN  Interdisciplinary Rounds: did not attend    General Information Comments: Diet advanced per SLP; TFs discontinued & NGT removed. Per RN documentation, pt tolerating diet w/ 75% PO intake. UBW: 180# per chart review. NFPE complete 7/7 - pt nourished w/ no indicators of malnutrition.  Nutrition Discharge Planning: Pending clinical course    Nutrition/Diet History    Patient Reported Diet/Restrictions/Preferences: general  Spiritual, Cultural Beliefs, Religion Practices, Values that Affect Care: no  Food Allergies: NKFA  Factors Affecting Nutritional Intake: None identified at this time    Anthropometrics    Temp: 98 °F (36.7 °C)  Height Method: Stated  Height: 5'  (152.4 cm)  Height (inches): 60 in  Weight Method: Bed Scale  Weight: 90.8 kg (200 lb 2.8 oz)  Weight (lb): 200.18 lb  Ideal Body Weight (IBW), Female: 100 lb  % Ideal Body Weight, Female (lb): 200.18 %  BMI (Calculated): 39.1  BMI Grade: 35 - 39.9 - obesity - grade II    Lab/Procedures/Meds    Pertinent Labs Reviewed: reviewed  Pertinent Labs Comments: Na 128, BUN 31, GFR 38.9  Pertinent Medications Reviewed: reviewed  Pertinent Medications Comments: Folic acid, Thiamine, Lactulose    Estimated/Assessed Needs    Weight Used For Calorie Calculations: 90.8 kg (200 lb 2.8 oz)     Energy Calorie Requirements (kcal): 1502 kcal/d  Energy Need Method: Appling-St Jeor (1.1 PAL)     Protein Requirements: 82 g/d (.9 g/kg)  Weight Used For Protein Calculations: 90.8 kg (200 lb 2.8 oz)     Fluid Requirements (mL): 1500 per MD  RDA Method (mL): 1502     Nutrition Prescription Ordered    Current Diet Order: Low Na, Dysphagia soft  Current Nutrition Support Formula Ordered: Other (Comment) (Discontinued)    Evaluation of Received Nutrient/Fluid Intake    I/O: -8.3L since 7/6    Comments: LBM: 7/18    Tolerance: tolerating    Nutrition Risk    Level of Risk/Frequency of Follow-up: low (one time per week)     Monitor and Evaluation    Food and Nutrient Intake: energy intake, food and beverage intake, enteral nutrition intake  Food and Nutrient Adminstration: diet order, enteral and parenteral nutrition administration  Knowledge/Beliefs/Attitudes: food and nutrition knowledge/skill  Physical Activity and Function: nutrition-related ADLs and IADLs  Anthropometric Measurements: weight, weight change  Biochemical Data, Medical Tests and Procedures: lipid profile, inflammatory profile, electrolyte and renal panel, gastrointestinal profile, glucose/endocrine profile  Nutrition-Focused Physical Findings: overall appearance     Nutrition Follow-Up    RD Follow-up?: Yes

## 2022-07-20 NOTE — CONSULTS
Inpatient consult to Physical Medicine Rehab  Consult performed by: Norah Christiansen NP  Consult ordered by: Jessenia Foster MD  Reason for consult: Assess rehab needs      Rehab team already following. Will re-eval as a rehab candidate.     URBANO Encarnacion, FNP-C  Physical Medicine & Rehabilitation   7/20/22

## 2022-07-20 NOTE — H&P
Inpatient Radiology Pre-procedure Note    History of Present Illness:  Aleyda Floyd is a 67 y.o. female who presents for ultrasound guided paracentesis.  Admission H&P reviewed.  Past Medical History:   Diagnosis Date    Hypercholesterolemia     Hypertension      Past Surgical History:   Procedure Laterality Date    DILATION AND CURETTAGE OF UTERUS      ESOPHAGOGASTRODUODENOSCOPY N/A 6/23/2022    Procedure: EGD (ESOPHAGOGASTRODUODENOSCOPY);  Surgeon: Sean Perry MD;  Location: [facility] ([address] [place] [address]);  Service: Endoscopy;  Laterality: N/A;  cirrhosis, variceal screening  labs prior  -request sent 6/14  fully vaccinated, instructions emailed to [email or web address]       Review of Systems:   As documented in primary team H&P    Home Meds:   Prior to Admission medications    Medication Sig Start Date End Date Taking? Authorizing Provider   acetaminophen (TYLENOL) 325 MG tablet Take 650 mg by mouth daily as needed for Pain.   Yes Historical Provider   aspirin 81 MG Chew Take 81 mg by mouth once daily.   Yes Historical Provider   cholecalciferol, vitamin D3, (VITAMIN D3) 50 mcg (2,000 unit) Cap capsule Take 1 capsule by mouth once daily.   Yes Historical Provider   cholestyramine-aspartame (QUESTRAN LIGHT) 4 gram PwPk Take 4 g by mouth once daily. 3/31/22  Yes Historical Provider   folic acid (FOLVITE) 1 MG tablet Take 1 mg by mouth once daily.  7/7/13  Yes Historical Provider   nystatin (MYCOSTATIN) powder Apply to skin folds beneath breasts as needed 3/31/22 3/31/23 Yes Historical Provider   omeprazole (PRILOSEC) 40 MG capsule Take 40 mg by mouth once daily.   Yes Historical Provider   thiamine 100 MG tablet Take 100 mg by mouth once daily.   Yes Historical Provider   furosemide (LASIX) 20 MG tablet Take 20 mg by mouth 2 (two) times daily.  7/8/22 Yes Historical Provider   bumetanide (BUMEX) 1 MG tablet Take 1 tablet (1 mg total) by mouth 2 (two) times a day. 7/8/22 7/8/23   Elly G Penfold, DO   lactulose (CHRONULAC) 20 gram/30 mL Soln Take 45 mLs (30 g total) by mouth 3 (three) times daily. 7/8/22   Elly G Penfold, DO   melatonin (MELATIN) 5 mg Take 1 tablet (5 mg total) by mouth nightly. 1-2 tabs nightly 7/8/22   Elly G Penfold, DO   spironolactone (ALDACTONE) 50 MG tablet Take 3 tablets (150 mg total) by mouth once daily. 7/9/22 7/9/23  Elly G Penfold, DO   losartan (COZAAR) 50 MG tablet Take 50 mg by mouth once daily.  8/21/13 6/28/22  Historical Provider   simvastatin (ZOCOR) 10 MG tablet Take 10 mg by mouth once daily.  10/6/13 6/28/22  Historical Provider     Scheduled Meds:    albumin human 5%  25 g Intravenous BID    cloBAZam  10 mg Oral Daily    enoxaparin  40 mg Subcutaneous Daily    folic acid  1 mg Oral Daily    lactulose  10 g Oral BID    levETIRAcetam  750 mg Oral BID    LIDOcaine HCL 10 mg/ml (1%)        pantoprazole  40 mg Oral Daily    rifAXImin  550 mg Oral BID    thiamine  100 mg Oral Daily     Continuous Infusions:   PRN Meds:acetaminophen, aluminum & magnesium hydroxide-simethicone, sars-cov-2 (covid-19), dextrose 10%, dextrose 10%, glucagon (human recombinant), glucose, glucose, insulin aspart U-100, melatonin, naloxone, ondansetron, oxyCODONE, prochlorperazine, sodium chloride 0.9%, sodium chloride 0.9%  Anticoagulants/Antiplatelets: Lovenox    Allergies: Review of patient's allergies indicates:  No Known Allergies  Sedation Hx: have not been any systemic reactions    Vitals:  Temp: 98 °F (36.7 °C) (07/20/22 0400)  Pulse: 87 (07/20/22 0815)  Resp: 19 (07/20/22 0815)  BP: (!) 113/52 (07/20/22 0815)  SpO2: 97 % (07/20/22 0815)     Physical Exam:  ASA: 3  Mallampati: n/a    General: no acute distress  Mental Status: alert and oriented to person, place and time  HEENT: normocephalic, atraumatic  Chest: unlabored breathing  Heart: regular heart rate  Abdomen: distended  Extremity: moves all extremities    Plan: ultrasound guided paracentesis  Sedation Plan:  local    URBANO Barton, FNP  Interventional Radiology  [phone] clinic

## 2022-07-20 NOTE — ASSESSMENT & PLAN NOTE
-Diuresis  with Lasix. Now on Bumex.  -Monitor I/O.  -On Low Na with fluid restriction of 1500ml.  -Per  note, will FU with hepatologist outpt.   - Diuretics held 2/2 PITO

## 2022-07-20 NOTE — PROCEDURES
Radiology Post-Procedure Note    Pre Op Diagnosis: Ascites  Post Op Diagnosis: Same    Procedure: Ultrasound Guided Paracentesis    Procedure performed by: Clifford LÓPEZ, Donna     Written Informed Consent Obtained: Yes  Specimen Removed: YES clear yellow  Estimated Blood Loss: Minimal    Findings:   Successful paracentesis.  Albumin administered PRN per protocol.    Patient tolerated procedure well.    Donna Horton, APRN, FNP  Interventional Radiology  [phone] clinic

## 2022-07-20 NOTE — PLAN OF CARE
Patient is in need of SNF post dc. JAMIE has spoken to patient sister reporting they are requesting a SNF that can provide and  as patient is deaf. JAMIE has spoken to Sho in admissions stating that patient has been placed on a waiting list. JAMIE has also left a voicemail for Ryanne in admissions with [facility].      Anjana Guzmán, SRINIVAS  [facility]   [phone]

## 2022-07-20 NOTE — PLAN OF CARE
Paracentesis complete. Removed 7500 mL. Albumin 50g given IV. Patient AAOx4, no distress noted, respirations even and unlabored. VSS. Pt stable for transfer back to room at this time.    Report called to carly Sandoval RN.

## 2022-07-20 NOTE — SUBJECTIVE & OBJECTIVE
Interval History 7/20/2022:  Patient is seen for follow-up PM&R evaluation and recommendations: Participated w/ OT yesterday and still lower level. Transfusion completed yesterday. Para pending,     HPI, Past Medical, Family, and Social History remains the same as documented in the initial encounter.    Scheduled Medications:    albumin human 5%  25 g Intravenous BID    cloBAZam  10 mg Oral Daily    enoxaparin  40 mg Subcutaneous Daily    folic acid  1 mg Oral Daily    lactulose  10 g Oral BID    levETIRAcetam  750 mg Oral BID    pantoprazole  40 mg Oral Daily    rifAXImin  550 mg Oral BID    thiamine  100 mg Oral Daily       Diagnostic Results: Labs: Reviewed  ECG: Reviewed  US: Reviewed    PRN Medications: acetaminophen, aluminum & magnesium hydroxide-simethicone, sars-cov-2 (covid-19), dextrose 10%, dextrose 10%, glucagon (human recombinant), glucose, glucose, insulin aspart U-100, melatonin, naloxone, ondansetron, oxyCODONE, prochlorperazine, sodium chloride 0.9%, sodium chloride 0.9%    Review of Systems   Constitutional:  Negative for fatigue.   Respiratory:  Negative for cough and shortness of breath.    Gastrointestinal:  Positive for abdominal distention. Negative for nausea and vomiting.   Musculoskeletal:  Positive for gait problem.   Neurological:  Positive for speech difficulty and weakness.   Objective:     Vital Signs (Most Recent):  Temp: 98 °F (36.7 °C) (07/20/22 0400)  Pulse: 87 (07/20/22 0815)  Resp: 19 (07/20/22 0815)  BP: (!) 113/52 (07/20/22 0815)  SpO2: 97 % (07/20/22 0815)      Vital Signs (24h Range):  Temp:  [83 °F (28.3 °C)-98.1 °F (36.7 °C)] 98 °F (36.7 °C)  Pulse:  [65-87] 87  Resp:  [11-23] 19  SpO2:  [96 %-100 %] 97 %  BP: ()/(42-61) 113/52     Physical Exam  Vitals and nursing note reviewed.   Constitutional:       Appearance: Normal appearance. She is well-developed.   HENT:      Nose: Nose normal.      Mouth/Throat:      Mouth: Mucous membranes are moist.   Eyes:      Pupils:  Pupils are equal, round, and reactive to light.   Pulmonary:      Effort: Pulmonary effort is normal.      Breath sounds: Normal breath sounds.   Abdominal:      General: There is distension.   Musculoskeletal:      Cervical back: Normal range of motion and neck supple.      Comments: Deconditioned and generalized weakness   Skin:     General: Skin is warm and dry.   Neurological:      Mental Status: She is alert.      Motor: Weakness present.      Gait: Gait abnormal.   Psychiatric:         Mood and Affect: Mood normal.     NEUROLOGICAL EXAMINATION:     CRANIAL NERVES     CN III, IV, VI   Pupils are equal, round, and reactive to light.

## 2022-07-20 NOTE — PT/OT/SLP PROGRESS
Occupational Therapy      Patient Name:  Aleyda Floyd   MRN:  [id number]    Patient not seen today secondary to  PELON for paracentesis. Will follow-up as scheduled.    7/20/2022

## 2022-07-20 NOTE — TREATMENT PLAN
Hepatology Progress Note    Aleyda Floyd is a 67 y.o. female admitted to hospital 6/27/2022 (Hospital Day: 24) due to Anasarca.     Interval History  - underwent paracentesis today, 7.5L removed  - Cr improved after albumin and holding diuretics      Objective  Temp:  [83 °F (28.3 °C)-98.1 °F (36.7 °C)] 98 °F (36.7 °C) (07/20 0400)  Pulse:  [68-87] 87 (07/20 0815)  BP: ()/(47-61) 113/52 (07/20 0815)  Resp:  [11-19] 19 (07/20 0815)  SpO2:  [96 %-100 %] 97 % (07/20 0815)          Laboratory      Lab Results   Component Value Date    WBC 7.15 07/20/2022    HGB 7.8 (L) 07/20/2022    HCT 23.3 (L) 07/20/2022    MCV 91 07/20/2022    PLT 82 (L) 07/20/2022       Lab Results   Component Value Date     (L) 07/20/2022    K 4.5 07/20/2022    CL 98 07/20/2022    CO2 23 07/20/2022    BUN 31 (H) 07/20/2022    CREATININE 1.4 07/20/2022    CALCIUM 8.1 (L) 07/20/2022       Lab Results   Component Value Date    ALBUMIN 2.2 (L) 07/20/2022    ALT 26 07/20/2022    AST 42 (H) 07/20/2022    ALKPHOS 75 07/20/2022    BILITOT 2.3 (H) 07/20/2022       Lab Results   Component Value Date    INR 1.5 (H) 07/11/2022    INR 1.7 (H) 06/28/2022    INR 1.7 (H) 06/23/2022       MELD-Na score: 15 at 7/13/2022  4:56 AM  MELD score: 14 at 7/13/2022  4:56 AM  Calculated from:  Serum Creatinine: 1.2 mg/dL at 7/13/2022  4:56 AM  Serum Sodium: 136 mmol/L at 7/13/2022  4:56 AM  Total Bilirubin: 1.6 mg/dL at 7/13/2022  4:56 AM  INR(ratio): 1.5 at 7/11/2022  3:50 PM  Age: 67 years      Micro and imaging reviewed.    Assessment    #EtOH cirrhosis, decompensated  #ascitis, secondary to above  - MELD: 18>20>15  - Patient has been diuresed significantly since admission.   - Na: 137, Cr: 1.3, T. Bilirubin: 1.9, INR: 1.5  - patient will be evaluated for liver tx as outpatient    #hematochezia, resolved  #blood in NG tube,single episode  - Hb 6.7, slowly downtrending, no overt bleeding reported  - transfusion for Hgb <7    #hyperechoic lesion in  liver  - US doppler liver (5/5/22): Hypoechoic lesion in the right hepatic lobe measuring 1.4 x 1.3 x 1.0 cm.  - CT abdomen w/wout contrast (5/10/22): Focal lesion right hepatic lobe seen on ultrasound not seen best advantage on CT exam.    -MRI liver exam with contrast suggested as outpatient    #AMS, resolving  #seizure disorder  - likely 2/2 toxic metabolic encephalopathy.   - CT head & CTA head & neck were negative.   - EEG with generalized periodic discharges and triphasic morphology concering for status epilepticus    #generalized deconditioning  - Patient too frail currently. Must demonstrate mobility and functional capacity before being evlauated for liver tx.   - Still full support per PT but progressing slowly      Plan  - Would recommend obtaining MRI of the abdomen w/wout contrast to evaluate liver lesion. Can be done as outpatient.   - Patient will eventually require outpatient liver tx eval. Outpatient hepatology and addiction psych evaluation.  - Continue IV PPI. Continue Lactulose TID and Rifaximin BID to maintain 2-3 soft bowel movements daily  - If creatinine remains stable tomorrow morning, would consider giving IV albumin followed by IV diuresis for ongoing volume removal  - No need for fluid restriction at this time given mild hyponatremia  - Low sodium diet  - Please obtain daily CBC, BMP, LFT, INR      Thank you for involving us in the care of Aleyda Floyd. Please call with any additional concerns or questions.    Pietro Bojorquez MD, PGY-IV  Gastroenterology Fellow  [facility]

## 2022-07-20 NOTE — PLAN OF CARE
Recommendations     1. Continue current Low sodium, Dysphagia soft diet (texture per SLP).  2. RD to monitor & follow-up.     Goals: Meet % EEN, EPN by RD f/u date  Nutrition Goal Status: goal met  Communication of RD Recs: reviewed with RN

## 2022-07-20 NOTE — PT/OT/SLP PROGRESS
Physical Therapy      Patient Name:  Aleyda Floyd   MRN:  [id number]    Patient not seen today secondary to Off the floor for procedure/surgery. Will follow-up 7/20/22.

## 2022-07-20 NOTE — PROGRESS NOTES
[facility] ([place] [address])  Physical Medicine & Rehab  Progress Note    Patient Name: Aleyda Floyd  MRN: [id number]  Admission Date: 6/27/2022  Length of Stay: 22 days  Attending Physician: Jessenia Foster MD    Subjective:     Principal Problem:[facility]    Hospital Course:   OT- 07/04    Bed Mobility:    · Scooting to HOB in supine: maximal assistance via drawsheet + bridging technique  · Rolling: Stand by assistance with pt performing x2 trials toward L and x1 trial toward R  · Patient completed Supine to Sit with maximal assistance on left side of bed  · Patient completed Sit to Supine with moderate assistance on left side of bed     Functional Mobility/Transfers:  1. Static Sitting EOB: Supervision  ? Pt maintained x10 mins     Activities of Daily Living:  · Upper Body Dressing: maximal assistance to doff/don gown while supine with pt assisting by threading BUEs  · Lower Body Dressing: maximal assistance to don clean brief while supine with pt assisting by rolling  · Toileting: maximal assistance     PT- 07/05    Bed Mobility:  1. Supine > Sit with minimum assistance  ? Assistance provided with trunk  2. Sit > Supine with minimum assistance  ? Assistance with margot LE  ? Pt able to use margot UE to pull herself up in bed     Transfers:   · Sit <> Stand Transfer: Contact Guard Assistance x 1 trials from eob with RW AD              Gait:  · Distance: 5 ft R + 5 ft R  · Assistance level: Contact Guard Assistance  · Assistive Device: rolling walker  · Gait Assessment: decreased gait speed     Balance:  1. Dynamic Sitting: GOOD: Maintains balance through MODERATE excursions of active trunk movement   § Sat eob for ~15 minutes - initially requiring Miriam for balance with progression to SBA      7/19/22: Participated w/ OT. dependent and drawsheet pull via Trendenelenburg position- maxA. Grooming SBA        Interval History 7/20/2022:  Patient is seen for follow-up PM&R evaluation and recommendations:  Participated w/ OT yesterday and still lower level. Transfusion completed yesterday. Para pending,     HPI, Past Medical, Family, and Social History remains the same as documented in the initial encounter.    Scheduled Medications:    albumin human 5%  25 g Intravenous BID    cloBAZam  10 mg Oral Daily    enoxaparin  40 mg Subcutaneous Daily    folic acid  1 mg Oral Daily    lactulose  10 g Oral BID    levETIRAcetam  750 mg Oral BID    pantoprazole  40 mg Oral Daily    rifAXImin  550 mg Oral BID    thiamine  100 mg Oral Daily       Diagnostic Results: Labs: Reviewed  ECG: Reviewed  US: Reviewed    PRN Medications: acetaminophen, aluminum & magnesium hydroxide-simethicone, sars-cov-2 (covid-19), dextrose 10%, dextrose 10%, glucagon (human recombinant), glucose, glucose, insulin aspart U-100, melatonin, naloxone, ondansetron, oxyCODONE, prochlorperazine, sodium chloride 0.9%, sodium chloride 0.9%    Review of Systems   Constitutional:  Negative for fatigue.   Respiratory:  Negative for cough and shortness of breath.    Gastrointestinal:  Positive for abdominal distention. Negative for nausea and vomiting.   Musculoskeletal:  Positive for gait problem.   Neurological:  Positive for speech difficulty and weakness.   Objective:     Vital Signs (Most Recent):  Temp: 98 °F (36.7 °C) (07/20/22 0400)  Pulse: 87 (07/20/22 0815)  Resp: 19 (07/20/22 0815)  BP: (!) 113/52 (07/20/22 0815)  SpO2: 97 % (07/20/22 0815)      Vital Signs (24h Range):  Temp:  [83 °F (28.3 °C)-98.1 °F (36.7 °C)] 98 °F (36.7 °C)  Pulse:  [65-87] 87  Resp:  [11-23] 19  SpO2:  [96 %-100 %] 97 %  BP: ()/(42-61) 113/52     Physical Exam  Vitals and nursing note reviewed.   Constitutional:       Appearance: Normal appearance. She is well-developed.   HENT:      Nose: Nose normal.      Mouth/Throat:      Mouth: Mucous membranes are moist.   Eyes:      Pupils: Pupils are equal, round, and reactive to light.   Pulmonary:      Effort: Pulmonary  effort is normal.      Breath sounds: Normal breath sounds.   Abdominal:      General: There is distension.   Musculoskeletal:      Cervical back: Normal range of motion and neck supple.      Comments: Deconditioned and generalized weakness   Skin:     General: Skin is warm and dry.   Neurological:      Mental Status: She is alert.      Motor: Weakness present.      Gait: Gait abnormal.   Psychiatric:         Mood and Affect: Mood normal.       Assessment/Plan:      * Anasarca  -Diuresis  with Lasix. Now on Bumex.  -Monitor I/O.  -On Low Na with fluid restriction of 1500ml.  -Per  note, will FU with hepatologist outpt.   - Diuretics held 2/2 PITO    PITO (acute kidney injury)  - Holding diuretics     Debility  See hospital course for functional status.    Recommendations  -  Encourage mobility, OOB in chair, and early ambulation as appropriate  -  PT/OT evaluate and treat  -  Pain management  -  DVT prophylaxis if appropriate   -  Monitor for and prevent skin breakdown and pressure ulcers  · Early mobility, repositioning/weight shifting every 20-30 minutes when sitting, turn patient every 2 hours, proper mattress/overlay and chair cushioning, pressure relief/heel protector boots    Alcoholic cirrhosis of liver with ascites  - Hepatology recommending large volume para pending with IR     PM&R Recommendation:     At this time, the PM&R team has reviewed this patient's ongoing medical case including inpatient diagnosis, medical history, clinical examination, labs, vitals, current social and functional history.    We will continue to follow for improvement with PT & OT as a rehab candidate.     Norah Christiansen NP  Department of Physical Medicine & Rehab   [facility] ([place]

## 2022-07-21 NOTE — ASSESSMENT & PLAN NOTE
Patient was diagnosed in February. Previous history of chronic alcohol abuse and states she drank about 4-5 glasses of wine per night for over 20 years. Patient is seeing hepatology outpatient. Labs shown in care everywhere.     MELD-Na score: 15 at 7/13/2022  4:56 AM  MELD score: 14 at 7/13/2022  4:56 AM  Calculated from:  Serum Creatinine: 1.2 mg/dL at 7/13/2022  4:56 AM  Serum Sodium: 136 mmol/L at 7/13/2022  4:56 AM  Total Bilirubin: 1.6 mg/dL at 7/13/2022  4:56 AM  INR(ratio): 1.5 at 7/11/2022  3:50 PM  Age: 67 years    - Continue lactulose, thiamine, and folate  - Hepatology consulted; appreciate recommendations - outpatient follow up  -  repeat IR paracentesis completed, 7.5L  - continue to monitor for signs of liver failure  - F/u daily CMPs

## 2022-07-21 NOTE — PLAN OF CARE
Problem: Adult Inpatient Plan of Care  Goal: Plan of Care Review  Outcome: Ongoing, Progressing  Goal: Absence of Hospital-Acquired Illness or Injury  Outcome: Ongoing, Progressing  Goal: Optimal Comfort and Wellbeing  Outcome: Ongoing, Progressing     Problem: Skin Injury Risk Increased  Goal: Skin Health and Integrity  Outcome: Ongoing, Progressing

## 2022-07-21 NOTE — PLAN OF CARE
I have reviewed the chart and discussed Aleyda Fengluis manuelsebas with the resident, including the HPI, overnight events, results, and assessment and plan. Please refer to the resident's progress note dated 7/20/2022 for information regarding the care of the patient.    67 year old female with history of alcoholic cirrhosis complicated by ascites, HLP and HTN admitted for volume overload. IR consulted, patient underwent IR paracentesis 6/28 with 4.2L removed. TTE 4/29 with EF 65% and normal diastolic function noted. Hepatology consulted, appreciate recommendations. Patient started on IV diuretics (Diuril and Lasix). Patient with upper extremity swelling Doppler 6/29 with no DVT noted. Due to PITO held diuretics.          Patient noted to be altered 7/10, transferred to ICU. Code stroke called, imaging not indicative of stroke. EEG concerning of seizure, neurology   consulted and recommended Keppra and Onfi. Dose adjusted for somnolence.         UCx 6/28 with Lactobacillus, however patient without reported symptoms so deferred antibiotic treatment initially. Repeat UA 7/10 showed Enterococcus faecalis, ertapenem started 7/11, transitioned to ampicillin 7/13, completed 7/17.          Patient with anemia 7/18 with no active signs of bleed, likely multifactorial related to underlying chronic diseases. Transfused 1 unit PRBC 7/19.       Hepatology recommending large volume paracentesis, consulted IR and patient underwent paracentesis 7/20 where they removed 7.5L. Albumin given.          HARPREET used for ASL interpretation, all questions answered.      Jessenia Foster M.D.  Department of Hospital Medicine  [facility]

## 2022-07-21 NOTE — PT/OT/SLP PROGRESS
Occupational Therapy      Patient Name:  Aleyda Floyd   MRN:  [id number]    Patient not seen today secondary to pt PELON for CT scan upon OT attempt in PM. Will follow-up as able.    7/21/2022

## 2022-07-21 NOTE — PROGRESS NOTES
[facility] ([place] [id number])  [facility] Hospital Medicine  Progress Note    Patient Name: Aleyda Floyd  MRN: [id number]  Patient Class: IP- Inpatient   Admission Date: 6/27/2022  Length of Stay: 22 days  Attending Physician: Jessenia Foster MD  Primary Care Provider: Elvie Fernandes MD        Subjective:     Principal Problem:Anasarca        HPI:  This is a 67 year old deaf lady with alcoholic cirrhosis, and hypertension who presented with generalized swelling.  used to obtain history. Patient states that she had the swelling since her diagnosis of cirrhosis in February. Patient states that 2 days ago, she was able to ambulate but with some difficulty due to the swelling but now  worsening to the point where she is unable to ambulate by herself. Of note, patient had a recent EGD performed earlier last week where they had issues with her IV resulting is significant bruising. Patient also notes that whenever she scratches her skin with her nails, she notices clear fluid coming from the cuts. Patient denies fever, chills, cough, hemoptysis, nausea, vomiting, abdominal pain, yellowing of skin, constipation, dysuria, hematuria, and black/ bloody stools. Patient has baseline loose stools from her lactulose.  She reports compliance with all medications including her lactulose and Lasix. Patient and family declined any confusion or altered mental status. She denies any recent alcohol use with last use in February when she was diagnosed with alcoholic cirrhosis. Patient states that she used to weigh around 170s which she believes is her dry weight and is currently in the 200s.     Upon chart review, patient had an echo at an OSH on 2/25/22 which showed EF 60-65% and some diastolic dysfunction.    In the ED, patient was found to have Hgb of 8.5 and platelets of 113 around baseline. Patient had a UA which showed 1+ leuks and many bacteria. Patient did not complain of dysuria.       Overview/Hospital  Course:  Admitted for decompensated cirrhosis and anasarca. Began diuresing with IV Lasix and spironolactone. Paracentesis of -4.2L; ruled out SBP. Pt stabilized for few days and ready to be dc'ed when she had a change in mental status. Found pt unresponsive with fixed gaze and no pupil response. Code stroke called, CT neg for stroke. Eeg showed seizure-like activity. Pt transitioned to MICU for higher level of care. On step down, had a UTI and PITO, both of which were treated. She received a repeat paracentesis with 7.5L removed. Her creatinine started improve with administration of albumin and holding diuretics.         Interval History: Underwent paracentesis today, 7.5L removed. No acute events, patient is afebrile.       Review of Systems   Constitutional:  Positive for fatigue. Negative for chills and fever.   HENT:  Negative for congestion, ear pain and sinus pain.    Eyes:  Negative for pain.   Respiratory:  Negative for cough, chest tightness and shortness of breath.    Gastrointestinal:  Positive for abdominal distention. Negative for nausea and vomiting.   Genitourinary:  Negative for flank pain.   Musculoskeletal:  Negative for back pain.   Skin:  Negative for rash.   Neurological:  Negative for dizziness.   Objective:     Vital Signs (Most Recent):  Temp: 98.2 °F (36.8 °C) (07/20/22 1630)  Pulse: 66 (07/20/22 1630)  Resp: 14 (07/20/22 1630)  BP: (!) 85/45 (07/20/22 1630)  SpO2: 95 % (07/20/22 1630)   Vital Signs (24h Range):  Temp:  [98 °F (36.7 °C)-98.2 °F (36.8 °C)] 98.2 °F (36.8 °C)  Pulse:  [66-87] 66  Resp:  [11-19] 14  SpO2:  [95 %-100 %] 95 %  BP: ()/(45-56) 85/45     Weight: 90.8 kg (200 lb 2.8 oz)  Body mass index is 39.09 kg/m².    Intake/Output Summary (Last 24 hours) at 7/20/2022 2012  Last data filed at 7/20/2022 1429  Gross per 24 hour   Intake --   Output 7500 ml   Net -7500 ml      Physical Exam  Vitals and nursing note reviewed.   Constitutional:       Appearance: Normal  appearance. She is well-developed.   HENT:      Nose: Nose normal.      Mouth/Throat:      Mouth: Mucous membranes are moist.   Eyes:      Pupils: Pupils are equal, round, and reactive to light.   Pulmonary:      Effort: Pulmonary effort is normal.      Breath sounds: Normal breath sounds.   Abdominal:      General: There is distension.   Musculoskeletal:      Cervical back: Normal range of motion and neck supple.      Comments: Deconditioned and generalized weakness   Skin:     General: Skin is warm and dry.   Neurological:      Mental Status: She is alert.      Motor: Weakness present.      Gait: Gait abnormal.   Psychiatric:         Mood and Affect: Mood normal.       Significant Labs: All pertinent labs within the past 24 hours have been reviewed.  Recent Lab Results  (Last 5 results in the past 24 hours)        07/20/22  1622   07/20/22  1201   07/20/22  0814   07/20/22  0334   07/19/22  2230        Albumin       2.2         Alkaline Phosphatase       75         ALT       26         Anion Gap       7         Aniso       Slight         AST       42         Baso #       0.06         Basophilic Stippling       Occasional         Basophil %       0.8         BILIRUBIN TOTAL       2.3  Comment: For infants and newborns, interpretation of results should be based  on gestational age, weight and in agreement with clinical  observations.    Premature Infant recommended reference ranges:  Up to 24 hours.............<8.0 mg/dL  Up to 48 hours............<12.0 mg/dL  3-5 days..................<15.0 mg/dL  6-29 days.................<15.0 mg/dL           BUN       31         Calcium       8.1         Chloride       98         CO2       23         Creatinine       1.4         Differential Method       Automated         eGFR if        44.9         eGFR if non        38.9  Comment: Calculation used to obtain the estimated glomerular filtration  rate (eGFR) is the CKD-EPI equation.            Eos  #       0.4         Eosinophil %       5.2         Glucose       96         Gran # (ANC)       4.0         Gran %       55.4         Hematocrit       23.3         Hemoglobin       7.8         Hypo       Occasional         Immature Grans (Abs)       0.05  Comment: Mild elevation in immature granulocytes is non specific and   can be seen in a variety of conditions including stress response,   acute inflammation, trauma and pregnancy. Correlation with other   laboratory and clinical findings is essential.           Immature Granulocytes       0.7         Lymph #       1.7         Lymph %       24.3         Magnesium       2.2         MCH       30.5         MCHC       33.5         MCV       91         Mono #       1.0         Mono %       13.6         MPV       9.7         nRBC       0         Ovalocytes       Occasional         Phosphorus       3.9         Platelets       82         POCT Glucose 120   110   87     116       Poikilocytosis       Slight         Poly       Occasional         Potassium       4.5         PROTEIN TOTAL       6.1         RBC       2.56         RDW       17.5         Sodium       128         WBC       7.15                                Significant Imaging: I have reviewed all pertinent imaging results/findings within the past 24 hours.      Assessment/Plan:      * Anasarca  67 yoF with alcoholic liver cirrhosis and hypertension admitted for generalized anasarca. Patient's albumin on admission was 1.9. Likely edema related to poor intravascular oncotic pressure 2/2 alcoholic cirrhosis. Patient adherent to home medication. Given history and exam, patient would benefit from diuresis. Dry weight reported as 170s. Patient is currently 200 lbs 6.4oz (down from 212 lbs).     - Strict I/Os  - Daily weights  - Low sodium diet with 1500 ml fluid restriction  - Plan for f/u with her hepatologist on d/c  - Pt 100% on 2 L NC; wean off supp O2 completely and satting well  - Diuretic regimen held d/t likely  pre-renal PITO, will restart diuretic regimen when tolerated, see PITO A/P  - IR paracentesis today yielding 7.5L fluid  - Started on 25% bottle of 25g Albumin     Gram-positive cocci in clusters  Noted on 1 set of aer/anae cultures from 7/10/22. 2nd set so far negative. No clear sign of infection. Could likely be contaminant.  Uctx also from enterococcus species  - s/p Ampicillin completed 07/17          UTI (urinary tract infection)  Positive UA from new kim placed 7/10/22. Hx of ESBL E coli in Feb 2022 at OSH  Uctx growing enterococcus  - S/P ampicillin course 07/17        Acute encephalopathy  -  Continue lactulose 10g bid  - cholestyramine dc'd and metamucil dc'd   -  dc'd PEG 17g packet PRN  - plan to pull rectal tube today  - plan to remove kim catheter, still in place  - titrate bowel regimen to about 4 BMs per day      Hypomagnesemia  Monitor daily and replete prn      PITO (acute kidney injury)  Presenting with PITO with new sCr 1.6 (baseline sCr 0.9-1.1).   DDx: Decreased PO intake likely resulting in pre-renal etiology    - Holding diuresis (Lasix 40 BID, Aldactone 100 QD)  - Gentle trial of IVF; encourage PO intake  - Trend sCr  - Strict I&Os  - Avoid nephrotoxic agents  - Renally adjust medications      Debility  - Pt non-mobile for some time in hospital, so PT/OT order placed  - Pt advised to move around in bed but to only get up with help  - PT /OT recs SNF  - Plan to remove rectal tube today      Thrombocytopenia  Chronic, patient at baseline    Anemia, chronic disease  Patient with Hgb of 8.5 on admit. Upon review of care everywhere, patient had Hgb of 8.4 in all of 2022. Likely due to chronic disease and stable.    - Hgb downtrending without overt signs of bleeding or other blood per rectum / GI /  tract; likely etiology considering continued chronic disease  - Ordered 1 U pRBC  - Received written consent from patient and  prior to transfusion   - Trend CBC; transfuse Hgb < 7    Alcoholic  cirrhosis of liver with ascites  Patient was diagnosed in February. Previous history of chronic alcohol abuse and states she drank about 4-5 glasses of wine per night for over 20 years. Patient is seeing hepatology outpatient. Labs shown in care everywhere.     MELD-Na score: 15 at 7/13/2022  4:56 AM  MELD score: 14 at 7/13/2022  4:56 AM  Calculated from:  Serum Creatinine: 1.2 mg/dL at 7/13/2022  4:56 AM  Serum Sodium: 136 mmol/L at 7/13/2022  4:56 AM  Total Bilirubin: 1.6 mg/dL at 7/13/2022  4:56 AM  INR(ratio): 1.5 at 7/11/2022  3:50 PM  Age: 67 years    - Continue lactulose, thiamine, and folate  - Hepatology consulted; appreciate recommendations - outpatient follow up  -  repeat IR paracentesis completed, 7.5L  - continue to monitor for signs of liver failure  - F/u daily CMPs    Primary hypertension  Patient was discontinued from losartan per hepatologist.   - continue to monitor and can add when clinically indicated  - pt removed from tele 7/16    Deaf- written communication  - ASL  used for interaction as well as written communication      VTE Risk Mitigation (From admission, onward)         Ordered     enoxaparin injection 40 mg  Daily         07/13/22 1005     IP VTE HIGH RISK PATIENT  Once         06/28/22 0419     Place sequential compression device  Until discontinued         06/28/22 0419     Place sequential compression device  Until discontinued         06/28/22 0415                Discharge Planning   REMA: 7/21/2022     Code Status: Full Code   Is the patient medically ready for discharge?: No    Reason for patient still in hospital (select all that apply): Treatment  Discharge Plan A: Skilled Nursing Facility   Discharge Delays: None known at this time              Lele Posadas MD  Department of Hospital Medicine   [facility] - Intensive Care ([facility]

## 2022-07-21 NOTE — NURSING
Patient A/Ox4. Hearing impaired-- stratus tablet used to communicate. VSS. Afebrile. Denies pain. 1 BM during shift, AUOP. Spouse updated at bedside. Will hand off to on coming RN.

## 2022-07-21 NOTE — SUBJECTIVE & OBJECTIVE
Interval History: Underwent paracentesis today, 7.5L removed. No acute events, patient is afebrile.       Review of Systems   Constitutional:  Positive for fatigue. Negative for chills and fever.   HENT:  Negative for congestion, ear pain and sinus pain.    Eyes:  Negative for pain.   Respiratory:  Negative for cough, chest tightness and shortness of breath.    Gastrointestinal:  Positive for abdominal distention. Negative for nausea and vomiting.   Genitourinary:  Negative for flank pain.   Musculoskeletal:  Negative for back pain.   Skin:  Negative for rash.   Neurological:  Negative for dizziness.   Objective:     Vital Signs (Most Recent):  Temp: 98.2 °F (36.8 °C) (07/20/22 1630)  Pulse: 66 (07/20/22 1630)  Resp: 14 (07/20/22 1630)  BP: (!) 85/45 (07/20/22 1630)  SpO2: 95 % (07/20/22 1630)   Vital Signs (24h Range):  Temp:  [98 °F (36.7 °C)-98.2 °F (36.8 °C)] 98.2 °F (36.8 °C)  Pulse:  [66-87] 66  Resp:  [11-19] 14  SpO2:  [95 %-100 %] 95 %  BP: ()/(45-56) 85/45     Weight: 90.8 kg (200 lb 2.8 oz)  Body mass index is 39.09 kg/m².    Intake/Output Summary (Last 24 hours) at 7/20/2022 2012  Last data filed at 7/20/2022 1429  Gross per 24 hour   Intake --   Output 7500 ml   Net -7500 ml      Physical Exam  Vitals and nursing note reviewed.   Constitutional:       Appearance: Normal appearance. She is well-developed.   HENT:      Nose: Nose normal.      Mouth/Throat:      Mouth: Mucous membranes are moist.   Eyes:      Pupils: Pupils are equal, round, and reactive to light.   Pulmonary:      Effort: Pulmonary effort is normal.      Breath sounds: Normal breath sounds.   Abdominal:      General: There is distension.   Musculoskeletal:      Cervical back: Normal range of motion and neck supple.      Comments: Deconditioned and generalized weakness   Skin:     General: Skin is warm and dry.   Neurological:      Mental Status: She is alert.      Motor: Weakness present.      Gait: Gait abnormal.   Psychiatric:          Mood and Affect: Mood normal.       Significant Labs: All pertinent labs within the past 24 hours have been reviewed.  Recent Lab Results  (Last 5 results in the past 24 hours)        07/20/22  1622   07/20/22  1201   07/20/22  0814   07/20/22  0334   07/19/22  2230        Albumin       2.2         Alkaline Phosphatase       75         ALT       26         Anion Gap       7         Aniso       Slight         AST       42         Baso #       0.06         Basophilic Stippling       Occasional         Basophil %       0.8         BILIRUBIN TOTAL       2.3  Comment: For infants and newborns, interpretation of results should be based  on gestational age, weight and in agreement with clinical  observations.    Premature Infant recommended reference ranges:  Up to 24 hours.............<8.0 mg/dL  Up to 48 hours............<12.0 mg/dL  3-5 days..................<15.0 mg/dL  6-29 days.................<15.0 mg/dL           BUN       31         Calcium       8.1         Chloride       98         CO2       23         Creatinine       1.4         Differential Method       Automated         eGFR if        44.9         eGFR if non        38.9  Comment: Calculation used to obtain the estimated glomerular filtration  rate (eGFR) is the CKD-EPI equation.            Eos #       0.4         Eosinophil %       5.2         Glucose       96         Gran # (ANC)       4.0         Gran %       55.4         Hematocrit       23.3         Hemoglobin       7.8         Hypo       Occasional         Immature Grans (Abs)       0.05  Comment: Mild elevation in immature granulocytes is non specific and   can be seen in a variety of conditions including stress response,   acute inflammation, trauma and pregnancy. Correlation with other   laboratory and clinical findings is essential.           Immature Granulocytes       0.7         Lymph #       1.7         Lymph %       24.3         Magnesium       2.2          MCH       30.5         MCHC       33.5         MCV       91         Mono #       1.0         Mono %       13.6         MPV       9.7         nRBC       0         Ovalocytes       Occasional         Phosphorus       3.9         Platelets       82         POCT Glucose 120   110   87     116       Poikilocytosis       Slight         Poly       Occasional         Potassium       4.5         PROTEIN TOTAL       6.1         RBC       2.56         RDW       17.5         Sodium       128         WBC       7.15                                Significant Imaging: I have reviewed all pertinent imaging results/findings within the past 24 hours.

## 2022-07-21 NOTE — PROGRESS NOTES
Hepatology Progress Note    Aleyda Floyd is a 67 y.o. female admitted to hospital 6/27/2022 (Hospital Day: 25) due to Anasarca.     Interval History  - Cr stable despite paracentesis yesterday  - Had some abd pain this morning but improved as of this afternoon  -  Having some leaking at paracentesis site      Objective  Temp:  [97.4 °F (36.3 °C)] 97.4 °F (36.3 °C) (07/21 0431)  Pulse:  [66-68] 68 (07/21 0837)  BP: ()/(47-55) 96/47 (07/21 0837)  Resp:  [15-18] 15 (07/21 0837)  SpO2:  [94 %-100 %] 100 % (07/21 0837)      Gen: NAD, lying comfortably  HENT: NCAT, oropharynx clear  Eyes: anicteric sclerae, EOMI grossly  Neck: supple, no visible masses/goiter  Cardiac: 2+ distal pulses  Lungs:unlabored breathing, symmetrical chest expansion  Abd: soft, NT/ND, normoactive BS, no rebound  Ext: 2+ LE edema, warm, well perfused  Skin: skin intact on exposed body parts, no visible rashes, lesions  Neuro: A&Ox4, neuro exam grossly intact, moves all extremities  Psych: appropriate mood, affect         Laboratory      Lab Results   Component Value Date    WBC 6.75 07/21/2022    HGB 7.5 (L) 07/21/2022    HCT 23.1 (L) 07/21/2022    MCV 95 07/21/2022    PLT 67 (L) 07/21/2022       Lab Results   Component Value Date     (L) 07/21/2022    K 4.8 07/21/2022     07/21/2022    CO2 22 (L) 07/21/2022    BUN 29 (H) 07/21/2022    CREATININE 1.4 07/21/2022    CALCIUM 7.7 (L) 07/21/2022       Lab Results   Component Value Date    ALBUMIN 2.5 (L) 07/21/2022    ALT 17 07/21/2022    AST 32 07/21/2022    ALKPHOS 62 07/21/2022    BILITOT 1.3 (H) 07/21/2022       Lab Results   Component Value Date    INR 2.1 (H) 07/21/2022    INR 1.5 (H) 07/11/2022    INR 1.7 (H) 06/28/2022       MELD-Na score: 25 at 7/21/2022  5:36 AM  MELD score: 19 at 7/21/2022  5:36 AM  Calculated from:  Serum Creatinine: 1.4 mg/dL at 7/21/2022  5:36 AM  Serum Sodium: 129 mmol/L at 7/21/2022  5:36 AM  Total Bilirubin: 1.3 mg/dL at 7/21/2022  5:36  AM  INR(ratio): 2.1 at 7/21/2022 12:12 AM  Age: 67 years      Micro and imaging reviewed.    Assessment    #EtOH cirrhosis, decompensated  #ascitis, secondary to above  - MELD: 18>20>15  - Patient has been diuresed significantly since admission.   - Na: 137, Cr: 1.3, T. Bilirubin: 1.9, INR: 1.5  - patient will be evaluated for liver tx as outpatient    #hematochezia, resolved  #blood in NG tube,single episode  - Hb 6.7, slowly downtrending, no overt bleeding reported  - transfusion for Hgb <7    #hyperechoic lesion in liver  - US doppler liver (5/5/22): Hypoechoic lesion in the right hepatic lobe measuring 1.4 x 1.3 x 1.0 cm.  - CT abdomen w/wout contrast (5/10/22): Focal lesion right hepatic lobe seen on ultrasound not seen best advantage on CT exam.    -MRI liver exam with contrast suggested as outpatient    #AMS, resolving  #seizure disorder  - likely 2/2 toxic metabolic encephalopathy.   - CT head & CTA head & neck were negative.   - EEG with generalized periodic discharges and triphasic morphology concering for status epilepticus    #generalized deconditioning  - Patient too frail currently. Must demonstrate mobility and functional capacity before being evlauated for liver tx.   - Still full support per PT but progressing slowly      Plan  - Would recommend obtaining MRI of the abdomen w/wout contrast to evaluate liver lesion. Can be done as outpatient.   - Patient will eventually require outpatient liver tx eval. Outpatient hepatology and addiction psych evaluation.  - Continue IV PPI. Continue Lactulose TID and Rifaximin BID to maintain 2-3 soft bowel movements daily  - Would restart diuresis with lasix 40 mg IV x1 and spironolactone 100 mg daily   - Agree with some form of rehab placement if indicated to improve functional status  - No need for fluid restriction at this time given mild hyponatremia  - Low sodium diet  - Please obtain daily CBC, BMP, LFT, INR      Thank you for involving us in the care of  Aleyda Floyd. Please call with any additional concerns or questions.    Pietro Bojorquez MD, PGY-IV  Gastroenterology Fellow  [facility]

## 2022-07-21 NOTE — ASSESSMENT & PLAN NOTE
67 yoF with alcoholic liver cirrhosis and hypertension admitted for generalized anasarca. Patient's albumin on admission was 1.9. Likely edema related to poor intravascular oncotic pressure 2/2 alcoholic cirrhosis. Patient adherent to home medication. Given history and exam, patient would benefit from diuresis. Dry weight reported as 170s. Patient is currently 200 lbs 6.4oz (down from 212 lbs).     - Strict I/Os  - Daily weights  - Low sodium diet with 1500 ml fluid restriction  - Plan for f/u with her hepatologist on d/c  - Pt 100% on 2 L NC; wean off supp O2 completely and satting well  - Diuretic regimen held d/t likely pre-renal PITO, will restart diuretic regimen when tolerated, see PITO A/P  - IR paracentesis today yielding 7.5L fluid  - Started on 25% bottle of 25g Albumin

## 2022-07-21 NOTE — PT/OT/SLP PROGRESS
Physical Therapy Treatment    Patient Name:  Aleyda Floyd   MRN:  [id number]    Recommendations:     Discharge Recommendations:  nursing facility, skilled   Discharge Equipment Recommendations:  (will determine DME needs closer to discharge)   Barriers to discharge: Decreased caregiver support    Assessment:     Aleyda Floyd is a 67 y.o. female admitted with a medical diagnosis of Anasarca.  She presents with the following impairments/functional limitations:  weakness, impaired endurance, impaired balance, pain, decreased safety awareness, decreased coordination .  Pt was agreeable and tolerated therapy session. Pt was limited d/t increased fatigued and multiple tests today. Pt agreeable to sitting EOB and supine therex. Pt demonstrated better bed mobility today requiring slightly less assistance. Pt is progressing well and continues to benefit from therapy to improve functional endurance, strength, and mobility.     Rehab Prognosis: Good; patient would benefit from acute skilled PT services to address these deficits and reach maximum level of function.    Recent Surgery: * No surgery found *      Plan:     During this hospitalization, patient to be seen 3 x/week to address the identified rehab impairments via gait training, therapeutic activities, therapeutic exercises, neuromuscular re-education and progress toward the following goals:    · Plan of Care Expires:  08/12/22    Subjective     Chief Complaint: full from lunch and fatigued  Patient/Family Comments/goals: pt reports just finishing eating lunch. Rn reports multiple test completed today.  Pain/Comfort:  · Pain Rating 1: 2/10  · Location - Side 1: Left  · Location - Orientation 1: generalized  · Location 1:  (incision site)  · Pain Addressed 1: Reposition, Distraction  · Pain Rating Post-Intervention 1: 2/10      Objective:     Communicated with Rn prior to session.  Patient found HOB elevated with telemetry, pulse ox (continuous), PureWick upon  PT entry to room.    via Mariola: Michelle #[id number]    General Precautions: Standard, hearing impaired, fall   Orthopedic Precautions:N/A   Braces: N/A  Respiratory Status: Room air     Functional Mobility:  · Bed Mobility:     · Scooting to HOB: maximal assistance and of 2 persons  · Supine to Sit: moderate assistance with HOB elevated   · Increase time to complete  · Tactile cues for sequencing   · Sit to Supine: moderate assistance and of 2 persons  · Assistance with trunk and BLE management   · Transfers:  Sit to Stand:  pt refused, too fatigued today  Balance:   · Static/Dynamic sitting EOB balance: good, Jocelynn-CGA   · X 10 minutes    AM-PAC 6 CLICK MOBILITY  Turning over in bed (including adjusting bedclothes, sheets and blankets)?: 3  Sitting down on and standing up from a chair with arms (e.g., wheelchair, bedside commode, etc.): 1  Moving from lying on back to sitting on the side of the bed?: 2  Moving to and from a bed to a chair (including a wheelchair)?: 1  Need to walk in hospital room?: 1  Climbing 3-5 steps with a railing?: 1  Basic Mobility Total Score: 9     Therapeutic Activities and Exercises:   Supine BLE therex x10 reps: ankle pumps, heel slides, glute set, and hip abd/add  o Educated to perform therex throughout the day   Seated BLE therex x10 reps: LAQ, marching  o Multiple rest breaks  Patient educated on role of therapy, goals of session, and benefits of out of bed mobility.   Instructed on use of call button and importance of calling nursing staff for assistance with mobility   Questions/concerns addressed within PTA scope of practice  Pt verbalized understanding.  Whiteboard Update    Patient left HOB elevated with all lines intact, call button in reach and spouse present..    GOALS:   Multidisciplinary Problems     Physical Therapy Goals        Problem: Physical Therapy    Goal Priority Disciplines Outcome Goal Variances Interventions   Physical Therapy Goal     PT, PT/OT  Ongoing, Progressing     Description: Goals to be met by: 22    Patient will increase functional independence with mobility by performin. Supine to sit with minimal Assistance - not met  2. Sit to stand transfer with minimal assistance- not met  3. Bed to chair transfer with minimal assistance using LRAD - not met  4. Gait  x 30 feet with minimal  Assistance using LRAD - not met  5. Ascend/Descend 6 inch curb step with Contact Guard Assistance using LRAD - not met  6. Lower extremity exercise program x30 reps per handout, with independence - not met                     Time Tracking:     PT Received On: 22  PT Start Time: 1446     PT Stop Time: 1516  PT Total Time (min): 30 min     Billable Minutes: Therapeutic Activity 12 and Therapeutic Exercise 18    Treatment Type: Treatment  PT/PTA: PTA     PTA Visit Number: 2     2022

## 2022-07-21 NOTE — PLAN OF CARE
07/21/22 1049   Post-Acute Status   Post-Acute Authorization Placement     JAMIE has sent a referral to [facility] for SNF placement. JAMIE will continue to follow up.      Anjana Guzmán LMSW  [facility]   [id number]

## 2022-07-21 NOTE — PLAN OF CARE
JAMIE has contacted [facility] unable to reach Jennifer in admissions and will continue to follow up.        Anjana Guzmán LMSW  [facility]   [id number]

## 2022-07-21 NOTE — PROGRESS NOTES
PM&R consult follow up.  Please see original consult for detailed note.      PM&R Recommendation:     At this time, the PM&R team has reviewed this patient's ongoing medical case including inpatient diagnosis, medical history, clinical examination, labs, vitals, current social and functional history to provide the post-acute recommendation as follows:     RECOMMENDATIONS: skilled nursing facility due to patient poor tolerance for consistent therapy/poor potential for improvement with rehabilitation. Discussed with. Pt is in agreement.       We will sign off.     URBANO Cuevas, [facility]-BC  Physical Medicine & Rehabilitation   07/21/2022

## 2022-07-21 NOTE — ASSESSMENT & PLAN NOTE
- Pt non-mobile for some time in hospital, so PT/OT order placed  - Pt advised to move around in bed but to only get up with help  - PT /OT recs SNF  - Plan to remove rectal tube today

## 2022-07-21 NOTE — ASSESSMENT & PLAN NOTE
-  Continue lactulose 10g bid  - cholestyramine dc'd and metamucil dc'd   -  dc'd PEG 17g packet PRN  - plan to pull rectal tube today  - plan to remove kim catheter, still in place  - titrate bowel regimen to about 4 BMs per day

## 2022-07-22 NOTE — ASSESSMENT & PLAN NOTE
Did trend down to 67 today, PT 20.7, INR 2.1, ptt 82.2  Patient underwent paracentesis and there was large volume leaking from site, now controlled with dermabond  - continue to monitor platelet count tomorrow and for continuous bleeding

## 2022-07-22 NOTE — SUBJECTIVE & OBJECTIVE
Past Medical History:   Diagnosis Date    Hypercholesterolemia     Hypertension        Past Surgical History:   Procedure Laterality Date    DILATION AND CURETTAGE OF UTERUS      ESOPHAGOGASTRODUODENOSCOPY N/A 2022    Procedure: EGD (ESOPHAGOGASTRODUODENOSCOPY);  Surgeon: Sean Perry MD;  Location: [address];  Service: Endoscopy;  Laterality: N/A;  cirrhosis, variceal screening  labs prior  -request sent   fully vaccinated, instructions emailed to [email or web address]       Review of patient's allergies indicates:  No Known Allergies  Family History       Problem Relation (Age of Onset)    Breast cancer Maternal Grandmother          Tobacco Use    Smoking status: Former Smoker     Packs/day: 1.00     Years: 15.00     Pack years: 15.00     Quit date: 1998     Years since quittin.6    Smokeless tobacco: Never Used   Substance and Sexual Activity    Alcohol use: Not Currently    Drug use: Not Currently    Sexual activity: Not on file     Review of Systems   Constitutional:  Negative for appetite change, chills and fever.   HENT:  Positive for rhinorrhea. Negative for sore throat and trouble swallowing.    Eyes:  Negative for pain and visual disturbance.   Respiratory:  Negative for cough, chest tightness and shortness of breath.    Cardiovascular:  Negative for chest pain and palpitations.   Gastrointestinal:  Positive for abdominal distention. Negative for abdominal pain, constipation, diarrhea, nausea and vomiting.   Genitourinary:  Negative for difficulty urinating, dysuria and hematuria.   Musculoskeletal:  Negative for joint swelling and myalgias.   Skin:  Negative for color change and pallor.   Neurological:  Negative for dizziness, light-headedness and headaches.   Psychiatric/Behavioral:  Negative for agitation and confusion.    Objective:     Vital Signs (Most Recent):  Temp: 98 °F (36.7 °C) (22 1022)  Pulse: 69 (22 2330)  Resp: 18  (07/21/22 1950)  BP: (!) 107/55 (07/21/22 2330)  SpO2: 99 % (07/21/22 2330)   Vital Signs (24h Range):  Temp:  [97.6 °F (36.4 °C)-98 °F (36.7 °C)] 98 °F (36.7 °C)  Pulse:  [62-70] 69  Resp:  [16-18] 18  SpO2:  [95 %-100 %] 99 %  BP: ()/(49-59) 107/55     Weight: 83.4 kg (183 lb 12.8 oz) (07/22/22 0400)  Body mass index is 35.9 kg/m².      Intake/Output Summary (Last 24 hours) at 7/22/2022 1032  Last data filed at 7/22/2022 0600  Gross per 24 hour   Intake --   Output 1600 ml   Net -1600 ml       Physical Exam  Constitutional:       General: She is not in acute distress.     Appearance: Normal appearance. She is not ill-appearing.   HENT:      Head: Normocephalic and atraumatic.      Nose: Rhinorrhea present. No congestion.      Mouth/Throat:      Mouth: Mucous membranes are moist.   Eyes:      General:         Right eye: No discharge.         Left eye: No discharge.      Extraocular Movements: Extraocular movements intact.   Cardiovascular:      Rate and Rhythm: Normal rate and regular rhythm.      Pulses: Normal pulses.      Heart sounds: Murmur (systolic flow) heard.     No gallop.   Pulmonary:      Effort: Pulmonary effort is normal. No respiratory distress.      Breath sounds: Normal breath sounds.   Abdominal:      General: There is distension.      Tenderness: There is no abdominal tenderness. There is no guarding or rebound.   Musculoskeletal:      Cervical back: Normal range of motion.      Right lower leg: Edema (3+) present.      Left lower leg: Edema (3+) present.   Skin:     Coloration: Skin is not jaundiced or pale.      Findings: Bruising (bilateral arms) present.   Neurological:      General: No focal deficit present.      Mental Status: She is alert and oriented to person, place, and time.      Cranial Nerves: No cranial nerve deficit.   Psychiatric:         Mood and Affect: Mood normal.         Behavior: Behavior normal.       Significant Labs:  All pertinent lab results from the last 24 hours  have been reviewed.  Recent Lab Results         07/22/22  1022   07/22/22  0635        Albumin   2.6       Alkaline Phosphatase   60       ALT   15       Anion Gap   5       Aniso   Slight       AST   32       Baso #   0.02       Basophil %   0.4       BILIRUBIN TOTAL   1.2  Comment: For infants and newborns, interpretation of results should be based  on gestational age, weight and in agreement with clinical  observations.    Premature Infant recommended reference ranges:  Up to 24 hours.............<8.0 mg/dL  Up to 48 hours............<12.0 mg/dL  3-5 days..................<15.0 mg/dL  6-29 days.................<15.0 mg/dL         BUN   27       Calcium   7.8       Chloride   99       CO2   26       Creatinine   1.2       Differential Method   Automated       eGFR if    54.0       eGFR if non    46.9  Comment: Calculation used to obtain the estimated glomerular filtration  rate (eGFR) is the CKD-EPI equation.          Eos #   0.2       Eosinophil %   3.3       Glucose   109       Gran # (ANC)   2.6       Gran %   53.9       Group & Rh A POS         Hematocrit   19.1  Comment: HCT critical result(s) called and verbal readback obtained from   Jake Zhang RN  by JC8 07/22/2022 08:37         Hemoglobin   6.3       Hypo   Occasional       Immature Grans (Abs)   0.02  Comment: Mild elevation in immature granulocytes is non specific and   can be seen in a variety of conditions including stress response,   acute inflammation, trauma and pregnancy. Correlation with other   laboratory and clinical findings is essential.         Immature Granulocytes   0.4       INDIRECT GERMAIN NEG         INR 1.9  Comment: Coumadin Therapy:  2.0 - 3.0 for INR for all indicators except mechanical heart valves  and antiphospholipid syndromes which should use 2.5 - 3.5.           Lymph #   1.4       Lymph %   28.6       Magnesium   2.2       MCH   30.7       MCHC   33.0       MCV   93       Mono #   0.7        Mono %   13.4       MPV   10.1       nRBC   0       Ovalocytes   Occasional       Phosphorus   3.1       Platelets   64       Poikilocytosis   Slight       Poly   Occasional       Potassium   4.6       PROTEIN TOTAL   4.6       Protime 18.8         RBC   2.05       RDW   17.2       Sodium   130       Spherocytes   Occasional       WBC   4.86               Significant Imaging:  CT: I have reviewed all results within the past 24 hours and my personal findings are:  hyper dense segment within the stomach, potential ingestion or bleed.

## 2022-07-22 NOTE — PROGRESS NOTES
[facility] - Intensive Care ([place] [address])  [facility] Medicine  Progress Note    Patient Name: Aleyda Floyd  MRN: [id number]  Patient Class: IP- Inpatient   Admission Date: 6/27/2022  Length of Stay: 23 days  Attending Physician: Tena Brizuela MD  Primary Care Provider: Elvie Fernandes MD        Subjective:     Principal Problem:Anasarca        HPI:  This is a 67 year old deaf lady with alcoholic cirrhosis, and hypertension who presented with generalized swelling.  used to obtain history. Patient states that she had the swelling since her diagnosis of cirrhosis in February. Patient states that 2 days ago, she was able to ambulate but with some difficulty due to the swelling but now  worsening to the point where she is unable to ambulate by herself. Of note, patient had a recent EGD performed earlier last week where they had issues with her IV resulting is significant bruising. Patient also notes that whenever she scratches her skin with her nails, she notices clear fluid coming from the cuts. Patient denies fever, chills, cough, hemoptysis, nausea, vomiting, abdominal pain, yellowing of skin, constipation, dysuria, hematuria, and black/ bloody stools. Patient has baseline loose stools from her lactulose.  She reports compliance with all medications including her lactulose and Lasix. Patient and family declined any confusion or altered mental status. She denies any recent alcohol use with last use in February when she was diagnosed with alcoholic cirrhosis. Patient states that she used to weigh around 170s which she believes is her dry weight and is currently in the 200s.     Upon chart review, patient had an echo at an OSH on 2/25/22 which showed EF 60-65% and some diastolic dysfunction.    In the ED, patient was found to have Hgb of 8.5 and platelets of 113 around baseline. Patient had a UA which showed 1+ leuks and many bacteria. Patient did not complain of dysuria.       Overview/Hospital  Course:  Admitted for decompensated cirrhosis and anasarca. Began diuresing with IV Lasix and spironolactone. Paracentesis of -4.2L; ruled out SBP. Pt stabilized for few days and ready to be dc'ed when she had a change in mental status. Found pt unresponsive with fixed gaze and no pupil response. Code stroke called, CT neg for stroke. Eeg showed seizure-like activity. Pt transitioned to MICU for higher level of care. On step down, had a UTI and PITO, both of which were treated. She received a repeat paracentesis with 7.5L removed. Her creatinine started improve with administration of albumin and holding diuretics.         Interval History: Patient had IR paracentesis yesterday yielding 7.5L. There was leakage from the site. Dermabond was applied. Patient is deaf and we communicated via ASL interpretor.     Review of Systems   Constitutional:  Positive for fatigue. Negative for chills and fever.   HENT:  Negative for congestion, ear pain, rhinorrhea, sinus pain and trouble swallowing.    Eyes:  Negative for pain and discharge.   Respiratory:  Negative for cough, chest tightness and shortness of breath.    Cardiovascular:  Negative for chest pain and leg swelling.   Gastrointestinal:  Positive for abdominal distention. Negative for abdominal pain, nausea and vomiting.   Genitourinary:  Negative for dysuria, flank pain and hematuria.   Musculoskeletal:  Negative for arthralgias, back pain and myalgias.   Skin:  Negative for rash.   Neurological:  Negative for dizziness, tremors and headaches.   Psychiatric/Behavioral:  Negative for agitation and confusion.    Objective:     Vital Signs (Most Recent):  Temp: 97.4 °F (36.3 °C) (07/21/22 0431)  Pulse: 62 (07/21/22 1705)  Resp: 16 (07/21/22 1705)  BP: (!) 109/49 (07/21/22 1705)  SpO2: 100 % (07/21/22 1705)   Vital Signs (24h Range):  Temp:  [97.4 °F (36.3 °C)] 97.4 °F (36.3 °C)  Pulse:  [62-70] 62  Resp:  [15-18] 16  SpO2:  [94 %-100 %] 100 %  BP: ()/(47-55) 109/49      Weight: 90.8 kg (200 lb 2.8 oz)  Body mass index is 39.09 kg/m².    Intake/Output Summary (Last 24 hours) at 7/21/2022 2001  Last data filed at 7/21/2022 0945  Gross per 24 hour   Intake 230 ml   Output --   Net 230 ml      Physical Exam  Vitals and nursing note reviewed.   Constitutional:       Appearance: Normal appearance. She is well-developed.   HENT:      Nose: Nose normal.      Mouth/Throat:      Mouth: Mucous membranes are moist.   Eyes:      Pupils: Pupils are equal, round, and reactive to light.   Pulmonary:      Effort: Pulmonary effort is normal.      Breath sounds: Normal breath sounds.   Abdominal:      General: There is distension.   Musculoskeletal:      Cervical back: Normal range of motion and neck supple.      Comments: Deconditioned and generalized weakness   Skin:     General: Skin is warm and dry.   Neurological:      Mental Status: She is alert.      Motor: Weakness present.      Gait: Gait abnormal.   Psychiatric:         Mood and Affect: Mood normal.       Significant Labs: All pertinent labs within the past 24 hours have been reviewed.  Recent Lab Results         07/21/22  1103   07/21/22  1102   07/21/22  0536   07/21/22  0012        Unit Blood Type Code       6200  [P]       Unit Expiration       [id number]  [P]       Unit Blood Type       A POS  [P]       Albumin     2.5         Alkaline Phosphatase     62         ALT     17         Anion Gap     7         aPTT       82.2  Comment: aPTT therapeutic range = 39-69 seconds       AST     32         Baso #     0.04   0.04       Basophil %     0.6   0.6       BILIRUBIN TOTAL     1.3  Comment: For infants and newborns, interpretation of results should be based  on gestational age, weight and in agreement with clinical  observations.    Premature Infant recommended reference ranges:  Up to 24 hours.............<8.0 mg/dL  Up to 48 hours............<12.0 mg/dL  3-5 days..................<15.0 mg/dL  6-29 days.................<15.0 mg/dL            BUN     29         Calcium     7.7         Chloride     100         CO2     22         CODING SYSTEM       [id number]  [P]       Creatinine     1.4         Differential Method     Automated   Automated       DISPENSE STATUS       ISSUED  [P]       eGFR if      44.9         eGFR if non      38.9  Comment: Calculation used to obtain the estimated glomerular filtration  rate (eGFR) is the CKD-EPI equation.            Eos #     0.2   0.2       Eosinophil %     2.4   2.6       Ferritin   41           Fibrinogen       112       Glucose     108         Gran # (ANC)     4.3   4.3       Gran %     63.8   62.2       Group & Rh       A POS       Hematocrit     23.1   24.5       Hemoglobin     7.5   8.0       Immature Grans (Abs)     0.02  Comment: Mild elevation in immature granulocytes is non specific and   can be seen in a variety of conditions including stress response,   acute inflammation, trauma and pregnancy. Correlation with other   laboratory and clinical findings is essential.     0.02  Comment: Mild elevation in immature granulocytes is non specific and   can be seen in a variety of conditions including stress response,   acute inflammation, trauma and pregnancy. Correlation with other   laboratory and clinical findings is essential.         Immature Granulocytes     0.3   0.3       INDIRECT GERMAIN       NEG       INR       2.1  Comment: Coumadin Therapy:  2.0 - 3.0 for INR for all indicators except mechanical heart valves  and antiphospholipid syndromes which should use 2.5 - 3.5.         Iron   29           Lymph #     1.3   1.3       Lymph %     19.7   19.2       Magnesium     2.3         MCH     30.9   30.8       MCHC     32.5   32.7       MCV     95   94       Mono #     0.9   1.0       Mono %     13.2   15.1       MPV     11.1   10.5       nRBC     0   0       Phosphorus     3.5         Platelets     67   72       Potassium     4.8         Product Code       [id number]  [P]        PROTEIN TOTAL     4.7         Protime       20.7       RBC     2.43   2.60       RDW     17.2   17.4       Saturated Iron   19           Sodium     129         TIBC   152           Transferrin   103           UNIT NUMBER       [id number]  [P]       Vitamin B-12 1202             WBC     6.75   6.88                [P] - Preliminary Result               Significant Imaging: I have reviewed all pertinent imaging results/findings within the past 24 hours.      Assessment/Plan:      * Anasarca  67 yoF with alcoholic liver cirrhosis and hypertension admitted for generalized anasarca. Patient's albumin on admission was 1.9. Likely edema related to poor intravascular oncotic pressure 2/2 alcoholic cirrhosis. Patient adherent to home medication. Given history and exam, patient would benefit from diuresis. Dry weight reported as 170s. Patient is currently 200 lbs 6.4oz (down from 212 lbs).     - Strict I/Os  - Daily weights  - Low sodium diet with 1500 ml fluid restriction  - Plan for f/u with her hepatologist on d/c  - continue 25% bottle of 25g Albumin     Alcoholic cirrhosis of liver with ascites  Patient was diagnosed in February. Previous history of chronic alcohol abuse and states she drank about 4-5 glasses of wine per night for over 20 years. Patient is seeing hepatology outpatient. Labs shown in care everywhere.     MELD-Na score: 25 at 7/21/2022  5:36 AM  MELD score: 19 at 7/21/2022  5:36 AM  Calculated from:  Serum Creatinine: 1.4 mg/dL at 7/21/2022  5:36 AM  Serum Sodium: 129 mmol/L at 7/21/2022  5:36 AM  Total Bilirubin: 1.3 mg/dL at 7/21/2022  5:36 AM  INR(ratio): 2.1 at 7/21/2022 12:12 AM  Age: 67 years    - Continue lactulose, thiamine, and folate  - Hepatology consulted; appreciate recommendations - outpatient follow up  -  repeat IR paracentesis completed, 7.5L  - continue to monitor for signs of liver failure  - F/u daily CMPs    PITO (acute kidney injury)  Presenting with PITO with new sCr 1.6 (baseline  sCr 0.9-1.1). Today 1.4.  DDx: Decreased PO intake likely resulting in pre-renal etiology    - Restart diuresis (Lasix 40 BID IV, Aldactone 100 QD)  - Gentle trial of IVF; encourage PO intake  - Trend sCr  - Strict I&Os  - Avoid nephrotoxic agents  - Renally adjust medications      Thrombocytopenia   Did trend down to 67 today, PT 20.7, INR 2.1, ptt 82.2  Patient underwent paracentesis and there was large volume leaking from site, now controlled with dermabond  - continue to monitor platelet count tomorrow and for continuous bleeding    Anemia, chronic disease  Patient with Hgb of 8.5 on admit. Upon review of care everywhere, patient had Hgb of 8.4 in all of 2022. Likely due to chronic disease and stable.  One episode of Hgb 6.9 on 7/18. Ordered 1 U pRBC. Received written consent from patient and  prior to transfusion     - today Hgb 7.5  - Trend CBC; transfuse Hgb < 7    UTI (urinary tract infection)  Positive UA from new kim placed 7/10/22. Hx of ESBL E coli in Feb 2022 at OSH  Uctx growing enterococcus  - S/P ampicillin course 07/17        Debility  - Pt non-mobile for some time in hospital,  PT/OT recs for SNF  - Pt advised to move around in bed but to only get up with help        Primary hypertension  Patient was discontinued from losartan per hepatologist.   - continue to monitor and can add when clinically indicated  - pt removed from tele 7/16    Acute encephalopathy  Patient is A&OX3, did have episode of seizures in MICU, now on onfi and keppra, no signs of hepatic encephalopathy, negative for asterixis    -  Continue lactulose 10g bid, Rifaximin 550mg bid, thiamine 100mg  -  titrate bowel regimen to about3- 4 BMs per day      Gram-positive cocci in clusters  Noted on 1 set of aer/anae cultures from 7/10/22. 2nd set so far negative. No clear sign of infection. Could likely be contaminant.  Uctx also from enterococcus species  - s/p Ampicillin completed 07/17          Hypomagnesemia  Monitor daily  and replete prn      Deaf- written communication  - ASL  used for interaction as well as written communication      VTE Risk Mitigation (From admission, onward)         Ordered     enoxaparin injection 40 mg  Daily         07/13/22 1005     IP VTE HIGH RISK PATIENT  Once         06/28/22 0419     Place sequential compression device  Until discontinued         06/28/22 0419     Place sequential compression device  Until discontinued         06/28/22 0415                Discharge Planning   REMA: 7/25/2022     Code Status: Full Code   Is the patient medically ready for discharge?: No    Reason for patient still in hospital (select all that apply): Treatment  Discharge Plan A: Skilled Nursing Facility   Discharge Delays: None known at this time              Lele Posadas MD  Department of Hospital Medicine   [facility] - Intensive Care ([place]-)

## 2022-07-22 NOTE — ASSESSMENT & PLAN NOTE
Presenting with PITO with new sCr 1.6 (baseline sCr 0.9-1.1). Today 1.4.  DDx: Decreased PO intake likely resulting in pre-renal etiology    - Restart diuresis (Lasix 40 BID IV, Aldactone 100 QD)  - Gentle trial of IVF; encourage PO intake  - Trend sCr  - Strict I&Os  - Avoid nephrotoxic agents  - Renally adjust medications

## 2022-07-22 NOTE — ASSESSMENT & PLAN NOTE
Patient with Hgb of 8.5 on admit. Upon review of care everywhere, patient had Hgb of 8.4 in all of 2022. Likely due to chronic disease and stable.  One episode of Hgb 6.9 on 7/18. Ordered 1 U pRBC. Received written consent from patient and  prior to transfusion     - today Hgb 7.5  - Trend CBC; transfuse Hgb < 7

## 2022-07-22 NOTE — TREATMENT PLAN
Hepatology Treatment Plan    Aleyda Floyd is a 67 y.o. female admitted to hospital 6/27/2022 (Hospital Day: 26) due to Anasarca.     Interval History  - Cr back to baseline  - s/p paracentesis yesterday with 7.5L removed  - Hgb downtrending, no overt signs of bleeding reported, GI consulted      Objective  Temp:  [97.6 °F (36.4 °C)-98 °F (36.7 °C)] 97.6 °F (36.4 °C) (07/22 1641)  Pulse:  [63-70] 70 (07/22 1010)  BP: ()/(47-59) 90/47 (07/22 1641)  Resp:  [18] 18 (07/22 1641)  SpO2:  [95 %-99 %] 97 % (07/22 1010)          Laboratory      Lab Results   Component Value Date    WBC 4.86 07/22/2022    HGB 6.3 (L) 07/22/2022    HCT 19.1 (LL) 07/22/2022    MCV 93 07/22/2022    PLT 64 (L) 07/22/2022       Lab Results   Component Value Date     (L) 07/22/2022    K 4.6 07/22/2022    CL 99 07/22/2022    CO2 26 07/22/2022    BUN 27 (H) 07/22/2022    CREATININE 1.2 07/22/2022    CALCIUM 7.8 (L) 07/22/2022       Lab Results   Component Value Date    ALBUMIN 2.6 (L) 07/22/2022    ALT 15 07/22/2022    AST 32 07/22/2022    ALKPHOS 60 07/22/2022    BILITOT 1.2 (H) 07/22/2022       Lab Results   Component Value Date    INR 1.9 (H) 07/22/2022    INR 2.1 (H) 07/21/2022    INR 1.5 (H) 07/11/2022       MELD-Na score: 22 at 7/22/2022 10:22 AM  MELD score: 16 at 7/22/2022 10:22 AM  Calculated from:  Serum Creatinine: 1.2 mg/dL at 7/22/2022  6:35 AM  Serum Sodium: 130 mmol/L at 7/22/2022  6:35 AM  Total Bilirubin: 1.2 mg/dL at 7/22/2022  6:35 AM  INR(ratio): 1.9 at 7/22/2022 10:22 AM  Age: 67 years      Micro and imaging reviewed.    Assessment    #EtOH cirrhosis, decompensated  #ascitis, secondary to above  - MELD: 18>20>15  - Patient has been diuresed significantly since admission.   - Na: 137, Cr: 1.3, T. Bilirubin: 1.9, INR: 1.5  - patient will be evaluated for liver tx as outpatient    #hematochezia, resolved  #blood in NG tube,single episode  - Hb 6.7, slowly downtrending, no overt bleeding reported  - transfusion for  Hgb <7    #hyperechoic lesion in liver  - US doppler liver (5/5/22): Hypoechoic lesion in the right hepatic lobe measuring 1.4 x 1.3 x 1.0 cm.  - CT abdomen w/wout contrast (5/10/22): Focal lesion right hepatic lobe seen on ultrasound not seen best advantage on CT exam.    -MRI liver exam with contrast suggested as outpatient    #AMS, resolving  #seizure disorder  - likely 2/2 toxic metabolic encephalopathy.   - CT head & CTA head & neck were negative.   - EEG with generalized periodic discharges and triphasic morphology concering for status epilepticus    #generalized deconditioning  - Patient too frail currently. Must demonstrate mobility and functional capacity before being evlauated for liver tx.   - Still full support per PT but progressing slowly      Plan  - Would recommend obtaining MRI of the abdomen w/wout contrast to evaluate liver lesion. Can be done as outpatient.   - Patient will eventually require outpatient liver tx eval. Outpatient hepatology and addiction psych evaluation.  - Continue IV PPI. Continue Lactulose TID and Rifaximin BID to maintain 2-3 soft bowel movements daily  - Continue lasix 40 mg IV and spironolactone 100 mg daily   - Would transition to PO lasix 40 mg tomorrow 7/23  - Agree with some form of rehab placement if indicated to improve functional status  - No need for fluid restriction at this time given mild hyponatremia  - Low sodium diet  - Please obtain daily CBC, BMP, LFT, INR      Thank you for involving us in the care of Aleyda Floyd. Please call with any additional concerns or questions.    Pietro Bojorquez MD, PGY-IV  Gastroenterology Fellow  [facility]

## 2022-07-22 NOTE — ASSESSMENT & PLAN NOTE
Patient is A&OX3, did have episode of seizures in MICU, now on onfi and keppra, no signs of hepatic encephalopathy, negative for asterixis    -  Continue lactulose 10g bid, Rifaximin 550mg bid, thiamine 100mg  -  titrate bowel regimen to about3- 4 BMs per day

## 2022-07-22 NOTE — ASSESSMENT & PLAN NOTE
Patient was diagnosed in February. Previous history of chronic alcohol abuse and states she drank about 4-5 glasses of wine per night for over 20 years. Patient is seeing hepatology outpatient. Labs shown in care everywhere.     MELD-Na score: 25 at 7/21/2022  5:36 AM  MELD score: 19 at 7/21/2022  5:36 AM  Calculated from:  Serum Creatinine: 1.4 mg/dL at 7/21/2022  5:36 AM  Serum Sodium: 129 mmol/L at 7/21/2022  5:36 AM  Total Bilirubin: 1.3 mg/dL at 7/21/2022  5:36 AM  INR(ratio): 2.1 at 7/21/2022 12:12 AM  Age: 67 years    - Continue lactulose, thiamine, and folate  - Hepatology consulted; appreciate recommendations - outpatient follow up  -  repeat IR paracentesis completed, 7.5L  - continue to monitor for signs of liver failure  - F/u daily CMPs

## 2022-07-22 NOTE — PLAN OF CARE
Problem: Adult Inpatient Plan of Care  Goal: Plan of Care Review  Outcome: Ongoing, Progressing  Goal: Patient-Specific Goal (Individualized)  Outcome: Ongoing, Progressing  Goal: Absence of Hospital-Acquired Illness or Injury  Outcome: Ongoing, Progressing  Goal: Optimal Comfort and Wellbeing  Outcome: Ongoing, Progressing  Goal: Readiness for Transition of Care  Outcome: Ongoing, Progressing     Problem: Fall Injury Risk  Goal: Absence of Fall and Fall-Related Injury  Outcome: Ongoing, Progressing     Problem: Bariatric Environmental Safety  Goal: Safety Maintained with Care  Outcome: Ongoing, Progressing

## 2022-07-22 NOTE — CONSULTS
[facility] ([place] [address])  Gastroenterology  Consult Note    Patient Name: Aleyda Floyd  MRN: [id number]  Admission Date: 6/27/2022  Hospital Length of Stay: 24 days  Code Status: Full Code   Attending Provider: Khadra Guzman MD  Consulting Provider: Terry Jo MD  Primary Care Physician: Elvie Fernandes MD  Principal Problem:Anasarca    Consults  Subjective:     CC: Swelling, chronic anemia    HPI:  67 F w/ PMH decompensated EtOH cirrhosis c/b Ascites, deafness, HTN, HLD admitted with anasarca and gross volume overload.  Patient was diagnosed with decompensated cirrhosis 2/2022 and since that time, has been dealing with volume overload since diagnosis.  She reports compliance with diuretics but reports her urine output is not significantly increased.  Reported to ED approximately 40lbs weight gain over the past few months.  In ED, HDS and afebrile.  Labs notable for Cr 1.0, Albumin 1.9, Bili 1.8, AST 45, ALT 25.  Patient was given IV lasix and admitted to hospital medicine for further management of volume overload.  Hepatology consulted for h/o decompensated cirrhosis.      Past Medical History:   Diagnosis Date    Hypercholesterolemia     Hypertension        Past Surgical History:   Procedure Laterality Date    DILATION AND CURETTAGE OF UTERUS      ESOPHAGOGASTRODUODENOSCOPY N/A 6/23/2022    Procedure: EGD (ESOPHAGOGASTRODUODENOSCOPY);  Surgeon: Sean Perry MD;  Location: [address];  Service: Endoscopy;  Laterality: N/A;  cirrhosis, variceal screening  labs prior  -request sent 6/14  fully vaccinated, instructions emailed to [email or web address]       Review of patient's allergies indicates:  No Known Allergies  Family History       Problem Relation (Age of Onset)    Breast cancer Maternal Grandmother          Tobacco Use    Smoking status: Former Smoker     Packs/day: 1.00     Years: 15.00     Pack years: 15.00     Quit date: 12/5/1998      Years since quittin.6    Smokeless tobacco: Never Used   Substance and Sexual Activity    Alcohol use: Not Currently    Drug use: Not Currently          Review of Systems   Constitutional:  Negative for appetite change, chills and fever.   HENT:  Positive for rhinorrhea. Negative for sore throat and trouble swallowing.    Eyes:  Negative for pain and visual disturbance.   Respiratory:  Negative for cough, chest tightness and shortness of breath.    Cardiovascular:  Negative for chest pain and palpitations.   Gastrointestinal:  Positive for abdominal distention. Negative for abdominal pain, constipation, diarrhea, nausea and vomiting.   Genitourinary:  Negative for difficulty urinating, dysuria and hematuria.   Musculoskeletal:  Negative for joint swelling and myalgias.   Skin:  Negative for color change and pallor.   Neurological:  Negative for dizziness, light-headedness and headaches.   Psychiatric/Behavioral:  Negative for agitation and confusion.    Objective:     Vital Signs (Most Recent):  Temp: 98 °F (36.7 °C) (22 1022)  Pulse: 69 (22 2330)  Resp: 18 (22 1950)  BP: (!) 107/55 (22 2330)  SpO2: 99 % (22 233)   Vital Signs (24h Range):  Temp:  [97.6 °F (36.4 °C)-98 °F (36.7 °C)] 98 °F (36.7 °C)  Pulse:  [62-70] 69  Resp:  [16-18] 18  SpO2:  [95 %-100 %] 99 %  BP: ()/(49-59) 107/55     Weight: 83.4 kg (183 lb 12.8 oz) (22 0400)  Body mass index is 35.9 kg/m².      Intake/Output Summary (Last 24 hours) at 2022 1032  Last data filed at 2022 0600  Gross per 24 hour   Intake --   Output 1600 ml   Net -1600 ml       Physical Exam  Constitutional:       General: She is not in acute distress.     Appearance: Normal appearance. She is not ill-appearing.   HENT:      Head: Normocephalic and atraumatic.      Nose: Rhinorrhea present. No congestion.      Mouth/Throat:      Mouth: Mucous membranes are moist.   Eyes:      General:         Right eye: No discharge.          Left eye: No discharge.      Extraocular Movements: Extraocular movements intact.   Cardiovascular:      Rate and Rhythm: Normal rate and regular rhythm.      Pulses: Normal pulses.      Heart sounds: Murmur (systolic flow) heard.     No gallop.   Pulmonary:      Effort: Pulmonary effort is normal. No respiratory distress.      Breath sounds: Normal breath sounds.   Abdominal:      General: There is distension.      Tenderness: There is no abdominal tenderness. There is no guarding or rebound.   Musculoskeletal:      Cervical back: Normal range of motion.      Right lower leg: Edema (3+) present.      Left lower leg: Edema (3+) present.   Skin:     Coloration: Skin is not jaundiced or pale.      Findings: Bruising (bilateral arms) present.   Neurological:      General: No focal deficit present.      Mental Status: She is alert and oriented to person, place, and time.      Cranial Nerves: No cranial nerve deficit.   Psychiatric:         Mood and Affect: Mood normal.         Behavior: Behavior normal.       Significant Labs:  All pertinent lab results from the last 24 hours have been reviewed.  Recent Lab Results         07/22/22  1022   07/22/22  0635        Albumin   2.6       Alkaline Phosphatase   60       ALT   15       Anion Gap   5       Aniso   Slight       AST   32       Baso #   0.02       Basophil %   0.4       BILIRUBIN TOTAL   1.2  Comment: For infants and newborns, interpretation of results should be based  on gestational age, weight and in agreement with clinical  observations.    Premature Infant recommended reference ranges:  Up to 24 hours.............<8.0 mg/dL  Up to 48 hours............<12.0 mg/dL  3-5 days..................<15.0 mg/dL  6-29 days.................<15.0 mg/dL         BUN   27       Calcium   7.8       Chloride   99       CO2   26       Creatinine   1.2       Differential Method   Automated       eGFR if    54.0       eGFR if non    46.9  Comment:  Calculation used to obtain the estimated glomerular filtration  rate (eGFR) is the CKD-EPI equation.          Eos #   0.2       Eosinophil %   3.3       Glucose   109       Gran # (ANC)   2.6       Gran %   53.9       Group & Rh A POS         Hematocrit   19.1  Comment: HCT critical result(s) called and verbal readback obtained from   Jake Zhang RN  by JC8 07/22/2022 08:37         Hemoglobin   6.3       Hypo   Occasional       Immature Grans (Abs)   0.02  Comment: Mild elevation in immature granulocytes is non specific and   can be seen in a variety of conditions including stress response,   acute inflammation, trauma and pregnancy. Correlation with other   laboratory and clinical findings is essential.         Immature Granulocytes   0.4       INDIRECT GERMAIN NEG         INR 1.9  Comment: Coumadin Therapy:  2.0 - 3.0 for INR for all indicators except mechanical heart valves  and antiphospholipid syndromes which should use 2.5 - 3.5.           Lymph #   1.4       Lymph %   28.6       Magnesium   2.2       MCH   30.7       MCHC   33.0       MCV   93       Mono #   0.7       Mono %   13.4       MPV   10.1       nRBC   0       Ovalocytes   Occasional       Phosphorus   3.1       Platelets   64       Poikilocytosis   Slight       Poly   Occasional       Potassium   4.6       PROTEIN TOTAL   4.6       Protime 18.8         RBC   2.05       RDW   17.2       Sodium   130       Spherocytes   Occasional       WBC   4.86               Significant Imaging:  CT: I have reviewed all results within the past 24 hours and my personal findings are:  hyper dense segment within the stomach, potential ingestion or bleed.    Assessment/Plan:     Ms. Floyd is a 66 yo F with decompensated cirrhosis and Grade I esophageal varices on EGD (6/23/22) with chronically low hemoglobin values. CT abdomen with hyperdensity within the stomach, likely ingested material, however, blood product can not be excluded. Ct also showed colonic  diverticulosis and Cirrhotic liver morphology with sequela of portal hypertension including splenomegaly, large volume abdominal ascites, and collateral vessels.     Patient without abdominal pain, hematochezia, hematemesis, hemodynamically stable on exam, Recently transfused for declining Hemoglobin. Anemia likely secondary to decompensated cirrhosis and portal hypertensive sequestration/blood loss.     Recommendations:  - continue supportive care  - transfuse for hemoglobin as necessary  - recommend outpatient colonoscopy    Thank you for your consult. Please reach out with any further questions    Terry Jo MD  Gastroenterology  [facility] ([place]

## 2022-07-22 NOTE — SUBJECTIVE & OBJECTIVE
Interval History: Patient had IR paracentesis yesterday yielding 7.5L. There was leakage from the site. Dermabond was applied. Patient is deaf and we communicated via ASL interpretor.     Review of Systems   Constitutional:  Positive for fatigue. Negative for chills and fever.   HENT:  Negative for congestion, ear pain, rhinorrhea, sinus pain and trouble swallowing.    Eyes:  Negative for pain and discharge.   Respiratory:  Negative for cough, chest tightness and shortness of breath.    Cardiovascular:  Negative for chest pain and leg swelling.   Gastrointestinal:  Positive for abdominal distention. Negative for abdominal pain, nausea and vomiting.   Genitourinary:  Negative for dysuria, flank pain and hematuria.   Musculoskeletal:  Negative for arthralgias, back pain and myalgias.   Skin:  Negative for rash.   Neurological:  Negative for dizziness, tremors and headaches.   Psychiatric/Behavioral:  Negative for agitation and confusion.    Objective:     Vital Signs (Most Recent):  Temp: 97.4 °F (36.3 °C) (07/21/22 0431)  Pulse: 62 (07/21/22 1705)  Resp: 16 (07/21/22 1705)  BP: (!) 109/49 (07/21/22 1705)  SpO2: 100 % (07/21/22 1705)   Vital Signs (24h Range):  Temp:  [97.4 °F (36.3 °C)] 97.4 °F (36.3 °C)  Pulse:  [62-70] 62  Resp:  [15-18] 16  SpO2:  [94 %-100 %] 100 %  BP: ()/(47-55) 109/49     Weight: 90.8 kg (200 lb 2.8 oz)  Body mass index is 39.09 kg/m².    Intake/Output Summary (Last 24 hours) at 7/21/2022 2001  Last data filed at 7/21/2022 0945  Gross per 24 hour   Intake 230 ml   Output --   Net 230 ml      Physical Exam  Vitals and nursing note reviewed.   Constitutional:       Appearance: Normal appearance. She is well-developed.   HENT:      Nose: Nose normal.      Mouth/Throat:      Mouth: Mucous membranes are moist.   Eyes:      Pupils: Pupils are equal, round, and reactive to light.   Pulmonary:      Effort: Pulmonary effort is normal.      Breath sounds: Normal breath sounds.   Abdominal:       General: There is distension.   Musculoskeletal:      Cervical back: Normal range of motion and neck supple.      Comments: Deconditioned and generalized weakness   Skin:     General: Skin is warm and dry.   Neurological:      Mental Status: She is alert.      Motor: Weakness present.      Gait: Gait abnormal.   Psychiatric:         Mood and Affect: Mood normal.       Significant Labs: All pertinent labs within the past 24 hours have been reviewed.  Recent Lab Results         07/21/22  1103   07/21/22  1102   07/21/22  0536   07/21/22  0012        Unit Blood Type Code       6200  [P]       Unit Expiration       [id number]  [P]       Unit Blood Type       A POS  [P]       Albumin     2.5         Alkaline Phosphatase     62         ALT     17         Anion Gap     7         aPTT       82.2  Comment: aPTT therapeutic range = 39-69 seconds       AST     32         Baso #     0.04   0.04       Basophil %     0.6   0.6       BILIRUBIN TOTAL     1.3  Comment: For infants and newborns, interpretation of results should be based  on gestational age, weight and in agreement with clinical  observations.    Premature Infant recommended reference ranges:  Up to 24 hours.............<8.0 mg/dL  Up to 48 hours............<12.0 mg/dL  3-5 days..................<15.0 mg/dL  6-29 days.................<15.0 mg/dL           BUN     29         Calcium     7.7         Chloride     100         CO2     22         CODING SYSTEM       [id number]  [P]       Creatinine     1.4         Differential Method     Automated   Automated       DISPENSE STATUS       ISSUED  [P]       eGFR if      44.9         eGFR if non      38.9  Comment: Calculation used to obtain the estimated glomerular filtration  rate (eGFR) is the CKD-EPI equation.            Eos #     0.2   0.2       Eosinophil %     2.4   2.6       Ferritin   41           Fibrinogen       112       Glucose     108         Gran # (ANC)     4.3   4.3       Gran %      63.8   62.2       Group & Rh       A POS       Hematocrit     23.1   24.5       Hemoglobin     7.5   8.0       Immature Grans (Abs)     0.02  Comment: Mild elevation in immature granulocytes is non specific and   can be seen in a variety of conditions including stress response,   acute inflammation, trauma and pregnancy. Correlation with other   laboratory and clinical findings is essential.     0.02  Comment: Mild elevation in immature granulocytes is non specific and   can be seen in a variety of conditions including stress response,   acute inflammation, trauma and pregnancy. Correlation with other   laboratory and clinical findings is essential.         Immature Granulocytes     0.3   0.3       INDIRECT GERMAIN       NEG       INR       2.1  Comment: Coumadin Therapy:  2.0 - 3.0 for INR for all indicators except mechanical heart valves  and antiphospholipid syndromes which should use 2.5 - 3.5.         Iron   29           Lymph #     1.3   1.3       Lymph %     19.7   19.2       Magnesium     2.3         MCH     30.9   30.8       MCHC     32.5   32.7       MCV     95   94       Mono #     0.9   1.0       Mono %     13.2   15.1       MPV     11.1   10.5       nRBC     0   0       Phosphorus     3.5         Platelets     67   72       Potassium     4.8         Product Code       [id number]  [P]       PROTEIN TOTAL     4.7         Protime       20.7       RBC     2.43   2.60       RDW     17.2   17.4       Saturated Iron   19           Sodium     129         TIBC   152           Transferrin   103           UNIT NUMBER       [id number]  [P]       Vitamin B-12 1202             WBC     6.75   6.88                [P] - Preliminary Result               Significant Imaging: I have reviewed all pertinent imaging results/findings within the past 24 hours.

## 2022-07-22 NOTE — CARE UPDATE
RAPID RESPONSE NURSE ROUND       Rounding completed with charge RNGabriela, bedside RN Jacoby. No concerns verbalized at this time. Instructed to call [phone] for further concerns or assistance.

## 2022-07-22 NOTE — ASSESSMENT & PLAN NOTE
67 yoF with alcoholic liver cirrhosis and hypertension admitted for generalized anasarca. Patient's albumin on admission was 1.9. Likely edema related to poor intravascular oncotic pressure 2/2 alcoholic cirrhosis. Patient adherent to home medication. Given history and exam, patient would benefit from diuresis. Dry weight reported as 170s. Patient is currently 200 lbs 6.4oz (down from 212 lbs).     - Strict I/Os  - Daily weights  - Low sodium diet with 1500 ml fluid restriction  - Plan for f/u with her hepatologist on d/c  - continue 25% bottle of 25g Albumin

## 2022-07-22 NOTE — ASSESSMENT & PLAN NOTE
- Pt non-mobile for some time in hospital,  PT/OT recs for SNF  - Pt advised to move around in bed but to only get up with help

## 2022-07-22 NOTE — NURSING
Pt AOX3, stable,stratus tablet used to communicate, denies pain or discomfort, safety measure in place call light in reach.

## 2022-07-23 NOTE — ASSESSMENT & PLAN NOTE
Presenting with PITO with new sCr 1.6 (baseline sCr 0.9-1.1). Today 1.2.  DDx: Decreased PO intake likely resulting in pre-renal etiology    - Restarted diuresis. Per hepatology recommendation Lasix 40 BID IV transitioned to Lasix 40 PO. Continue  Aldactone 100 daily.  - Gentle trial of IVF; encourage PO intake  - Trend sCr  - Strict I&Os  - Avoid nephrotoxic agents  - Renally adjust medications

## 2022-07-23 NOTE — ASSESSMENT & PLAN NOTE
67 yoF with alcoholic liver cirrhosis and hypertension admitted for generalized anasarca. Patient's albumin on admission was 1.9. Likely edema related to poor intravascular oncotic pressure 2/2 alcoholic cirrhosis. Patient adherent to home medication. Given history and exam, patient would benefit from diuresis. Dry weight reported as 170s. Patient is currently 200 lbs 6.4oz (down from 212 lbs).     - Strict I/Os  - Daily weights  - Low sodium diet with 1500 ml fluid restriction  - Plan for f/u with her hepatologist on d/c  - discontinued 25% bottle of 25g Albumin

## 2022-07-23 NOTE — ASSESSMENT & PLAN NOTE
Presenting with PITO with new sCr 1.6 (baseline sCr 0.9-1.1). Today 1.2.  DDx: Decreased PO intake likely resulting in pre-renal etiology    - Restarted diuresis (Lasix 40 BID IV, Aldactone 100 QD)  - Heptology recommending to transition to PO lasix 40 mg tomorrow 7/23  - Gentle trial of IVF; encourage PO intake  - Trend sCr  - Strict I&Os  - Avoid nephrotoxic agents  - Renally adjust medications

## 2022-07-23 NOTE — PLAN OF CARE
Problem: Adult Inpatient Plan of Care  Goal: Plan of Care Review  Outcome: Ongoing, Progressing  Goal: Patient-Specific Goal (Individualized)  Outcome: Ongoing, Progressing  Goal: Absence of Hospital-Acquired Illness or Injury  Outcome: Ongoing, Progressing  Goal: Optimal Comfort and Wellbeing  Outcome: Ongoing, Progressing  Goal: Readiness for Transition of Care  Outcome: Ongoing, Progressing     Problem: Skin Injury Risk Increased  Goal: Skin Health and Integrity  Outcome: Ongoing, Progressing     Problem: Bariatric Environmental Safety  Goal: Safety Maintained with Care  Outcome: Ongoing, Progressing     Problem: Fall Injury Risk  Goal: Absence of Fall and Fall-Related Injury  Outcome: Ongoing, Progressing     Problem: Infection  Goal: Absence of Infection Signs and Symptoms  Outcome: Ongoing, Progressing     Problem: Oral Intake Inadequate (Acute Kidney Injury/Impairment)  Goal: Optimal Nutrition Intake  Outcome: Ongoing, Progressing     Problem: Renal Function Impairment (Acute Kidney Injury/Impairment)  Goal: Effective Renal Function  Outcome: Ongoing, Progressing     Problem: Impaired Wound Healing  Goal: Optimal Wound Healing  Outcome: Ongoing, Progressing

## 2022-07-23 NOTE — ASSESSMENT & PLAN NOTE
Did trend down to 64, PT 20.7, INR 2.1, ptt 82.2  Patient underwent paracentesis and there was large volume leaking from site, now controlled with dermabond  - continue to monitor platelet count tomorrow and for continuous bleeding

## 2022-07-23 NOTE — SUBJECTIVE & OBJECTIVE
Interval History: No acute events, afebrile. Communicating with ASL interpretor.     Review of Systems   Constitutional:  Negative for appetite change, chills and fever.   HENT:  Positive for rhinorrhea. Negative for sore throat and trouble swallowing.    Eyes:  Negative for pain and visual disturbance.   Respiratory:  Negative for cough, chest tightness and shortness of breath.    Cardiovascular:  Negative for chest pain and palpitations.   Gastrointestinal:  Positive for abdominal distention. Negative for abdominal pain, constipation, diarrhea, nausea and vomiting.   Genitourinary:  Negative for difficulty urinating, dysuria and hematuria.   Musculoskeletal:  Negative for joint swelling and myalgias.   Skin:  Negative for color change and pallor.   Neurological:  Negative for dizziness, light-headedness and headaches.   Psychiatric/Behavioral:  Negative for agitation and confusion.    Objective:     Vital Signs (Most Recent):  Temp: 96.4 °F (35.8 °C) (07/22/22 2055)  Pulse: 68 (07/22/22 2055)  Resp: 19 (07/22/22 2055)  BP: (!) 107/53 (07/22/22 2055)  SpO2: 100 % (07/22/22 2055)   Vital Signs (24h Range):  Temp:  [96.4 °F (35.8 °C)-98.2 °F (36.8 °C)] 96.4 °F (35.8 °C)  Pulse:  [68-70] 68  Resp:  [18-19] 19  SpO2:  [97 %-100 %] 100 %  BP: ()/(47-55) 107/53     Weight: 83.4 kg (183 lb 12.8 oz)  Body mass index is 35.9 kg/m².    Intake/Output Summary (Last 24 hours) at 7/22/2022 2209  Last data filed at 7/22/2022 1941  Gross per 24 hour   Intake 300 ml   Output 1100 ml   Net -800 ml      Physical Exam  Constitutional:       General: She is not in acute distress.     Appearance: Normal appearance. She is not ill-appearing.   HENT:      Head: Normocephalic and atraumatic.      Nose: Rhinorrhea present. No congestion.      Mouth/Throat:      Mouth: Mucous membranes are moist.   Eyes:      General:         Right eye: No discharge.         Left eye: No discharge.      Extraocular Movements: Extraocular movements  intact.   Cardiovascular:      Rate and Rhythm: Normal rate and regular rhythm.      Pulses: Normal pulses.      Heart sounds: Murmur (systolic flow) heard.     No gallop.   Pulmonary:      Effort: Pulmonary effort is normal. No respiratory distress.      Breath sounds: Normal breath sounds.   Abdominal:      General: There is distension.      Tenderness: There is no abdominal tenderness. There is no guarding or rebound.   Musculoskeletal:      Cervical back: Normal range of motion.      Right lower leg: Edema (3+) present.      Left lower leg: Edema (3+) present.   Skin:     Coloration: Skin is not jaundiced or pale.      Findings: Bruising (bilateral arms) present.   Neurological:      General: No focal deficit present.      Mental Status: She is alert and oriented to person, place, and time.      Cranial Nerves: No cranial nerve deficit.   Psychiatric:         Mood and Affect: Mood normal.         Behavior: Behavior normal.       Significant Labs: All pertinent labs within the past 24 hours have been reviewed.  Recent Lab Results         07/22/22  1022   07/22/22  0635        Albumin   2.6       Alkaline Phosphatase   60       ALT   15       Anion Gap   5       Aniso   Slight       AST   32       Baso #   0.02       Basophil %   0.4       BILIRUBIN TOTAL   1.2  Comment: For infants and newborns, interpretation of results should be based  on gestational age, weight and in agreement with clinical  observations.    Premature Infant recommended reference ranges:  Up to 24 hours.............<8.0 mg/dL  Up to 48 hours............<12.0 mg/dL  3-5 days..................<15.0 mg/dL  6-29 days.................<15.0 mg/dL         BUN   27       Calcium   7.8       Chloride   99       CO2   26       Creatinine   1.2       Differential Method   Automated       eGFR if    54.0       eGFR if non    46.9  Comment: Calculation used to obtain the estimated glomerular filtration  rate (eGFR) is the  CKD-EPI equation.          Eos #   0.2       Eosinophil %   3.3       Glucose   109       Gran # (ANC)   2.6       Gran %   53.9       Group & Rh A POS         Hematocrit   19.1  Comment: HCT critical result(s) called and verbal readback obtained from   Jake Zhang RN  by JC8 07/22/2022 08:37         Hemoglobin   6.3       Hypo   Occasional       Immature Grans (Abs)   0.02  Comment: Mild elevation in immature granulocytes is non specific and   can be seen in a variety of conditions including stress response,   acute inflammation, trauma and pregnancy. Correlation with other   laboratory and clinical findings is essential.         Immature Granulocytes   0.4       INDIRECT GERMAIN NEG         INR 1.9  Comment: Coumadin Therapy:  2.0 - 3.0 for INR for all indicators except mechanical heart valves  and antiphospholipid syndromes which should use 2.5 - 3.5.           Lymph #   1.4       Lymph %   28.6       Magnesium   2.2       MCH   30.7       MCHC   33.0       MCV   93       Mono #   0.7       Mono %   13.4       MPV   10.1       nRBC   0       Ovalocytes   Occasional       Phosphorus   3.1       Platelets   64       Poikilocytosis   Slight       Poly   Occasional       Potassium   4.6       PROTEIN TOTAL   4.6       Protime 18.8         RBC   2.05       RDW   17.2       Sodium   130       Spherocytes   Occasional       WBC   4.86               Significant Imaging: I have reviewed all pertinent imaging results/findings within the past 24 hours.

## 2022-07-23 NOTE — ASSESSMENT & PLAN NOTE
Patient with Hgb of 8.5 on admit. Upon review of care everywhere, patient had Hgb of 8.4 in all of 2022. Likely due to chronic disease and stable.  One episode of Hgb 6.9 on 7/18. Ordered 1 U pRBC. Received written consent from patient and  prior to transfusion. Received 1U prbc for Hgb 6.3 yesterday.     Plan:  - today Hgb 7.9, Transfuse if Hg<7.0. No bloody bowel movements noted.   - Per GI recommendation, if anemia persists and not responding to transfusion then inpatient colonoscopy recommended for further evaluation  - continue pantoprazole 40 bid

## 2022-07-23 NOTE — TREATMENT PLAN
Hepatology Treatment Plan    Aleyda Floyd is a 67 y.o. female admitted to hospital 6/27/2022 (Hospital Day: 27) due to Anasarca.     Interval History  - Cr stable, at baseline  - GI consulted for anemia, considering inpatient colonoscopy if persistent      Objective  Temp:  [96.4 °F (35.8 °C)-98.2 °F (36.8 °C)] 97.6 °F (36.4 °C) (07/23 1147)  Pulse:  [68-78] 77 (07/23 1147)  BP: ()/(47-56) 112/55 (07/23 1147)  Resp:  [18-19] 18 (07/23 1147)  SpO2:  [98 %-100 %] 100 % (07/23 1147)          Laboratory      Lab Results   Component Value Date    WBC 6.02 07/23/2022    HGB 7.9 (L) 07/23/2022    HCT 23.5 (L) 07/23/2022    MCV 93 07/23/2022    PLT 67 (L) 07/23/2022       Lab Results   Component Value Date     (L) 07/23/2022    K 4.9 07/23/2022    CL 99 07/23/2022    CO2 25 07/23/2022    BUN 26 (H) 07/23/2022    CREATININE 1.1 07/23/2022    CALCIUM 8.1 (L) 07/23/2022       Lab Results   Component Value Date    ALBUMIN 2.6 (L) 07/23/2022    ALT 18 07/23/2022    AST 38 07/23/2022    ALKPHOS 72 07/23/2022    BILITOT 1.7 (H) 07/23/2022       Lab Results   Component Value Date    INR 1.7 (H) 07/23/2022    INR 1.9 (H) 07/22/2022    INR 2.1 (H) 07/21/2022       MELD-Na score: 21 at 7/23/2022  5:50 AM  MELD score: 15 at 7/23/2022  5:50 AM  Calculated from:  Serum Creatinine: 1.1 mg/dL at 7/23/2022  5:50 AM  Serum Sodium: 130 mmol/L at 7/23/2022  5:50 AM  Total Bilirubin: 1.7 mg/dL at 7/23/2022  5:50 AM  INR(ratio): 1.7 at 7/23/2022  5:50 AM  Age: 67 years      Micro and imaging reviewed.    Assessment    #EtOH cirrhosis, decompensated  #ascitis, secondary to above  - MELD: 18>20>15  - Patient has been diuresed significantly since admission.   - Na: 137, Cr: 1.3, T. Bilirubin: 1.9, INR: 1.5  - patient will be evaluated for liver tx as outpatient    #hematochezia, resolved  #blood in NG tube,single episode  - Hb 6.7, slowly downtrending, no overt bleeding reported  - transfusion for Hgb <7  - GI now following, may  get inpatient colonoscopy pending H/H trend    #hyperechoic lesion in liver  - US doppler liver (5/5/22): Hypoechoic lesion in the right hepatic lobe measuring 1.4 x 1.3 x 1.0 cm.  - CT abdomen w/wout contrast (5/10/22): Focal lesion right hepatic lobe seen on ultrasound not seen best advantage on CT exam.    -MRI liver exam with contrast suggested as outpatient    #AMS, resolving  #seizure disorder  - likely 2/2 toxic metabolic encephalopathy.   - CT head & CTA head & neck were negative.   - EEG with generalized periodic discharges and triphasic morphology concering for status epilepticus    #generalized deconditioning  - Patient too frail currently. Must demonstrate mobility and functional capacity before being evlauated for liver tx.   - Still full support per PT but progressing slowly      Plan  - Would recommend obtaining MRI of the abdomen w/wout contrast to evaluate liver lesion. Can be done as outpatient.   - Patient will eventually require outpatient liver tx eval. Outpatient hepatology and addiction psych evaluation.  - Continue IV PPI.   -Continue Lactulose TID and Rifaximin BID to maintain 2-3 soft bowel movements daily  - Continue home diuretics: lasix 40 mg daily + spironolactone 100 mg daily  - Agree with some form of rehab placement if indicated to improve functional status  - No need for fluid restriction at this time given mild hyponatremia  - Low sodium diet  - Please obtain daily CBC, BMP, LFT, INR      Thank you for involving us in the care of Aleyda Floyd. Please call with any additional concerns or questions.    Pietro Bojorquez MD, PGY-IV  Gastroenterology Fellow  [facility]

## 2022-07-23 NOTE — ASSESSMENT & PLAN NOTE
Patient was diagnosed in February. Previous history of chronic alcohol abuse and states she drank about 4-5 glasses of wine per night for over 20 years. Patient is seeing hepatology outpatient. Labs shown in care everywhere.     MELD-Na score: 22 at 7/22/2022 10:22 AM  MELD score: 16 at 7/22/2022 10:22 AM  Calculated from:  Serum Creatinine: 1.2 mg/dL at 7/22/2022  6:35 AM  Serum Sodium: 130 mmol/L at 7/22/2022  6:35 AM  Total Bilirubin: 1.2 mg/dL at 7/22/2022  6:35 AM  INR(ratio): 1.9 at 7/22/2022 10:22 AM  Age: 67 years    - Continue lactulose, thiamine, and folate  - Hepatology consulted; appreciate recommendations - outpatient follow up  -  repeat IR paracentesis completed, 7.5L  - continue to monitor for signs of liver failure  - F/u daily CMPs

## 2022-07-23 NOTE — ASSESSMENT & PLAN NOTE
Patient with Hgb of 8.5 on admit. Upon review of care everywhere, patient had Hgb of 8.4 in all of 2022. Likely due to chronic disease and stable.  One episode of Hgb 6.9 on 7/18. Ordered 1 U pRBC. Received written consent from patient and  prior to transfusion     - today Hgb 6.3, transfuse 1 U pRBC  - Repeat INR, f/u CBC  - discontinue lovenox, start vitamin K  - concern for occult bleeding source; consult GI, start PPI 80mg loading followed by 40mg bid  - Trend CBC; transfuse Hgb < 7

## 2022-07-23 NOTE — PROGRESS NOTES
[facility] ([place] [address])  [facility] Medicine  Progress Note    Patient Name: Aleyda Flyod  MRN: [id number]  Patient Class: IP- Inpatient   Admission Date: 6/27/2022  Length of Stay: 24 days  Attending Physician: Tena Brizuela MD  Primary Care Provider: Elvie Fernandes MD        Subjective:     Principal Problem:Anasarca        HPI:  This is a 67 year old deaf lady with alcoholic cirrhosis, and hypertension who presented with generalized swelling.  used to obtain history. Patient states that she had the swelling since her diagnosis of cirrhosis in February. Patient states that 2 days ago, she was able to ambulate but with some difficulty due to the swelling but now  worsening to the point where she is unable to ambulate by herself. Of note, patient had a recent EGD performed earlier last week where they had issues with her IV resulting is significant bruising. Patient also notes that whenever she scratches her skin with her nails, she notices clear fluid coming from the cuts. Patient denies fever, chills, cough, hemoptysis, nausea, vomiting, abdominal pain, yellowing of skin, constipation, dysuria, hematuria, and black/ bloody stools. Patient has baseline loose stools from her lactulose.  She reports compliance with all medications including her lactulose and Lasix. Patient and family declined any confusion or altered mental status. She denies any recent alcohol use with last use in February when she was diagnosed with alcoholic cirrhosis. Patient states that she used to weigh around 170s which she believes is her dry weight and is currently in the 200s.     Upon chart review, patient had an echo at an OSH on 2/25/22 which showed EF 60-65% and some diastolic dysfunction.    In the ED, patient was found to have Hgb of 8.5 and platelets of 113 around baseline. Patient had a UA which showed 1+ leuks and many bacteria. Patient did not complain of dysuria.       Overview/Hospital  Course:  Admitted for decompensated cirrhosis and anasarca. Began diuresing with IV Lasix and spironolactone. Paracentesis of -4.2L; ruled out SBP. Pt stabilized for few days and ready to be dc'ed when she had a change in mental status. Found pt unresponsive with fixed gaze and no pupil response. Code stroke called, CT neg for stroke. Eeg showed seizure-like activity. Pt transitioned to MICU for higher level of care. On step down, had a UTI and PITO, both of which were treated. She received a repeat paracentesis with 7.5L removed. Her creatinine started improve with administration of albumin and holding diuretics.         Interval History: No acute events, afebrile. Communicating with ASL interpretor.     Review of Systems   Constitutional:  Negative for appetite change, chills and fever.   HENT:  Positive for rhinorrhea. Negative for sore throat and trouble swallowing.    Eyes:  Negative for pain and visual disturbance.   Respiratory:  Negative for cough, chest tightness and shortness of breath.    Cardiovascular:  Negative for chest pain and palpitations.   Gastrointestinal:  Positive for abdominal distention. Negative for abdominal pain, constipation, diarrhea, nausea and vomiting.   Genitourinary:  Negative for difficulty urinating, dysuria and hematuria.   Musculoskeletal:  Negative for joint swelling and myalgias.   Skin:  Negative for color change and pallor.   Neurological:  Negative for dizziness, light-headedness and headaches.   Psychiatric/Behavioral:  Negative for agitation and confusion.    Objective:     Vital Signs (Most Recent):  Temp: 96.4 °F (35.8 °C) (07/22/22 2055)  Pulse: 68 (07/22/22 2055)  Resp: 19 (07/22/22 2055)  BP: (!) 107/53 (07/22/22 2055)  SpO2: 100 % (07/22/22 2055)   Vital Signs (24h Range):  Temp:  [96.4 °F (35.8 °C)-98.2 °F (36.8 °C)] 96.4 °F (35.8 °C)  Pulse:  [68-70] 68  Resp:  [18-19] 19  SpO2:  [97 %-100 %] 100 %  BP: ()/(47-55) 107/53     Weight: 83.4 kg (183 lb 12.8  oz)  Body mass index is 35.9 kg/m².    Intake/Output Summary (Last 24 hours) at 7/22/2022 2209  Last data filed at 7/22/2022 1941  Gross per 24 hour   Intake 300 ml   Output 1100 ml   Net -800 ml      Physical Exam  Constitutional:       General: She is not in acute distress.     Appearance: Normal appearance. She is not ill-appearing.   HENT:      Head: Normocephalic and atraumatic.      Nose: Rhinorrhea present. No congestion.      Mouth/Throat:      Mouth: Mucous membranes are moist.   Eyes:      General:         Right eye: No discharge.         Left eye: No discharge.      Extraocular Movements: Extraocular movements intact.   Cardiovascular:      Rate and Rhythm: Normal rate and regular rhythm.      Pulses: Normal pulses.      Heart sounds: Murmur (systolic flow) heard.     No gallop.   Pulmonary:      Effort: Pulmonary effort is normal. No respiratory distress.      Breath sounds: Normal breath sounds.   Abdominal:      General: There is distension.      Tenderness: There is no abdominal tenderness. There is no guarding or rebound.   Musculoskeletal:      Cervical back: Normal range of motion.      Right lower leg: Edema (3+) present.      Left lower leg: Edema (3+) present.   Skin:     Coloration: Skin is not jaundiced or pale.      Findings: Bruising (bilateral arms) present.   Neurological:      General: No focal deficit present.      Mental Status: She is alert and oriented to person, place, and time.      Cranial Nerves: No cranial nerve deficit.   Psychiatric:         Mood and Affect: Mood normal.         Behavior: Behavior normal.       Significant Labs: All pertinent labs within the past 24 hours have been reviewed.  Recent Lab Results         07/22/22  1022   07/22/22  0635        Albumin   2.6       Alkaline Phosphatase   60       ALT   15       Anion Gap   5       Aniso   Slight       AST   32       Baso #   0.02       Basophil %   0.4       BILIRUBIN TOTAL   1.2  Comment: For infants and newborns,  interpretation of results should be based  on gestational age, weight and in agreement with clinical  observations.    Premature Infant recommended reference ranges:  Up to 24 hours.............<8.0 mg/dL  Up to 48 hours............<12.0 mg/dL  3-5 days..................<15.0 mg/dL  6-29 days.................<15.0 mg/dL         BUN   27       Calcium   7.8       Chloride   99       CO2   26       Creatinine   1.2       Differential Method   Automated       eGFR if    54.0       eGFR if non    46.9  Comment: Calculation used to obtain the estimated glomerular filtration  rate (eGFR) is the CKD-EPI equation.          Eos #   0.2       Eosinophil %   3.3       Glucose   109       Gran # (ANC)   2.6       Gran %   53.9       Group & Rh A POS         Hematocrit   19.1  Comment: HCT critical result(s) called and verbal readback obtained from   Jake Zhang RN  by JC8 07/22/2022 08:37         Hemoglobin   6.3       Hypo   Occasional       Immature Grans (Abs)   0.02  Comment: Mild elevation in immature granulocytes is non specific and   can be seen in a variety of conditions including stress response,   acute inflammation, trauma and pregnancy. Correlation with other   laboratory and clinical findings is essential.         Immature Granulocytes   0.4       INDIRECT GERMAIN NEG         INR 1.9  Comment: Coumadin Therapy:  2.0 - 3.0 for INR for all indicators except mechanical heart valves  and antiphospholipid syndromes which should use 2.5 - 3.5.           Lymph #   1.4       Lymph %   28.6       Magnesium   2.2       MCH   30.7       MCHC   33.0       MCV   93       Mono #   0.7       Mono %   13.4       MPV   10.1       nRBC   0       Ovalocytes   Occasional       Phosphorus   3.1       Platelets   64       Poikilocytosis   Slight       Poly   Occasional       Potassium   4.6       PROTEIN TOTAL   4.6       Protime 18.8         RBC   2.05       RDW   17.2       Sodium   130        Spherocytes   Occasional       WBC   4.86               Significant Imaging: I have reviewed all pertinent imaging results/findings within the past 24 hours.      Assessment/Plan:      * Anasarca  67 yoF with alcoholic liver cirrhosis and hypertension admitted for generalized anasarca. Patient's albumin on admission was 1.9. Likely edema related to poor intravascular oncotic pressure 2/2 alcoholic cirrhosis. Patient adherent to home medication. Given history and exam, patient would benefit from diuresis. Dry weight reported as 170s. Patient is currently 200 lbs 6.4oz (down from 212 lbs).     - Strict I/Os  - Daily weights  - Low sodium diet with 1500 ml fluid restriction  - Plan for f/u with her hepatologist on d/c  - continue 25% bottle of 25g Albumin     PITO (acute kidney injury)  Presenting with PITO with new sCr 1.6 (baseline sCr 0.9-1.1). Today 1.2.  DDx: Decreased PO intake likely resulting in pre-renal etiology    - Restarted diuresis (Lasix 40 BID IV, Aldactone 100 QD)  - Heptology recommending to transition to PO lasix 40 mg tomorrow 7/23  - Gentle trial of IVF; encourage PO intake  - Trend sCr  - Strict I&Os  - Avoid nephrotoxic agents  - Renally adjust medications      Anemia, chronic disease  Patient with Hgb of 8.5 on admit. Upon review of care everywhere, patient had Hgb of 8.4 in all of 2022. Likely due to chronic disease and stable.  One episode of Hgb 6.9 on 7/18. Ordered 1 U pRBC. Received written consent from patient and  prior to transfusion     - today Hgb 6.3, transfuse 1 U pRBC  - Repeat INR, f/u CBC  - discontinue lovenox, start vitamin K  - concern for occult bleeding source; consult GI, start PPI 80mg loading followed by 40mg bid  - Trend CBC; transfuse Hgb < 7    Alcoholic cirrhosis of liver with ascites  Patient was diagnosed in February. Previous history of chronic alcohol abuse and states she drank about 4-5 glasses of wine per night for over 20 years. Patient is seeing hepatology  outpatient. Labs shown in care everywhere.     MELD-Na score: 22 at 7/22/2022 10:22 AM  MELD score: 16 at 7/22/2022 10:22 AM  Calculated from:  Serum Creatinine: 1.2 mg/dL at 7/22/2022  6:35 AM  Serum Sodium: 130 mmol/L at 7/22/2022  6:35 AM  Total Bilirubin: 1.2 mg/dL at 7/22/2022  6:35 AM  INR(ratio): 1.9 at 7/22/2022 10:22 AM  Age: 67 years    - Continue lactulose, thiamine, and folate  - Hepatology consulted; appreciate recommendations - outpatient follow up  -  repeat IR paracentesis completed, 7.5L  - continue to monitor for signs of liver failure  - F/u daily CMPs    Thrombocytopenia   Did trend down to 64, PT 20.7, INR 2.1, ptt 82.2  Patient underwent paracentesis and there was large volume leaking from site, now controlled with dermabond  - continue to monitor platelet count tomorrow and for continuous bleeding    UTI (urinary tract infection)  Positive UA from new kim placed 7/10/22. Hx of ESBL E coli in Feb 2022 at OSH  Uctx growing enterococcus  - S/P ampicillin course 07/17        Debility  - Pt non-mobile for some time in hospital,  PT/OT recs for SNF  - Pt advised to move around in bed but to only get up with help        Primary hypertension  Patient was discontinued from losartan per hepatologist.   - continue to monitor and can add when clinically indicated  - pt removed from tele 7/16    Acute encephalopathy  Patient is A&OX3, did have episode of seizures in MICU, now on onfi and keppra, no signs of hepatic encephalopathy, negative for asterixis    -  Continue lactulose 10g bid, Rifaximin 550mg bid, thiamine 100mg  -  titrate bowel regimen to about3- 4 BMs per day      Gram-positive cocci in clusters  Noted on 1 set of aer/anae cultures from 7/10/22. 2nd set so far negative. No clear sign of infection. Could likely be contaminant.  Uctx also from enterococcus species  - s/p Ampicillin completed 07/17          Hypomagnesemia  Monitor daily and replete prn      Deaf- written communication  - ASL   used for interaction as well as written communication      VTE Risk Mitigation (From admission, onward)         Ordered     IP VTE HIGH RISK PATIENT  Once         06/28/22 0419     Place sequential compression device  Until discontinued         06/28/22 0419     Place sequential compression device  Until discontinued         06/28/22 0415                Discharge Planning   REMA: 7/25/2022     Code Status: Full Code   Is the patient medically ready for discharge?: No    Reason for patient still in hospital (select all that apply): Treatment  Discharge Plan A: Skilled Nursing Facility   Discharge Delays: None known at this time              Lele Posadas MD  Department of Hospital Medicine   [facility] - Intensive Care ([place])

## 2022-07-23 NOTE — PLAN OF CARE
Problem: Adult Inpatient Plan of Care  Goal: Plan of Care Review  Outcome: Ongoing, Progressing  Goal: Patient-Specific Goal (Individualized)  Outcome: Ongoing, Progressing  Goal: Absence of Hospital-Acquired Illness or Injury  Outcome: Ongoing, Progressing  Goal: Optimal Comfort and Wellbeing  Outcome: Ongoing, Progressing  Goal: Readiness for Transition of Care  Outcome: Ongoing, Progressing     Problem: Skin Injury Risk Increased  Goal: Skin Health and Integrity  Outcome: Ongoing, Progressing     Problem: Bariatric Environmental Safety  Goal: Safety Maintained with Care  Outcome: Ongoing, Progressing

## 2022-07-23 NOTE — SUBJECTIVE & OBJECTIVE
Interval History: NAEON, afebrile. Patient communicative. She denies any new associated symptoms including chest pain, sob, headache, blurry vision, nausea, or vomiting. ASL interpretor used.     Review of Systems   Constitutional:  Negative for appetite change, chills and fever.   HENT:  Positive for rhinorrhea. Negative for sore throat and trouble swallowing.    Eyes:  Negative for pain and visual disturbance.   Respiratory:  Negative for cough, chest tightness and shortness of breath.    Cardiovascular:  Negative for chest pain and palpitations.   Gastrointestinal:  Positive for abdominal distention. Negative for abdominal pain, constipation, diarrhea, nausea and vomiting.   Genitourinary:  Negative for difficulty urinating, dysuria and hematuria.   Musculoskeletal:  Negative for joint swelling and myalgias.   Skin:  Negative for color change and pallor.   Neurological:  Negative for dizziness, light-headedness and headaches.   Psychiatric/Behavioral:  Negative for agitation and confusion.    Objective:     Vital Signs (Most Recent):  Temp: 97.9 °F (36.6 °C) (07/23/22 1627)  Pulse: 74 (07/23/22 1627)  Resp: 18 (07/23/22 1627)  BP: (!) 96/51 (07/23/22 1627)  SpO2: 100 % (07/23/22 1627)   Vital Signs (24h Range):  Temp:  [96.4 °F (35.8 °C)-98.2 °F (36.8 °C)] 97.9 °F (36.6 °C)  Pulse:  [68-78] 74  Resp:  [18-19] 18  SpO2:  [98 %-100 %] 100 %  BP: ()/(51-56) 96/51     Weight: 83.4 kg (183 lb 12.8 oz)  Body mass index is 35.9 kg/m².    Intake/Output Summary (Last 24 hours) at 7/23/2022 1848  Last data filed at 7/23/2022 1130  Gross per 24 hour   Intake 2190 ml   Output 400 ml   Net 1790 ml      Physical Exam  Constitutional:       General: She is not in acute distress.     Appearance: Normal appearance. She is not ill-appearing.   HENT:      Head: Normocephalic and atraumatic.      Nose: Rhinorrhea present. No congestion.      Mouth/Throat:      Mouth: Mucous membranes are moist.   Eyes:      General:          Right eye: No discharge.         Left eye: No discharge.      Extraocular Movements: Extraocular movements intact.   Cardiovascular:      Rate and Rhythm: Normal rate and regular rhythm.      Pulses: Normal pulses.      Heart sounds: Murmur (systolic flow) heard.     No gallop.   Pulmonary:      Effort: Pulmonary effort is normal. No respiratory distress.      Breath sounds: Normal breath sounds.   Abdominal:      General: There is distension.      Tenderness: There is no abdominal tenderness. There is no guarding or rebound.   Musculoskeletal:      Cervical back: Normal range of motion.      Right lower leg: Edema (3+) present.      Left lower leg: Edema (3+) present.   Skin:     Coloration: Skin is not jaundiced or pale.      Findings: Bruising (bilateral arms) present.   Neurological:      General: No focal deficit present.      Mental Status: She is alert and oriented to person, place, and time.      Cranial Nerves: No cranial nerve deficit.   Psychiatric:         Mood and Affect: Mood normal.         Behavior: Behavior normal.       Significant Labs: All pertinent labs within the past 24 hours have been reviewed.    Significant Imaging: I have reviewed all pertinent imaging results/findings within the past 24 hours.

## 2022-07-23 NOTE — ASSESSMENT & PLAN NOTE
Patient was diagnosed in February. Previous history of chronic alcohol abuse and states she drank about 4-5 glasses of wine per night for over 20 years. Patient is seeing hepatology outpatient. Labs shown in care everywhere.     MELD-Na score: 21 at 7/23/2022  5:50 AM  MELD score: 15 at 7/23/2022  5:50 AM  Calculated from:  Serum Creatinine: 1.1 mg/dL at 7/23/2022  5:50 AM  Serum Sodium: 130 mmol/L at 7/23/2022  5:50 AM  Total Bilirubin: 1.7 mg/dL at 7/23/2022  5:50 AM  INR(ratio): 1.7 at 7/23/2022  5:50 AM  Age: 67 years    - Continue lactulose, thiamine, and folate  - Hepatology consulted; appreciate recommendations - outpatient follow up  -  repeat IR paracentesis completed, 7.5L  - continue to monitor for signs of liver failure  - F/u daily CMPs

## 2022-07-24 NOTE — ASSESSMENT & PLAN NOTE
Plt 64, PT 17.7, INR 1.7. Improved after Dose of Vitamin K yesterday.   Patient underwent paracentesis and there was large volume leaking from site controlled with dermabond.    - continue to monitor platelet count and inspect abdomen for sites of bleeding.  - No bloody bowel movements noted.

## 2022-07-24 NOTE — PROGRESS NOTES
[facility] - Intensive [facility] ([place] [address])  [facility] Medicine  Progress Note    Patient Name: Aleyda Floyd  MRN: [id number]  Patient Class: IP- Inpatient   Admission Date: 6/27/2022  Length of Stay: 26 days  Attending Physician: Tena Brizuela MD  Primary Care Provider: Elvie Fernandes MD        Subjective:     Principal Problem:Anasarca        HPI:  This is a 67 year old deaf lady with alcoholic cirrhosis, and hypertension who presented with generalized swelling.  used to obtain history. Patient states that she had the swelling since her diagnosis of cirrhosis in February. Patient states that 2 days ago, she was able to ambulate but with some difficulty due to the swelling but now  worsening to the point where she is unable to ambulate by herself. Of note, patient had a recent EGD performed earlier last week where they had issues with her IV resulting is significant bruising. Patient also notes that whenever she scratches her skin with her nails, she notices clear fluid coming from the cuts. Patient denies fever, chills, cough, hemoptysis, nausea, vomiting, abdominal pain, yellowing of skin, constipation, dysuria, hematuria, and black/ bloody stools. Patient has baseline loose stools from her lactulose.  She reports compliance with all medications including her lactulose and Lasix. Patient and family declined any confusion or altered mental status. She denies any recent alcohol use with last use in February when she was diagnosed with alcoholic cirrhosis. Patient states that she used to weigh around 170s which she believes is her dry weight and is currently in the 200s.     Upon chart review, patient had an echo at an OSH on 2/25/22 which showed EF 60-65% and some diastolic dysfunction.    In the ED, patient was found to have Hgb of 8.5 and platelets of 113 around baseline. Patient had a UA which showed 1+ leuks and many bacteria. Patient did not complain of dysuria.       Overview/Hospital  Course:  Admitted for decompensated cirrhosis and anasarca. Began diuresing with IV Lasix and spironolactone. Paracentesis of -4.2L; ruled out SBP. Pt stabilized for few days and ready to be dc'ed when she had a change in mental status. Found pt unresponsive with fixed gaze and no pupil response. Code stroke called, CT neg for stroke. Eeg showed seizure-like activity. Pt transitioned to MICU for higher level of care. On step down, had a UTI and PITO, both of which were treated. She received a repeat paracentesis with 7.5L removed. Her creatinine started improve with administration of albumin and holding diuretics. Diuretics restarted and transitioned to Lasix 40mg PO and Aldactone 100mg PO.     Dispo to NH and follow up with hepatology, neuro.      Interval History: AF HDS NAEON. No new complaints at this time. No new signs of bleeding. Hgb stable.  services used.    Review of Systems   Constitutional:  Negative for appetite change, chills and fever.   HENT:  Positive for rhinorrhea. Negative for sore throat and trouble swallowing.    Eyes:  Negative for pain and visual disturbance.   Respiratory:  Negative for cough, chest tightness and shortness of breath.    Cardiovascular:  Negative for chest pain and palpitations.   Gastrointestinal:  Positive for abdominal distention. Negative for abdominal pain, constipation, diarrhea, nausea and vomiting.   Genitourinary:  Negative for difficulty urinating, dysuria and hematuria.   Musculoskeletal:  Negative for joint swelling and myalgias.   Skin:  Negative for color change and pallor.   Neurological:  Negative for dizziness, light-headedness and headaches.   Psychiatric/Behavioral:  Negative for agitation and confusion.    Objective:     Vital Signs (Most Recent):  Temp: 97.8 °F (36.6 °C) (07/24/22 0842)  Pulse: 93 (07/24/22 0842)  Resp: 18 (07/24/22 0842)  BP: (!) 109/55 (07/24/22 0842)  SpO2: 97 % (07/24/22 0842)   Vital Signs (24h Range):  Temp:  [96.3 °F (35.7  °C)-97.9 °F (36.6 °C)] 97.8 °F (36.6 °C)  Pulse:  [74-93] 93  Resp:  [18-19] 18  SpO2:  [97 %-100 %] 97 %  BP: ()/(51-55) 109/55     Weight: 83 kg (182 lb 15.7 oz)  Body mass index is 35.74 kg/m².  No intake or output data in the 24 hours ending 07/24/22 1429   Physical Exam  Constitutional:       General: She is not in acute distress.     Appearance: Normal appearance. She is not ill-appearing.   HENT:      Head: Normocephalic and atraumatic.      Nose: Rhinorrhea present. No congestion.      Mouth/Throat:      Mouth: Mucous membranes are moist.   Eyes:      General:         Right eye: No discharge.         Left eye: No discharge.      Extraocular Movements: Extraocular movements intact.   Cardiovascular:      Rate and Rhythm: Normal rate and regular rhythm.      Pulses: Normal pulses.      Heart sounds: Murmur (systolic flow) heard.     No gallop.   Pulmonary:      Effort: Pulmonary effort is normal. No respiratory distress.      Breath sounds: Normal breath sounds.   Abdominal:      General: There is distension.      Tenderness: There is no abdominal tenderness. There is no guarding or rebound.   Musculoskeletal:      Cervical back: Normal range of motion.      Right lower leg: Edema (3+) present.      Left lower leg: Edema (3+) present.   Skin:     Coloration: Skin is not jaundiced or pale.      Findings: Bruising (bilateral arms) present.   Neurological:      General: No focal deficit present.      Mental Status: She is alert and oriented to person, place, and time.      Cranial Nerves: No cranial nerve deficit.   Psychiatric:         Mood and Affect: Mood normal.         Behavior: Behavior normal.       Significant Labs: All pertinent labs within the past 24 hours have been reviewed.    Significant Imaging: I have reviewed all pertinent imaging results/findings within the past 24 hours.      Assessment/Plan:      * Keikosarca  67 yoF with alcoholic liver cirrhosis and hypertension admitted for generalized  anasarca. Patient's albumin on admission was 1.9. Likely edema related to poor intravascular oncotic pressure 2/2 alcoholic cirrhosis. Patient adherent to home medication. Given history and exam, patient would benefit from diuresis. Dry weight reported as 170s. Patient is currently 200 lbs 6.4oz (down from 212 lbs).     - Strict I/Os  - Daily weights  - Low sodium diet with 1500 ml fluid restriction  - Plan for f/u with her hepatologist on d/c  - discontinued 25% bottle of 25g Albumin     PITO (acute kidney injury)  Presenting with PITO with new sCr 1.6 (baseline sCr 0.9-1.1). Today 1.2.  DDx: Decreased PO intake likely resulting in pre-renal etiology    - Restarted diuresis. Per hepatology recommendation Lasix 40 BID IV transitioned to Lasix 40 PO. Continue  Aldactone 100 daily.  - Gentle trial of IVF; encourage PO intake  - Trend sCr  - Strict I&Os  - Avoid nephrotoxic agents  - Renally adjust medications      Anemia, chronic disease  Patient with Hgb of 8.5 on admit. Upon review of care everywhere, patient had Hgb of 8.4 in all of 2022. Likely due to chronic disease and stable.  One episode of Hgb 6.9 on 7/18. Ordered 1 U pRBC. Received written consent from patient and  prior to transfusion. Received 1U prbc for Hgb 6.3 yesterday.     Plan:  - today Hgb 7.9, Transfuse if Hg<7.0. No bloody bowel movements noted.   - Per GI recommendation, if anemia persists and not responding to transfusion then inpatient colonoscopy recommended for further evaluation  - continue pantoprazole 40 bid      Gram-positive cocci in clusters  Noted on 1 set of aer/anae cultures from 7/10/22. 2nd set so far negative. No clear sign of infection. Could likely be contaminant.  Uctx also from enterococcus species  - s/p Ampicillin completed 07/17          UTI (urinary tract infection)  Positive UA from new kim placed 7/10/22. Hx of ESBL E coli in Feb 2022 at OSH  Uctx growing enterococcus  - S/P ampicillin course 07/17        Acute  encephalopathy  Patient is A&OX3, did have episode of seizures in MICU, now on onfi and keppra, no signs of hepatic encephalopathy, negative for asterixis    - Continue lactulose 10g bid, Rifaximin 550mg bid, thiamine 100mg  - Titrate bowel regimen to 3- 4 BMs per day      Hypomagnesemia  Monitor daily and replete prn      Debility  - Pt non-mobile for some time in hospital,  PT/OT recs for SNF  - Pt advised to move around in bed but to only get up with help        Thrombocytopenia   Plt 64, PT 17.7, INR 1.7. Improved after Dose of Vitamin K yesterday.   Patient underwent paracentesis and there was large volume leaking from site controlled with dermabond.    - continue to monitor platelet count and inspect abdomen for sites of bleeding.  - No bloody bowel movements noted.     Alcoholic cirrhosis of liver with ascites  Patient was diagnosed in February. Previous history of chronic alcohol abuse and states she drank about 4-5 glasses of wine per night for over 20 years. Patient is seeing hepatology outpatient. Labs shown in care everywhere.     MELD-Na score: 21 at 7/23/2022  5:50 AM  MELD score: 15 at 7/23/2022  5:50 AM  Calculated from:  Serum Creatinine: 1.1 mg/dL at 7/23/2022  5:50 AM  Serum Sodium: 130 mmol/L at 7/23/2022  5:50 AM  Total Bilirubin: 1.7 mg/dL at 7/23/2022  5:50 AM  INR(ratio): 1.7 at 7/23/2022  5:50 AM  Age: 67 years    - Continue lactulose, thiamine, and folate  - Hepatology consulted; appreciate recommendations - outpatient follow up  -  repeat IR paracentesis completed, 7.5L  - continue to monitor for signs of liver failure  - F/u daily CMPs    Primary hypertension  Patient was discontinued from losartan per hepatologist.   - continue to monitor and can add when clinically indicated  - pt removed from tele 7/16    Deaf- written communication  - ASL  used for interaction as well as written communication      VTE Risk Mitigation (From admission, onward)         Ordered     IP VTE HIGH  RISK PATIENT  Once         06/28/22 0419     Place sequential compression device  Until discontinued         06/28/22 0419     Place sequential compression device  Until discontinued         06/28/22 0415                Discharge Planning   REMA: 7/25/2022     Code Status: Full Code   Is the patient medically ready for discharge?: Yes    Reason for patient still in hospital (select all that apply): Treatment  Discharge Plan A: Skilled Nursing Facility   Discharge Delays: None known at this time              Roger-Ernesto Ellis MD  Department of Hospital Medicine   [facility] - Intensive Care ([place])

## 2022-07-24 NOTE — PROGRESS NOTES
[facility] ([place] [address])  [facility] Medicine  Progress Note    Patient Name: Aleyda Floyd  MRN: [id number]  Patient Class: IP- Inpatient   Admission Date: 6/27/2022  Length of Stay: 25 days  Attending Physician: Tena Brizuela MD  Primary Care Provider: Elvie Fernandes MD        Subjective:     Principal Problem:Anasarca        HPI:  This is a 67 year old deaf lady with alcoholic cirrhosis, and hypertension who presented with generalized swelling.  used to obtain history. Patient states that she had the swelling since her diagnosis of cirrhosis in February. Patient states that 2 days ago, she was able to ambulate but with some difficulty due to the swelling but now  worsening to the point where she is unable to ambulate by herself. Of note, patient had a recent EGD performed earlier last week where they had issues with her IV resulting is significant bruising. Patient also notes that whenever she scratches her skin with her nails, she notices clear fluid coming from the cuts. Patient denies fever, chills, cough, hemoptysis, nausea, vomiting, abdominal pain, yellowing of skin, constipation, dysuria, hematuria, and black/ bloody stools. Patient has baseline loose stools from her lactulose.  She reports compliance with all medications including her lactulose and Lasix. Patient and family declined any confusion or altered mental status. She denies any recent alcohol use with last use in February when she was diagnosed with alcoholic cirrhosis. Patient states that she used to weigh around 170s which she believes is her dry weight and is currently in the 200s.     Upon chart review, patient had an echo at an OSH on 2/25/22 which showed EF 60-65% and some diastolic dysfunction.    In the ED, patient was found to have Hgb of 8.5 and platelets of 113 around baseline. Patient had a UA which showed 1+ leuks and many bacteria. Patient did not complain of dysuria.       Overview/Hospital  Course:  Admitted for decompensated cirrhosis and anasarca. Began diuresing with IV Lasix and spironolactone. Paracentesis of -4.2L; ruled out SBP. Pt stabilized for few days and ready to be dc'ed when she had a change in mental status. Found pt unresponsive with fixed gaze and no pupil response. Code stroke called, CT neg for stroke. Eeg showed seizure-like activity. Pt transitioned to MICU for higher level of care. On step down, had a UTI and PITO, both of which were treated. She received a repeat paracentesis with 7.5L removed. Her creatinine started improve with administration of albumin and holding diuretics. Diuretics restarted and transitioned to Lasix 40mg PO and Aldactone 100mg PO.         Interval History: NAEON, afebrile. Patient communicative. She denies any new associated symptoms including chest pain, sob, headache, blurry vision, nausea, or vomiting. ASL interpretor used.     Review of Systems   Constitutional:  Negative for appetite change, chills and fever.   HENT:  Positive for rhinorrhea. Negative for sore throat and trouble swallowing.    Eyes:  Negative for pain and visual disturbance.   Respiratory:  Negative for cough, chest tightness and shortness of breath.    Cardiovascular:  Negative for chest pain and palpitations.   Gastrointestinal:  Positive for abdominal distention. Negative for abdominal pain, constipation, diarrhea, nausea and vomiting.   Genitourinary:  Negative for difficulty urinating, dysuria and hematuria.   Musculoskeletal:  Negative for joint swelling and myalgias.   Skin:  Negative for color change and pallor.   Neurological:  Negative for dizziness, light-headedness and headaches.   Psychiatric/Behavioral:  Negative for agitation and confusion.    Objective:     Vital Signs (Most Recent):  Temp: 97.9 °F (36.6 °C) (07/23/22 1627)  Pulse: 74 (07/23/22 1627)  Resp: 18 (07/23/22 1627)  BP: (!) 96/51 (07/23/22 1627)  SpO2: 100 % (07/23/22 1627)   Vital Signs (24h Range):  Temp:   [96.4 °F (35.8 °C)-98.2 °F (36.8 °C)] 97.9 °F (36.6 °C)  Pulse:  [68-78] 74  Resp:  [18-19] 18  SpO2:  [98 %-100 %] 100 %  BP: ()/(51-56) 96/51     Weight: 83.4 kg (183 lb 12.8 oz)  Body mass index is 35.9 kg/m².    Intake/Output Summary (Last 24 hours) at 7/23/2022 1848  Last data filed at 7/23/2022 1130  Gross per 24 hour   Intake 2190 ml   Output 400 ml   Net 1790 ml      Physical Exam  Constitutional:       General: She is not in acute distress.     Appearance: Normal appearance. She is not ill-appearing.   HENT:      Head: Normocephalic and atraumatic.      Nose: Rhinorrhea present. No congestion.      Mouth/Throat:      Mouth: Mucous membranes are moist.   Eyes:      General:         Right eye: No discharge.         Left eye: No discharge.      Extraocular Movements: Extraocular movements intact.   Cardiovascular:      Rate and Rhythm: Normal rate and regular rhythm.      Pulses: Normal pulses.      Heart sounds: Murmur (systolic flow) heard.     No gallop.   Pulmonary:      Effort: Pulmonary effort is normal. No respiratory distress.      Breath sounds: Normal breath sounds.   Abdominal:      General: There is distension.      Tenderness: There is no abdominal tenderness. There is no guarding or rebound.   Musculoskeletal:      Cervical back: Normal range of motion.      Right lower leg: Edema (3+) present.      Left lower leg: Edema (3+) present.   Skin:     Coloration: Skin is not jaundiced or pale.      Findings: Bruising (bilateral arms) present.   Neurological:      General: No focal deficit present.      Mental Status: She is alert and oriented to person, place, and time.      Cranial Nerves: No cranial nerve deficit.   Psychiatric:         Mood and Affect: Mood normal.         Behavior: Behavior normal.       Significant Labs: All pertinent labs within the past 24 hours have been reviewed.    Significant Imaging: I have reviewed all pertinent imaging results/findings within the past 24  hours.      Assessment/Plan:      * Anasarca  67 yoF with alcoholic liver cirrhosis and hypertension admitted for generalized anasarca. Patient's albumin on admission was 1.9. Likely edema related to poor intravascular oncotic pressure 2/2 alcoholic cirrhosis. Patient adherent to home medication. Given history and exam, patient would benefit from diuresis. Dry weight reported as 170s. Patient is currently 200 lbs 6.4oz (down from 212 lbs).     - Strict I/Os  - Daily weights  - Low sodium diet with 1500 ml fluid restriction  - Plan for f/u with her hepatologist on d/c  - discontinued 25% bottle of 25g Albumin     PITO (acute kidney injury)  Presenting with PITO with new sCr 1.6 (baseline sCr 0.9-1.1). Today 1.2.  DDx: Decreased PO intake likely resulting in pre-renal etiology    - Restarted diuresis. Per hepatology recommendation Lasix 40 BID IV transitioned to Lasix 40 PO. Continue  Aldactone 100 daily.  - Gentle trial of IVF; encourage PO intake  - Trend sCr  - Strict I&Os  - Avoid nephrotoxic agents  - Renally adjust medications      Anemia, chronic disease  Patient with Hgb of 8.5 on admit. Upon review of care everywhere, patient had Hgb of 8.4 in all of 2022. Likely due to chronic disease and stable.  One episode of Hgb 6.9 on 7/18. Ordered 1 U pRBC. Received written consent from patient and  prior to transfusion. Received 1U prbc for Hgb 6.3 yesterday.     Plan:  - today Hgb 7.9, Transfuse if Hg<7.0. No bloody bowel movements noted.   - Per GI recommendation, if anemia persists and not responding to transfusion then inpatient colonoscopy recommended for further evaluation  - continue pantoprazole 40 bid      Alcoholic cirrhosis of liver with ascites  Patient was diagnosed in February. Previous history of chronic alcohol abuse and states she drank about 4-5 glasses of wine per night for over 20 years. Patient is seeing hepatology outpatient. Labs shown in care everywhere.     MELD-Na score: 21 at 7/23/2022   5:50 AM  MELD score: 15 at 7/23/2022  5:50 AM  Calculated from:  Serum Creatinine: 1.1 mg/dL at 7/23/2022  5:50 AM  Serum Sodium: 130 mmol/L at 7/23/2022  5:50 AM  Total Bilirubin: 1.7 mg/dL at 7/23/2022  5:50 AM  INR(ratio): 1.7 at 7/23/2022  5:50 AM  Age: 67 years    - Continue lactulose, thiamine, and folate  - Hepatology consulted; appreciate recommendations - outpatient follow up  -  repeat IR paracentesis completed, 7.5L  - continue to monitor for signs of liver failure  - F/u daily CMPs    Thrombocytopenia   Plt 64, PT 17.7, INR 1.7. Improved after Dose of Vitamin K yesterday.   Patient underwent paracentesis and there was large volume leaking from site controlled with dermabond.    - continue to monitor platelet count and inspect abdomen for sites of bleeding.  - No bloody bowel movements noted.     UTI (urinary tract infection)  Positive UA from new kim placed 7/10/22. Hx of ESBL E coli in Feb 2022 at OSH  Uctx growing enterococcus  - S/P ampicillin course 07/17        Debility  - Pt non-mobile for some time in hospital,  PT/OT recs for SNF  - Pt advised to move around in bed but to only get up with help        Primary hypertension  Patient was discontinued from losartan per hepatologist.   - continue to monitor and can add when clinically indicated  - pt removed from tele 7/16    Acute encephalopathy  Patient is A&OX3, did have episode of seizures in MICU, now on onfi and keppra, no signs of hepatic encephalopathy, negative for asterixis    -  Continue lactulose 10g bid, Rifaximin 550mg bid, thiamine 100mg  -  titrate bowel regimen to about3- 4 BMs per day      Gram-positive cocci in clusters  Noted on 1 set of aer/anae cultures from 7/10/22. 2nd set so far negative. No clear sign of infection. Could likely be contaminant.  Uctx also from enterococcus species  - s/p Ampicillin completed 07/17          Hypomagnesemia  Monitor daily and replete prn      Deaf- written communication  - ASL  used  for interaction as well as written communication      VTE Risk Mitigation (From admission, onward)         Ordered     IP VTE HIGH RISK PATIENT  Once         06/28/22 0419     Place sequential compression device  Until discontinued         06/28/22 0419     Place sequential compression device  Until discontinued         06/28/22 0415                Discharge Planning   REMA: 7/25/2022     Code Status: Full Code   Is the patient medically ready for discharge?: No    Reason for patient still in hospital (select all that apply): Treatment and Pending disposition  Discharge Plan A: Skilled Nursing Facility   Discharge Delays: None known at this time              Lele Posadas MD  Department of Hospital Medicine   [facility] - Intensive Care ([facility]

## 2022-07-24 NOTE — ASSESSMENT & PLAN NOTE
Patient is A&OX3, did have episode of seizures in MICU, now on onfi and keppra, no signs of hepatic encephalopathy, negative for asterixis    - Continue lactulose 10g bid, Rifaximin 550mg bid, thiamine 100mg  - Titrate bowel regimen to 3- 4 BMs per day

## 2022-07-24 NOTE — SUBJECTIVE & OBJECTIVE
Interval History: AF HDS NAEON. No new complaints at this time. No new signs of bleeding. Hgb stable.  services used.    Review of Systems   Constitutional:  Negative for appetite change, chills and fever.   HENT:  Positive for rhinorrhea. Negative for sore throat and trouble swallowing.    Eyes:  Negative for pain and visual disturbance.   Respiratory:  Negative for cough, chest tightness and shortness of breath.    Cardiovascular:  Negative for chest pain and palpitations.   Gastrointestinal:  Positive for abdominal distention. Negative for abdominal pain, constipation, diarrhea, nausea and vomiting.   Genitourinary:  Negative for difficulty urinating, dysuria and hematuria.   Musculoskeletal:  Negative for joint swelling and myalgias.   Skin:  Negative for color change and pallor.   Neurological:  Negative for dizziness, light-headedness and headaches.   Psychiatric/Behavioral:  Negative for agitation and confusion.    Objective:     Vital Signs (Most Recent):  Temp: 97.8 °F (36.6 °C) (07/24/22 0842)  Pulse: 93 (07/24/22 0842)  Resp: 18 (07/24/22 0842)  BP: (!) 109/55 (07/24/22 0842)  SpO2: 97 % (07/24/22 0842)   Vital Signs (24h Range):  Temp:  [96.3 °F (35.7 °C)-97.9 °F (36.6 °C)] 97.8 °F (36.6 °C)  Pulse:  [74-93] 93  Resp:  [18-19] 18  SpO2:  [97 %-100 %] 97 %  BP: ()/(51-55) 109/55     Weight: 83 kg (182 lb 15.7 oz)  Body mass index is 35.74 kg/m².  No intake or output data in the 24 hours ending 07/24/22 1429   Physical Exam  Constitutional:       General: She is not in acute distress.     Appearance: Normal appearance. She is not ill-appearing.   HENT:      Head: Normocephalic and atraumatic.      Nose: Rhinorrhea present. No congestion.      Mouth/Throat:      Mouth: Mucous membranes are moist.   Eyes:      General:         Right eye: No discharge.         Left eye: No discharge.      Extraocular Movements: Extraocular movements intact.   Cardiovascular:      Rate and Rhythm: Normal rate  and regular rhythm.      Pulses: Normal pulses.      Heart sounds: Murmur (systolic flow) heard.     No gallop.   Pulmonary:      Effort: Pulmonary effort is normal. No respiratory distress.      Breath sounds: Normal breath sounds.   Abdominal:      General: There is distension.      Tenderness: There is no abdominal tenderness. There is no guarding or rebound.   Musculoskeletal:      Cervical back: Normal range of motion.      Right lower leg: Edema (3+) present.      Left lower leg: Edema (3+) present.   Skin:     Coloration: Skin is not jaundiced or pale.      Findings: Bruising (bilateral arms) present.   Neurological:      General: No focal deficit present.      Mental Status: She is alert and oriented to person, place, and time.      Cranial Nerves: No cranial nerve deficit.   Psychiatric:         Mood and Affect: Mood normal.         Behavior: Behavior normal.       Significant Labs: All pertinent labs within the past 24 hours have been reviewed.    Significant Imaging: I have reviewed all pertinent imaging results/findings within the past 24 hours.

## 2022-07-24 NOTE — PLAN OF CARE
Problem: Adult Inpatient Plan of Care  Goal: Plan of Care Review  Outcome: Ongoing, Progressing  Goal: Absence of Hospital-Acquired Illness or Injury  Outcome: Ongoing, Progressing  Goal: Optimal Comfort and Wellbeing  Outcome: Ongoing, Progressing  Goal: Readiness for Transition of Care  Outcome: Ongoing, Progressing     Problem: Skin Injury Risk Increased  Goal: Skin Health and Integrity  Outcome: Ongoing, Progressing     Problem: Bariatric Environmental Safety  Goal: Safety Maintained with Care  Outcome: Ongoing, Progressing     Problem: Fall Injury Risk  Goal: Absence of Fall and Fall-Related Injury  Outcome: Ongoing, Progressing     Problem: Infection  Goal: Absence of Infection Signs and Symptoms  Outcome: Ongoing, Progressing     Problem: Fluid and Electrolyte Imbalance (Acute Kidney Injury/Impairment)  Goal: Fluid and Electrolyte Balance  Outcome: Ongoing, Progressing     Problem: Oral Intake Inadequate (Acute Kidney Injury/Impairment)  Goal: Optimal Nutrition Intake  Outcome: Ongoing, Progressing     Problem: Renal Function Impairment (Acute Kidney Injury/Impairment)  Goal: Effective Renal Function  Outcome: Ongoing, Progressing     Problem: Impaired Wound Healing  Goal: Optimal Wound Healing  Outcome: Ongoing, Progressing

## 2022-07-24 NOTE — NURSING
Patient is Alert and oriented to self , situation . Had bowel and bladder movement , Incontinence. External catheter present . Position changed frequently . Call light within reach , safety precaution maintained. Will continue monitoring.

## 2022-07-25 PROBLEM — Z71.89 GOALS OF CARE, COUNSELING/DISCUSSION: Status: RESOLVED | Noted: 2022-01-01 | Resolved: 2022-01-01

## 2022-07-25 PROBLEM — Z71.89 GOALS OF CARE, COUNSELING/DISCUSSION: Status: ACTIVE | Noted: 2022-01-01

## 2022-07-25 NOTE — ASSESSMENT & PLAN NOTE
Ms. Floyd is a 66 yo F with decompensated cirrhosis and Grade I esophageal varices on EGD (6/23/22) with chronically low hemoglobin values. CT abdomen with hyperdensity within the stomach, likely ingested material, however, blood product can not be excluded. Ct also showed colonic diverticulosis and Cirrhotic liver morphology with sequela of portal hypertension including splenomegaly, large volume abdominal ascites, and collateral vessels.      Patient without abdominal pain, hematochezia, hematemesis, hemodynamically stable on exam, Recently transfused for declining Hemoglobin. Anemia likely secondary to decompensated cirrhosis and portal hypertensive sequestration/blood loss.     Recommendations:  - continue supportive care  - transfuse for hemoglobin as necessary  - recommend outpatient colonoscopy, orders placed to be arranged after d/c.  - Please contact GI with any acute changes in patient's clinical status

## 2022-07-25 NOTE — ASSESSMENT & PLAN NOTE
Positive UA from new kim placed 7/10/22. Hx of ESBL E coli in Feb 2022 at OSH  Uctx growing enterococcus    7/25 - concerns for recurring uti given discoloration of urine and mild decline in concentration.    - f/u repeat U/A  - determine if another antibiotic course may need to be started  - Delay in pending d/c to [place] or [facility]. [facility] requires booster vaccine. Hold off d/c and await repeat UA.   - S/P ampicillin course 07/17

## 2022-07-25 NOTE — ASSESSMENT & PLAN NOTE
Patient with Hgb of 8.5 on admit. Upon review of care everywhere, patient had Hgb of 8.4 in all of 2022. Likely due to chronic disease and stable.  One episode of Hgb 6.9 on 7/18. Ordered 1 U pRBC. Received written consent from patient and  prior to transfusion. Received 1U prbc for Hgb 6.3 yesterday.     Plan:  - today Hgb 7.7, Transfuse if Hg<7.0. No bloody bowel movements noted.   - Per GI recommendation, if anemia persists and not responding to transfusion then inpatient colonoscopy recommended for further evaluation  - continue pantoprazole 40 bid

## 2022-07-25 NOTE — ASSESSMENT & PLAN NOTE
Patient did have episode of seizures in MICU, now on onfi and keppra, no signs of hepatic encephalopathy, negative for asterixis. Patient has decreased concentration today 7/25 but is able to answer questions.     - Continue lactulose 10g bid, Rifaximin 550mg bid, thiamine 100mg  - Titrate bowel regimen to 3- 4 BMs per day

## 2022-07-25 NOTE — PLAN OF CARE
JAMIE provided updates to patient sister. Patient not stable to dc today. JAMIE has sent updates to [facility].           Anjana Guzmán LMSW  [facility]   [phone]

## 2022-07-25 NOTE — SUBJECTIVE & OBJECTIVE
Interval History: Patient having more difficulty with concentration today. NAEON, afebrile. Urine has dark brown discoloration.   ASL interpretor used.     Review of Systems   Constitutional:  Negative for appetite change, chills and fever.   HENT:  Negative for sore throat and trouble swallowing.    Eyes:  Negative for pain and visual disturbance.   Respiratory:  Negative for cough, chest tightness and shortness of breath.    Cardiovascular:  Negative for chest pain and palpitations.   Gastrointestinal:  Positive for abdominal distention. Negative for abdominal pain, constipation, diarrhea, nausea and vomiting.   Genitourinary:  Negative for difficulty urinating, dysuria and hematuria.   Musculoskeletal:  Negative for joint swelling and myalgias.   Skin:  Negative for color change and pallor.   Neurological:  Negative for dizziness, light-headedness and headaches.   Psychiatric/Behavioral:  Positive for confusion and decreased concentration. Negative for agitation.    Objective:     Vital Signs (Most Recent):  Temp: 97.9 °F (36.6 °C) (07/25/22 0953)  Pulse: 93 (07/25/22 0945)  Resp: 16 (07/25/22 0340)  BP: (!) 115/58 (07/25/22 0953)  SpO2: 97 % (07/25/22 0945)   Vital Signs (24h Range):  Temp:  [97.6 °F (36.4 °C)-97.9 °F (36.6 °C)] 97.9 °F (36.6 °C)  Pulse:  [84-94] 93  Resp:  [16-20] 16  SpO2:  [97 %-100 %] 97 %  BP: (105-117)/(51-58) 115/58     Weight: 83 kg (182 lb 15.7 oz)  Body mass index is 35.74 kg/m².    Intake/Output Summary (Last 24 hours) at 7/25/2022 1648  Last data filed at 7/25/2022 1200  Gross per 24 hour   Intake 300 ml   Output 1300 ml   Net -1000 ml      Physical Exam  Constitutional:       General: She is not in acute distress.     Appearance: Normal appearance. She is not ill-appearing.   HENT:      Head: Normocephalic and atraumatic.      Nose: Rhinorrhea present. No congestion.      Mouth/Throat:      Mouth: Mucous membranes are moist.   Eyes:      General:         Right eye: No discharge.          Left eye: No discharge.      Extraocular Movements: Extraocular movements intact.   Cardiovascular:      Rate and Rhythm: Normal rate and regular rhythm.      Pulses: Normal pulses.      Heart sounds: Murmur (systolic flow) heard.     No gallop.   Pulmonary:      Effort: Pulmonary effort is normal. No respiratory distress.      Breath sounds: Normal breath sounds.   Abdominal:      General: There is distension.      Tenderness: There is no abdominal tenderness. There is no guarding or rebound.   Musculoskeletal:      Cervical back: Normal range of motion.      Right lower leg: Edema (3+) present.      Left lower leg: Edema (3+) present.   Skin:     Coloration: Skin is not jaundiced or pale.      Findings: Bruising (bilateral arms) present.   Neurological:      General: No focal deficit present.      Mental Status: She is alert and oriented to person, place, and time.      Cranial Nerves: No cranial nerve deficit.   Psychiatric:         Mood and Affect: Mood normal.         Behavior: Behavior normal.       Significant Labs: All pertinent labs within the past 24 hours have been reviewed.    Significant Imaging: I have reviewed all pertinent imaging results/findings within the past 24 hours.

## 2022-07-25 NOTE — PROGRESS NOTES
[facility] ([place] [id number])  [facility] Medicine  Progress Note    Patient Name: Aleyda Floyd  MRN: [id number]  Patient Class: IP- Inpatient   Admission Date: 6/27/2022  Length of Stay: 27 days  Attending Physician: Tena Brizuela MD  Primary Care Provider: Elvie Fernandes MD        Subjective:     Principal Problem:Anasarca        HPI:  This is a 67 year old deaf lady with alcoholic cirrhosis, and hypertension who presented with generalized swelling.  used to obtain history. Patient states that she had the swelling since her diagnosis of cirrhosis in February. Patient states that 2 days ago, she was able to ambulate but with some difficulty due to the swelling but now  worsening to the point where she is unable to ambulate by herself. Of note, patient had a recent EGD performed earlier last week where they had issues with her IV resulting is significant bruising. Patient also notes that whenever she scratches her skin with her nails, she notices clear fluid coming from the cuts. Patient denies fever, chills, cough, hemoptysis, nausea, vomiting, abdominal pain, yellowing of skin, constipation, dysuria, hematuria, and black/ bloody stools. Patient has baseline loose stools from her lactulose.  She reports compliance with all medications including her lactulose and Lasix. Patient and family declined any confusion or altered mental status. She denies any recent alcohol use with last use in February when she was diagnosed with alcoholic cirrhosis. Patient states that she used to weigh around 170s which she believes is her dry weight and is currently in the 200s.     Upon chart review, patient had an echo at an OSH on 2/25/22 which showed EF 60-65% and some diastolic dysfunction.    In the ED, patient was found to have Hgb of 8.5 and platelets of 113 around baseline. Patient had a UA which showed 1+ leuks and many bacteria. Patient did not complain of dysuria.       Overview/Hospital  Course:  Admitted for decompensated cirrhosis and anasarca. Began diuresing with IV Lasix and spironolactone. Paracentesis of -4.2L; ruled out SBP. Pt stabilized for few days and ready to be dc'ed when she had a change in mental status. Found pt unresponsive with fixed gaze and no pupil response. Code stroke called, CT neg for stroke. Eeg showed seizure-like activity. Pt transitioned to MICU for higher level of care. On step down, had a UTI and PITO, both of which were treated. She received a repeat paracentesis with 7.5L removed. Her creatinine started improve with administration of albumin and holding diuretics. Diuretics restarted and transitioned to Lasix 40mg PO and Aldactone 100mg PO. Patient started to have mild difficulty with mentation again with concerns for recurring UTI. Awaiting repeat UA.    Dispo to NH and follow up with hepatology, neuro.      Interval History: Patient having more difficulty with concentration today. NAEON, afebrile. Urine has dark brown discoloration.   ASL interpretor used.     Review of Systems   Constitutional:  Negative for appetite change, chills and fever.   HENT:  Negative for sore throat and trouble swallowing.    Eyes:  Negative for pain and visual disturbance.   Respiratory:  Negative for cough, chest tightness and shortness of breath.    Cardiovascular:  Negative for chest pain and palpitations.   Gastrointestinal:  Positive for abdominal distention. Negative for abdominal pain, constipation, diarrhea, nausea and vomiting.   Genitourinary:  Negative for difficulty urinating, dysuria and hematuria.   Musculoskeletal:  Negative for joint swelling and myalgias.   Skin:  Negative for color change and pallor.   Neurological:  Negative for dizziness, light-headedness and headaches.   Psychiatric/Behavioral:  Positive for confusion and decreased concentration. Negative for agitation.    Objective:     Vital Signs (Most Recent):  Temp: 97.9 °F (36.6 °C) (07/25/22 0953)  Pulse: 93  (07/25/22 0945)  Resp: 16 (07/25/22 0340)  BP: (!) 115/58 (07/25/22 0953)  SpO2: 97 % (07/25/22 0945)   Vital Signs (24h Range):  Temp:  [97.6 °F (36.4 °C)-97.9 °F (36.6 °C)] 97.9 °F (36.6 °C)  Pulse:  [84-94] 93  Resp:  [16-20] 16  SpO2:  [97 %-100 %] 97 %  BP: (105-117)/(51-58) 115/58     Weight: 83 kg (182 lb 15.7 oz)  Body mass index is 35.74 kg/m².    Intake/Output Summary (Last 24 hours) at 7/25/2022 1648  Last data filed at 7/25/2022 1200  Gross per 24 hour   Intake 300 ml   Output 1300 ml   Net -1000 ml      Physical Exam  Constitutional:       General: She is not in acute distress.     Appearance: Normal appearance. She is not ill-appearing.   HENT:      Head: Normocephalic and atraumatic.      Nose: Rhinorrhea present. No congestion.      Mouth/Throat:      Mouth: Mucous membranes are moist.   Eyes:      General:         Right eye: No discharge.         Left eye: No discharge.      Extraocular Movements: Extraocular movements intact.   Cardiovascular:      Rate and Rhythm: Normal rate and regular rhythm.      Pulses: Normal pulses.      Heart sounds: Murmur (systolic flow) heard.     No gallop.   Pulmonary:      Effort: Pulmonary effort is normal. No respiratory distress.      Breath sounds: Normal breath sounds.   Abdominal:      General: There is distension.      Tenderness: There is no abdominal tenderness. There is no guarding or rebound.   Musculoskeletal:      Cervical back: Normal range of motion.      Right lower leg: Edema (3+) present.      Left lower leg: Edema (3+) present.   Skin:     Coloration: Skin is not jaundiced or pale.      Findings: Bruising (bilateral arms) present.   Neurological:      General: No focal deficit present.      Mental Status: She is alert and oriented to person, place, and time.      Cranial Nerves: No cranial nerve deficit.   Psychiatric:         Mood and Affect: Mood normal.         Behavior: Behavior normal.       Significant Labs: All pertinent labs within the past  24 hours have been reviewed.    Significant Imaging: I have reviewed all pertinent imaging results/findings within the past 24 hours.      Assessment/Plan:      * Anasarca  67 yoF with alcoholic liver cirrhosis and hypertension admitted for generalized anasarca. Patient's albumin on admission was 1.9. Likely edema related to poor intravascular oncotic pressure 2/2 alcoholic cirrhosis. Patient adherent to home medication. Given history and exam, patient would benefit from diuresis. Dry weight reported as 170s. Patient is currently 200 lbs 6.4oz (down from 212 lbs).     - Strict I/Os  - Daily weights  - Low sodium diet with 1500 ml fluid restriction  - Plan for f/u with her hepatologist on d/c  - discontinued 25% bottle of 25g Albumin     PITO (acute kidney injury)  Presenting with PITO with new sCr 1.6 (baseline sCr 0.9-1.1). Today 1.2.  DDx: Decreased PO intake likely resulting in pre-renal etiology    - Restarted diuresis. Per hepatology recommendation Lasix 40 BID IV transitioned to Lasix 40 PO. Continue  Aldactone 100 daily.  - Gentle trial of IVF; encourage PO intake  - Trend sCr  - Strict I&Os  - Avoid nephrotoxic agents  - Renally adjust medications      UTI (urinary tract infection)  Positive UA from new kim placed 7/10/22. Hx of ESBL E coli in Feb 2022 at OSH  Uctx growing enterococcus    7/25 - concerns for recurring uti given discoloration of urine and mild decline in concentration.    - f/u repeat U/A  - determine if another antibiotic course may need to be started  - Delay in pending d/c to [place] or [facility]. [facility] requires booster vaccine. Hold off d/c and await repeat UA.   - S/P ampicillin course 07/17        Anemia, chronic disease  Patient with Hgb of 8.5 on admit. Upon review of care everywhere, patient had Hgb of 8.4 in all of 2022. Likely due to chronic disease and stable.  One episode of Hgb 6.9 on 7/18. Ordered 1 U pRBC. Received written consent from patient and  prior to  transfusion. Received 1U prbc for Hgb 6.3 yesterday.     Plan:  - today Hgb 7.7, Transfuse if Hg<7.0. No bloody bowel movements noted.   - Per GI recommendation, if anemia persists and not responding to transfusion then inpatient colonoscopy recommended for further evaluation  - continue pantoprazole 40 bid      Alcoholic cirrhosis of liver with ascites  Patient was diagnosed in February. Previous history of chronic alcohol abuse and states she drank about 4-5 glasses of wine per night for over 20 years. Patient is seeing hepatology outpatient. Labs shown in care everywhere.     MELD-Na score: 21 at 7/23/2022  5:50 AM  MELD score: 15 at 7/23/2022  5:50 AM  Calculated from:  Serum Creatinine: 1.1 mg/dL at 7/23/2022  5:50 AM  Serum Sodium: 130 mmol/L at 7/23/2022  5:50 AM  Total Bilirubin: 1.7 mg/dL at 7/23/2022  5:50 AM  INR(ratio): 1.7 at 7/23/2022  5:50 AM  Age: 67 years    - Continue lactulose, thiamine, and folate  - Hepatology consulted; appreciate recommendations - outpatient follow up  -  repeat IR paracentesis completed, 7.5L  - continue to monitor for signs of liver failure  - F/u daily CMPs    Thrombocytopenia   Plt 56, PT 17.3, INR 1.7. Improved after Dose of Vitamin K.   Patient underwent paracentesis and there was large volume leaking from site controlled with dermabond.    - continue to monitor platelet count and inspect abdomen for sites of bleeding.  - No bloody bowel movements noted.     Debility  - Pt non-mobile for some time in hospital,  PT/OT recs for SNF  - Pt advised to move around in bed but to only get up with help      Primary hypertension  Patient was discontinued from losartan per hepatologist.   - continue to monitor and can add when clinically indicated  - pt removed from tele 7/16    Acute encephalopathy  Patient did have episode of seizures in MICU, now on onfi and keppra, no signs of hepatic encephalopathy, negative for asterixis. Patient has decreased concentration today 7/25 but is  able to answer questions.     - Continue lactulose 10g bid, Rifaximin 550mg bid, thiamine 100mg  - Titrate bowel regimen to 3- 4 BMs per day      Gram-positive cocci in clusters  Noted on 1 set of aer/anae cultures from 7/10/22. 2nd set so far negative. No clear sign of infection. Could likely be contaminant.  Uctx also from enterococcus species    - See UTI  - s/p Ampicillin completed 07/17          Hypomagnesemia  Monitor daily and replete prn      Deaf- written communication  - ASL  used for interaction as well as written communication      VTE Risk Mitigation (From admission, onward)         Ordered     IP VTE HIGH RISK PATIENT  Once         06/28/22 0419     Place sequential compression device  Until discontinued         06/28/22 0419     Place sequential compression device  Until discontinued         06/28/22 0415                Discharge Planning   REMA: 7/26/2022     Code Status: Full Code   Is the patient medically ready for discharge?: No    Reason for patient still in hospital (select all that apply): Treatment  Discharge Plan A: Skilled Nursing Facility   Discharge Delays: None known at this time              Lele Posadas MD  Department of Hospital Medicine   [facility] [address] - Intensive Care ([facility]-)

## 2022-07-25 NOTE — PLAN OF CARE
Problem: Adult Inpatient Plan of Care  Goal: Plan of Care Review  Outcome: Ongoing, Progressing  Goal: Patient-Specific Goal (Individualized)  Outcome: Ongoing, Progressing  Goal: Absence of Hospital-Acquired Illness or Injury  Outcome: Ongoing, Progressing  Intervention: Identify and Manage Fall Risk  Flowsheets (Taken 7/25/2022 1540)  Safety Promotion/Fall Prevention:   assistive device/personal item within reach   Fall Risk reviewed with patient/family   Fall Risk signage in place   lighting adjusted   medications reviewed   side rails raised x 2   nonskid shoes/socks when out of bed  Intervention: Prevent Skin Injury  Flowsheets (Taken 7/25/2022 1540)  Body Position: weight shifting  Skin Protection:   adhesive use limited   pectin skin barriers applied  Intervention: Prevent and Manage VTE (Venous Thromboembolism) Risk  Flowsheets (Taken 7/25/2022 1540)  Activity Management: Rolling - L1  VTE Prevention/Management: bleeding risk assessed  Range of Motion: active ROM (range of motion) encouraged  Intervention: Prevent Infection  Flowsheets (Taken 7/25/2022 1540)  Infection Prevention:   hand hygiene promoted   single patient room provided  Goal: Optimal Comfort and Wellbeing  Outcome: Ongoing, Progressing  Intervention: Monitor Pain and Promote Comfort  Flowsheets (Taken 7/25/2022 1540)  Pain Management Interventions:   care clustered   pillow support provided   position adjusted  Intervention: Provide Person-Centered Care  Flowsheets (Taken 7/25/2022 1540)  Trust Relationship/Rapport:   questions answered   questions encouraged   choices provided  Goal: Readiness for Transition of Care  Outcome: Ongoing, Progressing     Problem: Skin Injury Risk Increased  Goal: Skin Health and Integrity  Outcome: Ongoing, Progressing  Intervention: Optimize Skin Protection  Flowsheets (Taken 7/25/2022 1540)  Pressure Reduction Techniques:   frequent weight shift encouraged   weight shift assistance provided  Pressure  Reduction Devices:   positioning supports utilized   pressure-redistributing mattress utilized  Skin Protection:   adhesive use limited   pectin skin barriers applied  Head of Bed (HOB) Positioning: HOB elevated  Intervention: Promote and Optimize Oral Intake  Flowsheets (Taken 7/25/2022 1540)  Oral Nutrition Promotion: social interaction promoted     Problem: Bariatric Environmental Safety  Goal: Safety Maintained with Care  Outcome: Ongoing, Progressing  Intervention: Promote Safety and Comfort  Flowsheets (Taken 7/25/2022 1540)  Bariatric Safety: specialty bed utilized     Problem: Fall Injury Risk  Goal: Absence of Fall and Fall-Related Injury  Outcome: Ongoing, Progressing  Intervention: Identify and Manage Contributors  Flowsheets (Taken 7/25/2022 1540)  Self-Care Promotion: meal set-up provided  Medication Review/Management: medications reviewed  Intervention: Promote Injury-Free Environment  Flowsheets (Taken 7/25/2022 1540)  Safety Promotion/Fall Prevention:   assistive device/personal item within reach   Fall Risk reviewed with patient/family   Fall Risk signage in place   lighting adjusted   medications reviewed   side rails raised x 2   nonskid shoes/socks when out of bed     Problem: Oral Intake Inadequate (Acute Kidney Injury/Impairment)  Goal: Optimal Nutrition Intake  Outcome: Ongoing, Progressing  Intervention: Promote and Optimize Nutrition  Flowsheets (Taken 7/25/2022 1540)  Oral Nutrition Promotion: social interaction promoted     Problem: Impaired Wound Healing  Goal: Optimal Wound Healing  Outcome: Ongoing, Progressing  Intervention: Promote Wound Healing  Flowsheets (Taken 7/25/2022 1540)  Oral Nutrition Promotion: social interaction promoted  Activity Management: Rolling - L1  Pain Management Interventions:   care clustered   pillow support provided   position adjusted

## 2022-07-25 NOTE — SUBJECTIVE & OBJECTIVE
Subjective:   GI initially consulted on 7/22 for anemia without overt GI bleeding.    Interval History: Brown BMs noted in flow sheets.  No overt GI bleeding reported per patients family or nursing.    Hgb 7.7 this AM, last received 1 unit PRBCs 7/23 for hgb drop to 6.3.  Her , who provides most of the history, states her orientation status continues to wax and wane.      *Visit conducted via video , Albino ID # [id number].    Review of Systems   HENT:  Positive for hearing loss.    Gastrointestinal:  Negative for blood in stool, nausea and vomiting.   Objective:     Vital Signs (Most Recent):  Temp: 97.7 °F (36.5 °C) (07/25/22 0340)  Pulse: 94 (07/25/22 0340)  Resp: 16 (07/25/22 0340)  BP: (!) 115/58 (07/25/22 0953)  SpO2: 98 % (07/25/22 0340)   Vital Signs (24h Range):  Temp:  [97.6 °F (36.4 °C)-98.4 °F (36.9 °C)] 97.7 °F (36.5 °C)  Pulse:  [84-94] 94  Resp:  [16-20] 16  SpO2:  [97 %-100 %] 98 %  BP: (105-117)/(51-58) 115/58     Weight: 83 kg (182 lb 15.7 oz) (07/24/22 0400)  Body mass index is 35.74 kg/m².      Intake/Output Summary (Last 24 hours) at 7/25/2022 1254  Last data filed at 7/25/2022 0400  Gross per 24 hour   Intake 550 ml   Output 1000 ml   Net -450 ml       Lines/Drains/Airways       Drain  Duration             Female External Urinary Catheter 07/20/22 1400 4 days              Peripheral Intravenous Line  Duration                  Peripheral IV - Single Lumen 07/21/22 0000 20 G Right Antecubital 4 days                    Physical Exam  Vitals reviewed.   Constitutional:       General: She is not in acute distress.     Appearance: She is not ill-appearing.   Neurological:      Mental Status: She is alert.       Significant Labs:  CBC:   Recent Labs   Lab 07/24/22  0317 07/25/22  0442   WBC 6.10 5.66   HGB 8.3* 7.7*   HCT 24.8* 23.0*   PLT 60* 56*     CMP:   Recent Labs   Lab 07/25/22  0442   GLU 89   CALCIUM 8.2*   ALBUMIN 2.4*   PROT 5.2*   *   K 5.0   CO2 24      BUN  26*   CREATININE 1.2   ALKPHOS 70   ALT 24   AST 40   BILITOT 1.9*     Coagulation:   Recent Labs   Lab 07/25/22  0442   INR 1.7*     All pertinent lab results from the last 24 hours have been reviewed.      Significant Imaging:  Imaging results within the past 24 hours have been reviewed.

## 2022-07-25 NOTE — PROGRESS NOTES
[facility] ([place] [address])  Gastroenterology  Progress Note    Patient Name: Aleyda Floyd  MRN: [id number]  Admission Date: 6/27/2022  Hospital Length of Stay: 27 days  Code Status: Full Code   Attending Provider: Tena Brizuela MD  Consulting Provider: Nayla Gamez DNP  Primary Care Physician: Elvie Fernandes MD  Principal Problem: Anasarca      Subjective:   GI initially consulted on 7/22 for anemia without overt GI bleeding.    Interval History: Brown BMs noted in flow sheets.  No overt GI bleeding reported per patients family or nursing.    Hgb 7.7 this AM, last received 1 unit PRBCs 7/23 for hgb drop to 6.3.  Her , who provides most of the history, states her orientation status continues to wax and wane.      *Visit conducted via video Albino ID # [id number].    Review of Systems   HENT:  Positive for hearing loss.    Gastrointestinal:  Negative for blood in stool, nausea and vomiting.   Objective:     Vital Signs (Most Recent):  Temp: 97.7 °F (36.5 °C) (07/25/22 0340)  Pulse: 94 (07/25/22 0340)  Resp: 16 (07/25/22 0340)  BP: (!) 115/58 (07/25/22 0953)  SpO2: 98 % (07/25/22 0340)   Vital Signs (24h Range):  Temp:  [97.6 °F (36.4 °C)-98.4 °F (36.9 °C)] 97.7 °F (36.5 °C)  Pulse:  [84-94] 94  Resp:  [16-20] 16  SpO2:  [97 %-100 %] 98 %  BP: (105-117)/(51-58) 115/58     Weight: 83 kg (182 lb 15.7 oz) (07/24/22 0400)  Body mass index is 35.74 kg/m².      Intake/Output Summary (Last 24 hours) at 7/25/2022 1254  Last data filed at 7/25/2022 0400  Gross per 24 hour   Intake 550 ml   Output 1000 ml   Net -450 ml       Lines/Drains/Airways       Drain  Duration             Female External Urinary Catheter 07/20/22 1400 4 days              Peripheral Intravenous Line  Duration                  Peripheral IV - Single Lumen 07/21/22 0000 20 G Right Antecubital 4 days                    Physical Exam  Vitals reviewed.   Constitutional:       General: She is not in acute distress.      Appearance: She is not ill-appearing.   Neurological:      Mental Status: She is alert.       Significant Labs:  CBC:   Recent Labs   Lab 07/24/22  0317 07/25/22  0442   WBC 6.10 5.66   HGB 8.3* 7.7*   HCT 24.8* 23.0*   PLT 60* 56*     CMP:   Recent Labs   Lab 07/25/22  0442   GLU 89   CALCIUM 8.2*   ALBUMIN 2.4*   PROT 5.2*   *   K 5.0   CO2 24      BUN 26*   CREATININE 1.2   ALKPHOS 70   ALT 24   AST 40   BILITOT 1.9*     Coagulation:   Recent Labs   Lab 07/25/22  0442   INR 1.7*     All pertinent lab results from the last 24 hours have been reviewed.      Significant Imaging:  Imaging results within the past 24 hours have been reviewed.    Assessment/Plan:     Anemia, chronic disease  Ms. Floyd is a 66 yo F with decompensated cirrhosis and Grade I esophageal varices on EGD (6/23/22) with chronically low hemoglobin values. CT abdomen with hyperdensity within the stomach, likely ingested material, however, blood product can not be excluded. Ct also showed colonic diverticulosis and Cirrhotic liver morphology with sequela of portal hypertension including splenomegaly, large volume abdominal ascites, and collateral vessels.      Patient without abdominal pain, hematochezia, hematemesis, hemodynamically stable on exam, Recently transfused for declining Hemoglobin. Anemia likely secondary to decompensated cirrhosis and portal hypertensive sequestration/blood loss.     Recommendations:  - continue supportive care  - transfuse for hemoglobin as necessary  - recommend outpatient colonoscopy, orders placed to be arranged after d/c.  - Please contact GI with any acute changes in patient's clinical status        Thank you for your consult. I will sign off. Please contact us if you have any additional questions.    Nayla Gamez DNP  Gastroenterology  [facility] ([place]

## 2022-07-26 NOTE — ASSESSMENT & PLAN NOTE
Patient was diagnosed in February. Previous history of chronic alcohol abuse and states she drank about 4-5 glasses of wine per night for over 20 years. Patient is seeing hepatology outpatient. Labs shown in care everywhere.     MELD-Na score: 22 at 7/26/2022  3:31 AM  MELD score: 16 at 7/26/2022  3:31 AM  Calculated from:  Serum Creatinine: 1.3 mg/dL at 7/26/2022  3:31 AM  Serum Sodium: 129 mmol/L at 7/26/2022  3:31 AM  Total Bilirubin: 2.2 mg/dL at 7/26/2022  3:31 AM  INR(ratio): 1.5 at 7/26/2022  3:31 AM  Age: 67 years    - Continue lactulose (15mg bid), thiamine, and folate  - Hepatology consulted; appreciate recommendations - outpatient follow up  -  repeat IR paracentesis completed, 7.5L  - continue to monitor for signs of liver failure  - F/u daily CMPs

## 2022-07-26 NOTE — ASSESSMENT & PLAN NOTE
Plt 65, PT 15.6, INR 1.5. Improved after Dose of Vitamin K.  Patient underwent paracentesis and there was large volume leaking from site controlled with dermabond.    - continue to monitor platelet count and inspect abdomen for sites of bleeding.  - No bloody bowel movements noted.

## 2022-07-26 NOTE — CONSULTS
[facility] ([place] [address])  Neurology  Consult Note    Patient Name: Aleyda Floyd  MRN: [id number]  Admission Date: 6/27/2022  Hospital Length of Stay: 28 days  Code Status: Full Code   Attending Provider: Tena Brizuela MD   Consulting Provider: Isai Bustillo MD  Primary Care Physician: Elvie Fernandes MD  Principal Problem:Anasarca    Inpatient consult to Neurology  Consult performed by: Isai Bustillo MD  Consult ordered by: Lele Posadas MD  Reason for consult: evaluation of contribution of anti-epileptic medication to patient's mentation         Subjective:     Chief Complaint:  evaluation of contribution of anti-epileptic medication to patient's mentation     HPI:   Ms Floyd is a 67 yoF with bilateral chronic deafness, alcoholic cirrhosis, and hypertension who [place] [facility] [place]. The patient and family declined any confusion or altered mental status on admission and the patient was able to communicate with a writing board over the course of her hospital course. She denied any recent alcohol use with last use in February when she was diagnosed with alcoholic cirrhosis. The patient was treated for decompensated cirrhosis and anasarca with IV Lasix, spironolactone and paracentesis. The patient was pending discharge to SNF but was noted to have had change in cognition on 7/10 AM for which neurology was consulted. On exam, the patient was noted to have be unable to communicate with writing board or follow commands. She was initially not opening eyes spontaneously on exam but was later doing so when re-evaluated while still not following commands or communicating.          Past Medical History:   Diagnosis Date    Hypercholesterolemia     Hypertension        Past Surgical History:   Procedure Laterality Date    DILATION AND CURETTAGE OF UTERUS      ESOPHAGOGASTRODUODENOSCOPY N/A 6/23/2022    Procedure: EGD (ESOPHAGOGASTRODUODENOSCOPY);  Surgeon: Sean HOBBS  MD [place];  Location: [facility] ([address] [place] [address]);  Service: Endoscopy;  Laterality: N/A;  cirrhosis, variceal screening  labs prior  -request sent   fully vaccinated, instructions emailed to [email or web address]       Review of patient's allergies indicates:  No Known Allergies    Current Neurological Medications:   Clobazam 10mg QD  Levitiracetam 750 BID    Other relevant received over the past 24 hrs  Melatonin 6mg qhs  Prochlorperazine 5mg  Oxycodone 5mg    No current facility-administered medications on file prior to encounter.     Current Outpatient Medications on File Prior to Encounter   Medication Sig    acetaminophen (TYLENOL) 325 MG tablet Take 650 mg by mouth daily as needed for Pain.    aspirin 81 MG Chew Take 81 mg by mouth once daily.    cholecalciferol, vitamin D3, (VITAMIN D3) 50 mcg (2,000 unit) Cap capsule Take 1 capsule by mouth once daily.    cholestyramine-aspartame (QUESTRAN LIGHT) 4 gram PwPk Take 4 g by mouth once daily.    folic acid (FOLVITE) 1 MG tablet Take 1 mg by mouth once daily.     nystatin (MYCOSTATIN) powder Apply to skin folds beneath breasts as needed    omeprazole (PRILOSEC) 40 MG capsule Take 40 mg by mouth once daily.    thiamine 100 MG tablet Take 100 mg by mouth once daily.    [DISCONTINUED] furosemide (LASIX) 20 MG tablet Take 20 mg by mouth 2 (two) times daily.    [DISCONTINUED] losartan (COZAAR) 50 MG tablet Take 50 mg by mouth once daily.     [DISCONTINUED] simvastatin (ZOCOR) 10 MG tablet Take 10 mg by mouth once daily.      Family History       Problem Relation (Age of Onset)    Breast cancer Maternal Grandmother          Tobacco Use    Smoking status: Former Smoker     Packs/day: 1.00     Years: 15.00     Pack years: 15.00     Quit date: 1998     Years since quittin.6    Smokeless tobacco: Never Used   Substance and Sexual Activity    Alcohol use: Not Currently    Drug use: Not Currently    Sexual activity: Not  on file     Review of Systems   Constitutional:  Negative for chills and fever.   Respiratory:  Negative for cough.    Gastrointestinal:  Positive for abdominal distention. Negative for abdominal pain.   Genitourinary:  Negative for dysuria.   Musculoskeletal:  Negative for joint swelling.   Neurological:  Negative for facial asymmetry.   Psychiatric/Behavioral:  The patient is not nervous/anxious.    Objective:     Vital Signs (Most Recent):  Temp: 97.1 °F (36.2 °C) (07/26/22 1135)  Pulse: 82 (07/26/22 1135)  Resp: 20 (07/26/22 1135)  BP: 126/60 (07/26/22 1135)  SpO2: 97 % (07/26/22 1135) Vital Signs (24h Range):  Temp:  [96.9 °F (36.1 °C)-98.8 °F (37.1 °C)] 97.1 °F (36.2 °C)  Pulse:  [77-88] 82  Resp:  [17-20] 20  SpO2:  [97 %-100 %] 97 %  BP: (112-126)/(54-60) 126/60     Weight: 83 kg (182 lb 15.7 oz)  Body mass index is 35.74 kg/m².    Physical Exam  Vitals and nursing note reviewed.   HENT:      Head: Normocephalic.      Mouth/Throat:      Mouth: Mucous membranes are moist.   Eyes:      Extraocular Movements: Extraocular movements intact.      Pupils: Pupils are equal, round, and reactive to light.   Cardiovascular:      Rate and Rhythm: Normal rate and regular rhythm.   Pulmonary:      Effort: No respiratory distress.   Abdominal:      General: There is distension.   Musculoskeletal:      Right lower leg: Edema present.      Left lower leg: Edema present.   Neurological:      Mental Status: She is alert.   Psychiatric:         Mood and Affect: Mood normal.         Behavior: Behavior normal.       NEUROLOGICAL EXAMINATION:     CRANIAL NERVES     CN III, IV, VI   Pupils are equal, round, and reactive to light.    Neurological Exam:  MENTAL STATUS  Level of consciousness: alert  Orientation: oriented to person (name / age), place (only upon providing choices), and time (year)  Attention normal. Concentration normal.  Speech: sign language patient    CRANIAL NERVES  CN II: Visual fields intact  CN III, IV, VI:  PERRL, EOMI  CN VII: Facial expression symmetric and full  CN VIII: patient is legally deaf  CN XI: Head turn intact bilaterally    MOTOR EXAM  Muscle bulk: normal    Significant Labs: CBC:   Recent Labs   Lab 07/25/22  0442 07/26/22  0331   WBC 5.66 6.31   HGB 7.7* 8.4*   HCT 23.0* 25.4*   PLT 56* 65*     CMP:   Recent Labs   Lab 07/25/22 0442 07/26/22  0331   GLU 89 97   * 129*   K 5.0 4.7    99   CO2 24 25   BUN 26* 24*   CREATININE 1.2 1.3   CALCIUM 8.2* 8.6*   MG 2.2 2.1   PROT 5.2* 5.7*   ALBUMIN 2.4* 2.5*   BILITOT 1.9* 2.2*   ALKPHOS 70 71   AST 40 44*   ALT 24 27   ANIONGAP 6* 5*   EGFRNONAA 46.9* 42.6*       Significant Imaging: I have reviewed all pertinent imaging results/findings within the past 24 hours.    Assessment and Plan:   Acute encephalopathy  Neurology is being consulted for the second time, in this case for evaluation of contribution of anti-epileptic medication to patient's mentation. Patient with deafness and alcoholic cirrhosis presented to [facility] [place] for decompensated cirrhosis requiring fluid management over hospitalization. Notable encephalopathy on 7/10 AM for which neurology was previously consulted. No witnessed seizure activity and no prior history of epilepsy. She was evaluated with EEG and though no electrographic seizures were found was started on keppra and onfi. Patient appears to have a UTI picture on U/A and continued hyponatremia.    On exam this afternoon, patient was alert and communicative without signs of significant decline in mentation compared to earlier in admission. Contributors include her suspected ongoing UTI, hospital-induced delirium, as well as Melatonin 6mg qhs, that she has been receiving.    Recommendations:  - consider discontinuing melatonin for tonight  - will re-evaluate patient tomorrow 07/27/22  - Continue Keppra 750mg bid, Onfi 10mg bid for the moment  - Discussed plan and treatments with patient and family using , they voiced  understanding of the plan        VTE Risk Mitigation (From admission, onward)         Ordered     IP VTE HIGH RISK PATIENT  Once         06/28/22 0419     Place sequential compression device  Until discontinued         06/28/22 0419     Place sequential compression device  Until discontinued         06/28/22 0415                Thank you for your consult. I will follow-up with patient. Please contact us if you have any additional questions.    Isai Bustillo MD  Neurology  [facility] ([place]

## 2022-07-26 NOTE — PROGRESS NOTES
[facility] - Intensive Care ([place] [id number])  [facility] Medicine  Progress Note    Patient Name: Aleyda Floyd  MRN: [id number]  Patient Class: IP- Inpatient   Admission Date: 6/27/2022  Length of Stay: 28 days  Attending Physician: Tena Brizuela MD  Primary Care Provider: Elvie Fernandes MD        Subjective:     Principal Problem:Anasarca        HPI:  This is a 67 year old deaf lady with alcoholic cirrhosis, and hypertension who presented with generalized swelling.  used to obtain history. Patient states that she had the swelling since her diagnosis of cirrhosis in February. Patient states that 2 days ago, she was able to ambulate but with some difficulty due to the swelling but now  worsening to the point where she is unable to ambulate by herself. Of note, patient had a recent EGD performed earlier last week where they had issues with her IV resulting is significant bruising. Patient also notes that whenever she scratches her skin with her nails, she notices clear fluid coming from the cuts. Patient denies fever, chills, cough, hemoptysis, nausea, vomiting, abdominal pain, yellowing of skin, constipation, dysuria, hematuria, and black/ bloody stools. Patient has baseline loose stools from her lactulose.  She reports compliance with all medications including her lactulose and Lasix. Patient and family declined any confusion or altered mental status. She denies any recent alcohol use with last use in February when she was diagnosed with alcoholic cirrhosis. Patient states that she used to weigh around 170s which she believes is her dry weight and is currently in the 200s.     Upon chart review, patient had an echo at an OSH on 2/25/22 which showed EF 60-65% and some diastolic dysfunction.    In the ED, patient was found to have Hgb of 8.5 and platelets of 113 around baseline. Patient had a UA which showed 1+ leuks and many bacteria. Patient did not complain of dysuria.       Overview/Hospital  Course:  Admitted for decompensated cirrhosis and anasarca. Began diuresing with IV Lasix and spironolactone. Paracentesis of -4.2L; ruled out SBP. Pt stabilized for few days and ready to be dc'ed when she had a change in mental status. Found pt unresponsive with fixed gaze and no pupil response. Code stroke called, CT neg for stroke. Eeg showed seizure-like activity. Pt transitioned to MICU for higher level of care. On step down, had a UTI and PITO, both of which were treated. She received a repeat paracentesis with 7.5L removed. Her creatinine started improve with administration of albumin and holding diuretics. Diuretics restarted and transitioned to Lasix 40mg PO and Aldactone 100mg PO. Patient started to have mild difficulty with mentation again with concerns for recurring UTI. Repeat UA indicative of recurring UTI, awaiting UCX. Neurology consulted for recent decline in mentation.    Dispo to NH and follow up with hepatology, neuro.      Interval History: DOUGON, AF. Patient continues to respond slower to questions. Two BM's on 7/25, one today. Patient denies burning on urination.    Review of Systems   Constitutional:  Negative for appetite change, chills and fever.   HENT:  Negative for sore throat and trouble swallowing.    Eyes:  Negative for pain and visual disturbance.   Respiratory:  Negative for cough, chest tightness and shortness of breath.    Cardiovascular:  Negative for chest pain and palpitations.   Gastrointestinal:  Positive for abdominal distention. Negative for abdominal pain, constipation, diarrhea, nausea and vomiting.   Genitourinary:  Negative for difficulty urinating, dysuria and hematuria.   Musculoskeletal:  Negative for joint swelling and myalgias.   Skin:  Negative for color change and pallor.   Neurological:  Negative for dizziness, light-headedness and headaches.   Psychiatric/Behavioral:  Positive for confusion and decreased concentration. Negative for agitation.    Objective:      Vital Signs (Most Recent):  Temp: 97.1 °F (36.2 °C) (07/26/22 1135)  Pulse: 82 (07/26/22 1135)  Resp: 20 (07/26/22 1135)  BP: 126/60 (07/26/22 1135)  SpO2: 97 % (07/26/22 1135) Vital Signs (24h Range):  Temp:  [96.9 °F (36.1 °C)-98.8 °F (37.1 °C)] 97.1 °F (36.2 °C)  Pulse:  [77-88] 82  Resp:  [17-20] 20  SpO2:  [97 %-100 %] 97 %  BP: (112-126)/(54-60) 126/60     Weight: 83 kg (182 lb 15.7 oz)  Body mass index is 35.74 kg/m².  No intake or output data in the 24 hours ending 07/26/22 1617   Physical Exam  Constitutional:       General: She is not in acute distress.     Appearance: Normal appearance. She is not ill-appearing.   HENT:      Head: Normocephalic and atraumatic.      Nose: Rhinorrhea present. No congestion.      Mouth/Throat:      Mouth: Mucous membranes are moist.   Eyes:      General:         Right eye: No discharge.         Left eye: No discharge.      Extraocular Movements: Extraocular movements intact.   Cardiovascular:      Rate and Rhythm: Normal rate and regular rhythm.      Pulses: Normal pulses.      Heart sounds: Murmur (systolic flow) heard.     No gallop.   Pulmonary:      Effort: Pulmonary effort is normal. No respiratory distress.      Breath sounds: Normal breath sounds.   Abdominal:      General: There is distension.      Tenderness: There is no abdominal tenderness. There is no guarding or rebound.   Musculoskeletal:      Cervical back: Normal range of motion.      Right lower leg: Edema (3+) present.      Left lower leg: Edema (3+) present.   Skin:     Coloration: Skin is not jaundiced or pale.      Findings: Bruising (bilateral arms) present.   Neurological:      General: No focal deficit present.      Mental Status: She is alert and oriented to person, place, and time.      Cranial Nerves: No cranial nerve deficit.   Psychiatric:         Mood and Affect: Mood normal.         Behavior: Behavior normal.       Significant Labs: All pertinent labs within the past 24 hours have been  reviewed.    Significant Imaging: I have reviewed all pertinent imaging results/findings within the past 24 hours.      Assessment/Plan:      * Anasarca  67 yoF with alcoholic liver cirrhosis and hypertension admitted for generalized anasarca. Patient's albumin on admission was 1.9. Likely edema related to poor intravascular oncotic pressure 2/2 alcoholic cirrhosis. Patient adherent to home medication. Given history and exam, patient would benefit from diuresis. Dry weight reported as 170s. Patient is currently 200 lbs 6.4oz (down from 212 lbs).     - Strict I/Os  - Daily weights  - Low sodium diet with 1500 ml fluid restriction  - Plan for f/u with her hepatologist on d/c  - discontinued 25% bottle of 25g Albumin     PITO (acute kidney injury)  Presenting with PITO with new sCr 1.6 (baseline sCr 0.9-1.1). Today 1.3.  DDx: Decreased PO intake likely resulting in pre-renal etiology    - Restarted diuresis. Per hepatology recommendation Lasix 40 BID IV transitioned to Lasix 40 PO. Continue  Aldactone 100 daily.  - Gentle trial of IVF; encourage PO intake  - Trend sCr  - Strict I&Os  - Avoid nephrotoxic agents  - Renally adjust medications      UTI (urinary tract infection)  Positive UA from new kim placed 7/10/22. Hx of ESBL E coli in Feb 2022 at OSH  Uctx growing enterococcus    7/26 - UA WBC>100, await UCX    - Start rocephin 1g daily  - Delay in pending d/c to [place] or [facility]. [facility] requires booster vaccine. Hold off d/c and await UCX.  - S/P ampicillin course 07/17        Acute encephalopathy  Patient did have episode of seizures in MICU, now on onfi and keppra, no signs of hepatic encephalopathy, negative for asterixis. Patient has decreased concentration today 7/25 but is able to answer questions.  Ammonia 47 wnl.    - Continue lactulose 15g bid, Rifaximin 550mg bid, thiamine 100mg  - Titrate bowel regimen to 3- 4 BMs per day  - Neurology consulted, appreciate recommendations      Anemia, chronic  disease  Patient with Hgb of 8.5 on admit. Upon review of care everywhere, patient had Hgb of 8.4 in all of 2022. Likely due to chronic disease and stable.  One episode of Hgb 6.9 on 7/18. Ordered 1 U pRBC. Received written consent from patient and  prior to transfusion. Received 1U prbc for Hgb 6.3 yesterday.     Plan:  - today Hgb 8.4, Transfuse if Hg<7.0. No bloody bowel movements noted.   - Per GI recommendation, if anemia persists and not responding to transfusion then inpatient colonoscopy recommended for further evaluation  - pantoprazole 40 bid switches to daily      Alcoholic cirrhosis of liver with ascites  Patient was diagnosed in February. Previous history of chronic alcohol abuse and states she drank about 4-5 glasses of wine per night for over 20 years. Patient is seeing hepatology outpatient. Labs shown in care everywhere.     MELD-Na score: 22 at 7/26/2022  3:31 AM  MELD score: 16 at 7/26/2022  3:31 AM  Calculated from:  Serum Creatinine: 1.3 mg/dL at 7/26/2022  3:31 AM  Serum Sodium: 129 mmol/L at 7/26/2022  3:31 AM  Total Bilirubin: 2.2 mg/dL at 7/26/2022  3:31 AM  INR(ratio): 1.5 at 7/26/2022  3:31 AM  Age: 67 years    - Continue lactulose (15mg bid), thiamine, and folate  - Hepatology consulted; appreciate recommendations - outpatient follow up  -  repeat IR paracentesis completed, 7.5L  - continue to monitor for signs of liver failure  - F/u daily CMPs    Thrombocytopenia   Plt 65, PT 15.6, INR 1.5. Improved after Dose of Vitamin K.  Patient underwent paracentesis and there was large volume leaking from site controlled with dermabond.    - continue to monitor platelet count and inspect abdomen for sites of bleeding.  - No bloody bowel movements noted.     Debility  - Pt non-mobile for some time in hospital,  PT/OT recs for SNF  - Pt advised to move around in bed but to only get up with help        Primary hypertension  Patient was discontinued from losartan per hepatologist.   - continue  to monitor and can add when clinically indicated  - pt removed from tele 7/16    Gram-positive cocci in clusters  Noted on 1 set of aer/anae cultures from 7/10/22. 2nd set so far negative. No clear sign of infection. Could likely be contaminant.  Uctx also from enterococcus species  - s/p Ampicillin completed 07/17          Hypomagnesemia  Monitor daily and replete prn      Deaf- written communication  - ASL  used for interaction as well as written communication      VTE Risk Mitigation (From admission, onward)         Ordered     IP VTE HIGH RISK PATIENT  Once         06/28/22 0419     Place sequential compression device  Until discontinued         06/28/22 0419     Place sequential compression device  Until discontinued         06/28/22 0415                Discharge Planning   REMA: 7/28/2022     Code Status: Full Code   Is the patient medically ready for discharge?: No    Reason for patient still in hospital (select all that apply): Treatment  Discharge Plan A: Skilled Nursing Facility   Discharge Delays: None known at this time              Lele Posadas MD  Department of Hospital Medicine   [facility] - Intensive Care ([place]-14)

## 2022-07-26 NOTE — ASSESSMENT & PLAN NOTE
Patient did have episode of seizures in MICU, now on onfi and keppra, no signs of hepatic encephalopathy, negative for asterixis. Patient has decreased concentration today 7/25 but is able to answer questions.  Ammonia 47 wnl.    - Continue lactulose 15g bid, Rifaximin 550mg bid, thiamine 100mg  - Titrate bowel regimen to 3- 4 BMs per day  - Neurology consulted, appreciate recommendations

## 2022-07-26 NOTE — SUBJECTIVE & OBJECTIVE
Past Medical History:   Diagnosis Date    Hypercholesterolemia     Hypertension        Past Surgical History:   Procedure Laterality Date    DILATION AND CURETTAGE OF UTERUS      ESOPHAGOGASTRODUODENOSCOPY N/A 6/23/2022    Procedure: EGD (ESOPHAGOGASTRODUODENOSCOPY);  Surgeon: Sean Perry MD;  Location: [address];  Service: Endoscopy;  Laterality: N/A;  cirrhosis, variceal screening  labs prior  -request sent 6/14  fully vaccinated, instructions emailed to [email or web address]       Review of patient's allergies indicates:  No Known Allergies    Current Neurological Medications:   Clobazam 10mg QD  Levitiracetam 750 BID    Other relevant received over the past 24 hrs  Melatonin 6mg qhs  Prochlorperazine 5mg  Oxycodone 5mg    No current facility-administered medications on file prior to encounter.     Current Outpatient Medications on File Prior to Encounter   Medication Sig    acetaminophen (TYLENOL) 325 MG tablet Take 650 mg by mouth daily as needed for Pain.    aspirin 81 MG Chew Take 81 mg by mouth once daily.    cholecalciferol, vitamin D3, (VITAMIN D3) 50 mcg (2,000 unit) Cap capsule Take 1 capsule by mouth once daily.    cholestyramine-aspartame (QUESTRAN LIGHT) 4 gram PwPk Take 4 g by mouth once daily.    folic acid (FOLVITE) 1 MG tablet Take 1 mg by mouth once daily.     nystatin (MYCOSTATIN) powder Apply to skin folds beneath breasts as needed    omeprazole (PRILOSEC) 40 MG capsule Take 40 mg by mouth once daily.    thiamine 100 MG tablet Take 100 mg by mouth once daily.    [DISCONTINUED] furosemide (LASIX) 20 MG tablet Take 20 mg by mouth 2 (two) times daily.    [DISCONTINUED] losartan (COZAAR) 50 MG tablet Take 50 mg by mouth once daily.     [DISCONTINUED] simvastatin (ZOCOR) 10 MG tablet Take 10 mg by mouth once daily.      Family History       Problem Relation (Age of Onset)    Breast cancer Maternal Grandmother          Tobacco Use    Smoking status:  Former Smoker     Packs/day: 1.00     Years: 15.00     Pack years: 15.00     Quit date: 1998     Years since quittin.6    Smokeless tobacco: Never Used   Substance and Sexual Activity    Alcohol use: Not Currently    Drug use: Not Currently    Sexual activity: Not on file     Review of Systems   Constitutional:  Negative for chills and fever.   Respiratory:  Negative for cough.    Gastrointestinal:  Positive for abdominal distention. Negative for abdominal pain.   Genitourinary:  Negative for dysuria.   Musculoskeletal:  Negative for joint swelling.   Neurological:  Negative for facial asymmetry.   Psychiatric/Behavioral:  The patient is not nervous/anxious.    Objective:     Vital Signs (Most Recent):  Temp: 97.1 °F (36.2 °C) (22 1135)  Pulse: 82 (22 1135)  Resp: 20 (22 1135)  BP: 126/60 (22 1135)  SpO2: 97 % (22 1135) Vital Signs (24h Range):  Temp:  [96.9 °F (36.1 °C)-98.8 °F (37.1 °C)] 97.1 °F (36.2 °C)  Pulse:  [77-88] 82  Resp:  [17-20] 20  SpO2:  [97 %-100 %] 97 %  BP: (112-126)/(54-60) 126/60     Weight: 83 kg (182 lb 15.7 oz)  Body mass index is 35.74 kg/m².    Physical Exam  Vitals and nursing note reviewed.   HENT:      Head: Normocephalic.      Mouth/Throat:      Mouth: Mucous membranes are moist.   Eyes:      Extraocular Movements: Extraocular movements intact.      Pupils: Pupils are equal, round, and reactive to light.   Cardiovascular:      Rate and Rhythm: Normal rate and regular rhythm.   Pulmonary:      Effort: No respiratory distress.   Abdominal:      General: There is distension.   Musculoskeletal:      Right lower leg: Edema present.      Left lower leg: Edema present.   Neurological:      Mental Status: She is alert.   Psychiatric:         Mood and Affect: Mood normal.         Behavior: Behavior normal.       NEUROLOGICAL EXAMINATION:     CRANIAL NERVES     CN III, IV, VI   Pupils are equal, round, and reactive to light.    Neurological Exam:  MENTAL  STATUS  Level of consciousness: alert  Orientation: oriented to person (name / age), place (only upon providing choices), and time (year)  Attention normal. Concentration normal.  Speech: sign language patient    CRANIAL NERVES  CN II: Visual fields intact  CN III, IV, VI: PERRL, EOMI  CN VII: Facial expression symmetric and full  CN VIII: patient is legally deaf  CN XI: Head turn intact bilaterally    MOTOR EXAM  Muscle bulk: normal    Significant Labs: CBC:   Recent Labs   Lab 07/25/22  0442 07/26/22  0331   WBC 5.66 6.31   HGB 7.7* 8.4*   HCT 23.0* 25.4*   PLT 56* 65*     CMP:   Recent Labs   Lab 07/25/22 0442 07/26/22  0331   GLU 89 97   * 129*   K 5.0 4.7    99   CO2 24 25   BUN 26* 24*   CREATININE 1.2 1.3   CALCIUM 8.2* 8.6*   MG 2.2 2.1   PROT 5.2* 5.7*   ALBUMIN 2.4* 2.5*   BILITOT 1.9* 2.2*   ALKPHOS 70 71   AST 40 44*   ALT 24 27   ANIONGAP 6* 5*   EGFRNONAA 46.9* 42.6*       Significant Imaging: I have reviewed all pertinent imaging results/findings within the past 24 hours.

## 2022-07-26 NOTE — ASSESSMENT & PLAN NOTE
Presenting with PITO with new sCr 1.6 (baseline sCr 0.9-1.1). Today 1.3.  DDx: Decreased PO intake likely resulting in pre-renal etiology    - Restarted diuresis. Per hepatology recommendation Lasix 40 BID IV transitioned to Lasix 40 PO. Continue  Aldactone 100 daily.  - Gentle trial of IVF; encourage PO intake  - Trend sCr  - Strict I&Os  - Avoid nephrotoxic agents  - Renally adjust medications

## 2022-07-26 NOTE — ASSESSMENT & PLAN NOTE
Positive UA from new kim placed 7/10/22. Hx of ESBL E coli in Feb 2022 at OSH  Uctx growing enterococcus    7/26 - UA WBC>100, await UCX    - Start rocephin 1g daily  - Delay in pending d/c to [facility] Hold off d/c and await UCX.  - S/P ampicillin course 07/17

## 2022-07-26 NOTE — PT/OT/SLP PROGRESS
Occupational Therapy   Treatment    Name: Aleyda Floyd  MRN: [id number]  Admitting Diagnosis:  Anasarca        Co-treat with PT due to pt's requiring 2 skilled therapists to safely perform bed mobility and to accommodate her activity tolerance    Recommendations:     Discharge Recommendations: nursing facility, skilled  Discharge Equipment Recommendations:  other (see comments) (TBD)  Barriers to discharge:  Inaccessible home environment (increased skilled assistance required)    Assessment:     Aleyda Floyd is a 67 y.o. female with a medical diagnosis of Anasarca.   utlizied throughout the session, along with assistance from pt's family member.  Pt tolerated session well and without incident, performing EOB grooming ADLs and sitting EOB ~20 minutes.  However, she requires significant assistance to perform ADLs and for sitting balance.  She presents with the following. Performance deficits affecting function are weakness, impaired endurance, impaired self care skills, impaired functional mobility, gait instability, impaired balance, impaired fine motor, decreased ROM, impaired coordination, decreased coordination, decreased upper extremity function, edema (hearing impaired).     Rehab Prognosis:  Fair; patient would benefit from acute skilled OT services to address these deficits and reach maximum level of function.       Plan:     Patient to be seen 3 x/week to address the above listed problems via self-care/home management, therapeutic exercises, therapeutic activities, neuromuscular re-education  · Plan of Care Expires: 08/12/22  · Plan of Care Reviewed with: patient (male family member)    Subjective   Pt was agreeable to therapy  Pain/Comfort:  Pain Rating 1: 0/10  Pain Rating Post-Intervention 1: 0/10    Objective:     Communicated with: nursing and PT prior to session.  Patient found HOB elevated with blood pressure cuff, telemetry, pulse ox (continuous) with male family member  present upon OT entry to room.    General Precautions: Standard, fall, hearing impaired, contact   Orthopedic Precautions:N/A   Braces: N/A  Respiratory Status: Room air     Occupational Performance:     Bed Mobility:    · Patient completed Scooting/Bridging anteriorly to EOB with total assistance of 2 persons; pt scooted to HOB with total assistance and 2 persons while supine.  Noted that pt needed a garbage can to place her feet on due to her being unable to reach the floor with her feet due to her short stature.   · Patient completed Supine to Sit to the R with maximal assistance and 2 persons  · Patient completed Sit to Supine with maximal assistance and 2 persons     Functional Mobility/Transfers:  · Deferred due to pt's feet being unable to reach the floor with the bed at its lowest elevation.      Activities of Daily Living:  · Grooming: moderate assistance to brush her teeth while seated EOB with (A) to remove cap from toothpaste tube and to squeeze the toothpaste onto the toothbrush (Max A-Total A for sitting balance from PT).  [place] IRA assistance while pt sipped water from cup for rinsing and spitting.  Min A to wash her face while sitting EOB with (A) for thoroughness.  · Lower Body Dressing: total assistance to joanne non-skid socks while supine      AMPA 6 Click ADL: 12    Treatment & Education:  - Pt sat EOB ~20 minutes with Total A-Min A for sitting balance to correct her L lateral and posterior lean.  She performed abdominal exercises, including bilateral leans and forward leans, with writing therapist supporting her posteriorly and PT supporting her anteriorly.  Pt also performed reaching exercises of touching therapists' hands with her R hand, requiring Max A - Mod A to perform.    Pt and her family member edu on role of OT, POC, safety when performing self care tasks, benefit of performing EOB activity, and safety when performing bed mobility.  - White board updated  - Self care tasks completed-- as  noted above     - Pt has minimal functional use of her LUE.      Patient left HOB elevated with all lines intact, call button in reach and her family member presentEducation:      GOALS:   Multidisciplinary Problems     Occupational Therapy Goals        Problem: Occupational Therapy    Goal Priority Disciplines Outcome Interventions   Occupational Therapy Goal     OT, PT/OT Ongoing, Progressing    Description: Goals set 7/14 to be met by 7/28/2022    Patient will increase functional independence with ADLs by performing:    Grooming while standing with Minimal Assistance.  Toileting from bedside commode with Minimal Assistance for hygiene and clothing management.   Supine to sit with Minimal Assistance.  Step transfer with Minimal Assistance using RW.  Toilet transfer to bedside commode with Minimal Assistance using RW.                     Time Tracking:     OT Date of Treatment: 07/26/22  OT Start Time: 1122  OT Stop Time: 1151  OT Total Time (min): 29 min    Billable Minutes:Self Care/Home Management 14 min  Therapeutic Activity 15 min    OT/TORREY: OT          7/26/2022

## 2022-07-26 NOTE — SUBJECTIVE & OBJECTIVE
Interval History: NAEON, AF. Patient continues to respond slower to questions. Two BM's on 7/25, one today. Patient denies burning on urination.    Review of Systems   Constitutional:  Negative for appetite change, chills and fever.   HENT:  Negative for sore throat and trouble swallowing.    Eyes:  Negative for pain and visual disturbance.   Respiratory:  Negative for cough, chest tightness and shortness of breath.    Cardiovascular:  Negative for chest pain and palpitations.   Gastrointestinal:  Positive for abdominal distention. Negative for abdominal pain, constipation, diarrhea, nausea and vomiting.   Genitourinary:  Negative for difficulty urinating, dysuria and hematuria.   Musculoskeletal:  Negative for joint swelling and myalgias.   Skin:  Negative for color change and pallor.   Neurological:  Negative for dizziness, light-headedness and headaches.   Psychiatric/Behavioral:  Positive for confusion and decreased concentration. Negative for agitation.    Objective:     Vital Signs (Most Recent):  Temp: 97.1 °F (36.2 °C) (07/26/22 1135)  Pulse: 82 (07/26/22 1135)  Resp: 20 (07/26/22 1135)  BP: 126/60 (07/26/22 1135)  SpO2: 97 % (07/26/22 1135) Vital Signs (24h Range):  Temp:  [96.9 °F (36.1 °C)-98.8 °F (37.1 °C)] 97.1 °F (36.2 °C)  Pulse:  [77-88] 82  Resp:  [17-20] 20  SpO2:  [97 %-100 %] 97 %  BP: (112-126)/(54-60) 126/60     Weight: 83 kg (182 lb 15.7 oz)  Body mass index is 35.74 kg/m².  No intake or output data in the 24 hours ending 07/26/22 1617   Physical Exam  Constitutional:       General: She is not in acute distress.     Appearance: Normal appearance. She is not ill-appearing.   HENT:      Head: Normocephalic and atraumatic.      Nose: Rhinorrhea present. No congestion.      Mouth/Throat:      Mouth: Mucous membranes are moist.   Eyes:      General:         Right eye: No discharge.         Left eye: No discharge.      Extraocular Movements: Extraocular movements intact.   Cardiovascular:      Rate  and Rhythm: Normal rate and regular rhythm.      Pulses: Normal pulses.      Heart sounds: Murmur (systolic flow) heard.     No gallop.   Pulmonary:      Effort: Pulmonary effort is normal. No respiratory distress.      Breath sounds: Normal breath sounds.   Abdominal:      General: There is distension.      Tenderness: There is no abdominal tenderness. There is no guarding or rebound.   Musculoskeletal:      Cervical back: Normal range of motion.      Right lower leg: Edema (3+) present.      Left lower leg: Edema (3+) present.   Skin:     Coloration: Skin is not jaundiced or pale.      Findings: Bruising (bilateral arms) present.   Neurological:      General: No focal deficit present.      Mental Status: She is alert and oriented to person, place, and time.      Cranial Nerves: No cranial nerve deficit.   Psychiatric:         Mood and Affect: Mood normal.         Behavior: Behavior normal.       Significant Labs: All pertinent labs within the past 24 hours have been reviewed.    Significant Imaging: I have reviewed all pertinent imaging results/findings within the past 24 hours.

## 2022-07-26 NOTE — PT/OT/SLP PROGRESS
Physical Therapy Co-Treatment with Occupational Therapy     Patient Name:  Aleyda Floyd   MRN:  [id number]  Admit Date: 2022  Admitting Diagnosis:  Anasarca   Length of Stay: 28 days  Recent Surgery: * No surgery found *      Recommendations:     Discharge Recommendations:  nursing facility, skilled   Discharge Equipment Recommendations:  (TBD)   Barriers to discharge: decline in functional mobility     Plan:     During this hospitalization, patient to be seen 3 x/week to address the listed problems via gait training, therapeutic activities, therapeutic exercises, neuromuscular re-education  · Plan of Care Expires:  22   Plan of Care Reviewed with: patient (male family member)    Assessment:     Aleyda Floyd is a 67 y.o. female admitted with a medical diagnosis of Anasarca.  Today, patient required maximal assistance to total assistance of 2 people with bed mobility. She sat edge of bed with minimal to total assistance for approximately 20 minutes.  Pt is currently limited by weakness, impaired functional mobility, impaired fine motor, impaired cognition, impaired coordination, impaired balance, decreased upper extremity function, impaired self care skills, edema. Pt will continue to benefit from skilled PT services to address the above deficits in order to increase safety and independence with functional mobility.     Rehab Prognosis: Fair     GOALS:   Multidisciplinary Problems     Physical Therapy Goals        Problem: Physical Therapy    Goal Priority Disciplines Outcome Goal Variances Interventions   Physical Therapy Goal     PT, PT/OT Ongoing, Progressing     Description: Goals to be met by: 22    Patient will increase functional independence with mobility by performin. Supine to sit with minimal Assistance - not met  2. Sit to stand transfer with minimal assistance- not met  3. Bed to chair transfer with minimal assistance using LRAD - not met  4. Gait  x 30 feet with  minimal  Assistance using LRAD - not met  5. Ascend/Descend 6 inch curb step with Contact Guard Assistance using LRAD - not met  6. Lower extremity exercise program x30 reps per handout, with independence - not met                     Subjective   Communicated with RN prior to session.  Patient found sleeping in bed HOB elevated upon PT entry to room. Patient is agreeable to treatment session.  Patient's male family member present during session.     OT for cotx due to pt's multiple medical comorbidities and functional deficits req'ing two skilled therapists to appropriately maximize functional potential by progressing pt's musculoskeletal strength and endurance, facilitating neuromuscular postural balance and control ,and accommodating for patient's impaired cardiopulmonary tolerance to activities.     Therapist used American Sign Language during PT session. , Diane, signed with patient's family member, who then signed to patient.     Patient's subjective: Patient agreeable to therapy with encouragement.     Pain/Comfort:  · Pain Rating 1: 0/10  · Pain Rating Post-Intervention 1: 0/10    Objective:   Patient found with: telemetry, pulse ox (continuous), blood pressure cuff   General Precautions: Standard, fall, hearing impaired   Orthopedic Precautions:N/A   Braces: N/A   Oxygen Device: Room Air  Vitals: /60 (BP Location: Right arm, Patient Position: Lying)   Pulse 82   Temp 97.1 °F (36.2 °C) (Axillary)   Resp 20   Ht 5' (1.524 m)   Wt 83 kg (182 lb 15.7 oz)   SpO2 97%   Breastfeeding No   BMI 35.74 kg/m²     Outcome Measures:  AM-PAC 6 CLICK MOBILITY  Turning over in bed (including adjusting bedclothes, sheets and blankets)?: 2  Sitting down on and standing up from a chair with arms (e.g., wheelchair, bedside commode, etc.): 1  Moving from lying on back to sitting on the side of the bed?: 2  Moving to and from a bed to a chair (including a wheelchair)?: 1  Need to walk in hospital room?:  1  Climbing 3-5 steps with a railing?: 1  Basic Mobility Total Score: 8       Functional Mobility:    Bed Mobility:    · Rolling Right: maximal assistance and of 2 persons with use of bed rail   · Scooting supine: total assistance and of 2 persons   · Right side lying to sit: maximal assistance and of 2 persons for LE management and trunk management  · Seated scooting towards EOB: total assistance and of 2 persons  · Sit to Supine: maximal assistance and of 2 persons    Transfers:     Patient unable to perform safely. Patient's feet do not touch the floor due to patient's short stature. Therapists will need to bring curb or stool to room in order for patient to practice transfers safely.     Sitting Balance at Edge of Bed:   Time: 20 minutes   Patient initially required total assistance to sit edge of bed without feet supported due to left lateral and posterior lean.    Therapists positioned sideways trash can under patient's feet so patient's knees and hips are at 90 degree angle. Patient progressed to sitting edge of bed with feet supported and moderate assistance.    Patient initially required maximal assistance to reach left hand forward to tap OT's hand. She reached right hand forward to tap PT's hand with moderate assistance.    Patient rocked anteriorly with minimal to moderate assistance to work on translating trunk anteriorly to sit in midline for 1 minute.    She rocked laterally side to side with moderate to maximal assistance for 1 minute.    Patient required maximal to total assistance to sit edge of bed with feet supported while she brushed her teeth. She demos left lateral lean; thus PT assisted patient to midline.     Patient Education:    Patient re-educated on PT POC and role of PT in acute care.   Patient re-educated on the importance of early mobility to prevent functional decline during hospital stay   White board updated to include patient's safest level of mobility with staff  assistance   Patient and patient's family member educated on bed mobility training, plan of care and posture by explanation with  services.  Patient was confusion limiting to education and needs further education.       Patient left with bed in chair position with all lines intact, call button in reach and patient's male family member present.    Time Tracking:     PT Received On: 07/26/22  PT Start Time: 1122     PT Stop Time: 1151  PT Total Time (min): 29 min     Billable Minutes:   · Therapeutic Activity 15 mins and Neuromuscular Re-education 14 mins    Treatment Type: Treatment  PT/PTA: PT

## 2022-07-26 NOTE — ASSESSMENT & PLAN NOTE
Patient with Hgb of 8.5 on admit. Upon review of care everywhere, patient had Hgb of 8.4 in all of 2022. Likely due to chronic disease and stable.  One episode of Hgb 6.9 on 7/18. Ordered 1 U pRBC. Received written consent from patient and  prior to transfusion. Received 1U prbc for Hgb 6.3 yesterday.     Plan:  - today Hgb 8.4, Transfuse if Hg<7.0. No bloody bowel movements noted.   - Per GI recommendation, if anemia persists and not responding to transfusion then inpatient colonoscopy recommended for further evaluation  - pantoprazole 40 bid switches to daily

## 2022-07-26 NOTE — NURSING
Pt is AOx3,  is bedside. Using the   and patient had no complaints and can make needs known. Pt VSS. Call light within reach.

## 2022-07-26 NOTE — PLAN OF CARE
JAMIE spoke with Sho, with [facility] reporting that patient has been placed on their waiting list. JAMIE has also spoke with Harper in admission with [facility] reporting that patient referral is under review.    Updated  Physician will send the xifaxan to a diff pharmacy to try and get the cost of expensive meds covered. JAMIE will need to provide Harper in admissions with [facility] of this update.         Anjana Guzmán LMSW  [facility]   [id number]

## 2022-07-27 NOTE — PLAN OF CARE
I have reviewed the chart and discussed Aleyda Johnsonalfonso with the resident, including the HPI, overnight events, results, and assessment and plan. Please refer to the resident's progress note dated 7/28/2022 for information regarding the care of the patient. This note will serve as my attestation for the progress note dated for today.    67 year old female with history of alcoholic cirrhosis complicated by ascites, HLP and HTN admitted for volume overload. IR consulted, patient underwent IR paracentesis 6/28 with 4.2L removed. TTE 4/29 with EF 65% and normal diastolic function noted. Hepatology consulted, appreciate recommendations. Patient diuresed.            Patient noted to be altered 7/10, transferred to ICU. Code stroke called, imaging not indicative of stroke. EEG concerning of seizure, neurology   consulted and recommended Keppra and Onfi. Dose adjusted for somnolence. Neurology reconsulted for AMS, patient placed back on EEG, appreciate recommendations.  Patient AAOx4 on my exam.        UCx 6/28 with Lactobacillus, however patient without reported symptoms so deferred antibiotic treatment initially. Repeat UA 7/10 showed Enterococcus faecalis, ertapenem started 7/11, transitioned to ampicillin 7/13, completed 7/17. Repeat urine culture with candida albicans, started fluconazole.       Patient with anemia 7/18 with no active signs of bleed, likely multifactorial related to underlying chronic diseases.         Hepatology recommending large volume paracentesis, consulted IR and patient underwent paracentesis 7/20 where they removed 7.5L. Albumin given.            PT/OT recommending SNF at discharge. HARPREET used for ASL interpretation, all questions answered.      Jessenia Foster M.D.  Department of Hospital Medicine  [facility]

## 2022-07-27 NOTE — PROCEDURES
Radiology Post-Procedure Note    Pre Op Diagnosis: Ascites  Post Op Diagnosis: Same    Procedure: Ultrasound Guided Paracentesis    Procedure performed by: Clifford LÓPEZ, Donna     Written Informed Consent Obtained: Yes  Specimen Removed: YES cloudy yellow  Estimated Blood Loss: Minimal    Findings:   Successful paracentesis.  Albumin administered PRN per protocol.    Patient tolerated procedure well.    Donna Horton, APRN, FNP  Interventional Radiology  [phone] clinic

## 2022-07-27 NOTE — PLAN OF CARE
Paracentesis complete. Removed 6500 ml. Albumin 50g given IV. Patient AAOx2, no distress noted, respirations even and unlabored. Pt stable for transport back to room at this time. Report called to RN.

## 2022-07-27 NOTE — PT/OT/SLP PROGRESS
Occupational Therapy      Patient Name:  Aleyda Floyd   MRN:  [id number]    Patient not seen today secondary to  (pt away for paracentesis at time of session attempt (1412), OT unable to re-attempt later in day.). Will follow-up as appropriate.    7/27/2022

## 2022-07-27 NOTE — NURSING
Pt is AOx2 at this time.  is bedside. Pt was AOx1 and somnolent this AM. Mentation improved throughout the day. More alert this afternoon and evening but still lethargic. VSS on room air. Call light within reach.

## 2022-07-27 NOTE — H&P
Inpatient Radiology Pre-procedure Note    History of Present Illness:  Aleyda Floyd is a 67 y.o. female who presents for ultrasound guided paracentesis.  Admission H&P reviewed.  Past Medical History:   Diagnosis Date    Hypercholesterolemia     Hypertension      Past Surgical History:   Procedure Laterality Date    DILATION AND CURETTAGE OF UTERUS      ESOPHAGOGASTRODUODENOSCOPY N/A 6/23/2022    Procedure: EGD (ESOPHAGOGASTRODUODENOSCOPY);  Surgeon: Sean Perry MD;  Location: [facility] ([address] [place] [address]);  Service: Endoscopy;  Laterality: N/A;  cirrhosis, variceal screening  labs prior  -request sent 6/14  fully vaccinated, instructions emailed to [email or web address]       Review of Systems:   As documented in primary team H&P    Home Meds:   Prior to Admission medications    Medication Sig Start Date End Date Taking? Authorizing Provider   acetaminophen (TYLENOL) 325 MG tablet Take 650 mg by mouth daily as needed for Pain.   Yes Historical Provider   aspirin 81 MG Chew Take 81 mg by mouth once daily.   Yes Historical Provider   cholecalciferol, vitamin D3, (VITAMIN D3) 50 mcg (2,000 unit) Cap capsule Take 1 capsule by mouth once daily.   Yes Historical Provider   cholestyramine-aspartame (QUESTRAN LIGHT) 4 gram PwPk Take 4 g by mouth once daily. 3/31/22  Yes Historical Provider   folic acid (FOLVITE) 1 MG tablet Take 1 mg by mouth once daily.  7/7/13  Yes Historical Provider   nystatin (MYCOSTATIN) powder Apply to skin folds beneath breasts as needed 3/31/22 3/31/23 Yes Historical Provider   omeprazole (PRILOSEC) 40 MG capsule Take 40 mg by mouth once daily.   Yes Historical Provider   thiamine 100 MG tablet Take 100 mg by mouth once daily.   Yes Historical Provider   furosemide (LASIX) 20 MG tablet Take 20 mg by mouth 2 (two) times daily.  7/8/22 Yes Historical Provider   bumetanide (BUMEX) 1 MG tablet Take 1 tablet (1 mg total) by mouth 2 (two) times a day. 7/8/22 7/8/23   Elly G Penfold, DO   lactulose (CHRONULAC) 20 gram/30 mL Soln Take 45 mLs (30 g total) by mouth 3 (three) times daily. 7/8/22   Elly G Penfold, DO   melatonin (MELATIN) 5 mg Take 1 tablet (5 mg total) by mouth nightly. 1-2 tabs nightly 7/8/22   Elly G Penfold, DO   rifAXIMin (XIFAXAN) 550 mg Tab Take 1 tablet (550 mg total) by mouth 2 (two) times daily. 7/26/22   Tena Brizuela MD   spironolactone (ALDACTONE) 50 MG tablet Take 3 tablets (150 mg total) by mouth once daily. 7/9/22 7/9/23  Elly G Penfold, DO   losartan (COZAAR) 50 MG tablet Take 50 mg by mouth once daily.  8/21/13 6/28/22  Historical Provider   simvastatin (ZOCOR) 10 MG tablet Take 10 mg by mouth once daily.  10/6/13 6/28/22  Historical Provider     Scheduled Meds:    cefTRIAXone (ROCEPHIN) IVPB  2 g Intravenous Q24H    cloBAZam  10 mg Oral Daily    fluconazole (DIFLUCAN) IV (PEDS and ADULTS)  200 mg Intravenous Q24H    folic acid  1 mg Oral Daily    furosemide (LASIX) injection  40 mg Intravenous Daily    lactulose  15 g Oral BID    levetiracetam IV  750 mg Intravenous Q12H    pantoprazole  40 mg Oral Daily    rifAXImin  550 mg Oral BID    thiamine  100 mg Oral Daily     Continuous Infusions:   PRN Meds:sodium chloride, acetaminophen, aluminum & magnesium hydroxide-simethicone, sars-cov-2 (covid-19), dextrose 10%, dextrose 10%, glucagon (human recombinant), glucose, glucose, insulin aspart U-100, melatonin, naloxone, ondansetron, oxyCODONE, prochlorperazine, sodium chloride 0.9%, sodium chloride 0.9%, thrombin (bovine)  Anticoagulants/Antiplatelets: no anticoagulation    Allergies: Review of patient's allergies indicates:  No Known Allergies  Sedation Hx: have not been any systemic reactions    Vitals:  Temp: 97.8 °F (36.6 °C) (07/27/22 1200)  Pulse: 85 (07/27/22 1200)  Resp: 19 (07/27/22 1200)  BP: (!) 114/53 (07/27/22 1200)  SpO2: 99 % (07/27/22 1200)     Physical Exam:  ASA: 3  Mallampati: n/a    General: no acute distress  Mental Status:  alert but not oriented to person, place and time  HEENT: normocephalic, atraumatic  Chest: unlabored breathing  Heart: regular heart rate  Abdomen: distended  Extremity: moves all extremities    Plan: ultrasound guided paracentesis  Sedation Plan: local    URBANO Barton, FNP  Interventional Radiology  [phone] [facility]

## 2022-07-28 NOTE — PROGRESS NOTES
[facility] - [facility] ([place] [id number])  [facility] Medicine  Progress Note    Patient Name: Aleyda Floyd  MRN: [id number]  Patient Class: IP- Inpatient   Admission Date: 6/27/2022  Length of Stay: 29 days  Attending Physician: Tena Brizuela MD  Primary Care Provider: Elvie Fernandes MD        Subjective:     Principal Problem:Acute encephalopathy        HPI:  This is a 67 year old deaf lady with alcoholic cirrhosis, and hypertension who presented with generalized swelling.  used to obtain history. Patient states that she had the swelling since her diagnosis of cirrhosis in February. Patient states that 2 days ago, she was able to ambulate but with some difficulty due to the swelling but now  worsening to the point where she is unable to ambulate by herself. Of note, patient had a recent EGD performed earlier last week where they had issues with her IV resulting is significant bruising. Patient also notes that whenever she scratches her skin with her nails, she notices clear fluid coming from the cuts. Patient denies fever, chills, cough, hemoptysis, nausea, vomiting, abdominal pain, yellowing of skin, constipation, dysuria, hematuria, and black/ bloody stools. Patient has baseline loose stools from her lactulose.  She reports compliance with all medications including her lactulose and Lasix. Patient and family declined any confusion or altered mental status. She denies any recent alcohol use with last use in February when she was diagnosed with alcoholic cirrhosis. Patient states that she used to weigh around 170s which she believes is her dry weight and is currently in the 200s.     Upon chart review, patient had an echo at an OSH on 2/25/22 which showed EF 60-65% and some diastolic dysfunction.    In the ED, patient was found to have Hgb of 8.5 and platelets of 113 around baseline. Patient had a UA which showed 1+ leuks and many bacteria. Patient did not complain of dysuria.        Overview/Hospital Course:  Admitted for decompensated cirrhosis and anasarca. Began diuresing with IV Lasix and spironolactone. Paracentesis of -4.2L; ruled out SBP. Pt stabilized for few days and ready to be dc'ed when she had a change in mental status. Found pt unresponsive with fixed gaze and no pupil response. Code stroke called, CT neg for stroke. Eeg showed seizure-like activity. Pt transitioned to MICU for higher level of care. On step down, had a UTI and PITO, both of which were treated. She received a repeat paracentesis with 7.5L removed. Her creatinine started improve with administration of albumin and holding diuretics. Diuretics restarted and transitioned to Lasix 40mg PO and Aldactone 100mg PO. Patient started to have mild difficulty with mentation again with concerns for recurring UTI. Repeat UA indicative of recurring UTI, awaiting UCX. Neurology consulted for recent decline in mentation. Paracentesis labs negative for SBP.    Dispo to NH and follow up with hepatology, neuro.      Interval History: AF HDS. Patient significantly more altered today, unable to answer basic questions, though seems to comprehend them.  services used.    Review of Systems   Unable to perform ROS: Mental status change   Objective:     Vital Signs (Most Recent):  Temp: 97.5 °F (36.4 °C) (07/27/22 1545)  Pulse: 83 (07/27/22 1545)  Resp: 16 (07/27/22 1545)  BP: (!) 103/53 (07/27/22 1545)  SpO2: 100 % (07/27/22 1545)   Vital Signs (24h Range):  Temp:  [97.5 °F (36.4 °C)-98.6 °F (37 °C)] 97.5 °F (36.4 °C)  Pulse:  [83-96] 83  Resp:  [16-19] 16  SpO2:  [96 %-100 %] 100 %  BP: (103-114)/(50-57) 103/53     Weight: 83 kg (182 lb 15.7 oz)  Body mass index is 35.74 kg/m².    Intake/Output Summary (Last 24 hours) at 7/27/2022 2034  Last data filed at 7/27/2022 1453  Gross per 24 hour   Intake --   Output 7500 ml   Net -7500 ml      Physical Exam  Constitutional:       General: She is not in acute distress.      Appearance: Normal appearance. She is not ill-appearing.   HENT:      Head: Normocephalic and atraumatic.      Nose: No congestion or rhinorrhea.      Mouth/Throat:      Mouth: Mucous membranes are moist.   Eyes:      General:         Right eye: No discharge.         Left eye: No discharge.      Extraocular Movements: Extraocular movements intact.   Cardiovascular:      Rate and Rhythm: Normal rate and regular rhythm.      Pulses: Normal pulses.      Heart sounds: Murmur (systolic flow) heard.     No gallop.   Pulmonary:      Effort: Pulmonary effort is normal. No respiratory distress.      Breath sounds: Normal breath sounds.   Abdominal:      General: There is distension.      Tenderness: There is no abdominal tenderness. There is no guarding or rebound.   Musculoskeletal:      Cervical back: Normal range of motion.      Right lower leg: Edema (3+) present.      Left lower leg: Edema (3+) present.   Skin:     Coloration: Skin is not jaundiced or pale.      Findings: Bruising (bilateral arms) present.   Neurological:      General: No focal deficit present.      Mental Status: She is alert and oriented to person, place, and time.      Cranial Nerves: No cranial nerve deficit.   Psychiatric:         Mood and Affect: Mood normal.         Behavior: Behavior normal.       Significant Labs: All pertinent labs within the past 24 hours have been reviewed.    Significant Imaging: I have reviewed all pertinent imaging results/findings within the past 24 hours.      Assessment/Plan:      * Acute encephalopathy  Candiduria    Patient did have episode of seizures in MICU, now on onfi and keppra, no signs of hepatic encephalopathy, negative for asterixis. Ammonia wnl. Patient acutely altered and is unable to respond to basic questions, though appears to exhibit understanding.     Etiology unclear. Concerns for UTI, new seizure, SBP. Hyponatremic, though has been so throughout course. UA with new candida, no bacteria.    -  Continue lactulose 15g bid, Rifaximin 550mg bid, thiamine 100mg  - Titrate bowel regimen to 3- 4 BMs per day  - Neurology consulted, appreciate recommendations  - EEG monitoring  - Paracentesis labs negative for SBP (WBC 53, PMN 64%)  - Fluconazole 200 mg IV; D/C ceftriaxone    PITO (acute kidney injury)  Presenting with PITO with new sCr 1.6 (baseline sCr 0.9-1.1).  DDx: Decreased PO intake likely resulting in pre-renal etiology    - Per hepatology recommendation Lasix 40 BID IV transitioned to Lasix 40 PO BID. Continue Aldactone 100 daily.  - Gentle trial of IVF; encourage PO intake  - Trend sCr  - Strict I&Os  - Avoid nephrotoxic agents  - Renally adjust medications      Anasarca  67 yoF with alcoholic liver cirrhosis and hypertension admitted for generalized anasarca. Patient's albumin on admission was 1.9. Likely edema related to poor intravascular oncotic pressure 2/2 alcoholic cirrhosis. Patient adherent to home medication. Given history and exam, patient would benefit from diuresis. Dry weight reported as 170s. Patient is currently 200 lbs 6.4oz (down from 212 lbs).     - Strict I/Os  - Daily weights  - Low sodium diet with 1500 ml fluid restriction  - Plan for f/u with her hepatologist on d/c  - discontinued 25% bottle of 25g Albumin     Anemia, chronic disease  Patient with Hgb of 8.5 on admit. Upon review of care everywhere, patient had Hgb of 8.4 in all of 2022. Likely due to chronic disease and stable.  One episode of Hgb 6.9 on 7/18. Ordered 1 U pRBC. Received written consent from patient and  prior to transfusion. Received 1U prbc for Hgb 6.3 yesterday.     Plan:  - today Hgb 8.4, Transfuse if Hg<7.0. No bloody bowel movements noted.   - Per GI recommendation, if anemia persists and not responding to transfusion then inpatient colonoscopy recommended for further evaluation  - pantoprazole 40 bid switches to daily      Gram-positive cocci in clusters  Noted on 1 set of aer/anae cultures from  7/10/22. 2nd set so far negative. No clear sign of infection. Could likely be contaminant.  Uctx also from enterococcus species  - s/p Ampicillin completed 07/17          UTI (urinary tract infection)  Positive UA from new kim placed 7/10/22. Hx of ESBL E coli in Feb 2022 at OSH  Uctx growing enterococcus, s/p treatment with ampicillin course 07/17.  UA with candida 07/25.    - Rest per Acute Encephalopathy A/P    Hypomagnesemia  Monitor daily and replete prn      Debility  - Pt non-mobile for some time in hospital,  PT/OT recs for SNF  - Pt advised to move around in bed but to only get up with help        Thrombocytopenia   Plt 65, PT 15.6, INR 1.5. Improved after Dose of Vitamin K.  Patient underwent paracentesis and there was large volume leaking from site controlled with dermabond.    - continue to monitor platelet count and inspect abdomen for sites of bleeding.  - No bloody bowel movements noted.     Alcoholic cirrhosis of liver with ascites  Patient was diagnosed in February. Previous history of chronic alcohol abuse and states she drank about 4-5 glasses of wine per night for over 20 years. Patient is seeing hepatology outpatient. Labs shown in care everywhere.     MELD-Na score: 21 at 7/27/2022  6:26 AM  MELD score: 16 at 7/27/2022  6:26 AM  Calculated from:  Serum Creatinine: 1.3 mg/dL at 7/27/2022  6:26 AM  Serum Sodium: 131 mmol/L at 7/27/2022  6:26 AM  Total Bilirubin: 1.8 mg/dL at 7/27/2022  6:26 AM  INR(ratio): 1.5 at 7/27/2022  6:26 AM  Age: 67 years    - Continue lactulose titrate to BM, thiamine, and folate  - Hepatology consulted; appreciate recommendations - outpatient follow up  - Repeat IR paracentesis completed, 7.5L  - Continue to monitor for signs of liver failure  - F/u daily CMPs    Primary hypertension  Patient was discontinued from losartan per hepatologist.   - continue to monitor and can add when clinically indicated  - pt removed from tele 7/16    Deaf- written communication  - ASL   used for interaction as well as written communication      VTE Risk Mitigation (From admission, onward)         Ordered     IP VTE HIGH RISK PATIENT  Once         06/28/22 0419     Place sequential compression device  Until discontinued         06/28/22 0419     Place sequential compression device  Until discontinued         06/28/22 0415                Discharge Planning   REMA: 7/29/2022     Code Status: Full Code   Is the patient medically ready for discharge?: No    Reason for patient still in hospital (select all that apply): Treatment  Discharge Plan A: Skilled Nursing Facility   Discharge Delays: None known at this time              Nate Ellis MD  Department of Hospital Medicine   [facility] - Intensive Care ([place]-)

## 2022-07-28 NOTE — ASSESSMENT & PLAN NOTE
Positive UA from new kim placed 7/10/22. Hx of ESBL E coli in Feb 2022 at OSH  Uctx growing enterococcus, s/p treatment with ampicillin course 07/17.  UA with candida 07/25.    - Rest per Acute Encephalopathy A/P

## 2022-07-28 NOTE — SUBJECTIVE & OBJECTIVE
Interval History: AF HDS. Patient significantly more altered today, unable to answer basic questions, though seems to comprehend them.  services used.    Review of Systems   Unable to perform ROS: Mental status change   Objective:     Vital Signs (Most Recent):  Temp: 97.5 °F (36.4 °C) (07/27/22 1545)  Pulse: 83 (07/27/22 1545)  Resp: 16 (07/27/22 1545)  BP: (!) 103/53 (07/27/22 1545)  SpO2: 100 % (07/27/22 1545)   Vital Signs (24h Range):  Temp:  [97.5 °F (36.4 °C)-98.6 °F (37 °C)] 97.5 °F (36.4 °C)  Pulse:  [83-96] 83  Resp:  [16-19] 16  SpO2:  [96 %-100 %] 100 %  BP: (103-114)/(50-57) 103/53     Weight: 83 kg (182 lb 15.7 oz)  Body mass index is 35.74 kg/m².    Intake/Output Summary (Last 24 hours) at 7/27/2022 2034  Last data filed at 7/27/2022 1453  Gross per 24 hour   Intake --   Output 7500 ml   Net -7500 ml      Physical Exam  Constitutional:       General: She is not in acute distress.     Appearance: Normal appearance. She is not ill-appearing.   HENT:      Head: Normocephalic and atraumatic.      Nose: No congestion or rhinorrhea.      Mouth/Throat:      Mouth: Mucous membranes are moist.   Eyes:      General:         Right eye: No discharge.         Left eye: No discharge.      Extraocular Movements: Extraocular movements intact.   Cardiovascular:      Rate and Rhythm: Normal rate and regular rhythm.      Pulses: Normal pulses.      Heart sounds: Murmur (systolic flow) heard.     No gallop.   Pulmonary:      Effort: Pulmonary effort is normal. No respiratory distress.      Breath sounds: Normal breath sounds.   Abdominal:      General: There is distension.      Tenderness: There is no abdominal tenderness. There is no guarding or rebound.   Musculoskeletal:      Cervical back: Normal range of motion.      Right lower leg: Edema (3+) present.      Left lower leg: Edema (3+) present.   Skin:     Coloration: Skin is not jaundiced or pale.      Findings: Bruising (bilateral arms) present.    Neurological:      General: No focal deficit present.      Mental Status: She is alert and oriented to person, place, and time.      Cranial Nerves: No cranial nerve deficit.   Psychiatric:         Mood and Affect: Mood normal.         Behavior: Behavior normal.       Significant Labs: All pertinent labs within the past 24 hours have been reviewed.    Significant Imaging: I have reviewed all pertinent imaging results/findings within the past 24 hours.

## 2022-07-28 NOTE — ASSESSMENT & PLAN NOTE
Candiduria    Patient did have episode of seizures in MICU, now on onfi and keppra, no signs of hepatic encephalopathy, negative for asterixis. Ammonia wnl. Patient acutely altered and is unable to respond to basic questions, though appears to exhibit understanding.     Etiology unclear. Concerns for UTI, new seizure, SBP. Hyponatremic, though has been so throughout course. UA with new candida, no bacteria.    - Continue lactulose 15g bid, Rifaximin 550mg bid, thiamine 100mg  - Titrate bowel regimen to 3- 4 BMs per day  - Neurology consulted, appreciate recommendations  - EEG monitoring  - Paracentesis labs negative for SBP (WBC 53, PMN 64%)  - Fluconazole 200 mg IV; D/C ceftriaxone

## 2022-07-28 NOTE — ASSESSMENT & PLAN NOTE
Patient was diagnosed in February. Previous history of chronic alcohol abuse and states she drank about 4-5 glasses of wine per night for over 20 years. Patient is seeing hepatology outpatient. Labs shown in care everywhere.     MELD-Na score: 21 at 7/27/2022  6:26 AM  MELD score: 16 at 7/27/2022  6:26 AM  Calculated from:  Serum Creatinine: 1.3 mg/dL at 7/27/2022  6:26 AM  Serum Sodium: 131 mmol/L at 7/27/2022  6:26 AM  Total Bilirubin: 1.8 mg/dL at 7/27/2022  6:26 AM  INR(ratio): 1.5 at 7/27/2022  6:26 AM  Age: 67 years    - Continue lactulose titrate to BM, thiamine, and folate  - Hepatology consulted; appreciate recommendations - outpatient follow up  - Repeat IR paracentesis completed, 7.5L  - Continue to monitor for signs of liver failure  - F/u daily CMPs

## 2022-07-28 NOTE — ASSESSMENT & PLAN NOTE
Presenting with PITO with new sCr 1.6 (baseline sCr 0.9-1.1).  DDx: Decreased PO intake likely resulting in pre-renal etiology    - Per hepatology recommendation Lasix 40 BID IV transitioned to Lasix 40 PO BID. Continue Aldactone 100 daily.  - Gentle trial of IVF; encourage PO intake  - Trend sCr  - Strict I&Os  - Avoid nephrotoxic agents  - Renally adjust medications

## 2022-07-28 NOTE — PLAN OF CARE
Problem: Adult Inpatient Plan of Care  Goal: Plan of Care Review  7/28/2022 1554 by Natalio Hidalgo RN  Outcome: Ongoing, Progressing  7/28/2022 1554 by Natalio Hidalgo RN  Outcome: Ongoing, Progressing  7/28/2022 1553 by Natalio Hidalgo RN  Outcome: Ongoing, Progressing  Goal: Patient-Specific Goal (Individualized)  7/28/2022 1554 by Natalio Hidalgo RN  Outcome: Ongoing, Progressing  7/28/2022 1554 by Natalio Hidalgo RN  Outcome: Ongoing, Progressing  7/28/2022 1553 by Natalio Hidalgo RN  Outcome: Ongoing, Not Progressing  Goal: Absence of Hospital-Acquired Illness or Injury  7/28/2022 1554 by Natalio Hidalgo RN  Outcome: Ongoing, Progressing  7/28/2022 1554 by Natalio Hidalgo RN  Outcome: Ongoing, Progressing  7/28/2022 1553 by Natalio Hidalgo RN  Outcome: Ongoing, Not Progressing  Goal: Optimal Comfort and Wellbeing  7/28/2022 1554 by Natalio Hidalgo RN  Outcome: Ongoing, Progressing  7/28/2022 1554 by Natalio Hidalgo RN  Outcome: Ongoing, Progressing  7/28/2022 1553 by Natalio Hidalgo RN  Outcome: Ongoing, Not Progressing  Goal: Readiness for Transition of Care  7/28/2022 1554 by Natalio Hidalgo RN  Outcome: Ongoing, Progressing  7/28/2022 1554 by Natalio Hidalgo RN  Outcome: Ongoing, Progressing  7/28/2022 1553 by Natalio Hidalgo RN  Outcome: Ongoing, Not Progressing     Problem: Skin Injury Risk Increased  Goal: Skin Health and Integrity  7/28/2022 1554 by Natalio Hidalgo RN  Outcome: Ongoing, Progressing  7/28/2022 1554 by Natalio Hidalgo RN  Outcome: Ongoing, Progressing  7/28/2022 1553 by Natalio Hidalgo RN  Outcome: Ongoing, Not Progressing     Problem: Bariatric Environmental Safety  Goal: Safety Maintained with Care  7/28/2022 1554 by Natalio Hidalgo RN  Outcome: Ongoing, Progressing  7/28/2022 1554 by Natalio Hidalgo RN  Outcome: Ongoing, Progressing  7/28/2022 1553 by Natalio Hidalgo RN  Outcome: Ongoing, Not Progressing     Problem: Fall Injury Risk  Goal: Absence of Fall and Fall-Related Injury  7/28/2022 1554 by Natalio Hidalgo, RN  Outcome:  Ongoing, Progressing  7/28/2022 1554 by Natalio Hidalgo RN  Outcome: Ongoing, Progressing  7/28/2022 1553 by Natalio Hidalgo RN  Outcome: Ongoing, Not Progressing     Problem: Infection  Goal: Absence of Infection Signs and Symptoms  7/28/2022 1554 by Natalio Hidalgo RN  Outcome: Ongoing, Progressing  7/28/2022 1554 by Natalio Hidalgo RN  Outcome: Ongoing, Progressing  7/28/2022 1553 by Natalio Hidalgo RN  Outcome: Ongoing, Not Progressing     Problem: Fluid and Electrolyte Imbalance (Acute Kidney Injury/Impairment)  Goal: Fluid and Electrolyte Balance  7/28/2022 1554 by Natalio Hidalgo RN  Outcome: Ongoing, Progressing  7/28/2022 1554 by Natalio Hidalgo RN  Outcome: Ongoing, Progressing  7/28/2022 1553 by Natalio Hidalgo RN  Outcome: Ongoing, Not Progressing     Problem: Oral Intake Inadequate (Acute Kidney Injury/Impairment)  Goal: Optimal Nutrition Intake  7/28/2022 1554 by Natalio Hidalgo RN  Outcome: Ongoing, Progressing  7/28/2022 1554 by Natalio Hidalgo RN  Outcome: Ongoing, Progressing  7/28/2022 1553 by Natalio Hidalgo RN  Outcome: Ongoing, Not Progressing     Problem: Renal Function Impairment (Acute Kidney Injury/Impairment)  Goal: Effective Renal Function  7/28/2022 1554 by Natalio Hidalgo RN  Outcome: Ongoing, Progressing  7/28/2022 1554 by Natalio Hidalgo RN  Outcome: Ongoing, Progressing  7/28/2022 1553 by Natalio Hidalgo RN  Outcome: Ongoing, Not Progressing     Problem: Impaired Wound Healing  Goal: Optimal Wound Healing  7/28/2022 1554 by Natalio Hidalgo RN  Outcome: Ongoing, Progressing  7/28/2022 1554 by Natalio Hidalgo RN  Outcome: Ongoing, Progressing  7/28/2022 1553 by Natalio Hidalgo RN  Outcome: Ongoing, Not Progressing

## 2022-07-28 NOTE — PLAN OF CARE
Neurology had previously been consulted again on 7/26 for evaluation of contribution of anti-epileptic medication to patient's mentation. Patient with deafness and alcoholic cirrhosis presented to [facility] for decompensated cirrhosis requiring fluid management over hospitalization. Notable encephalopathy on 7/10 AM for which neurology was previously consulted. No witnessed seizure activity and no prior history of epilepsy. She was evaluated with EEG and though no electrographic seizures were found was started on keppra and onfi. At the time of consult, patient appeaed to have a UTI picture on U/A and continued hyponatremia.     On prior exam 7/26 at the time of consult, patient was alert and communicative without signs of significant decline in mentation compared to earlier in admission. Contributors include her suspected ongoing UTI, hospital-induced delirium, as well as Melatonin 6mg qhs, that she has been receiving.    Recommendations were made to discontinue melatonin and continue Keppra 750 BID, Onfi 10mg BID. We discussed the plan and treatments with patient and family using sign language interpretor and they voiced understanding of the plan.    Recommendations have not changed. General Neurology is following peripherally and will provide updated recommendations if significant findings are noted in the EEG requiring any change in recommendations.    07/28/2022  Isai Bustillo MD, [facility]  Neurology resident, PGY-1  Department of Neurology  [facility]

## 2022-07-28 NOTE — PT/OT/SLP PROGRESS
Physical Therapy Co-Treatment  Co-treatment with OT for maximal pt participation, safety, and activity tolerance    Patient Name:  Aleyda Floyd   MRN:  [id number]  Admitting Diagnosis:  Acute encephalopathy   Recent Surgery: * No surgery found *    Admit Date: 6/27/2022  Length of Stay: 30 days    Recommendations:     Discharge Recommendations:  nursing facility, skilled   Discharge Equipment Recommendations:  (TBD)   Barriers to discharge: Evolving Clinical Presentation    Assessment:     Aleyda Floyd is a 67 y.o. female admitted with a medical diagnosis of Acute encephalopathy.  She presents with the following impairments/functional limitations:  weakness, gait instability, decreased lower extremity function, impaired balance, impaired endurance, impaired cardiopulmonary response to activity, decreased safety awareness, impaired self care skills, impaired functional mobility.     Pt supine in bed upon arrival, asleep but easily aroused, agreeable to therapy. ASL  used in combination with 's help to improve communication with patient. Pt reports wanting to get up. Pt sits EOB from supine with mod A x2, CGA to min A for sitting balance. Sit to stand with min A x2 with 2 min of standing balance with min A. Second attempt at standing with walker required more assistance and only able to maintain for a few seconds due to fatigue. Pt back into supine from sitting with max A x2, total A x2 to scoot to HOB. Treatment tolerated well, continue to progress functional mobility as able.    Rehab Prognosis: Good; patient would benefit from acute skilled PT services to address these deficits and reach maximum level of function.    Recent Surgery: * No surgery found *      Highest level of mobility achieved this visit: standing EOB with min A    Activity with RN/PCT: bed mobility only    Plan:     During this hospitalization, patient to be seen 3 x/week to address the identified rehab impairments via  "gait training, therapeutic activities, therapeutic exercises, neuromuscular re-education and progress toward the following goals:    · Plan of Care Expires:  08/12/22    Subjective     RN Malka notified prior to session. No family present upon PT entrance into room ( into room at later time).    Chief Complaint: fatigue  Patient/Family Comments/goals: "I want to get out of bed"  Pain/Comfort:  · Pain Rating 1: 0/10      Objective:     Additional staff present: OT Suzan    Patient found supine with: telemetry, pulse ox (continuous)   Cognition:   · Mildly Lethargic and Cooperative  · Command following: Follows one-step verbal commands  · Fluency: non-verbal  General Precautions: Standard, Cardiac fall, contact, hearing impaired   Orthopedic Precautions:N/A   Braces: N/A   Body mass index is 35.74 kg/m².  Oxygen Device: Room Air      Vitals: BP (!) 99/49 (BP Location: Left arm, Patient Position: Lying)   Pulse 69   Temp 97.4 °F (36.3 °C) (Oral)   Resp 14   Ht 5' (1.524 m)   Wt 83 kg (182 lb 15.7 oz)   SpO2 97%   Breastfeeding No   BMI 35.74 kg/m²     Outcome Measures:  AM-PAC 6 CLICK MOBILITY  Turning over in bed (including adjusting bedclothes, sheets and blankets)?: 2  Sitting down on and standing up from a chair with arms (e.g., wheelchair, bedside commode, etc.): 2  Moving from lying on back to sitting on the side of the bed?: 2  Moving to and from a bed to a chair (including a wheelchair)?: 2  Need to walk in hospital room?: 1  Climbing 3-5 steps with a railing?: 1  Basic Mobility Total Score: 10     Functional Mobility:    Bed Mobility:   · Rolling/Turning to Left: moderate assistance  · Rolling/Turning to Right: moderate assistance  · Scooting to HOB via supine bridge: total assistance of 2 persons  · Supine to Sit: minimum assistance of 2 persons; from R side of bed  · Scooting anteriorly to EOB to have both feet planted on floor: maximal assistance of 2 persons  · Sit to Supine: maximal " assistance of 2 persons; to L side of bed    Sitting Balance at Edge of Bed:   Static Sitting Balance: Poor+ : able to maintain balance with minimal assistance from individual or chair   Dynamic Sitting Balance: Poor+ : able to sit unsupported with minimal assistance and reach to ipsilateral side but unable to weight shift   Assistance Level Required: Contact Guard Assistance and Minimal Assistance   Time: 18 min   Postural deviations noted: rounded shoulders    Transfers:   · Sit <> Stand Transfer: minimum assistance of 2 persons with hand-held assist   · Stand <> Sit Transfer: contact guard assistance with hand-held assist   · [id number] from EOB    Standing Balance:   Static Standing Balance: Fair- : requires minimal assistance or UE support in order to stand without LOB   Assistance Level Required: Contact Guard Assistance and Minimal Assistance   Patient used: hand-held assist    Time: 1-2 min   Postural deviations noted: no deviations noted    Education:   Time provided for education, counseling and discussion of health disposition in regards to patient's current status   PT role in POC to address current functional deficits    ASL  used throughout    Patient left supine with all lines intact, call button in reach and  present.    GOALS:   Multidisciplinary Problems     Physical Therapy Goals        Problem: Physical Therapy    Goal Priority Disciplines Outcome Goal Variances Interventions   Physical Therapy Goal     PT, PT/OT Ongoing, Progressing     Description: Goals to be met by: 22    Patient will increase functional independence with mobility by performin. Supine to sit with minimal Assistance - not met  2. Sit to stand transfer with minimal assistance- not met  3. Bed to chair transfer with minimal assistance using LRAD - not met  4. Gait  x 30 feet with minimal  Assistance using LRAD - not met  5. Ascend/Descend 6 inch curb step with Contact Guard Assistance  using LRAD - not met  6. Lower extremity exercise program x30 reps per handout, with independence - not met                   Time Tracking:     PT Received On: 07/28/22  PT Start Time: 1015     PT Stop Time: 1043  PT Total Time (min): 28 min     Billable Minutes:   · Therapeutic Activity 15 min and Neuromuscular Re-education 10 min    Treatment Type: Treatment  PT/PTA: PT       Walter Carolina, PT, DPT  7/28/2022

## 2022-07-28 NOTE — PLAN OF CARE
Patient is not medically ready to discharge.    CM/SW staff will follow-up and assist team with discharge needs.      Nayeli Soto RN  Covering  - [facility]  [phone]

## 2022-07-28 NOTE — PROCEDURES
EEG REPORT      Aleyda Floyd  [id number]  1955    DATE OF SERVICE: 7/28/2022         METHODOLOGY      Extended electroencephalographic recording is made while the patient is ambulatory and continuing normal daily activities.  Electrodes are placed according to the International 10-20 placement system and included T1 and T2 electrode placement.  Twenty four (24) channels of digital signal (sampling rate of 512/sec) was simultaneously recorded from the scalp including EKG and eye monitors.  Recording band pass was 0.1 to 100 hz and all data was stored digitally on the recorder.  The patient is instructed to press an event button when clinical symptoms occur and write the symptoms into a diary. Activation procedures which include photic stimulation, hyperventilation and instructing patients to perform simple task are done in selected patients.        The EEG is displayed on a monitor screen and can be reformatted into different montages for evaluation.  The entire recoding is submitted for computer assisted analysis to detect spike and electrographic seizure activity.  The entire recording is visually reviewed and the times identified by computer analysis as being spikes or seizures are reviewed again.  Compresses spectral analysis (CSA) is also performed on the activity recorded from each individual channel.  This is displayed as a power display of frequencies from 0 to 30 Hz over time.   The CSA analysis is done and displayed continuously.  This is reviewed for asymmetries in power between homologous areas of the scalp and for presence of changes in power which canbe seen when seizures occur.  Sections of suspected abnormalities on the CSA is then compared with the original EEG recording.  .     [facility] software was also utilized in the review of this study.  This software suite analyzes the EEG recording in multiple domains.  Coherence and rhythmicity is computed to identify EEG sections which may  contain organized seizures.  Each channel undergoes analysis to detect presence of spike and sharp waves which have special and morphological characteristic of epileptic activity.  The routine EEG recording is converted from spacial into frequency domain.  This is then displayed comparing homologous areas to identify areas of significant asymmetry.  Algorithm to identify non-cortically generated artifact is used to separate eye movement, EMG and other artifact from the EEG     Recording Times    A total of 1:19:38 hours of EEG was recorded.      EEG FINDINGS:  Background activity:   The background rhythm was characterized by alpha and theta activity with a 6-7Hz posterior dominant alpha rhythm at 30-70 microvolts.   Symmetry and continuity: the background was continuous and symmetric     Sleep:   No sleep transients were seen.    Activation procedures:   NA    Abnormal activity:   Occasional generalized periodic discharges with triphasic morphology (triphasics).    IMPRESSION:   Abnormal EEG due to a mild generalized cerebral dysfunction as is commonly seen in a wide variety of states of toxic and metabolic derangement.      David Monteiro MD  Neurology-Epilepsy.  [facility].

## 2022-07-28 NOTE — NURSING
Pt alert and oriented,  at bedside. Denies any pain. Has been very involved in her care today. Presently undergoing continuous EEG monitoring. No acute events. Will continue with current plan of care.

## 2022-07-28 NOTE — PROGRESS NOTES
[facility] ([facility]  Adult Nutrition  Consult Note    SUMMARY     Recommendations    1. Continue current Low Na, Dysphagia soft diet (fluid restriction per MD).  2. RD to monitor & follow-up.    Goals: Meet % EEN, EPN by RD f/u date  Nutrition Goal Status: goal met  Communication of RD Recs: reviewed with RN    Assessment and Plan    Nutrition Problem:  Inadequate oral intake     Related to (etiology):   Inability to consume sufficient energy     Signs and Symptoms (as evidenced by):   NPO     Interventions(treatment strategy):  Collaboration of nutrition care w/ other providers  EN     Nutrition Diagnosis Status:   Resolved    Malnutrition Assessment    Orbital Region (Subcutaneous Fat Loss): well nourished  Upper Arm Region (Subcutaneous Fat Loss): well nourished  Thoracic and Lumbar Region: well nourished   [place] Region (Muscle Loss): mild depletion  Clavicle Bone Region (Muscle Loss): mild depletion  Clavicle and Acromion Bone Region (Muscle Loss): mild depletion  Anterior Thigh Region (Muscle Loss): mild depletion   Edema (Fluid Accumulation): 3-->moderate     Reason for Assessment    Reason For Assessment: RD follow-up  Diagnosis: liver disease (anasarca)  Relevant Medical History: Alcoholic Cirrhosis, obesity, deaf,HLD, HTN  Interdisciplinary Rounds: did not attend    General Information Comments: Pt working w/ PT this AM during visit. Per RN documentation, pt continues to tolerate diet w/ % PO intake. UBW: 180# per chart review. NFPE complete 7/7 - pt nourished w/ no indicators of malnutrition. Noted 18# weight loss since previous RD assessment, likely fluid related as pt -16L. Paracentesis complete 7/27 w/ 6.5L removed.  Nutrition Discharge Planning: Pending clinical course    Nutrition/Diet History    Patient Reported Diet/Restrictions/Preferences: general  Spiritual, Cultural Beliefs, Judaism Practices, Values that Affect Care: no  Food Allergies: NKFA  Factors  Affecting Nutritional Intake: None identified at this time    Anthropometrics    Temp: 96.3 °F (35.7 °C)  Height Method: Stated  Height: 5' (152.4 cm)  Height (inches): 60 in  Weight Method: Bed Scale  Weight: 83 kg (182 lb 15.7 oz)  Weight (lb): 182.98 lb  Ideal Body Weight (IBW), Female: 100 lb  % Ideal Body Weight, Female (lb): 182.98 %  BMI (Calculated): 35.7  BMI Grade: 35 - 39.9 - obesity - grade II    Lab/Procedures/Meds    Pertinent Labs Reviewed: reviewed  Pertinent Labs Comments: GFR 46.9  Pertinent Medications Reviewed: reviewed  Pertinent Medications Comments: Folic acid, Thiamine    Estimated/Assessed Needs    Weight Used For Calorie Calculations: 83 kg (182 lb 15.7 oz)     Energy Calorie Requirements (kcal): 1544 kcal/d  Energy Need Method: Coryell-St Jeor (1.2 PAL)     Protein Requirements: 83 g/d (1 g/kg)  Weight Used For Protein Calculations: 83 kg (182 lb 15.7 oz)     Fluid Requirements (mL): 1500 per MD  RDA Method (mL): 1544    Nutrition Prescription Ordered    Current Diet Order: Low Na, Dysphagia soft  Nutrition Order Comments: 1500 mL FR    Current Nutrition Support Formula Ordered: Other (Comment) (Discontinued)    Evaluation of Received Nutrient/Fluid Intake    I/O: -16.2L Since 7/14    Comments: LBM: 7/27    Tolerance: tolerating    Nutrition Risk    Level of Risk/Frequency of Follow-up: low (one time per week)     Monitor and Evaluation    Food and Nutrient Intake: energy intake, food and beverage intake, enteral nutrition intake  Food and Nutrient Adminstration: diet order, enteral and parenteral nutrition administration  Knowledge/Beliefs/Attitudes: food and nutrition knowledge/skill  Physical Activity and Function: nutrition-related ADLs and IADLs  Anthropometric Measurements: weight, weight change  Biochemical Data, Medical Tests and Procedures: lipid profile, inflammatory profile, electrolyte and renal panel, gastrointestinal profile, glucose/endocrine profile  Nutrition-Focused  Physical Findings: overall appearance     Nutrition Follow-Up    RD Follow-up?: Yes

## 2022-07-28 NOTE — PLAN OF CARE
Problem: Occupational Therapy  Goal: Occupational Therapy Goal  Description: Goals  on 8/15/2022    Patient will increase functional independence with ADLs by performing:    Grooming while standing with Minimal Assistance.  Toileting from bedside commode with Minimal Assistance for hygiene and clothing management.   Supine to sit with Minimal Assistance.  UE dressing with Minimal assistance  Step transfer with Minimal Assistance using RW.  Toilet transfer to bedside commode with Minimal Assistance using RW.    Outcome: Ongoing, Progressing     Goals updated.

## 2022-07-28 NOTE — PT/OT/SLP PROGRESS
Occupational Therapy   Co-Treatment    Name: Aleyda Floyd  MRN: [id number]  Admitting Diagnosis:  Acute encephalopathy       Recommendations:     Discharge Recommendations: nursing facility, skilled  Discharge Equipment Recommendations:   (TBD)  Barriers to discharge:  Inaccessible home environment (increased (A) required)    Assessment:     Aleyda Floyd is a 67 y.o. female with a medical diagnosis of Acute encephalopathy.  She presents with improved mobility & balance on this date.  Pt continues to require (A) with all activities & is at high fall risk. Performance deficits affecting function are weakness, impaired endurance, impaired self care skills, impaired functional mobility, impaired balance, decreased upper extremity function, decreased lower extremity function, decreased safety awareness, impaired cardiopulmonary response to activity. Co-treatment performed due to patient's multiple deficits requiring two skilled therapists to safely assess patient's ability to complete ADLs and functional mobility in addition to accommodating for patient's activity tolerance while in acute care setting.      Rehab Prognosis:  Fair; patient would benefit from acute skilled OT services to address these deficits and reach maximum level of function.       Plan:     Patient to be seen 3 x/week to address the above listed problems via self-care/home management, therapeutic activities, therapeutic exercises, neuromuscular re-education  · Plan of Care Expires: 08/12/22  · Plan of Care Reviewed with: patient    Subjective   Pt signed to ASL interpretor that she was ok with EOB.  Utilized ASL interpretor via IPAD throughout session as well as family member assisted at times during session.  Pain/Comfort:  · Pain Rating 1: 0/10  · Pain Rating Post-Intervention 1: 0/10    Objective:     Communicated with: RN prior to session.  Patient found supine with telemetry, pulse ox (continuous) (family member arrived during session)  upon OT entry to room.    General Precautions: Standard, fall, contact, hearing impaired   Orthopedic Precautions:N/A   Braces: N/A     Occupational Performance:     Bed Mobility:    · Patient completed Rolling/Turning to Left with  moderate assistance  · Patient completed Rolling/Turning to Right with moderate assistance  · Patient completed Scooting/Bridging with total assistance scooting forward on EOB & up HOB while supine  · Patient completed Supine to Sit with moderate assistance and 2 persons  · Patient completed Sit to Supine with moderate assistance and 2 persons     Functional Mobility/Transfers:  · Patient completed Sit <> Stand Transfer with minimum assistance and of 2 persons  with  hand held (A) x 1 trial from EOB & with RW upon 2nd trial from EOB   · Functional Mobility: Min (A) x 2 persons with static standing trials    Activities of Daily Living:  · Grooming: minimum assistance washing face while seated EOB   · UE dressing: Max (A) while seated EOB      AMPAC 6 Click ADL: 12    Treatment & Education:  Pt required CGA-MIn (A) for postural control while seated EOB.  Provided verbal & physical cues to facilitate postural control. Pt had no further questions & when asked whether there were any concerns pt reported none.        Patient left supine with all lines intact, call button in reach, bed alarm on, RN notified and family member presentEducation:      GOALS:   Multidisciplinary Problems     Occupational Therapy Goals        Problem: Occupational Therapy    Goal Priority Disciplines Outcome Interventions   Occupational Therapy Goal     OT, PT/OT Ongoing, Progressing    Description: Goals  on 8/15/2022    Patient will increase functional independence with ADLs by performing:    Grooming while standing with Minimal Assistance.  Toileting from bedside commode with Minimal Assistance for hygiene and clothing management.   Supine to sit with Minimal Assistance.  UE dressing with Minimal  assistance  Step transfer with Minimal Assistance using RW.  Toilet transfer to bedside commode with Minimal Assistance using RW.                     Time Tracking:     OT Date of Treatment: 07/28/22  OT Start Time: 1016  OT Stop Time: 1043  OT Total Time (min): 27 min    Billable Minutes:Self Care/Home Management 14  Therapeutic Activity 13    OT/TORREY: OT          7/28/2022

## 2022-07-29 NOTE — PROGRESS NOTES
[facility] ([place] [address])  [facility] Medicine  Progress Note    Patient Name: Aleyda Floyd  MRN: [id number]  Patient Class: IP- Inpatient   Admission Date: 6/27/2022  Length of Stay: 30 days  Attending Physician: Jessenia Foster MD  Primary Care Provider: Elvie Fernandes MD        Subjective:     Principal Problem:Acute encephalopathy        HPI:  This is a 67 year old deaf lady with alcoholic cirrhosis, and hypertension who presented with generalized swelling.  used to obtain history. Patient states that she had the swelling since her diagnosis of cirrhosis in February. Patient states that 2 days ago, she was able to ambulate but with some difficulty due to the swelling but now  worsening to the point where she is unable to ambulate by herself. Of note, patient had a recent EGD performed earlier last week where they had issues with her IV resulting is significant bruising. Patient also notes that whenever she scratches her skin with her nails, she notices clear fluid coming from the cuts. Patient denies fever, chills, cough, hemoptysis, nausea, vomiting, abdominal pain, yellowing of skin, constipation, dysuria, hematuria, and black/ bloody stools. Patient has baseline loose stools from her lactulose.  She reports compliance with all medications including her lactulose and Lasix. Patient and family declined any confusion or altered mental status. She denies any recent alcohol use with last use in February when she was diagnosed with alcoholic cirrhosis. Patient states that she used to weigh around 170s which she believes is her dry weight and is currently in the 200s.     Upon chart review, patient had an echo at an OSH on 2/25/22 which showed EF 60-65% and some diastolic dysfunction.    In the ED, patient was found to have Hgb of 8.5 and platelets of 113 around baseline. Patient had a UA which showed 1+ leuks and many bacteria. Patient did not complain of dysuria.        Overview/Hospital Course:  Admitted for decompensated cirrhosis and anasarca. Began diuresing with IV Lasix and spironolactone. Paracentesis of -4.2L; ruled out SBP. Pt stabilized for few days and ready to be dc'ed when she had a change in mental status. Found pt unresponsive with fixed gaze and no pupil response. Code stroke called, CT neg for stroke. Eeg showed seizure-like activity. Pt transitioned to MICU for higher level of care. On step down, had a UTI and PITO, both of which were treated. She received a repeat paracentesis with 7.5L removed. Her creatinine started improve with administration of albumin and holding diuretics. Diuretics restarted and transitioned to Lasix 40mg PO and Aldactone 100mg PO. Patient started to have mild difficulty with mentation again with concerns for recurring UTI. Repeat UA indicative of recurring UTI, awaiting UCX. Neurology consulted for recent decline in mentation. Paracentesis labs negative for SBP.    Dispo to NH and follow up with hepatology, neuro.      Interval History: KIERRA PADILLA. Patient's attention was declining for last 2 days but with some improvement today.    Review of Systems   Constitutional:  Negative for appetite change, chills and fever.   HENT:  Negative for sore throat and trouble swallowing.    Eyes:  Negative for pain and visual disturbance.   Respiratory:  Negative for cough, chest tightness and shortness of breath.    Cardiovascular:  Negative for chest pain and palpitations.   Gastrointestinal:  Positive for abdominal distention. Negative for abdominal pain, constipation, diarrhea, nausea and vomiting.   Genitourinary:  Negative for difficulty urinating, dysuria and hematuria.   Musculoskeletal:  Negative for joint swelling and myalgias.   Skin:  Negative for color change and pallor.   Neurological:  Negative for dizziness, light-headedness and headaches.   Psychiatric/Behavioral:  Positive for confusion and decreased concentration. Negative for  agitation.    Objective:     Vital Signs (Most Recent):  Temp: 97.5 °F (36.4 °C) (07/28/22 1950)  Pulse: 75 (07/28/22 1950)  Resp: 18 (07/28/22 1950)  BP: (!) 101/50 (07/28/22 1950)  SpO2: 98 % (07/28/22 1950)   Vital Signs (24h Range):  Temp:  [96.2 °F (35.7 °C)-98.2 °F (36.8 °C)] 97.5 °F (36.4 °C)  Pulse:  [69-75] 75  Resp:  [14-18] 18  SpO2:  [96 %-98 %] 98 %  BP: ()/(49-54) 101/50     Weight: 83 kg (182 lb 15.7 oz)  Body mass index is 35.74 kg/m².    Intake/Output Summary (Last 24 hours) at 7/28/2022 2021  Last data filed at 7/28/2022 1548  Gross per 24 hour   Intake --   Output 900 ml   Net -900 ml      Physical Exam  Constitutional:       General: She is not in acute distress.     Appearance: Normal appearance. She is not ill-appearing.   HENT:      Head: Normocephalic and atraumatic.      Nose: Rhinorrhea present. No congestion.      Mouth/Throat:      Mouth: Mucous membranes are moist.   Eyes:      General:         Right eye: No discharge.         Left eye: No discharge.      Extraocular Movements: Extraocular movements intact.   Cardiovascular:      Rate and Rhythm: Normal rate and regular rhythm.      Pulses: Normal pulses.      Heart sounds: Murmur (systolic flow) heard.     No gallop.   Pulmonary:      Effort: Pulmonary effort is normal. No respiratory distress.      Breath sounds: Normal breath sounds.   Abdominal:      General: There is distension.      Tenderness: There is no abdominal tenderness. There is no guarding or rebound.   Musculoskeletal:      Cervical back: Normal range of motion.      Right lower leg: Edema (3+) present.      Left lower leg: Edema (3+) present.   Skin:     Coloration: Skin is not jaundiced or pale.      Findings: Bruising (bilateral arms) present.   Neurological:      General: No focal deficit present.      Mental Status: She is alert and oriented to person, place, and time.      Cranial Nerves: No cranial nerve deficit.   Psychiatric:         Mood and Affect: Mood  normal.         Behavior: Behavior normal.       Significant Labs: All pertinent labs within the past 24 hours have been reviewed.  Recent Lab Results         07/28/22  0436        Albumin 2.7       Alkaline Phosphatase 56       ALT 20       Anion Gap 9       Aniso Slight       AST 31       Baso # 0.04       Basophil % 0.7       BILIRUBIN TOTAL 1.7  Comment: For infants and newborns, interpretation of results should be based  on gestational age, weight and in agreement with clinical  observations.    Premature Infant recommended reference ranges:  Up to 24 hours.............<8.0 mg/dL  Up to 48 hours............<12.0 mg/dL  3-5 days..................<15.0 mg/dL  6-29 days.................<15.0 mg/dL         BUN 18       Calcium 8.9       Chloride 102       CO2 26       Creatinine 1.2       Differential Method Automated       eGFR if African American 54.0       eGFR if non  46.9  Comment: Calculation used to obtain the estimated glomerular filtration  rate (eGFR) is the CKD-EPI equation.          Eos # 0.4       Eosinophil % 6.8       Fragmented Cells Occasional       Glucose 94       Gran # (ANC) 2.5       Gran % 46.2       Hematocrit 24.8       Hemoglobin 8.2       Immature Grans (Abs) 0.01  Comment: Mild elevation in immature granulocytes is non specific and   can be seen in a variety of conditions including stress response,   acute inflammation, trauma and pregnancy. Correlation with other   laboratory and clinical findings is essential.         Immature Granulocytes 0.2       INR 1.9  Comment: Coumadin Therapy:  2.0 - 3.0 for INR for all indicators except mechanical heart valves  and antiphospholipid syndromes which should use 2.5 - 3.5.         Lymph # 1.9       Lymph % 34.4       Magnesium 1.5       MCH 31.2       MCHC 33.1       MCV 94       Mono # 0.6       Mono % 11.7       MPV 10.1       nRBC 0       Ovalocytes Occasional       Phosphorus 4.5       Platelet Estimate Decreased        Platelets 55       Poikilocytosis Slight       Potassium 4.3       PROTEIN TOTAL 5.2       Protime 18.8       RBC 2.63       RDW 17.6       Schistocytes Present       Sodium 137       WBC 5.46               Significant Imaging: I have reviewed all pertinent imaging results/findings within the past 24 hours.      Assessment/Plan:      * Acute encephalopathy  Candiduria    Patient did have episode of seizures in MICU, now on onfi and keppra, no signs of hepatic encephalopathy, negative for asterixis. Ammonia wnl. Patient acutely altered and is unable to respond to basic questions, though appears to exhibit understanding.     Etiology unclear. Concerns for UTI, new seizure, SBP. Hyponatremic, though has been so throughout course. UA with new candida, no bacteria.    - Continue lactulose 15g bid, Rifaximin 550mg bid, thiamine 100mg  - Titrate bowel regimen to 3- 4 BMs per day  - Neurology following peripherally, appreciate recommendations  - Continuous EEG monitoring  - Paracentesis labs negative for SBP (WBC 53, PMN 64%)  - Fluconazole 200 mg IV; D/C ceftriaxone    PITO (acute kidney injury)  Presenting with PITO with new sCr 1.6 (baseline sCr 0.9-1.1).  DDx: Decreased PO intake likely resulting in pre-renal etiology    - Per hepatology recommendation Lasix 40 BID IV transitioned to Lasix 40 PO BID. Continue Aldactone 100 daily.  - Gentle trial of IVF; encourage PO intake  - Trend sCr  - Strict I&Os  - Avoid nephrotoxic agents  - Renally adjust medications      Anasarca  67 yoF with alcoholic liver cirrhosis and hypertension admitted for generalized anasarca. Patient's albumin on admission was 1.9. Likely edema related to poor intravascular oncotic pressure 2/2 alcoholic cirrhosis. Patient adherent to home medication. Given history and exam, patient would benefit from diuresis. Dry weight reported as 170s. Patient is currently 200 lbs 6.4oz (down from 212 lbs).     - Strict I/Os  - Daily weights  - Low sodium diet with  1500 ml fluid restriction  - Plan for f/u with her hepatologist on d/c  - discontinued 25% bottle of 25g Albumin     UTI (urinary tract infection)  Positive UA from new kim placed 7/10/22. Hx of ESBL E coli in Feb 2022 at OSH  Uctx growing enterococcus, s/p treatment with ampicillin course 07/17.  UA with candida 07/25.    - Rest per Acute Encephalopathy A/P    Anemia, chronic disease  Patient with Hgb of 8.5 on admit. Upon review of care everywhere, patient had Hgb of 8.4 in all of 2022. Likely due to chronic disease and stable.  One episode of Hgb 6.9 on 7/18. Ordered 1 U pRBC. Received written consent from patient and  prior to transfusion. Received 1U prbc for Hgb 6.3 yesterday.     Plan:  - today Hgb 8.4, Transfuse if Hg<7.0. No bloody bowel movements noted.   - Per GI recommendation, if anemia persists and not responding to transfusion then inpatient colonoscopy recommended for further evaluation  - pantoprazole 40 bid switches to daily      Alcoholic cirrhosis of liver with ascites  Patient was diagnosed in February. Previous history of chronic alcohol abuse and states she drank about 4-5 glasses of wine per night for over 20 years. Patient is seeing hepatology outpatient. Labs shown in care everywhere.     MELD-Na score: 21 at 7/27/2022  6:26 AM  MELD score: 16 at 7/27/2022  6:26 AM  Calculated from:  Serum Creatinine: 1.3 mg/dL at 7/27/2022  6:26 AM  Serum Sodium: 131 mmol/L at 7/27/2022  6:26 AM  Total Bilirubin: 1.8 mg/dL at 7/27/2022  6:26 AM  INR(ratio): 1.5 at 7/27/2022  6:26 AM  Age: 67 years    - Continue lactulose titrate to BM, thiamine, and folate  - Hepatology consulted; appreciate recommendations - outpatient follow up  - Repeat IR paracentesis completed, 7.5L  - Continue to monitor for signs of liver failure  - F/u daily CMPs    Thrombocytopenia   Plt 65, PT 15.6, INR 1.5. Improved after Dose of Vitamin K.  Patient underwent paracentesis and there was large volume leaking from site  controlled with dermabond.    - continue to monitor platelet count and inspect abdomen for sites of bleeding.  - No bloody bowel movements noted.     Debility  - Pt non-mobile for some time in hospital,  PT/OT recs for SNF  - Pt advised to move around in bed but to only get up with help      Primary hypertension  Patient was discontinued from losartan per hepatologist.   - continue to monitor and can add when clinically indicated  - pt removed from tele 7/16    Gram-positive cocci in clusters  Noted on 1 set of aer/anae cultures from 7/10/22. 2nd set so far negative. No clear sign of infection. Could likely be contaminant.  Uctx also from enterococcus species  - s/p Ampicillin completed 07/17    Hypomagnesemia  Monitor daily and replete prn      Deaf- written communication  - ASL  used for interaction as well as written communication      VTE Risk Mitigation (From admission, onward)         Ordered     IP VTE HIGH RISK PATIENT  Once         06/28/22 0419     Place sequential compression device  Until discontinued         06/28/22 0419     Place sequential compression device  Until discontinued         06/28/22 0415                Discharge Planning   REMA: 7/29/2022     Code Status: Full Code   Is the patient medically ready for discharge?: No    Reason for patient still in hospital (select all that apply): Treatment  Discharge Plan A: Skilled Nursing Facility   Discharge Delays: None known at this time    PT/OT: recommend SNF placement          Lele Posadas MD  Department of Hospital Medicine   [facility] - [facility] ([place]

## 2022-07-29 NOTE — PLAN OF CARE
Updated clinical notes and MAR faxed to Joselyn at [facility] to [phone] and downloaded in [employer].

## 2022-07-29 NOTE — NURSING
Patient denied any new complaints today.  No sob or pain reported.  Pt on room air.  Bedridden Q2 turn. Pt does have open red area to coccyx, Rn changed foam dressing this shift.  Heels floated on pillow.  at bedside most of the shift.  Pt and  are deaf so utilized tablet for  today.

## 2022-07-29 NOTE — NURSING
Pt AOx2, mentation is improving.Denies pain or discomfort, safety measure in place call light in reach.

## 2022-07-29 NOTE — ASSESSMENT & PLAN NOTE
Candiduria  Concerns for SBP    Patient did have episode of seizures in MICU, now on onfi and keppra, no signs of hepatic encephalopathy, negative for asterixis. Ammonia wnl. Patient acutely altered and is unable to respond to basic questions, though appears to exhibit understanding.     Etiology unclear. Concerns for UTI, new seizure, SBP. Hyponatremic, though has been so throughout course. UA with new candida, no bacteria.     - Continue lactulose 15g bid, Rifaximin 550mg bid, thiamine 100mg  - Titrate bowel regimen to 3- 4 BMs per day  - Neurology following peripherally, appreciate recommendations  - Continuous EEG monitoring did not demonstrate seizure activity, more c/w metabolic encephalopathy  - Paracentesis labs negative for SBP (WBC 53, PMN 64%), however, obtained after starting CTX (initially started when UA was drawn and concerns for bacterial UTI)  - Fluconazole 200 mg IV  - CTX 2 g qd x 5 total days (will continue SBP tx despite negative labs d/t suboptimal sample and acuity of condition)

## 2022-07-29 NOTE — ASSESSMENT & PLAN NOTE
Candiduria    Patient did have episode of seizures in MICU, now on onfi and keppra, no signs of hepatic encephalopathy, negative for asterixis. Ammonia wnl. Patient acutely altered and is unable to respond to basic questions, though appears to exhibit understanding.     Etiology unclear. Concerns for UTI, new seizure, SBP. Hyponatremic, though has been so throughout course. UA with new candida, no bacteria.    - Continue lactulose 15g bid, Rifaximin 550mg bid, thiamine 100mg  - Titrate bowel regimen to 3- 4 BMs per day  - Neurology following peripherally, appreciate recommendations  - Continuous EEG monitoring  - Paracentesis labs negative for SBP (WBC 53, PMN 64%)  - Fluconazole 200 mg IV; D/C ceftriaxone

## 2022-07-29 NOTE — ASSESSMENT & PLAN NOTE
Patient with Hgb of 8.5 on admit. Upon review of care everywhere, patient had Hgb of 8.4 in all of 2022. Likely due to chronic disease and stable.  One episode of Hgb 6.9 on 7/18. Ordered 1 U pRBC. Received written consent from patient and  prior to transfusion. Received 1U prbc for Hgb 6.3 yesterday.     - Hgb stable, no signs of new bleed  - Per GI recommendation, if anemia persists and not responding to transfusion then inpatient colonoscopy recommended for further evaluation; otherwise outpatient  - Pantoprazole 40 PO qd

## 2022-07-29 NOTE — PROGRESS NOTES
[facility] - Intensive [facility] ([place] [address])  [facility] Medicine  Progress Note    Patient Name: Aleyda Floyd  MRN: [id number]  Patient Class: IP- Inpatient   Admission Date: 6/27/2022  Length of Stay: 31 days  Attending Physician: Jessenia Foster MD  Primary Care Provider: Elvie Fernandes MD        Subjective:     Principal Problem:Acute encephalopathy        HPI:  This is a 67 year old deaf lady with alcoholic cirrhosis, and hypertension who presented with generalized swelling.  used to obtain history. Patient states that she had the swelling since her diagnosis of cirrhosis in February. Patient states that 2 days ago, she was able to ambulate but with some difficulty due to the swelling but now  worsening to the point where she is unable to ambulate by herself. Of note, patient had a recent EGD performed earlier last week where they had issues with her IV resulting is significant bruising. Patient also notes that whenever she scratches her skin with her nails, she notices clear fluid coming from the cuts. Patient denies fever, chills, cough, hemoptysis, nausea, vomiting, abdominal pain, yellowing of skin, constipation, dysuria, hematuria, and black/ bloody stools. Patient has baseline loose stools from her lactulose.  She reports compliance with all medications including her lactulose and Lasix. Patient and family declined any confusion or altered mental status. She denies any recent alcohol use with last use in February when she was diagnosed with alcoholic cirrhosis. Patient states that she used to weigh around 170s which she believes is her dry weight and is currently in the 200s.     Upon chart review, patient had an echo at an OSH on 2/25/22 which showed EF 60-65% and some diastolic dysfunction.    In the ED, patient was found to have Hgb of 8.5 and platelets of 113 around baseline. Patient had a UA which showed 1+ leuks and many bacteria. Patient did not complain of dysuria.        Overview/Hospital Course:  Admitted for decompensated cirrhosis and anasarca. Began diuresing with IV Lasix and spironolactone. Paracentesis of -4.2L; ruled out SBP. Pt stabilized for few days and ready to be dc'ed when she had a change in mental status. Found pt unresponsive with fixed gaze and no pupil response. Code stroke called, CT neg for stroke. Eeg showed seizure-like activity. Pt transitioned to MICU for higher level of care. On step down, had a UTI and PITO, both of which were treated. She received a repeat paracentesis with 7.5L removed. Her creatinine started improve with administration of albumin and holding diuretics. Diuretics restarted and transitioned to Lasix 40mg PO and Aldactone 100mg PO. Patient started to have mild difficulty with mentation again with concerns for recurring UTI. Repeat UA indicative of recurring UTI, awaiting UCX. Neurology consulted for recent decline in mentation. Paracentesis labs negative for SBP.    Dispo to NH and follow up with hepatology, neuro.      Interval History: AF HDS NAEON. Patient a little more responsive, but not quite at baseline. Continues to be oriented to place and self only. Mild interval increase in energy level though still quite tired.     Review of Systems   Constitutional:  Negative for appetite change, chills and fever.   HENT:  Negative for sore throat and trouble swallowing.    Eyes:  Negative for pain and visual disturbance.   Respiratory:  Negative for cough, chest tightness and shortness of breath.    Cardiovascular:  Negative for chest pain and palpitations.   Gastrointestinal:  Positive for abdominal distention. Negative for abdominal pain, constipation, diarrhea, nausea and vomiting.   Genitourinary:  Negative for difficulty urinating, dysuria and hematuria.   Musculoskeletal:  Negative for joint swelling and myalgias.   Skin:  Negative for color change and pallor.   Neurological:  Negative for dizziness, light-headedness and headaches.    Psychiatric/Behavioral:  Positive for confusion and decreased concentration. Negative for agitation.    Objective:     Vital Signs (Most Recent):  Temp: 97.7 °F (36.5 °C) (07/29/22 1554)  Pulse: 77 (07/29/22 1554)  Resp: 16 (07/29/22 1554)  BP: (!) 98/46 (07/29/22 1554)  SpO2: 99 % (07/29/22 1554)   Vital Signs (24h Range):  Temp:  [97.5 °F (36.4 °C)-97.9 °F (36.6 °C)] 97.7 °F (36.5 °C)  Pulse:  [73-81] 77  Resp:  [15-18] 16  SpO2:  [95 %-99 %] 99 %  BP: ()/(46-58) 98/46     Weight: 82.6 kg (182 lb 1.6 oz)  Body mass index is 35.56 kg/m².    Intake/Output Summary (Last 24 hours) at 7/29/2022 1744  Last data filed at 7/29/2022 1449  Gross per 24 hour   Intake 360 ml   Output 1001 ml   Net -641 ml      Physical Exam  Constitutional:       General: She is not in acute distress.     Appearance: Normal appearance. She is not ill-appearing.   HENT:      Head: Normocephalic and atraumatic.      Nose: Rhinorrhea present. No congestion.      Mouth/Throat:      Mouth: Mucous membranes are moist.   Eyes:      General:         Right eye: No discharge.         Left eye: No discharge.      Extraocular Movements: Extraocular movements intact.   Cardiovascular:      Rate and Rhythm: Normal rate and regular rhythm.      Pulses: Normal pulses.      Heart sounds: Murmur (systolic flow) heard.     No gallop.   Pulmonary:      Effort: Pulmonary effort is normal. No respiratory distress.      Breath sounds: Normal breath sounds.   Abdominal:      General: There is distension.      Tenderness: There is no abdominal tenderness. There is no guarding or rebound.   Musculoskeletal:      Cervical back: Normal range of motion.      Right lower leg: Edema (3+) present.      Left lower leg: Edema (3+) present.   Skin:     Coloration: Skin is not jaundiced or pale.      Findings: Bruising (bilateral arms) present.   Neurological:      General: No focal deficit present.      Mental Status: She is alert and oriented to person, place, and  time.      Cranial Nerves: No cranial nerve deficit.   Psychiatric:         Mood and Affect: Mood normal.         Behavior: Behavior normal.       Significant Labs: All pertinent labs within the past 24 hours have been reviewed.    Significant Imaging: I have reviewed all pertinent imaging results/findings within the past 24 hours.      Assessment/Plan:      * Acute encephalopathy  Candiduria  Concerns for SBP    Patient did have episode of seizures in MICU, now on onfi and keppra, no signs of hepatic encephalopathy, negative for asterixis. Ammonia wnl. Patient acutely altered and is unable to respond to basic questions, though appears to exhibit understanding.     Etiology unclear. Concerns for UTI, new seizure, SBP. Hyponatremic, though has been so throughout course. UA with new candida, no bacteria.     - Continue lactulose 15g bid, Rifaximin 550mg bid, thiamine 100mg  - Titrate bowel regimen to 3- 4 BMs per day  - Neurology following peripherally, appreciate recommendations  - Continuous EEG monitoring did not demonstrate seizure activity, more c/w metabolic encephalopathy  - Paracentesis labs negative for SBP (WBC 53, PMN 64%), however, obtained after starting CTX (initially started when UA was drawn and concerns for bacterial UTI)  - Fluconazole 200 mg IV  - CTX 2 g qd x 5 total days (will continue SBP tx despite negative labs d/t suboptimal sample and acuity of condition)    PITO (acute kidney injury)  Presenting with PITO with new sCr 1.6 (baseline sCr 0.9-1.1).  DDx: Decreased PO intake likely resulting in pre-renal etiology    - Per hepatology recommendation Lasix 40 BID IV transitioned to Lasix 40 PO BID. Continue Aldactone 100 daily.  - Gentle trial of IVF; encourage PO intake  - Trend sCr  - Strict I&Os  - Avoid nephrotoxic agents  - Renally adjust medications      Anasarca  67 yoF with alcoholic liver cirrhosis and hypertension admitted for generalized anasarca. Patient's albumin on admission was 1.9.  Likely edema related to poor intravascular oncotic pressure 2/2 alcoholic cirrhosis. Patient adherent to home medication. Given history and exam, patient would benefit from diuresis. Dry weight reported as 170s. Patient is currently 200 lbs 6.4oz (down from 212 lbs).     - Strict I/Os  - Daily weights  - Low sodium diet with 1500 ml fluid restriction  - Plan for f/u with her hepatologist on d/c  - discontinued 25% bottle of 25g Albumin     Anemia, chronic disease  Patient with Hgb of 8.5 on admit. Upon review of care everywhere, patient had Hgb of 8.4 in all of 2022. Likely due to chronic disease and stable.  One episode of Hgb 6.9 on 7/18. Ordered 1 U pRBC. Received written consent from patient and  prior to transfusion. Received 1U prbc for Hgb 6.3 yesterday.     - Hgb stable, no signs of new bleed  - Per GI recommendation, if anemia persists and not responding to transfusion then inpatient colonoscopy recommended for further evaluation; otherwise outpatient  - Pantoprazole 40 PO qd    Gram-positive cocci in clusters  Noted on 1 set of aer/anae cultures from 7/10/22. 2nd set so far negative. No clear sign of infection. Could likely be contaminant.  Uctx also from enterococcus species  - s/p Ampicillin completed 07/17          UTI (urinary tract infection)  Positive UA from new kim placed 7/10/22. Hx of ESBL E coli in Feb 2022 at OSH  Uctx growing enterococcus, s/p treatment with ampicillin course 07/17.  UA with candida 07/25.    - Rest per Acute Encephalopathy A/P    Hypomagnesemia  Monitor daily and replete prn      Debility  - Pt non-mobile for some time in hospital,  PT/OT recs for SNF  - Pt advised to move around in bed but to only get up with help        Thrombocytopenia   Plt 65, PT 15.6, INR 1.5. Improved after Dose of Vitamin K.  Patient underwent paracentesis and there was large volume leaking from site controlled with dermabond.    - continue to monitor platelet count and inspect abdomen for  sites of bleeding.  - No bloody bowel movements noted.     Alcoholic cirrhosis of liver with ascites  Patient was diagnosed in February. Previous history of chronic alcohol abuse and states she drank about 4-5 glasses of wine per night for over 20 years. Patient is seeing hepatology outpatient. Labs shown in care everywhere.     MELD-Na score: 21 at 7/27/2022  6:26 AM  MELD score: 16 at 7/27/2022  6:26 AM  Calculated from:  Serum Creatinine: 1.3 mg/dL at 7/27/2022  6:26 AM  Serum Sodium: 131 mmol/L at 7/27/2022  6:26 AM  Total Bilirubin: 1.8 mg/dL at 7/27/2022  6:26 AM  INR(ratio): 1.5 at 7/27/2022  6:26 AM  Age: 67 years    - Continue lactulose titrate to BM, thiamine, and folate  - Hepatology consulted; appreciate recommendations - outpatient follow up  - Repeat IR paracentesis completed, 7.5L  - Continue to monitor for signs of liver failure  - F/u daily CMPs    Primary hypertension  Patient was discontinued from losartan per hepatologist.   - continue to monitor and can add when clinically indicated  - pt removed from tele 7/16    Deaf- written communication  - ASL  used for interaction as well as written communication    VTE Risk Mitigation (From admission, onward)         Ordered     heparin (porcine) injection 5,000 Units  2 times daily         07/29/22 1052     IP VTE HIGH RISK PATIENT  Once         06/28/22 0419     Place sequential compression device  Until discontinued         06/28/22 0419     Place sequential compression device  Until discontinued         06/28/22 0415                Discharge Planning   REMA: 8/1/2022     Code Status: Full Code   Is the patient medically ready for discharge?: No    Reason for patient still in hospital (select all that apply): Treatment  Discharge Plan A: Skilled Nursing Facility   Discharge Delays: None known at this time              Roger-Enresto Ellis MD  Department of Hospital Medicine   [facility] - Intensive Care ([place]-)

## 2022-07-29 NOTE — ASSESSMENT & PLAN NOTE
Candiduria  Concerns for SBP    Patient did have episode of seizures in MICU, now on onfi and keppra, no signs of hepatic encephalopathy, negative for asterixis. Ammonia wnl. Patient acutely altered and is unable to respond to basic questions, though appears to exhibit understanding.     Etiology unclear. Concerns for UTI, new seizure, SBP. Hyponatremic, though has been so throughout course. UA with new candida, no bacteria.     - Continue lactulose 15g bid, Rifaximin 550mg bid, thiamine 100mg  - Titrate bowel regimen to 3- 4 BMs per day  - Neurology following peripherally, appreciate recommendations  - Continuous EEG monitoring did not demonstrate seizure activity, more c/w metabolic encephalopathy showing generalized periodic discharges, no changes to AED's at this time  - Paracentesis labs negative for SBP (WBC 53, PMN 64%), however, obtained after starting CTX (initially started when UA was drawn and concerns for bacterial UTI)  - Continue Fluconazole 200 mg IV  - Ceftriaxone 2 g qd x 5 total days will discontinue. Switch to Ciprofloxacin 500mg PO bid (will continue SBP tx despite negative labs d/t suboptimal sample and acuity of condition)  - Plan for [facility] placement likely Monday

## 2022-07-29 NOTE — SUBJECTIVE & OBJECTIVE
Interval History: NAEON, AF. Patient's attention was declining for last 2 days but with some improvement today.    Review of Systems   Constitutional:  Negative for appetite change, chills and fever.   HENT:  Negative for sore throat and trouble swallowing.    Eyes:  Negative for pain and visual disturbance.   Respiratory:  Negative for cough, chest tightness and shortness of breath.    Cardiovascular:  Negative for chest pain and palpitations.   Gastrointestinal:  Positive for abdominal distention. Negative for abdominal pain, constipation, diarrhea, nausea and vomiting.   Genitourinary:  Negative for difficulty urinating, dysuria and hematuria.   Musculoskeletal:  Negative for joint swelling and myalgias.   Skin:  Negative for color change and pallor.   Neurological:  Negative for dizziness, light-headedness and headaches.   Psychiatric/Behavioral:  Positive for confusion and decreased concentration. Negative for agitation.    Objective:     Vital Signs (Most Recent):  Temp: 97.5 °F (36.4 °C) (07/28/22 1950)  Pulse: 75 (07/28/22 1950)  Resp: 18 (07/28/22 1950)  BP: (!) 101/50 (07/28/22 1950)  SpO2: 98 % (07/28/22 1950)   Vital Signs (24h Range):  Temp:  [96.2 °F (35.7 °C)-98.2 °F (36.8 °C)] 97.5 °F (36.4 °C)  Pulse:  [69-75] 75  Resp:  [14-18] 18  SpO2:  [96 %-98 %] 98 %  BP: ()/(49-54) 101/50     Weight: 83 kg (182 lb 15.7 oz)  Body mass index is 35.74 kg/m².    Intake/Output Summary (Last 24 hours) at 7/28/2022 2021  Last data filed at 7/28/2022 1548  Gross per 24 hour   Intake --   Output 900 ml   Net -900 ml      Physical Exam  Constitutional:       General: She is not in acute distress.     Appearance: Normal appearance. She is not ill-appearing.   HENT:      Head: Normocephalic and atraumatic.      Nose: Rhinorrhea present. No congestion.      Mouth/Throat:      Mouth: Mucous membranes are moist.   Eyes:      General:         Right eye: No discharge.         Left eye: No discharge.      Extraocular  Movements: Extraocular movements intact.   Cardiovascular:      Rate and Rhythm: Normal rate and regular rhythm.      Pulses: Normal pulses.      Heart sounds: Murmur (systolic flow) heard.     No gallop.   Pulmonary:      Effort: Pulmonary effort is normal. No respiratory distress.      Breath sounds: Normal breath sounds.   Abdominal:      General: There is distension.      Tenderness: There is no abdominal tenderness. There is no guarding or rebound.   Musculoskeletal:      Cervical back: Normal range of motion.      Right lower leg: Edema (3+) present.      Left lower leg: Edema (3+) present.   Skin:     Coloration: Skin is not jaundiced or pale.      Findings: Bruising (bilateral arms) present.   Neurological:      General: No focal deficit present.      Mental Status: She is alert and oriented to person, place, and time.      Cranial Nerves: No cranial nerve deficit.   Psychiatric:         Mood and Affect: Mood normal.         Behavior: Behavior normal.       Significant Labs: All pertinent labs within the past 24 hours have been reviewed.  Recent Lab Results         07/28/22  0436        Albumin 2.7       Alkaline Phosphatase 56       ALT 20       Anion Gap 9       Aniso Slight       AST 31       Baso # 0.04       Basophil % 0.7       BILIRUBIN TOTAL 1.7  Comment: For infants and newborns, interpretation of results should be based  on gestational age, weight and in agreement with clinical  observations.    Premature Infant recommended reference ranges:  Up to 24 hours.............<8.0 mg/dL  Up to 48 hours............<12.0 mg/dL  3-5 days..................<15.0 mg/dL  6-29 days.................<15.0 mg/dL         BUN 18       Calcium 8.9       Chloride 102       CO2 26       Creatinine 1.2       Differential Method Automated       eGFR if African American 54.0       eGFR if non  46.9  Comment: Calculation used to obtain the estimated glomerular filtration  rate (eGFR) is the CKD-EPI equation.           Eos # 0.4       Eosinophil % 6.8       Fragmented Cells Occasional       Glucose 94       Gran # (ANC) 2.5       Gran % 46.2       Hematocrit 24.8       Hemoglobin 8.2       Immature Grans (Abs) 0.01  Comment: Mild elevation in immature granulocytes is non specific and   can be seen in a variety of conditions including stress response,   acute inflammation, trauma and pregnancy. Correlation with other   laboratory and clinical findings is essential.         Immature Granulocytes 0.2       INR 1.9  Comment: Coumadin Therapy:  2.0 - 3.0 for INR for all indicators except mechanical heart valves  and antiphospholipid syndromes which should use 2.5 - 3.5.         Lymph # 1.9       Lymph % 34.4       Magnesium 1.5       MCH 31.2       MCHC 33.1       MCV 94       Mono # 0.6       Mono % 11.7       MPV 10.1       nRBC 0       Ovalocytes Occasional       Phosphorus 4.5       Platelet Estimate Decreased       Platelets 55       Poikilocytosis Slight       Potassium 4.3       PROTEIN TOTAL 5.2       Protime 18.8       RBC 2.63       RDW 17.6       Schistocytes Present       Sodium 137       WBC 5.46               Significant Imaging: I have reviewed all pertinent imaging results/findings within the past 24 hours.

## 2022-07-29 NOTE — SUBJECTIVE & OBJECTIVE
Interval History: AF HDS NAEON. Patient improving today. She is able to answer questions with ASL interpretor.     Review of Systems   Constitutional:  Negative for appetite change, chills and fever.   HENT:  Negative for sore throat and trouble swallowing.    Eyes:  Negative for pain and visual disturbance.   Respiratory:  Negative for cough, chest tightness and shortness of breath.    Cardiovascular:  Negative for chest pain and palpitations.   Gastrointestinal:  Positive for abdominal distention. Negative for abdominal pain, constipation, diarrhea, nausea and vomiting.   Genitourinary:  Negative for difficulty urinating, dysuria and hematuria.   Musculoskeletal:  Negative for joint swelling and myalgias.   Skin:  Negative for color change and pallor.   Neurological:  Negative for dizziness, light-headedness and headaches.   Psychiatric/Behavioral:  Negative for agitation.    Objective:     Vital Signs (Most Recent):  Temp: 97.7 °F (36.5 °C) (07/29/22 1554)  Pulse: 77 (07/29/22 1554)  Resp: 16 (07/29/22 1554)  BP: (!) 98/46 (07/29/22 1554)  SpO2: 99 % (07/29/22 1554)   Vital Signs (24h Range):  Temp:  [97.5 °F (36.4 °C)-97.9 °F (36.6 °C)] 97.7 °F (36.5 °C)  Pulse:  [73-81] 77  Resp:  [15-18] 16  SpO2:  [95 %-99 %] 99 %  BP: ()/(46-58) 98/46     Weight: 82.6 kg (182 lb 1.6 oz)  Body mass index is 35.56 kg/m².    Intake/Output Summary (Last 24 hours) at 7/29/2022 1744  Last data filed at 7/29/2022 1449  Gross per 24 hour   Intake 360 ml   Output 1001 ml   Net -641 ml      Physical Exam  Constitutional:       General: She is not in acute distress.     Appearance: Normal appearance. She is not ill-appearing.   HENT:      Head: Normocephalic and atraumatic.      Nose: Rhinorrhea present. No congestion.      Mouth/Throat:      Mouth: Mucous membranes are moist.   Eyes:      General:         Right eye: No discharge.         Left eye: No discharge.      Extraocular Movements: Extraocular movements intact.    Cardiovascular:      Rate and Rhythm: Normal rate and regular rhythm.      Pulses: Normal pulses.      Heart sounds: Murmur (systolic flow) heard.     No gallop.   Pulmonary:      Effort: Pulmonary effort is normal. No respiratory distress.      Breath sounds: Normal breath sounds.   Abdominal:      General: There is distension.      Tenderness: There is no abdominal tenderness. There is no guarding or rebound.   Musculoskeletal:      Cervical back: Normal range of motion.      Right lower leg: Edema (3+) present.      Left lower leg: Edema (3+) present.   Skin:     Coloration: Skin is not jaundiced or pale.      Findings: Bruising (bilateral arms) present.   Neurological:      General: No focal deficit present.      Mental Status: She is alert and oriented to person, place, and time.      Cranial Nerves: No cranial nerve deficit.   Psychiatric:         Mood and Affect: Mood normal.         Behavior: Behavior normal.       Significant Labs: All pertinent labs within the past 24 hours have been reviewed.  Recent Lab Results         07/30/22  0816   07/30/22  0513   07/30/22  0508        Albumin     2.5       Alkaline Phosphatase     65       ALT     19       Anion Gap     7       AST     32       Baso #   0.04         Basophil %   0.7         BILIRUBIN TOTAL     1.6  Comment: For infants and newborns, interpretation of results should be based  on gestational age, weight and in agreement with clinical  observations.    Premature Infant recommended reference ranges:  Up to 24 hours.............<8.0 mg/dL  Up to 48 hours............<12.0 mg/dL  3-5 days..................<15.0 mg/dL  6-29 days.................<15.0 mg/dL         BUN     16       Calcium     9.3       Chloride     99       CO2     27       Creatinine     1.0       Differential Method   Automated         eGFR if      >60.0       eGFR if non      58.4  Comment: Calculation used to obtain the estimated glomerular  filtration  rate (eGFR) is the CKD-EPI equation.          Eos #   0.3         Eosinophil %   4.9         Glucose     99       Gran # (ANC)   2.8         Gran %   49.3         Hematocrit   25.1         Hemoglobin   8.1         Immature Grans (Abs)   0.02  Comment: Mild elevation in immature granulocytes is non specific and   can be seen in a variety of conditions including stress response,   acute inflammation, trauma and pregnancy. Correlation with other   laboratory and clinical findings is essential.           Immature Granulocytes   0.4         INR     1.7  Comment: Coumadin Therapy:  2.0 - 3.0 for INR for all indicators except mechanical heart valves  and antiphospholipid syndromes which should use 2.5 - 3.5.         Lymph #   1.9         Lymph %   32.7         Magnesium     1.4       MCH   30.6         MCHC   32.3         MCV   95         Mono #   0.7         Mono %   12.0         MPV   10.5         nRBC   0         Phosphorus     3.9       Platelet Estimate   Decreased         Platelets   64         POCT Glucose 90           Potassium     4.4       PROTEIN TOTAL     5.5       Protime     16.9       RBC   2.65         RDW   17.7         Sodium     133       WBC   5.69                 Significant Imaging: I have reviewed all pertinent imaging results/findings within the past 24 hours.

## 2022-07-29 NOTE — PROCEDURES
EEG REPORT      Aleyda Floyd  [id number]  1955    DATE OF SERVICE: 7/29/2022     -1,1    METHODOLOGY      Extended electroencephalographic recording is made while the patient is ambulatory and continuing normal daily activities.  Electrodes are placed according to the International 10-20 placement system and included T1 and T2 electrode placement.  Twenty four (24) channels of digital signal (sampling rate of 512/sec) was simultaneously recorded from the scalp including EKG and eye monitors.  Recording band pass was 0.1 to 100 hz and all data was stored digitally on the recorder.  The patient is instructed to press an event button when clinical symptoms occur and write the symptoms into a diary. Activation procedures which include photic stimulation, hyperventilation and instructing patients to perform simple task are done in selected patients.        The EEG is displayed on a monitor screen and can be reformatted into different montages for evaluation.  The entire recoding is submitted for computer assisted analysis to detect spike and electrographic seizure activity.  The entire recording is visually reviewed and the times identified by computer analysis as being spikes or seizures are reviewed again.  Compresses spectral analysis (CSA) is also performed on the activity recorded from each individual channel.  This is displayed as a power display of frequencies from 0 to 30 Hz over time.   The CSA analysis is done and displayed continuously.  This is reviewed for asymmetries in power between homologous areas of the scalp and for presence of changes in power which canbe seen when seizures occur.  Sections of suspected abnormalities on the CSA is then compared with the original EEG recording.  .     "[employer] software was also utilized in the review of this study.  This software suite analyzes the EEG recording in multiple domains.  Coherence and rhythmicity is computed to identify EEG sections which  may contain organized seizures.  Each channel undergoes analysis to detect presence of spike and sharp waves which have special and morphological characteristic of epileptic activity.  The routine EEG recording is converted from spacial into frequency domain.  This is then displayed comparing homologous areas to identify areas of significant asymmetry.  Algorithm to identify non-cortically generated artifact is used to separate eye movement, EMG and other artifact from the EEG     Recording Times    From 7/29/2022 to 7/30/2022    A total of 1:11:41 and 18:55:13 hours of EEG was recorded.      EEG FINDINGS:  Background activity:   The background rhythm was characterized by alpha and theta activity with a 6-7Hz posterior dominant alpha rhythm at 30-70 microvolts.   Symmetry and continuity: the background was continuous and symmetric     Sleep:   No sleep transients although there is cycling of the background consistent with state change.    Activation procedures:   NA    Abnormal activity:   Occasional generalized periodic discharges with triphasic morphology (triphasics).    IMPRESSION:   Abnormal EEG due to a mild generalized cerebral dysfunction as is commonly seen in a wide variety of states of toxic and metabolic derangement.      David Monteiro MD  Neurology-Epilepsy.  [facility].

## 2022-07-30 NOTE — PROCEDURES
24 hr. Video EEG Monitoring    Date/Time: 6/27/2022 11:56 PM  Performed by: Omari Donahue MD  Authorized by: Lele Posadas MD       EXTENDED  ELECTROENCEPHALOGRAM  REPORT    DATE OF SERVICE: 7/29/22-7/30/22  EEG NUMBER: [id number]  REQUESTED BY:  Cu[place]emoc  LOCATION OF SERVICE: [facility]    METHODOLOGY   Electroencephalographic (EEG) recording is with electrodes placed according to the International 10-20 placement system.  Thirty two (32) channels of digital signal (sampling rate of 512/sec) including T1 and T2 was simultaneously recorded from the scalp and may include  EKG, EMG, and/or eye monitors.  Recording band pass was 0.1 to 512 hz.  Digital video recording of the patient is simultaneously recorded with the EEG.  The patient is instructed report clinical symptoms which may occur during the recording session.  EEG and video recording is stored and archived in digital format.  Activation procedures which include photic stimulation, hyperventilation and instructing patients to perform simple task are done in selected patients.   The EEG is displayed on a monitor screen and can be reviewed using different montages.  Computer assisted analysis is employed to detect spike and electrographic seizure activity.   The entire record is submitted for computer analysis.  The entire recording is visually reviewed and the times identified by computer analysis as being spikes or seizures are reviewed again.  Compresses spectral analysis (CSA) is also performed on the activity recorded from each individual channel.  This is displayed as a power display of frequencies from 0 to 30 Hz over time.   The CSA is reviewed looking for asymmetries in power between homologous areas of the scalp and then compared with the original EEG recording.     [employer] software was also utilized in the review of this study.  This software suite analyzes the EEG recording in multiple domains.  Coherence and rhythmicity is computed to identify EEG  sections which may contain organized seizures.  Each channel undergoes analysis to detect presence of spike and sharp waves which have special and morphological characteristic of epileptic activity.  The routine EEG recording is converted from spacial into frequency domain.  This is then displayed comparing homologous areas to identify areas of significant asymmetry.  Algorithm to identify non-cortically generated artifact is used to separate eye movement, EMG and other artifact from the EEG.      RECORDING TIMES  Start on 7/29/22 at 07:01:22  Stop on 7/30/22 at 10:28:55     A total of  3  hrs and 27 min of EEG recording was obtained.    EEG FINDINGS  The record shows a poor organization at rest, consisting of a 6 Hz posterior dominant rhythm with poor reactivity. There is mild bilateral beta activity. There is continuous diffuse theta and delta range background slowing.     Drowsiness is characterized by attenuation of the background, vertex waves, and further bilateral theta slowing.     Provocative maneuvers including hyperventilation and photic stimulation were not performed.     EKG recording shows a regular rhythm.    There is no push button or clinical event.    IMPRESSION:  Abnormal study due to mild to moderate diffuse background slowing consistent with diffuse cerebral dysfunction and encephalopathy which may be on the basis of toxic, metabolic, or primary neuronal disorder.       Omari Donahue MD

## 2022-07-30 NOTE — PROGRESS NOTES
[facility] ([place] [id number])  [facility] Medicine  Progress Note    Patient Name: Aleyda Floyd  MRN: [id number]  Patient Class: IP- Inpatient   Admission Date: 6/27/2022  Length of Stay: 32 days  Attending Physician: Jessenia Foster MD  Primary Care Provider: Elvie Fernandes MD        Subjective:     Principal Problem:Acute encephalopathy        HPI:  This is a 67 year old deaf lady with alcoholic cirrhosis, and hypertension who presented with generalized swelling.  used to obtain history. Patient states that she had the swelling since her diagnosis of cirrhosis in February. Patient states that 2 days ago, she was able to ambulate but with some difficulty due to the swelling but now  worsening to the point where she is unable to ambulate by herself. Of note, patient had a recent EGD performed earlier last week where they had issues with her IV resulting is significant bruising. Patient also notes that whenever she scratches her skin with her nails, she notices clear fluid coming from the cuts. Patient denies fever, chills, cough, hemoptysis, nausea, vomiting, abdominal pain, yellowing of skin, constipation, dysuria, hematuria, and black/ bloody stools. Patient has baseline loose stools from her lactulose.  She reports compliance with all medications including her lactulose and Lasix. Patient and family declined any confusion or altered mental status. She denies any recent alcohol use with last use in February when she was diagnosed with alcoholic cirrhosis. Patient states that she used to weigh around 170s which she believes is her dry weight and is currently in the 200s.     Upon chart review, patient had an echo at an OSH on 2/25/22 which showed EF 60-65% and some diastolic dysfunction.    In the ED, patient was found to have Hgb of 8.5 and platelets of 113 around baseline. Patient had a UA which showed 1+ leuks and many bacteria. Patient did not complain of dysuria.        Overview/Hospital Course:  Admitted for decompensated cirrhosis and anasarca. Began diuresing with IV Lasix and spironolactone. Paracentesis of -4.2L; ruled out SBP. Pt stabilized for few days and ready to be dc'ed when she had a change in mental status. Found pt unresponsive with fixed gaze and no pupil response. Code stroke called, CT neg for stroke. Eeg showed seizure-like activity. Pt transitioned to MICU for higher level of care. On step down, had a UTI and PITO, both of which were treated. She received a repeat paracentesis with 7.5L removed. Her creatinine started improve with administration of albumin and holding diuretics. Diuretics restarted and transitioned to Lasix 40mg PO and Aldactone 100mg PO. Patient started to have mild difficulty with mentation again with concerns for recurring UTI. Repeat UA indicative of recurring UTI, she was started on IV 1g Rocephin. Further UCX positive for candida albicans; she was started on 200mg fluconazole on 7/30. Paracentesis labs negative for SBP pending. Rocephin 1g discontinued and transitioned to ciprofloxacin 500mg PO bid for SBP prophylaxis. Patient improving on fluconazole, EEG negative for epileptiform activity.    Dispo to NH and follow up with hepatology, neuro.      Interval History: AF HDS NAEON. Patient improving today. She is able to answer questions with ASL interpretor.     Review of Systems   Constitutional:  Negative for appetite change, chills and fever.   HENT:  Negative for sore throat and trouble swallowing.    Eyes:  Negative for pain and visual disturbance.   Respiratory:  Negative for cough, chest tightness and shortness of breath.    Cardiovascular:  Negative for chest pain and palpitations.   Gastrointestinal:  Positive for abdominal distention. Negative for abdominal pain, constipation, diarrhea, nausea and vomiting.   Genitourinary:  Negative for difficulty urinating, dysuria and hematuria.   Musculoskeletal:  Negative for joint  swelling and myalgias.   Skin:  Negative for color change and pallor.   Neurological:  Negative for dizziness, light-headedness and headaches.   Psychiatric/Behavioral:  Negative for agitation.    Objective:     Vital Signs (Most Recent):  Temp: 97.7 °F (36.5 °C) (07/29/22 1554)  Pulse: 77 (07/29/22 1554)  Resp: 16 (07/29/22 1554)  BP: (!) 98/46 (07/29/22 1554)  SpO2: 99 % (07/29/22 1554)   Vital Signs (24h Range):  Temp:  [97.5 °F (36.4 °C)-97.9 °F (36.6 °C)] 97.7 °F (36.5 °C)  Pulse:  [73-81] 77  Resp:  [15-18] 16  SpO2:  [95 %-99 %] 99 %  BP: ()/(46-58) 98/46     Weight: 82.6 kg (182 lb 1.6 oz)  Body mass index is 35.56 kg/m².    Intake/Output Summary (Last 24 hours) at 7/29/2022 1744  Last data filed at 7/29/2022 1449  Gross per 24 hour   Intake 360 ml   Output 1001 ml   Net -641 ml      Physical Exam  Constitutional:       General: She is not in acute distress.     Appearance: Normal appearance. She is not ill-appearing.   HENT:      Head: Normocephalic and atraumatic.      Nose: Rhinorrhea present. No congestion.      Mouth/Throat:      Mouth: Mucous membranes are moist.   Eyes:      General:         Right eye: No discharge.         Left eye: No discharge.      Extraocular Movements: Extraocular movements intact.   Cardiovascular:      Rate and Rhythm: Normal rate and regular rhythm.      Pulses: Normal pulses.      Heart sounds: Murmur (systolic flow) heard.     No gallop.   Pulmonary:      Effort: Pulmonary effort is normal. No respiratory distress.      Breath sounds: Normal breath sounds.   Abdominal:      General: There is distension.      Tenderness: There is no abdominal tenderness. There is no guarding or rebound.   Musculoskeletal:      Cervical back: Normal range of motion.      Right lower leg: Edema (3+) present.      Left lower leg: Edema (3+) present.   Skin:     Coloration: Skin is not jaundiced or pale.      Findings: Bruising (bilateral arms) present.   Neurological:      General: No  focal deficit present.      Mental Status: She is alert and oriented to person, place, and time.      Cranial Nerves: No cranial nerve deficit.   Psychiatric:         Mood and Affect: Mood normal.         Behavior: Behavior normal.       Significant Labs: All pertinent labs within the past 24 hours have been reviewed.  Recent Lab Results         07/30/22  0816   07/30/22  0513   07/30/22  0508        Albumin     2.5       Alkaline Phosphatase     65       ALT     19       Anion Gap     7       AST     32       Baso #   0.04         Basophil %   0.7         BILIRUBIN TOTAL     1.6  Comment: For infants and newborns, interpretation of results should be based  on gestational age, weight and in agreement with clinical  observations.    Premature Infant recommended reference ranges:  Up to 24 hours.............<8.0 mg/dL  Up to 48 hours............<12.0 mg/dL  3-5 days..................<15.0 mg/dL  6-29 days.................<15.0 mg/dL         BUN     16       Calcium     9.3       Chloride     99       CO2     27       Creatinine     1.0       Differential Method   Automated         eGFR if      >60.0       eGFR if non      58.4  Comment: Calculation used to obtain the estimated glomerular filtration  rate (eGFR) is the CKD-EPI equation.          Eos #   0.3         Eosinophil %   4.9         Glucose     99       Gran # (ANC)   2.8         Gran %   49.3         Hematocrit   25.1         Hemoglobin   8.1         Immature Grans (Abs)   0.02  Comment: Mild elevation in immature granulocytes is non specific and   can be seen in a variety of conditions including stress response,   acute inflammation, trauma and pregnancy. Correlation with other   laboratory and clinical findings is essential.           Immature Granulocytes   0.4         INR     1.7  Comment: Coumadin Therapy:  2.0 - 3.0 for INR for all indicators except mechanical heart valves  and antiphospholipid syndromes which should use  2.5 - 3.5.         Lymph #   1.9         Lymph %   32.7         Magnesium     1.4       MCH   30.6         MCHC   32.3         MCV   95         Mono #   0.7         Mono %   12.0         MPV   10.5         nRBC   0         Phosphorus     3.9       Platelet Estimate   Decreased         Platelets   64         POCT Glucose 90           Potassium     4.4       PROTEIN TOTAL     5.5       Protime     16.9       RBC   2.65         RDW   17.7         Sodium     133       WBC   5.69                 Significant Imaging: I have reviewed all pertinent imaging results/findings within the past 24 hours.      Assessment/Plan:      * Acute encephalopathy  Candiduria  Concerns for SBP    Patient did have episode of seizures in MICU, now on onfi and keppra, no signs of hepatic encephalopathy, negative for asterixis. Ammonia wnl. Patient acutely altered and is unable to respond to basic questions, though appears to exhibit understanding.     Etiology unclear. Concerns for UTI, new seizure, SBP. Hyponatremic, though has been so throughout course. UA with new candida, no bacteria.     - Continue lactulose 15g bid, Rifaximin 550mg bid, thiamine 100mg  - Titrate bowel regimen to 3- 4 BMs per day  - Neurology following peripherally, appreciate recommendations  - Continuous EEG monitoring did not demonstrate seizure activity, more c/w metabolic encephalopathy showing generalized periodic discharges, no changes to AED's at this time  - Paracentesis labs negative for SBP (WBC 53, PMN 64%), however, obtained after starting CTX (initially started when UA was drawn and concerns for bacterial UTI)  - Continue Fluconazole 200 mg IV  - Ceftriaxone 2 g qd x 5 total days will discontinue. Switch to Ciprofloxacin 500mg PO bid for 3 days (will continue SBP tx despite negative labs d/t suboptimal sample and acuity of condition)  - Plan for [facility] placement likely Monday      UTI (urinary tract infection)  Positive UA from new kim placed 7/10/22.  Hx of ESBL E coli in Feb 2022 at OSH  Uctx growing enterococcus, s/p treatment with ampicillin course 07/17.  UA with candida 07/25.    - See Acute Encephalopathy A/P    PITO (acute kidney injury)  Presenting with PITO with new sCr 1.6 (baseline sCr 0.9-1.1).  DDx: Decreased PO intake likely resulting in pre-renal etiology    - Per hepatology recommendation Lasix 40 BID IV transitioned to Lasix 40 PO BID. Continue Aldactone 100 daily.  - Gentle trial of IVF; encourage PO intake  - Trend sCr  - Strict I&Os  - Avoid nephrotoxic agents  - Renally adjust medications      Anemia, chronic disease  Patient with Hgb of 8.5 on admit. Upon review of care everywhere, patient had Hgb of 8.4 in all of 2022. Likely due to chronic disease and stable.  One episode of Hgb 6.9 on 7/18. Ordered 1 U pRBC. Received written consent from patient and  prior to transfusion. Received 1U prbc for Hgb 6.3 yesterday.     - Hgb stable, no signs of new bleed  - Per GI recommendation, if anemia persists and not responding to transfusion then inpatient colonoscopy recommended for further evaluation; otherwise outpatient  - Pantoprazole 40 PO qd    Anasarca  67 yoF with alcoholic liver cirrhosis and hypertension admitted for generalized anasarca. Patient's albumin on admission was 1.9. Likely edema related to poor intravascular oncotic pressure 2/2 alcoholic cirrhosis. Patient adherent to home medication. Given history and exam, patient would benefit from diuresis. Dry weight reported as 170s. Patient is currently 200 lbs 6.4oz (down from 212 lbs).     - Strict I/Os  - Daily weights  - Low sodium diet with 1500 ml fluid restriction  - Plan for f/u with her hepatologist on d/c  - discontinued 25% bottle of 25g Albumin     Alcoholic cirrhosis of liver with ascites  Patient was diagnosed in February. Previous history of chronic alcohol abuse and states she drank about 4-5 glasses of wine per night for over 20 years. Patient is seeing hepatology  outpatient. Labs shown in care everywhere.     MELD-Na score: 21 at 7/27/2022  6:26 AM  MELD score: 16 at 7/27/2022  6:26 AM  Calculated from:  Serum Creatinine: 1.3 mg/dL at 7/27/2022  6:26 AM  Serum Sodium: 131 mmol/L at 7/27/2022  6:26 AM  Total Bilirubin: 1.8 mg/dL at 7/27/2022  6:26 AM  INR(ratio): 1.5 at 7/27/2022  6:26 AM  Age: 67 years    - Continue lactulose titrate to BM, thiamine, and folate  - Hepatology consulted; appreciate recommendations - outpatient follow up  -Repeat  IR paracentesis completed, 6.5L. No WBC's or organisms seen on gram stain. No growth of aerobic culture. Awaiting anaerobic culture.  - Continue to monitor for signs of liver failure  - F/u daily CMPs    Thrombocytopenia   Plt 65, PT 15.6, INR 1.5. Improved after Dose of Vitamin K.  Patient underwent paracentesis and there was large volume leaking from site controlled with dermabond.    - continue to monitor platelet count and inspect abdomen for sites of bleeding.  - No bloody bowel movements noted.     Debility  - Pt non-mobile for some time in hospital,  PT/OT recs for SNF  - Pt advised to move around in bed but to only get up with help        Primary hypertension  Patient was discontinued from losartan per hepatologist.   - continue to monitor and can add when clinically indicated  - pt removed from tele 7/16    Gram-positive cocci in clusters  Noted on 1 set of aer/anae cultures from 7/10/22. 2nd set so far negative. No clear sign of infection. Could likely be contaminant.  Uctx also from enterococcus species  - s/p Ampicillin completed 07/17      Hypomagnesemia  Monitor daily and replete prn      Deaf- written communication  - ASL  used for interaction as well as written communication      VTE Risk Mitigation (From admission, onward)         Ordered     heparin (porcine) injection 5,000 Units  2 times daily         07/29/22 1052     IP VTE HIGH RISK PATIENT  Once         06/28/22 0809     Place sequential compression  device  Until discontinued         06/28/22 0419     Place sequential compression device  Until discontinued         06/28/22 0415                Discharge Planning   REMA: 8/1/2022     Code Status: Full Code   Is the patient medically ready for discharge?: No    Reason for patient still in hospital (select all that apply): Treatment  Discharge Plan A: Skilled Nursing Facility   Discharge Delays: None known at this time              Lele Posadas MD  Department of Hospital Medicine   [facility] - Intensive Care ([place]-14)

## 2022-07-31 NOTE — ASSESSMENT & PLAN NOTE
Patient with Hgb of 8.5 on admit. Upon review of care everywhere, patient had Hgb of 8.4 in all of 2022. Likely due to chronic disease and stable.  One episode of Hgb 6.9 on 7/18. Ordered 1 U pRBC. Received written consent from patient and  prior to transfusion. Received 1U prbc for Hgb 6.3.     - Hgb stable, no signs of new bleed  - Per GI recommendation, if anemia persists and not responding to transfusion then inpatient colonoscopy recommended for further evaluation; otherwise outpatient  - Pantoprazole 40 PO qd

## 2022-07-31 NOTE — ASSESSMENT & PLAN NOTE
Candiduria  Concerns for SBP    Patient did have episode of seizures in MICU, now on onfi and keppra, no signs of hepatic encephalopathy, negative for asterixis. Ammonia wnl. Patient acutely altered and is unable to respond to basic questions, though appears to exhibit understanding.     Etiology unclear. Concerns for UTI, new seizure, SBP. Hyponatremic, though has been so throughout course. UA with new candida, no bacteria.     - Continue lactulose 15g bid, Rifaximin 550mg bid, thiamine 100mg  - Titrate bowel regimen to 3- 4 BMs per day  - Neurology following peripherally, appreciate recommendations  - Continuous EEG monitoring did not demonstrate seizure activity, more c/w metabolic encephalopathy showing generalized periodic discharges, no changes to AED's at this time  - Paracentesis labs negative for SBP (WBC 53, PMN 64%), however, obtained after starting CTX (initially started when UA was drawn and concerns for bacterial UTI)  - Continue Fluconazole 200 mg IV  - Ceftriaxone 2 g qd x 5 total days discontinued. Switched to Ciprofloxacin 500mg PO bid (will continue empiric SBP tx despite negative labs because rocephin given prior to paracentesis)  - Plan for [facility] placement likely Monday

## 2022-07-31 NOTE — ASSESSMENT & PLAN NOTE
Patient was diagnosed in February. Previous history of chronic alcohol abuse and states she drank about 4-5 glasses of wine per night for over 20 years. Patient is seeing hepatology outpatient. Labs shown in care everywhere.     MELD-Na score: 17 at 7/31/2022  3:25 AM  MELD score: 15 at 7/31/2022  3:25 AM  Calculated from:  Serum Creatinine: 1.1 mg/dL at 7/31/2022  3:25 AM  Serum Sodium: 134 mmol/L at 7/31/2022  3:25 AM  Total Bilirubin: 1.6 mg/dL at 7/31/2022  3:25 AM  INR(ratio): 1.7 at 7/31/2022  3:25 AM  Age: 67 years    - Continue lactulose titrate to BM, thiamine, and folate  - Hepatology consulted; appreciate recommendations - outpatient follow up  - Repeat IR paracentesis completed, 7.5L  - Continue to monitor for signs of liver failure  - F/u daily CMPs

## 2022-07-31 NOTE — TREATMENT PLAN
Hepatology Treatment Plan    Aleyda Floyd is a 67 y.o. female admitted to hospital 6/27/2022 (Hospital Day: 35) due to Acute encephalopathy.     Interval History  - Cr stable  - Transitioned to PO diuretics  - Primary team treating empirically for SBP as tap was negative but done after CTX tx      Objective  Temp:  [98 °F (36.7 °C)] 98 °F (36.7 °C) (07/31 0410)  Pulse:  [74-83] 83 (07/31 0410)  BP: (110-123)/(54-56) 117/56 (07/31 0935)  SpO2:  [94 %-98 %] 96 % (07/31 1249)          Laboratory      Lab Results   Component Value Date    WBC 6.27 07/31/2022    HGB 8.1 (L) 07/31/2022    HCT 24.8 (L) 07/31/2022    MCV 95 07/31/2022    PLT 61 (L) 07/31/2022       Lab Results   Component Value Date     (L) 07/31/2022    K 4.6 07/31/2022     07/31/2022    CO2 28 07/31/2022    BUN 16 07/31/2022    CREATININE 1.1 07/31/2022    CALCIUM 9.2 07/31/2022       Lab Results   Component Value Date    ALBUMIN 2.4 (L) 07/31/2022    ALT 21 07/31/2022    AST 34 07/31/2022    ALKPHOS 63 07/31/2022    BILITOT 1.6 (H) 07/31/2022       Lab Results   Component Value Date    INR 1.7 (H) 07/31/2022    INR 1.7 (H) 07/30/2022    INR 1.7 (H) 07/29/2022       MELD-Na score: 17 at 7/31/2022  3:25 AM  MELD score: 15 at 7/31/2022  3:25 AM  Calculated from:  Serum Creatinine: 1.1 mg/dL at 7/31/2022  3:25 AM  Serum Sodium: 134 mmol/L at 7/31/2022  3:25 AM  Total Bilirubin: 1.6 mg/dL at 7/31/2022  3:25 AM  INR(ratio): 1.7 at 7/31/2022  3:25 AM  Age: 67 years      Micro and imaging reviewed.    Assessment    #EtOH cirrhosis, decompensated  #ascitis, secondary to above  - MELD: 18>20>15  - Patient has been diuresed significantly since admission.   - Na: 137, Cr: 1.3, T. Bilirubin: 1.9, INR: 1.5  - patient will be evaluated for liver tx as outpatient    #hematochezia, resolved  #blood in NG tube,single episode  - Hb 6.7, slowly downtrending, no overt bleeding reported  - now stable    #hyperechoic lesion in liver  - US doppler liver  (5/5/22): Hypoechoic lesion in the right hepatic lobe measuring 1.4 x 1.3 x 1.0 cm.  - CT abdomen w/wout contrast (5/10/22): Focal lesion right hepatic lobe seen on ultrasound not seen best advantage on CT exam.    -MRI liver exam with contrast suggested as outpatient    #AMS, resolving  #seizure disorder  - suspected 2/2 UTI, improving    #generalized deconditioning  - Patient too frail currently. Must demonstrate mobility and functional capacity before being evlauated for liver tx.   - Planning SNF placement      Plan  - MRI of the abdomen w/wout contrast to evaluate liver lesion will be done as outpatient  -Continue Lactulose TID and Rifaximin BID to maintain 2-3 soft bowel movements daily  - Transitioned to PO diuretics, can continue lasix 40 mg BID and spironolactone 100 mg at discharge  - Low sodium diet  - Hepatology will sign off at this time        Thank you for involving us in the care of Aleyda Floyd. Hepatology will sign off at this time, but please call with any additional concerns or questions.    Pietro Bojorquez MD, PGY-IV  Gastroenterology Fellow  [facility]

## 2022-07-31 NOTE — SUBJECTIVE & OBJECTIVE
Interval History: NAEON, AF.  550ml UOP. Used ASL interpretor.     Review of Systems   Constitutional:  Negative for appetite change, chills and fever.   HENT:  Negative for rhinorrhea, sore throat and trouble swallowing.    Eyes:  Negative for pain, discharge and visual disturbance.   Respiratory:  Negative for cough, chest tightness and shortness of breath.    Cardiovascular:  Negative for chest pain, palpitations and leg swelling.   Gastrointestinal:  Positive for abdominal distention. Negative for abdominal pain, constipation, diarrhea, nausea and vomiting.   Genitourinary:  Negative for difficulty urinating, dysuria and hematuria.   Musculoskeletal:  Negative for arthralgias, joint swelling and myalgias.   Skin:  Negative for color change and pallor.   Neurological:  Negative for dizziness, tremors, light-headedness and headaches.   Psychiatric/Behavioral:  Negative for agitation and confusion.    Objective:     Vital Signs (Most Recent):  Temp: 98 °F (36.7 °C) (07/31/22 0410)  Pulse: 83 (07/31/22 0410)  Resp: 18 (07/30/22 0400)  BP: (!) 117/56 (07/31/22 0935)  SpO2: 96 % (07/31/22 1249)   Vital Signs (24h Range):  Temp:  [98 °F (36.7 °C)] 98 °F (36.7 °C)  Pulse:  [74-83] 83  SpO2:  [94 %-98 %] 96 %  BP: (110-123)/(54-56) 117/56     Weight: 82.6 kg (182 lb 1.6 oz)  Body mass index is 35.56 kg/m².    Intake/Output Summary (Last 24 hours) at 7/31/2022 1400  Last data filed at 7/31/2022 0530  Gross per 24 hour   Intake --   Output 550 ml   Net -550 ml      Physical Exam  Constitutional:       General: She is not in acute distress.     Appearance: Normal appearance. She is not ill-appearing.   HENT:      Head: Normocephalic and atraumatic.      Nose: No congestion.      Mouth/Throat:      Mouth: Mucous membranes are moist.   Eyes:      General:         Right eye: No discharge.         Left eye: No discharge.      Extraocular Movements: Extraocular movements intact.   Cardiovascular:      Rate and Rhythm: Normal  rate and regular rhythm.      Pulses: Normal pulses.      Heart sounds: Murmur (systolic flow) heard.     No gallop.   Pulmonary:      Effort: Pulmonary effort is normal. No respiratory distress.      Breath sounds: Normal breath sounds.   Abdominal:      General: There is distension.      Tenderness: There is no abdominal tenderness. There is no guarding or rebound.   Musculoskeletal:      Cervical back: Normal range of motion.      Right lower leg: Edema (3+) present.      Left lower leg: Edema (3+) present.   Skin:     Coloration: Skin is not jaundiced or pale.      Findings: Bruising (bilateral arms) present.   Neurological:      General: No focal deficit present.      Mental Status: She is alert and oriented to person, place, and time.      Cranial Nerves: No cranial nerve deficit.   Psychiatric:         Mood and Affect: Mood normal.         Behavior: Behavior normal.       Significant Labs: All pertinent labs within the past 24 hours have been reviewed.  Recent Lab Results         07/31/22  0325   07/30/22  1650        Albumin 2.4         Alkaline Phosphatase 63         ALT 21         Anion Gap 6         AST 34         Baso # 0.05         Basophil % 0.8         BILIRUBIN TOTAL 1.6  Comment: For infants and newborns, interpretation of results should be based  on gestational age, weight and in agreement with clinical  observations.    Premature Infant recommended reference ranges:  Up to 24 hours.............<8.0 mg/dL  Up to 48 hours............<12.0 mg/dL  3-5 days..................<15.0 mg/dL  6-29 days.................<15.0 mg/dL           BUN 16         Calcium 9.2         Chloride 100         CO2 28         Creatinine 1.1         Differential Method Automated         eGFR if  >60.0         eGFR if non  52.1  Comment: Calculation used to obtain the estimated glomerular filtration  rate (eGFR) is the CKD-EPI equation.            Eos # 0.3         Eosinophil % 4.9          Glucose 105         Gran # (ANC) 3.2         Gran % 51.1         Hematocrit 24.8         Hemoglobin 8.1         Immature Grans (Abs) 0.01  Comment: Mild elevation in immature granulocytes is non specific and   can be seen in a variety of conditions including stress response,   acute inflammation, trauma and pregnancy. Correlation with other   laboratory and clinical findings is essential.           Immature Granulocytes 0.2         INR 1.7  Comment: Coumadin Therapy:  2.0 - 3.0 for INR for all indicators except mechanical heart valves  and antiphospholipid syndromes which should use 2.5 - 3.5.           Lymph # 2.1         Lymph % 33.0         Magnesium 1.7         MCH 30.9         MCHC 32.7         MCV 95         Mono # 0.6         Mono % 10.0         MPV 10.2         nRBC 0         Phosphorus 4.1         Platelets 61         POCT Glucose   150       Potassium 4.6         PROTEIN TOTAL 5.4         Protime 17.0         RBC 2.62         RDW 18.1         Sodium 134         WBC 6.27                 Significant Imaging: I have reviewed all pertinent imaging results/findings within the past 24 hours.

## 2022-07-31 NOTE — NURSING
Pt rested through the night, vitals stable. Pt making adequate urine and taking PO intake.  No sob or pain reported on shift.  Continue Q2 turns.  Plans to discharge home vs Snf placement.

## 2022-07-31 NOTE — PROGRESS NOTES
[facility] ([place] [address])  [facility]  Progress Note    Patient Name: Aleyda Floyd  MRN: [id number]  Patient Class: IP- Inpatient   Admission Date: 6/27/2022  Length of Stay: 33 days  Attending Physician: Jessenia Foster MD  Primary Care Provider: Elvie Fernandes MD        Subjective:     Principal Problem:Acute encephalopathy        HPI:  This is a 67 year old deaf lady with alcoholic cirrhosis, and hypertension who presented with generalized swelling.  used to obtain history. Patient states that she had the swelling since her diagnosis of cirrhosis in February. Patient states that 2 days ago, she was able to ambulate but with some difficulty due to the swelling but now  worsening to the point where she is unable to ambulate by herself. Of note, patient had a recent EGD performed earlier last week where they had issues with her IV resulting is significant bruising. Patient also notes that whenever she scratches her skin with her nails, she notices clear fluid coming from the cuts. Patient denies fever, chills, cough, hemoptysis, nausea, vomiting, abdominal pain, yellowing of skin, constipation, dysuria, hematuria, and black/ bloody stools. Patient has baseline loose stools from her lactulose.  She reports compliance with all medications including her lactulose and Lasix. Patient and family declined any confusion or altered mental status. She denies any recent alcohol use with last use in February when she was diagnosed with alcoholic cirrhosis. Patient states that she used to weigh around 170s which she believes is her dry weight and is currently in the 200s.     Upon chart review, patient had an echo at an OSH on 2/25/22 which showed EF 60-65% and some diastolic dysfunction.    In the ED, patient was found to have Hgb of 8.5 and platelets of 113 around baseline. Patient had a UA which showed 1+ leuks and many bacteria. Patient did not complain of dysuria.        Overview/Hospital Course:  Admitted for decompensated cirrhosis and anasarca. Began diuresing with IV Lasix and spironolactone. Paracentesis of -4.2L; ruled out SBP. Pt stabilized for few days and ready to be dc'ed when she had a change in mental status. Found pt unresponsive with fixed gaze and no pupil response. Code stroke called, CT neg for stroke. Eeg showed seizure-like activity. Pt transitioned to MICU for higher level of care. On step down, had a UTI and PITO, both of which were treated. She received a repeat paracentesis with 7.5L removed. Her creatinine started improve with administration of albumin and holding diuretics. Diuretics restarted and transitioned to Lasix 40mg PO and Aldactone 100mg PO. Patient started to have mild difficulty with mentation again with concerns for recurring UTI. Repeat UA indicative of recurring UTI, she was started on IV 1g Rocephin. Further UCX positive for candida albicans; she was started on 200mg fluconazole on 7/30. Paracentesis labs negative for SBP but continued rocephin for empiric treatment of SBP because paracentesis was performed after giving rocephin.. Rocephin 1g discontinued and transitioned to ciprofloxacin 500mg PO bid for empiric SBP treatment. Patient improving on fluconazole, EEG negative for epileptiform activity.    Dispo to [facility] and follow up with hepatology, neuro.      Interval History: NAEON, AF.  550ml UOP. Used ASL interpretor.     Review of Systems   Constitutional:  Negative for appetite change, chills and fever.   HENT:  Negative for rhinorrhea, sore throat and trouble swallowing.    Eyes:  Negative for pain, discharge and visual disturbance.   Respiratory:  Negative for cough, chest tightness and shortness of breath.    Cardiovascular:  Negative for chest pain, palpitations and leg swelling.   Gastrointestinal:  Positive for abdominal distention. Negative for abdominal pain, constipation, diarrhea, nausea and vomiting.    Genitourinary:  Negative for difficulty urinating, dysuria and hematuria.   Musculoskeletal:  Negative for arthralgias, joint swelling and myalgias.   Skin:  Negative for color change and pallor.   Neurological:  Negative for dizziness, tremors, light-headedness and headaches.   Psychiatric/Behavioral:  Negative for agitation and confusion.    Objective:     Vital Signs (Most Recent):  Temp: 98 °F (36.7 °C) (07/31/22 0410)  Pulse: 83 (07/31/22 0410)  Resp: 18 (07/30/22 0400)  BP: (!) 117/56 (07/31/22 0935)  SpO2: 96 % (07/31/22 1249)   Vital Signs (24h Range):  Temp:  [98 °F (36.7 °C)] 98 °F (36.7 °C)  Pulse:  [74-83] 83  SpO2:  [94 %-98 %] 96 %  BP: (110-123)/(54-56) 117/56     Weight: 82.6 kg (182 lb 1.6 oz)  Body mass index is 35.56 kg/m².    Intake/Output Summary (Last 24 hours) at 7/31/2022 1400  Last data filed at 7/31/2022 0530  Gross per 24 hour   Intake --   Output 550 ml   Net -550 ml      Physical Exam  Constitutional:       General: She is not in acute distress.     Appearance: Normal appearance. She is not ill-appearing.   HENT:      Head: Normocephalic and atraumatic.      Nose: No congestion.      Mouth/Throat:      Mouth: Mucous membranes are moist.   Eyes:      General:         Right eye: No discharge.         Left eye: No discharge.      Extraocular Movements: Extraocular movements intact.   Cardiovascular:      Rate and Rhythm: Normal rate and regular rhythm.      Pulses: Normal pulses.      Heart sounds: Murmur (systolic flow) heard.     No gallop.   Pulmonary:      Effort: Pulmonary effort is normal. No respiratory distress.      Breath sounds: Normal breath sounds.   Abdominal:      General: There is distension.      Tenderness: There is no abdominal tenderness. There is no guarding or rebound.   Musculoskeletal:      Cervical back: Normal range of motion.      Right lower leg: Edema (3+) present.      Left lower leg: Edema (3+) present.   Skin:     Coloration: Skin is not jaundiced or pale.       Findings: Bruising (bilateral arms) present.   Neurological:      General: No focal deficit present.      Mental Status: She is alert and oriented to person, place, and time.      Cranial Nerves: No cranial nerve deficit.   Psychiatric:         Mood and Affect: Mood normal.         Behavior: Behavior normal.       Significant Labs: All pertinent labs within the past 24 hours have been reviewed.  Recent Lab Results         07/31/22  0325   07/30/22  1650        Albumin 2.4         Alkaline Phosphatase 63         ALT 21         Anion Gap 6         AST 34         Baso # 0.05         Basophil % 0.8         BILIRUBIN TOTAL 1.6  Comment: For infants and newborns, interpretation of results should be based  on gestational age, weight and in agreement with clinical  observations.    Premature Infant recommended reference ranges:  Up to 24 hours.............<8.0 mg/dL  Up to 48 hours............<12.0 mg/dL  3-5 days..................<15.0 mg/dL  6-29 days.................<15.0 mg/dL           BUN 16         Calcium 9.2         Chloride 100         CO2 28         Creatinine 1.1         Differential Method Automated         eGFR if  >60.0         eGFR if non  52.1  Comment: Calculation used to obtain the estimated glomerular filtration  rate (eGFR) is the CKD-EPI equation.            Eos # 0.3         Eosinophil % 4.9         Glucose 105         Gran # (ANC) 3.2         Gran % 51.1         Hematocrit 24.8         Hemoglobin 8.1         Immature Grans (Abs) 0.01  Comment: Mild elevation in immature granulocytes is non specific and   can be seen in a variety of conditions including stress response,   acute inflammation, trauma and pregnancy. Correlation with other   laboratory and clinical findings is essential.           Immature Granulocytes 0.2         INR 1.7  Comment: Coumadin Therapy:  2.0 - 3.0 for INR for all indicators except mechanical heart valves  and antiphospholipid syndromes  which should use 2.5 - 3.5.           Lymph # 2.1         Lymph % 33.0         Magnesium 1.7         MCH 30.9         MCHC 32.7         MCV 95         Mono # 0.6         Mono % 10.0         MPV 10.2         nRBC 0         Phosphorus 4.1         Platelets 61         POCT Glucose   150       Potassium 4.6         PROTEIN TOTAL 5.4         Protime 17.0         RBC 2.62         RDW 18.1         Sodium 134         WBC 6.27                 Significant Imaging: I have reviewed all pertinent imaging results/findings within the past 24 hours.      Assessment/Plan:      * Acute encephalopathy  Candiduria  Concerns for SBP    Patient did have episode of seizures in MICU, now on onfi and keppra, no signs of hepatic encephalopathy, negative for asterixis. Ammonia wnl. Patient acutely altered and is unable to respond to basic questions, though appears to exhibit understanding.     Etiology unclear. Concerns for UTI, new seizure, SBP. Hyponatremic, though has been so throughout course. UA with new candida, no bacteria.     - Continue lactulose 15g bid, Rifaximin 550mg bid, thiamine 100mg  - Titrate bowel regimen to 3- 4 BMs per day  - Neurology following peripherally, appreciate recommendations  - Continuous EEG monitoring did not demonstrate seizure activity, more c/w metabolic encephalopathy showing generalized periodic discharges, no changes to AED's at this time  - Paracentesis labs negative for SBP (WBC 53, PMN 64%), however, obtained after starting CTX (initially started when UA was drawn and concerns for bacterial UTI)  - Continue Fluconazole 200 mg IV  - Ceftriaxone 2 g qd x 5 total days discontinued. Switched to Ciprofloxacin 500mg PO bid (will continue empiric SBP tx despite negative labs because rocephin given prior paracentesis)  - Plan for [facility] placement likely Monday      UTI (urinary tract infection)  Positive UA from new kim placed 7/10/22. Hx of ESBL E coli in Feb 2022 at OSH  Uctx growing enterococcus,  s/p treatment with ampicillin course 07/17.  UA with candida 07/25.    - Rest per Acute Encephalopathy A/P    PITO (acute kidney injury)  Presenting with PITO with new sCr 1.6 (baseline sCr 0.9-1.1).  DDx: Decreased PO intake likely resulting in pre-renal etiology    - Per hepatology recommendation Lasix 40 BID IV transitioned to Lasix 40 PO BID. Continue Aldactone 100 daily.  - Gentle trial of IVF; encourage PO intake  - Trend sCr  - Strict I&Os  - Avoid nephrotoxic agents  - Renally adjust medications      Anemia, chronic disease  Patient with Hgb of 8.5 on admit. Upon review of care everywhere, patient had Hgb of 8.4 in all of 2022. Likely due to chronic disease and stable.  One episode of Hgb 6.9 on 7/18. Ordered 1 U pRBC. Received written consent from patient and  prior to transfusion. Received 1U prbc for Hgb 6.3 yesterday.     - Hgb stable, no signs of new bleed  - Per GI recommendation, if anemia persists and not responding to transfusion then inpatient colonoscopy recommended for further evaluation; otherwise outpatient  - Pantoprazole 40 PO qd    Anasarca  67 yoF with alcoholic liver cirrhosis and hypertension admitted for generalized anasarca. Patient's albumin on admission was 1.9. Likely edema related to poor intravascular oncotic pressure 2/2 alcoholic cirrhosis. Patient adherent to home medication. Given history and exam, patient would benefit from diuresis. Dry weight reported as 170s. Patient is currently 200 lbs 6.4oz (down from 212 lbs).     - Strict I/Os  - Daily weights  - Low sodium diet with 1500 ml fluid restriction  - Plan for f/u with her hepatologist on d/c  - discontinued 25% bottle of 25g Albumin     Alcoholic cirrhosis of liver with ascites  Patient was diagnosed in February. Previous history of chronic alcohol abuse and states she drank about 4-5 glasses of wine per night for over 20 years. Patient is seeing hepatology outpatient. Labs shown in care everywhere.     MELD-Na score:  21 at 7/27/2022  6:26 AM  MELD score: 16 at 7/27/2022  6:26 AM  Calculated from:  Serum Creatinine: 1.3 mg/dL at 7/27/2022  6:26 AM  Serum Sodium: 131 mmol/L at 7/27/2022  6:26 AM  Total Bilirubin: 1.8 mg/dL at 7/27/2022  6:26 AM  INR(ratio): 1.5 at 7/27/2022  6:26 AM  Age: 67 years    - Continue lactulose titrate to BM, thiamine, and folate  - Hepatology consulted; appreciate recommendations - outpatient follow up  - Repeat IR paracentesis completed, 7.5L  - Continue to monitor for signs of liver failure  - F/u daily CMPs    Thrombocytopenia   Plt 65, PT 15.6, INR 1.5. Improved after Dose of Vitamin K.  Patient underwent paracentesis and there was large volume leaking from site controlled with dermabond.    - continue to monitor platelet count and inspect abdomen for sites of bleeding.  - No bloody bowel movements noted.     Debility  - Pt non-mobile for some time in hospital,  PT/OT recs for SNF  - Pt advised to move around in bed but to only get up with help        Primary hypertension  Patient was discontinued from losartan per hepatologist.   - continue to monitor and can add when clinically indicated  - pt removed from tele 7/16    Gram-positive cocci in clusters  Noted on 1 set of aer/anae cultures from 7/10/22. 2nd set so far negative. No clear sign of infection. Could likely be contaminant.  Uctx also from enterococcus species  - s/p Ampicillin completed 07/17          Hypomagnesemia  Monitor daily and replete prn      Deaf- written communication  - ASL  used for interaction as well as written communication      VTE Risk Mitigation (From admission, onward)         Ordered     heparin (porcine) injection 5,000 Units  2 times daily         07/29/22 1052     IP VTE HIGH RISK PATIENT  Once         06/28/22 0419     Place sequential compression device  Until discontinued         06/28/22 0419     Place sequential compression device  Until discontinued         06/28/22 0414                Discharge  Planning   REMA: 8/1/2022     Code Status: Full Code   Is the patient medically ready for discharge?: No    Reason for patient still in hospital (select all that apply): Treatment and Pending disposition  Discharge Plan A: Skilled Nursing Facility   Discharge Delays: None known at this time              Lele Posadas MD  Department of Hospital Medicine   [facility] - Intensive Care ([place]-[facility])

## 2022-08-01 NOTE — PLAN OF CARE
[facility] ([facility]  Discharge Reassessment    Primary Care Provider: Elvie Fernandes MD    Expected Discharge Date: 8/2/2022    Reassessment (most recent)       Discharge Reassessment - 08/01/22 1535          Discharge Reassessment    Assessment Type Discharge Planning Reassessment (P)      Did the patient's condition or plan change since previous assessment? Yes (P)      Communicated REMA with patient/caregiver Yes (P)      Discharge Plan A Skilled Nursing Facility (P)      Discharge Plan B Skilled Nursing Facility (P)

## 2022-08-01 NOTE — PT/OT/SLP PROGRESS
Physical Therapy Co-Treatment    Patient Name:  Aleyda Floyd   MRN:  [id number]    Recommendations:     Discharge Recommendations:  nursing facility, skilled   Discharge Equipment Recommendations:  (TBD)   Barriers to discharge: Evolving Clinical Presentation     Assessment:     Aleyda Floyd is a 67 y.o. female admitted with a medical diagnosis of Acute encephalopathy.  She presents with the following impairments/functional limitations:  weakness, impaired endurance, impaired functional mobility, gait instability, decreased safety awareness, decreased lower extremity function, pain, edema, impaired coordination, decreased ROM.  Pt was agreeable and tolerated therapy session fairly. Noted BUE and BLE edema with therapist providing education on fluid management and therex throughout the day. Pt completed 3 stands from EOB with Jocelynn-modAx2 and RW; increasing flexed posture and knee flexion with prolong stand. Pt is progressing and continues to benefit from therapy to improve functional endurance, strength, and mobility.     Rehab Prognosis: Fair; patient would benefit from acute skilled PT services to address these deficits and reach maximum level of function.    Recent Surgery: * No surgery found *      Plan:     During this hospitalization, patient to be seen 3 x/week to address the identified rehab impairments via gait training, therapeutic activities, therapeutic exercises, neuromuscular re-education and progress toward the following goals:    · Plan of Care Expires:  08/12/22    Subjective     Chief Complaint: facial grimace with touch on L side abdomen   Patient/Family Comments/goals: pt agreeable to therapy   Pain/Comfort:  · Pain Rating 1: 0/10  · Pain Rating Post-Intervention 1: 0/10      Objective:     Communicated with RN prior to session.  Patient found HOB elevated with telemetry, blood pressure cuff, pulse ox (continuous) upon PT entry to room.   Interpretor Camila (#[id number]) utilize  Pt seen  co-treating with PT due to patient's multiple deficits and requirement of 2 skilled clinicians to safely address pts goals of treatment and accomodate for her activity tolerance while in acute setting.  General Precautions: Standard, contact, fall, hearing impaired   Orthopedic Precautions:N/A   Braces: N/A  Respiratory Status: Room air     Functional Mobility:  · Bed Mobility:     · Rolling Left:  maximal assistance with bed rails   · Rolling Right: maximal assistance with bed rails   · Supine to Sit: maximal assistance with HOB elevated   · Sit to Supine: maximal assistance and of 2 persons  · Increase assistance d/t fatigued  · Transfers:     · Sit to Stand:  Jocelynn-modAx2 with rolling walker  · Verbal/Tactile cues for hand placement and feet placement   · Increase flexed posture and knee flexion, able to clear the bed, but pt slowly sits back down in bed.  · 3 trials from EOB, increase assistance scoot pt towards EOB prior to standing and scooting back into bed following stand.   · Increase difficulty d/t pt's short stature with bed in lowest setting.   · Gait: deferred, not safe at this time     Balance:   · Static/Dynamic sitting EOB balance: good, CGA with BUE support   · Increase left side leaning.   · Static standing balance: poor, modA-max with RW  · 3 trials from EOB, ~5-15 seconds each       AM-PAC 6 CLICK MOBILITY  Turning over in bed (including adjusting bedclothes, sheets and blankets)?: 2  Sitting down on and standing up from a chair with arms (e.g., wheelchair, bedside commode, etc.): 2  Moving from lying on back to sitting on the side of the bed?: 2  Moving to and from a bed to a chair (including a wheelchair)?: 2  Need to walk in hospital room?: 1  Climbing 3-5 steps with a railing?: 1  Basic Mobility Total Score: 10       Therapeutic Activities and Exercises:   Supine BLE therex: ankle pumps (pt completed 5 reps on each leg)  o (increase encouragement/curing to do more)   Seated BLE therex: LAQ  (pt completed 3-4 reps on each leg)  o (REGLAOM, for increase encouragement/motivation to complete)   o Pt's spouse reports pt is understanding exercises, but refused to complete.   Noted edema in BUE and BLE, educated about fluid management/integration  Patient educated on role of therapy, goals of session, and benefits of out of bed mobility.   Instructed on use of call button and importance of calling nursing staff for assistance with mobility   Questions/concerns addressed within PTA scope of practice  Pt verbalized/signed in understanding.  Whiteboard Updated    Patient left HOB elevated with all lines intact, call button in reach and spouse present..    GOALS:   Multidisciplinary Problems     Physical Therapy Goals        Problem: Physical Therapy    Goal Priority Disciplines Outcome Goal Variances Interventions   Physical Therapy Goal     PT, PT/OT Ongoing, Progressing     Description: Goals to be met by: 22    Patient will increase functional independence with mobility by performin. Supine to sit with minimal Assistance - not met  2. Sit to stand transfer with minimal assistance- not met  3. Bed to chair transfer with minimal assistance using LRAD - not met  4. Gait  x 30 feet with minimal  Assistance using LRAD - not met  5. Ascend/Descend 6 inch curb step with Contact Guard Assistance using LRAD - not met  6. Lower extremity exercise program x30 reps per handout, with independence - not met                     Time Tracking:     PT Received On: 22  PT Start Time: 1445     PT Stop Time: 1523  PT Total Time (min): 38 min     Billable Minutes: Therapeutic Activity 10, Therapeutic Exercise 15 and Neuromuscular Re-education 15    Treatment Type: Treatment  PT/PTA: PTA     PTA Visit Number: 1     2022

## 2022-08-01 NOTE — NURSING
Patient rested well this shift. No new complaints.  Continue Q2 turns. Crush meds in applesauce.  Vitals stable. Pt on room air.

## 2022-08-01 NOTE — PT/OT/SLP PROGRESS
Occupational Therapy   Treatment    Name: Aleyda Floyd  MRN: [id number]  Admitting Diagnosis:  Acute encephalopathy       Recommendations:     Discharge Recommendations: nursing facility, skilled  Discharge Equipment Recommendations:   (TBD)  Barriers to discharge:  Inaccessible home environment    Assessment:     Aleyda Floyd is a 67 y.o. female with a medical diagnosis of Acute encephalopathy.  She presents with significant edema LLQ, moderate edema all 4 extremities which causes discomfort due to prolonged immobility and generalized weakness. Pt and pts spouse were very receptive to education re: proper positioning and gentle massage for edema management. They were also receptive and understood education re: HEP. Pt has a very supportive spouse who participates in and encourages patient during treatment. . Performance deficits affecting function are weakness, impaired endurance, impaired self care skills, impaired functional mobility, impaired balance, decreased upper extremity function, decreased lower extremity function, edema. Pt required max A to transition to EOB, demonstrated good static sitting balance and poor static standing balance, requiring max A to maintain. LLQ edema causes pt to weight shift to L side when upright and due to weakness, she is unable to obtain midline.    Rehab Prognosis:  Good; patient would benefit from acute skilled OT services to address these deficits and reach maximum level of function.       Plan:     Patient to be seen 3 x/week to address the above listed problems via self-care/home management, therapeutic activities, therapeutic exercises  · Plan of Care Expires: 08/12/22  · Plan of Care Reviewed with: spouse, patient    Subjective     Pain/Comfort:  · Pain Rating 1: 0/10  · Pain Rating Post-Intervention 1: 0/10    Objective:   Pt seen co-treating with PT due to patient's multiple deficits and requirement of 2 skilled clinicians to safely address pts goals of  treatment and accomodate for her activity tolerance while in acute setting.  Interpretor # [id number] utilized as ASL is pts primary language.     Communicated with: nurse prior to session.  Patient found HOB elevated with telemetry, pulse ox (continuous) and all 4 extremities supported upon OT entry to room.    General Precautions: Standard, fall, contact, hearing impaired   Orthopedic Precautions:N/A   Braces: N/A  Respiratory Status: Room air    Bed Mobility:    · Patient completed Rolling/Turning to Left with  maximal assistance  · Patient completed Rolling/Turning to Right with maximal assistance  · Patient completed Supine to Sit with maximal assistance  · Patient completed Sit to Supine with maximal assistance and 2 persons     Functional Mobility/Transfers:  · Patient completed Sit <> Stand Transfer with maximal assistance  with  rolling walker   · Functional Mobility: Pt seen for sitting and standing balance/ tolerance focusing on goal of transitioning to more active role in managing her ADL's.  Sit <> stand x 3 trials requiring max A to scoot to EOB and prep for standing, mod A x 2 for standing with A provided to obtain midline secondary to listing to L side.     Activities of Daily Living:  · Grooming: minimum assistance    · Toileting: total assistance with [place]       [facility] 6 Click ADL: 12    Treatment & Education:  -Pt education on OT role and POC.  -Importance of E/OOB activity with staff assistance, emphasis on daily participation  -Multiple self-care tasks and functional mobility completed -- assistance level noted above  -Safety during functional transfer and mobility ensured  -Patient provided with education on importance of Bilateral UB/LB integration during functional tasks for improvement in functional performance.   -Education provided/reviewed, questions answered within OT scope of practice.   -Education provided to pt on use of call bell and repeating call to receive service in timely manner  to prevent falls/injury  -Additional time spent providing emotional support as pt expressed experience since medical diagnosis and hospital stay  -Patient demonstrates understanding and learning this date.      Patient left HOB elevated with call button in reachEducation:      GOALS:   Multidisciplinary Problems     Occupational Therapy Goals        Problem: Occupational Therapy    Goal Priority Disciplines Outcome Interventions   Occupational Therapy Goal     OT, PT/OT Ongoing, Progressing    Description: Goals  on 8/15/2022    Patient will increase functional independence with ADLs by performing:    Grooming while standing with Minimal Assistance.  Toileting from bedside commode with Minimal Assistance for hygiene and clothing management.   Supine to sit with Minimal Assistance.  UE dressing with Minimal assistance  Step transfer with Minimal Assistance using RW.  Toilet transfer to bedside commode with Minimal Assistance using RW.                     Time Tracking:     OT Date of Treatment: 22  OT Start Time: 1440  OT Stop Time: 1523  OT Total Time (min): 43 min    Billable Minutes:Therapeutic Activity 43    OT/TORREY: OT          2022

## 2022-08-01 NOTE — SUBJECTIVE & OBJECTIVE
Interval History: GABBIEON, AF. UOP 1.15. Patient slow to respond via ASL interpretor, but A&Ox3. Jeremy abdominal pain, chest pain, sob, or palpitations.     Review of Systems   Constitutional:  Negative for appetite change, chills and fever.   HENT:  Negative for rhinorrhea, sore throat and trouble swallowing.    Eyes:  Negative for pain, discharge and visual disturbance.   Respiratory:  Negative for cough, chest tightness and shortness of breath.    Cardiovascular:  Negative for chest pain, palpitations and leg swelling.   Gastrointestinal:  Positive for abdominal distention. Negative for abdominal pain, constipation, diarrhea, nausea and vomiting.   Genitourinary:  Negative for difficulty urinating, dysuria and hematuria.   Musculoskeletal:  Negative for arthralgias, joint swelling and myalgias.   Skin:  Negative for color change and pallor.   Neurological:  Negative for dizziness, tremors, light-headedness and headaches.   Psychiatric/Behavioral:  Negative for agitation and confusion.    Objective:     Vital Signs (Most Recent):  Temp: 97.7 °F (36.5 °C) (08/01/22 1200)  Pulse: 77 (08/01/22 1200)  Resp: 16 (08/01/22 0339)  BP: (!) 118/56 (08/01/22 1200)  SpO2: 98 % (08/01/22 1200)   Vital Signs (24h Range):  Temp:  [97.7 °F (36.5 °C)-98.2 °F (36.8 °C)] 97.7 °F (36.5 °C)  Pulse:  [71-84] 77  Resp:  [16-19] 16  SpO2:  [95 %-100 %] 98 %  BP: (109-128)/(51-62) 118/56     Weight: 82.6 kg (182 lb 1.6 oz)  Body mass index is 35.56 kg/m².    Intake/Output Summary (Last 24 hours) at 8/1/2022 1450  Last data filed at 8/1/2022 0600  Gross per 24 hour   Intake 640 ml   Output 1150 ml   Net -510 ml      Physical Exam  Constitutional:       General: She is not in acute distress.     Appearance: Normal appearance. She is not ill-appearing.   HENT:      Head: Normocephalic and atraumatic.      Nose: No congestion.      Mouth/Throat:      Mouth: Mucous membranes are moist.   Eyes:      General:         Right eye: No discharge.          Left eye: No discharge.      Extraocular Movements: Extraocular movements intact.   Cardiovascular:      Rate and Rhythm: Normal rate and regular rhythm.      Pulses: Normal pulses.      Heart sounds: Murmur (systolic flow) heard.     No gallop.   Pulmonary:      Effort: Pulmonary effort is normal. No respiratory distress.      Breath sounds: Normal breath sounds.   Abdominal:      General: There is distension.      Tenderness: There is no abdominal tenderness. There is no guarding or rebound.   Musculoskeletal:      Cervical back: Normal range of motion.      Right lower leg: Edema (3+) present.      Left lower leg: Edema (3+) present.   Skin:     Coloration: Skin is not jaundiced or pale.      Findings: Bruising (bilateral arms) present.   Neurological:      General: No focal deficit present.      Mental Status: She is alert and oriented to person, place, and time.      Cranial Nerves: No cranial nerve deficit.   Psychiatric:         Mood and Affect: Mood normal.         Behavior: Behavior normal.       Significant Labs: All pertinent labs within the past 24 hours have been reviewed.  Recent Lab Results       None            Significant Imaging: I have reviewed all pertinent imaging results/findings within the past 24 hours.

## 2022-08-01 NOTE — PLAN OF CARE
Patient is stable to dc. JAMIE has contacted Sho with [facility] and has sent updates. JAMIE has also attempted to reach the coordinator with [facility] and will try again.       JAMIE has sent updates to Sho with [facility] updates. JAMIE is awaiting a call back from Sho in admissions.      Anjana Guzmán LMSW  [facility]   [phone]

## 2022-08-01 NOTE — PROGRESS NOTES
[facility] - Intensive Care ([place] [address])  [facility] Medicine  Progress Note    Patient Name: Aleyda Floyd  MRN: [id number]  Patient Class: IP- Inpatient   Admission Date: 6/27/2022  Length of Stay: 34 days  Attending Physician: Jessenia Foster MD  Primary Care Provider: Elvie Fernandes MD        Subjective:     Principal Problem:Acute encephalopathy        HPI:  This is a 67 year old deaf lady with alcoholic cirrhosis, and hypertension who presented with generalized swelling.  used to obtain history. Patient states that she had the swelling since her diagnosis of cirrhosis in February. Patient states that 2 days ago, she was able to ambulate but with some difficulty due to the swelling but now  worsening to the point where she is unable to ambulate by herself. Of note, patient had a recent EGD performed earlier last week where they had issues with her IV resulting is significant bruising. Patient also notes that whenever she scratches her skin with her nails, she notices clear fluid coming from the cuts. Patient denies fever, chills, cough, hemoptysis, nausea, vomiting, abdominal pain, yellowing of skin, constipation, dysuria, hematuria, and black/ bloody stools. Patient has baseline loose stools from her lactulose.  She reports compliance with all medications including her lactulose and Lasix. Patient and family declined any confusion or altered mental status. She denies any recent alcohol use with last use in February when she was diagnosed with alcoholic cirrhosis. Patient states that she used to weigh around 170s which she believes is her dry weight and is currently in the 200s.     Upon chart review, patient had an echo at an OSH on 2/25/22 which showed EF 60-65% and some diastolic dysfunction.    In the ED, patient was found to have Hgb of 8.5 and platelets of 113 around baseline. Patient had a UA which showed 1+ leuks and many bacteria. Patient did not complain of dysuria.        Overview/Hospital Course:  Admitted for decompensated cirrhosis and anasarca. Began diuresing with IV Lasix and spironolactone. Paracentesis of -4.2L; ruled out SBP. Pt stabilized for few days and ready to be dc'ed when she had a change in mental status. Found pt unresponsive with fixed gaze and no pupil response. Code stroke called, CT neg for stroke. Eeg showed seizure-like activity. Pt transitioned to MICU for higher level of care. On step down, had a UTI and PITO, both of which were treated. She received a repeat paracentesis with 7.5L removed. Her creatinine started improve with administration of albumin and holding diuretics. Diuretics restarted and transitioned to Lasix 40mg PO and Aldactone 100mg PO. Patient started to have mild difficulty with mentation again with concerns for recurring UTI. Repeat UA indicative of recurring UTI, she was started on IV 1g Rocephin. Further UCX positive for candida albicans; she was started on 200mg fluconazole on 7/30. Paracentesis labs negative for SBP but continued rocephin for empiric treatment of SBP because paracentesis was performed after giving rocephin.. Rocephin 1g discontinued and transitioned to ciprofloxacin 500mg PO bid for empiric SBP treatment. Patient improving on fluconazole, EEG negative for epileptiform activity.    Dispo to [facility] and follow up with hepatology, neuro.      Interval History: DOUGON, AF. UOP 1.15. Patient slow to respond via ASL interpretor, but A&Ox3. Jeremy abdominal pain, chest pain, sob, or palpitations.     Review of Systems   Constitutional:  Negative for appetite change, chills and fever.   HENT:  Negative for rhinorrhea, sore throat and trouble swallowing.    Eyes:  Negative for pain, discharge and visual disturbance.   Respiratory:  Negative for cough, chest tightness and shortness of breath.    Cardiovascular:  Negative for chest pain, palpitations and leg swelling.   Gastrointestinal:  Positive for abdominal  distention. Negative for abdominal pain, constipation, diarrhea, nausea and vomiting.   Genitourinary:  Negative for difficulty urinating, dysuria and hematuria.   Musculoskeletal:  Negative for arthralgias, joint swelling and myalgias.   Skin:  Negative for color change and pallor.   Neurological:  Negative for dizziness, tremors, light-headedness and headaches.   Psychiatric/Behavioral:  Negative for agitation and confusion.    Objective:     Vital Signs (Most Recent):  Temp: 97.7 °F (36.5 °C) (08/01/22 1200)  Pulse: 77 (08/01/22 1200)  Resp: 16 (08/01/22 0339)  BP: (!) 118/56 (08/01/22 1200)  SpO2: 98 % (08/01/22 1200)   Vital Signs (24h Range):  Temp:  [97.7 °F (36.5 °C)-98.2 °F (36.8 °C)] 97.7 °F (36.5 °C)  Pulse:  [71-84] 77  Resp:  [16-19] 16  SpO2:  [95 %-100 %] 98 %  BP: (109-128)/(51-62) 118/56     Weight: 82.6 kg (182 lb 1.6 oz)  Body mass index is 35.56 kg/m².    Intake/Output Summary (Last 24 hours) at 8/1/2022 1450  Last data filed at 8/1/2022 0600  Gross per 24 hour   Intake 640 ml   Output 1150 ml   Net -510 ml      Physical Exam  Constitutional:       General: She is not in acute distress.     Appearance: Normal appearance. She is not ill-appearing.   HENT:      Head: Normocephalic and atraumatic.      Nose: No congestion.      Mouth/Throat:      Mouth: Mucous membranes are moist.   Eyes:      General:         Right eye: No discharge.         Left eye: No discharge.      Extraocular Movements: Extraocular movements intact.   Cardiovascular:      Rate and Rhythm: Normal rate and regular rhythm.      Pulses: Normal pulses.      Heart sounds: Murmur (systolic flow) heard.     No gallop.   Pulmonary:      Effort: Pulmonary effort is normal. No respiratory distress.      Breath sounds: Normal breath sounds.   Abdominal:      General: There is distension.      Tenderness: There is no abdominal tenderness. There is no guarding or rebound.   Musculoskeletal:      Cervical back: Normal range of motion.       Right lower leg: Edema (3+) present.      Left lower leg: Edema (3+) present.   Skin:     Coloration: Skin is not jaundiced or pale.      Findings: Bruising (bilateral arms) present.   Neurological:      General: No focal deficit present.      Mental Status: She is alert and oriented to person, place, and time.      Cranial Nerves: No cranial nerve deficit.   Psychiatric:         Mood and Affect: Mood normal.         Behavior: Behavior normal.       Significant Labs: All pertinent labs within the past 24 hours have been reviewed.  Recent Lab Results       None            Significant Imaging: I have reviewed all pertinent imaging results/findings within the past 24 hours.      Assessment/Plan:      * Acute encephalopathy  Candiduria  Concerns for SBP    Patient did have episode of seizures in MICU, now on onfi and keppra, no signs of hepatic encephalopathy, negative for asterixis. Ammonia wnl. Patient acutely altered and is unable to respond to basic questions, though appears to exhibit understanding.     Etiology unclear. Concerns for UTI, new seizure, SBP. Hyponatremic, though has been so throughout course. UA with new candida, no bacteria.     - Continue lactulose 15g bid, Rifaximin 550mg bid, thiamine 100mg  - Titrate bowel regimen to 3- 4 BMs per day  - Neurology following peripherally, appreciate recommendations  - Continuous EEG monitoring did not demonstrate seizure activity, more c/w metabolic encephalopathy showing generalized periodic discharges, no changes to AED's at this time  - Paracentesis labs negative for SBP (WBC 53, PMN 64%), however, obtained after starting CTX (initially started when UA was drawn and concerns for bacterial UTI)  - Continue Fluconazole 200 mg IV  - Ceftriaxone 2 g qd x 5 total days discontinued. Switched to Ciprofloxacin 500mg PO bid (will continue empiric SBP tx despite negative labs because rocephin given prior to paracentesis)  - Plan for [facility] or [facility] placement ;  awaiting bed availability at [facility] likely for tomorrow.       UTI (urinary tract infection)  Positive UA from new kim placed 7/10/22. Hx of ESBL E coli in Feb 2022 at OSH  Uctx growing enterococcus, s/p treatment with ampicillin course 07/17.  UA with candida 07/25.    - Rest per Acute Encephalopathy A/P    PITO (acute kidney injury)  Presenting with PITO with new sCr 1.6 (baseline sCr 0.9-1.1).  DDx: Decreased PO intake likely resulting in pre-renal etiology    - Per hepatology recommendation Lasix 40 BID IV transitioned to Lasix 40 PO BID. Continue Aldactone 100 daily.  - Gentle trial of IVF; encourage PO intake  - Trend sCr  - Strict I&Os  - Avoid nephrotoxic agents  - Renally adjust medications      Anemia, chronic disease  Patient with Hgb of 8.5 on admit. Upon review of care everywhere, patient had Hgb of 8.4 in all of 2022. Likely due to chronic disease and stable.  One episode of Hgb 6.9 on 7/18. Ordered 1 U pRBC. Received written consent from patient and  prior to transfusion. Received 1U prbc for Hgb 6.3.     - Hgb stable, no signs of new bleed  - Per GI recommendation, if anemia persists and not responding to transfusion then inpatient colonoscopy recommended for further evaluation; otherwise outpatient  - Pantoprazole 40 PO qd    Anasarca  67 yoF with alcoholic liver cirrhosis and hypertension admitted for generalized anasarca. Patient's albumin on admission was 1.9. Likely edema related to poor intravascular oncotic pressure 2/2 alcoholic cirrhosis. Patient adherent to home medication. Given history and exam, patient would benefit from diuresis. Dry weight reported as 170s. Patient is currently 200 lbs 6.4oz (down from 212 lbs).     - Strict I/Os  - Daily weights  - Low sodium diet with 1500 ml fluid restriction  - Plan for f/u with her hepatologist on d/c  - discontinued 25% bottle of 25g Albumin     Alcoholic cirrhosis of liver with ascites  Patient was diagnosed in February. Previous history of  chronic alcohol abuse and states she drank about 4-5 glasses of wine per night for over 20 years. Patient is seeing hepatology outpatient. Labs shown in care everywhere.     MELD-Na score: 17 at 7/31/2022  3:25 AM  MELD score: 15 at 7/31/2022  3:25 AM  Calculated from:  Serum Creatinine: 1.1 mg/dL at 7/31/2022  3:25 AM  Serum Sodium: 134 mmol/L at 7/31/2022  3:25 AM  Total Bilirubin: 1.6 mg/dL at 7/31/2022  3:25 AM  INR(ratio): 1.7 at 7/31/2022  3:25 AM  Age: 67 years    - Continue lactulose titrate to BM, thiamine, and folate  - Hepatology consulted; appreciate recommendations - outpatient follow up  - Repeat IR paracentesis completed, 7.5L  - Continue to monitor for signs of liver failure  - F/u daily CMPs    Thrombocytopenia   Plt 65, PT 15.6, INR 1.5. Improved after Dose of Vitamin K.  Patient underwent paracentesis and there was large volume leaking from site controlled with dermabond.    - continue to monitor platelet count and inspect abdomen for sites of bleeding.  - No bloody bowel movements noted.     Debility  - Pt non-mobile for some time in hospital,  PT/OT recs for SNF  - Pt advised to move around in bed but to only get up with help        Primary hypertension  Patient was discontinued from losartan per hepatologist.   - continue to monitor and can add when clinically indicated  - pt removed from tele 7/16    Gram-positive cocci in clusters  Noted on 1 set of aer/anae cultures from 7/10/22. 2nd set so far negative. No clear sign of infection. Could likely be contaminant.  Uctx also from enterococcus species  - s/p Ampicillin completed 07/17          Hypomagnesemia  Monitor daily and replete prn      Deaf- written communication  - ASL  used for interaction as well as written communication      VTE Risk Mitigation (From admission, onward)         Ordered     heparin (porcine) injection 5,000 Units  2 times daily         07/29/22 1052     IP VTE HIGH RISK PATIENT  Once         06/28/22 7220      Place sequential compression device  Until discontinued         06/28/22 0419     Place sequential compression device  Until discontinued         06/28/22 0415                Discharge Planning   REMA: 8/1/2022     Code Status: Full Code   Is the patient medically ready for discharge?: Yes    Reason for patient still in hospital (select all that apply): Pending disposition  Discharge Plan A: Skilled Nursing Facility   Discharge Delays: None known at this time              Lele Posadas MD  Department of Hospital Medicine   [facility] - Intensive Care ([place])

## 2022-08-01 NOTE — ASSESSMENT & PLAN NOTE
Candiduria  Concerns for SBP    Patient did have episode of seizures in MICU, now on onfi and keppra, no signs of hepatic encephalopathy, negative for asterixis. Ammonia wnl. Patient acutely altered and is unable to respond to basic questions, though appears to exhibit understanding.     Etiology unclear. Concerns for UTI, new seizure, SBP. Hyponatremic, though has been so throughout course. UA with new candida, no bacteria.     - Continue lactulose 15g bid, Rifaximin 550mg bid, thiamine 100mg  - Titrate bowel regimen to 3- 4 BMs per day  - Neurology following peripherally, appreciate recommendations  - Continuous EEG monitoring did not demonstrate seizure activity, more c/w metabolic encephalopathy showing generalized periodic discharges, no changes to AED's at this time  - Paracentesis labs negative for SBP (WBC 53, PMN 64%), however, obtained after starting CTX (initially started when UA was drawn and concerns for bacterial UTI)  - Continue Fluconazole 200 mg IV  - Ceftriaxone 2 g qd x 5 total days discontinued. Switched to Ciprofloxacin 500mg PO bid (will continue empiric SBP tx despite negative labs because rocephin given prior to paracentesis)  - Plan for [facility] or [facility] placement ; awaiting bed availability at SNF likely for tomorrow.

## 2022-08-01 NOTE — PROCEDURES
DATE: 7/12/22     EEG NUMBER: FH [id number]     REFERRING PHYSICIAN:  Dr. Swann      This EEG was performed to assess for subclinical seizures      ELECTROENCEPHALOGRAM REPORT     METHODOLOGY:  Electroencephalographic (EEG) recording is with electrodes placed according to the International 10-20 placement system.  Thirty two (32) channels of digital signal are simultaneously recorded from the scalp and may include EKG, EMG, and/or eye monitors.   Recording band pass was 0.1 to 512 hz.  Digital video recording of the patient is simultaneously recorded with the EEG.  The nursing staff report clinical symptoms and may press an event button when the patient has symptoms of clinical interest to the treating physicians.  EEG and video recording is stored and archived in digital format.  The entire recording is visually reviewed, and the times identified by computer analysis as being spikes or seizures are reviewed again.  Activation procedures which include photic stimulation, hyperventilation and instructing patients to perform simple task are done in selected patients.   Compresses spectral analysis (CSA) is also performed on the activity recorded from each individual channel.  This is displayed as a power display of frequencies from 0 to 30 Hz over time.   The CSA analysis is done and displayed continuously.  This is reviewed for asymmetries in power between homologous areas of the scalp and for presence of changes in power which can be seen when seizures occur.  Sections of suspected abnormalities on the CSA is then compared with the original EEG recording.                Global [employer] software was also utilized in the review of this study.  This software suite analyzes the EEG recording in multiple domains.  Coherence and rhythmicity is computed to identify EEG sections which may contain organized seizures.  Each channel undergoes analysis to detect presence of spike and sharp waves which have special and morphological  characteristic of epileptic activity.  The routine EEG recording is converted from spacial into frequency domain.  This is then displayed comparing homologous areas to identify areas of significant asymmetry.  Algorithm to identify non-cortically generated artifact is used to separate eye movement, EMG and other artifact from the EEG.     Recording times  Start on July 12, 2022 at hours 7 minute 1 seconds 3  End on July 12, 2022 at hours 15 minutes 28 seconds 38  The total time of EEG recording for the study was 8 hrs and 26 minutes      EEG FINDINGS:  The recording was obtained with a number of standard bipolar and referential montages during wakefulness, drowsiness and sleep.  In the alert state, the posterior background rhythm was a symmetric 5-7 Hz activity, which reacted symmetrically to eye opening.  Activation procedures were not performed.  Mixed generalized synchronous semi rhythmical delta activity was noted.  The background was punctuated by 1-2 hertz generalized triphasic morphology waves..  During drowsiness, the background rhythm waxed and waned and there were periods of slowing.  During stage II sleep, symmetric V waves and sleep spindles were noted. There were no interictal epileptiform abnormalities and no clinical or electrographic seizures were recorded.     The EKG channel revealed a sinus rhythm.     IMPRESSION:  This is an abnormal EEG during wakefulness, drowsiness and sleep. Diffuse slowing was noted.  1-2 hertz generalized triphasic morphology waves were noted which were periodic.     CLINICAL CORRELATION:  The patient is a 67 year-old female who is being evaluated for altered sensorium. The patient is currently maintained on Keppra and onfi. This is an abnormal EEG during wakefulness, drowsiness and sleep.  The overall degree of disorganization and slowing for given age is suggestive of a moderate to severe encephalopathy, likely a toxic metabolic encephalopathy.  The presence of triphasic  waves further supports the diagnosis of a generalized metabolic encephalopathy and possible boundary syndrome. There is no evidence of an epileptic process on this recording.  No seizures were recorded during this study.

## 2022-08-02 NOTE — PLAN OF CARE
SW received a call from [facility] with [facility] and rehab [facility] known [facility] reporting that patient is accepted pending an .        Anjana Guzmán LMSW  [facility]   [id number]

## 2022-08-02 NOTE — PROGRESS NOTES
[facility] - Intensive Care ([place] [id number])  [facility] Medicine  Progress Note    Patient Name: Aleyda Floyd  MRN: [id number]  Patient Class: IP- Inpatient   Admission Date: 6/27/2022  Length of Stay: 35 days  Attending Physician: Jessenia Foster MD  Primary Care Provider: Elvie Fernandes MD        Subjective:     Principal Problem:Acute encephalopathy        HPI:  This is a 67 year old deaf lady with alcoholic cirrhosis, and hypertension who presented with generalized swelling.  used to obtain history. Patient states that she had the swelling since her diagnosis of cirrhosis in February. Patient states that 2 days ago, she was able to ambulate but with some difficulty due to the swelling but now  worsening to the point where she is unable to ambulate by herself. Of note, patient had a recent EGD performed earlier last week where they had issues with her IV resulting is significant bruising. Patient also notes that whenever she scratches her skin with her nails, she notices clear fluid coming from the cuts. Patient denies fever, chills, cough, hemoptysis, nausea, vomiting, abdominal pain, yellowing of skin, constipation, dysuria, hematuria, and black/ bloody stools. Patient has baseline loose stools from her lactulose.  She reports compliance with all medications including her lactulose and Lasix. Patient and family declined any confusion or altered mental status. She denies any recent alcohol use with last use in February when she was diagnosed with alcoholic cirrhosis. Patient states that she used to weigh around 170s which she believes is her dry weight and is currently in the 200s.     Upon chart review, patient had an echo at an OSH on 2/25/22 which showed EF 60-65% and some diastolic dysfunction.    In the ED, patient was found to have Hgb of 8.5 and platelets of 113 around baseline. Patient had a UA which showed 1+ leuks and many bacteria. Patient did not complain of dysuria.        Overview/Hospital Course:  Admitted for decompensated cirrhosis and anasarca. Began diuresing with IV Lasix and spironolactone. Paracentesis of -4.2L; ruled out SBP. Pt stabilized for few days and ready to be dc'ed when she had a change in mental status. Found pt unresponsive with fixed gaze and no pupil response. Code stroke called, CT neg for stroke. Eeg showed seizure-like activity. Pt transitioned to MICU for higher level of care. On step down, had a UTI and PITO, both of which were treated. She received a repeat paracentesis with 7.5L removed. Her creatinine started improve with administration of albumin and holding diuretics. Diuretics restarted and transitioned to Lasix 40mg PO and Aldactone 100mg PO. Patient started to have mild difficulty with mentation again with concerns for recurring UTI. Repeat UA indicative of recurring UTI, she was started on IV 1g Rocephin. Further UCX positive for candida albicans; she was started on 200mg fluconazole on 7/30. Paracentesis labs negative for SBP but continued rocephin for empiric treatment of SBP because paracentesis was performed after giving rocephin.. Rocephin 1g discontinued and transitioned to ciprofloxacin 500mg PO bid for empiric SBP treatment. Patient improving on fluconazole, EEG negative for epileptiform activity.    Dispo to [place] or [facility] and follow up with hepatology, neuro.      Interval History: NAEON, AF. Plans for d/c likely tomorrow. ASL interpretor used.     Review of Systems   Constitutional:  Negative for appetite change, chills and fever.   HENT:  Negative for rhinorrhea, sore throat and trouble swallowing.    Eyes:  Negative for pain, discharge and visual disturbance.   Respiratory:  Negative for cough, chest tightness and shortness of breath.    Cardiovascular:  Negative for chest pain, palpitations and leg swelling.   Gastrointestinal:  Positive for abdominal distention. Negative for abdominal pain, constipation, diarrhea, nausea  and vomiting.   Genitourinary:  Negative for difficulty urinating, dysuria and hematuria.   Musculoskeletal:  Negative for arthralgias, joint swelling and myalgias.   Skin:  Negative for color change and pallor.   Neurological:  Negative for dizziness, tremors, light-headedness and headaches.   Psychiatric/Behavioral:  Negative for agitation and confusion.    Objective:     Vital Signs (Most Recent):  Temp: 98 °F (36.7 °C) (08/02/22 0800)  Pulse: 94 (08/02/22 1200)  Resp: 16 (08/02/22 0200)  BP: (!) 108/53 (08/02/22 1200)  SpO2: 98 % (08/02/22 1200)   Vital Signs (24h Range):  Temp:  [97.6 °F (36.4 °C)-98.7 °F (37.1 °C)] 98 °F (36.7 °C)  Pulse:  [73-94] 94  Resp:  [15-16] 16  SpO2:  [96 %-100 %] 98 %  BP: ()/(48-63) 108/53     Weight: 82.6 kg (182 lb 1.6 oz)  Body mass index is 35.56 kg/m².  No intake or output data in the 24 hours ending 08/02/22 1701   Physical Exam  Constitutional:       General: She is not in acute distress.     Appearance: Normal appearance. She is not ill-appearing.   HENT:      Head: Normocephalic and atraumatic.      Nose: No congestion.      Mouth/Throat:      Mouth: Mucous membranes are moist.   Eyes:      General:         Right eye: No discharge.         Left eye: No discharge.      Extraocular Movements: Extraocular movements intact.   Cardiovascular:      Rate and Rhythm: Normal rate and regular rhythm.      Pulses: Normal pulses.      Heart sounds: Murmur (systolic flow) heard.     No gallop.   Pulmonary:      Effort: Pulmonary effort is normal. No respiratory distress.      Breath sounds: Normal breath sounds.   Abdominal:      General: There is distension.      Tenderness: There is no abdominal tenderness. There is no guarding or rebound.   Musculoskeletal:      Cervical back: Normal range of motion.      Right lower leg: Edema (3+) present.      Left lower leg: Edema (3+) present.   Skin:     Coloration: Skin is not jaundiced or pale.      Findings: Bruising (bilateral arms)  present.   Neurological:      General: No focal deficit present.      Mental Status: She is alert and oriented to person, place, and time.      Cranial Nerves: No cranial nerve deficit.   Psychiatric:         Mood and Affect: Mood normal.         Behavior: Behavior normal.       Significant Labs: All pertinent labs within the past 24 hours have been reviewed.  Recent Lab Results         08/01/22  1738        POCT Glucose 126               Significant Imaging: I have reviewed all pertinent imaging results/findings within the past 24 hours.      Assessment/Plan:      * Acute encephalopathy  Candiduria  Concerns for SBP    Patient did have episode of seizures in MICU, now on onfi and keppra, no signs of hepatic encephalopathy, negative for asterixis. Ammonia wnl. Patient acutely altered and is unable to respond to basic questions, though appears to exhibit understanding.     Etiology unclear. Concerns for UTI, new seizure, SBP. Hyponatremic, though has been so throughout course. UA with new candida, no bacteria.     - Continue lactulose 15g bid, Rifaximin 550mg bid, thiamine 100mg  - Titrate bowel regimen to 3- 4 BMs per day  - Neurology following peripherally, appreciate recommendations  - Continuous EEG monitoring did not demonstrate seizure activity, more c/w metabolic encephalopathy showing generalized periodic discharges, no changes to AED's at this time  - Paracentesis labs negative for SBP (WBC 53, PMN 64%), however, obtained after starting CTX (initially started when UA was drawn and concerns for bacterial UTI)  - Fluconazole 200 mg IV for 3 days, transitioned to PO for 4 days, continue for total of 12 doses  - Ceftriaxone 2 g qd x 5 total days discontinued. Switched to Ciprofloxacin 500mg PO bid for 8 doses (will continue empiric SBP tx despite negative labs because rocephin given prior to paracentesis)  - Plan for SNF placement ; awaiting bed availability and accommodations for ASL interpretor.      UTI  (urinary tract infection)  Positive UA from new kim placed 7/10/22. Hx of ESBL E coli in Feb 2022 at OSH  Uctx growing enterococcus, s/p treatment with ampicillin course 07/17.  UA with candida 07/25.    - Rest per Acute Encephalopathy A/P    PITO (acute kidney injury)  Presenting with PITO with new sCr 1.6 (baseline sCr 0.9-1.1).  DDx: Decreased PO intake likely resulting in pre-renal etiology    - Per hepatology recommendation Lasix 40 BID IV transitioned to Lasix 40 PO BID. Continue Aldactone 100 daily.  - Gentle trial of IVF; encourage PO intake  - Trend sCr  - Strict I&Os  - Avoid nephrotoxic agents  - Renally adjust medications      Anemia, chronic disease  Patient with Hgb of 8.5 on admit. Upon review of care everywhere, patient had Hgb of 8.4 in all of 2022. Likely due to chronic disease and stable.  One episode of Hgb 6.9 on 7/18. Ordered 1 U pRBC. Received written consent from patient and  prior to transfusion. Received 1U prbc for Hgb 6.3.     - Hgb stable, no signs of new bleed  - Per GI recommendation, if anemia persists and not responding to transfusion then inpatient colonoscopy recommended for further evaluation; otherwise outpatient  - Pantoprazole 40 PO qd    Anasarca  67 yoF with alcoholic liver cirrhosis and hypertension admitted for generalized anasarca. Patient's albumin on admission was 1.9. Likely edema related to poor intravascular oncotic pressure 2/2 alcoholic cirrhosis. Patient adherent to home medication. Given history and exam, patient would benefit from diuresis. Dry weight reported as 170s. Patient is currently 200 lbs 6.4oz (down from 212 lbs).     - Strict I/Os  - Daily weights  - Low sodium diet with 1500 ml fluid restriction  - Plan for f/u with her hepatologist on d/c  - discontinued 25% bottle of 25g Albumin     Alcoholic cirrhosis of liver with ascites  Patient was diagnosed in February. Previous history of chronic alcohol abuse and states she drank about 4-5 glasses of  wine per night for over 20 years. Patient is seeing hepatology outpatient. Labs shown in care everywhere.     MELD-Na score: 17 at 7/31/2022  3:25 AM  MELD score: 15 at 7/31/2022  3:25 AM  Calculated from:  Serum Creatinine: 1.1 mg/dL at 7/31/2022  3:25 AM  Serum Sodium: 134 mmol/L at 7/31/2022  3:25 AM  Total Bilirubin: 1.6 mg/dL at 7/31/2022  3:25 AM  INR(ratio): 1.7 at 7/31/2022  3:25 AM  Age: 67 years    - Continue lactulose titrate to BM, thiamine, and folate  - Hepatology consulted; appreciate recommendations - outpatient follow up  - Repeat IR paracentesis completed, 7.5L  - Continue to monitor for signs of liver failure  - F/u daily CMPs    Thrombocytopenia   Plt 65, PT 15.6, INR 1.5. Improved after Dose of Vitamin K.  Patient underwent paracentesis and there was large volume leaking from site controlled with dermabond.    - continue to monitor platelet count and inspect abdomen for sites of bleeding.  - No bloody bowel movements noted.     Debility  - Pt non-mobile for some time in hospital,  PT/OT recs for SNF  - Pt advised to move around in bed but to only get up with help        Primary hypertension  Patient was discontinued from losartan per hepatologist.   - continue to monitor and can add when clinically indicated  - pt removed from tele 7/16    Gram-positive cocci in clusters  Noted on 1 set of aer/anae cultures from 7/10/22. 2nd set so far negative. No clear sign of infection. Could likely be contaminant.  Uctx also from enterococcus species  - s/p Ampicillin completed 07/17          Hypomagnesemia  Monitor daily and replete prn      Deaf- written communication  - ASL  used for interaction as well as written communication      VTE Risk Mitigation (From admission, onward)         Ordered     heparin (porcine) injection 5,000 Units  2 times daily         07/29/22 1052     IP VTE HIGH RISK PATIENT  Once         06/28/22 4839     Place sequential compression device  Until discontinued          06/28/22 0419     Place sequential compression device  Until discontinued         06/28/22 0415                Discharge Planning   REMA: 8/3/2022     Code Status: Full Code   Is the patient medically ready for discharge?: Yes    Reason for patient still in hospital (select all that apply): Pending disposition  Discharge Plan A: Skilled Nursing Facility   Discharge Delays: None known at this time              Lele Posadas MD  Department of Hospital Medicine   [facility] - Intensive Care ([place])

## 2022-08-02 NOTE — NURSING
Patient nonverbal and deaf-- stratus used to sign with patient. VSS. Afebrile. Resting comfortably in bed. No complaints of pain. Spouse at bedside. Will hand off report to on coming RN.

## 2022-08-02 NOTE — SUBJECTIVE & OBJECTIVE
Interval History: NAEON, AF. Plans for d/c likely tomorrow. ASL interpretor used.     Review of Systems   Constitutional:  Negative for appetite change, chills and fever.   HENT:  Negative for rhinorrhea, sore throat and trouble swallowing.    Eyes:  Negative for pain, discharge and visual disturbance.   Respiratory:  Negative for cough, chest tightness and shortness of breath.    Cardiovascular:  Negative for chest pain, palpitations and leg swelling.   Gastrointestinal:  Positive for abdominal distention. Negative for abdominal pain, constipation, diarrhea, nausea and vomiting.   Genitourinary:  Negative for difficulty urinating, dysuria and hematuria.   Musculoskeletal:  Negative for arthralgias, joint swelling and myalgias.   Skin:  Negative for color change and pallor.   Neurological:  Negative for dizziness, tremors, light-headedness and headaches.   Psychiatric/Behavioral:  Negative for agitation and confusion.    Objective:     Vital Signs (Most Recent):  Temp: 98 °F (36.7 °C) (08/02/22 0800)  Pulse: 94 (08/02/22 1200)  Resp: 16 (08/02/22 0200)  BP: (!) 108/53 (08/02/22 1200)  SpO2: 98 % (08/02/22 1200)   Vital Signs (24h Range):  Temp:  [97.6 °F (36.4 °C)-98.7 °F (37.1 °C)] 98 °F (36.7 °C)  Pulse:  [73-94] 94  Resp:  [15-16] 16  SpO2:  [96 %-100 %] 98 %  BP: ()/(48-63) 108/53     Weight: 82.6 kg (182 lb 1.6 oz)  Body mass index is 35.56 kg/m².  No intake or output data in the 24 hours ending 08/02/22 1701   Physical Exam  Constitutional:       General: She is not in acute distress.     Appearance: Normal appearance. She is not ill-appearing.   HENT:      Head: Normocephalic and atraumatic.      Nose: No congestion.      Mouth/Throat:      Mouth: Mucous membranes are moist.   Eyes:      General:         Right eye: No discharge.         Left eye: No discharge.      Extraocular Movements: Extraocular movements intact.   Cardiovascular:      Rate and Rhythm: Normal rate and regular rhythm.      Pulses:  Normal pulses.      Heart sounds: Murmur (systolic flow) heard.     No gallop.   Pulmonary:      Effort: Pulmonary effort is normal. No respiratory distress.      Breath sounds: Normal breath sounds.   Abdominal:      General: There is distension.      Tenderness: There is no abdominal tenderness. There is no guarding or rebound.   Musculoskeletal:      Cervical back: Normal range of motion.      Right lower leg: Edema (3+) present.      Left lower leg: Edema (3+) present.   Skin:     Coloration: Skin is not jaundiced or pale.      Findings: Bruising (bilateral arms) present.   Neurological:      General: No focal deficit present.      Mental Status: She is alert and oriented to person, place, and time.      Cranial Nerves: No cranial nerve deficit.   Psychiatric:         Mood and Affect: Mood normal.         Behavior: Behavior normal.       Significant Labs: All pertinent labs within the past 24 hours have been reviewed.  Recent Lab Results         08/01/22  1738        POCT Glucose 126               Significant Imaging: I have reviewed all pertinent imaging results/findings within the past 24 hours.

## 2022-08-02 NOTE — PLAN OF CARE
Neurology had previously been consulted again on 7/26 for evaluation of contribution of anti-epileptic medication to patient's mentation. Patient with deafness and alcoholic cirrhosis [place] [facility] [place] requiring fluid management over hospitalization. Notable encephalopathy on 7/10 AM for which neurology was previously consulted (an EEG read dated 8/2 is actually from an EEG study on 7/12, that referenced metabolic encephalopathy with possible boundary syndrome, which hasn't been noted on EEGs since and regardless, is not a sign of NCSE). No witnessed seizure activity and no prior history of epilepsy. She was evaluated with EEG and though no electrographic seizures were found was started on keppra and onfi. At the time of consult, patient appeaed to have a UTI picture on U/A and continued hyponatremia.     On prior exam 7/26 at the time of consult, patient was alert and communicative without signs of significant decline in mentation compared to earlier in admission. Contributors include her suspected ongoing UTI, hospital-induced delirium, as well as Melatonin 6mg qhs, that she has been receiving.    Recommendations were made to discontinue melatonin and continue Keppra 750 BID, Onfi 10mg BID. We discussed the plan and treatments with patient and family using sign language interpretor and they voiced understanding of the plan.    Recommendations remain unchanged.    08/02/2022  Isai Bustillo MD, [facility] [place] resident, PGY-1  Department of Neurology  [facility]

## 2022-08-02 NOTE — PLAN OF CARE
Patient is accepted by [facility] awaiting medical approval family willing to admit to this facility post dc. SW will continue to follow up.      Anjana Guzmán LMSW  [facility]   [phone]

## 2022-08-02 NOTE — PT/OT/SLP PROGRESS
Occupational Therapy   Treatment    Name: Aleyda Floyd  MRN: [id number]  Admitting Diagnosis:  Acute encephalopathy      Recommendations:     Discharge Recommendations: nursing facility, skilled  Discharge Equipment Recommendations:  walker, rolling, wheelchair, bath bench  Barriers to discharge:  Inaccessible home environment    Assessment:     Aleyda Floyd is a 67 y.o. female with a medical diagnosis of Acute encephalopathy.  She presents with decreasing anasarca compared to yesterday. It is still persistent LLQ and in hands. On approach pt was properly positioned in bed with all 4 extremities elevated. Performance deficits affecting function are weakness, impaired endurance, impaired self care skills, impaired functional mobility, impaired balance, decreased upper extremity function, decreased lower extremity function, other (comment) (fluid retention/ edema).   Patient demonstrates a decline in functional status secondary to illness and will benefit from skilled therapy to return to prior level of function. Patient is currently appropriate for a skilled nursing facility, as patient demonstrates appropriate endurance and strength to participate in at least 60 minutes per day, 5x/week of PT and OT.    Patient continues to benefit from therapy in a skilled nursing facility as patient continues to requires maximal assistance for ADL tasks of dressing, bathing and toileting.    Rehab Prognosis:  Good; patient would benefit from acute skilled OT services to address these deficits and reach maximum level of function.       Plan:     Patient to be seen 3 x/week to address the above listed problems via self-care/home management, therapeutic activities, therapeutic exercises  · Plan of Care Expires: 08/12/22  · Plan of Care Reviewed with: spouse, patient    Subjective     Pain/Comfort:  · Pain Rating 1: 0/10  · Pain Rating Post-Intervention 1: 0/10    Objective:   Utilization of ASL interpretor for  session.    Communicated with: nurse prior to session.  Patient found HOB elevated with telemetry, blood pressure cuff, pulse ox (continuous), PureWick upon OT entry to room.    General Precautions: Standard, contact, fall, hearing impaired   Orthopedic Precautions:N/A   Braces: N/A  Respiratory Status: Room air     Bed Mobility:    · Patient completed Rolling/Turning to Right with maximal assistance  · Patient completed Supine to Sit with maximal assistance  · Patient completed Sit to Supine with maximal assistance and 2 persons     Functional Mobility/Transfers:  · Patient completed Sit <> Stand Transfer with maximal assistance and of 2 persons  with  rolling walker   Functional Mobility:OT focused on sitting balance/ tolerance, functional reach and required OT facilitation of weight shift to midline. At rest pt posterior pelvic tilt and L lateral weight shift due to weight and pressure from edema LLQ.     Activities of Daily Living:  · Grooming: moderate assistance for performing oral care secondary to limited rom and  strength R hand, performed while sitting on EOB.  · Toileting: maximal assistance for bowel and total for urinary incontinence using PureWick      AMPA 6 Click ADL: 12    Treatment & Education:  OT provided lymphedema massage BUE's and instructed pts  in technique for gentle retrograde massage.   -Pt education on OT role and POC and the importance of maximizing use of hospital bed in chair sitting position several times daily.  -Importance of E/OOB activity with staff assistance, emphasis on daily participation  -Multiple self-care tasks and functional mobility completed -- assistance level noted above  -Safety during functional transfer and mobility ensured  -Patient provided with education on importance of Bilateral UB/LB integration during functional tasks for improvement in functional performance.   -Education provided/reviewed, questions answered within OT scope of practice.    -Education provided to pt on use of call bell and repeating call to receive service in timely manner to prevent falls/injury  -Additional time spent providing emotional support as pt expressed experience since medical diagnosis and hospital stay  -Patient demonstrates understanding and learning this date.      Patient left supine with call button in reachEducation:      GOALS:   Multidisciplinary Problems     Occupational Therapy Goals        Problem: Occupational Therapy    Goal Priority Disciplines Outcome Interventions   Occupational Therapy Goal     OT, PT/OT Ongoing, Progressing    Description: Goals  on 8/15/2022    Patient will increase functional independence with ADLs by performing:    Grooming while standing with Minimal Assistance.  Toileting from bedside commode with Minimal Assistance for hygiene and clothing management.   Supine to sit with Minimal Assistance.  UE dressing with Minimal assistance  Step transfer with Minimal Assistance using RW.  Toilet transfer to bedside commode with Minimal Assistance using RW.                     Time Tracking:     OT Date of Treatment: 22  OT Start Time: 1135  OT Stop Time: 1220  OT Total Time (min): 45 min    Billable Minutes:Therapeutic Activity 45    OT/TORREY: OT          2022

## 2022-08-02 NOTE — ASSESSMENT & PLAN NOTE
Candiduria  Concerns for SBP    Patient did have episode of seizures in MICU, now on onfi and keppra, no signs of hepatic encephalopathy, negative for asterixis. Ammonia wnl. Patient acutely altered and is unable to respond to basic questions, though appears to exhibit understanding.     Etiology unclear. Concerns for UTI, new seizure, SBP. Hyponatremic, though has been so throughout course. UA with new candida, no bacteria.     - Continue lactulose 15g bid, Rifaximin 550mg bid, thiamine 100mg  - Titrate bowel regimen to 3- 4 BMs per day  - Neurology following peripherally, appreciate recommendations  - Continuous EEG monitoring did not demonstrate seizure activity, more c/w metabolic encephalopathy showing generalized periodic discharges, no changes to AED's at this time  - Paracentesis labs negative for SBP (WBC 53, PMN 64%), however, obtained after starting CTX (initially started when UA was drawn and concerns for bacterial UTI)  - Continue Fluconazole 200 mg IV  - Ceftriaxone 2 g qd x 5 total days discontinued. Switched to Ciprofloxacin 500mg PO bid (will continue empiric SBP tx despite negative labs because rocephin given prior to paracentesis)  - Plan for SNF placement ; awaiting bed availability and accommodations for ASL interpretor.

## 2022-08-03 NOTE — PLAN OF CARE
08/03/22 1353   Post-Acute Status   Post-Acute Authorization Placement     Patient expected to dc to LTAC post dc. SW awaiting a call from patient sister in regard to LTAC  preference. Patient /family would like to admit to [facility] post dc.        Anjana Guzmán LMSW  [facility]   [id number]

## 2022-08-03 NOTE — ASSESSMENT & PLAN NOTE
Candiduria  Concerns for SBP    Patient did have episode of seizures in MICU, now on onfi and keppra, no signs of hepatic encephalopathy, negative for asterixis. Ammonia wnl. Patient acutely altered and is unable to respond to basic questions, though appears to exhibit understanding.     Etiology unclear. Concerns for UTI, new seizure, SBP. Hyponatremic, though has been so throughout course. UA with new candida, no bacteria.     - Continue lactulose 15g bid, Rifaximin 550mg bid, thiamine 100mg  - Titrate bowel regimen to 3- 4 BMs per day  - Neurology following peripherally, appreciate recommendations  - Continuous EEG monitoring did not demonstrate seizure activity, more c/w metabolic encephalopathy showing generalized periodic discharges, no changes to AED's at this time  - Paracentesis labs negative for SBP (WBC 53, PMN 64%), however, obtained after starting CTX (initially started when UA was drawn and concerns for bacterial UTI)  - Continue Fluconazole 200 mg for 14 day course for candida UTI  - S/P Ciprofloxacin course for SBP coverage x 5 days   - Potential component of hospital delirium (patient appears to be more active/responsive when  in room in afternoon)  - Plan for SNF vs LTAC placement ; awaiting bed availability and accommodations for ASL interpretor.

## 2022-08-03 NOTE — PROGRESS NOTES
[facility] - [facility] ([facility]-)  Adult Nutrition  Consult Note    SUMMARY     Recommendations    1. Continue current Low Na, Dysphagia soft diet (fluid restriction per MD).  2. RD to monitor & follow-up.    Goals: Meet % EEN, EPN by RD f/u date  Nutrition Goal Status: goal met  Communication of RD Recs: reviewed with RN    Assessment and Plan    Nutrition Problem:  Inadequate oral intake     Related to (etiology):   Inability to consume sufficient energy     Signs and Symptoms (as evidenced by):   NPO     Interventions(treatment strategy):  Collaboration of nutrition care w/ other providers     Nutrition Diagnosis Status:   Resolved    Malnutrition Assessment    Orbital Region (Subcutaneous Fat Loss): well nourished  Upper Arm Region (Subcutaneous Fat Loss): well nourished  Thoracic and Lumbar Region: well nourished   Epps Region (Muscle Loss): mild depletion  Clavicle Bone Region (Muscle Loss): mild depletion  Clavicle and Acromion Bone Region (Muscle Loss): mild depletion  Anterior Thigh Region (Muscle Loss): mild depletion   Edema (Fluid Accumulation): 3-->moderate     Reason for Assessment    Reason For Assessment: RD follow-up  Diagnosis: liver disease (anasarca)  Relevant Medical History: Alcoholic Cirrhosis, obesity, deaf,HLD, HTN  Interdisciplinary Rounds: did not attend    General Information Comments: Per RN documentation, pt continues to tolerate diet w/ 50-75% PO intake. UBW: 180# per chart review. NFPE complete 7/7 - pt nourished w/ no indicators of malnutrition.   Nutrition Discharge Planning: Pending clinical course    Nutrition/Diet History    Patient Reported Diet/Restrictions/Preferences: general  Spiritual, Cultural Beliefs, Mandaeism Practices, Values that Affect Care: no  Food Allergies: NKFA  Factors Affecting Nutritional Intake: None identified at this time    Anthropometrics    Temp: 98.2 °F (36.8 °C)  Height Method: Stated  Height: 5' (152.4 cm)  Height (inches): 60  in  Weight Method: Bed Scale  Weight: 81.3 kg (179 lb 3.7 oz)  Weight (lb): 179.24 lb  Ideal Body Weight (IBW), Female: 100 lb  % Ideal Body Weight, Female (lb): 179.24 %  BMI (Calculated): 35  BMI Grade: 35 - 39.9 - obesity - grade II    Lab/Procedures/Meds    Pertinent Labs Reviewed: reviewed  Pertinent Labs Comments: Na 133, GFR 45.1  Pertinent Medications Reviewed: reviewed  Pertinent Medications Comments: Folic acid, Thiamine, Lactulose    Estimated/Assessed Needs    Weight Used For Calorie Calculations: 81.3 kg (179 lb 3.7 oz)     Energy Calorie Requirements (kcal): 1524 kcal/d  Energy Need Method: Wilkes-St Jeor (1.2 PAL)     Protein Requirements: 81 g/d (1 g/kg)  Weight Used For Protein Calculations: 81.3 kg (179 lb 3.7 oz)     Fluid Requirements (mL): 1500 per MD  RDA Method (mL): 1524    Nutrition Prescription Ordered    Current Diet Order: Low Na, Dysphagia soft  Nutrition Order Comments: 1500 mL FR    Evaluation of Received Nutrient/Fluid Intake    I/O: -19.6L since 7/20    Comments: LBM: 8/1    Tolerance: tolerating    Nutrition Risk    Level of Risk/Frequency of Follow-up: low (one time per week)     Monitor and Evaluation    Food and Nutrient Intake: energy intake, food and beverage intake, enteral nutrition intake  Food and Nutrient Adminstration: diet order, enteral and parenteral nutrition administration  Knowledge/Beliefs/Attitudes: food and nutrition knowledge/skill  Physical Activity and Function: nutrition-related ADLs and IADLs  Anthropometric Measurements: weight, weight change  Biochemical Data, Medical Tests and Procedures: lipid profile, inflammatory profile, electrolyte and renal panel, gastrointestinal profile, glucose/endocrine profile  Nutrition-Focused Physical Findings: overall appearance     Nutrition Follow-Up    RD Follow-up?: Yes

## 2022-08-03 NOTE — PROGRESS NOTES
[facility] - [facility] ([place] [address])  [facility] Medicine  Progress Note    Patient Name: Aleyda Floyd  MRN: [id number]  Patient Class: IP- Inpatient   Admission Date: 6/27/2022  Length of Stay: 36 days  Attending Physician: Jessenia Foster MD  Primary Care Provider: Elvie Fernandes MD        Subjective:     Principal Problem:Acute encephalopathy        HPI:  This is a 67 year old deaf lady with alcoholic cirrhosis, and hypertension who presented with generalized swelling.  used to obtain history. Patient states that she had the swelling since her diagnosis of cirrhosis in February. Patient states that 2 days ago, she was able to ambulate but with some difficulty due to the swelling but now  worsening to the point where she is unable to ambulate by herself. Of note, patient had a recent EGD performed earlier last week where they had issues with her IV resulting is significant bruising. Patient also notes that whenever she scratches her skin with her nails, she notices clear fluid coming from the cuts. Patient denies fever, chills, cough, hemoptysis, nausea, vomiting, abdominal pain, yellowing of skin, constipation, dysuria, hematuria, and black/ bloody stools. Patient has baseline loose stools from her lactulose.  She reports compliance with all medications including her lactulose and Lasix. Patient and family declined any confusion or altered mental status. She denies any recent alcohol use with last use in February when she was diagnosed with alcoholic cirrhosis. Patient states that she used to weigh around 170s which she believes is her dry weight and is currently in the 200s.     Upon chart review, patient had an echo at an OSH on 2/25/22 which showed EF 60-65% and some diastolic dysfunction.    In the ED, patient was found to have Hgb of 8.5 and platelets of 113 around baseline. Patient had a UA which showed 1+ leuks and many bacteria. Patient did not complain of dysuria.        Overview/Hospital Course:  Admitted for decompensated cirrhosis and anasarca. Began diuresing with IV Lasix and spironolactone. Paracentesis of -4.2L; ruled out SBP. Pt stabilized for few days and ready to be dc'ed when she had a change in mental status. Found pt unresponsive with fixed gaze and no pupil response. Code stroke called, CT neg for stroke. Eeg showed seizure-like activity. Pt transitioned to MICU for higher level of care. On step down, had a UTI and PITO, both of which were treated. She received a repeat paracentesis with 7.5L removed. Her creatinine started improve with administration of albumin and holding diuretics. Diuretics restarted and transitioned to Lasix 40mg PO and Aldactone 100mg PO. Patient started to have mild difficulty with mentation again with concerns for recurring UTI. Repeat UA indicative of recurring UTI, she was started on IV 1g Rocephin. Further UCX positive for candida albicans; she was started on 200mg fluconazole on 7/30. Paracentesis labs negative for SBP but continued rocephin for empiric treatment of SBP because paracentesis was performed after giving rocephin.. Rocephin 1g discontinued and transitioned to ciprofloxacin 500mg PO bid for empiric SBP treatment. Patient improving on fluconazole, EEG negative for epileptiform activity.    Dispo to [facility] or [facility] and follow up with hepatology, neuro.      Interval History: AF HDS NAEON. Patient remains weak and signs minimally, though tracks with eyes, smiles back, waves. ASL  used.    Review of Systems   Constitutional:  Negative for appetite change, chills and fever.   HENT:  Negative for rhinorrhea, sore throat and trouble swallowing.    Eyes:  Negative for pain, discharge and visual disturbance.   Respiratory:  Negative for cough, chest tightness and shortness of breath.    Cardiovascular:  Negative for chest pain, palpitations and leg swelling.   Gastrointestinal:  Negative for abdominal distention,  abdominal pain, constipation, diarrhea, nausea and vomiting.   Genitourinary:  Negative for difficulty urinating, dysuria and hematuria.   Musculoskeletal:  Negative for arthralgias, joint swelling and myalgias.   Skin:  Negative for color change and pallor.   Neurological:  Negative for dizziness, tremors, light-headedness and headaches.   Psychiatric/Behavioral:  Negative for agitation and confusion.    Objective:     Vital Signs (Most Recent):  Temp: 98.2 °F (36.8 °C) (08/03/22 0400)  Pulse: 78 (08/03/22 0400)  Resp: 19 (08/03/22 0400)  BP: (!) 105/52 (08/03/22 0400)  SpO2: 96 % (08/03/22 0400)   Vital Signs (24h Range):  Temp:  [97.5 °F (36.4 °C)-98.2 °F (36.8 °C)] 98.2 °F (36.8 °C)  Pulse:  [78-79] 78  Resp:  [18-19] 19  SpO2:  [96 %-100 %] 96 %  BP: (105-114)/(52-56) 105/52     Weight: 81.3 kg (179 lb 3.7 oz)  Body mass index is 35 kg/m².    Intake/Output Summary (Last 24 hours) at 8/3/2022 1432  Last data filed at 8/3/2022 0635  Gross per 24 hour   Intake 200 ml   Output 1100 ml   Net -900 ml      Physical Exam  Constitutional:       General: She is not in acute distress.     Appearance: Normal appearance. She is not ill-appearing.   HENT:      Head: Normocephalic and atraumatic.      Nose: No congestion.      Mouth/Throat:      Mouth: Mucous membranes are moist.   Eyes:      General:         Right eye: No discharge.         Left eye: No discharge.      Extraocular Movements: Extraocular movements intact.   Cardiovascular:      Rate and Rhythm: Normal rate and regular rhythm.      Pulses: Normal pulses.      Heart sounds: Murmur (systolic flow) heard.     No gallop.   Pulmonary:      Effort: Pulmonary effort is normal. No respiratory distress.      Breath sounds: Normal breath sounds.   Abdominal:      General: There is distension.      Tenderness: There is no abdominal tenderness. There is no guarding or rebound.   Musculoskeletal:      Cervical back: Normal range of motion.      Right lower leg: Edema (3+)  present.      Left lower leg: Edema (3+) present.   Skin:     Coloration: Skin is not jaundiced or pale.      Findings: Bruising (bilateral arms) present.   Neurological:      General: No focal deficit present.      Mental Status: She is alert and oriented to person, place, and time.      Cranial Nerves: No cranial nerve deficit.   Psychiatric:         Mood and Affect: Mood normal.         Behavior: Behavior normal.       Significant Labs: All pertinent labs within the past 24 hours have been reviewed.    Significant Imaging: I have reviewed all pertinent imaging results/findings within the past 24 hours.      Assessment/Plan:      * Acute encephalopathy  Candiduria  Concerns for SBP    Patient did have episode of seizures in MICU, now on onfi and keppra, no signs of hepatic encephalopathy, negative for asterixis. Ammonia wnl. Patient acutely altered and is unable to respond to basic questions, though appears to exhibit understanding.     Etiology unclear. Concerns for UTI, new seizure, SBP. Hyponatremic, though has been so throughout course. UA with new candida, no bacteria.     - Continue lactulose 15g bid, Rifaximin 550mg bid, thiamine 100mg  - Titrate bowel regimen to 3- 4 BMs per day  - Neurology following peripherally, appreciate recommendations  - Continuous EEG monitoring did not demonstrate seizure activity, more c/w metabolic encephalopathy showing generalized periodic discharges, no changes to AED's at this time  - Paracentesis labs negative for SBP (WBC 53, PMN 64%), however, obtained after starting CTX (initially started when UA was drawn and concerns for bacterial UTI)  - Continue Fluconazole 200 mg for 14 day course for candida UTI  - S/P Ciprofloxacin course for SBP coverage x 5 days   - Potential component of hospital delirium (patient appears to be more active/responsive when  in room in afternoon)  - Plan for SNF vs LTAC placement ; awaiting bed availability and accommodations for ASL  interpretor.      PITO (acute kidney injury)  Presenting with PITO with new sCr 1.6 (baseline sCr 0.9-1.1).  DDx: Decreased PO intake likely resulting in pre-renal etiology    - Per hepatology recommendation, Lasix 40 PO BID + Aldactone 100 daily.  - Gentle trial of IVF; encourage PO intake  - Trend sCr  - Strict I&Os  - Avoid nephrotoxic agents  - Renally adjust medications      Anasarca  67 yoF with alcoholic liver cirrhosis and hypertension admitted for generalized anasarca. Patient's albumin on admission was 1.9. Likely edema related to poor intravascular oncotic pressure 2/2 alcoholic cirrhosis. Patient adherent to home medication. Given history and exam, patient would benefit from diuresis. Dry weight reported as 170s. Patient is currently 200 lbs 6.4oz (down from 212 lbs).     - Strict I/Os  - Daily weights  - Low sodium diet with 1500 ml fluid restriction  - Plan for f/u with her hepatologist on d/c  - discontinued 25% bottle of 25g Albumin     Anemia, chronic disease  Patient with Hgb of 8.5 on admit. Upon review of care everywhere, patient had Hgb of 8.4 in all of 2022. Likely due to chronic disease and stable.  One episode of Hgb 6.9 on 7/18. Ordered 1 U pRBC. Received written consent from patient and  prior to transfusion. Received 1U prbc for Hgb 6.3.     - Hgb stable, no signs of new bleed  - Per GI recommendation, if anemia persists and not responding to transfusion then inpatient colonoscopy recommended for further evaluation; otherwise outpatient  - Pantoprazole 40 PO qd    Gram-positive cocci in clusters  Noted on 1 set of aer/anae cultures from 7/10/22. 2nd set so far negative. No clear sign of infection. Could likely be contaminant.  Uctx also from enterococcus species  - s/p Ampicillin completed 07/17          UTI (urinary tract infection)  Positive UA from new kim placed 7/10/22. Hx of ESBL E coli in Feb 2022 at OSH  Uctx growing enterococcus, s/p treatment with ampicillin course  07/17.  UA with candida 07/25.    - Rest per Acute Encephalopathy A/P    Hypomagnesemia  Monitor daily and replete prn      Debility  - Pt non-mobile for some time in hospital,  PT/OT recs for SNF  - Pt advised to move around in bed but to only get up with help        Thrombocytopenia   Plt 65, PT 15.6, INR 1.5. Improved after Dose of Vitamin K.  Patient underwent paracentesis and there was large volume leaking from site controlled with dermabond.    - continue to monitor platelet count and inspect abdomen for sites of bleeding.  - No bloody bowel movements noted.     Alcoholic cirrhosis of liver with ascites  Patient was diagnosed in February. Previous history of chronic alcohol abuse and states she drank about 4-5 glasses of wine per night for over 20 years. Patient is seeing hepatology outpatient. Labs shown in care everywhere.     MELD-Na score: 17 at 7/31/2022  3:25 AM  MELD score: 15 at 7/31/2022  3:25 AM  Calculated from:  Serum Creatinine: 1.1 mg/dL at 7/31/2022  3:25 AM  Serum Sodium: 134 mmol/L at 7/31/2022  3:25 AM  Total Bilirubin: 1.6 mg/dL at 7/31/2022  3:25 AM  INR(ratio): 1.7 at 7/31/2022  3:25 AM  Age: 67 years    - Continue lactulose titrate to BM, thiamine, and folate  - Hepatology consulted; appreciate recommendations - outpatient follow up  - Repeat IR paracentesis completed, 7.5L  - Continue to monitor for signs of liver failure  - F/u daily CMPs    Primary hypertension  Patient was discontinued from losartan per hepatologist.   - continue to monitor and can add when clinically indicated  - pt removed from tele 7/16    Deaf- written communication  - ASL  used for interaction as well as written communication    VTE Risk Mitigation (From admission, onward)         Ordered     heparin (porcine) injection 5,000 Units  2 times daily         07/29/22 1052     IP VTE HIGH RISK PATIENT  Once         06/28/22 7674     Place sequential compression device  Until discontinued         06/28/22  0419     Place sequential compression device  Until discontinued         06/28/22 0415                Discharge Planning   REMA: 8/4/2022     Code Status: Full Code   Is the patient medically ready for discharge?: Yes    Reason for patient still in hospital (select all that apply): Treatment  Discharge Plan A: Skilled Nursing Facility   Discharge Delays: None known at this time              Nate Ellis MD  Department of Hospital Medicine   [facility] - Intensive Care ([place]-14)

## 2022-08-03 NOTE — NURSING
Patient nonverbal and deaf-- stratus used to sign with patient. VSS. Afebrile. 1 BM during shift, linens changed. Resting comfortably in bed. No complaints of pain. Spouse at bedside. Will hand off report to on coming RN.

## 2022-08-03 NOTE — SUBJECTIVE & OBJECTIVE
Interval History: AF HDS NAEON. Patient remains weak and signs minimally, though tracks with eyes, smiles back, waves. ASL  used.    Review of Systems   Constitutional:  Negative for appetite change, chills and fever.   HENT:  Negative for rhinorrhea, sore throat and trouble swallowing.    Eyes:  Negative for pain, discharge and visual disturbance.   Respiratory:  Negative for cough, chest tightness and shortness of breath.    Cardiovascular:  Negative for chest pain, palpitations and leg swelling.   Gastrointestinal:  Negative for abdominal distention, abdominal pain, constipation, diarrhea, nausea and vomiting.   Genitourinary:  Negative for difficulty urinating, dysuria and hematuria.   Musculoskeletal:  Negative for arthralgias, joint swelling and myalgias.   Skin:  Negative for color change and pallor.   Neurological:  Negative for dizziness, tremors, light-headedness and headaches.   Psychiatric/Behavioral:  Negative for agitation and confusion.    Objective:     Vital Signs (Most Recent):  Temp: 98.2 °F (36.8 °C) (08/03/22 0400)  Pulse: 78 (08/03/22 0400)  Resp: 19 (08/03/22 0400)  BP: (!) 105/52 (08/03/22 0400)  SpO2: 96 % (08/03/22 0400)   Vital Signs (24h Range):  Temp:  [97.5 °F (36.4 °C)-98.2 °F (36.8 °C)] 98.2 °F (36.8 °C)  Pulse:  [78-79] 78  Resp:  [18-19] 19  SpO2:  [96 %-100 %] 96 %  BP: (105-114)/(52-56) 105/52     Weight: 81.3 kg (179 lb 3.7 oz)  Body mass index is 35 kg/m².    Intake/Output Summary (Last 24 hours) at 8/3/2022 1432  Last data filed at 8/3/2022 0635  Gross per 24 hour   Intake 200 ml   Output 1100 ml   Net -900 ml      Physical Exam  Constitutional:       General: She is not in acute distress.     Appearance: Normal appearance. She is not ill-appearing.   HENT:      Head: Normocephalic and atraumatic.      Nose: No congestion.      Mouth/Throat:      Mouth: Mucous membranes are moist.   Eyes:      General:         Right eye: No discharge.         Left eye: No discharge.       Extraocular Movements: Extraocular movements intact.   Cardiovascular:      Rate and Rhythm: Normal rate and regular rhythm.      Pulses: Normal pulses.      Heart sounds: Murmur (systolic flow) heard.     No gallop.   Pulmonary:      Effort: Pulmonary effort is normal. No respiratory distress.      Breath sounds: Normal breath sounds.   Abdominal:      General: There is distension.      Tenderness: There is no abdominal tenderness. There is no guarding or rebound.   Musculoskeletal:      Cervical back: Normal range of motion.      Right lower leg: Edema (3+) present.      Left lower leg: Edema (3+) present.   Skin:     Coloration: Skin is not jaundiced or pale.      Findings: Bruising (bilateral arms) present.   Neurological:      General: No focal deficit present.      Mental Status: She is alert and oriented to person, place, and time.      Cranial Nerves: No cranial nerve deficit.   Psychiatric:         Mood and Affect: Mood normal.         Behavior: Behavior normal.       Significant Labs: All pertinent labs within the past 24 hours have been reviewed.    Significant Imaging: I have reviewed all pertinent imaging results/findings within the past 24 hours.

## 2022-08-03 NOTE — NURSING
Patient alert and oriented x4. Denies any pain or or discomfort. Denies any respiratory distress. No s/s of distress noted. Safety measure in place. Bed in lowest position and call light within reach.

## 2022-08-03 NOTE — ASSESSMENT & PLAN NOTE
Presenting with PITO with new sCr 1.6 (baseline sCr 0.9-1.1).  DDx: Decreased PO intake likely resulting in pre-renal etiology    - Per hepatology recommendation, Lasix 40 PO BID + Aldactone 100 daily.  - Gentle trial of IVF; encourage PO intake  - Trend sCr  - Strict I&Os  - Avoid nephrotoxic agents  - Renally adjust medications

## 2022-08-04 NOTE — NURSING
Patient alert and oriented x4. Denies any pain or or discomfort. Denies any respiratory distress. Safety measure in place. Bed in lowest position and call light within reach.

## 2022-08-04 NOTE — PT/OT/SLP PROGRESS
Physical Therapy   Progress Note    Patient Name:  Aleyda Floyd  MRN: [id number]    Admit Date: 6/27/2022  Admitting Diagnosis:  Acute encephalopathy  Length of Stay: 37 days  Recent Surgery: * No surgery found *      Recommendations:     Discharge Recommendations: Skilled Nursing Facility   Equipment recommendations: rolling walker, transfer tub bench and wheelchair  Barriers to discharge: Increased level of skilled assistance required and Fall risk     Assessment:     Aleyda Floyd is a 67 y.o. female admitted to [facility] on 6/27/2022 with medical diagnosis of Acute encephalopathy. Pt presents with weakness, impaired endurance, impaired self care skills, impaired functional mobility, gait instability, impaired balance, decreased coordination, decreased upper extremity function, pain, edema, impaired coordination. Pt is progressing towards goals, but has not yet reached prior level of function. Pt continues to required significant amount of assistance to perform sit <> stand t/f, with bilateral knees blocked. Aleyda Floyd would benefit from continued acute PT intervention to improve quality of life, focus on recovery of impairments, provide patient/caregiver education, reduce fall risk, and maximize (I) and safety with functional mobility. Once medically stable, recommending pt discharge to Skilled Nursing Facility .      Rehab Prognosis: Good    Plan:     During this hospitalization, patient to be seen 3 x/week to address the identified rehab impairments via gait training, therapeutic activities, therapeutic exercises, neuromuscular re-education and progress towards stated goals.     Plan of Care Expires:  08/12/22  Plan of Care reviewed with: patient and spouse    This plan of care has been discussed with the patient/caregiver, who was included in its development and is in agreement with the identified goals and treatment plan.     Subjective     Communicated with RN prior to session.  Patient found  supine upon PT entry to room, agreeable to therapy session. Pt's  present during session.    Patient/Family Comments/goals:  states she has not been signing with him as frequently as she was during beginning of hospital admission    Pain/Comfort:  · Pain Rating 1:  (pt did not rate; grimacing while performing bed mobility)  · Pain Rating Post-Intervention 1:  (pt did not rate)    Patients cultural, spiritual, Orthodoxy conflicts given the current situation: None identified     Objective:   OT present for cotreat due to pt's multiple medical comorbidities and functional/cognition deficits requiring two skilled therapists to appropriately progress pt's musculoskeletal strength, neuromuscular control, and endurance while taking into consideration medical acuity and pt safety.    Patient found with: pulse ox (continuous)    General Precautions: Standard, contact, fall, hearing impaired   Orthopedic Precautions:N/A   Braces: N/A   Oxygen Device: room air    Cognition:   Pt is Alert during session.    Therapist provided skilled verbal and tactile cueing to facilitate the following functional mobility tasks. Listed tasks are focused on recovery of impairments and improving pt's independence and efficiency with bed mobility, transfers and ambulation as able.     Bed Mobility:  · Supine > Sit: Moderate Assistance  · Sit > Supine: Moderate Assistancex2    Transfers:   · Sit <> Stand Transfer: Maximum Assistancex2 from eob   · Trial 1: with RW; increased margot knee flexion  · Trial 2: no AD; blocked margot knees with assistance posteriorly to promote upright standing  · Trial 3 &4: no AD; blocked margot knees with assistance posteriorly to promote upright standing; hand on chest to assist with forward flexion compensation  · Bed <> Chair: Activity did not occur 2/2 safety                 Gait:  Deferred for safety    Balance:  · Dynamic Sitting: FAIR+: Maintains balance through MINIMAL excursions of active trunk motion    · Increased L lateral trunk shift; ModA to correct for this  · Standing:  · Static: 0: Needs MAXIMAL assist to maintain    · Dynamic: 0: N/A    Outcome Measure: AM-PAC 6 CLICK MOBILITY  Total Score:10     Patient/Caregiver Education and Additional Therapeutic Activities/Exercises   Performed supine PROM to assist with stretching out hip flexors for ~3 minutes    ASL video  used with assistance from ; pt is struggling to sign back due to increased edema in R hand. Encouraged  to practice signing with patient not only to improve communication but to assist with coordination and decrease swelling.    Provided pt/caregiver education regarding:    PT POC and goals for therapy    Safety with mobility and fall risk    Safe management of AD as needed    Energy conservation techniques    Instruction on use of call button and importance of calling nursing staff for assistance with mobility     Patient/caregiver able to verbalize understanding; will follow-up with pt/caregiver during current admit for additional questions/concerns within scope of practice.     White board updated.     Patient left supine with OT, RN, transport in room.    Goals:     Multidisciplinary Problems     Physical Therapy Goals        Problem: Physical Therapy    Goal Priority Disciplines Outcome Goal Variances Interventions   Physical Therapy Goal     PT, PT/OT Ongoing, Progressing     Description: Goals to be met by: 22    Patient will increase functional independence with mobility by performin. Supine to sit with minimal Assistance - not met  2. Sit to stand transfer with minimal assistance- not met  3. Bed to chair transfer with minimal assistance using LRAD - not met  4. Gait  x 30 feet with minimal  Assistance using LRAD - not met  5. Ascend/Descend 6 inch curb step with Contact Guard Assistance using LRAD - not met  6. Lower extremity exercise program x30 reps per handout, with independence - not met                      Time Tracking:       PT Received On: 08/04/22  PT Start Time: 1305     PT Stop Time: 1355  PT Total Time (min): 50 min     Billable Minutes: Therapeutic Activity 30 and Neuromuscular Re-education 20    08/04/2022

## 2022-08-04 NOTE — SUBJECTIVE & OBJECTIVE
Interval History: GABBIEON, AF. Patient notes soreness in her right shoulder and arm. Otherwise she denies chest pain, sob, or palpitations. She is able to communicate via ASL interpretor.    Review of Systems   Constitutional:  Negative for appetite change, chills and fever.   HENT:  Negative for rhinorrhea, sore throat and trouble swallowing.    Eyes:  Negative for pain, discharge and visual disturbance.   Respiratory:  Negative for cough, chest tightness and shortness of breath.    Cardiovascular:  Negative for chest pain, palpitations and leg swelling.   Gastrointestinal:  Positive for abdominal distention. Negative for abdominal pain, constipation, diarrhea, nausea and vomiting.   Genitourinary:  Negative for difficulty urinating, dysuria and hematuria.   Musculoskeletal:  Negative for arthralgias, joint swelling and myalgias.        Shoulder soreness.    Skin:  Negative for color change and pallor.   Neurological:  Negative for dizziness, tremors, light-headedness and headaches.   Psychiatric/Behavioral:  Negative for agitation and confusion.    Objective:     Vital Signs (Most Recent):  Temp: 97.8 °F (36.6 °C) (08/04/22 1222)  Pulse: 81 (08/04/22 1222)  Resp: 16 (08/04/22 1222)  BP: (!) 121/58 (08/04/22 1222)  SpO2: 97 % (08/04/22 1222)   Vital Signs (24h Range):  Temp:  [97.7 °F (36.5 °C)-98.4 °F (36.9 °C)] 97.8 °F (36.6 °C)  Pulse:  [76-81] 81  Resp:  [16-19] 16  SpO2:  [97 %-98 %] 97 %  BP: (109-121)/(52-58) 121/58     Weight: 81.7 kg (180 lb 1.9 oz)  Body mass index is 35.18 kg/m².    Intake/Output Summary (Last 24 hours) at 8/4/2022 1511  Last data filed at 8/4/2022 0637  Gross per 24 hour   Intake 100 ml   Output 950 ml   Net -850 ml      Physical Exam  Constitutional:       General: She is not in acute distress.     Appearance: Normal appearance. She is not ill-appearing.   HENT:      Head: Normocephalic and atraumatic.      Nose: No congestion.      Mouth/Throat:      Mouth: Mucous membranes are moist.    Eyes:      General:         Right eye: No discharge.         Left eye: No discharge.      Extraocular Movements: Extraocular movements intact.   Cardiovascular:      Rate and Rhythm: Normal rate and regular rhythm.      Pulses: Normal pulses.      Heart sounds: Murmur (systolic flow) heard.     No gallop.   Pulmonary:      Effort: Pulmonary effort is normal. No respiratory distress.      Breath sounds: Normal breath sounds.   Abdominal:      General: There is distension.      Tenderness: There is no abdominal tenderness. There is no guarding or rebound.   Musculoskeletal:      Cervical back: Normal range of motion.      Right lower leg: Edema (3+) present.      Left lower leg: Edema (3+) present.      Comments: Bilateral 3+ edema has improved significantly to 1+.   Skin:     Coloration: Skin is not jaundiced or pale.      Findings: Bruising (bilateral arms) present.   Neurological:      General: No focal deficit present.      Mental Status: She is alert and oriented to person, place, and time.      Cranial Nerves: No cranial nerve deficit.   Psychiatric:         Mood and Affect: Mood normal.         Behavior: Behavior normal.       Significant Labs: All pertinent labs within the past 24 hours have been reviewed.  Recent Lab Results         08/04/22  0246        Albumin 2.1       Alkaline Phosphatase 70       ALT 18       Anion Gap 7       AST 30       Baso # 0.06       Basophil % 0.9       BILIRUBIN TOTAL 1.7  Comment: For infants and newborns, interpretation of results should be based  on gestational age, weight and in agreement with clinical  observations.    Premature Infant recommended reference ranges:  Up to 24 hours.............<8.0 mg/dL  Up to 48 hours............<12.0 mg/dL  3-5 days..................<15.0 mg/dL  6-29 days.................<15.0 mg/dL         BUN 18       Calcium 9.1       Chloride 98       CO2 27       Creatinine 1.4       Differential Method Automated       eGFR 41.2       Eos # 0.4        Eosinophil % 6.2       Glucose 116       Gran # (ANC) 3.3       Gran % 48.0       Hematocrit 24.1       Hemoglobin 7.9       Immature Grans (Abs) 0.03  Comment: Mild elevation in immature granulocytes is non specific and   can be seen in a variety of conditions including stress response,   acute inflammation, trauma and pregnancy. Correlation with other   laboratory and clinical findings is essential.         Immature Granulocytes 0.4       INR 1.7  Comment: Coumadin Therapy:  2.0 - 3.0 for INR for all indicators except mechanical heart valves  and antiphospholipid syndromes which should use 2.5 - 3.5.         Lymph # 1.9       Lymph % 28.0       Magnesium 1.7       MCH 31.2       MCHC 32.8       MCV 95       Mono # 1.1       Mono % 16.5       MPV 9.8       nRBC 0       Phosphorus 3.7       Platelets 96       Potassium 4.3       PROTEIN TOTAL 5.5       Protime 16.8       RBC 2.53       RDW 17.9       Sodium 132       WBC 6.89               Significant Imaging: I have reviewed all pertinent imaging results/findings within the past 24 hours.

## 2022-08-04 NOTE — PROGRESS NOTES
[facility] - Intensive Care ([place] [address])  [facility] Medicine  Progress Note    Patient Name: Aleyda Floyd  MRN: [id number]  Patient Class: IP- Inpatient   Admission Date: 6/27/2022  Length of Stay: 37 days  Attending Physician: Tena Brizuela MD  Primary Care Provider: Elvie Fernandes MD        Subjective:     Principal Problem:Acute encephalopathy        HPI:  This is a 67 year old deaf lady with alcoholic cirrhosis, and hypertension who presented with generalized swelling.  used to obtain history. Patient states that she had the swelling since her diagnosis of cirrhosis in February. Patient states that 2 days ago, she was able to ambulate but with some difficulty due to the swelling but now  worsening to the point where she is unable to ambulate by herself. Of note, patient had a recent EGD performed earlier last week where they had issues with her IV resulting is significant bruising. Patient also notes that whenever she scratches her skin with her nails, she notices clear fluid coming from the cuts. Patient denies fever, chills, cough, hemoptysis, nausea, vomiting, abdominal pain, yellowing of skin, constipation, dysuria, hematuria, and black/ bloody stools. Patient has baseline loose stools from her lactulose.  She reports compliance with all medications including her lactulose and Lasix. Patient and family declined any confusion or altered mental status. She denies any recent alcohol use with last use in February when she was diagnosed with alcoholic cirrhosis. Patient states that she used to weigh around 170s which she believes is her dry weight and is currently in the 200s.     Upon chart review, patient had an echo at an OSH on 2/25/22 which showed EF 60-65% and some diastolic dysfunction.    In the ED, patient was found to have Hgb of 8.5 and platelets of 113 around baseline. Patient had a UA which showed 1+ leuks and many bacteria. Patient did not complain of dysuria.        Overview/Hospital Course:  Admitted for decompensated cirrhosis and anasarca. Began diuresing with IV Lasix and spironolactone. Paracentesis of -4.2L; ruled out SBP. Pt stabilized for few days and ready to be dc'ed when she had a change in mental status. Found pt unresponsive with fixed gaze and no pupil response. Code stroke called, CT neg for stroke. Eeg showed seizure-like activity. Pt transitioned to MICU for higher level of care. On step down, had a UTI and PITO, both of which were treated. She received a repeat paracentesis with 7.5L removed. Her creatinine started improve with administration of albumin and holding diuretics. Diuretics restarted and transitioned to Lasix 40mg PO and Aldactone 100mg PO. Patient started to have mild difficulty with mentation again with concerns for recurring UTI. Repeat UA indicative of recurring UTI, she was started on IV 1g Rocephin. Further UCX positive for candida albicans; she was started on 200mg fluconazole on 7/30. Paracentesis labs negative for SBP but continued rocephin for empiric treatment of SBP because paracentesis was performed after giving rocephin. Rocephin 1g discontinued and transitioned to ciprofloxacin 500mg PO bid for empiric SBP treatment. Patient improving on fluconazole, EEG negative for epileptiform activity.Patient had soreness in right upper extremity and U/S dopper was negative for any signs of DVT.     Dispo to SNF/ LTAC and follow up with hepatology, neurology.      Interval History: NAEON, AF. Patient notes soreness in her right shoulder and arm. Otherwise she denies chest pain, sob, or palpitations. She is able to communicate via ASL interpretor.    Review of Systems   Constitutional:  Negative for appetite change, chills and fever.   HENT:  Negative for rhinorrhea, sore throat and trouble swallowing.    Eyes:  Negative for pain, discharge and visual disturbance.   Respiratory:  Negative for cough, chest tightness and shortness of breath.     Cardiovascular:  Negative for chest pain, palpitations and leg swelling.   Gastrointestinal:  Positive for abdominal distention. Negative for abdominal pain, constipation, diarrhea, nausea and vomiting.   Genitourinary:  Negative for difficulty urinating, dysuria and hematuria.   Musculoskeletal:  Negative for arthralgias, joint swelling and myalgias.        Shoulder soreness.    Skin:  Negative for color change and pallor.   Neurological:  Negative for dizziness, tremors, light-headedness and headaches.   Psychiatric/Behavioral:  Negative for agitation and confusion.    Objective:     Vital Signs (Most Recent):  Temp: 97.8 °F (36.6 °C) (08/04/22 1222)  Pulse: 81 (08/04/22 1222)  Resp: 16 (08/04/22 1222)  BP: (!) 121/58 (08/04/22 1222)  SpO2: 97 % (08/04/22 1222)   Vital Signs (24h Range):  Temp:  [97.7 °F (36.5 °C)-98.4 °F (36.9 °C)] 97.8 °F (36.6 °C)  Pulse:  [76-81] 81  Resp:  [16-19] 16  SpO2:  [97 %-98 %] 97 %  BP: (109-121)/(52-58) 121/58     Weight: 81.7 kg (180 lb 1.9 oz)  Body mass index is 35.18 kg/m².    Intake/Output Summary (Last 24 hours) at 8/4/2022 1511  Last data filed at 8/4/2022 0637  Gross per 24 hour   Intake 100 ml   Output 950 ml   Net -850 ml      Physical Exam  Constitutional:       General: She is not in acute distress.     Appearance: Normal appearance. She is not ill-appearing.   HENT:      Head: Normocephalic and atraumatic.      Nose: No congestion.      Mouth/Throat:      Mouth: Mucous membranes are moist.   Eyes:      General:         Right eye: No discharge.         Left eye: No discharge.      Extraocular Movements: Extraocular movements intact.   Cardiovascular:      Rate and Rhythm: Normal rate and regular rhythm.      Pulses: Normal pulses.      Heart sounds: Murmur (systolic flow) heard.     No gallop.   Pulmonary:      Effort: Pulmonary effort is normal. No respiratory distress.      Breath sounds: Normal breath sounds.   Abdominal:      General: There is distension.       Tenderness: There is no abdominal tenderness. There is no guarding or rebound.   Musculoskeletal:      Cervical back: Normal range of motion.      Right lower leg: Edema (3+) present.      Left lower leg: Edema (3+) present.      Comments: Bilateral 3+ edema has improved significantly to 1+.   Skin:     Coloration: Skin is not jaundiced or pale.      Findings: Bruising (bilateral arms) present.   Neurological:      General: No focal deficit present.      Mental Status: She is alert and oriented to person, place, and time.      Cranial Nerves: No cranial nerve deficit.   Psychiatric:         Mood and Affect: Mood normal.         Behavior: Behavior normal.       Significant Labs: All pertinent labs within the past 24 hours have been reviewed.  Recent Lab Results         08/04/22  0246        Albumin 2.1       Alkaline Phosphatase 70       ALT 18       Anion Gap 7       AST 30       Baso # 0.06       Basophil % 0.9       BILIRUBIN TOTAL 1.7  Comment: For infants and newborns, interpretation of results should be based  on gestational age, weight and in agreement with clinical  observations.    Premature Infant recommended reference ranges:  Up to 24 hours.............<8.0 mg/dL  Up to 48 hours............<12.0 mg/dL  3-5 days..................<15.0 mg/dL  6-29 days.................<15.0 mg/dL         BUN 18       Calcium 9.1       Chloride 98       CO2 27       Creatinine 1.4       Differential Method Automated       eGFR 41.2       Eos # 0.4       Eosinophil % 6.2       Glucose 116       Gran # (ANC) 3.3       Gran % 48.0       Hematocrit 24.1       Hemoglobin 7.9       Immature Grans (Abs) 0.03  Comment: Mild elevation in immature granulocytes is non specific and   can be seen in a variety of conditions including stress response,   acute inflammation, trauma and pregnancy. Correlation with other   laboratory and clinical findings is essential.         Immature Granulocytes 0.4       INR 1.7  Comment: Coumadin  Therapy:  2.0 - 3.0 for INR for all indicators except mechanical heart valves  and antiphospholipid syndromes which should use 2.5 - 3.5.         Lymph # 1.9       Lymph % 28.0       Magnesium 1.7       MCH 31.2       MCHC 32.8       MCV 95       Mono # 1.1       Mono % 16.5       MPV 9.8       nRBC 0       Phosphorus 3.7       Platelets 96       Potassium 4.3       PROTEIN TOTAL 5.5       Protime 16.8       RBC 2.53       RDW 17.9       Sodium 132       WBC 6.89               Significant Imaging: I have reviewed all pertinent imaging results/findings within the past 24 hours.      Assessment/Plan:      * Acute encephalopathy  Candiduria  Concerns for SBP    Patient did have episode of seizures in MICU, now on onfi and keppra, no signs of hepatic encephalopathy, negative for asterixis. Ammonia wnl. Patient acutely altered and is unable to respond to basic questions, though appears to exhibit understanding.     Etiology unclear. Concerns for UTI, new seizure, SBP. Hyponatremic, though has been so throughout course. UA with new candida, no bacteria.     - Continue lactulose 15g bid, Rifaximin 550mg bid, thiamine 100mg  - Titrate bowel regimen to 3- 4 BMs per day  - Neurology following peripherally, appreciate recommendations  - Continuous EEG monitoring did not demonstrate seizure activity, more c/w metabolic encephalopathy showing generalized periodic discharges, no changes to AED's at this time  - Paracentesis labs negative for SBP (WBC 53, PMN 64%), however, obtained after starting CTX (initially started when UA was drawn and concerns for bacterial UTI)  - Continue Fluconazole 200 mg for 14 day course for candida UTI  - S/P Ciprofloxacin course for SBP coverage x 5 days   - Potential component of hospital delirium (patient appears to be more active/responsive when  in room in afternoon)  - Plan for SNF vs LTAC placement ; awaiting bed availability and accommodations for ASL interpretor.      UTI (urinary  tract infection)  Positive UA from new kim placed 7/10/22. Hx of ESBL E coli in Feb 2022 at OSH  Uctx growing enterococcus, s/p treatment with ampicillin course 07/17.  UA with candida 07/25.    - Rest per Acute Encephalopathy A/P    PTIO (acute kidney injury)  Presenting with PITO with new sCr 1.6 (baseline sCr 0.9-1.1).  DDx: Decreased PO intake likely resulting in pre-renal etiology    - Per hepatology recommendation, Lasix 40 PO BID + Aldactone 100 daily.  - Gentle trial of IVF; encourage PO intake  - Trend sCr  - Strict I&Os  - Avoid nephrotoxic agents  - Renally adjust medications      Anemia, chronic disease  Patient with Hgb of 8.5 on admit. Upon review of care everywhere, patient had Hgb of 8.4 in all of 2022. Likely due to chronic disease and stable.  One episode of Hgb 6.9 on 7/18. Ordered 1 U pRBC. Received written consent from patient and  prior to transfusion. Received 1U prbc for Hgb 6.3.     - Hgb stable, no signs of new bleed  - Per GI recommendation, if anemia persists and not responding to transfusion then inpatient colonoscopy recommended for further evaluation; otherwise outpatient  - Pantoprazole 40 PO qd    Anasarca  67 yoF with alcoholic liver cirrhosis and hypertension admitted for generalized anasarca. Patient's albumin on admission was 1.9. Likely edema related to poor intravascular oncotic pressure 2/2 alcoholic cirrhosis. Patient adherent to home medication. Given history and exam, patient would benefit from diuresis. Dry weight reported as 170s. Patient is currently 200 lbs 6.4oz (down from 212 lbs).     - Strict I/Os  - Daily weights  - Low sodium diet with 1500 ml fluid restriction  - Plan for f/u with her hepatologist on d/c  - discontinued 25% bottle of 25g Albumin     Alcoholic cirrhosis of liver with ascites  Patient was diagnosed in February. Previous history of chronic alcohol abuse and states she drank about 4-5 glasses of wine per night for over 20 years. Patient is  seeing hepatology outpatient. Labs shown in care everywhere.     MELD-Na score: 17 at 7/31/2022  3:25 AM  MELD score: 15 at 7/31/2022  3:25 AM  Calculated from:  Serum Creatinine: 1.1 mg/dL at 7/31/2022  3:25 AM  Serum Sodium: 134 mmol/L at 7/31/2022  3:25 AM  Total Bilirubin: 1.6 mg/dL at 7/31/2022  3:25 AM  INR(ratio): 1.7 at 7/31/2022  3:25 AM  Age: 67 years    - Continue lactulose titrate to BM, thiamine, and folate  - Hepatology consulted; appreciate recommendations - outpatient follow up  - Repeat IR paracentesis completed, 7.5L  - Continue to monitor for signs of liver failure  - F/u daily CMPs    Thrombocytopenia   Plt 65, PT 15.6, INR 1.5. Improved after Dose of Vitamin K.  Patient underwent paracentesis and there was large volume leaking from site controlled with dermabond.    - continue to monitor platelet count and inspect abdomen for sites of bleeding.  - No bloody bowel movements noted.     Debility  - Pt non-mobile for some time in hospital,  PT/OT recs for SNF  - Pt advised to move around in bed but to only get up with help  - Minimal swelling and soreness in right arm and shoulder (8/04), concerns for DVT, U/S doppler upper extremity negative for DVT.         Primary hypertension  Patient was discontinued from losartan per hepatologist.   - continue to monitor and can add when clinically indicated  - pt removed from tele 7/16    Gram-positive cocci in clusters  Noted on 1 set of aer/anae cultures from 7/10/22. 2nd set so far negative. No clear sign of infection. Could likely be contaminant.  Uctx also from enterococcus species  - s/p Ampicillin completed 07/17          Hypomagnesemia  Monitor daily and replete prn      Deaf- written communication  - ASL  used for interaction as well as written communication      VTE Risk Mitigation (From admission, onward)         Ordered     heparin (porcine) injection 5,000 Units  2 times daily         07/29/22 1052     IP VTE HIGH RISK PATIENT  Once          06/28/22 0419     Place sequential compression device  Until discontinued         06/28/22 0419     Place sequential compression device  Until discontinued         06/28/22 0415                Discharge Planning   REMA: 8/4/2022     Code Status: Full Code   Is the patient medically ready for discharge?: Yes    Reason for patient still in hospital (select all that apply): Pending disposition  Discharge Plan A: Skilled Nursing Facility   Discharge Delays: None known at this time              Lele Posadas MD  Department of Hospital Medicine   [facility] - Intensive Care ([place]-14)

## 2022-08-04 NOTE — PLAN OF CARE
"  Per Linda with [facility], they are unable to accept patient due to 3x/mo paracentesis to high level of care.    2:45p  Discussed above with Dr SHAUN Ellis. She states "I don't believe she'll need more than 1x a month paracentesis, if at."    Phoned Rafy liaison with [facility] and informed her of conversation with Dr. Ellis. Rafy to speak with her medical director regarding updated information.     "

## 2022-08-04 NOTE — ASSESSMENT & PLAN NOTE
- Pt non-mobile for some time in hospital,  PT/OT recs for SNF  - Pt advised to move around in bed but to only get up with help  - Minimal swelling and soreness in right arm and shoulder (8/04), concerns for DVT, U/S doppler upper extremity negative for DVT.

## 2022-08-05 NOTE — PLAN OF CARE
Problem: Adult Inpatient Plan of Care  Goal: Plan of Care Review  Outcome: Ongoing, Progressing  Goal: Patient-Specific Goal (Individualized)  Outcome: Ongoing, Progressing  Goal: Absence of Hospital-Acquired Illness or Injury  Outcome: Ongoing, Progressing  Goal: Optimal Comfort and Wellbeing  Outcome: Ongoing, Progressing  Goal: Readiness for Transition of Care  Outcome: Ongoing, Progressing     Problem: Skin Injury Risk Increased  Goal: Skin Health and Integrity  Outcome: Ongoing, Progressing     Problem: Bariatric Environmental Safety  Goal: Safety Maintained with Care  Outcome: Ongoing, Progressing     Problem: Fall Injury Risk  Goal: Absence of Fall and Fall-Related Injury  Outcome: Ongoing, Progressing     Problem: Infection  Goal: Absence of Infection Signs and Symptoms  Outcome: Ongoing, Progressing     Problem: Fluid and Electrolyte Imbalance (Acute Kidney Injury/Impairment)  Goal: Fluid and Electrolyte Balance  Outcome: Ongoing, Progressing     Problem: Oral Intake Inadequate (Acute Kidney Injury/Impairment)  Goal: Optimal Nutrition Intake  Outcome: Ongoing, Progressing     Problem: Renal Function Impairment (Acute Kidney Injury/Impairment)  Goal: Effective Renal Function  Outcome: Ongoing, Progressing     Problem: Impaired Wound Healing  Goal: Optimal Wound Healing  Outcome: Ongoing, Progressing     Problem: Adult Inpatient Plan of Care  Goal: Plan of Care Review  8/5/2022 1642 by Orlando Keen RN  Outcome: Adequate for Care Transition  8/5/2022 1641 by Orlando Keen RN  Outcome: Ongoing, Progressing  Goal: Patient-Specific Goal (Individualized)  8/5/2022 1642 by Orlando Keen RN  Outcome: Adequate for Care Transition  8/5/2022 1641 by Orlando Keen RN  Outcome: Ongoing, Progressing  Goal: Absence of Hospital-Acquired Illness or Injury  8/5/2022 1642 by Orlando Keen RN  Outcome: Adequate for Care Transition  8/5/2022 1641 by Orlando Keen RN  Outcome: Ongoing, Progressing  Goal: Optimal Comfort  and Wellbeing  8/5/2022 1642 by Olrando Keen RN  Outcome: Adequate for Care Transition  8/5/2022 1641 by Orlando Keen RN  Outcome: Ongoing, Progressing  Goal: Readiness for Transition of Care  8/5/2022 1642 by Orlando Keen RN  Outcome: Adequate for Care Transition  8/5/2022 1641 by Orlando Keen RN  Outcome: Ongoing, Progressing     Problem: Skin Injury Risk Increased  Goal: Skin Health and Integrity  8/5/2022 1642 by Orlando Keen RN  Outcome: Adequate for Care Transition  8/5/2022 1641 by Orlando Keen RN  Outcome: Ongoing, Progressing     Problem: Bariatric Environmental Safety  Goal: Safety Maintained with Care  8/5/2022 1642 by Orlando Keen RN  Outcome: Adequate for Care Transition  8/5/2022 1641 by Orlando Keen RN  Outcome: Ongoing, Progressing     Problem: Fall Injury Risk  Goal: Absence of Fall and Fall-Related Injury  8/5/2022 1642 by Orlando Keen RN  Outcome: Adequate for Care Transition  8/5/2022 1641 by Orlando Keen RN  Outcome: Ongoing, Progressing     Problem: Infection  Goal: Absence of Infection Signs and Symptoms  8/5/2022 1642 by Orlando Keen RN  Outcome: Adequate for Care Transition  8/5/2022 1641 by Orlando Keen RN  Outcome: Ongoing, Progressing     Problem: Fluid and Electrolyte Imbalance (Acute Kidney Injury/Impairment)  Goal: Fluid and Electrolyte Balance  8/5/2022 1642 by Orlando Keen RN  Outcome: Adequate for Care Transition  8/5/2022 1641 by Orlando Keen RN  Outcome: Ongoing, Progressing     Problem: Oral Intake Inadequate (Acute Kidney Injury/Impairment)  Goal: Optimal Nutrition Intake  8/5/2022 1642 by Orlando Keen RN  Outcome: Adequate for Care Transition  8/5/2022 1641 by Orlando Keen RN  Outcome: Ongoing, Progressing     Problem: Renal Function Impairment (Acute Kidney Injury/Impairment)  Goal: Effective Renal Function  8/5/2022 1642 by Orlando Keen RN  Outcome: Adequate for Care Transition  8/5/2022 1641 by Orlando Keen RN  Outcome: Ongoing,  Progressing     Problem: Impaired Wound Healing  Goal: Optimal Wound Healing  8/5/2022 1642 by Orlando Keen RN  Outcome: Adequate for Care Transition  8/5/2022 1641 by Orlando Keen RN  Outcome: Ongoing, Progressing

## 2022-08-05 NOTE — NURSING
Patient alert and oriented x4. Denies any pain or or discomfort. Denies any respiratory distress. Patient is stable. Safety measure in place. Bed in lowest position and call light within reach.

## 2022-08-05 NOTE — DISCHARGE SUMMARY
[facility] - Intensive Care ([place] [id number])  [facility] Medicine  Discharge Summary      Patient Name: Aleyda Floyd  MRN: [id number]  Patient Class: IP- Inpatient  Admission Date: 6/27/2022  Hospital Length of Stay: 38 days  Discharge Date and Time:  08/05/2022 4:23 PM  Attending Physician: Tena Brizuela MD   Discharging Provider: Lele Posadas MD  Primary Care Provider: Elvie Fernandes MD  [facility] Medicine Team: [facility] HOSP MED 4 Lele Posadas MD    HPI:   This is a 67 year old deaf lady with alcoholic cirrhosis, and hypertension who presented with generalized swelling.  used to obtain history. Patient states that she had the swelling since her diagnosis of cirrhosis in February. Patient states that 2 days ago, she was able to ambulate but with some difficulty due to the swelling but now  worsening to the point where she is unable to ambulate by herself. Of note, patient had a recent EGD performed earlier last week where they had issues with her IV resulting is significant bruising. Patient also notes that whenever she scratches her skin with her nails, she notices clear fluid coming from the cuts. Patient denies fever, chills, cough, hemoptysis, nausea, vomiting, abdominal pain, yellowing of skin, constipation, dysuria, hematuria, and black/ bloody stools. Patient has baseline loose stools from her lactulose.  She reports compliance with all medications including her lactulose and Lasix. Patient and family declined any confusion or altered mental status. She denies any recent alcohol use with last use in February when she was diagnosed with alcoholic cirrhosis. Patient states that she used to weigh around 170s which she believes is her dry weight and is currently in the 200s.     Upon chart review, patient had an echo at an OSH on 2/25/22 which showed EF 60-65% and some diastolic dysfunction.    In the ED, patient was found to have Hgb of 8.5 and platelets of 113 around baseline. Patient had a UA which  showed 1+ leuks and many bacteria. Patient did not complain of dysuria.       * No surgery found *      Hospital Course:   Admitted for decompensated cirrhosis and anasarca. Diuresis began with IV Lasix and spironolactone. Paracentesis of -4.2L; ruled out SBP. Pt stabilized for few days and was ready to be dc'ed when she had a change in mental status. She was found unresponsive with fixed gaze and no pupil response. Code stroke called, CT neg for stroke. Eeg showed seizure-like activity, started on keppra and onfi. Pt transitioned to MICU for higher level of care. On step down, had a UTI and PITO, both of which were treated. She received a repeat paracentesis with 7.5L removed. Her creatinine started improve with administration of albumin and holding diuretics. Diuretics were then restarted and transitioned to Lasix 40mg PO and Aldactone 100mg PO. Patient started to have mild difficulty with mentation again with concerns for recurring UTI. Repeat UCX positive for candida albicans; she was started on 200mg fluconazole on 7/30. Paracentesis labs negative for SBP but treated empirically with rocephin because paracentesis was performed after giving rocephin. Rocephin 1g was transitioned to ciprofloxacin 500mg PO bid.  Patient improved on fluconazole and EEG was negative for any new epileptiform activity.     Dispo to [facility] and follow up with PCP, hepatology, neurology.       Goals of Care Treatment Preferences:  Code Status: Full Code      Consults:   Consults (From admission, onward)        Status Ordering Provider     Inpatient consult to Interventional Radiology  Once        Provider:  (Not yet assigned)    Completed WAYNE ARREDONDO     Inpatient consult to Neurology  Once        Provider:  (Not yet assigned)    Completed LOLLY MALIK     Inpatient consult to Physical Medicine Rehab  Once        Provider:  (Not yet assigned)    Completed BHANU GARCIA     Inpatient consult to Registered Dietitian/Nutritionist   Once        Provider:  (Not yet assigned)    Completed SONIA KEMP     Inpatient consult to Vascular (Stroke) Neurology  Once        Provider:  (Not yet assigned)    Completed MARGUERITE SHIRLEY     Inpatient consult to Neurology  Once        Provider:  (Not yet assigned)    Completed KECIA BARRIOS     Inpatient consult to Physical Medicine Rehab  Once        Provider:  Destiny Sweeney MD    Completed WAYNE ARREDONDO     Inpatient consult to Midline team  Once        Provider:  (Not yet assigned)    Completed LILIAM BERRIOS     Inpatient consult to Hepatology  Once        Provider:  (Not yet assigned)    Completed PIO ROMEO          No new Assessment & Plan notes have been filed under this hospital service since the last note was generated.  Service: Hospital Medicine    Final Active Diagnoses:    Diagnosis Date Noted POA    PRINCIPAL PROBLEM:  Acute encephalopathy [G93.40] 07/10/2022 Unknown    UTI (urinary tract infection) [N39.0] 07/11/2022 Unknown    PITO (acute kidney injury) [N17.9] 07/06/2022 Unknown    Anasarca [R60.1] 06/28/2022 Unknown    Anemia, chronic disease [D63.8] 06/28/2022 Unknown    Alcoholic cirrhosis of liver with ascites [K70.31] 06/28/2022 Unknown    Thrombocytopenia [D69.6] 06/28/2022 Unknown    Debility [R53.81] 07/03/2022 Unknown    Primary hypertension [I10] 06/28/2022 Unknown    Gram-positive cocci in clusters [A49.8] 07/11/2022 No    Deaf- written communication [H91.90] 10/08/2013 Yes      Problems Resolved During this Admission:    Diagnosis Date Noted Date Resolved POA    Goals of care, counseling/discussion [Z71.89] 07/25/2022 07/25/2022 Not Applicable       Discharged Condition: fair    Disposition: Long Term Acute Care    Follow Up:    Patient Instructions:      MRI Abdomen W WO Contrast   Standing Status: Future Standing Exp. Date: 07/31/23     Order Specific Question Answer Comments   Does the patient have a pacemaker or a defibrilator (Note: Some facilities may  not be able to schedule an MRI for patients with pacemakers and defibrillators. You should contact your local radiology department to determine if this is the case.)? No    Does the patient have an aneurysm or surgical clip, pump, nerve/brain stimulator, middle/inner ear prosthesis, or other metal implant or foreign object (bullet, shrapnel)? If they have a card related to their implant, ask them to bring it. Issues related to the implant may cause the MRI to be delayed. No    Is the patient claustrophobic? Unable to assess    Will the patient require sedation? No    Does the patient have any of the following conditions? Diabetes, History of Renal Disease or Hypertension requiring medical therapy? Yes    May the Radiologist modify the order per protocol to meet the clinical needs of the patient? Yes    Is this part of a Research Study? No    Does the patient have on a skin patch for medication with aluminized backing? No      Ambulatory referral/consult to Hepatology   Standing Status: Future   Referral Priority: Routine Referral Type: Consultation   Referral Reason: Specialty Services Required   Requested Specialty: Hepatology     Ambulatory referral/consult to Internal Medicine   Standing Status: Future   Referral Priority: Routine Referral Type: Consultation   Referral Reason: Specialty Services Required   Requested Specialty: Internal Medicine     Ambulatory referral/consult to Neurology   Standing Status: Future   Referral Priority: Routine Referral Type: Consultation   Referral Reason: Specialty Services Required   Requested Specialty: Neurology   Number of Visits Requested: 1       Significant Diagnostic Studies: Labs:   CMP   Recent Labs   Lab 08/04/22  0246 08/05/22  0420   * 131*   K 4.3 4.4   CL 98 98   CO2 27 27   * 109   BUN 18 19   CREATININE 1.4 1.3   CALCIUM 9.1 9.5   PROT 5.5* 5.9*   ALBUMIN 2.1* 2.4*   BILITOT 1.7* 1.7*   ALKPHOS 70 83   AST 30 36   ALT 18 19   ANIONGAP 7* 6*   , CBC    Recent Labs   Lab 08/04/22  0246 08/05/22  0420   WBC 6.89 7.38   HGB 7.9* 8.5*   HCT 24.1* 25.9*   PLT 96* 95*    and INR   Lab Results   Component Value Date    INR 1.6 (H) 08/05/2022    INR 1.7 (H) 08/04/2022    INR 1.8 (H) 08/03/2022     Microbiology:   Blood Culture   Lab Results   Component Value Date    LABBLOO No growth after 5 days. 07/11/2022    and Urine Culture    Lab Results   Component Value Date    LABURIN (A) 07/25/2022     CANDIDA ALBICANS  > 100,000 cfu/ml  Treatment of asymptomatic candiduria is not recommended (except for   specific populations). Candida isolated in the urine typically   represents colonization. If an indwelling urinary catheter is present  it should be removed or replaced.       Radiology: X-Ray: KUB: X-Ray Abdomen AP 1 View (KUB):   Results for orders placed or performed during the hospital encounter of 06/27/22   X-Ray Abdomen AP 1 View    Narrative    EXAMINATION:  XR ABDOMEN AP 1 VIEW    CLINICAL HISTORY:  NG tube placement;    TECHNIQUE:  AP View(s) of the abdomen was performed.    COMPARISON:  Correlation is made to chest radiograph 06/28/2022    FINDINGS:  Multiple monitoring leads overlie the visualized chest and upper abdomen.  Enteric tube tip courses below the diaphragm with tip and side port appearing to project over the region of the stomach in the left upper quadrant.  Please note the tip of the enteric tube is in close proximity to the edge of the field of view.  No dilated loops of bowel are appreciated in the visualized upper abdomen.  No significant interval detrimental change in the radiographic appearance of intrathoracic structures compared to prior radiograph of 06/28/2022.      Impression    Please see above.      Electronically signed by: Yessenia Chavez MD  Date:    07/10/2022  Time:    22:18     MRI: Brain without contrast  CT scan: CT ABDOMEN PELVIS WITHOUT CONTRAST:   Results for orders placed or performed during the hospital encounter of 06/27/22   CT  Abdomen Pelvis  Without Contrast    Narrative    EXAMINATION:  CT ABDOMEN PELVIS WITHOUT CONTRAST    CLINICAL HISTORY:  GI bleed;Peritonitis or perforation suspected;    TECHNIQUE:  The patient was surveyed from the lung bases through the pelvis.  Oral and IV contrast were not administered..  The data was reconstructed for coronal, sagittal, and axial images.    COMPARISON:  CT 05/10/2022    FINDINGS:  CHEST:    Lungs/Pleura: Small bilateral pleural effusions.  Small amount of fluid tracks along the right minor fissure.  Bibasilar atelectasis.  No evidence of pneumothorax.    Heart: The visualized portions of the heart are normal. No pericardial effusion.  Aortic annulus and coronary artery calcifications.    Thoracic soft tissues: Anasarca.    ABDOMEN:    Solid organ evaluation is limited by lack of intravenous contrast.    Liver: Cirrhotic liver morphology.  No focal hepatic abnormality.    Gallbladder/Bile ducts: Cholelithiasis.  No intrahepatic or extrahepatic biliary ductal dilatation.    Spleen:Mildly enlarged.    Stomach: Subtle hyperdensity within the stomach, potentially due to ingested material, however given reported history of GI bleed, blood products can not be entirely excluded.    Pancreas: Unremarkable.    Adrenals: Unremarkable.    Renal/Ureters: The kidneys are normal in size and location. No hydronephrosis.   The ureters are normal in course and caliber. The urinary bladder is unremarkable.    Reproductive: Unremarkable.    Bowel: No evidence of obstructive bowel pattern.  Colonic diverticulosis.  Moderate volume of stool suggestive of constipation.  Appendix is prominent measuring up to 9 mm, however there is air throughout the majority of the lumen.  No evidence of adjacent free air.  Evaluation for surrounding inflammatory changes limited due to abdominal ascites.    Peritoneum: Large volume abdominal ascites with attenuation suggestive of simple fluid.  No layering hyperdensities to suggest  hemoperitoneum.    Lymph Nodes: No pathologic duncan enlargement in the abdomen or pelvis.    Vasculature: The abdominal aorta is normal in course and caliber.  Severe atherosclerotic calcifications.  There are collateral vessels in the upper abdomen better evaluated on prior contrast enhanced CT.    Bones: Degenerative changes of the spine.  No acute fractures or osseous destructive lesions.    Soft Tissues: Diffuse anasarca.  Ascites containing left paraumbilical hernia.  There is trace air in the anterior abdominal wall likely related to medical injection or prior procedure.      Impression    Hyperdensity within the stomach, likely ingested material, however given reported history of GI bleed, blood products cannot be entirely excluded in the setting of gastric varices.  Further evaluation with endoscopy as clinically warranted.    Cirrhotic liver morphology with sequela of portal hypertension including splenomegaly, large volume abdominal ascites, and collateral vessels.    Prominent appendix, however majority of the appendix has intraluminal air.  Adequate evaluation for surrounding inflammatory change is limited due to ascites.  Correlation physical exam is recommended.    Significant anasarca.    Colonic diverticulosis.    Ascites containing left periumbilical hernia.    Small bilateral pleural effusions.    Cholelithiasis.    Additional findings discussed in the body of the report.    Electronically signed by resident: Eduardo Salmon  Date:    07/21/2022  Time:    03:12    Electronically signed by: Matthew Navarrete MD  Date:    07/21/2022  Time:    03:35     Ultrasound: Upper extremity veins right    Pending Diagnostic Studies:     Procedure Component Value Units Date/Time    IR Paracentesis with Imaging [[id number]]     Order Status: Sent Lab Status: No result          Medications:  Reconciled Home Medications:      Medication List      START taking these medications    cloBAZam 10 mg Tab  Commonly known  as: ONFI  Take 1 each (10 mg total) by mouth once daily.     fluconazole 200 MG Tab  Commonly known as: DIFLUCAN  Take 1 tablet (200 mg total) by mouth once daily. Last day of therapy 8/10/22 for 5 days     levETIRAcetam 750 MG Tab  Commonly known as: KEPPRA  Take 1 tablet (750 mg total) by mouth 2 (two) times daily.     rifAXIMin 550 mg Tab  Commonly known as: XIFAXAN  Take 1 tablet (550 mg total) by mouth 2 (two) times daily.     spironolactone 100 MG tablet  Commonly known as: ALDACTONE  Take 1 tablet (100 mg total) by mouth once daily.        CHANGE how you take these medications    furosemide 40 MG tablet  Commonly known as: LASIX  Take 1 tablet (40 mg total) by mouth 2 (two) times daily.  What changed:   · medication strength  · how much to take     lactulose 20 gram/30 mL Soln  Commonly known as: CHRONULAC  Take 30 mLs (20 g total) by mouth 2 (two) times daily.  What changed:   · medication strength  · when to take this        CONTINUE taking these medications    acetaminophen 325 MG tablet  Commonly known as: TYLENOL  Take 650 mg by mouth daily as needed for Pain.     aspirin 81 MG Chew  Take 81 mg by mouth once daily.     cholecalciferol (vitamin D3) 50 mcg (2,000 unit) Cap capsule  Commonly known as: VITAMIN D3  Take 1 capsule by mouth once daily.     cholestyramine-aspartame 4 gram Pwpk  Commonly known as: QUESTRAN LIGHT  Take 4 g by mouth once daily.     folic acid 1 MG tablet  Commonly known as: FOLVITE  Take 1 mg by mouth once daily.     nystatin powder  Commonly known as: MYCOSTATIN  Apply to skin folds beneath breasts as needed     omeprazole 40 MG capsule  Commonly known as: PRILOSEC  Take 40 mg by mouth once daily.     thiamine 100 MG tablet  Take 100 mg by mouth once daily.        STOP taking these medications    losartan 50 MG tablet  Commonly known as: COZAAR     melatonin 5 mg  Commonly known as: MELATIN     oxyCODONE-acetaminophen 5-325 mg per tablet  Commonly known as: PERCOCET      simvastatin 10 MG tablet  Commonly known as: ZOCOR     wound dressings Pste            Indwelling Lines/Drains at time of discharge:   Lines/Drains/Airways     Drain  Duration           Female External Urinary Catheter 07/20/22 1400 16 days                Time spent on the discharge of patient: 45 minutes         Lele Posadas MD  Department of Hospital Medicine  [facility] - Intensive Care ([place]-14)

## 2022-08-05 NOTE — PT/OT/SLP PROGRESS
Occupational Therapy   Treatment    Name: Aleyda Floyd  MRN: [id number]  Admitting Diagnosis:  Acute encephalopathy       Recommendations:     Discharge Recommendations: nursing facility, skilled  Discharge Equipment Recommendations:  walker, rolling, wheelchair, bath bench  Barriers to discharge:  Inaccessible home environment    Assessment:     Aleyda Floyd is a 67 y.o. female with a medical diagnosis of Acute encephalopathy.  She presents with gradually decreasing edema in BUE's, BLE's and LLQ and abdomen. Noted significant weakness, lack of dexterity, arom and edema impede pts ability to functionally communicate through ASL. Performance deficits affecting function are weakness, impaired balance, impaired endurance, impaired self care skills, impaired functional mobility, edema, decreased upper extremity function, decreased lower extremity function.     Pts primary communication with her  is gestures and facial expression at this time. Pts  understands the importance of encouraging pts need to attempt ASL even for basic gestures.    Rehab Prognosis:  Good; patient would benefit from acute skilled OT services to address these deficits and reach maximum level of function.       Plan:     Patient to be seen 3 x/week to address the above listed problems via self-care/home management, therapeutic activities, therapeutic exercises  · Plan of Care Expires: 08/12/22  · Plan of Care Reviewed with: patient, spouse    Subjective     Pain/Comfort:  · Pain Rating 1: 3/10  · Location - Side 1: Right  · Location - Orientation 1: generalized  · Location 1: arm  · Pain Addressed 1: Distraction  · Pain Rating Post-Intervention 1: 0/10    Objective:     Communicated with: nurse prior to session.  Patient found right sidelying with pulse ox (continuous) upon OT entry to room.On last session pts  was encouraged to increase R sidelying for gravity assisted drainage of L abdominal edema.    General  Precautions: Standard, contact, fall, hearing impaired   Orthopedic Precautions:N/A   Braces: N/A  Respiratory Status: Room air    Bed Mobility:    · Patient completed Rolling/Turning to Left with  moderate assistance  · Patient completed Rolling/Turning to Right with maximal assistance  · Patient completed Supine to Sit with moderate assistance  · Patient completed Sit to Supine with moderate assistance and 2 persons     Functional Mobility/Transfers:  · Patient completed Sit <> Stand Transfer with maximal assistance  with  OT and PT both providing blocking of B knees, max A to promote proximal co-activation for upright standing. Pt seen for 4 trials, requiring increasing assistance with each trial.    · Functional Mobility: Consideration given to use of lift device yet contraindicated secondary to anasarca and skin integrity due to such.  · OT focused on improving BUE functional reach and utilization of bedrails to assist with rolling and transitional movements.    Activities of Daily Living:  · Feeding:  maximal assistance    · Grooming: maximal assistance sitting EOB      [facility] 6 Click ADL: 11    Treatment & Education:  OT educated pts  in aarom for shoulder flexion 0-90*, elbow, wrist and digit arom. Encouraged full rom with gentle stretch at end range.  OT focused on use of  for patient education re: positioning, encouraging patient engagement in simple ADL's and direction of her own care.   Co-treatment required to maximize pts safe participation in functional mobility as noted above, due to requirement of 2 skilled clinicians to properly facilitate and improve neuromuscular control.    Patient left HOB elevated with call button in reachEducation:      GOALS:   Multidisciplinary Problems     Occupational Therapy Goals        Problem: Occupational Therapy    Goal Priority Disciplines Outcome Interventions   Occupational Therapy Goal     OT, PT/OT Ongoing, Progressing    Description: Goals   on 8/15/2022    Patient will increase functional independence with ADLs by performing:    Grooming while standing with Minimal Assistance.  Toileting from bedside commode with Minimal Assistance for hygiene and clothing management.   Supine to sit with Minimal Assistance.  UE dressing with Minimal assistance  Step transfer with Minimal Assistance using RW.  Toilet transfer to bedside commode with Minimal Assistance using RW.                     Time Tracking:     OT Date of Treatment: 22  OT Start Time: 1305  OT Stop Time: 1358  OT Total Time (min): 53 min    Billable Minutes:Therapeutic Activity 15  Neuromuscular Re-education 38    OT/TORREY: OT          2022

## 2022-08-05 NOTE — PLAN OF CARE
[facility]    FACILITY TRANSFER ORDERS      Patient Name: Aleyda Floyd  YOB: 1955    PCP: Elvie Fernandes MD   PCP Address: [address] [place] [address]  PCP Phone Number: [phone]  PCP Fax: [phone]    Encounter Date: 08/05/2022    Admit to: [facility]    Vital Signs:  Routine    Diagnoses:   Active Hospital Problems    Diagnosis  POA    *Acute encephalopathy [G93.40]  Unknown     Priority: 1 - High    UTI (urinary tract infection) [N39.0]  Unknown     Priority: 2     PITO (acute kidney injury) [N17.9]  Unknown     Priority: 2     Anasarca [R60.1]  Unknown     Priority: 3     Anemia, chronic disease [D63.8]  Unknown     Priority: 3     Alcoholic cirrhosis of liver with ascites [K70.31]  Unknown     Priority: 4     Thrombocytopenia [D69.6]  Unknown     Priority: 5     Debility [R53.81]  Unknown     Priority: 7     Primary hypertension [I10]  Unknown     Priority: 8     Gram-positive cocci in clusters [A49.8]  No    Deaf- written communication [H91.90]  Yes      Resolved Hospital Problems    Diagnosis Date Resolved POA    Goals of care, counseling/discussion [Z71.89] 07/25/2022 Not Applicable       Allergies:Review of patient's allergies indicates:  No Known Allergies    Diet: cardiac diet and soft diet    Activities: Activity as tolerated    Goals of Care Treatment Preferences:  Code Status: Full Code      Labs: CBC, CMP, HB and INR Daily for 7 days     CONSULTS:    Physical Therapy to evaluate and treat. , Occupational Therapy to evaluate and treat., Speech Therapy to evaluate and treat for Swallowing and Cognition. and  to evaluate for community resources/long-range planning.    MISCELLANEOUS CARE:  Routine Skin for Bedridden Patients: Apply moisture barrier cream to all skin folds and wet areas in perineal area daily and after baths and all bowel movements.    WOUND CARE ORDERS  Yes: Monitor for sacral/decubitus  ulcers    Medications: Review discharge medications with patient and family and provide education.      Current Discharge Medication List      START taking these medications    Details   cloBAZam (ONFI) 10 mg Tab Take 1 each (10 mg total) by mouth once daily.  Qty: 30 each, Refills: 5      fluconazole (DIFLUCAN) 200 MG Tab Take 1 tablet (200 mg total) by mouth once daily. Last day of therapy 8/10/22 for 5 days  Qty: 5 tablet, Refills: 0      levETIRAcetam (KEPPRA) 750 MG Tab Take 1 tablet (750 mg total) by mouth 2 (two) times daily.  Qty: 60 tablet, Refills: 2      rifAXIMin (XIFAXAN) 550 mg Tab Take 1 tablet (550 mg total) by mouth 2 (two) times daily.  Qty: 60 tablet, Refills: 2      spironolactone (ALDACTONE) 100 MG tablet Take 1 tablet (100 mg total) by mouth once daily.  Qty: 30 tablet, Refills: 2    Comments: .         CONTINUE these medications which have CHANGED    Details   furosemide (LASIX) 40 MG tablet Take 1 tablet (40 mg total) by mouth 2 (two) times daily.  Qty: 60 tablet, Refills: 2      lactulose (CHRONULAC) 20 gram/30 mL Soln Take 30 mLs (20 g total) by mouth 2 (two) times daily.         CONTINUE these medications which have NOT CHANGED    Details   acetaminophen (TYLENOL) 325 MG tablet Take 650 mg by mouth daily as needed for Pain.      aspirin 81 MG Chew Take 81 mg by mouth once daily.      cholecalciferol, vitamin D3, (VITAMIN D3) 50 mcg (2,000 unit) Cap capsule Take 1 capsule by mouth once daily.      cholestyramine-aspartame (QUESTRAN LIGHT) 4 gram PwPk Take 4 g by mouth once daily.      folic acid (FOLVITE) 1 MG tablet Take 1 mg by mouth once daily.       nystatin (MYCOSTATIN) powder Apply to skin folds beneath breasts as needed      omeprazole (PRILOSEC) 40 MG capsule Take 40 mg by mouth once daily.      thiamine 100 MG tablet Take 100 mg by mouth once daily.         STOP taking these medications       losartan (COZAAR) 50 MG tablet Comments:   Reason for Stopping:          oxycodone-acetaminophen 5-325 mg (PERCOCET) 5-325 mg per tablet Comments:   Reason for Stopping:         simvastatin (ZOCOR) 10 MG tablet Comments:   Reason for Stopping:         wound dressings Pste Comments:   Reason for Stopping:                  Immunizations Administered as of 8/5/2022     No immunizations on file.          This patient has had both covid vaccinations    Some patients may experience side effects after vaccination.  These may include fever, headache, muscle or joint aches.  Most symptoms resolve with 24-48 hours and do not require urgent medical evaluation unless they persist for more than 72 hours or symptoms are concerning for an unrelated medical condition.          _________________________________  Lele Posadas MD  08/05/2022

## 2022-08-05 NOTE — PLAN OF CARE
Patient accepted to [facility], going to room 232.  Nurse please call report to  [phone]  Ambulance transportation set per [facility] for 5:30pm, time cannot be guaranteed.    Notified patient sister Shanon Clark of above and Nurse notified patient /spouse who is at bedside.

## 2022-08-06 NOTE — NURSING
Patient stable. Denies any pain or discomfort. Patient discharge to [facility] via ambulance transport with all of her belonging and dc information. Spouse at bedside.

## 2022-08-09 NOTE — PLAN OF CARE
[facility] ([facility])  Discharge Final Note    Primary Care Provider: Elvie Fernandes MD    Expected Discharge Date: 8/5/2022    Final Discharge Note (most recent)     Final Note - 08/05/22 2100        Final Note    Assessment Type Final Discharge Note     Anticipated Discharge Disposition Long Term Acute Care   [facility]    What phone number can be called within the next 1-3 days to see how you are doing after discharge? [phone]     Hospital Resources/Appts/Education Provided Provided patient/caregiver with written discharge plan information;Appointments scheduled by Navigator/Coordinator               Future Appointments   Date Time Provider Department Center   8/31/2022  1:00 PM Donna Garnica MD [facility]       Important Message from Medicare  Important Message from Medicare regarding Discharge Appeal Rights: Given to patient/caregiver, Explained to patient/caregiver, Signed/date by patient/caregiver     Date IMM was signed: 08/02/22  Time IMM was signed: [id number]

## 2022-08-18 NOTE — ED NOTES
Pt resting comfortably in bed with eyes closed. Chest rise and fall noted. Respirations E/UL. NAD noted. Call bell within reach at bedside. Will continue to monitor

## 2022-08-18 NOTE — HPI
This is a 67 year old lady with alcoholic cirrhosis on lactulose and seizure disorder who presented to the ED for AMS and fatigue. 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 History primarily obtained from ED and prior notes. Patient has felt weak and has not been using her arms to communicate via ASL with her family for the past 1-2 weeks. Patient also complained of generalized fatigue in which the patient is sleeping more and is hard to arouse. Of note, patient had a paracentesis 3 weeks ago. Additionally, it was noted that the patient has a history of UTIs.      Upon chart review, patient was recently discharged for decompensated liver failure. At that visit, patient had seizure like acitivity and patient was started on Onfi and keppra.     In the ED, patient was VSS. Labs showed Hgb of 7.9 and an ammonia of 58. CT head showed no acute intracranial abnormality.

## 2022-08-18 NOTE — ED NOTES
"Pt adds "I have been feeling more weak lately and its hard to sign since my hands are curled up x2 weeks." pt son states "medication makes her sleep more and she has frequent UTIs."  "

## 2022-08-18 NOTE — ED NOTES
Pt /pt family updated on poc, no further questions at this time. Pt made aware of needing urine sample, states unable to urinate at this time.

## 2022-08-18 NOTE — ASSESSMENT & PLAN NOTE
Alcoholic cirrhosis of liver with ascites  Seizure disorder    67 yoF with cirrhosis with ascites and seizure disorder presents with AMS and fatigue. Labs showed ammonia of 58. CT head showed no acute intracranial abnormality. Possible cause of encephalopathy is multifactorial: infection given history of UTIs and SBP, hepatic encephalopathy given elevated ammonia, or seizures.     - continue home Clobazam and keppra for seizures  - f/u keppra level  - EEG  - f/u UA for infection. Abdomen was soft and non-tender and no leukocytosis, less likely SBP  - continue lactulose and rifaximin  - f/u ammonia  - continue lasix and aldactone  - continue thiamine and folic acid

## 2022-08-18 NOTE — PHARMACY MED REC
"Admission Medication History     The home medication history was taken by Elaine Harrington.    You may go to "Admission" then "Reconcile Home Medications" tabs to review and/or act upon these items.      The home medication list has been updated by the Pharmacy department.    Please read ALL comments highlighted in yellow.    Please address this information as you see fit.     Feel free to contact us if you have any questions or require assistance.        Medications listed below were obtained from: SNF/Rehab/[facility]   Medication Sig    acetaminophen (TYLENOL) 325 MG tablet   Take 650 mg by mouth daily as needed for Pain. NOT TO EXCEED 4 GRAMS IN 24 HOUR PERIOD    cholecalciferol, vitamin D3, (VITAMIN D3) 50 mcg (2,000 unit) Cap capsule   Take 1 capsule by mouth once daily.    cholestyramine-aspartame (QUESTRAN LIGHT) 4 gram PwPk   Take 4 g by mouth once daily.    folic acid (FOLVITE) 1 MG tablet   Take 1 mg by mouth once daily.     furosemide (LASIX) 40 MG tablet   Take 1 tablet (40 mg total) by mouth 2 (two) times daily.    lactulose (CHRONULAC) 20 gram/30 mL Soln  ADMINISTER 30 ML RECTALLY TWICE DAILY. TO BE ADDED INTO CLEANSING ENEMA SET WITH 700 ML NORMAL SALINE)    levETIRAcetam (KEPPRA) 750 MG Tab   Take 1 tablet (750 mg total) by mouth 2 (two) times daily.    nystatin (MYCOSTATIN) powder   Apply to skin folds beneath breasts as needed    omeprazole (PRILOSEC) 40 MG capsule   Take 40 mg by mouth once daily.    rifAXIMin (XIFAXAN) 550 mg Tab   Take 1 tablet (550 mg total) by mouth 2 (two) times daily.    spironolactone (ALDACTONE) 100 MG tablet   Take 1 tablet (100 mg total) by mouth once daily.    thiamine 100 MG tablet   Take 100 mg by mouth once daily.    aspirin 81 MG Chew Take 81 mg by mouth once daily.             Potential issues to be addressed PRIOR TO DISCHARGE  Patient reported not taking the following medications: (CLOBAZAM 10 MG ). These medications remain on the home medication list. " Please address accordingly.     The listed medications were obtained from another facility ([facility] ). The patient may not have been able to fill these prescriptions prior to this admission and may require new scripts upon discharge.     Elaine Harrington  EXT [id number]                    .

## 2022-08-18 NOTE — ED PROVIDER NOTES
Encounter Date: 8/17/2022       History     Chief Complaint   Patient presents with    Abnormal Lab     Pt arrives from [facility] for c/o high ammonia levels yesterday. 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 Pt communicates via written communication     66 yo female with PMHx Alcoholic cirrhosis with ascites on lactulose, spironolactone, and rifaximin with history of paracentesis most recently in June 2022, seizure disorder on keppra, HTN, thrombocytopenia presenting for elevated ammonia levels (110-120s) and fatigue at her skilled nursing facility, [facility]. Of note, Ammonia was 105 in 7/10/22. She is deaf and mute at baseline and is accompanied by her  and son who are also deaf interpreting through sign language with her. The patient has felt weak and has not been using her arms to communicate via ASL with her family for the past 1-2 weeks. She is able to move her legs spontaneously. She is feeling generalized fatigue that is constant. Nothing appears to improve or worsen that fatigue. Both the patient and her family state that she is not confused and able to appropriately answer questions with yes or no. She recently had a paracentesis 3 weeks ago, draining 4L of fluid. She is on chronic fluconazole SBP prophylaxis.    Afebrile and vitals wnl on arrival to ED. She denies any recent diarrhea, chest pain, abdominal pain, altered mental status. Family states that she has a history of UTIs and that she has been taking all of her medications regularly while in the rehab facility.     Recent admission with discharge 8/5 for anasarca and decompensated liver disease, underwent diuresis and paracentesis x2. Found to have seizure like activity in hospital course and started on keppra and onfi. Treated empirically for SBP due to paracentesis after antibiotic administration.     The best way to communicate with the patient is with remote  with ASL and family can communicate with you and the  patient.        Review of patient's allergies indicates:  No Known Allergies  Past Medical History:   Diagnosis Date    PITO (acute kidney injury)     Alcoholic cirrhosis of liver     Hypercholesterolemia     Hypertension      Past Surgical History:   Procedure Laterality Date    DILATION AND CURETTAGE OF UTERUS      ESOPHAGOGASTRODUODENOSCOPY N/A 2022    Procedure: EGD (ESOPHAGOGASTRODUODENOSCOPY);  Surgeon: Sean Perry MD;  Location: [address]);  Service: Endoscopy;  Laterality: N/A;  cirrhosis, variceal screening  labs prior  -request sent   fully vaccinated, instructions emailed to [email or web address]     Family History   Problem Relation Age of Onset    Colon cancer Neg Hx     Ovarian cancer Neg Hx     Breast cancer Maternal Grandmother      Social History     Tobacco Use    Smoking status: Former Smoker     Packs/day: 1.00     Years: 15.00     Pack years: 15.00     Quit date: 1998     Years since quittin.7    Smokeless tobacco: Never Used   Substance Use Topics    Alcohol use: Not Currently    Drug use: Not Currently     Review of Systems   Constitutional: Positive for fatigue (generalized, for 1-2 weeks). Negative for chills and fever.   HENT: Negative for sore throat.    Eyes: Negative for visual disturbance.   Respiratory: Negative for cough, chest tightness and shortness of breath.    Cardiovascular: Negative for chest pain.   Gastrointestinal: Negative for abdominal pain, diarrhea, nausea and vomiting.   Genitourinary: Negative for difficulty urinating.   Musculoskeletal: Negative for arthralgias and myalgias.   Skin: Negative for color change.   Neurological: Positive for weakness (upper extremity weakness).   Psychiatric/Behavioral: Negative for confusion.       Physical Exam     Initial Vitals [22]   BP Pulse Resp Temp SpO2   128/70 84 17 98.8 °F (37.1 °C) 97 %      MAP       --         Physical  Exam    Constitutional:   Generally fatigued  Ill appearing  Somnolent     HENT:   Head: Normocephalic and atraumatic.   Deaf at baseline   Eyes: Conjunctivae and EOM are normal. No scleral icterus.   Neck: Neck supple.   Normal range of motion.  Cardiovascular: Normal rate and regular rhythm.   Pulmonary/Chest: Breath sounds normal. She has no wheezes.   Abdominal: Abdomen is soft. Bowel sounds are normal. She exhibits distension. There is no abdominal tenderness.   Minimal ascites present   There is no rebound and no guarding.   Musculoskeletal:         General: Edema (trace JOY, hands edematous bilaterally) present. No tenderness.      Cervical back: Normal range of motion and neck supple.      Comments: Patient unable to spontaneously move upper extremities  Moves toes in response to direction     Neurological: She is alert and oriented to person, place, and time.   Upper extremity weakness     Skin: Skin is warm and dry.   No jaundice present  Few telangiectasias present chest, abdomen   Psychiatric: She has a normal mood and affect. Thought content normal.         ED Course   Procedures  Labs Reviewed   AMMONIA - Abnormal; Notable for the following components:       Result Value    Ammonia 58 (*)     All other components within normal limits   CBC W/ AUTO DIFFERENTIAL - Abnormal; Notable for the following components:    RBC 2.46 (*)     Hemoglobin 7.9 (*)     Hematocrit 22.6 (*)     MCH 32.1 (*)     RDW 17.2 (*)     Platelets 117 (*)     All other components within normal limits   COMPREHENSIVE METABOLIC PANEL - Abnormal; Notable for the following components:    CO2 21 (*)     Albumin 2.3 (*)     Total Bilirubin 1.2 (*)     AST 61 (*)     eGFR 49.6 (*)     All other components within normal limits   URINALYSIS, REFLEX TO URINE CULTURE   DRUG SCREEN PANEL, URINE EMERGENCY          Imaging Results    None          Medications - No data to display  Medical Decision Making:   Initial Assessment:   66 yo female  presenting for elevated ammonia levels in the setting of alcoholic cirrhosis  Differential Diagnosis:   Including but not limited to hyperammonemia related to cirrhosis, hepatic encephalopathy, stroke, UTI, SBP not likely due to stable vital signs, non-infectious appearance.  Clinical Tests:   Lab Tests: Ordered and Reviewed       <> Summary of Lab: Ammonia 58  Radiological Study: Ordered  ED Management:  Ammonia 58, similar to her previous studies, CBC, CMP, UA, CT head noncontrast ordered with no signs of acute abnormality. Due to altered mental staus per baseline per family, and change in motor function of arms and communication with ASL in the past 1-2 weeks, plan to admit for further workup considering recent hospital admissio                      Clinical Impression:   Final diagnoses:  [G93.40] Acute encephalopathy  [R29.898] Weakness of both arms (Primary)                 Jack Guzmán MD  Resident  08/18/22 0108

## 2022-08-18 NOTE — SUBJECTIVE & OBJECTIVE
Past Medical History:   Diagnosis Date    PITO (acute kidney injury)     Alcoholic cirrhosis of liver     Hypercholesterolemia     Hypertension        Past Surgical History:   Procedure Laterality Date    DILATION AND CURETTAGE OF UTERUS      ESOPHAGOGASTRODUODENOSCOPY N/A 6/23/2022    Procedure: EGD (ESOPHAGOGASTRODUODENOSCOPY);  Surgeon: Sean Perry MD;  Location: [address];  Service: Endoscopy;  Laterality: N/A;  cirrhosis, variceal screening  labs prior  -request sent 6/14  fully vaccinated, instructions emailed to [email or web address]       Review of patient's allergies indicates:  No Known Allergies    No current facility-administered medications on file prior to encounter.     Current Outpatient Medications on File Prior to Encounter   Medication Sig    acetaminophen (TYLENOL) 325 MG tablet Take 650 mg by mouth daily as needed for Pain.    aspirin 81 MG Chew Take 81 mg by mouth once daily.    cholecalciferol, vitamin D3, (VITAMIN D3) 50 mcg (2,000 unit) Cap capsule Take 1 capsule by mouth once daily.    cholestyramine-aspartame (QUESTRAN LIGHT) 4 gram PwPk Take 4 g by mouth once daily.    cloBAZam (ONFI) 10 mg Tab Take 1 each (10 mg total) by mouth once daily.    folic acid (FOLVITE) 1 MG tablet Take 1 mg by mouth once daily.     furosemide (LASIX) 40 MG tablet Take 1 tablet (40 mg total) by mouth 2 (two) times daily.    lactulose (CHRONULAC) 20 gram/30 mL Soln Take 30 mLs (20 g total) by mouth 2 (two) times daily.    levETIRAcetam (KEPPRA) 750 MG Tab Take 1 tablet (750 mg total) by mouth 2 (two) times daily.    nystatin (MYCOSTATIN) powder Apply to skin folds beneath breasts as needed    omeprazole (PRILOSEC) 40 MG capsule Take 40 mg by mouth once daily.    rifAXIMin (XIFAXAN) 550 mg Tab Take 1 tablet (550 mg total) by mouth 2 (two) times daily.    spironolactone (ALDACTONE) 100 MG tablet Take 1 tablet (100 mg total) by mouth once daily.    thiamine 100 MG  tablet Take 100 mg by mouth once daily.    [DISCONTINUED] bumetanide (BUMEX) 1 MG tablet Take 1 tablet (1 mg total) by mouth 2 (two) times a day.    [DISCONTINUED] losartan (COZAAR) 50 MG tablet Take 50 mg by mouth once daily.     [DISCONTINUED] simvastatin (ZOCOR) 10 MG tablet Take 10 mg by mouth once daily.      Family History       Problem Relation (Age of Onset)    Breast cancer Maternal Grandmother          Tobacco Use    Smoking status: Former Smoker     Packs/day: 1.00     Years: 15.00     Pack years: 15.00     Quit date: 1998     Years since quittin.7    Smokeless tobacco: Never Used   Substance and Sexual Activity    Alcohol use: Not Currently    Drug use: Not Currently    Sexual activity: Not on file     Review of Systems   Unable to perform ROS: Mental status change   Objective:     Vital Signs (Most Recent):  Temp: 99 °F (37.2 °C) (22)  Pulse: 84 (22)  Resp: 15 (22)  BP: (!) 111/54 (22)  SpO2: 97 % (22)   Vital Signs (24h Range):  Temp:  [98.8 °F (37.1 °C)-99 °F (37.2 °C)] 99 °F (37.2 °C)  Pulse:  [84-94] 84  Resp:  [15-19] 15  SpO2:  [97 %] 97 %  BP: (111-133)/(54-70) 111/54     Weight: 81.6 kg (180 lb)  Body mass index is 35.15 kg/m².    Physical Exam  Vitals and nursing note reviewed.   Constitutional:       General: She is not in acute distress.     Appearance: Normal appearance. She is not ill-appearing.   HENT:      Head: Normocephalic and atraumatic.      Nose: No congestion.      Mouth/Throat:      Mouth: Mucous membranes are moist.   Eyes:      General:         Right eye: No discharge.         Left eye: No discharge.      Extraocular Movements: Extraocular movements intact.      Conjunctiva/sclera: Conjunctivae normal.   Cardiovascular:      Rate and Rhythm: Normal rate and regular rhythm.      Pulses: Normal pulses.      Heart sounds: Murmur (systolic flow) heard.     No gallop.   Pulmonary:      Effort: Pulmonary effort is  normal. No respiratory distress.      Breath sounds: Normal breath sounds.   Abdominal:      General: There is distension.      Tenderness: There is no abdominal tenderness. There is no guarding or rebound.   Musculoskeletal:      Cervical back: Neck supple.      Right lower leg: Edema (2+) present.      Left lower leg: Edema (2+) present.   Lymphadenopathy:      Cervical: No cervical adenopathy.   Skin:     Coloration: Skin is not jaundiced or pale.      Findings: Bruising (bilateral arms) present.   Neurological:      Mental Status: She is alert.      Comments: Patient arousable. Patient too lethargic to participate in examination   Psychiatric:      Comments: Patient too lethargic to participate     Significant Labs: All pertinent labs within the past 24 hours have been reviewed.  CBC:   Recent Labs   Lab 08/17/22 2052   WBC 7.56   HGB 7.9*   HCT 22.6*   *     CMP:   Recent Labs   Lab 08/17/22 2052      K 4.8      CO2 21*      BUN 15   CREATININE 1.2   CALCIUM 8.8   PROT 6.9   ALBUMIN 2.3*   BILITOT 1.2*   ALKPHOS 86   AST 61*   ALT 25   ANIONGAP 9     Coagulation: No results for input(s): PT, INR, APTT in the last 48 hours.    Significant Imaging: I have reviewed all pertinent imaging results/findings within the past 24 hours.

## 2022-08-18 NOTE — ED TRIAGE NOTES
"Pt states sent here for abnormal ammonia levels. States "feeling okay, not bad right now." denies n/v, diarrhea, dizziness, sob, abd pain at this time. pt bedbound, weak to move extremities, pt communicating using written communication. Pt  states pt baseline.   "

## 2022-08-18 NOTE — PROVIDER PROGRESS NOTES - EMERGENCY DEPT.
Encounter Date: 8/17/2022    ED Physician Progress Notes             EKG interpretation by ED attending physician:  Low-voltage QRS with artifact inhibits evaluation.    Normal sinus rhythm, rate 95, nonspecific ST changes, no ST elevations or other signs of ischemia, otherwise normal intervals.    Grossly stable from June 2022.

## 2022-08-18 NOTE — ASSESSMENT & PLAN NOTE
Normocytic anemia    Hgb on admission 7.9 and platelets of 117. Hgb baseline around 7-8 and platelets around 50s-60s.     - continue to monitor for signs of bleeding  - trend Hgb and platelets

## 2022-08-18 NOTE — H&P
[facility] Emergency Dept  [facility]  History & Physical    Patient Name: Aleyda Floyd  MRN: [id number]  Patient Class: OP- Observation  Admission Date: 8/17/2022  Attending Physician: Lele Espitia MD   Primary Care Provider: Elvie Fernandes MD         Patient information was obtained from past medical records and ER records.     Subjective:     Principal Problem:Acute encephalopathy    Chief Complaint:   Chief Complaint   Patient presents with    Abnormal Lab     Pt arrives from [facility] for c/o high ammonia levels yesterday. 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 Pt communicates via written communication        HPI: This is a 67 year old lady with alcoholic cirrhosis on lactulose and seizure disorder who presented to the ED for AMS and fatigue. 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 History primarily obtained from ED and prior notes. Patient has felt weak and has not been using her arms to communicate via ASL with her family for the past 1-2 weeks. Patient also complained of generalized fatigue in which the patient is sleeping more and is hard to arouse. Of note, patient had a paracentesis 3 weeks ago. Additionally, it was noted that the patient has a history of UTIs.      Upon chart review, patient was recently discharged for decompensated liver failure. At that visit, patient had seizure like acitivity and patient was started on Onfi and keppra.     In the ED, patient was VSS. Labs showed Hgb of 7.9 and an ammonia of 58. CT head showed no acute intracranial abnormality.       Past Medical History:   Diagnosis Date    PITO (acute kidney injury)     Alcoholic cirrhosis of liver     Hypercholesterolemia     Hypertension        Past Surgical History:   Procedure Laterality Date    DILATION AND CURETTAGE OF UTERUS      ESOPHAGOGASTRODUODENOSCOPY N/A 6/23/2022     Procedure: EGD (ESOPHAGOGASTRODUODENOSCOPY);  Surgeon: Sean Perry MD;  Location: [facility] ([address] [place] [address]);  Service: Endoscopy;  Laterality: N/A;  cirrhosis, variceal screening  labs prior  -request sent 6/14  fully vaccinated, instructions emailed to [email or web address]       Review of patient's allergies indicates:  No Known Allergies    No current facility-administered medications on file prior to encounter.     Current Outpatient Medications on File Prior to Encounter   Medication Sig    acetaminophen (TYLENOL) 325 MG tablet Take 650 mg by mouth daily as needed for Pain.    aspirin 81 MG Chew Take 81 mg by mouth once daily.    cholecalciferol, vitamin D3, (VITAMIN D3) 50 mcg (2,000 unit) Cap capsule Take 1 capsule by mouth once daily.    cholestyramine-aspartame (QUESTRAN LIGHT) 4 gram PwPk Take 4 g by mouth once daily.    cloBAZam (ONFI) 10 mg Tab Take 1 each (10 mg total) by mouth once daily.    folic acid (FOLVITE) 1 MG tablet Take 1 mg by mouth once daily.     furosemide (LASIX) 40 MG tablet Take 1 tablet (40 mg total) by mouth 2 (two) times daily.    lactulose (CHRONULAC) 20 gram/30 mL Soln Take 30 mLs (20 g total) by mouth 2 (two) times daily.    levETIRAcetam (KEPPRA) 750 MG Tab Take 1 tablet (750 mg total) by mouth 2 (two) times daily.    nystatin (MYCOSTATIN) powder Apply to skin folds beneath breasts as needed    omeprazole (PRILOSEC) 40 MG capsule Take 40 mg by mouth once daily.    rifAXIMin (XIFAXAN) 550 mg Tab Take 1 tablet (550 mg total) by mouth 2 (two) times daily.    spironolactone (ALDACTONE) 100 MG tablet Take 1 tablet (100 mg total) by mouth once daily.    thiamine 100 MG tablet Take 100 mg by mouth once daily.    [DISCONTINUED] bumetanide (BUMEX) 1 MG tablet Take 1 tablet (1 mg total) by mouth 2 (two) times a day.    [DISCONTINUED] losartan (COZAAR) 50 MG tablet Take 50 mg by mouth once daily.     [DISCONTINUED] simvastatin (ZOCOR) 10 MG  tablet Take 10 mg by mouth once daily.      Family History       Problem Relation (Age of Onset)    Breast cancer Maternal Grandmother          Tobacco Use    Smoking status: Former Smoker     Packs/day: 1.00     Years: 15.00     Pack years: 15.00     Quit date: 1998     Years since quittin.7    Smokeless tobacco: Never Used   Substance and Sexual Activity    Alcohol use: Not Currently    Drug use: Not Currently    Sexual activity: Not on file     Review of Systems   Unable to perform ROS: Mental status change   Objective:     Vital Signs (Most Recent):  Temp: 99 °F (37.2 °C) (22)  Pulse: 84 (22)  Resp: 15 (22)  BP: (!) 111/54 (22)  SpO2: 97 % (22)   Vital Signs (24h Range):  Temp:  [98.8 °F (37.1 °C)-99 °F (37.2 °C)] 99 °F (37.2 °C)  Pulse:  [84-94] 84  Resp:  [15-19] 15  SpO2:  [97 %] 97 %  BP: (111-133)/(54-70) 111/54     Weight: 81.6 kg (180 lb)  Body mass index is 35.15 kg/m².    Physical Exam  Vitals and nursing note reviewed.   Constitutional:       General: She is not in acute distress.     Appearance: Normal appearance. She is not ill-appearing.   HENT:      Head: Normocephalic and atraumatic.      Nose: No congestion.      Mouth/Throat:      Mouth: Mucous membranes are moist.   Eyes:      General:         Right eye: No discharge.         Left eye: No discharge.      Extraocular Movements: Extraocular movements intact.      Conjunctiva/sclera: Conjunctivae normal.   Cardiovascular:      Rate and Rhythm: Normal rate and regular rhythm.      Pulses: Normal pulses.      Heart sounds: Murmur (systolic flow) heard.     No gallop.   Pulmonary:      Effort: Pulmonary effort is normal. No respiratory distress.      Breath sounds: Normal breath sounds.   Abdominal:      General: There is distension.      Tenderness: There is no abdominal tenderness. There is no guarding or rebound.   Musculoskeletal:      Cervical back: Neck supple.      Right  lower leg: Edema (2+) present.      Left lower leg: Edema (2+) present.   Lymphadenopathy:      Cervical: No cervical adenopathy.   Skin:     Coloration: Skin is not jaundiced or pale.      Findings: Bruising (bilateral arms) present.   Neurological:      Mental Status: She is alert.      Comments: Patient arousable. Patient too lethargic to participate in examination   Psychiatric:      Comments: Patient too lethargic to participate     Significant Labs: All pertinent labs within the past 24 hours have been reviewed.  CBC:   Recent Labs   Lab 08/17/22 2052   WBC 7.56   HGB 7.9*   HCT 22.6*   *     CMP:   Recent Labs   Lab 08/17/22 2052      K 4.8      CO2 21*      BUN 15   CREATININE 1.2   CALCIUM 8.8   PROT 6.9   ALBUMIN 2.3*   BILITOT 1.2*   ALKPHOS 86   AST 61*   ALT 25   ANIONGAP 9     Coagulation: No results for input(s): PT, INR, APTT in the last 48 hours.    Significant Imaging: I have reviewed all pertinent imaging results/findings within the past 24 hours.    Assessment/Plan:     * Acute encephalopathy  Alcoholic cirrhosis of liver with ascites  Seizure disorder    67 yoF with cirrhosis with ascites and seizure disorder presents with AMS and fatigue. Labs showed ammonia of 58. CT head showed no acute intracranial abnormality. Possible cause of encephalopathy is multifactorial: infection given history of UTIs and SBP, hepatic encephalopathy given elevated ammonia, or seizures.     - continue home Clobazam and keppra for seizures  - f/u keppra level  - EEG  - f/u UA for infection. Abdomen was soft and non-tender and no leukocytosis, less likely SBP  - continue lactulose and rifaximin  - f/u ammonia  - continue lasix and aldactone  - continue thiamine and folic acid        Deaf- written communication   used      Debility  2/2 encephalopathy. Once resolves, will consult PT/OT      Thrombocytopenia  Normocytic anemia    Hgb on admission 7.9 and platelets of 117. Hgb  baseline around 7-8 and platelets around 50s-60s.     - continue to monitor for signs of bleeding  - trend Hgb and platelets        VTE Risk Mitigation (From admission, onward)         Ordered     enoxaparin injection 40 mg  Daily         08/18/22 0145     IP VTE HIGH RISK PATIENT  Once         08/18/22 0145     Place sequential compression device  Until discontinued         08/18/22 0145     Place sequential compression device  Until discontinued         08/18/22 0138                   Yaakov Castro MD  Department of Hospital Medicine   [facility]

## 2022-08-19 NOTE — PLAN OF CARE
Problem: Adult Inpatient Plan of Care  Goal: Plan of Care Review  Outcome: Ongoing, Progressing     Problem: Impaired Wound Healing  Goal: Optimal Wound Healing  Outcome: Ongoing, Progressing     Problem: Fall Injury Risk  Goal: Absence of Fall and Fall-Related Injury  Outcome: Ongoing, Progressing     Problem: Infection  Goal: Absence of Infection Signs and Symptoms  Outcome: Ongoing, Progressing

## 2022-08-19 NOTE — PLAN OF CARE
PT rested all night woke up when prompted. EEG has stayed in place without issue. Pt was given a purwic because of wound and in hopes of collecting a urine sample.     Problem: Skin Injury Risk Increased  Goal: Skin Health and Integrity  Outcome: Ongoing, Progressing     Problem: Adult Inpatient Plan of Care  Goal: Plan of Care Review  Outcome: Ongoing, Progressing  Goal: Patient-Specific Goal (Individualized)  Outcome: Ongoing, Progressing  Goal: Absence of Hospital-Acquired Illness or Injury  Outcome: Ongoing, Progressing  Goal: Optimal Comfort and Wellbeing  Outcome: Ongoing, Progressing  Goal: Readiness for Transition of Care  Outcome: Ongoing, Progressing     Problem: Fluid and Electrolyte Imbalance (Acute Kidney Injury/Impairment)  Goal: Fluid and Electrolyte Balance  Outcome: Ongoing, Progressing     Problem: Oral Intake Inadequate (Acute Kidney Injury/Impairment)  Goal: Optimal Nutrition Intake  Outcome: Ongoing, Progressing     Problem: Renal Function Impairment (Acute Kidney Injury/Impairment)  Goal: Effective Renal Function  Outcome: Ongoing, Progressing     Problem: Impaired Wound Healing  Goal: Optimal Wound Healing  Outcome: Ongoing, Progressing     Problem: Fall Injury Risk  Goal: Absence of Fall and Fall-Related Injury  Outcome: Ongoing, Progressing

## 2022-08-19 NOTE — ASSESSMENT & PLAN NOTE
Normocytic anemia    Hgb on admission 7.9 and platelets of 117. Hgb baseline around 7.5-8.5 and platelets around 50s-60s.     - continue to monitor for signs of bleeding  - trend Hgb and platelets

## 2022-08-19 NOTE — PLAN OF CARE
Provider assessed patient and found no fluid for paracentesis and/or labs. Patient AAOx3, no distress noted, respirations even and unlabored. Allergies reviewed. Patient to be transferred to [facility] via patient transporter. Patient stable for transfer. Report given to CARA Yeung.

## 2022-08-19 NOTE — ASSESSMENT & PLAN NOTE
Alcoholic cirrhosis of liver with ascites  Seizure disorder    67 yoF with cirrhosis with ascites and seizure disorder presents with AMS and fatigue. Labs showed ammonia of 58. CT head showed no acute intracranial abnormality. Possible cause of encephalopathy is multifactorial: infection given history of UTIs and SBP, hepatic encephalopathy given elevated ammonia.    - continue home Clobazam and keppra for seizures  - f/u keppra level  - f/u UA for infection.   - Abdomen was soft and non-tender and no leukocytosis, less likely SBP. Obtaining para with cultures for rule out.   - continue lactulose and rifaximin  - repeat ammonia  - continue lasix and aldactone  - continue thiamine and folic acid

## 2022-08-19 NOTE — PLAN OF CARE
[facility] Observation  Initial Discharge Assessment       Primary Care Provider: Elvie Fernandes MD    Admission Diagnosis: Weakness of both arms [R29.898]  Acute encephalopathy [G93.40]  Chest pain [R07.9]  Altered mental status, unspecified altered mental status type [R41.82]    Admission Date: 8/17/2022  Expected Discharge Date: 8/22/2022         Payor: MEDICARE / Plan: MEDICARE PART A & B / Product Type: Government /     Extended Emergency Contact Information  Primary Emergency Contact: Lay Solitario   [facility]  Home Phone: [phone]  Mobile Phone: [phone]  Relation: Sister  Secondary Emergency Contact: MarielJaime  Mobile Phone: [phone]  Relation: Spouse  Preferred language: American Sign Language   needed? Yes    Discharge Plan A: Rehab  Discharge Plan B: Rehab      [id number]  [place] [address] [facility]  [address]  [place] [id number]  Phone: [phone] Fax: [phone]      Initial Assessment (most recent)     Adult Discharge Assessment - 08/19/22 1451        Discharge Assessment    Confirmed/corrected address, phone number and insurance Yes     Confirmed Demographics Correct on Facesheet     Source of Information family     If unable to respond/provide information was family/caregiver contacted? Yes     Contact Name/Number sister Toro [phone]     Does patient/caregiver understand observation status --   Inpatient    Communicated REMA with patient/caregiver Yes     Reason For Admission Acute encephalopathy     Lives With spouse     Facility Arrived From: [facility]     Do you expect to return to your current living situation? Yes     Do you have help at home or someone to help you manage your care at home? Yes     Who are your caregiver(s) and their phone number(s)?  Bhaskar [phone]     Prior to hospitilization cognitive status: Alert/Oriented     Current cognitive status: Not Oriented to  Person;Not Oriented to Place;Not Oriented to Time     Walking or Climbing Stairs Difficulty ambulation difficulty, requires equipment     Mobility Management Cane, Rollator     Dressing/Bathing Difficulty bathing difficulty, assistance 1 person     Home Layout Able to live on 1st floor     Equipment Currently Used at Home rollator;cane, straight     Readmission within 30 days? No     Patient currently being followed by outpatient case management? No     Do you currently have service(s) that help you manage your care at home? No     Do you take prescription medications? Yes     Do you have prescription coverage? Yes     Coverage Medicare     Do you have any problems affording any of your prescribed medications? No     Are you on dialysis? No     Do you take coumadin? No     Discharge Plan A Rehab     Discharge Plan B Rehab     DME Needed Upon Discharge  none     Discharge Plan discussed with: Sibling     Name(s) and Number(s) RADHA               Information for this assessment was obtained via telephone call with pt's sister Lay. Pt is a 67 y.o. female admitted with acute encephalopathy and has a PMH of al;lcoholic cirrhosis of the liver. She was transferred from [facility] in [place] and per the sister the plan is for the pt to return. Prior to rehab pt needed assistance w/ ADLs. [facility] Discharge Packet given to patient and/or family with understanding verbalized.   name and number and estimated discharge date written on white board in patient's room with request to call for any questions or concerns.  Will continue to follow for needs.  Crow Sanchez RN,BSN

## 2022-08-19 NOTE — NURSING
Lab called to report Gram negative rods in anerobic culture drawn at 10:00am. Hospital Med 3 paged.

## 2022-08-19 NOTE — NURSING
Pt straight cath for urine specimen, 850mL of cloudy foul odor urine drained from bladder. Dr. Tomas notified via secure chat. Please see new orders.

## 2022-08-19 NOTE — PLAN OF CARE
Patient AAOx3, no distress noted, respirations even and unlabored, will continue to monitor. VSS. Acceptance of education, consents signed, H/P done. Labs reviewed. Patient in [facility] [place] [facility] for paracentesis procedure.  provided. Warm blankets applied to patient. Patient prepped and draped in sterile fashion.

## 2022-08-19 NOTE — SUBJECTIVE & OBJECTIVE
Interval History: History still difficult to obtain from patient.  was in the room and assisted with ASL translation. Pt is without significant pain.     Review of Systems   Constitutional:  Positive for activity change and fatigue. Negative for chills and fever.   HENT:  Negative for congestion, dental problem, facial swelling, postnasal drip, rhinorrhea, sore throat and trouble swallowing.    Eyes:  Negative for photophobia and pain.   Respiratory:  Negative for cough, choking, chest tightness, shortness of breath and wheezing.    Cardiovascular:  Negative for chest pain.   Gastrointestinal:  Negative for abdominal distention, abdominal pain, diarrhea, nausea and vomiting.   Endocrine: Negative.    Genitourinary:  Negative for dysuria, flank pain, frequency and hematuria.   Musculoskeletal:  Negative for back pain.   Skin:  Positive for wound (tips of fingers). Negative for pallor and rash.   Allergic/Immunologic: Negative.    Neurological:  Negative for dizziness, syncope, weakness, numbness and headaches.   Psychiatric/Behavioral:  The patient is not nervous/anxious.    Objective:     Vital Signs (Most Recent):  Temp: 97.9 °F (36.6 °C) (08/19/22 1154)  Pulse: 68 (08/19/22 1154)  Resp: 18 (08/19/22 1154)  BP: 122/60 (08/19/22 1154)  SpO2: 98 % (08/19/22 1154) Vital Signs (24h Range):  Temp:  [97.6 °F (36.4 °C)-98.2 °F (36.8 °C)] 97.9 °F (36.6 °C)  Pulse:  [67-80] 68  Resp:  [14-18] 18  SpO2:  [96 %-99 %] 98 %  BP: (112-123)/(51-60) 122/60     Weight: 81.6 kg (180 lb)  Body mass index is 35.15 kg/m².  No intake or output data in the 24 hours ending 08/19/22 1344   Physical Exam  Vitals and nursing note reviewed.   Constitutional:       General: She is not in acute distress.     Appearance: Normal appearance. She is obese. She is not ill-appearing or diaphoretic.   HENT:      Head: Normocephalic and atraumatic.      Right Ear: External ear normal.      Left Ear: External ear normal.      Nose: Nose normal.       Mouth/Throat:      Mouth: Mucous membranes are moist.      Pharynx: Oropharynx is clear.   Eyes:      General: No scleral icterus.     Extraocular Movements: Extraocular movements intact.      Conjunctiva/sclera: Conjunctivae normal.      Pupils: Pupils are equal, round, and reactive to light.   Cardiovascular:      Rate and Rhythm: Normal rate and regular rhythm.      Pulses: Normal pulses.      Heart sounds: Normal heart sounds. No murmur heard.  Pulmonary:      Effort: Pulmonary effort is normal. No respiratory distress.      Breath sounds: Normal breath sounds. No stridor. No wheezing or rhonchi.   Chest:      Chest wall: No tenderness.   Abdominal:      General: Abdomen is flat. There is distension.      Palpations: Abdomen is soft.      Tenderness: There is no abdominal tenderness. There is no guarding or rebound.   Musculoskeletal:         General: No swelling or tenderness. Normal range of motion.      Cervical back: Normal range of motion and neck supple. No rigidity or tenderness.      Right lower leg: Edema present.      Left lower leg: Edema present.   Skin:     General: Skin is warm and dry.      Capillary Refill: Capillary refill takes less than 2 seconds.      Coloration: Skin is pale. Skin is not jaundiced.      Findings: Lesion (tips of fingers where BG checks are performed) present. No erythema.   Neurological:      General: No focal deficit present.      Mental Status: She is alert. She is disoriented.      Cranial Nerves: No cranial nerve deficit.      Motor: No weakness.   Psychiatric:         Mood and Affect: Mood normal.         Behavior: Behavior normal.       Significant Labs: All pertinent labs within the past 24 hours have been reviewed.    Significant Imaging: I have reviewed all pertinent imaging results/findings within the past 24 hours.

## 2022-08-19 NOTE — PROGRESS NOTES
[facility] Medicine  Progress Note    Patient Name: Aleyda Floyd  MRN: [id number]  Patient Class: IP- Inpatient   Admission Date: 8/17/2022  Length of Stay: 0 days  Attending Physician: Areli Tomas*  Primary Care Provider: Elvie Fernandes MD        Subjective:     Principal Problem:Acute encephalopathy    HPI:  This is a 67 year old lady with alcoholic cirrhosis on lactulose and seizure disorder who presented to the ED for AMS and fatigue. 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 History primarily obtained from ED and prior notes. Patient has felt weak and has not been using her arms to communicate via ASL with her family for the past 1-2 weeks. Patient also complained of generalized fatigue in which the patient is sleeping more and is hard to arouse. Of note, patient had a paracentesis 3 weeks ago. Additionally, it was noted that the patient has a history of UTIs.      Upon chart review, patient was recently discharged for decompensated liver failure. At that visit, patient had seizure like acitivity and patient was started on Onfi and keppra.     In the ED, patient was VSS. Labs showed Hgb of 7.9 and an ammonia of 58. CT head showed no acute intracranial abnormality.       Overview/Hospital Course:  08/19/2022  Pt receiving Lactulose 20g TID and Rifaximin 550mg with improving mental status. H/H this morning 8.4/24.1 which is baseline. Tbili increasing to 2.2 from 1.2. CXR (-) and CT (-). Blood cultures show GNRs. Ordered IR paracentesis w/o imaging. Pending urine cultures. Evaluating for infectious source. EEG showed no seizure activity. On Zosyn.       Interval History: History still difficult to obtain from patient.  was in the room and assisted with ASL translation. Pt is without significant pain.     Review of Systems   Constitutional:   Positive for activity change and fatigue. Negative for chills and fever.   HENT:  Negative for congestion, dental problem, facial swelling, postnasal drip, rhinorrhea, sore throat and trouble swallowing.    Eyes:  Negative for photophobia and pain.   Respiratory:  Negative for cough, choking, chest tightness, shortness of breath and wheezing.    Cardiovascular:  Negative for chest pain.   Gastrointestinal:  Negative for abdominal distention, abdominal pain, diarrhea, nausea and vomiting.   Endocrine: Negative.    Genitourinary:  Negative for dysuria, flank pain, frequency and hematuria.   Musculoskeletal:  Negative for back pain.   Skin:  Positive for wound (tips of fingers). Negative for pallor and rash.   Allergic/Immunologic: Negative.    Neurological:  Negative for dizziness, syncope, weakness, numbness and headaches.   Psychiatric/Behavioral:  The patient is not nervous/anxious.    Objective:     Vital Signs (Most Recent):  Temp: 97.9 °F (36.6 °C) (08/19/22 1154)  Pulse: 68 (08/19/22 1154)  Resp: 18 (08/19/22 1154)  BP: 122/60 (08/19/22 1154)  SpO2: 98 % (08/19/22 1154) Vital Signs (24h Range):  Temp:  [97.6 °F (36.4 °C)-98.2 °F (36.8 °C)] 97.9 °F (36.6 °C)  Pulse:  [67-80] 68  Resp:  [14-18] 18  SpO2:  [96 %-99 %] 98 %  BP: (112-123)/(51-60) 122/60     Weight: 81.6 kg (180 lb)  Body mass index is 35.15 kg/m².  No intake or output data in the 24 hours ending 08/19/22 1344   Physical Exam  Vitals and nursing note reviewed.   Constitutional:       General: She is not in acute distress.     Appearance: Normal appearance. She is obese. She is not ill-appearing or diaphoretic.   HENT:      Head: Normocephalic and atraumatic.      Right Ear: External ear normal.      Left Ear: External ear normal.      Nose: Nose normal.      Mouth/Throat:      Mouth: Mucous membranes are moist.      Pharynx: Oropharynx is clear.   Eyes:      General: No scleral icterus.     Extraocular Movements: Extraocular movements intact.       Conjunctiva/sclera: Conjunctivae normal.      Pupils: Pupils are equal, round, and reactive to light.   Cardiovascular:      Rate and Rhythm: Normal rate and regular rhythm.      Pulses: Normal pulses.      Heart sounds: Normal heart sounds. No murmur heard.  Pulmonary:      Effort: Pulmonary effort is normal. No respiratory distress.      Breath sounds: Normal breath sounds. No stridor. No wheezing or rhonchi.   Chest:      Chest wall: No tenderness.   Abdominal:      General: Abdomen is flat. There is distension.      Palpations: Abdomen is soft.      Tenderness: There is no abdominal tenderness. There is no guarding or rebound.   Musculoskeletal:         General: No swelling or tenderness. Normal range of motion.      Cervical back: Normal range of motion and neck supple. No rigidity or tenderness.      Right lower leg: Edema present.      Left lower leg: Edema present.   Skin:     General: Skin is warm and dry.      Capillary Refill: Capillary refill takes less than 2 seconds.      Coloration: Skin is pale. Skin is not jaundiced.      Findings: Lesion (tips of fingers where BG checks are performed) present. No erythema.   Neurological:      General: No focal deficit present.      Mental Status: She is alert. She is disoriented.      Cranial Nerves: No cranial nerve deficit.      Motor: No weakness.   Psychiatric:         Mood and Affect: Mood normal.         Behavior: Behavior normal.       Significant Labs: All pertinent labs within the past 24 hours have been reviewed.    Significant Imaging: I have reviewed all pertinent imaging results/findings within the past 24 hours.      Assessment/Plan:      * Acute encephalopathy  Alcoholic cirrhosis of liver with ascites  Seizure disorder    67 yoF with cirrhosis with ascites and seizure disorder presents with AMS and fatigue. Labs showed ammonia of 58. CT head showed no acute intracranial abnormality. Possible cause of encephalopathy is multifactorial: infection  given history of UTIs and SBP, hepatic encephalopathy given elevated ammonia.    - continue home Clobazam and keppra for seizures  - f/u keppra level  - f/u UA for infection.   - Abdomen was soft and non-tender and no leukocytosis, less likely SBP. Obtaining para with cultures for rule out.   - continue lactulose and rifaximin  - repeat ammonia  - continue lasix and aldactone  - continue thiamine and folic acid        Altered mental status  See acute encephalopathy      Debility  2/2 encephalopathy. Once resolves, will consult PT/OT      Thrombocytopenia  Normocytic anemia    Hgb on admission 7.9 and platelets of 117. Hgb baseline around 7.5-8.5 and platelets around 50s-60s.     - continue to monitor for signs of bleeding  - trend Hgb and platelets      Deaf- written communication   used      VTE Risk Mitigation (From admission, onward)         Ordered     enoxaparin injection 40 mg  Daily         08/18/22 0145     IP VTE HIGH RISK PATIENT  Once         08/18/22 0145     Place sequential compression device  Until discontinued         08/18/22 0145     Place sequential compression device  Until discontinued         08/18/22 0138                Discharge Planning   REMA: 8/22/2022     Code Status: Full Code   Is the patient medically ready for discharge?: No    Reason for patient still in hospital (select all that apply): Patient trending condition           Andrew Garcia MD  Department of Hospital Medicine   [facility] - [facility]ation

## 2022-08-19 NOTE — HOSPITAL COURSE
Admitted to hospital medicine for encephalopathy. EEG on admission unremarkable for seizure activity, consistent with encephalopathy that could be infectious vs metabolic vs primary neuronal in nature. Continue on lactulose and rifamixin for hepatic encephalopathy. Blood and urine cultures growing ESBL e coli. Initially started on meropenem, however consulted ID who recommended de-escalating to ertapenem; will need two week course in total. Pt alert and mentation seems to be improving but only minimally; she remains alert and appears to understand what the  is saying, but will not sign herself. Moving all four extremities. CT head unremarkable for acute change, only shows chronic microvascular changes. Will obtain MRI c-spine to rule out spinal cord compression that may be affecting use of her arms. Pt came from LT[facility] and SW confirming that Pt will be discharging back to the same LTAC once medically ready.

## 2022-08-19 NOTE — PROCEDURES
Procedures     EXTENDED  ELECTROENCEPHALOGRAM  REPORT    DATE OF SERVICE: 8/18/22-8/19/22  EEG NUMBER: [id number]  REQUESTED BY:  Thom  [place] [facility] [place]Y   Electroencephalographic (EEG) recording is with electrodes placed according to the International 10-20 placement system.  Thirty two (32) channels of digital signal (sampling rate of 512/sec) including T1 and T2 was simultaneously recorded from the scalp and may include  EKG, EMG, and/or eye monitors.  Recording band pass was 0.1 to 512 hz.  Digital video recording of the patient is simultaneously recorded with the EEG.  The patient is instructed report clinical symptoms which may occur during the recording session.  EEG and video recording is stored and archived in digital format.  Activation procedures which include photic stimulation, hyperventilation and instructing patients to perform simple task are done in selected patients.   The EEG is displayed on a monitor screen and can be reviewed using different montages.  Computer assisted analysis is employed to detect spike and electrographic seizure activity.   The entire record is submitted for computer analysis.  The entire recording is visually reviewed and the times identified by computer analysis as being spikes or seizures are reviewed again.  Compresses spectral analysis (CSA) is also performed on the activity recorded from each individual channel.  This is displayed as a power display of frequencies from 0 to 30 Hz over time.   The CSA is reviewed looking for asymmetries in power between homologous areas of the scalp and then compared with the original EEG recording.     [employer] software was also utilized in the review of this study.  This software suite analyzes the EEG recording in multiple domains.  Coherence and rhythmicity is computed to identify EEG sections which may contain organized seizures.  Each channel undergoes analysis to detect presence of spike and sharp waves  which have special and morphological characteristic of epileptic activity.  The routine EEG recording is converted from spacial into frequency domain.  This is then displayed comparing homologous areas to identify areas of significant asymmetry.  Algorithm to identify non-cortically generated artifact is used to separate eye movement, EMG and other artifact from the EEG.      RECORDING TIMES  Start on 8/18/22 at 10:22:26   Stop on 8/18/22  at 13:06:28  Start on 8/18/22 at 14:04:22  Stop on 8/19/22 at 07:00:22   Start on 8/19/22 at 07:00:42  Stop on 8/19/22 at 08:23:00  Start on 8/19/22 at 09:19:00  Stop on 8/19/22 at 10:10:27    A total of 21 hours of EEG recording was obtained.    EEG FINDINGS  The record shows a fair  organization at rest, consisting of a 8 Hz posterior dominant rhythm with good  reactivity. There is mild bilateral beta activity.    Drowsiness is characterized by attenuation of the background, vertex waves, and bilateral theta slowing. Stage II sleep is characterized by slowing, vertex waves, and symmetric sleep spindles.    Provocative maneuvers including hyperventilation and photic stimulation were not performed.     EKG recording shows a regular rhythm.    There is no push button or clinical event.    IMPRESSION:  Abnormal study due to mild to moderate  diffuse background slowing consistent with diffuse cerebral dysfunction and encephalopathy which may be on the basis of toxic, metabolic, or primary neuronal disorder.       Omari Donahue MD

## 2022-08-20 NOTE — PLAN OF CARE
Problem: Skin Injury Risk Increased  Goal: Skin Health and Integrity  Outcome: Ongoing, Progressing     Problem: Adult Inpatient Plan of Care  Goal: Plan of Care Review  Outcome: Ongoing, Progressing     Problem: Oral Intake Inadequate (Acute Kidney Injury/Impairment)  Goal: Optimal Nutrition Intake  Outcome: Ongoing, Progressing     Problem: Fall Injury Risk  Goal: Absence of Fall and Fall-Related Injury  Outcome: Ongoing, Progressing     Problem: Infection  Goal: Absence of Infection Signs and Symptoms  Outcome: Ongoing, Progressing

## 2022-08-20 NOTE — ASSESSMENT & PLAN NOTE
Alcoholic cirrhosis of liver with ascites  Seizure disorder    67 yoF with cirrhosis with ascites and seizure disorder presents with AMS and fatigue. Labs showed ammonia of 36. CT head showed no acute intracranial abnormality. Possible cause of encephalopathy is multifactorial: infection given history of UTIs and  ESBL E.Coli bacteremia.     -Meropenem sensitivity; start Blanquita IV   - continue home Clobazam and keppra for seizures  - f/u keppra level  - f/u UA for infection.   - continue lactulose and rifaximin  - continue lasix and aldactone  - continue thiamine and folic acid

## 2022-08-20 NOTE — SUBJECTIVE & OBJECTIVE
Interval History: Pt still too weak to sign for . Denies any significant pain overnight through nodding for yes/no questions.     Review of Systems   Constitutional:  Positive for activity change and fatigue. Negative for chills and fever.   HENT:  Negative for congestion, dental problem, facial swelling, postnasal drip, rhinorrhea, sore throat and trouble swallowing.    Eyes:  Negative for photophobia and pain.   Respiratory:  Negative for cough, choking, chest tightness, shortness of breath and wheezing.    Cardiovascular:  Negative for chest pain.   Gastrointestinal:  Negative for abdominal distention, abdominal pain, diarrhea, nausea and vomiting.   Endocrine: Negative.    Genitourinary:  Negative for dysuria, flank pain, frequency and hematuria.   Musculoskeletal:  Negative for back pain.   Skin:  Positive for wound (tips of fingers). Negative for pallor and rash.   Allergic/Immunologic: Negative.    Neurological:  Negative for dizziness, syncope, weakness, numbness and headaches.   Psychiatric/Behavioral:  The patient is not nervous/anxious.    Objective:     Vital Signs (Most Recent):  Temp: 97.5 °F (36.4 °C) (08/20/22 1221)  Pulse: 76 (08/20/22 1221)  Resp: 16 (08/20/22 1221)  BP: (!) 144/63 (08/20/22 1221)  SpO2: 100 % (08/20/22 1221)   Vital Signs (24h Range):  Temp:  [97.5 °F (36.4 °C)-98.9 °F (37.2 °C)] 97.5 °F (36.4 °C)  Pulse:  [76-86] 76  Resp:  [14-18] 16  SpO2:  [96 %-100 %] 100 %  BP: (115-144)/(55-63) 144/63     Weight: 81.6 kg (180 lb)  Body mass index is 35.15 kg/m².    Intake/Output Summary (Last 24 hours) at 8/20/2022 1324  Last data filed at 8/19/2022 1700  Gross per 24 hour   Intake 2120 ml   Output 1050 ml   Net 1070 ml      Physical Exam  Vitals and nursing note reviewed.   Constitutional:       General: She is not in acute distress.     Appearance: Normal appearance. She is obese. She is not ill-appearing or diaphoretic.   HENT:      Head: Normocephalic and atraumatic.       Right Ear: External ear normal.      Left Ear: External ear normal.      Nose: Nose normal.      Mouth/Throat:      Mouth: Mucous membranes are moist.      Pharynx: Oropharynx is clear.   Eyes:      General: No scleral icterus.     Extraocular Movements: Extraocular movements intact.      Conjunctiva/sclera: Conjunctivae normal.      Pupils: Pupils are equal, round, and reactive to light.   Cardiovascular:      Rate and Rhythm: Normal rate and regular rhythm.      Pulses: Normal pulses.      Heart sounds: Normal heart sounds. No murmur heard.  Pulmonary:      Effort: Pulmonary effort is normal. No respiratory distress.      Breath sounds: Normal breath sounds. No stridor. No wheezing or rhonchi.   Chest:      Chest wall: No tenderness.   Abdominal:      General: Abdomen is flat. There is distension.      Palpations: Abdomen is soft.      Tenderness: There is no abdominal tenderness. There is no guarding or rebound.   Musculoskeletal:         General: No swelling or tenderness. Normal range of motion.      Cervical back: Normal range of motion and neck supple. No rigidity or tenderness.      Right lower leg: Edema present.      Left lower leg: Edema present.   Skin:     General: Skin is warm and dry.      Capillary Refill: Capillary refill takes less than 2 seconds.      Coloration: Skin is pale. Skin is not jaundiced.      Findings: Lesion (tips of fingers where BG checks are performed) present. No erythema.   Neurological:      General: No focal deficit present.      Mental Status: She is alert. She is disoriented.      Cranial Nerves: No cranial nerve deficit.      Motor: No weakness.   Psychiatric:         Mood and Affect: Mood normal.         Behavior: Behavior normal.       Significant Labs: All pertinent labs within the past 24 hours have been reviewed.    Significant Imaging: I have reviewed all pertinent imaging results/findings within the past 24 hours.

## 2022-08-20 NOTE — PROGRESS NOTES
[facility] Medicine  Progress Note    Patient Name: Aleyda Floyd  MRN: [id number]  Patient Class: IP- Inpatient   Admission Date: 8/17/2022  Length of Stay: 1 days  Attending Physician: Areli Tomas*  Primary Care Provider: Elvie Fernandes MD        Subjective:     Principal Problem:Acute encephalopathy    HPI:  This is a 67 year old lady with alcoholic cirrhosis on lactulose and seizure disorder who presented to the ED for AMS and fatigue. 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 History primarily obtained from ED and prior notes. Patient has felt weak and has not been using her arms to communicate via ASL with her family for the past 1-2 weeks. Patient also complained of generalized fatigue in which the patient is sleeping more and is hard to arouse. Of note, patient had a paracentesis 3 weeks ago. Additionally, it was noted that the patient has a history of UTIs.      Upon chart review, patient was recently discharged for decompensated liver failure. At that visit, patient had seizure like acitivity and patient was started on Onfi and keppra.     In the ED, patient was VSS. Labs showed Hgb of 7.9 and an ammonia of 58. CT head showed no acute intracranial abnormality.       Overview/Hospital Course:  08/19/2022  Pt receiving Lactulose 20g TID and Rifaximin 550mg with improving mental status. H/H this morning 8.4/24.1 which is baseline. Tbili increasing to 2.2 from 1.2. CXR (-) and CT (-). Blood cultures show GNRs. Ordered IR paracentesis w/o imaging. Pending urine cultures. Evaluating for infectious source. EEG showed no seizure activity. On Zosyn.   08/20/2022  Pt alert but unwilling to sign to  due to fatigue. Repeat ammonia WNL. IR stated that there was not enough fluid for them to take a sample for culture. Pt with positive ESBL E.Coli  Bacteremia with Meropenem/Ertapenem sensitivities.       Interval History: Pt still too weak to sign for . Denies any significant pain overnight through nodding for yes/no questions.     Review of Systems   Constitutional:  Positive for activity change and fatigue. Negative for chills and fever.   HENT:  Negative for congestion, dental problem, facial swelling, postnasal drip, rhinorrhea, sore throat and trouble swallowing.    Eyes:  Negative for photophobia and pain.   Respiratory:  Negative for cough, choking, chest tightness, shortness of breath and wheezing.    Cardiovascular:  Negative for chest pain.   Gastrointestinal:  Negative for abdominal distention, abdominal pain, diarrhea, nausea and vomiting.   Endocrine: Negative.    Genitourinary:  Negative for dysuria, flank pain, frequency and hematuria.   Musculoskeletal:  Negative for back pain.   Skin:  Positive for wound (tips of fingers). Negative for pallor and rash.   Allergic/Immunologic: Negative.    Neurological:  Negative for dizziness, syncope, weakness, numbness and headaches.   Psychiatric/Behavioral:  The patient is not nervous/anxious.    Objective:     Vital Signs (Most Recent):  Temp: 97.5 °F (36.4 °C) (08/20/22 1221)  Pulse: 76 (08/20/22 1221)  Resp: 16 (08/20/22 1221)  BP: (!) 144/63 (08/20/22 1221)  SpO2: 100 % (08/20/22 1221)   Vital Signs (24h Range):  Temp:  [97.5 °F (36.4 °C)-98.9 °F (37.2 °C)] 97.5 °F (36.4 °C)  Pulse:  [76-86] 76  Resp:  [14-18] 16  SpO2:  [96 %-100 %] 100 %  BP: (115-144)/(55-63) 144/63     Weight: 81.6 kg (180 lb)  Body mass index is 35.15 kg/m².    Intake/Output Summary (Last 24 hours) at 8/20/2022 1324  Last data filed at 8/19/2022 1700  Gross per 24 hour   Intake 2120 ml   Output 1050 ml   Net 1070 ml      Physical Exam  Vitals and nursing note reviewed.   Constitutional:       General: She is not in acute distress.     Appearance: Normal appearance. She is obese. She is not ill-appearing or diaphoretic.    HENT:      Head: Normocephalic and atraumatic.      Right Ear: External ear normal.      Left Ear: External ear normal.      Nose: Nose normal.      Mouth/Throat:      Mouth: Mucous membranes are moist.      Pharynx: Oropharynx is clear.   Eyes:      General: No scleral icterus.     Extraocular Movements: Extraocular movements intact.      Conjunctiva/sclera: Conjunctivae normal.      Pupils: Pupils are equal, round, and reactive to light.   Cardiovascular:      Rate and Rhythm: Normal rate and regular rhythm.      Pulses: Normal pulses.      Heart sounds: Normal heart sounds. No murmur heard.  Pulmonary:      Effort: Pulmonary effort is normal. No respiratory distress.      Breath sounds: Normal breath sounds. No stridor. No wheezing or rhonchi.   Chest:      Chest wall: No tenderness.   Abdominal:      General: Abdomen is flat. There is distension.      Palpations: Abdomen is soft.      Tenderness: There is no abdominal tenderness. There is no guarding or rebound.   Musculoskeletal:         General: No swelling or tenderness. Normal range of motion.      Cervical back: Normal range of motion and neck supple. No rigidity or tenderness.      Right lower leg: Edema present.      Left lower leg: Edema present.   Skin:     General: Skin is warm and dry.      Capillary Refill: Capillary refill takes less than 2 seconds.      Coloration: Skin is pale. Skin is not jaundiced.      Findings: Lesion (tips of fingers where BG checks are performed) present. No erythema.   Neurological:      General: No focal deficit present.      Mental Status: She is alert. She is disoriented.      Cranial Nerves: No cranial nerve deficit.      Motor: No weakness.   Psychiatric:         Mood and Affect: Mood normal.         Behavior: Behavior normal.       Significant Labs: All pertinent labs within the past 24 hours have been reviewed.    Significant Imaging: I have reviewed all pertinent imaging results/findings within the past 24  hours.      Assessment/Plan:      * Acute encephalopathy  Alcoholic cirrhosis of liver with ascites  Seizure disorder    67 yoF with cirrhosis with ascites and seizure disorder presents with AMS and fatigue. Labs showed ammonia of 36. CT head showed no acute intracranial abnormality. Possible cause of encephalopathy is multifactorial: infection given history of UTIs and  ESBL E.Coli bacteremia.     -Meropenem sensitivity; start Blanquita IV   -ID consulted  - continue home Clobazam and keppra for seizures  - f/u keppra level  - f/u UA for infection.   - continue lactulose and rifaximin  - continue lasix and aldactone  - continue thiamine and folic acid        Altered mental status  See acute encephalopathy      Debility  2/2 encephalopathy. Once resolves, will consult PT/OT      Thrombocytopenia  Normocytic anemia    Hgb on admission 7.9 and platelets of 117. Hgb baseline around 7.5-8.5 and platelets around 50s-60s. Current Hgb 8.4 platelets 102.    - continue to monitor for signs of bleeding  - trend Hgb and platelets      Deaf- written communication   used; fatigue still too significant for any meaningful interaction.    VTE Risk Mitigation (From admission, onward)         Ordered     enoxaparin injection 40 mg  Daily         08/18/22 0145     IP VTE HIGH RISK PATIENT  Once         08/18/22 0145     Place sequential compression device  Until discontinued         08/18/22 0145     Place sequential compression device  Until discontinued         08/18/22 0138                Discharge Planning   REMA: 8/21/2022     Code Status: Full Code   Is the patient medically ready for discharge?: No    Reason for patient still in hospital (select all that apply): Patient new problem  Discharge Plan A: Rehab                  Andrew Garcia MD  Department of Hospital Medicine   [facility]

## 2022-08-20 NOTE — ASSESSMENT & PLAN NOTE
Normocytic anemia    Hgb on admission 7.9 and platelets of 117. Hgb baseline around 7.5-8.5 and platelets around 50s-60s. Current Hgb 8.4 platelets 102.    - continue to monitor for signs of bleeding  - trend Hgb and platelets

## 2022-08-21 PROBLEM — A49.8 INFECTION DUE TO ESBL-PRODUCING ESCHERICHIA COLI: Status: ACTIVE | Noted: 2022-01-01

## 2022-08-21 PROBLEM — Z16.12 INFECTION DUE TO ESBL-PRODUCING ESCHERICHIA COLI: Status: ACTIVE | Noted: 2022-01-01

## 2022-08-21 PROBLEM — R78.81 GRAM-NEGATIVE BACTEREMIA: Status: ACTIVE | Noted: 2022-01-01

## 2022-08-21 NOTE — HPI
"A 67-year-old woman with HTN, ETOH cirrhosis (recent admission for decompensated liver failure, not currently transplant candidate), seizure disorder (on Keppra and Onfi), deaf/mute, reported hx of recurrent UTIs, who presented with AMS and fatigue X 1-2 weeks.  CT imaging and EEG negative for acute IC pathology.  Admission blood cx  3/4 + for ESBL E coli susceptible to only carbapenems.   U/A >100 wbc, urine cx pending. Currently on meropenem. ID consulted for meropenem and ESBL E coli bacteremia.     Afebrile, no leukocytosis.  Reported dysuria per medical record.  At time of visit,  (who is also hearing impaired is in room).   Patient is lethargic, opens eyes, but will not respond to his signing/communication. Unable to obtain a ROS  Spoke to sister on the phone.  Unable to tell me much about patient symptoms/complaints.    Patient was seen by the Infectious Diseases [facility] service. Work up revealed ESBL E coli bacteremia. Her antibiotic therapy was optimized to Ertapenem. Blood cultures repeated on 8/20 remain NGTD and recommendations for a 14 day course of therapy were give prior to sign off.     Her hospital course was complicated by hypotension and bilateral hand weakness. She was given volume replacement however her shock failed to improve which prompted transfer to the ICU.     Infectious Diseases consulted for "ESBL bacteremia and UTI, stepped up to ICU for septic shock"          "

## 2022-08-21 NOTE — CONSULTS
[facility]  Infectious Disease  Consult Note    Patient Name: Aleyda Floyd  MRN: [id number]  Admission Date: 8/17/2022  Hospital Length of Stay: 2 days  Attending Physician: Areli Tomas*  Primary Care Provider: Elvie Fernandes MD     Isolation Status: Contact    Patient information was obtained from relative(s), past medical records and ER records.      Consults  Assessment/Plan:     * Gram-negative bacteremia     67 year old woman with HTN, ETOH cirrhosis (recent admission for decompensated liver failure, not currently transplant candidate), seizure disorder (on Keppra and Onfi), deaf/mute, reported hx of recurrent UTIs, who presented with AMS and fatigue X 1-2 weeks.  CT imaging for acute pathology.  EEG without evidence of seizure activity.  Admission blood cx  8/18 + for ESBL E coli susceptible to only carbapenems.  Repeat blood cultures 8/20 NGTD.   U/A >100 wbc, urine cx showing GNR - ID pending.  Currently on meropenem. ID consulted for meropenem use and ESBL E coli bacteremia     Afebrile, no leukocytosis. HDS.   Reported dysuria by chart review but unable to elicit complaints today on exam due to AMS/fatigue/unwillingness to communicate?      Plan/recommendations:  1.  Discontinue meropenem and start ertapenem 1 g IV q 24 hours (cr cl >30) for ESBL E coli.  Monitor closely for seizure activity as carbapenems can lower seizure threshold.  No other antibiotic options available.   2.  Follow up repeat blood and urine cultures  3.  Will follow     Data reviewed and plan discussed with ID staff, Dr. Segundo  Secure chat re: above plan with Primary Team, Dr. Tomas and Dr. Garcia    UTI (urinary tract infection)  See assessment/plan above      Infection due to ESBL-producing Escherichia coli  See assessment plan above      Thank you.   Please call for any questions or concerns.  Bella Ellis, APRN, ANP-C  Spectra [phone]  Subjective:     Principal Problem: Gram-negative bacteremia    HPI: Ms.  Aleyda Floyd is a 67 year old woman with HTN, ETOH cirrhosis (recent admission for decompensated liver failure, not currently transplant candidate), seizure disorder (on Keppra and Onfi), deaf/mute, reported hx of recurrent UTIs, who presented with AMS and fatigue X 1-2 weeks.  CT imaging and EEG negative for acute IC pathology.  Admission blood cx  3/4 + for ESBL E coli susceptible to only carbapenems.   U/A >100 wbc, urine cx pending. Currently on meropenem. ID consulted for meropenem and ESBL E coli bacteremia.     Afebrile, no leukocytosis.  Reported dysuria per medical record.  At time of visit,  (who is also hearing impaired is in room).   Patient is lethargic, opens eyes, but will not respond to his signing/communication. Unable to obtain a ROS  Spoke to sister on the phone.  Unable to tell me much about patient symptoms/complaints      Past Medical History:   Diagnosis Date    PITO (acute kidney injury)     Alcoholic cirrhosis of liver     Hypercholesterolemia     Hypertension        Past Surgical History:   Procedure Laterality Date    DILATION AND CURETTAGE OF UTERUS      ESOPHAGOGASTRODUODENOSCOPY N/A 6/23/2022    Procedure: EGD (ESOPHAGOGASTRODUODENOSCOPY);  Surgeon: Sean Perry MD;  Location: [address]);  Service: Endoscopy;  Laterality: N/A;  cirrhosis, variceal screening  labs prior  -request sent 6/14  fully vaccinated, instructions emailed to [email or web address]       Review of patient's allergies indicates:  No Known Allergies    Medications:  Medications Prior to Admission   Medication Sig    acetaminophen (TYLENOL) 325 MG tablet Take 650 mg by mouth daily as needed for Pain. NOT TO EXCEED 4 GRAMS IN 24 HOUR PERIOD    cholecalciferol, vitamin D3, (VITAMIN D3) 50 mcg (2,000 unit) Cap capsule Take 1 capsule by mouth once daily.    cholestyramine-aspartame (QUESTRAN LIGHT) 4 gram PwPk Take 4 g by mouth once daily.    folic acid  (FOLVITE) 1 MG tablet Take 1 mg by mouth once daily.     furosemide (LASIX) 40 MG tablet Take 1 tablet (40 mg total) by mouth 2 (two) times daily.    lactulose (CHRONULAC) 20 gram/30 mL Soln Take 30 mLs (20 g total) by mouth 2 (two) times daily. (Patient taking differently: ADMINISTER 30 ML RECTALLY TWICE DAILY. TO BE ADDED INTO CLEANSING ENEMA SET WITH 700 ML NORMAL SALINE)    levETIRAcetam (KEPPRA) 750 MG Tab Take 1 tablet (750 mg total) by mouth 2 (two) times daily.    nystatin (MYCOSTATIN) powder Apply to skin folds beneath breasts as needed    omeprazole (PRILOSEC) 40 MG capsule Take 40 mg by mouth once daily.    rifAXIMin (XIFAXAN) 550 mg Tab Take 1 tablet (550 mg total) by mouth 2 (two) times daily.    spironolactone (ALDACTONE) 100 MG tablet Take 1 tablet (100 mg total) by mouth once daily.    thiamine 100 MG tablet Take 100 mg by mouth once daily.    aspirin 81 MG Chew Take 81 mg by mouth once daily.    cloBAZam (ONFI) 10 mg Tab Take 1 each (10 mg total) by mouth once daily. (Patient not taking: Reported on 2022)     Antibiotics (From admission, onward)                Start     Stop Route Frequency Ordered    22 1445  meropenem-0.9% sodium chloride 1 g/50 mL IVPB         -- IV Every 8 hours (non-standard times) 22 1340    22 0900  rifAXIMin tablet 550 mg         -- Oral 2 times daily 22 0138          Antifungals (From admission, onward)                None          Antivirals (From admission, onward)      None             Immunization History   Administered Date(s) Administered    PPD Test 2022       Family History       Problem Relation (Age of Onset)    Breast cancer Maternal Grandmother          Social History     Socioeconomic History    Marital status:    Tobacco Use    Smoking status: Former Smoker     Packs/day: 1.00     Years: 15.00     Pack years: 15.00     Quit date: 1998     Years since quittin.7    Smokeless tobacco: Never Used    Substance and Sexual Activity    Alcohol use: Not Currently    Drug use: Not Currently     Review of Systems   Unable to perform ROS: Mental status change   Objective:     Vital Signs (Most Recent):  Temp: 98.6 °F (37 °C) (08/21/22 1611)  Pulse: 70 (08/21/22 1611)  Resp: 17 (08/21/22 1611)  BP: 134/61 (08/21/22 1611)  SpO2: 100 % (08/21/22 1611) Vital Signs (24h Range):  Temp:  [97.5 °F (36.4 °C)-98.8 °F (37.1 °C)] 98.6 °F (37 °C)  Pulse:  [70-84] 70  Resp:  [17-18] 17  SpO2:  [97 %-100 %] 100 %  BP: (122-141)/(56-64) 134/61     Weight: 81.6 kg (180 lb)  Body mass index is 35.15 kg/m².    Estimated Creatinine Clearance: 46.9 mL/min (based on SCr of 1.1 mg/dL).    Physical Exam  Vitals and nursing note reviewed.   Constitutional:       General: She is not in acute distress.     Appearance: She is obese. She is ill-appearing (chronically ill appearing). She is not toxic-appearing or diaphoretic.      Comments: Lethargic   HENT:      Head: Normocephalic.      Mouth/Throat:      Mouth: Mucous membranes are dry.   Eyes:      General: No scleral icterus.     Conjunctiva/sclera: Conjunctivae normal.   Cardiovascular:      Rate and Rhythm: Normal rate and regular rhythm.      Heart sounds: No murmur heard.  Pulmonary:      Effort: Pulmonary effort is normal. No respiratory distress.      Breath sounds: Normal breath sounds.   Abdominal:      General: There is no distension.      Palpations: Abdomen is soft.      Tenderness: There is no abdominal tenderness. There is no guarding.   Musculoskeletal:      Right lower leg: Edema present.      Left lower leg: Edema present.   Skin:     General: Skin is warm and dry.   Neurological:      Comments: Arouses to touch, alert when awakened, but does not engage       Significant Labs: Blood Culture:   Recent Labs   Lab 07/10/22  1311 07/11/22  1550 08/18/22  1044 08/20/22  1413 08/20/22  1414   LABBLOO No growth after 5 days. No growth after 5 days. Gram stain luisa bottle: Gram  negative rods   Positive results previously called 08/19/2022  00:06  Gram stain aer bottle: Gram negative rods   Positive results previously called 08/19/2022  00:42  ESCHERICHIA COLI ESBL  For susceptibility see order # [id number]  *  Gram stain luisa bottle: Gram negative rods   Results called to and read back by:Kitty Carreon RN 08/18/2022  21:56  Gram stain aer bottle: Gram negative rods   Positive results previously called 08/19/2022  00:08  ESCHERICHIA COLI ESBL* No Growth to date  No Growth to date No Growth to date  No Growth to date     CBC:   Recent Labs   Lab 08/20/22  0555   WBC 7.33   HGB 8.6*   HCT 25.8*   *     CMP:   Recent Labs   Lab 08/20/22  0555 08/21/22  0707    135*   K 3.9 3.7    102   CO2 23 27   * 114*   BUN 19 18   CREATININE 1.2 1.1   CALCIUM 9.1 8.9   PROT 6.4  --    ALBUMIN 2.1*  --    BILITOT 1.6*  --    ALKPHOS 67  --    AST 35  --    ALT 18  --    ANIONGAP 10 6*     Urine Culture:   Recent Labs   Lab 06/28/22  0210 07/10/22  1104 07/25/22  1820 08/19/22  1708   LABURIN LACTOBACILLUS SPECIES  > 100,000 cfu/ml  No other significant isolate  * ENTEROCOCCUS FAECALIS  >100,000 cfu/ml  * CANDIDA ALBICANS  > 100,000 cfu/ml  Treatment of asymptomatic candiduria is not recommended (except for   specific populations). Candida isolated in the urine typically   represents colonization. If an indwelling urinary catheter is present  it should be removed or replaced.  * GRAM NEGATIVE BECCA  > 100,000 cfu/ml  Identification and susceptibility pending  *     Urine Studies:   Recent Labs   Lab 06/28/22  0210 07/10/22  1104 08/19/22  1708   COLORU Yellow   < > Orange*   APPEARANCEUA Hazy*   < > Ex.Turbid   PHUR 5.0   < > 7.0   SPECGRAV 1.015   < > 1.010   PROTEINUA Negative   < > 1+*   GLUCUA Negative   < > Negative   KETONESU Negative   < > Negative   BILIRUBINUA Negative   < > Negative   OCCULTUA Negative   < > 2+*   NITRITE Negative   < > Positive*   LEUKOCYTESUR 1+*   <  > 3+*   RBCUA 1   < > >100*   WBCUA 20*   < > >100*   BACTERIA Many*   < > Many*   SQUAMEPITHEL 6  --   --    HYALINECASTS 49*   < > 0    < > = values in this interval not displayed.       Significant Imaging: I have reviewed all pertinent imaging results/findings within the past 24 hours.

## 2022-08-21 NOTE — SUBJECTIVE & OBJECTIVE
Past Medical History:   Diagnosis Date    PITO (acute kidney injury)     Alcoholic cirrhosis of liver     Hypercholesterolemia     Hypertension        Past Surgical History:   Procedure Laterality Date    DILATION AND CURETTAGE OF UTERUS      ESOPHAGOGASTRODUODENOSCOPY N/A 6/23/2022    Procedure: EGD (ESOPHAGOGASTRODUODENOSCOPY);  Surgeon: Sean Perry MD;  Location: [address]);  Service: Endoscopy;  Laterality: N/A;  cirrhosis, variceal screening  labs prior  -request sent 6/14  fully vaccinated, instructions emailed to [email or web address]       Review of patient's allergies indicates:  No Known Allergies    Medications:  Medications Prior to Admission   Medication Sig    acetaminophen (TYLENOL) 325 MG tablet Take 650 mg by mouth daily as needed for Pain. NOT TO EXCEED 4 GRAMS IN 24 HOUR PERIOD    cholecalciferol, vitamin D3, (VITAMIN D3) 50 mcg (2,000 unit) Cap capsule Take 1 capsule by mouth once daily.    cholestyramine-aspartame (QUESTRAN LIGHT) 4 gram PwPk Take 4 g by mouth once daily.    folic acid (FOLVITE) 1 MG tablet Take 1 mg by mouth once daily.     furosemide (LASIX) 40 MG tablet Take 1 tablet (40 mg total) by mouth 2 (two) times daily.    lactulose (CHRONULAC) 20 gram/30 mL Soln Take 30 mLs (20 g total) by mouth 2 (two) times daily. (Patient taking differently: ADMINISTER 30 ML RECTALLY TWICE DAILY. TO BE ADDED INTO CLEANSING ENEMA SET WITH 700 ML NORMAL SALINE)    levETIRAcetam (KEPPRA) 750 MG Tab Take 1 tablet (750 mg total) by mouth 2 (two) times daily.    nystatin (MYCOSTATIN) powder Apply to skin folds beneath breasts as needed    omeprazole (PRILOSEC) 40 MG capsule Take 40 mg by mouth once daily.    rifAXIMin (XIFAXAN) 550 mg Tab Take 1 tablet (550 mg total) by mouth 2 (two) times daily.    spironolactone (ALDACTONE) 100 MG tablet Take 1 tablet (100 mg total) by mouth once daily.    thiamine 100 MG tablet Take 100 mg by mouth once daily.    aspirin  81 MG Chew Take 81 mg by mouth once daily.    cloBAZam (ONFI) 10 mg Tab Take 1 each (10 mg total) by mouth once daily. (Patient not taking: Reported on 2022)     Antibiotics (From admission, onward)                Start     Stop Route Frequency Ordered    22 1445  meropenem-0.9% sodium chloride 1 g/50 mL IVPB         -- IV Every 8 hours (non-standard times) 22 1340    22 0900  rifAXIMin tablet 550 mg         -- Oral 2 times daily 22 0138          Antifungals (From admission, onward)                None          Antivirals (From admission, onward)      None             Immunization History   Administered Date(s) Administered    PPD Test 2022       Family History       Problem Relation (Age of Onset)    Breast cancer Maternal Grandmother          Social History     Socioeconomic History    Marital status:    Tobacco Use    Smoking status: Former Smoker     Packs/day: 1.00     Years: 15.00     Pack years: 15.00     Quit date: 1998     Years since quittin.7    Smokeless tobacco: Never Used   Substance and Sexual Activity    Alcohol use: Not Currently    Drug use: Not Currently     Review of Systems   Unable to perform ROS: Mental status change   Objective:     Vital Signs (Most Recent):  Temp: 98.6 °F (37 °C) (22)  Pulse: 70 (22)  Resp: 17 (22)  BP: 134/61 (22)  SpO2: 100 % (22) Vital Signs (24h Range):  Temp:  [97.5 °F (36.4 °C)-98.8 °F (37.1 °C)] 98.6 °F (37 °C)  Pulse:  [70-84] 70  Resp:  [17-18] 17  SpO2:  [97 %-100 %] 100 %  BP: (122-141)/(56-64) 134/61     Weight: 81.6 kg (180 lb)  Body mass index is 35.15 kg/m².    Estimated Creatinine Clearance: 46.9 mL/min (based on SCr of 1.1 mg/dL).    Physical Exam  Vitals and nursing note reviewed.   Constitutional:       General: She is not in acute distress.     Appearance: She is obese. She is ill-appearing (chronically ill appearing). She is not toxic-appearing or  diaphoretic.      Comments: Lethargic   HENT:      Head: Normocephalic.      Mouth/Throat:      Mouth: Mucous membranes are dry.   Eyes:      General: No scleral icterus.     Conjunctiva/sclera: Conjunctivae normal.   Cardiovascular:      Rate and Rhythm: Normal rate and regular rhythm.      Heart sounds: No murmur heard.  Pulmonary:      Effort: Pulmonary effort is normal. No respiratory distress.      Breath sounds: Normal breath sounds.   Abdominal:      General: There is no distension.      Palpations: Abdomen is soft.      Tenderness: There is no abdominal tenderness. There is no guarding.   Musculoskeletal:      Right lower leg: Edema present.      Left lower leg: Edema present.   Skin:     General: Skin is warm and dry.   Neurological:      Comments: Arouses to touch, alert when awakened, but does not engage       Significant Labs: Blood Culture:   Recent Labs   Lab 07/10/22  1311 07/11/22  1550 08/18/22  1044 08/20/22  1413 08/20/22  1414   LABBLOO No growth after 5 days. No growth after 5 days. Gram stain luisa bottle: Gram negative rods   Positive results previously called 08/19/2022  00:06  Gram stain aer bottle: Gram negative rods   Positive results previously called 08/19/2022  00:42  ESCHERICHIA COLI ESBL  For susceptibility see order # [id number]  *  Gram stain luisa bottle: Gram negative rods   Results called to and read back by:Kitty Carreon RN 08/18/2022  21:56  Gram stain aer bottle: Gram negative rods   Positive results previously called 08/19/2022  00:08  ESCHERICHIA COLI ESBL* No Growth to date  No Growth to date No Growth to date  No Growth to date     CBC:   Recent Labs   Lab 08/20/22  0555   WBC 7.33   HGB 8.6*   HCT 25.8*   *     CMP:   Recent Labs   Lab 08/20/22  0555 08/21/22  0707    135*   K 3.9 3.7    102   CO2 23 27   * 114*   BUN 19 18   CREATININE 1.2 1.1   CALCIUM 9.1 8.9   PROT 6.4  --    ALBUMIN 2.1*  --    BILITOT 1.6*  --    ALKPHOS 67  --    AST 35   --    ALT 18  --    ANIONGAP 10 6*     Urine Culture:   Recent Labs   Lab 06/28/22  0210 07/10/22  1104 07/25/22  1820 08/19/22  1708   LABURIN LACTOBACILLUS SPECIES  > 100,000 cfu/ml  No other significant isolate  * ENTEROCOCCUS FAECALIS  >100,000 cfu/ml  * CANDIDA ALBICANS  > 100,000 cfu/ml  Treatment of asymptomatic candiduria is not recommended (except for   specific populations). Candida isolated in the urine typically   represents colonization. If an indwelling urinary catheter is present  it should be removed or replaced.  * GRAM NEGATIVE BECCA  > 100,000 cfu/ml  Identification and susceptibility pending  *     Urine Studies:   Recent Labs   Lab 06/28/22  0210 07/10/22  1104 08/19/22  1708   COLORU Yellow   < > Orange*   APPEARANCEUA Hazy*   < > Ex.Turbid   PHUR 5.0   < > 7.0   SPECGRAV 1.015   < > 1.010   PROTEINUA Negative   < > 1+*   GLUCUA Negative   < > Negative   KETONESU Negative   < > Negative   BILIRUBINUA Negative   < > Negative   OCCULTUA Negative   < > 2+*   NITRITE Negative   < > Positive*   LEUKOCYTESUR 1+*   < > 3+*   RBCUA 1   < > >100*   WBCUA 20*   < > >100*   BACTERIA Many*   < > Many*   SQUAMEPITHEL 6  --   --    HYALINECASTS 49*   < > 0    < > = values in this interval not displayed.       Significant Imaging: I have reviewed all pertinent imaging results/findings within the past 24 hours.

## 2022-08-21 NOTE — ASSESSMENT & PLAN NOTE
Alcoholic cirrhosis of liver with ascites  Seizure disorder    67 yoF with cirrhosis with ascites and seizure disorder presents with AMS and fatigue. Labs showed ammonia of 36. CT head showed no acute intracranial abnormality. Possible cause of encephalopathy is multifactorial: infection given history of UTIs and  ESBL E.Coli bacteremia.     -Blood cultures show ESBL E.Coli with Meropenem sensitivity; continue Blanquita IV   - continue home Clobazam and keppra for seizures  - f/u keppra level  - UA shows GNRs   - continue lactulose and rifaximin  - continue lasix and aldactone  - continue thiamine and folic acid

## 2022-08-21 NOTE — PROGRESS NOTES
[facility]  Progress Note    Patient Name: Aleyda Floyd  MRN: [id number]  Patient Class: IP- Inpatient   Admission Date: 8/17/2022  Length of Stay: 2 days  Attending Physician: Areli Tomas*  Primary Care Provider: Elvie Fernandes MD        Subjective:     Principal Problem:Acute encephalopathy        HPI:  This is a 67 year old lady with alcoholic cirrhosis on lactulose and seizure disorder who presented to the ED for AMS and fatigue. 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 History primarily obtained from ED and prior notes. Patient has felt weak and has not been using her arms to communicate via ASL with her family for the past 1-2 weeks. Patient also complained of generalized fatigue in which the patient is sleeping more and is hard to arouse. Of note, patient had a paracentesis 3 weeks ago. Additionally, it was noted that the patient has a history of UTIs.      Upon chart review, patient was recently discharged for decompensated liver failure. At that visit, patient had seizure like acitivity and patient was started on Onfi and keppra.     In the ED, patient was VSS. Labs showed Hgb of 7.9 and an ammonia of 58. CT head showed no acute intracranial abnormality.       Overview/Hospital Course:  08/19/2022  Pt receiving Lactulose 20g TID and Rifaximin 550mg with improving mental status. H/H this morning 8.4/24.1 which is baseline. Tbili increasing to 2.2 from 1.2. CXR (-) and CT (-). Blood cultures show GNRs. Ordered IR paracentesis w/o imaging. Pending urine cultures. Evaluating for infectious source. EEG showed no seizure activity. On Zosyn.   08/20/2022  Pt alert but unwilling to sign to  due to fatigue. Repeat ammonia WNL. IR stated that there was not enough fluid for them to take a sample for culture. Pt with positive ESBL  E.Coli Bacteremia with Meropenem/Ertapenem sensitivities.       Interval History:  at bedside said the patient signed to him a little bit and is showing progress mentally. No other complaints could be elicited due to altered mentation. Called sister to update on status of the patient.     Review of Systems   Constitutional:  Positive for activity change and fatigue. Negative for chills and fever.   HENT:  Negative for congestion, dental problem, facial swelling, postnasal drip, rhinorrhea, sore throat and trouble swallowing.    Eyes:  Negative for photophobia and pain.   Respiratory:  Negative for cough, choking, chest tightness, shortness of breath and wheezing.    Cardiovascular:  Negative for chest pain.   Gastrointestinal:  Negative for abdominal distention, abdominal pain, diarrhea, nausea and vomiting.   Endocrine: Negative.    Genitourinary:  Negative for dysuria, flank pain, frequency and hematuria.   Musculoskeletal:  Negative for back pain.   Skin:  Positive for wound (tips of fingers). Negative for pallor and rash.   Allergic/Immunologic: Negative.    Neurological:  Negative for dizziness, syncope, weakness, numbness and headaches.   Psychiatric/Behavioral:  The patient is not nervous/anxious.    Objective:     Vital Signs (Most Recent):  Temp: 97.5 °F (36.4 °C) (08/21/22 1138)  Pulse: 82 (08/21/22 1138)  Resp: 17 (08/21/22 1138)  BP: (!) 141/64 (08/21/22 1138)  SpO2: 97 % (08/21/22 1138)   Vital Signs (24h Range):  Temp:  [97.5 °F (36.4 °C)-98.8 °F (37.1 °C)] 97.5 °F (36.4 °C)  Pulse:  [77-84] 82  Resp:  [16-18] 17  SpO2:  [97 %-100 %] 97 %  BP: (122-143)/(56-67) 141/64     Weight: 81.6 kg (180 lb)  Body mass index is 35.15 kg/m².  No intake or output data in the 24 hours ending 08/21/22 1457   Physical Exam  Vitals and nursing note reviewed.   Constitutional:       General: She is not in acute distress.     Appearance: Normal appearance. She is obese. She is not ill-appearing or diaphoretic.    HENT:      Head: Normocephalic and atraumatic.      Right Ear: External ear normal.      Left Ear: External ear normal.      Nose: Nose normal.      Mouth/Throat:      Mouth: Mucous membranes are moist.      Pharynx: Oropharynx is clear.   Eyes:      General: No scleral icterus.     Extraocular Movements: Extraocular movements intact.      Conjunctiva/sclera: Conjunctivae normal.      Pupils: Pupils are equal, round, and reactive to light.   Cardiovascular:      Rate and Rhythm: Normal rate and regular rhythm.      Pulses: Normal pulses.      Heart sounds: Normal heart sounds. No murmur heard.  Pulmonary:      Effort: Pulmonary effort is normal. No respiratory distress.      Breath sounds: Normal breath sounds. No stridor. No wheezing or rhonchi.   Chest:      Chest wall: No tenderness.   Abdominal:      General: Abdomen is flat. There is distension.      Palpations: Abdomen is soft.      Tenderness: There is no abdominal tenderness. There is no guarding or rebound.   Musculoskeletal:         General: No swelling or tenderness. Normal range of motion.      Cervical back: Normal range of motion and neck supple. No rigidity or tenderness.      Right lower leg: Edema present.      Left lower leg: Edema present.   Skin:     General: Skin is warm and dry.      Capillary Refill: Capillary refill takes less than 2 seconds.      Coloration: Skin is pale. Skin is not jaundiced.      Findings: Lesion (tips of fingers where BG checks are performed) present. No erythema.   Neurological:      General: No focal deficit present.      Mental Status: She is alert. She is disoriented.      Cranial Nerves: No cranial nerve deficit.      Motor: No weakness.   Psychiatric:         Mood and Affect: Mood normal.         Behavior: Behavior normal.       Significant Labs: All pertinent labs within the past 24 hours have been reviewed.    Significant Imaging: I have reviewed all pertinent imaging results/findings within the past 24  hours.      Assessment/Plan:      * Acute encephalopathy  Alcoholic cirrhosis of liver with ascites  Seizure disorder    67 yoF with cirrhosis with ascites and seizure disorder presents with AMS and fatigue. Labs showed ammonia of 36. CT head showed no acute intracranial abnormality. Possible cause of encephalopathy is multifactorial: infection given history of UTIs and  ESBL E.Coli bacteremia.     -Blood cultures show ESBL E.Coli with Meropenem sensitivity; continue Blanquita IV   - continue home Clobazam and keppra for seizures  - f/u keppra level  - UA shows GNRs   - continue lactulose and rifaximin  - continue lasix and aldactone  - continue thiamine and folic acid        Infection due to ESBL-producing Escherichia coli  Continue meropenem due to sensitivities; appreciate ID recs.      Gram-negative bacteremia  Meropenem coverage      Altered mental status  See acute encephalopathy      Debility  2/2 encephalopathy; will consult PT/OT      Thrombocytopenia  Normocytic anemia    Hgb on admission 7.9 and platelets of 117. Hgb baseline around 7.5-8.5 and platelets around 50s-60s. Current Hgb 8.4 platelets 102.    - continue to monitor for signs of bleeding  - trend Hgb and platelets      Deaf- written communication   used; fatigue still too significant for any meaningful interaction.      VTE Risk Mitigation (From admission, onward)         Ordered     enoxaparin injection 40 mg  Daily         08/18/22 0145     IP VTE HIGH RISK PATIENT  Once         08/18/22 0145     Place sequential compression device  Until discontinued         08/18/22 0138                Discharge Planning   REMA: 8/23/2022     Code Status: Full Code   Is the patient medically ready for discharge?: No    Reason for patient still in hospital (select all that apply): Patient trending condition  Discharge Plan A: Rehab        Andrew Garcia MD  Department of Hospital Medicine   [facility]

## 2022-08-21 NOTE — CONSULTS
[facility]  Infectious Disease  Consult Note    Patient Name: Aleyda Floyd  MRN: [id number]  Admission Date: 8/17/2022  Hospital Length of Stay: 2 days  Attending Physician: Areli Tomas*  Primary Care Provider: Elvie Fernandes MD     Isolation Status: Contact      Inpatient consult to Infectious Diseases  Consult performed by: URBANO Ellington, ANP  Consult ordered by: Andrew Garcia MD  Reason for consult: Meropenem for ESBL Ecoli        ID Consult acknowledged.   Full consult and recommendations to follow today  In the interim, please call for any immediate concerns.     Thank you.   URBANO Yang, ANP-C  [facility] [id number]

## 2022-08-21 NOTE — SUBJECTIVE & OBJECTIVE
Interval History:  at bedside said the patient signed to him a little bit and is showing progress mentally. No other complaints could be elicited due to altered mentation. Called sister to update on status of the patient.     Review of Systems   Constitutional:  Positive for activity change and fatigue. Negative for chills and fever.   HENT:  Negative for congestion, dental problem, facial swelling, postnasal drip, rhinorrhea, sore throat and trouble swallowing.    Eyes:  Negative for photophobia and pain.   Respiratory:  Negative for cough, choking, chest tightness, shortness of breath and wheezing.    Cardiovascular:  Negative for chest pain.   Gastrointestinal:  Negative for abdominal distention, abdominal pain, diarrhea, nausea and vomiting.   Endocrine: Negative.    Genitourinary:  Negative for dysuria, flank pain, frequency and hematuria.   Musculoskeletal:  Negative for back pain.   Skin:  Positive for wound (tips of fingers). Negative for pallor and rash.   Allergic/Immunologic: Negative.    Neurological:  Negative for dizziness, syncope, weakness, numbness and headaches.   Psychiatric/Behavioral:  The patient is not nervous/anxious.    Objective:     Vital Signs (Most Recent):  Temp: 97.5 °F (36.4 °C) (08/21/22 1138)  Pulse: 82 (08/21/22 1138)  Resp: 17 (08/21/22 1138)  BP: (!) 141/64 (08/21/22 1138)  SpO2: 97 % (08/21/22 1138)   Vital Signs (24h Range):  Temp:  [97.5 °F (36.4 °C)-98.8 °F (37.1 °C)] 97.5 °F (36.4 °C)  Pulse:  [77-84] 82  Resp:  [16-18] 17  SpO2:  [97 %-100 %] 97 %  BP: (122-143)/(56-67) 141/64     Weight: 81.6 kg (180 lb)  Body mass index is 35.15 kg/m².  No intake or output data in the 24 hours ending 08/21/22 1457   Physical Exam  Vitals and nursing note reviewed.   Constitutional:       General: She is not in acute distress.     Appearance: Normal appearance. She is obese. She is not ill-appearing or diaphoretic.   HENT:      Head: Normocephalic and atraumatic.      Right Ear:  External ear normal.      Left Ear: External ear normal.      Nose: Nose normal.      Mouth/Throat:      Mouth: Mucous membranes are moist.      Pharynx: Oropharynx is clear.   Eyes:      General: No scleral icterus.     Extraocular Movements: Extraocular movements intact.      Conjunctiva/sclera: Conjunctivae normal.      Pupils: Pupils are equal, round, and reactive to light.   Cardiovascular:      Rate and Rhythm: Normal rate and regular rhythm.      Pulses: Normal pulses.      Heart sounds: Normal heart sounds. No murmur heard.  Pulmonary:      Effort: Pulmonary effort is normal. No respiratory distress.      Breath sounds: Normal breath sounds. No stridor. No wheezing or rhonchi.   Chest:      Chest wall: No tenderness.   Abdominal:      General: Abdomen is flat. There is distension.      Palpations: Abdomen is soft.      Tenderness: There is no abdominal tenderness. There is no guarding or rebound.   Musculoskeletal:         General: No swelling or tenderness. Normal range of motion.      Cervical back: Normal range of motion and neck supple. No rigidity or tenderness.      Right lower leg: Edema present.      Left lower leg: Edema present.   Skin:     General: Skin is warm and dry.      Capillary Refill: Capillary refill takes less than 2 seconds.      Coloration: Skin is pale. Skin is not jaundiced.      Findings: Lesion (tips of fingers where BG checks are performed) present. No erythema.   Neurological:      General: No focal deficit present.      Mental Status: She is alert. She is disoriented.      Cranial Nerves: No cranial nerve deficit.      Motor: No weakness.   Psychiatric:         Mood and Affect: Mood normal.         Behavior: Behavior normal.       Significant Labs: All pertinent labs within the past 24 hours have been reviewed.    Significant Imaging: I have reviewed all pertinent imaging results/findings within the past 24 hours.

## 2022-08-21 NOTE — ASSESSMENT & PLAN NOTE
67 year old woman with HTN, ETOH cirrhosis (recent admission for decompensated liver failure, not currently transplant candidate), seizure disorder (on Keppra and Onfi), deaf/mute, reported hx of recurrent UTIs, who presented with AMS and fatigue X 1-2 weeks.  CT imaging for acute pathology.  EEG without evidence of seizure activity.  Admission blood cx  8/18 + for ESBL E coli susceptible to only carbapenems.  Repeat blood cultures 8/20 NGTD.   U/A >100 wbc, urine cx showing GNR - ID pending.  Currently on meropenem. ID consulted for meropenem use and ESBL E coli bacteremia     Afebrile, no leukocytosis. HDS.   Reported dysuria by chart review but unable to elicit complaints today on exam due to AMS/fatigue/unwillingness to communicate?      Plan/recommendations:  1.  Discontinue meropenem and start ertapenem 1 g IV q 24 hours (cr cl >30) for ESBL E coli.  Monitor closely for seizure activity as carbapenems can lower seizure threshold.  No other antibiotic options available.   2.  Follow up repeat blood and urine cultures  3.  Will follow     Data reviewed and plan discussed with ID staff, Dr. Segundo  Secure chat re: above plan with Primary Team, Dr. Tomas and Dr. Garcia

## 2022-08-21 NOTE — PLAN OF CARE
Problem: Skin Injury Risk Increased  Goal: Skin Health and Integrity  Outcome: Ongoing, Progressing     Problem: Adult Inpatient Plan of Care  Goal: Plan of Care Review  Outcome: Ongoing, Progressing  Goal: Patient-Specific Goal (Individualized)  Outcome: Ongoing, Progressing  Goal: Absence of Hospital-Acquired Illness or Injury  Outcome: Ongoing, Progressing  Goal: Optimal Comfort and Wellbeing  Outcome: Ongoing, Progressing  Goal: Readiness for Transition of Care  Outcome: Ongoing, Progressing     Problem: Fluid and Electrolyte Imbalance (Acute Kidney Injury/Impairment)  Goal: Fluid and Electrolyte Balance  Outcome: Ongoing, Progressing     Problem: Oral Intake Inadequate (Acute Kidney Injury/Impairment)  Goal: Optimal Nutrition Intake  Outcome: Ongoing, Progressing     Problem: Renal Function Impairment (Acute Kidney Injury/Impairment)  Goal: Effective Renal Function  Outcome: Ongoing, Progressing     Problem: Impaired Wound Healing  Goal: Optimal Wound Healing  Outcome: Ongoing, Progressing     Problem: Fall Injury Risk  Goal: Absence of Fall and Fall-Related Injury  Outcome: Ongoing, Progressing     Problem: Infection  Goal: Absence of Infection Signs and Symptoms  Outcome: Ongoing, Progressing

## 2022-08-22 NOTE — PLAN OF CARE
[facility]  Discharge Reassessment    Primary Care Provider: Elvie Fernandes MD    Expected Discharge Date: 8/23/2022    Patient is not medically ready for discharge at this time.    Overview/Hospital Course:  08/19/2022  Pt receiving Lactulose 20g TID and Rifaximin 550mg with improving mental status. H/H this morning 8.4/24.1 which is baseline. Tbili increasing to 2.2 from 1.2. CXR (-) and CT (-). Blood cultures show GNRs. Ordered IR paracentesis w/o imaging. Pending urine cultures. Evaluating for infectious source. EEG showed no seizure activity. On Zosyn.   08/20/2022  Pt alert but unwilling to sign to  due to fatigue. Repeat ammonia WNL. IR stated that there was not enough fluid for them to take a sample for culture. Pt with positive ESBL E.Coli Bacteremia with Meropenem/Ertapenem sensitivities.   08/22/2022  Pt on ertapenem instead of meropenem. Pt still mostly unresponsive but alert in bed.      Interval History: Pt still mostly unresponsive. Family not at bedside. Pt is still off baseline from prior admission when she was signing to her  and responding to written messages.     Reassessment (most recent)     Discharge Reassessment - 08/22/22 1608        Discharge Reassessment    Assessment Type Discharge Planning Reassessment     Discharge Plan A Rehab     Discharge Plan B Long-term acute care facility (LTAC)     DME Needed Upon Discharge  none     Discharge Barriers Identified None     Why the patient remains in the hospital Requires continued medical care        Post-Acute Status    Coverage MEDICARE PART A & B

## 2022-08-22 NOTE — ASSESSMENT & PLAN NOTE
Alcoholic cirrhosis of liver with ascites  Seizure disorder    67 yoF with cirrhosis with ascites and seizure disorder presents with AMS and fatigue. Labs showed ammonia of 36. CT head showed no acute intracranial abnormality. Possible cause of encephalopathy is multifactorial: infection given history of UTIs and  ESBL E.Coli bacteremia.     -Blood cultures show ESBL E.Coli with Meropenem sensitivity; continue Blanquita IV   - continue home Clobazam and keppra for seizures  - f/u keppra level  - UA shows ESBL E.Coli treating with ertapenem  - continue lactulose and rifaximin  - continue lasix and aldactone  - continue thiamine and folic acid

## 2022-08-22 NOTE — CONSULTS
[facility]  Wound Care    Patient Name:  Aleyda Floyd   MRN:  [id number]  Date: 2022  Diagnosis: Gram-negative bacteremia    History:     Past Medical History:   Diagnosis Date    PITO (acute kidney injury)     Alcoholic cirrhosis of liver     Hypercholesterolemia     Hypertension        Social History     Socioeconomic History    Marital status:    Tobacco Use    Smoking status: Former Smoker     Packs/day: 1.00     Years: 15.00     Pack years: 15.00     Quit date: 1998     Years since quittin.7    Smokeless tobacco: Never Used   Substance and Sexual Activity    Alcohol use: Not Currently    Drug use: Not Currently       Precautions:     Allergies as of 2022    (No Known Allergies)       [facility] Assessment Details/Treatment   Wound care consult for coccyx.  There is purple/maroon discoloration on the coccyx. The periwound is pink and moist. The patient is incontinent and unable to turn on her own at this time. A foam dressing was removed and triad cream applied. Patient repositioned on her left side using a foam wedge.     Plan:  - Coccyx: Nursing to cleanse with NS or wound cleanser, pat dry, and apply triad to the wound and periwound every shift and PRN soilage  - Turning every 2 hours  - Heel protecting boots  - Immerse mattress        22 1534        Wound 22 0820 Coccyx #2   Date First Assessed/Time First Assessed: 22 0820   Location: Coccyx  Wound Number: #2   Wound Image    Wound WDL ex   Dressing Appearance Intact   Drainage Amount None   Appearance Pink;Red;Maroon;Purple;Moist;Intact   Tissue loss description Not applicable   Periwound Area Intact;Moist;Pink   Wound Edges Defined   Care Cleansed with:;Soap and water   Dressing Removed;Foam;Applied;Other (comment)  (triad applied)     Recommendations made to primary team for above plan via secure chat. Orders placed. Wound care to follow-up as needed.     2022

## 2022-08-22 NOTE — PLAN OF CARE
OT POC established    Problem: Occupational Therapy  Goal: Occupational Therapy Goal  Description: Goals to be met by: 9/12/22     Patient will increase functional independence with ADLs by performing:    UE Dressing with Maximum Assistance.  LE Dressing with Maximum Assistance.  Grooming while seated with Maximum Assistance.  Sitting at edge of bed x10 minutes with Maximum Assistance.  Supine to sit with Maximum Assistance.    Outcome: Ongoing, Progressing

## 2022-08-22 NOTE — PROGRESS NOTES
[facility]  Infectious Disease  Progress Note    Patient Name: Aleyda Floyd  MRN: [id number]  Admission Date: 8/17/2022  Length of Stay: 3 days  Attending Physician: Areli Tomas*  Primary Care Provider: Elvie Fernandes MD    Isolation Status: Contact  Assessment/Plan:      * Gram-negative bacteremia     67 year old woman with HTN, ETOH cirrhosis (recent admission for decompensated liver failure, not currently transplant candidate), seizure disorder (on Keppra and Onfi), deaf/mute, reported hx of recurrent UTIs, who presented with AMS and fatigue X 1-2 weeks.  CT imaging for acute pathology.  EEG without evidence of seizure activity.  Admission blood cx  8/18 + for ESBL E coli susceptible to only carbapenems.  Repeat blood cultures 8/20 NGTD.   U/A >100 wbc, urine cx + Ecoli ESBL.  Currently on meropenem. ID consulted for meropenem use and ESBL E coli bacteremia     Afebrile, no leukocytosis. HDS.   Reported dysuria by chart review but unable to elicit complaints today on exam due to AMS/fatigue/unwillingness to communicate?      Plan/recommendations:  1.  Continue ertapenem 1 g IV q 24 hours (cr cl >30) for ESBL E coli x2 weeks from negative blood culture, EOC 9/3/22 .  Monitor closely for seizure activity as carbapenems can lower seizure threshold.  No other antibiotic options available.   2. Pt will need outpatient ID follow up and weekly CBC, CMP, CRP while on IV therapy.   3. Plan reviewed with ID staff, Dr. Haq. ID will sign off.     Outpatient Antibiotic Therapy Plan:    Please send referral to [facility].    1) Infection: Ecoli ESBL bacteremia/UTI    2) Discharge Antibiotics: Ertapenem 1g IV q24hr    Intravenous antibiotics:   Ertapenem 1g IV q 24 hours       3) Therapy Duration:  2 weeks from negative blood culture (8/20/22)    Estimated end date of IV antibiotics: 9/3/2022    4) Outpatient Weekly Labs:    Order the following labs to be drawn  on Mondays:    CBC   CMP    CRP      5) Fax Lab Results to Infectious Diseases Provider: URBANO Uribe FNP-C    [facility] Fax Number: [phone]    6) Outpatient Infectious Diseases Follow-up     Follow-up appointment will be arranged by the ID clinic and will be found in the patient's appointments tab.     Prior to discharge, please ensure the patient's follow-up has been scheduled.     If there is still no follow-up scheduled prior to discharge, please send an EPIC message to Roxanne Perez in Infectious Diseases.                UTI (urinary tract infection)  See assessment/plan above            Thank you for your consult. I will sign off. Please contact us if you have any questions.        Zhane Lynn NP  Infectious Disease  [facility]    Subjective:     Principal Problem:Gram-negative bacteremia    HPI: Ms. Aleyda Floyd is a 67 year old woman with HTN, ETOH cirrhosis (recent admission for decompensated liver failure, not currently transplant candidate), seizure disorder (on Keppra and Onfi), deaf/mute, reported hx of recurrent UTIs, who presented with AMS and fatigue X 1-2 weeks.  CT imaging and EEG negative for acute IC pathology.  Admission blood cx  3/4 + for ESBL E coli susceptible to only carbapenems.   U/A >100 wbc, urine cx pending. Currently on meropenem. ID consulted for meropenem and ESBL E coli bacteremia.     Afebrile, no leukocytosis.  Reported dysuria per medical record.  At time of visit,  (who is also hearing impaired is in room).   Patient is lethargic, opens eyes, but will not respond to his signing/communication. Unable to obtain a ROS  Spoke to sister on the phone.  Unable to tell me much about patient symptoms/complaints    Interval History: NAEON. Afebrile. No leukocytosis. Unable to communicate and preform ROS.       Past Medical History:   Diagnosis Date    PITO (acute kidney injury)     Alcoholic cirrhosis of liver     Hypercholesterolemia      Hypertension        Past Surgical History:   Procedure Laterality Date    DILATION AND CURETTAGE OF UTERUS      ESOPHAGOGASTRODUODENOSCOPY N/A 6/23/2022    Procedure: EGD (ESOPHAGOGASTRODUODENOSCOPY);  Surgeon: Sean Perry MD;  Location: [address];  Service: Endoscopy;  Laterality: N/A;  cirrhosis, variceal screening  labs prior  -request sent 6/14  fully vaccinated, instructions emailed to [email or web address]       Review of patient's allergies indicates:  No Known Allergies    Medications:  Medications Prior to Admission   Medication Sig    acetaminophen (TYLENOL) 325 MG tablet Take 650 mg by mouth daily as needed for Pain. NOT TO EXCEED 4 GRAMS IN 24 HOUR PERIOD    cholecalciferol, vitamin D3, (VITAMIN D3) 50 mcg (2,000 unit) Cap capsule Take 1 capsule by mouth once daily.    cholestyramine-aspartame (QUESTRAN LIGHT) 4 gram PwPk Take 4 g by mouth once daily.    folic acid (FOLVITE) 1 MG tablet Take 1 mg by mouth once daily.     furosemide (LASIX) 40 MG tablet Take 1 tablet (40 mg total) by mouth 2 (two) times daily.    lactulose (CHRONULAC) 20 gram/30 mL Soln Take 30 mLs (20 g total) by mouth 2 (two) times daily. (Patient taking differently: ADMINISTER 30 ML RECTALLY TWICE DAILY. TO BE ADDED INTO CLEANSING ENEMA SET WITH 700 ML NORMAL SALINE)    levETIRAcetam (KEPPRA) 750 MG Tab Take 1 tablet (750 mg total) by mouth 2 (two) times daily.    nystatin (MYCOSTATIN) powder Apply to skin folds beneath breasts as needed    omeprazole (PRILOSEC) 40 MG capsule Take 40 mg by mouth once daily.    rifAXIMin (XIFAXAN) 550 mg Tab Take 1 tablet (550 mg total) by mouth 2 (two) times daily.    spironolactone (ALDACTONE) 100 MG tablet Take 1 tablet (100 mg total) by mouth once daily.    thiamine 100 MG tablet Take 100 mg by mouth once daily.    aspirin 81 MG Chew Take 81 mg by mouth once daily.    cloBAZam (ONFI) 10 mg Tab Take 1 each (10 mg total) by mouth  once daily. (Patient not taking: Reported on 2022)     Antibiotics (From admission, onward)                Start     Stop Route Frequency Ordered    22 2245  ertapenem (INVANZ) 1 g in sodium chloride 0.9% 100 mL IVPB         -- IV Every 24 hours (non-standard times) 22 1654    22 0900  rifAXIMin tablet 550 mg         -- Oral 2 times daily 22 0138          Antifungals (From admission, onward)                None          Antivirals (From admission, onward)      None             Immunization History   Administered Date(s) Administered    PPD Test 2022       Family History       Problem Relation (Age of Onset)    Breast cancer Maternal Grandmother          Social History     Socioeconomic History    Marital status:    Tobacco Use    Smoking status: Former Smoker     Packs/day: 1.00     Years: 15.00     Pack years: 15.00     Quit date: 1998     Years since quittin.7    Smokeless tobacco: Never Used   Substance and Sexual Activity    Alcohol use: Not Currently    Drug use: Not Currently     Review of Systems   Unable to perform ROS: Mental status change   Objective:     Vital Signs (Most Recent):  Temp: 97.8 °F (36.6 °C) (22 1130)  Pulse: 73 (22 1130)  Resp: 18 (22 1130)  BP: (!) 120/57 (22 1130)  SpO2: 100 % (22 1130) Vital Signs (24h Range):  Temp:  [97.5 °F (36.4 °C)-98.6 °F (37 °C)] 97.8 °F (36.6 °C)  Pulse:  [] 73  Resp:  [17-18] 18  SpO2:  [97 %-100 %] 100 %  BP: ()/(47-61) 120/57     Weight: 81.6 kg (180 lb)  Body mass index is 35.15 kg/m².    Estimated Creatinine Clearance: 46.9 mL/min (based on SCr of 1.1 mg/dL).    Physical Exam  Vitals and nursing note reviewed.   Constitutional:       General: She is not in acute distress.     Appearance: She is obese. She is ill-appearing (chronically ill appearing). She is not toxic-appearing or diaphoretic.      Comments: Lethargic   HENT:      Head: Normocephalic.       Mouth/Throat:      Mouth: Mucous membranes are dry.   Eyes:      General: No scleral icterus.     Conjunctiva/sclera: Conjunctivae normal.   Cardiovascular:      Rate and Rhythm: Normal rate and regular rhythm.      Heart sounds: No murmur heard.  Pulmonary:      Effort: Pulmonary effort is normal. No respiratory distress.      Breath sounds: Normal breath sounds.   Abdominal:      General: There is no distension.      Palpations: Abdomen is soft.      Tenderness: There is no abdominal tenderness. There is no guarding.   Musculoskeletal:      Right lower leg: Edema present.      Left lower leg: Edema present.   Skin:     General: Skin is warm and dry.   Neurological:      Comments: Arouses to touch, alert when awakened, but does not engage       Significant Labs: Blood Culture:   Recent Labs   Lab 07/10/22  1311 07/11/22  1550 08/18/22  1044 08/20/22  1413 08/20/22  1414   LABBLOO No growth after 5 days. No growth after 5 days. Gram stain luisa bottle: Gram negative rods   Positive results previously called 08/19/2022  00:06  Gram stain aer bottle: Gram negative rods   Positive results previously called 08/19/2022  00:42  ESCHERICHIA COLI ESBL  For susceptibility see order # [id number]  *  Gram stain luisa bottle: Gram negative rods   Results called to and read back by:Kitty Carreon RN 08/18/2022  21:56  Gram stain aer bottle: Gram negative rods   Positive results previously called 08/19/2022  00:08  ESCHERICHIA COLI ESBL* No Growth to date  No Growth to date No Growth to date  No Growth to date       CBC:   No results for input(s): WBC, HGB, HCT, PLT in the last 48 hours.    CMP:   Recent Labs   Lab 08/21/22  0707   *   K 3.7      CO2 27   *   BUN 18   CREATININE 1.1   CALCIUM 8.9   ANIONGAP 6*       Urine Culture:   Recent Labs   Lab 06/28/22  0210 07/10/22  1104 07/25/22  1820 08/19/22  1708   LABURIN LACTOBACILLUS SPECIES  > 100,000 cfu/ml  No other significant isolate  * ENTEROCOCCUS  FAECALIS  >100,000 cfu/ml  * CANDIDA ALBICANS  > 100,000 cfu/ml  Treatment of asymptomatic candiduria is not recommended (except for   specific populations). Candida isolated in the urine typically   represents colonization. If an indwelling urinary catheter is present  it should be removed or replaced.  * ESCHERICHIA COLI ESBL  > 100,000 cfu/ml  *       Urine Studies:   Recent Labs   Lab 06/28/22  0210 07/10/22  1104 08/19/22  1708   COLORU Yellow   < > Orange*   APPEARANCEUA Hazy*   < > Ex.Turbid   PHUR 5.0   < > 7.0   SPECGRAV 1.015   < > 1.010   PROTEINUA Negative   < > 1+*   GLUCUA Negative   < > Negative   KETONESU Negative   < > Negative   BILIRUBINUA Negative   < > Negative   OCCULTUA Negative   < > 2+*   NITRITE Negative   < > Positive*   LEUKOCYTESUR 1+*   < > 3+*   RBCUA 1   < > >100*   WBCUA 20*   < > >100*   BACTERIA Many*   < > Many*   SQUAMEPITHEL 6  --   --    HYALINECASTS 49*   < > 0    < > = values in this interval not displayed.         Significant Imaging: I have reviewed all pertinent imaging results/findings within the past 24 hours.

## 2022-08-22 NOTE — SUBJECTIVE & OBJECTIVE
Interval History: Pt still mostly unresponsive. Family not at bedside. Pt is still off baseline from prior admission when she was signing to her  and responding to written messages.     Review of Systems   Constitutional:  Positive for activity change and fatigue. Negative for chills and fever.   HENT:  Negative for congestion, dental problem, facial swelling, postnasal drip, rhinorrhea, sore throat and trouble swallowing.    Eyes:  Negative for photophobia and pain.   Respiratory:  Negative for cough, choking, chest tightness, shortness of breath and wheezing.    Cardiovascular:  Negative for chest pain.   Gastrointestinal:  Negative for abdominal distention, abdominal pain, diarrhea, nausea and vomiting.   Endocrine: Negative.    Genitourinary:  Negative for dysuria, flank pain, frequency and hematuria.   Musculoskeletal:  Negative for back pain.   Skin:  Positive for wound (tips of fingers). Negative for pallor and rash.   Allergic/Immunologic: Negative.    Neurological:  Negative for dizziness, syncope, weakness, numbness and headaches.   Psychiatric/Behavioral:  The patient is not nervous/anxious.    Objective:     Vital Signs (Most Recent):  Temp: 97.5 °F (36.4 °C) (08/22/22 0752)  Pulse: 83 (08/22/22 1008)  Resp: 17 (08/22/22 1008)  BP: 126/60 (08/22/22 0752)  SpO2: 97 % (08/22/22 1008) Vital Signs (24h Range):  Temp:  [97.5 °F (36.4 °C)-98.6 °F (37 °C)] 97.5 °F (36.4 °C)  Pulse:  [] 83  Resp:  [17-18] 17  SpO2:  [97 %-100 %] 97 %  BP: ()/(47-64) 126/60     Weight: 81.6 kg (180 lb)  Body mass index is 35.15 kg/m².    Intake/Output Summary (Last 24 hours) at 8/22/2022 1116  Last data filed at 8/21/2022 1700  Gross per 24 hour   Intake --   Output 401 ml   Net -401 ml      Physical Exam  Vitals and nursing note reviewed.   Constitutional:       General: She is not in acute distress.     Appearance: Normal appearance. She is obese. She is not ill-appearing or diaphoretic.   HENT:      Head:  Normocephalic and atraumatic.      Right Ear: External ear normal.      Left Ear: External ear normal.      Nose: Nose normal.      Mouth/Throat:      Mouth: Mucous membranes are moist.      Pharynx: Oropharynx is clear.   Eyes:      General: No scleral icterus.     Extraocular Movements: Extraocular movements intact.      Conjunctiva/sclera: Conjunctivae normal.      Pupils: Pupils are equal, round, and reactive to light.   Cardiovascular:      Rate and Rhythm: Normal rate and regular rhythm.      Pulses: Normal pulses.      Heart sounds: Normal heart sounds. No murmur heard.  Pulmonary:      Effort: Pulmonary effort is normal. No respiratory distress.      Breath sounds: Normal breath sounds. No stridor. No wheezing or rhonchi.   Chest:      Chest wall: No tenderness.   Abdominal:      General: Abdomen is flat. There is distension.      Palpations: Abdomen is soft.      Tenderness: There is no abdominal tenderness. There is no guarding or rebound.   Musculoskeletal:         General: No swelling or tenderness. Normal range of motion.      Cervical back: Normal range of motion and neck supple. No rigidity or tenderness.      Right lower leg: Edema present.      Left lower leg: Edema present.   Skin:     General: Skin is warm and dry.      Capillary Refill: Capillary refill takes less than 2 seconds.      Coloration: Skin is pale. Skin is not jaundiced.      Findings: Lesion (tips of fingers where BG checks are performed) present. No erythema.   Neurological:      General: No focal deficit present.      Mental Status: She is alert. She is disoriented.      Cranial Nerves: No cranial nerve deficit.      Motor: No weakness.   Psychiatric:      Comments: Flat affect       Significant Labs: All pertinent labs within the past 24 hours have been reviewed.    Significant Imaging: I have reviewed all pertinent imaging results/findings within the past 24 hours.

## 2022-08-22 NOTE — ASSESSMENT & PLAN NOTE
67 year old woman with HTN, ETOH cirrhosis (recent admission for decompensated liver failure, not currently transplant candidate), seizure disorder (on Keppra and Onfi), deaf/mute, reported hx of recurrent UTIs, who presented with AMS and fatigue X 1-2 weeks.  CT imaging for acute pathology.  EEG without evidence of seizure activity.  Admission blood cx  8/18 + for ESBL E coli susceptible to only carbapenems.  Repeat blood cultures 8/20 NGTD.   U/A >100 wbc, urine cx + Ecoli ESBL.  Currently on meropenem. ID consulted for meropenem use and ESBL E coli bacteremia     Afebrile, no leukocytosis. HDS.   Reported dysuria by chart review but unable to elicit complaints today on exam due to AMS/fatigue/unwillingness to communicate?      Plan/recommendations:  1.  Continue ertapenem 1 g IV q 24 hours (cr cl >30) for ESBL E coli x2 weeks from negative blood culture, EOC 9/3/22 .  Monitor closely for seizure activity as carbapenems can lower seizure threshold.  No other antibiotic options available.   2. Pt will need outpatient ID follow up and weekly CBC, CMP, CRP while on IV therapy.   3. Plan reviewed with ID staff, Dr. Haq. ID will sign off.     Outpatient Antibiotic Therapy Plan:    Please send referral to [facility].    1) Infection: Ecoli ESBL bacteremia/UTI    2) Discharge Antibiotics: Ertapenem 1g IV q24hr    Intravenous antibiotics:   Ertapenem 1g IV q 24 hours       3) Therapy Duration:  2 weeks from negative blood culture (8/20/22)    Estimated end date of IV antibiotics: 9/3/2022    4) Outpatient Weekly Labs:    Order the following labs to be drawn on Mondays:    CBC   CMP    CRP      5) Fax Lab Results to Infectious Diseases Provider: URBANO Uribe FNP-C    [facility] Fax Number: [phone]    6) Outpatient Infectious Diseases Follow-up     Follow-up appointment will be arranged by the ID clinic and will be found in the patient's appointments  tab.     Prior to discharge, please ensure the patient's follow-up has been scheduled.     If there is still no follow-up scheduled prior to discharge, please send an EPIC message to Roxanne Perez in Infectious Diseases.

## 2022-08-22 NOTE — PT/OT/SLP EVAL
Occupational Therapy   Co-Evaluation and Co-Treatment    Name: Aleyda Floyd  MRN: [id number]  Admitting Diagnosis:  Gram-negative bacteremia  Recent Surgery: * No surgery found *       Co-evaluation/treatment performed due to patient's multiple deficits requiring two skilled therapists to appropriately and safely assess patient's strength and endurance while facilitating functional tasks in addition to accommodating for patient's activity tolerance.       Recommendations:     Discharge Recommendations: rehabilitation facility (return to [facility])  Discharge Equipment Recommendations:   (TBD)  Barriers to discharge:   (increased A needed)    Assessment:     Aleyda Floyd is a 67 y.o. female with a medical diagnosis of Gram-negative bacteremia. Pt tolerated therapy session fairly, with maximum amount of cues and assistance from , who was present (also deaf). Communicated with  via writing per chart review, and then  signed to wife.  states he prefers to use  going forward. Pt able to transfer supine to sit with total A x2, and sit up with total A with posterior/left lean.   She presents with performance deficits affecting function: weakness, impaired endurance, impaired self care skills, impaired functional mobility, impaired balance, impaired cognition, decreased upper extremity function, decreased lower extremity function, decreased safety awareness, decreased ROM, impaired cardiopulmonary response to activity, impaired joint extensibility.  Discharge recommendation: Return to [facility].    Rehab Prognosis: Good; patient would benefit from acute skilled OT services to address these deficits and reach maximum level of function.       Plan:     Patient to be seen 3 x/week to address the above listed problems via self-care/home management, therapeutic exercises, therapeutic activities, neuromuscular re-education  · Plan of Care Expires: 09/12/22  · Plan of Care Reviewed with:       Subjective     Chief Complaint:  signed/texted that she has difficulty moving around independently, but she ate well with his help  Patient/Family Comments/goals: Get better, return to rehab    Occupational Profile:  Living Environment: Pt lives with spouse in 1SH with small threshold step.   Previous level of function: Prior to 2 months ago, pt able to walk and complete ADLs. Pt requires assistance with both currently. Pt's  states pt able to transfer to standing a week ago  Equipment Used at Home:  rollator, cane, straight, shower chair, raised toilet (bed rails)  Assistance upon Discharge: [facility]/TBD    Pain/Comfort:  · Pain Rating 1:  not rated but grimaced/winced with BUE PROM  · Location - Side 1: Bilateral  · Location - Orientation 1: generalized  · Location 1: arm  · Pain Addressed 1: Reposition, Distraction  · Pain Rating Post-Intervention 1:  not rated  ·   Patients cultural, spiritual, Muslim conflicts given the current situation: no    Objective:     Communicated with: CARA Saravia prior to session.  Patient found supine with telemetry, peripheral IV, PureWick upon OT entry to room.    General Precautions: Standard, fall, hearing impaired   Orthopedic Precautions:N/A   Braces: N/A  Respiratory Status: Room air    Occupational Performance:    Bed Mobility:    · Patient completed Rolling/Turning to Left with  total assistance of 2 persons  · Patient completed Rolling/Turning to Right with total assistance of 2 persons  · Patient completed Scooting/Bridging with total assistance of 2 persons  · Patient completed Supine to Sit with total assistance and 2 persons  · Patient completed Sit to Supine with total assistance and 2 persons    Functional Mobility/Transfers:  · Pt able to sit EOB with total A with posterior and left lean    Activities of Daily Living:  · Grooming: total assistance using washcloth to wash face, therapists positioned pt's BUE to assist pt for a small swipe, and pt  unable to complete   · Toileting: Total A connecting/disconnecing Purewick    Cognitive/Visual Perceptual:  Cognitive/Psychosocial Skills:     -       Oriented to: AOX3   -       Follows Commands/attention:Inattentive, Easily distracted and Follows one-step commands  -       Communication: mute  -       Memory: unable to tell this date  -       Safety awareness/insight to disability: impaired   -       Mood/Affect/Coping skills/emotional control: Appropriate to situation  Visual/Perceptual:      -Intact pt sometimes unable to track therapists    Physical Exam:  Balance:    -       Impaired  Skin integrity: Bruising of BUE  Edema:  Moderate BUE and BLE  Sensation:    Motor Planning:    -       Impaired  Dominant hand:    -       Right  Upper Extremity Range of Motion: Required PROM to flex or extend BUE, or move in all planes.  Upper Extremity Strength:  Unable to determine   Strength:  Unable to determine  Fine Motor Coordination:    -       Impaired     Neurological:    -       Impaired    AMPAC 6 Click ADL:  AMPAC Total Score: 7    Treatment & Education:   Pt educated on role of OT, POC, and goals for therapy.     POC was dicussed with patient/caregiver, who was included in its development and is in agreement with the identified goals and treatment plan.    Patient and family aware of patient's deficits and therapy progression.    Time provided for therapeutic counseling and discussion of health disposition.    Pt completed ADLs and functional mobility for treatment session as noted above    Pt/caregiver verbalized understanding and expressed no further concerns/questions.   Updated communication board with level of assist required      Education:    Patient left HOB elevated with all lines intact, call button in reach, RN notified and  present    GOALS:   Multidisciplinary Problems     Occupational Therapy Goals        Problem: Occupational Therapy    Goal Priority Disciplines Outcome  Interventions   Occupational Therapy Goal     OT, PT/OT Ongoing, Progressing    Description: Goals to be met by: 9/12/22     Patient will increase functional independence with ADLs by performing:    UE Dressing with Maximum Assistance.  LE Dressing with Maximum Assistance.  Grooming while seated with Maximum Assistance.  Sitting at edge of bed x10 minutes with Maximum Assistance.  Supine to sit with Maximum Assistance.                     History:     Past Medical History:   Diagnosis Date    PITO (acute kidney injury)     Alcoholic cirrhosis of liver     Hypercholesterolemia     Hypertension        Past Surgical History:   Procedure Laterality Date    DILATION AND CURETTAGE OF UTERUS      ESOPHAGOGASTRODUODENOSCOPY N/A 6/23/2022    Procedure: EGD (ESOPHAGOGASTRODUODENOSCOPY);  Surgeon: Sean Perry MD;  Location: [facility] ([address] [place] [address]);  Service: Endoscopy;  Laterality: N/A;  cirrhosis, variceal screening  labs prior  -request sent 6/14  fully vaccinated, instructions emailed to [email or web address]       Time Tracking:     OT Date of Treatment: 08/22/22  OT Start Time: 0958  OT Stop Time: 1023  OT Total Time (min): 25 min    Billable Minutes:Evaluation 10  Self Care/Home Management 15    8/22/2022

## 2022-08-22 NOTE — SUBJECTIVE & OBJECTIVE
Interval History: NAEON. Afebrile. No leukocytosis. Unable to communicate and preform ROS.       Past Medical History:   Diagnosis Date    PITO (acute kidney injury)     Alcoholic cirrhosis of liver     Hypercholesterolemia     Hypertension        Past Surgical History:   Procedure Laterality Date    DILATION AND CURETTAGE OF UTERUS      ESOPHAGOGASTRODUODENOSCOPY N/A 6/23/2022    Procedure: EGD (ESOPHAGOGASTRODUODENOSCOPY);  Surgeon: Sean Perry MD;  Location: [address];  Service: Endoscopy;  Laterality: N/A;  cirrhosis, variceal screening  labs prior  -request sent 6/14  fully vaccinated, instructions emailed to [email or web address]       Review of patient's allergies indicates:  No Known Allergies    Medications:  Medications Prior to Admission   Medication Sig    acetaminophen (TYLENOL) 325 MG tablet Take 650 mg by mouth daily as needed for Pain. NOT TO EXCEED 4 GRAMS IN 24 HOUR PERIOD    cholecalciferol, vitamin D3, (VITAMIN D3) 50 mcg (2,000 unit) Cap capsule Take 1 capsule by mouth once daily.    cholestyramine-aspartame (QUESTRAN LIGHT) 4 gram PwPk Take 4 g by mouth once daily.    folic acid (FOLVITE) 1 MG tablet Take 1 mg by mouth once daily.     furosemide (LASIX) 40 MG tablet Take 1 tablet (40 mg total) by mouth 2 (two) times daily.    lactulose (CHRONULAC) 20 gram/30 mL Soln Take 30 mLs (20 g total) by mouth 2 (two) times daily. (Patient taking differently: ADMINISTER 30 ML RECTALLY TWICE DAILY. TO BE ADDED INTO CLEANSING ENEMA SET WITH 700 ML NORMAL SALINE)    levETIRAcetam (KEPPRA) 750 MG Tab Take 1 tablet (750 mg total) by mouth 2 (two) times daily.    nystatin (MYCOSTATIN) powder Apply to skin folds beneath breasts as needed    omeprazole (PRILOSEC) 40 MG capsule Take 40 mg by mouth once daily.    rifAXIMin (XIFAXAN) 550 mg Tab Take 1 tablet (550 mg total) by mouth 2 (two) times daily.    spironolactone (ALDACTONE) 100 MG tablet Take 1 tablet (100 mg  total) by mouth once daily.    thiamine 100 MG tablet Take 100 mg by mouth once daily.    aspirin 81 MG Chew Take 81 mg by mouth once daily.    cloBAZam (ONFI) 10 mg Tab Take 1 each (10 mg total) by mouth once daily. (Patient not taking: Reported on 2022)     Antibiotics (From admission, onward)                Start     Stop Route Frequency Ordered    22 2245  ertapenem (INVANZ) 1 g in sodium chloride 0.9% 100 mL IVPB         -- IV Every 24 hours (non-standard times) 22 1654    22 0900  rifAXIMin tablet 550 mg         -- Oral 2 times daily 22 0138          Antifungals (From admission, onward)                None          Antivirals (From admission, onward)      None             Immunization History   Administered Date(s) Administered    PPD Test 2022       Family History       Problem Relation (Age of Onset)    Breast cancer Maternal Grandmother          Social History     Socioeconomic History    Marital status:    Tobacco Use    Smoking status: Former Smoker     Packs/day: 1.00     Years: 15.00     Pack years: 15.00     Quit date: 1998     Years since quittin.7    Smokeless tobacco: Never Used   Substance and Sexual Activity    Alcohol use: Not Currently    Drug use: Not Currently     Review of Systems   Unable to perform ROS: Mental status change   Objective:     Vital Signs (Most Recent):  Temp: 97.8 °F (36.6 °C) (22 1130)  Pulse: 73 (22 1130)  Resp: 18 (22 1130)  BP: (!) 120/57 (22 1130)  SpO2: 100 % (22 1130) Vital Signs (24h Range):  Temp:  [97.5 °F (36.4 °C)-98.6 °F (37 °C)] 97.8 °F (36.6 °C)  Pulse:  [] 73  Resp:  [17-18] 18  SpO2:  [97 %-100 %] 100 %  BP: ()/(47-61) 120/57     Weight: 81.6 kg (180 lb)  Body mass index is 35.15 kg/m².    Estimated Creatinine Clearance: 46.9 mL/min (based on SCr of 1.1 mg/dL).    Physical Exam  Vitals and nursing note reviewed.   Constitutional:       General: She is not in acute  distress.     Appearance: She is obese. She is ill-appearing (chronically ill appearing). She is not toxic-appearing or diaphoretic.      Comments: Lethargic   HENT:      Head: Normocephalic.      Mouth/Throat:      Mouth: Mucous membranes are dry.   Eyes:      General: No scleral icterus.     Conjunctiva/sclera: Conjunctivae normal.   Cardiovascular:      Rate and Rhythm: Normal rate and regular rhythm.      Heart sounds: No murmur heard.  Pulmonary:      Effort: Pulmonary effort is normal. No respiratory distress.      Breath sounds: Normal breath sounds.   Abdominal:      General: There is no distension.      Palpations: Abdomen is soft.      Tenderness: There is no abdominal tenderness. There is no guarding.   Musculoskeletal:      Right lower leg: Edema present.      Left lower leg: Edema present.   Skin:     General: Skin is warm and dry.   Neurological:      Comments: Arouses to touch, alert when awakened, but does not engage       Significant Labs: Blood Culture:   Recent Labs   Lab 07/10/22  1311 07/11/22  1550 08/18/22  1044 08/20/22  1413 08/20/22  1414   LABBLOO No growth after 5 days. No growth after 5 days. Gram stain luisa bottle: Gram negative rods   Positive results previously called 08/19/2022  00:06  Gram stain aer bottle: Gram negative rods   Positive results previously called 08/19/2022  00:42  ESCHERICHIA COLI ESBL  For susceptibility see order # [id number]  *  Gram stain luisa bottle: Gram negative rods   Results called to and read back by:Kitty Carreon RN 08/18/2022  21:56  Gram stain aer bottle: Gram negative rods   Positive results previously called 08/19/2022  00:08  ESCHERICHIA COLI ESBL* No Growth to date  No Growth to date No Growth to date  No Growth to date       CBC:   No results for input(s): WBC, HGB, HCT, PLT in the last 48 hours.    CMP:   Recent Labs   Lab 08/21/22  0707   *   K 3.7      CO2 27   *   BUN 18   CREATININE 1.1   CALCIUM 8.9   ANIONGAP 6*        Urine Culture:   Recent Labs   Lab 06/28/22  0210 07/10/22  1104 07/25/22  1820 08/19/22  1708   LABURIN LACTOBACILLUS SPECIES  > 100,000 cfu/ml  No other significant isolate  * ENTEROCOCCUS FAECALIS  >100,000 cfu/ml  * CANDIDA ALBICANS  > 100,000 cfu/ml  Treatment of asymptomatic candiduria is not recommended (except for   specific populations). Candida isolated in the urine typically   represents colonization. If an indwelling urinary catheter is present  it should be removed or replaced.  * ESCHERICHIA COLI ESBL  > 100,000 cfu/ml  *       Urine Studies:   Recent Labs   Lab 06/28/22 0210 07/10/22  1104 08/19/22  1708   COLORU Yellow   < > Orange*   APPEARANCEUA Hazy*   < > Ex.Turbid   PHUR 5.0   < > 7.0   SPECGRAV 1.015   < > 1.010   PROTEINUA Negative   < > 1+*   GLUCUA Negative   < > Negative   KETONESU Negative   < > Negative   BILIRUBINUA Negative   < > Negative   OCCULTUA Negative   < > 2+*   NITRITE Negative   < > Positive*   LEUKOCYTESUR 1+*   < > 3+*   RBCUA 1   < > >100*   WBCUA 20*   < > >100*   BACTERIA Many*   < > Many*   SQUAMEPITHEL 6  --   --    HYALINECASTS 49*   < > 0    < > = values in this interval not displayed.         Significant Imaging: I have reviewed all pertinent imaging results/findings within the past 24 hours.

## 2022-08-22 NOTE — PROGRESS NOTES
[facility] Medicine  Progress Note    Patient Name: Aleyda Floyd  MRN: [id number]  Patient Class: IP- Inpatient   Admission Date: 8/17/2022  Length of Stay: 3 days  Attending Physician: Areli Tomas*  Primary Care Provider: Elvie Fernandes MD        Subjective:     Principal Problem:Gram-negative bacteremia    HPI:  This is a 67 year old lady with alcoholic cirrhosis on lactulose and seizure disorder who presented to the ED for AMS and fatigue. 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 History primarily obtained from ED and prior notes. Patient has felt weak and has not been using her arms to communicate via ASL with her family for the past 1-2 weeks. Patient also complained of generalized fatigue in which the patient is sleeping more and is hard to arouse. Of note, patient had a paracentesis 3 weeks ago. Additionally, it was noted that the patient has a history of UTIs.      Upon chart review, patient was recently discharged for decompensated liver failure. At that visit, patient had seizure like acitivity and patient was started on Onfi and keppra.     In the ED, patient was VSS. Labs showed Hgb of 7.9 and an ammonia of 58. CT head showed no acute intracranial abnormality.     Overview/Hospital Course:  08/19/2022  Pt receiving Lactulose 20g TID and Rifaximin 550mg with improving mental status. H/H this morning 8.4/24.1 which is baseline. Tbili increasing to 2.2 from 1.2. CXR (-) and CT (-). Blood cultures show GNRs. Ordered IR paracentesis w/o imaging. Pending urine cultures. Evaluating for infectious source. EEG showed no seizure activity. On Zosyn.   08/20/2022  Pt alert but unwilling to sign to  due to fatigue. Repeat ammonia WNL. IR stated that there was not enough fluid for them to take a sample for culture. Pt with positive ESBL  E.Coli Bacteremia with Meropenem/Ertapenem sensitivities.   08/22/2022  Pt on ertapenem instead of meropenem. Pt still mostly unresponsive but alert in bed.     Interval History: Pt still mostly unresponsive. Family not at bedside. Pt is still off baseline from prior admission when she was signing to her  and responding to written messages.     Review of Systems   Constitutional:  Positive for activity change and fatigue. Negative for chills and fever.   HENT:  Negative for congestion, dental problem, facial swelling, postnasal drip, rhinorrhea, sore throat and trouble swallowing.    Eyes:  Negative for photophobia and pain.   Respiratory:  Negative for cough, choking, chest tightness, shortness of breath and wheezing.    Cardiovascular:  Negative for chest pain.   Gastrointestinal:  Negative for abdominal distention, abdominal pain, diarrhea, nausea and vomiting.   Endocrine: Negative.    Genitourinary:  Negative for dysuria, flank pain, frequency and hematuria.   Musculoskeletal:  Negative for back pain.   Skin:  Positive for wound (tips of fingers). Negative for pallor and rash.   Allergic/Immunologic: Negative.    Neurological:  Negative for dizziness, syncope, weakness, numbness and headaches.   Psychiatric/Behavioral:  The patient is not nervous/anxious.    Objective:     Vital Signs (Most Recent):  Temp: 97.5 °F (36.4 °C) (08/22/22 0752)  Pulse: 83 (08/22/22 1008)  Resp: 17 (08/22/22 1008)  BP: 126/60 (08/22/22 0752)  SpO2: 97 % (08/22/22 1008) Vital Signs (24h Range):  Temp:  [97.5 °F (36.4 °C)-98.6 °F (37 °C)] 97.5 °F (36.4 °C)  Pulse:  [] 83  Resp:  [17-18] 17  SpO2:  [97 %-100 %] 97 %  BP: ()/(47-64) 126/60     Weight: 81.6 kg (180 lb)  Body mass index is 35.15 kg/m².    Intake/Output Summary (Last 24 hours) at 8/22/2022 1116  Last data filed at 8/21/2022 1700  Gross per 24 hour   Intake --   Output 401 ml   Net -401 ml      Physical Exam  Vitals and nursing note reviewed.    Constitutional:       General: She is not in acute distress.     Appearance: Normal appearance. She is obese. She is not ill-appearing or diaphoretic.   HENT:      Head: Normocephalic and atraumatic.      Right Ear: External ear normal.      Left Ear: External ear normal.      Nose: Nose normal.      Mouth/Throat:      Mouth: Mucous membranes are moist.      Pharynx: Oropharynx is clear.   Eyes:      General: No scleral icterus.     Extraocular Movements: Extraocular movements intact.      Conjunctiva/sclera: Conjunctivae normal.      Pupils: Pupils are equal, round, and reactive to light.   Cardiovascular:      Rate and Rhythm: Normal rate and regular rhythm.      Pulses: Normal pulses.      Heart sounds: Normal heart sounds. No murmur heard.  Pulmonary:      Effort: Pulmonary effort is normal. No respiratory distress.      Breath sounds: Normal breath sounds. No stridor. No wheezing or rhonchi.   Chest:      Chest wall: No tenderness.   Abdominal:      General: Abdomen is flat. There is distension.      Palpations: Abdomen is soft.      Tenderness: There is no abdominal tenderness. There is no guarding or rebound.   Musculoskeletal:         General: No swelling or tenderness. Normal range of motion.      Cervical back: Normal range of motion and neck supple. No rigidity or tenderness.      Right lower leg: Edema present.      Left lower leg: Edema present.   Skin:     General: Skin is warm and dry.      Capillary Refill: Capillary refill takes less than 2 seconds.      Coloration: Skin is pale. Skin is not jaundiced.      Findings: Lesion (tips of fingers where BG checks are performed) present. No erythema.   Neurological:      General: No focal deficit present.      Mental Status: She is alert. She is disoriented.      Cranial Nerves: No cranial nerve deficit.      Motor: No weakness.   Psychiatric:      Comments: Flat affect       Significant Labs: All pertinent labs within the past 24 hours have been  reviewed.    Significant Imaging: I have reviewed all pertinent imaging results/findings within the past 24 hours.      Assessment/Plan:      * Gram-negative bacteremia  Zosyn course finished      Infection due to ESBL-producing Escherichia coli  Continue ertapenem due to sensitivities and ID recs; appreciate ID recs.      Altered mental status  See acute encephalopathy      UTI (urinary tract infection)    See encephalopathy    Acute encephalopathy  Alcoholic cirrhosis of liver with ascites  Seizure disorder    67 yoF with cirrhosis with ascites and seizure disorder presents with AMS and fatigue. Labs showed ammonia of 36. CT head showed no acute intracranial abnormality. Possible cause of encephalopathy is multifactorial: infection given history of UTIs and  ESBL E.Coli bacteremia.     -Blood cultures show ESBL E.Coli with Meropenem sensitivity; continue Blanquita IV   - continue home Clobazam and keppra for seizures  - f/u keppra level  - UA shows ESBL E.Coli treating with ertapenem  - continue lactulose and rifaximin  - continue lasix and aldactone  - continue thiamine and folic acid  - Cortisol levels with morning draw        Debility  will consult PT/OT for deconditioning      Thrombocytopenia  Normocytic anemia    Hgb on admission 7.9 and platelets of 117. Hgb baseline around 7.5-8.5 and platelets around 50s-60s. Current Hgb 8.6 platelets 112.    - continue to monitor for signs of bleeding  - trend Hgb and platelets      Deaf- written communication   used; fatigue or current mood still too significant for any meaningful interaction.      VTE Risk Mitigation (From admission, onward)         Ordered     enoxaparin injection 40 mg  Daily         08/18/22 0145     IP VTE HIGH RISK PATIENT  Once         08/18/22 0145     Place sequential compression device  Until discontinued         08/18/22 0138                Discharge Planning   REMA: 8/23/2022     Code Status: Full Code   Is the patient medically  ready for discharge?: Yes    Reason for patient still in hospital (select all that apply): Patient trending condition  Discharge Plan A: Rehab        Andrew Garcia MD  Department of Hospital Medicine   [facility]

## 2022-08-22 NOTE — PLAN OF CARE
Problem: Physical Therapy  Goal: Physical Therapy Goal  Description: Goals to be met by: 2022    Patient will increase functional independence with mobility by performin. Supine to sit with Maximum Assistance  2. Rolling to Left and Right with Maximum Assistance.  3. Sit to stand transfer with Maximum Assistance using LRAD  4. Gait  x 10 feet with Maximum Assistance using LRAD  5. Stand for 3 minutes with Maximum Assistance using LRAD  6. Lower extremity exercise program x10 reps per handout, with assistance as needed    Outcome: Ongoing, Progressing     Eval completed. Goals appropriate.

## 2022-08-22 NOTE — PT/OT/SLP EVAL
Physical Therapy Evaluation    Patient Name:  Aleyda Floyd   MRN:  [id number]  Admit Date: 8/17/2022  Admitting Diagnosis:  Gram-negative bacteremia  Length of Stay: 3 days  Recent Surgery: * No surgery found *      Recommendations:     Discharge Recommendations:  rehabilitation facility (return to [facility])   Discharge Equipment Recommendations:  (TBD)   Barriers to discharge: None    Assessment:     Aleyda Floyd is a 67 y.o. female admitted with a medical diagnosis of Gram-negative bacteremia. Pt admitted from [facility] rehab for acute encephalopathy. Pt still encephalopathic on evaluation. Pt was able to sit EOB with two person assistance. Pt was making good progress at rehab.  reported that she progressed to standing once at rehab before this admission. Pt is an excellent IP rehab candidate due to qualifying diagnosis, good activity tolerance, decline from PLOF, good family support, and excellent motivation. Recommend pt return to IP Rehab once medically stable for discharge.         offered to translate during evaluation. Primary communication was writing and  using ASL.  requested ASL  for upcoming sessions.    Problem List: weakness, impaired endurance, impaired self care skills, impaired functional mobility, impaired balance, impaired cognition, impaired cardiopulmonary response to activity  Rehab Prognosis: Good; patient would benefit from acute skilled PT services to address these deficits and reach maximum level of function.      Plan:     During this hospitalization, patient to be seen 3 x/week to address the identified rehab impairments via gait training, therapeutic activities, therapeutic exercises, neuromuscular re-education and progress towards the established goals.    · Plan of Care Expires:  09/21/22    Subjective   Communicated with RN prior to session.  Patient found HOB elevated upon PT entry to room, agreeable to evaluation. Aleyda Floyd's   "present during session.    Chief Complaint: Abnormal Lab (Pt [facility] from [facility] for c/o high ammonia levels yesterday. 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 Pt communicates via written communication)    Patient/Family Comments/goals: to get better  Pain/Comfort:  · Location 1:  (wincing with extremity PROM)  · Pain Addressed 1: Reposition, Distraction    Living Environment:  Pt admitted from [facility] rehab. Pt was living with  in a [facility] with a TH entrance. Pt was using a SPC or rollator to ambulate. Pt required assistance with ADLs. Patient uses DME as follows: rollator, cane, straight, shower chair, raised toilet (bed rail). DME owned (not currently used): none.    Patient reports they will have assistance from  upon discharge.    Objective:   Patient found with: telemetry, peripheral IV, PureWick     General Precautions: Standard, Cardiac fall, hearing impaired, contact   Orthopedic Precautions:N/A   Braces: N/A   Oxygen Device: Room Air  Vitals: /60 (BP Location: Left arm, Patient Position: Lying)   Pulse 83   Temp 97.5 °F (36.4 °C) (Oral)   Resp 17   Wt 81.6 kg (180 lb)   SpO2 97%   BMI 35.15 kg/m²     Exams:  · Cognition:   · Lethargic, Flat  · Command following: Follows one-step commands 10% of the time; often pt would shake head "no" when asked to perform a task   · Fluency: communicated through nodding occasionally; at baseline pt uses AS    BLE PROM WFL  Unable to assess MMT due to pt unable to participate    Outcome Measures:  AM-PAC 6 CLICK MOBILITY  Turning over in bed (including adjusting bedclothes, sheets and blankets)?: 2  Sitting down on and standing up from a chair with arms (e.g., wheelchair, bedside commode, etc.): 1  Moving from lying on back to sitting on the side of the bed?: 2  Moving to and from a bed to a chair (including a wheelchair)?: 1  Need to walk in hospital room?: 1  Climbing 3-5 steps with a railing?: 1  Basic Mobility Total Score: 8     Functional " "Mobility:  Additional staff present: OT  Bed Mobility:  · Supine to Sit: total assistance and 2 persons; HOB elevated  · Scooting anteriorly to EOB to have both feet planted on floor: total assistance and 2 persons  · Sit to Supine: total assistance and 2 persons; HOB flat    Sitting Balance at Edge of Bed:   Assistance Level Required: Total Assistance x 2 persons   o One person to keep trunk in midline  o One person to block B LEs   Time: 10 minutes   Comments:   o Pt demo'd severe posterior lean. Writing therapist provided low back stretch in sitting x 10 seconds  o Worked on activity tolerance sitting EOB, worked on tolerance to positional change, and worked on sitting balance   o Pt encephalopathic presenting as flat affect, impaired processing, impaired response time, nodding "no" when ask to perform a task      Transfers:   Not performed 2nd to pt still working on sitting       Gait:   Not performed       Therapeutic Activities, Exercises, & Education:   Educated pt on PT role/POC  Provided daily orientation   Pt verbalized understanding         Patient left HOB elevated with all lines intact, call button in reach, RN notified and  present.    GOALS:   Multidisciplinary Problems     Physical Therapy Goals        Problem: Physical Therapy    Goal Priority Disciplines Outcome Goal Variances Interventions   Physical Therapy Goal     PT, PT/OT Ongoing, Progressing     Description: Goals to be met by: 2022    Patient will increase functional independence with mobility by performin. Supine to sit with Maximum Assistance  2. Rolling to Left and Right with Maximum Assistance.  3. Sit to stand transfer with Maximum Assistance using LRAD  4. Gait  x 10 feet with Maximum Assistance using LRAD  5. Stand for 3 minutes with Maximum Assistance using LRAD  6. Lower extremity exercise program x10 reps per handout, with assistance as needed                     History:     Past Medical History:   Diagnosis " Date    PITO (acute kidney injury)     Alcoholic cirrhosis of liver     Hypercholesterolemia     Hypertension        Past Surgical History:   Procedure Laterality Date    DILATION AND CURETTAGE OF UTERUS      ESOPHAGOGASTRODUODENOSCOPY N/A 6/23/2022    Procedure: EGD (ESOPHAGOGASTRODUODENOSCOPY);  Surgeon: Sean Perry MD;  Location: [address]);  Service: Endoscopy;  Laterality: N/A;  cirrhosis, variceal screening  labs prior  -request sent 6/14  fully vaccinated, instructions emailed to [email or web address]       Time Tracking:     PT Received On: 08/22/22  PT Start Time: 0958     PT Stop Time: 1023  PT Total Time (min): 25 min     Billable Minutes: Evaluation 8 and Therapeutic Activity 8

## 2022-08-22 NOTE — ASSESSMENT & PLAN NOTE
Normocytic anemia    Hgb on admission 7.9 and platelets of 117. Hgb baseline around 7.5-8.5 and platelets around 50s-60s. Current Hgb 8.6 platelets 112.    - continue to monitor for signs of bleeding  - trend Hgb and platelets

## 2022-08-22 NOTE — PLAN OF CARE
[facility]  Initial Discharge Assessment       Primary Care Provider: Elvie Fernandes MD    Admission Diagnosis: Weakness of both arms [R29.898]  Acute encephalopathy [G93.40]  Chest pain [R07.9]  Altered mental status, unspecified altered mental status type [R41.82]    Admission Date: 8/17/2022  Expected Discharge Date: 8/23/2022         Payor: MEDICARE / Plan: MEDICARE PART A & B / Product Type: Government /     Extended Emergency Contact Information  Primary Emergency Contact: Lay Solitario   [facility]  Home Phone: [phone]  Mobile Phone: [phone]  Relation: Sister  Secondary Emergency Contact: Jaime Floyd  Mobile Phone: [phone]  Relation: Spouse  Preferred language: American Sign Language   needed? Yes    Discharge Plan A: Rehab  Discharge Plan B: Rehab      [facility]  [address]  [address] [address]  Phone: [phone] Fax: [phone]      Initial Assessment (most recent)     Adult Discharge Assessment - 08/22/22 1604        Discharge Assessment    Assessment Type Discharge Planning Assessment

## 2022-08-23 PROBLEM — R68.89 SUSPECTED DEEP TISSUE INJURY: Status: ACTIVE | Noted: 2022-01-01

## 2022-08-23 NOTE — ASSESSMENT & PLAN NOTE
Normocytic anemia    Hgb on admission 7.9 and platelets of 117  Hgb baseline around 7.5-8.5 and platelets around 50s-60s. Current Hgb 8.6 platelets 112.    Plan:  - trend Hb and Plt daily   - continue to monitor for signs of bleeding

## 2022-08-23 NOTE — PLAN OF CARE
Problem: Adult Inpatient Plan of Care  Goal: Plan of Care Review  Outcome: Ongoing, Progressing  Goal: Optimal Comfort and Wellbeing  Outcome: Ongoing, Progressing     Problem: Fluid and Electrolyte Imbalance (Acute Kidney Injury/Impairment)  Goal: Fluid and Electrolyte Balance  Outcome: Ongoing, Progressing     Problem: Impaired Wound Healing  Goal: Optimal Wound Healing  Outcome: Ongoing, Progressing     Problem: Fall Injury Risk  Goal: Absence of Fall and Fall-Related Injury  Outcome: Ongoing, Progressing

## 2022-08-23 NOTE — SUBJECTIVE & OBJECTIVE
Scheduled Meds:   cloBAZam  10 mg Oral Daily    enoxaparin  40 mg Subcutaneous Daily    ertapenem (INVANZ) IVPB  1 g Intravenous Q24H    folic acid  1 mg Oral Daily    furosemide  40 mg Oral BID    lactulose  20 g Oral TID    levETIRAcetam  750 mg Oral BID    pantoprazole  40 mg Oral Daily    polyethylene glycol  17 g Oral BID    rifAXIMin  550 mg Oral BID    spironolactone  100 mg Oral Daily    thiamine  100 mg Oral Daily     Continuous Infusions:  PRN Meds:sodium chloride, dextrose 10%, dextrose 10%, glucagon (human recombinant), glucose, glucose, insulin aspart U-100, melatonin, naloxone, ondansetron, prochlorperazine, sodium chloride 0.9%, sodium chloride 0.9%    Review of patient's allergies indicates:  No Known Allergies     Past Medical History:   Diagnosis Date    PITO (acute kidney injury)     Alcoholic cirrhosis of liver     Hypercholesterolemia     Hypertension      Past Surgical History:   Procedure Laterality Date    DILATION AND CURETTAGE OF UTERUS      ESOPHAGOGASTRODUODENOSCOPY N/A 2022    Procedure: EGD (ESOPHAGOGASTRODUODENOSCOPY);  Surgeon: Sean Perry MD;  Location: [address];  Service: Endoscopy;  Laterality: N/A;  cirrhosis, variceal screening  labs prior  -request sent   fully vaccinated, instructions emailed to [email or web address]       Family History       Problem Relation (Age of Onset)    Breast cancer Maternal Grandmother          Tobacco Use    Smoking status: Former Smoker     Packs/day: 1.00     Years: 15.00     Pack years: 15.00     Quit date: 1998     Years since quittin.7    Smokeless tobacco: Never Used   Substance and Sexual Activity    Alcohol use: Not Currently    Drug use: Not Currently    Sexual activity: Not on file     Review of Systems   Skin:  Positive for wound.     Objective:     Vital Signs (Most Recent):  Temp: 97.1 °F (36.2 °C) (22 1133)  Pulse: 71 (22 1133)  Resp: 18 (22 1133)  BP:  (!) 118/55 (08/23/22 1133)  SpO2: 100 % (08/23/22 1133)   Vital Signs (24h Range):  Temp:  [97.1 °F (36.2 °C)-98.9 °F (37.2 °C)] 97.1 °F (36.2 °C)  Pulse:  [70-76] 71  Resp:  [17-18] 18  SpO2:  [98 %-100 %] 100 %  BP: (111-127)/(55-62) 118/55     Weight: 81.6 kg (180 lb)  Body mass index is 35.15 kg/m².  Physical Exam  Constitutional:       Appearance: Normal appearance.   Skin:     General: Skin is warm and dry.      Findings: Lesion present.   Neurological:      Mental Status: She is alert.       Laboratory:  All pertinent labs reviewed within the last 24 hours.    Diagnostic Results:  None

## 2022-08-23 NOTE — PLAN OF CARE
Spoke with [facility] at [phone].  [facility] will accept pt back to LTAC services when medically ready to discharge (today or tomorrow).  Will continue to follow for discharge needs.    Ale Daigle RN CM  Case Management  [id number]

## 2022-08-23 NOTE — HPI
Aleyda Floyd is a 67 year old female with alcoholic cirrhosis on lactulose and seizure disorder who presented to the ED for AMS and fatigue. 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 History primarily obtained from ED and prior notes. Patient has felt weak and has not been using her arms to communicate via ASL with her family for the past 1-2 weeks. Patient also complained of generalized fatigue in which the patient is sleeping more and is hard to arouse. Of note, patient had a paracentesis 3 weeks ago. Additionally, it was noted that the patient has a history of UTIs.       Upon chart review, patient was recently discharged for decompensated liver failure. At that visit, patient had seizure like acitivity and patient was started on Onfi and keppra.      In the ED, patient was VSS. Labs showed Hgb of 7.9 and an ammonia of 58. CT head showed no acute intracranial abnormality. [facility] TAO consulted for evaluation of sacral skin injury.

## 2022-08-23 NOTE — ASSESSMENT & PLAN NOTE
Alcoholic cirrhosis of liver with ascites  Seizure disorder    67 yoF with cirrhosis with ascites and seizure disorder presents with AMS and fatigue  Labs showed ammonia of 36  CT head showed no acute intracranial abnormality  Possible cause of encephalopathy is multifactorial: infection vs neurological vs metabolic  EEG on admit remarkable for encephalopathy, no other significant findings  UCx and BCx positive for ESBL e coli sensitive to only meropenem and ertapenem.     Although she has been treated for a few days with the right antibiotics, her mental status has been slow to improve  She remains alert, tracks with her eyes, and seems to understand what being are signing to her but will not sign herself  Pt able to squeeze hands BL and move her legs   CT head showing chronic microvascular changes, no acute changes  CK normal, rhabdo or myositis less likely   Cortisol normal, weakness from adrenal insufficiency less likely latoya since BPs have been relatively normal     Plan:  - continue ertapenem per ID recs; will need total course of 2 weeks from negative blood cultures which were on 8/20 so end date of abx will be 9/2  - continue home Clobazam and keppra for seizures  - continue lactulose and rifaximin  - continue lasix and aldactone  - continue thiamine and folic acid  - MRI c-spine to exclude mass or infective process in spinal canal accounting for decrease motor function in arms

## 2022-08-23 NOTE — PROGRESS NOTES
[facility] Medicine  Progress Note    Patient Name: Aleyda Floyd  MRN: [id number]  Patient Class: IP- Inpatient   Admission Date: 8/17/2022  Length of Stay: 4 days  Attending Physician: Areli Tomas*  Primary Care Provider: Elvie Fernandes MD    Subjective:     Principal Problem:Acute encephalopathy    HPI:  This is a 67 year old lady with alcoholic cirrhosis on lactulose and seizure disorder who presented to the ED for AMS and fatigue. 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 History primarily obtained from ED and prior notes. Patient has felt weak and has not been using her arms to communicate via ASL with her family for the past 1-2 weeks. Patient also complained of generalized fatigue in which the patient is sleeping more and is hard to arouse. Of note, patient had a paracentesis 3 weeks ago. Additionally, it was noted that the patient has a history of UTIs.      Upon chart review, patient was recently discharged for decompensated liver failure. At that visit, patient had seizure like acitivity and patient was started on Onfi and keppra.     In the ED, patient was VSS. Labs showed Hgb of 7.9 and an ammonia of 58. CT head showed no acute intracranial abnormality.       Overview/Hospital Course:  Admitted to hospital medicine for encephalopathy. EEG on admission unremarkable for seizure activity, consistent with encephalopathy that could be infectious vs metabolic vs primary neuronal in nature. Continue on lactulose and rifamixin for hepatic encephalopathy. Blood and urine cultures growing ESBL e coli. Initially started on meropenem, however consulted ID who recommended de-escalating to ertapenem; will need two week course in total. Pt alert and mentation seems to be improving but only minimally; she remains alert and appears to understand what the   is saying, but will not sign herself. Moving all four extremities. CT head unremarkable for acute change, only shows chronic microvascular changes. Will obtain MRI c-spine to rule out spinal cord compression that may be affecting use of her arms. Pt came from [facility] and  confirming that Pt will be discharging back to the same LTAC once medically ready.       Interval History: Lying in bed, alert and tracks with her eyes. No family at bedside,  used. Pt appears to understand what they are saying but will not sign herself. Squeezing hands BL on command.     Review of Systems   Unable to perform ROS: Patient nonverbal   Constitutional:  Positive for activity change and fatigue.   Endocrine: Negative.    Skin:  Wound: tips of fingers.   Allergic/Immunologic: Negative.    Objective:     Vital Signs (Most Recent):  Temp: 97.1 °F (36.2 °C) (08/23/22 1133)  Pulse: 71 (08/23/22 1133)  Resp: 18 (08/23/22 1133)  BP: (!) 118/55 (08/23/22 1133)  SpO2: 100 % (08/23/22 1133) Vital Signs (24h Range):  Temp:  [97.1 °F (36.2 °C)-98.9 °F (37.2 °C)] 97.1 °F (36.2 °C)  Pulse:  [70-76] 71  Resp:  [17-18] 18  SpO2:  [98 %-100 %] 100 %  BP: (111-127)/(55-62) 118/55     Weight: 81.6 kg (180 lb)  Body mass index is 35.15 kg/m².    Intake/Output Summary (Last 24 hours) at 8/23/2022 1431  Last data filed at 8/23/2022 1200  Gross per 24 hour   Intake --   Output 500 ml   Net -500 ml        Physical Exam  Vitals and nursing note reviewed.   Constitutional:       General: She is not in acute distress.     Appearance: Normal appearance. She is not ill-appearing.   HENT:      Head: Normocephalic and atraumatic.      Nose: Nose normal.      Mouth/Throat:      Mouth: Mucous membranes are moist.   Eyes:      General: No scleral icterus.     Extraocular Movements: Extraocular movements intact.      Conjunctiva/sclera: Conjunctivae normal.      Pupils: Pupils are equal, round, and reactive to light.    Cardiovascular:      Rate and Rhythm: Normal rate and regular rhythm.      Pulses: Normal pulses.      Heart sounds: Normal heart sounds. No murmur heard.  Pulmonary:      Effort: Pulmonary effort is normal. No respiratory distress.      Breath sounds: Normal breath sounds. No wheezing, rhonchi or rales.   Abdominal:      General: Bowel sounds are normal.      Palpations: Abdomen is soft.      Tenderness: There is no abdominal tenderness. There is no guarding or rebound.   Musculoskeletal:         General: No swelling or tenderness. Normal range of motion.      Right lower leg: Edema (trace at ankles) present.      Left lower leg: Edema (trace at ankles) present.   Skin:     General: Skin is warm and dry.      Capillary Refill: Capillary refill takes less than 2 seconds.      Coloration: Skin is pale. Skin is not jaundiced.      Findings: Lesion (tips of fingers where BG checks are performed) present. No erythema.   Neurological:      Mental Status: She is alert. She is disoriented.      GCS: GCS eye subscore is 4. GCS verbal subscore is 1. GCS motor subscore is 6.      Comments: Follows commands and squeeze hands BL   Psychiatric:      Comments: Flat affect       Significant Labs: All pertinent labs within the past 24 hours have been reviewed.  CBC:   Recent Labs   Lab 08/23/22  0638   WBC 6.99   HGB 9.8*   HCT 29.6*   *     CMP:   Recent Labs   Lab 08/23/22  0638   *   K 3.7      CO2 28      BUN 20   CREATININE 1.2   CALCIUM 9.7   PROT 6.8   ALBUMIN 2.2*   BILITOT 1.4*   ALKPHOS 73   AST 47*   ALT 24   ANIONGAP 7*       Significant Imaging: I have reviewed all pertinent imaging results/findings within the past 24 hours.      Assessment/Plan:      * Acute encephalopathy  Alcoholic cirrhosis of liver with ascites  Seizure disorder    67 yoF with cirrhosis with ascites and seizure disorder presents with AMS and fatigue  Labs showed ammonia of 36  CT head showed no acute intracranial  abnormality  Possible cause of encephalopathy is multifactorial: infection vs neurological vs metabolic  EEG on admit remarkable for encephalopathy, no other significant findings  UCx and BCx positive for ESBL e coli sensitive to only meropenem and ertapenem.     Although she has been treated for a few days with the right antibiotics, her mental status has been slow to improve  She remains alert, tracks with her eyes, and seems to understand what being are signing to her but will not sign herself  Pt able to squeeze hands BL and move her legs   CT head showing chronic microvascular changes, no acute changes  CK normal, rhabdo or myositis less likely   Cortisol normal, weakness from adrenal insufficiency less likely latoya since BPs have been relatively normal     Plan:  - continue ertapenem per ID recs; will need total course of 2 weeks from negative blood cultures which were on 8/20 so end date of abx will be 9/2  - continue home Clobazam and keppra for seizures  - continue lactulose and rifaximin  - continue lasix and aldactone  - continue thiamine and folic acid  - MRI c-spine to exclude mass or infective process in spinal canal accounting for decrease motor function in arms         Infection due to ESBL-producing Escherichia coli  Has UCx and BCx positive for ESBL e coli sensitive only to meropenem and ertapenem    Gram-negative bacteremia  See ESBL e coli      Altered mental status  See acute encephalopathy      UTI (urinary tract infection)  See encephalopathy    Debility  Will consult PT/OT for deconditioning  Plan is to discharge back to LTAC once medically ready      Thrombocytopenia  Normocytic anemia    Hgb on admission 7.9 and platelets of 117  Hgb baseline around 7.5-8.5 and platelets around 50s-60s. Current Hgb 8.6 platelets 112.    Plan:  - trend Hb and Plt daily   - continue to monitor for signs of bleeding      Deaf- written communication   used; fatigue or current mood still too  significant for any meaningful interaction.    VTE Risk Mitigation (From admission, onward)         Ordered     enoxaparin injection 40 mg  Daily         08/18/22 0145     IP VTE HIGH RISK PATIENT  Once         08/18/22 0145     Place sequential compression device  Until discontinued         08/18/22 0138              Discharge Planning   REMA: 8/23/2022     Code Status: Full Code   Is the patient medically ready for discharge?: Yes    Reason for patient still in hospital (select all that apply): Patient trending condition  Discharge Plan A: Rehab        Karen Scott MD  Department of Hospital Medicine   [facility]

## 2022-08-23 NOTE — ASSESSMENT & PLAN NOTE
- consult received for evaluation of sacral skin injury.  - pt presented to the ED for AMS and fatigue.   - red/maroon/purple discoloration and blistering noted to sacral region.  - suspected deep tissue injury over bony prominence.  - continue Triad bid/prn.  - Currently on [place] surface. Immerse bed ordered and delivered.  - pt to placed on bed today if discharge delayed.  - seen wearing a brief. Please avoid briefs if possible.  - wedge used for offloading sacrum.  - family member at bedside and assisted with turning.  - continue q2h turning.  - nursing to maintain pressure injury prevention measures and continue wound care per orders.

## 2022-08-23 NOTE — NURSING
Patient asleep at the beginning of the shift and slept most of the day. Hard to assess orientation.  Patient hasn't spoken at all today.  Son is at the bedside with her today.

## 2022-08-23 NOTE — SUBJECTIVE & OBJECTIVE
Interval History: Lying in bed, alert and tracks with her eyes. No family at bedside,  used. Pt appears to understand what they are saying but will not sign herself. Squeezing hands BL on command.     Review of Systems   Unable to perform ROS: Patient nonverbal   Constitutional:  Positive for activity change and fatigue.   Endocrine: Negative.    Skin:  Wound: tips of fingers.   Allergic/Immunologic: Negative.    Objective:     Vital Signs (Most Recent):  Temp: 97.1 °F (36.2 °C) (08/23/22 1133)  Pulse: 71 (08/23/22 1133)  Resp: 18 (08/23/22 1133)  BP: (!) 118/55 (08/23/22 1133)  SpO2: 100 % (08/23/22 1133) Vital Signs (24h Range):  Temp:  [97.1 °F (36.2 °C)-98.9 °F (37.2 °C)] 97.1 °F (36.2 °C)  Pulse:  [70-76] 71  Resp:  [17-18] 18  SpO2:  [98 %-100 %] 100 %  BP: (111-127)/(55-62) 118/55     Weight: 81.6 kg (180 lb)  Body mass index is 35.15 kg/m².    Intake/Output Summary (Last 24 hours) at 8/23/2022 1431  Last data filed at 8/23/2022 1200  Gross per 24 hour   Intake --   Output 500 ml   Net -500 ml        Physical Exam  Vitals and nursing note reviewed.   Constitutional:       General: She is not in acute distress.     Appearance: Normal appearance. She is not ill-appearing.   HENT:      Head: Normocephalic and atraumatic.      Nose: Nose normal.      Mouth/Throat:      Mouth: Mucous membranes are moist.   Eyes:      General: No scleral icterus.     Extraocular Movements: Extraocular movements intact.      Conjunctiva/sclera: Conjunctivae normal.      Pupils: Pupils are equal, round, and reactive to light.   Cardiovascular:      Rate and Rhythm: Normal rate and regular rhythm.      Pulses: Normal pulses.      Heart sounds: Normal heart sounds. No murmur heard.  Pulmonary:      Effort: Pulmonary effort is normal. No respiratory distress.      Breath sounds: Normal breath sounds. No wheezing, rhonchi or rales.   Abdominal:      General: Bowel sounds are normal.      Palpations: Abdomen is  soft.      Tenderness: There is no abdominal tenderness. There is no guarding or rebound.   Musculoskeletal:         General: No swelling or tenderness. Normal range of motion.      Right lower leg: Edema (trace at ankles) present.      Left lower leg: Edema (trace at ankles) present.   Skin:     General: Skin is warm and dry.      Capillary Refill: Capillary refill takes less than 2 seconds.      Coloration: Skin is pale. Skin is not jaundiced.      Findings: Lesion (tips of fingers where BG checks are performed) present. No erythema.   Neurological:      Mental Status: She is alert. She is disoriented.      GCS: GCS eye subscore is 4. GCS verbal subscore is 1. GCS motor subscore is 6.      Comments: Follows commands and squeeze hands BL   Psychiatric:      Comments: Flat affect       Significant Labs: All pertinent labs within the past 24 hours have been reviewed.  CBC:   Recent Labs   Lab 08/23/22  0638   WBC 6.99   HGB 9.8*   HCT 29.6*   *     CMP:   Recent Labs   Lab 08/23/22  0638   *   K 3.7      CO2 28      BUN 20   CREATININE 1.2   CALCIUM 9.7   PROT 6.8   ALBUMIN 2.2*   BILITOT 1.4*   ALKPHOS 73   AST 47*   ALT 24   ANIONGAP 7*       Significant Imaging: I have reviewed all pertinent imaging results/findings within the past 24 hours.

## 2022-08-23 NOTE — CONSULTS
[facility]ation  Skin Integrity TAO  Consult Note    Patient Name: Aleyda Floyd  MRN: [id number]  Admission Date: 8/17/2022  Hospital Length of Stay: 4 days  Attending Physician: Areli Tomas*  Primary Care Provider: Elive Fernandes MD     Consults  Subjective:     History of Present Illness:  Aleyda Floyd is a 67 year old female with alcoholic cirrhosis on lactulose and seizure disorder who presented to the ED for AMS and fatigue. 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 History primarily obtained from ED and prior notes. Patient has felt weak and has not been using her arms to communicate via ASL with her family for the past 1-2 weeks. Patient also complained of generalized fatigue in which the patient is sleeping more and is hard to arouse. Of note, patient had a paracentesis 3 weeks ago. Additionally, it was noted that the patient has a history of UTIs.       Upon chart review, patient was recently discharged for decompensated liver failure. At that visit, patient had seizure like acitivity and patient was started on Onfi and keppra.      In the ED, patient was VSS. Labs showed Hgb of 7.9 and an ammonia of 58. CT head showed no acute intracranial abnormality. Skin Integrity TAO consulted for evaluation of sacral skin injury.      Scheduled Meds:   cloBAZam  10 mg Oral Daily    enoxaparin  40 mg Subcutaneous Daily    ertapenem (INVANZ) IVPB  1 g Intravenous Q24H    folic acid  1 mg Oral Daily    furosemide  40 mg Oral BID    lactulose  20 g Oral TID    levETIRAcetam  750 mg Oral BID    pantoprazole  40 mg Oral Daily    polyethylene glycol  17 g Oral BID    rifAXIMin  550 mg Oral BID    spironolactone  100 mg Oral Daily    thiamine  100 mg Oral Daily     Continuous Infusions:  PRN Meds:sodium chloride, dextrose 10%, dextrose 10%, glucagon (human  recombinant), glucose, glucose, insulin aspart U-100, melatonin, naloxone, ondansetron, prochlorperazine, sodium chloride 0.9%, sodium chloride 0.9%    Review of patient's allergies indicates:  No Known Allergies     Past Medical History:   Diagnosis Date    PITO (acute kidney injury)     Alcoholic cirrhosis of liver     Hypercholesterolemia     Hypertension      Past Surgical History:   Procedure Laterality Date    DILATION AND CURETTAGE OF UTERUS      ESOPHAGOGASTRODUODENOSCOPY N/A 2022    Procedure: EGD (ESOPHAGOGASTRODUODENOSCOPY);  Surgeon: Sean Perry MD;  Location: [facility] ([address] [place] [address]);  Service: Endoscopy;  Laterality: N/A;  cirrhosis, variceal screening  labs prior  -request sent   fully vaccinated, instructions emailed to [email or web address]       Family History       Problem Relation (Age of Onset)    Breast cancer Maternal Grandmother          Tobacco Use    Smoking status: Former Smoker     Packs/day: 1.00     Years: 15.00     Pack years: 15.00     Quit date: 1998     Years since quittin.7    Smokeless tobacco: Never Used   Substance and Sexual Activity    Alcohol use: Not Currently    Drug use: Not Currently    Sexual activity: Not on file     Review of Systems   Skin:  Positive for wound.     Objective:     Vital Signs (Most Recent):  Temp: 97.1 °F (36.2 °C) (22 1133)  Pulse: 71 (22 1133)  Resp: 18 (22 1133)  BP: (!) 118/55 (22 1133)  SpO2: 100 % (22 1133)   Vital Signs (24h Range):  Temp:  [97.1 °F (36.2 °C)-98.9 °F (37.2 °C)] 97.1 °F (36.2 °C)  Pulse:  [70-76] 71  Resp:  [17-18] 18  SpO2:  [98 %-100 %] 100 %  BP: (111-127)/(55-62) 118/55     Weight: 81.6 kg (180 lb)  Body mass index is 35.15 kg/m².  Physical Exam  Constitutional:       Appearance: Normal appearance.   Skin:     General: Skin is warm and dry.      Findings: Lesion present.   Neurological:      Mental Status: She is alert.        Laboratory:  All pertinent labs reviewed within the last 24 hours.    Diagnostic Results:  None        Assessment/Plan:          TAO Skin Integrity Evaluation    Skin Integrity TAO evaluation of patient as part of the comprehensive skin care team.   She has been admitted for 6 days. Skin injury was noted on 7/9/22. POA yes.    Sacrum            Suspected deep tissue injury  - consult received for evaluation of sacral skin injury.  - pt presented to the ED for AMS and fatigue.   - red/maroon/purple discoloration and blistering noted to sacral region.  - suspected deep tissue injury possibly related to pressure over bony prominence of sacrum.  - continue Triad bid/prn.  - Currently on Zafgen surface. Immerse bed ordered and delivered.  - pt to placed on bed today if discharge delayed.  - seen wearing a brief. Please avoid briefs if possible.  - wedge used for offloading sacrum.  - family member at bedside and assisted with turning.  - continue q2h turning.  - nursing to maintain pressure injury prevention measures and continue wound care per orders.         Thank you for your consult. I will follow-up with patient. Please contact us if you have any additional questions.       Yazmin Carolina NP  Skin Integrity TAO  [facility]

## 2022-08-24 PROBLEM — R65.21 SEPTIC SHOCK: Status: ACTIVE | Noted: 2022-01-01

## 2022-08-24 PROBLEM — A41.9 SEPTIC SHOCK: Status: ACTIVE | Noted: 2022-01-01

## 2022-08-24 NOTE — ASSESSMENT & PLAN NOTE
Hgb on admission 7.9 and platelets of 117  Hgb baseline around 7.5-8.5 and platelets around 50s-60s. Current Hgb 8.6 platelets 112.     Plan:  - trend Hb and Plt daily   - continue to monitor for signs of bleeding

## 2022-08-24 NOTE — H&P
[facility] - Cardiac Medical ICU  Critical Care Medicine  History & Physical    Patient Name: Aleyda Floyd  MRN: [id number]  Admission Date: 8/17/2022  Hospital Length of Stay: 5 days  Code Status: Full Code  Attending Physician: Lia Anthony MD   Primary Care Provider: Elvie Fernandes MD   Principal Problem: Septic shock    Subjective:     HPI:  67 year old lady with alcoholic cirrhosis on lactulose and seizure disorder who presented to the ED for AMS and fatigue. 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 Admitted to hospital medicine for encephalopathy. EEG on admission unremarkable for seizure activity, consistent with encephalopathy that could be infectious vs metabolic vs primary neuronal in nature. Blood and urine cultures growing ESBL e coli. She was initially started on meropenem, however consulted ID who recommended de-escalating to ertapenem and to complete two week course in total. CT head on 8/22 was unremarkable for acute change. Given bilateral hand weakness, MRI was ordered to r/o spinal cord compression. On 8/24 AM, pt was rapid response 2/2 to hypotension. She received 1 L of NS and remains in refractory hypotension. Pt is transferred to MICU for shock.       Hospital/ICU Course:  No notes on file     Past Medical History:   Diagnosis Date    PITO (acute kidney injury)     Alcoholic cirrhosis of liver     Hypercholesterolemia     Hypertension        Past Surgical History:   Procedure Laterality Date    DILATION AND CURETTAGE OF UTERUS      ESOPHAGOGASTRODUODENOSCOPY N/A 6/23/2022    Procedure: EGD (ESOPHAGOGASTRODUODENOSCOPY);  Surgeon: Sean Perry MD;  Location: [address]);  Service: Endoscopy;  Laterality: N/A;  cirrhosis, variceal screening  labs prior  -request sent 6/14  fully vaccinated, instructions emailed to [email or web address]       Review of patient's allergies indicates:  No Known Allergies    Family History       Problem Relation (Age  of Onset)    Breast cancer Maternal Grandmother          Tobacco Use    Smoking status: Former Smoker     Packs/day: 1.00     Years: 15.00     Pack years: 15.00     Quit date: 1998     Years since quittin.7    Smokeless tobacco: Never Used   Substance and Sexual Activity    Alcohol use: Not Currently    Drug use: Not Currently    Sexual activity: Not on file      Review of Systems   Unable to perform ROS: Patient nonverbal   Objective:     Vital Signs (Most Recent):  Temp: 99.5 °F (37.5 °C) (22 0509)  Pulse: 99 (22 0600)  Resp: 18 (22 0509)  BP: (!) 66/36 (22 06)  SpO2: 96 % (22 050)   Vital Signs (24h Range):  Temp:  [97.1 °F (36.2 °C)-99.5 °F (37.5 °C)] 99.5 °F (37.5 °C)  Pulse:  [] 99  Resp:  [17-18] 18  SpO2:  [96 %-100 %] 96 %  BP: ()/(36-63) 66/36   Weight: 81.6 kg (180 lb)  Body mass index is 35.15 kg/m².      Intake/Output Summary (Last 24 hours) at 2022 0619  Last data filed at 2022 0559  Gross per 24 hour   Intake --   Output 800 ml   Net -800 ml       Physical Exam  Vitals and nursing note reviewed.   Constitutional:       General: She is not in acute distress.     Appearance: Normal appearance. She is not ill-appearing.   HENT:      Head: Normocephalic and atraumatic.      Nose: Nose normal.      Mouth/Throat:      Mouth: Mucous membranes are moist.   Eyes:      General: No scleral icterus.     Extraocular Movements: Extraocular movements intact.      Conjunctiva/sclera: Conjunctivae normal.      Pupils: Pupils are equal, round, and reactive to light.   Cardiovascular:      Rate and Rhythm: Normal rate and regular rhythm.      Pulses: Normal pulses.      Heart sounds: Normal heart sounds. No murmur heard.  Pulmonary:      Effort: Pulmonary effort is normal. No respiratory distress.      Breath sounds: Normal breath sounds. No wheezing, rhonchi or rales.   Abdominal:      General: Bowel sounds are normal.      Palpations: Abdomen is  soft.      Tenderness: There is no abdominal tenderness. There is no guarding or rebound.   Musculoskeletal:         General: No swelling or tenderness. Normal range of motion.      Right lower leg: Edema (trace at ankles) present.      Left lower leg: Edema (trace at ankles) present.   Skin:     General: Skin is warm and dry.      Capillary Refill: Capillary refill takes less than 2 seconds.      Coloration: Skin is pale. Skin is not jaundiced.      Findings: Lesion (tips of fingers where BG checks are performed) present. No erythema.   Neurological:      Mental Status: She is alert. She is disoriented.      GCS: GCS eye subscore is 4. GCS verbal subscore is 1. GCS motor subscore is 6.      Comments: Follows commands and squeeze hands BL   Psychiatric:      Comments: Flat affect       Vents:     Lines/Drains/Airways       Drain  Duration             Female External Urinary Catheter 07/20/22 1400 34 days              Peripheral Intravenous Line  Duration                  Peripheral IV - Single Lumen 07/28/22 1540 20 G Posterior;Right Hand 26 days         Peripheral IV - Single Lumen 08/17/22 2056 22 G Left Hand 6 days         Peripheral IV - Single Lumen 08/18/22 0629 20 G Right Forearm 5 days                  Significant Labs:    CBC/Anemia Profile:  Recent Labs   Lab 08/23/22  0638   WBC 6.99   HGB 9.8*   HCT 29.6*   *   MCV 94   RDW 17.3*        Chemistries:  Recent Labs   Lab 08/22/22  1415 08/22/22  1734 08/23/22  0638 08/23/22  0755   NA  --   --  135*  --    K  --   --  3.7  --    CL  --   --  100  --    CO2  --   --  28  --    BUN  --   --  20  --    CREATININE  --   --  1.2  --    CALCIUM  --   --  9.7  --    ALBUMIN  --   --  2.2*  --    PROT  --   --  6.8  --    BILITOT  --   --  1.4*  --    ALKPHOS  --   --  73  --    ALT  --   --  24  --    AST  --   --  47*  --    MG 1.4* 1.4*  --  2.7*       All pertinent labs within the past 24 hours have been reviewed.    Significant Imaging: I have reviewed  all pertinent imaging results/findings within the past 24 hours.    Assessment/Plan:     Neuro  Acute encephalopathy  67 yoF with cirrhosis with ascites and seizure disorder presents with AMS and fatigue  Labs showed ammonia of 36  CT head showed no acute intracranial abnormality  Possible cause of encephalopathy is multifactorial: infection vs neurological vs metabolic  EEG on admit remarkable for encephalopathy, no other significant findings  UCx and BCx positive for ESBL e coli sensitive to only meropenem and ertapenem.      Although she has been treated for a few days with the right antibiotics, her mental status has been slow to improve  She remains alert, tracks with her eyes, and seems to understand what being are signing to her but will not sign herself  Pt able to squeeze hands BL and move her legs   CT head showing chronic microvascular changes, no acute changes  CK normal, rhabdo or myositis less likely   Cortisol normal, weakness from adrenal insufficiency less likely latoya since BPs have been relatively normal      Plan:  - continue home Clobazam and keppra for seizures  - continue lactulose and rifaximin  - Ammonia level pending   - continue lasix and aldactone  - continue thiamine and folic acid  - F/u MRI c-spine to exclude mass or infective process in spinal canal accounting for decrease motor function in arms   - repeat EEG     ENT  Deaf- written communication   used; fatigue or current mood still too significant for any meaningful interaction    ID  * Septic shock  This patient does have evidence of infective focus  My overall impression is septic shock.  Source: Urine, Blood  Antibiotics given-   Antibiotics (From admission, onward)            Start     Stop Route Frequency Ordered    08/21/22 2245  ertapenem (INVANZ) 1 g in sodium chloride 0.9% 100 mL IVPB         -- IV Every 24 hours (non-standard times) 08/21/22 1654    08/18/22 0900  rifAXIMin tablet 550 mg         -- Oral 2  times daily 08/18/22 0138        Latest lactate reviewed-  No results for input(s): LACTATE in the last 72 hours.  Organ dysfunction indicated by encephalopathy    Shock with decreased perfusion noted, Fluid challenge was not given at 30cc/kg due to decreased urine output    Post- resuscitation assessment- (Done after fluids given for shock)  Vital signs post fluid administrations were-  Temp Readings from Last 1 Encounters:   08/24/22 99.5 °F (37.5 °C) (Oral)     BP Readings from Last 1 Encounters:   08/24/22 (!) 66/36     Pulse Readings from Last 1 Encounters:   08/24/22 99       Perfusion exam was performed within 6 hours of septic shock presentation after bolus shows Inadequate tissue perfusion assessed by non-invasive monitoring  Will Start Pressors- Levophed for MAP of 65    - F/U blood cultures, urine culture  - Will escalate back to meropenem and restart Vanc  - Will add stress dose steroids   - Re-consult ID for worsening shock  - Levophed for MAP >65       Gram-negative bacteremia  Has UCx and BCx positive for ESBL e coli sensitive only to meropenem and ertapenem    Hematology  Thrombocytopenia  Hgb on admission 7.9 and platelets of 117  Hgb baseline around 7.5-8.5 and platelets around 50s-60s. Current Hgb 8.6 platelets 112.     Plan:  - trend Hb and Plt daily   - continue to monitor for signs of bleeding    Other  Suspected deep tissue injury  Red/maroon/purple discoloration and blistering noted to sacral region.    - Wound care consulted        Critical Care Daily Checklist:    A: Awake: RASS Goal/Actual Goal:    Actual:     B: Spontaneous Breathing Trial Performed?     C: SAT & SBT Coordinated?  N/A                      D: Delirium: CAM-ICU     E: Early Mobility Performed? Yes   F: Feeding Goal:    Status:     Current Diet Order   Procedures    Diet Adult Regular (IDDSI Level 7)      AS: Analgesia/Sedation N/A   T: Thromboembolic Prophylaxis Lovenox    H: HOB > 300 Yes   U: Stress Ulcer Prophylaxis (if  needed) N/A   G: Glucose Control Y   B: Bowel Function Stool Occurrence: 2   I: Indwelling Catheter (Lines & Beckham) Necessity 3 PIV, Purewick   D: De-escalation of Antimicrobials/Pharmacotherapies N/A    Plan for the day/ETD Weaning vasopressor, ID consult     Code Status:  Family/Goals of Care: Full Code         Critical secondary to Patient has a condition that poses threat to life and bodily function: Septic shock      Critical care was time spent personally by me on the following activities: development of treatment plan with patient or surrogate and bedside caregivers, discussions with consultants, evaluation of patient's response to treatment, examination of patient, ordering and performing treatments and interventions, ordering and review of laboratory studies, ordering and review of radiographic studies, pulse oximetry, re-evaluation of patient's condition. This critical care time did not overlap with that of any other provider or involve time for any procedures.     Ryanne Rios, DO  Critical Care Medicine  [facility] - Cardiac Medical ICU

## 2022-08-24 NOTE — PROCEDURES
EEG REPORT      Aleyda Floyd  [id number]  1955    DATE OF SERVICE: 8/23/2022         METHODOLOGY      Extended electroencephalographic recording is made while the patient is ambulatory and continuing normal daily activities.  Electrodes are placed according to the International 10-20 placement system and included T1 and T2 electrode placement.  Twenty four (24) channels of digital signal (sampling rate of 512/sec) was simultaneously recorded from the scalp including EKG and eye monitors.  Recording band pass was 0.1 to 100 hz and all data was stored digitally on the recorder.  The patient is instructed to press an event button when clinical symptoms occur and write the symptoms into a diary. Activation procedures which include photic stimulation, hyperventilation and instructing patients to perform simple task are done in selected patients.        The EEG is displayed on a monitor screen and can be reformatted into different montages for evaluation.  The entire recoding is submitted for computer assisted analysis to detect spike and electrographic seizure activity.  The entire recording is visually reviewed and the times identified by computer analysis as being spikes or seizures are reviewed again.  Compresses spectral analysis (CSA) is also performed on the activity recorded from each individual channel.  This is displayed as a power display of frequencies from 0 to 30 Hz over time.   The CSA analysis is done and displayed continuously.  This is reviewed for asymmetries in power between homologous areas of the scalp and for presence of changes in power which canbe seen when seizures occur.  Sections of suspected abnormalities on the CSA is then compared with the original EEG recording.  .     [employer] software was also utilized in the review of this study.  This software suite analyzes the EEG recording in multiple domains.  Coherence and rhythmicity is computed to identify EEG sections which may  contain organized seizures.  Each channel undergoes analysis to detect presence of spike and sharp waves which have special and morphological characteristic of epileptic activity.  The routine EEG recording is converted from spacial into frequency domain.  This is then displayed comparing homologous areas to identify areas of significant asymmetry.  Algorithm to identify non-cortically generated artifact is used to separate eye movement, EMG and other artifact from the EEG     Recording Times    A total of 00:30:24 hours of EEG was recorded.      EEG FINDINGS:  Background activity:   The background rhythm was characterized by theta and some alpha activity with absent posterior dominant alpha rhythm at 30-70 microvolts.   Symmetry and continuity: the background was continuous and symmetric     Sleep:   Normal sleep transients including K complexes, vertex waves were seen although sleep architecture was poor.    Activation procedures:   NA    Abnormal activity:   Occasional generalized periodic discharges with triphasic morphology.    IMPRESSION:   Abnormal EEG due to a mild generalized cerebral dysfunction as is commonly seen in a wide variety of states of toxic and metabolic derangement.      David Monteiro MD  Neurology-Epilepsy.  [facility].

## 2022-08-24 NOTE — SUBJECTIVE & OBJECTIVE
Past Medical History:   Diagnosis Date    PITO (acute kidney injury)     Alcoholic cirrhosis of liver     Hypercholesterolemia     Hypertension        Past Surgical History:   Procedure Laterality Date    DILATION AND CURETTAGE OF UTERUS      ESOPHAGOGASTRODUODENOSCOPY N/A 2022    Procedure: EGD (ESOPHAGOGASTRODUODENOSCOPY);  Surgeon: Sean Perry MD;  Location: [address]);  Service: Endoscopy;  Laterality: N/A;  cirrhosis, variceal screening  labs prior  -request sent   fully vaccinated, instructions emailed to [email or web address]       Review of patient's allergies indicates:  No Known Allergies    Family History       Problem Relation (Age of Onset)    Breast cancer Maternal Grandmother          Tobacco Use    Smoking status: Former Smoker     Packs/day: 1.00     Years: 15.00     Pack years: 15.00     Quit date: 1998     Years since quittin.7    Smokeless tobacco: Never Used   Substance and Sexual Activity    Alcohol use: Not Currently    Drug use: Not Currently    Sexual activity: Not on file      Review of Systems   Unable to perform ROS: Patient nonverbal   Objective:     Vital Signs (Most Recent):  Temp: 99.5 °F (37.5 °C) (22 0509)  Pulse: 99 (22 0600)  Resp: 18 (22 0509)  BP: (!) 66/36 (22 0600)  SpO2: 96 % (22 0509)   Vital Signs (24h Range):  Temp:  [97.1 °F (36.2 °C)-99.5 °F (37.5 °C)] 99.5 °F (37.5 °C)  Pulse:  [] 99  Resp:  [17-18] 18  SpO2:  [96 %-100 %] 96 %  BP: ()/(36-63) 66/36   Weight: 81.6 kg (180 lb)  Body mass index is 35.15 kg/m².      Intake/Output Summary (Last 24 hours) at 2022  Last data filed at 2022 0559  Gross per 24 hour   Intake --   Output 800 ml   Net -800 ml       Physical Exam  Vitals and nursing note reviewed.   Constitutional:       General: She is not in acute distress.     Appearance: Normal appearance. She is not ill-appearing.   HENT:      Head:  Normocephalic and atraumatic.      Nose: Nose normal.      Mouth/Throat:      Mouth: Mucous membranes are moist.   Eyes:      General: No scleral icterus.     Extraocular Movements: Extraocular movements intact.      Conjunctiva/sclera: Conjunctivae normal.      Pupils: Pupils are equal, round, and reactive to light.   Cardiovascular:      Rate and Rhythm: Normal rate and regular rhythm.      Pulses: Normal pulses.      Heart sounds: Normal heart sounds. No murmur heard.  Pulmonary:      Effort: Pulmonary effort is normal. No respiratory distress.      Breath sounds: Normal breath sounds. No wheezing, rhonchi or rales.   Abdominal:      General: Bowel sounds are normal.      Palpations: Abdomen is soft.      Tenderness: There is no abdominal tenderness. There is no guarding or rebound.   Musculoskeletal:         General: No swelling or tenderness. Normal range of motion.      Right lower leg: Edema (trace at ankles) present.      Left lower leg: Edema (trace at ankles) present.   Skin:     General: Skin is warm and dry.      Capillary Refill: Capillary refill takes less than 2 seconds.      Coloration: Skin is pale. Skin is not jaundiced.      Findings: Lesion (tips of fingers where BG checks are performed) present. No erythema.   Neurological:      Mental Status: She is alert. She is disoriented.      GCS: GCS eye subscore is 4. GCS verbal subscore is 1. GCS motor subscore is 6.      Comments: Follows commands and squeeze hands BL   Psychiatric:      Comments: Flat affect       Vents:     Lines/Drains/Airways       Drain  Duration             Female External Urinary Catheter 07/20/22 1400 34 days              Peripheral Intravenous Line  Duration                  Peripheral IV - Single Lumen 07/28/22 1540 20 G Posterior;Right Hand 26 days         Peripheral IV - Single Lumen 08/17/22 2056 22 G Left Hand 6 days         Peripheral IV - Single Lumen 08/18/22 0629 20 G Right Forearm 5 days                  Significant  Labs:    CBC/Anemia Profile:  Recent Labs   Lab 08/23/22  0638   WBC 6.99   HGB 9.8*   HCT 29.6*   *   MCV 94   RDW 17.3*        Chemistries:  Recent Labs   Lab 08/22/22  1415 08/22/22  1734 08/23/22  0638 08/23/22  0755   NA  --   --  135*  --    K  --   --  3.7  --    CL  --   --  100  --    CO2  --   --  28  --    BUN  --   --  20  --    CREATININE  --   --  1.2  --    CALCIUM  --   --  9.7  --    ALBUMIN  --   --  2.2*  --    PROT  --   --  6.8  --    BILITOT  --   --  1.4*  --    ALKPHOS  --   --  73  --    ALT  --   --  24  --    AST  --   --  47*  --    MG 1.4* 1.4*  --  2.7*       All pertinent labs within the past 24 hours have been reviewed.    Significant Imaging: I have reviewed all pertinent imaging results/findings within the past 24 hours.

## 2022-08-24 NOTE — PROCEDURES
Aleyda Floyd is a 67 y.o. female patient.    Temp: 98.2 °F (36.8 °C) (08/24/22 1300)  Pulse: 74 (08/24/22 1401)  Resp: 14 (08/24/22 1401)  BP: 129/60 (08/24/22 1401)  SpO2: 95 % (08/24/22 1401)  Weight: 57 kg (125 lb 10.6 oz) (08/24/22 0634)  Height: 5' (152.4 cm) (08/24/22 0634)       Arterial Line    Date/Time: 8/24/2022 2:10 PM  Location procedure was performed: [facility] CRITICAL CARE MEDICINE  Performed by: Yasmin Rahman MD  Authorized by: Yasmin Rahman MD   Assisting provider: Charissa Adames MD  Consent Done: Emergent Situation  Preparation: Patient was prepped and draped in the usual sterile fashion.  Indications: hemodynamic monitoring  Location: right radial    Patient sedated: no  Vicente's test normal: yes  Needle gauge: 20  Seldinger technique: Seldinger technique used  Cutdown: cutdown required  Comments: Multiple attempts on patient's right radial unsuccessful. Guidewire guided into vessel however no blood return noted. Attending took over procedure with attempts on left and right radial without success. Procedure aborted. Will attempt in the afternoon if A-line is still necessary.          8/24/2022

## 2022-08-24 NOTE — CONSULTS
Pt evaluated at bedside. Will admit to MICU for further management and evaluation. Full H&P to follow.       Ryanne Rios D.O  Internal Medicine PGY-3  [facility]

## 2022-08-24 NOTE — ASSESSMENT & PLAN NOTE
Red/maroon/purple discoloration and blistering noted to sacral region.    - Wound care consulted

## 2022-08-24 NOTE — PT/OT/SLP DISCHARGE
Physical Therapy Discharge Summary    Name: Aleyda Floyd  MRN: [id number]   Principal Problem: Septic shock     Patient Discharged from acute Physical Therapy on 22.  Please refer to prior PT noted date on 22 for functional status.     Assessment:     Patient transferred to MICU for further management of care    Objective:     GOALS:   Multidisciplinary Problems     Physical Therapy Goals        Problem: Physical Therapy    Goal Priority Disciplines Outcome Goal Variances Interventions   Physical Therapy Goal     PT, PT/OT Ongoing, Progressing     Description: Goals to be met by: 2022    Patient will increase functional independence with mobility by performin. Supine to sit with Maximum Assistance  2. Rolling to Left and Right with Maximum Assistance.  3. Sit to stand transfer with Maximum Assistance using LRAD  4. Gait  x 10 feet with Maximum Assistance using LRAD  5. Stand for 3 minutes with Maximum Assistance using LRAD  6. Lower extremity exercise program x10 reps per handout, with assistance as needed                     Reasons for Discontinuation of Therapy Services  Transfer to alternate level of care.      Plan:     Patient Discharged to: MICU.      2022

## 2022-08-24 NOTE — ASSESSMENT & PLAN NOTE
This patient does have evidence of infective focus  My overall impression is septic shock.  Source: Urine, Blood  Antibiotics given-   Antibiotics (From admission, onward)            Start     Stop Route Frequency Ordered    08/21/22 2245  ertapenem (INVANZ) 1 g in sodium chloride 0.9% 100 mL IVPB         -- IV Every 24 hours (non-standard times) 08/21/22 1654    08/18/22 0900  rifAXIMin tablet 550 mg         -- Oral 2 times daily 08/18/22 0138        Latest lactate reviewed-  No results for input(s): LACTATE in the last 72 hours.  Organ dysfunction indicated by encephalopathy    Shock with decreased perfusion noted, Fluid challenge was not given at 30cc/kg due to decreased urine output    Post- resuscitation assessment- (Done after fluids given for shock)  Vital signs post fluid administrations were-  Temp Readings from Last 1 Encounters:   08/24/22 99.5 °F (37.5 °C) (Oral)     BP Readings from Last 1 Encounters:   08/24/22 (!) 66/36     Pulse Readings from Last 1 Encounters:   08/24/22 99       Perfusion exam was performed within 6 hours of septic shock presentation after bolus shows Inadequate tissue perfusion assessed by non-invasive monitoring  Will Start Pressors- Levophed for MAP of 65    - F/U blood cultures, urine culture  - Will escalate back to meropenem and restart Vanc  - Will add stress dose steroids   - Re-consult ID for worsening shock  - Levophed for MAP >65

## 2022-08-24 NOTE — SUBJECTIVE & OBJECTIVE
Past Medical History:   Diagnosis Date    PITO (acute kidney injury)     Alcoholic cirrhosis of liver     Hypercholesterolemia     Hypertension        Past Surgical History:   Procedure Laterality Date    DILATION AND CURETTAGE OF UTERUS      ESOPHAGOGASTRODUODENOSCOPY N/A 6/23/2022    Procedure: EGD (ESOPHAGOGASTRODUODENOSCOPY);  Surgeon: Sean Perry MD;  Location: [address]);  Service: Endoscopy;  Laterality: N/A;  cirrhosis, variceal screening  labs prior  -request sent 6/14  fully vaccinated, instructions emailed to [email or web address]       Review of patient's allergies indicates:  No Known Allergies    Medications:  Medications Prior to Admission   Medication Sig    acetaminophen (TYLENOL) 325 MG tablet Take 650 mg by mouth daily as needed for Pain. NOT TO EXCEED 4 GRAMS IN 24 HOUR PERIOD    cholecalciferol, vitamin D3, (VITAMIN D3) 50 mcg (2,000 unit) Cap capsule Take 1 capsule by mouth once daily.    cholestyramine-aspartame (QUESTRAN LIGHT) 4 gram PwPk Take 4 g by mouth once daily.    folic acid (FOLVITE) 1 MG tablet Take 1 mg by mouth once daily.     furosemide (LASIX) 40 MG tablet Take 1 tablet (40 mg total) by mouth 2 (two) times daily.    lactulose (CHRONULAC) 20 gram/30 mL Soln Take 30 mLs (20 g total) by mouth 2 (two) times daily. (Patient taking differently: ADMINISTER 30 ML RECTALLY TWICE DAILY. TO BE ADDED INTO CLEANSING ENEMA SET WITH 700 ML NORMAL SALINE)    levETIRAcetam (KEPPRA) 750 MG Tab Take 1 tablet (750 mg total) by mouth 2 (two) times daily.    nystatin (MYCOSTATIN) powder Apply to skin folds beneath breasts as needed    omeprazole (PRILOSEC) 40 MG capsule Take 40 mg by mouth once daily.    rifAXIMin (XIFAXAN) 550 mg Tab Take 1 tablet (550 mg total) by mouth 2 (two) times daily.    spironolactone (ALDACTONE) 100 MG tablet Take 1 tablet (100 mg total) by mouth once daily.    thiamine 100 MG tablet Take 100 mg by mouth once daily.    aspirin  "81 MG Chew Take 81 mg by mouth once daily.    cloBAZam (ONFI) 10 mg Tab Take 1 each (10 mg total) by mouth once daily. (Patient not taking: Reported on 2022)     Antibiotics (From admission, onward)                Start     Stop Route Frequency Ordered    22 2100  mupirocin 2 % ointment         2059 Nasl 2 times daily 22 1226    22 0745  meropenem-0.9% sodium chloride 1 g/50 mL IVPB         -- IV Every 12 hours (non-standard times) 22 0628    22 07  vancomycin - pharmacy to dose  (vancomycin IVPB)        "And" Linked Group Details    -- IV pharmacy to manage frequency 22 0622 0900  rifAXIMin tablet 550 mg         -- Oral 2 times daily 22 0138          Antifungals (From admission, onward)                None          Antivirals (From admission, onward)      None             Immunization History   Administered Date(s) Administered    PPD Test 2022       Family History       Problem Relation (Age of Onset)    Breast cancer Maternal Grandmother          Social History     Socioeconomic History    Marital status:    Tobacco Use    Smoking status: Former Smoker     Packs/day: 1.00     Years: 15.00     Pack years: 15.00     Quit date: 1998     Years since quittin.7    Smokeless tobacco: Never Used   Substance and Sexual Activity    Alcohol use: Not Currently    Drug use: Not Currently     Review of Systems   Unable to perform ROS: Patient nonverbal   Objective:     Vital Signs (Most Recent):  Temp: 100.1 °F (37.8 °C) (22 0801)  Pulse: 91 (22 1201)  Resp: 16 (22 1201)  BP: (!) 109/53 (22 1201)  SpO2: 99 % (22 1201)   Vital Signs (24h Range):  Temp:  [98 °F (36.7 °C)-100.9 °F (38.3 °C)] 100.1 °F (37.8 °C)  Pulse:  [] 91  Resp:  [12-24] 16  SpO2:  [95 %-100 %] 99 %  BP: ()/(36-63) 109/53     Weight: 57 kg (125 lb 10.6 oz)  Body mass index is 24.54 kg/m².    Estimated Creatinine Clearance: 24 " mL/min (A) (based on SCr of 1.8 mg/dL (H)).    Physical Exam  Vitals and nursing note reviewed.   Constitutional:       Appearance: She is well-developed. She is ill-appearing.   HENT:      Head: Normocephalic and atraumatic.      Right Ear: External ear normal.      Left Ear: External ear normal.   Eyes:      General:         Right eye: No discharge.         Left eye: No discharge.   Cardiovascular:      Rate and Rhythm: Normal rate and regular rhythm.      Heart sounds: Normal heart sounds. No murmur heard.    No friction rub. No gallop.   Pulmonary:      Effort: Pulmonary effort is normal. No respiratory distress.      Breath sounds: Normal breath sounds. No stridor. No wheezing or rales.   Musculoskeletal:         General: No tenderness. Normal range of motion.      Right lower leg: Edema present.      Left lower leg: Edema present.   Skin:     General: Skin is warm and dry.   Neurological:      Mental Status: She is lethargic.       Significant Labs: Blood Culture:   Recent Labs   Lab 07/10/22  1311 07/11/22  1550 08/18/22  1044 08/20/22  1413 08/20/22  1414   LABBLOO No growth after 5 days. No growth after 5 days. Gram stain luisa bottle: Gram negative rods   Positive results previously called 08/19/2022  00:06  Gram stain aer bottle: Gram negative rods   Positive results previously called 08/19/2022  00:42  ESCHERICHIA COLI ESBL  For susceptibility see order # [id number]  *  Gram stain luisa bottle: Gram negative rods   Results called to and read back by:Kitty Carreon RN 08/18/2022  21:56  Gram stain aer bottle: Gram negative rods   Positive results previously called 08/19/2022  00:08  ESCHERICHIA COLI ESBL* No Growth to date  No Growth to date  No Growth to date  No Growth to date No Growth to date  No Growth to date  No Growth to date  No Growth to date     BMP:   Recent Labs   Lab 08/23/22  0755 08/24/22  0601   GLU  --  127*   NA  --  139   K  --  3.8   CL  --  106   CO2  --  22*   BUN  --  25*    CREATININE  --  1.8*   CALCIUM  --  9.0   MG 2.7*  --      CBC:   Recent Labs   Lab 08/23/22  0638 08/24/22  0601   WBC 6.99 8.16   HGB 9.8* 9.6*   HCT 29.6* 30.5*   * 80*     Respiratory Culture: No results for input(s): GSRESP, RESPIRATORYC in the last 4320 hours.  Urine Culture:   Recent Labs   Lab 06/28/22  0210 07/10/22  1104 07/25/22  1820 08/19/22  1708   LABURIN LACTOBACILLUS SPECIES  > 100,000 cfu/ml  No other significant isolate  * ENTEROCOCCUS FAECALIS  >100,000 cfu/ml  * CANDIDA ALBICANS  > 100,000 cfu/ml  Treatment of asymptomatic candiduria is not recommended (except for   specific populations). Candida isolated in the urine typically   represents colonization. If an indwelling urinary catheter is present  it should be removed or replaced.  * ESCHERICHIA COLI ESBL  > 100,000 cfu/ml  *     Urine Studies:   Recent Labs   Lab 06/28/22  0210 07/10/22  1104 08/19/22  1708   COLORU Yellow   < > Orange*   APPEARANCEUA Hazy*   < > Ex.Turbid   PHUR 5.0   < > 7.0   SPECGRAV 1.015   < > 1.010   PROTEINUA Negative   < > 1+*   GLUCUA Negative   < > Negative   KETONESU Negative   < > Negative   BILIRUBINUA Negative   < > Negative   OCCULTUA Negative   < > 2+*   NITRITE Negative   < > Positive*   LEUKOCYTESUR 1+*   < > 3+*   RBCUA 1   < > >100*   WBCUA 20*   < > >100*   BACTERIA Many*   < > Many*   SQUAMEPITHEL 6  --   --    HYALINECASTS 49*   < > 0    < > = values in this interval not displayed.       Significant Imaging: I have reviewed all pertinent imaging results/findings within the past 24 hours.

## 2022-08-24 NOTE — PT/OT/SLP DISCHARGE
Occupational Therapy Discharge Summary    Aleyda Floyd  MRN: [id number]   Principal Problem: Septic shock      Patient Discharged from acute Occupational Therapy on 8/24/22.  Please refer to prior OT note dated 8/22/22 for functional status.    Assessment:      Patient transferred to MICU for further management of care.    Objective:     GOALS:   Multidisciplinary Problems     Occupational Therapy Goals        Problem: Occupational Therapy    Goal Priority Disciplines Outcome Interventions   Occupational Therapy Goal     OT, PT/OT Ongoing, Progressing    Description: Goals to be met by: 9/12/22     Patient will increase functional independence with ADLs by performing:    UE Dressing with Maximum Assistance.  LE Dressing with Maximum Assistance.  Grooming while seated with Maximum Assistance.  Sitting at edge of bed x10 minutes with Maximum Assistance.  Supine to sit with Maximum Assistance.                     Reasons for Discontinuation of Therapy Services  Transfer to alternate level of care.      Plan:     Patient Discharged to: MICU    8/24/2022

## 2022-08-24 NOTE — ASSESSMENT & PLAN NOTE
Etiology unclear however suspect this will be multifactorial. If secondary to ertapenem then would expect improvement over the next 3 days.   · Defer management to primary service.

## 2022-08-24 NOTE — EICU
eLert received for L IJ TLC placement considered emergent. Dr Rahman at bedside to perform procedure, supervised by Dr Adames. Time out performed. VS monitored throughout and no adverse events noted. Sterility maintained throughout. Chest xray ordered to confirm placement.

## 2022-08-24 NOTE — EICU
Nelson received for L femoral arterial line placement. Dr Anthony at bedside to perform procedure. Time out performed. Dr Aguilar called to bedside to assist procedure after unsuccessful attempts. Dr Aguilar able to obtain L fem arterial line. Pt helen procedure well w/ no adverse events noted.

## 2022-08-24 NOTE — NURSING
Michael RN and Shabbir Nevarez RN responded to calls from technician d/t patient change in behavior.  Vitals on arrival 60/37, , Oxygen 94, with notable changes in patient eye tracking. Rapid Response nurses notified, followed by MD.

## 2022-08-24 NOTE — EICU
Nelson received for R radial art line placement. Dr Rahman w/ Dr Adames to perform procedure. Attempts made to R radial artery. Dr Anthony at bedside to attempt L radial artery. Unable to place arterial line at this time.

## 2022-08-24 NOTE — PROCEDURES
Aleyda Floyd is a 67 y.o. female patient.    Temp: 98.2 °F (36.8 °C) (08/24/22 1300)  Pulse: 74 (08/24/22 1401)  Resp: 14 (08/24/22 1401)  BP: 129/60 (08/24/22 1401)  SpO2: 95 % (08/24/22 1401)  Weight: 57 kg (125 lb 10.6 oz) (08/24/22 0634)  Height: 5' (152.4 cm) (08/24/22 0634)       Central Line    Date/Time: 8/24/2022 2:06 PM  Performed by: Yasmin Rahman MD  Authorized by: Yasmin Rahman MD     Location procedure was performed:  [facility] CRITICAL CARE MEDICINE  Assisting Provider:  Charissa Adames MD  Pre-operative diagnosis:  Septic shock  Consent Done ?:  Emergent Situation  Time out complete?: Verified correct patient, procedure, equipment, staff, and site/side    Indications:  Med administration  Anesthesia:  Local infiltration  Local anesthetic:  Lidocaine 1% without epinephrine  Preparation:  Skin prepped with ChloraPrep  Skin prep agent dried: Skin prep agent completely dried prior to procedure    Sterile barriers: All five maximal sterile barriers used - gloves, gown, cap, mask and large sterile sheet    Hand hygiene: Hand hygiene performed immediately prior to central venous catheter insertion    Location:  Left internal jugular  Catheter type:  Triple lumen  Catheter size:  7 Fr  Inserted Catheter Length (cm):  20  Ultrasound guidance: Yes    Needle advanced into vessel with real time ultrasound guidance.    Guidewire confirmed in vessel.    Steril sheath on probe.    Sterile gel used.  Manometry: Yes    Number of attempts:  1  Securement:  Line sutured, sterile dressing applied, chlorhexidine patch and blood return through all ports  Complications: No    XRay:  Placement verified by x-ray, no pneumothorax on x-ray and successful placement  Adverse Events:  NoneTermination Site: superior vena cava      Yasmin Rahman MD  8/24/2022

## 2022-08-24 NOTE — PLAN OF CARE
Problem: Skin Injury Risk Increased  Goal: Skin Health and Integrity  Outcome: Ongoing, Progressing     Problem: Adult Inpatient Plan of Care  Goal: Plan of Care Review  Outcome: Ongoing, Progressing  Goal: Patient-Specific Goal (Individualized)  Outcome: Ongoing, Progressing  Goal: Absence of Hospital-Acquired Illness or Injury  Outcome: Ongoing, Progressing  Goal: Optimal Comfort and Wellbeing  Outcome: Ongoing, Progressing  Goal: Readiness for Transition of Care  Outcome: Ongoing, Progressing     Problem: Fluid and Electrolyte Imbalance (Acute Kidney Injury/Impairment)  Goal: Fluid and Electrolyte Balance  Outcome: Ongoing, Progressing     Problem: Oral Intake Inadequate (Acute Kidney Injury/Impairment)  Goal: Optimal Nutrition Intake  Outcome: Ongoing, Progressing     Problem: Renal Function Impairment (Acute Kidney Injury/Impairment)  Goal: Effective Renal Function  Outcome: Ongoing, Progressing     Problem: Impaired Wound Healing  Goal: Optimal Wound Healing  Outcome: Ongoing, Progressing     Problem: Fall Injury Risk  Goal: Absence of Fall and Fall-Related Injury  Outcome: Ongoing, Progressing     Problem: Infection  Goal: Absence of Infection Signs and Symptoms  Outcome: Ongoing, Progressing     Problem: Adjustment to Illness (Sepsis/Septic Shock)  Goal: Optimal Coping  Outcome: Ongoing, Progressing     Problem: Bleeding (Sepsis/Septic Shock)  Goal: Absence of Bleeding  Outcome: Ongoing, Progressing     Problem: Glycemic Control Impaired (Sepsis/Septic Shock)  Goal: Blood Glucose Level Within Desired Range  Outcome: Ongoing, Progressing     Problem: Infection Progression (Sepsis/Septic Shock)  Goal: Absence of Infection Signs and Symptoms  Outcome: Ongoing, Progressing     Problem: Nutrition Impaired (Sepsis/Septic Shock)  Goal: Optimal Nutrition Intake  Outcome: Ongoing, Progressing     CMICU DAILY GOALS       A: Awake    RASS: Goal -    Actual - RASS (Brizuela Agitation-Sedation Scale): -3-->moderate  sedation   Restraint necessity:    B: Breathe   SBT: Not intubated   C: Coordinate A & B, analgesics/sedatives   Pain: managed    SAT: Not intubated  D: Delirium   CAM-ICU:    E: Early(intubated/ Progressive (non-intubated) Mobility   MOVE Screen: Fail   Activity: Activity Management: Patient unable to perform activities  FAS: Feeding/Nutrition   Diet order: Diet/Nutrition Received: consistent carb/diabetic diet,    T: Thrombus   DVT prophylaxis: VTE Required Core Measure: Pharmacological prophylaxis initiated/maintained  H: HOB Elevation   Head of Bed (HOB) Positioning: HOB elevated  U: Ulcer Prophylaxis   GI: yes  G: Glucose control   managed    S: Skin   Bathing/Skin Care: back care, bath, complete, dressed/undressed, incontinence care, linen changed  Device Skin Pressure Protection: absorbent pad utilized/changed, pressure points protected  Pressure Reduction Devices: foam padding utilized, specialty bed utilized  Pressure Reduction Techniques: frequent weight shift encouraged, weight shift assistance provided  Skin Protection: drying agents applied, incontinence pads utilized, hydrocolloids used, tubing/devices free from skin contact  B: Bowel Function   no issues   I: Indwelling Catheters   Beckham necessity:     CVC necessity: Yes  D: De-escalation Antibiotics   No    Family/Goals of care/Code Status   Code Status: Full Code    24H Vital Sign Range  Temp:  [97.1 °F (36.2 °C)-100.9 °F (38.3 °C)]   Pulse:  []   Resp:  [12-24]   BP: ()/(36-63)   SpO2:  [94 %-100 %]   Arterial Line BP: (125)/(48)      Shift Events   Central line and a line placed. Pressors increased     VS and assessment per flow sheet, patient progressing towards goals as tolerated, plan of care reviewed with [unfilled] and family, all concerns addressed.    Morgan Cohen

## 2022-08-24 NOTE — CONSULTS
[facility] - Cardiac Medical ICU  Infectious Disease  Consult Note    Patient Name: Aleyda Floyd  MRN: [id number]  Admission Date: 8/17/2022  Hospital Length of Stay: 5 days  Attending Physician: Lia Anthony MD  Primary Care Provider: Elvie Fernandes MD     Isolation Status: Contact    Patient information was obtained from spouse/SO, past medical records and ER records.      Inpatient consult to Infectious Diseases  Consult performed by: Roma Galeana MD  Consult ordered by: Ryanne Rios DO        Assessment/Plan:     * Septic shock  Febrile to 100.9 F and without leukocytosis. Presumptive septic shock in the setting of recent bacteremia. Blood cultures repeated on 8/24.   · Continue meropenem and vancomycin for now.  · PharmD service to manage vancomycin dosing and therapeutic drug monitoring.  · Will follow up culture results.   · Discussed with ICU team and attending.     Acute encephalopathy  Etiology unclear however suspect this will be multifactorial. If secondary to ertapenem then would expect improvement over the next 3 days.   · Defer management to primary service.     Infection due to ESBL-producing Escherichia coli  Bacteremia and UTI. Repeat blood cultures on 8/20 NGTD.   · Antibiotics as above.       Thank you for your consult. I will follow-up with patient. Please contact us if you have any additional questions.    Roma Galeana MD  Infectious Disease  [facility] - Cardiac Medical [facility]    Subjective:     Principal Problem: Septic shock    HPI: A 67-year-old woman with HTN, ETOH cirrhosis (recent admission for decompensated liver failure, not currently transplant candidate), seizure disorder (on Keppra and Onfi), deaf/mute, reported hx of recurrent UTIs, who presented with AMS and fatigue X 1-2 weeks.  CT imaging and EEG negative for acute IC pathology.  Admission blood cx  3/4 + for ESBL E coli susceptible to only carbapenems.   U/A >100 wbc, urine cx pending. Currently on meropenem. ID  "consulted for meropenem and ESBL E coli bacteremia.     Afebrile, no leukocytosis.  Reported dysuria per medical record.  At time of visit,  (who is also hearing impaired is in room).   Patient is lethargic, opens eyes, but will not respond to his signing/communication. Unable to obtain a ROS  Spoke to sister on the phone.  Unable to tell me much about patient symptoms/complaints.    Patient was seen by the Infectious Diseases [facility] service. Work up revealed ESBL E coli bacteremia. Her antibiotic therapy was optimized to Ertapenem. Blood cultures repeated on 8/20 remain NGTD and recommendations for a 14 day course of therapy were give prior to sign off.     Her hospital course was complicated by hypotension and bilateral hand weakness. She was given volume replacement however her shock failed to improve which prompted transfer to the ICU.     Infectious Diseases consulted for "ESBL bacteremia and UTI, stepped up to ICU for septic shock"              Past Medical History:   Diagnosis Date    PITO (acute kidney injury)     Alcoholic cirrhosis of liver     Hypercholesterolemia     Hypertension        Past Surgical History:   Procedure Laterality Date    DILATION AND CURETTAGE OF UTERUS      ESOPHAGOGASTRODUODENOSCOPY N/A 6/23/2022    Procedure: EGD (ESOPHAGOGASTRODUODENOSCOPY);  Surgeon: Sean Perry MD;  Location: [address]);  Service: Endoscopy;  Laterality: N/A;  cirrhosis, variceal screening  labs prior  -request sent 6/14  fully vaccinated, instructions emailed to [email or web address]       Review of patient's allergies indicates:  No Known Allergies    Medications:  Medications Prior to Admission   Medication Sig    acetaminophen (TYLENOL) 325 MG tablet Take 650 mg by mouth daily as needed for Pain. NOT TO EXCEED 4 GRAMS IN 24 HOUR PERIOD    cholecalciferol, vitamin D3, (VITAMIN D3) 50 mcg (2,000 unit) Cap capsule Take 1 capsule by mouth once daily.    " "cholestyramine-aspartame (QUESTRAN LIGHT) 4 gram PwPk Take 4 g by mouth once daily.    folic acid (FOLVITE) 1 MG tablet Take 1 mg by mouth once daily.     furosemide (LASIX) 40 MG tablet Take 1 tablet (40 mg total) by mouth 2 (two) times daily.    lactulose (CHRONULAC) 20 gram/30 mL Soln Take 30 mLs (20 g total) by mouth 2 (two) times daily. (Patient taking differently: ADMINISTER 30 ML RECTALLY TWICE DAILY. TO BE ADDED INTO CLEANSING ENEMA SET WITH 700 ML NORMAL SALINE)    levETIRAcetam (KEPPRA) 750 MG Tab Take 1 tablet (750 mg total) by mouth 2 (two) times daily.    nystatin (MYCOSTATIN) powder Apply to skin folds beneath breasts as needed    omeprazole (PRILOSEC) 40 MG capsule Take 40 mg by mouth once daily.    rifAXIMin (XIFAXAN) 550 mg Tab Take 1 tablet (550 mg total) by mouth 2 (two) times daily.    spironolactone (ALDACTONE) 100 MG tablet Take 1 tablet (100 mg total) by mouth once daily.    thiamine 100 MG tablet Take 100 mg by mouth once daily.    aspirin 81 MG Chew Take 81 mg by mouth once daily.    cloBAZam (ONFI) 10 mg Tab Take 1 each (10 mg total) by mouth once daily. (Patient not taking: Reported on 8/18/2022)     Antibiotics (From admission, onward)                Start     Stop Route Frequency Ordered    08/24/22 2100  mupirocin 2 % ointment         08/29 2059 Nasl 2 times daily 08/24/22 1226    08/24/22 0745  meropenem-0.9% sodium chloride 1 g/50 mL IVPB         -- IV Every 12 hours (non-standard times) 08/24/22 0628    08/24/22 0726  vancomycin - pharmacy to dose  (vancomycin IVPB)        "And" Linked Group Details    -- IV pharmacy to manage frequency 08/24/22 0626    08/18/22 0900  rifAXIMin tablet 550 mg         -- Oral 2 times daily 08/18/22 0138          Antifungals (From admission, onward)                None          Antivirals (From admission, onward)      None             Immunization History   Administered Date(s) Administered    PPD Test 07/06/2022       Family History       " Problem Relation (Age of Onset)    Breast cancer Maternal Grandmother          Social History     Socioeconomic History    Marital status:    Tobacco Use    Smoking status: Former Smoker     Packs/day: 1.00     Years: 15.00     Pack years: 15.00     Quit date: 1998     Years since quittin.7    Smokeless tobacco: Never Used   Substance and Sexual Activity    Alcohol use: Not Currently    Drug use: Not Currently     Review of Systems   Unable to perform ROS: Patient nonverbal   Objective:     Vital Signs (Most Recent):  Temp: 100.1 °F (37.8 °C) (22 0801)  Pulse: 91 (22 1201)  Resp: 16 (22 1201)  BP: (!) 109/53 (22 1201)  SpO2: 99 % (22 1201)   Vital Signs (24h Range):  Temp:  [98 °F (36.7 °C)-100.9 °F (38.3 °C)] 100.1 °F (37.8 °C)  Pulse:  [] 91  Resp:  [12-24] 16  SpO2:  [95 %-100 %] 99 %  BP: ()/(36-63) 109/53     Weight: 57 kg (125 lb 10.6 oz)  Body mass index is 24.54 kg/m².    Estimated Creatinine Clearance: 24 mL/min (A) (based on SCr of 1.8 mg/dL (H)).    Physical Exam  Vitals and nursing note reviewed.   Constitutional:       Appearance: She is well-developed. She is ill-appearing.   HENT:      Head: Normocephalic and atraumatic.      Right Ear: External ear normal.      Left Ear: External ear normal.   Eyes:      General:         Right eye: No discharge.         Left eye: No discharge.   Cardiovascular:      Rate and Rhythm: Normal rate and regular rhythm.      Heart sounds: Normal heart sounds. No murmur heard.    No friction rub. No gallop.   Pulmonary:      Effort: Pulmonary effort is normal. No respiratory distress.      Breath sounds: Normal breath sounds. No stridor. No wheezing or rales.   Musculoskeletal:         General: No tenderness. Normal range of motion.      Right lower leg: Edema present.      Left lower leg: Edema present.   Skin:     General: Skin is warm and dry.   Neurological:      Mental Status: She is lethargic.        Significant Labs: Blood Culture:   Recent Labs   Lab 07/10/22  1311 07/11/22  1550 08/18/22  1044 08/20/22  1413 08/20/22  1414   LABBLOO No growth after 5 days. No growth after 5 days. Gram stain luisa bottle: Gram negative rods   Positive results previously called 08/19/2022  00:06  Gram stain aer bottle: Gram negative rods   Positive results previously called 08/19/2022  00:42  ESCHERICHIA COLI ESBL  For susceptibility see order # [id number]  *  Gram stain luisa bottle: Gram negative rods   Results called to and read back by:Kitty Carreon RN 08/18/2022  21:56  Gram stain aer bottle: Gram negative rods   Positive results previously called 08/19/2022  00:08  ESCHERICHIA COLI ESBL* No Growth to date  No Growth to date  No Growth to date  No Growth to date No Growth to date  No Growth to date  No Growth to date  No Growth to date     BMP:   Recent Labs   Lab 08/23/22  0755 08/24/22  0601   GLU  --  127*   NA  --  139   K  --  3.8   CL  --  106   CO2  --  22*   BUN  --  25*   CREATININE  --  1.8*   CALCIUM  --  9.0   MG 2.7*  --      CBC:   Recent Labs   Lab 08/23/22  0638 08/24/22  0601   WBC 6.99 8.16   HGB 9.8* 9.6*   HCT 29.6* 30.5*   * 80*     Respiratory Culture: No results for input(s): GSRESP, RESPIRATORYC in the last 4320 hours.  Urine Culture:   Recent Labs   Lab 06/28/22  0210 07/10/22  1104 07/25/22  1820 08/19/22  1708   LABURIN LACTOBACILLUS SPECIES  > 100,000 cfu/ml  No other significant isolate  * ENTEROCOCCUS FAECALIS  >100,000 cfu/ml  * CANDIDA ALBICANS  > 100,000 cfu/ml  Treatment of asymptomatic candiduria is not recommended (except for   specific populations). Candida isolated in the urine typically   represents colonization. If an indwelling urinary catheter is present  it should be removed or replaced.  * ESCHERICHIA COLI ESBL  > 100,000 cfu/ml  *     Urine Studies:   Recent Labs   Lab 06/28/22  0210 07/10/22  1104 08/19/22  1708   COLORU Yellow   < > Orange*   APPEARANCEUA  Hazy*   < > Ex.Turbid   PHUR 5.0   < > 7.0   SPECGRAV 1.015   < > 1.010   PROTEINUA Negative   < > 1+*   GLUCUA Negative   < > Negative   KETONESU Negative   < > Negative   BILIRUBINUA Negative   < > Negative   OCCULTUA Negative   < > 2+*   NITRITE Negative   < > Positive*   LEUKOCYTESUR 1+*   < > 3+*   RBCUA 1   < > >100*   WBCUA 20*   < > >100*   BACTERIA Many*   < > Many*   SQUAMEPITHEL 6  --   --    HYALINECASTS 49*   < > 0    < > = values in this interval not displayed.       Significant Imaging: I have reviewed all pertinent imaging results/findings within the past 24 hours.

## 2022-08-24 NOTE — HPI
67 year old lady with alcoholic cirrhosis on lactulose and seizure disorder who presented to the ED for AMS and fatigue. 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 Admitted to hospital medicine for encephalopathy. EEG on admission unremarkable for seizure activity, consistent with encephalopathy that could be infectious vs metabolic vs primary neuronal in nature. Blood and urine cultures growing ESBL e coli. She was initially started on meropenem, however consulted ID who recommended de-escalating to ertapenem and to complete two week course in total. CT head on 8/22 was unremarkable for acute change. Given bilateral hand weakness, MRI was ordered to r/o spinal cord compression. On 8/24 AM, pt was rapid response 2/2 to hypotension. She received 1 L of NS and remains in refractory hypotension. Pt is transferred to MICU for shock.

## 2022-08-24 NOTE — ASSESSMENT & PLAN NOTE
67 yoF with cirrhosis with ascites and seizure disorder presents with AMS and fatigue  Labs showed ammonia of 36  CT head showed no acute intracranial abnormality  Possible cause of encephalopathy is multifactorial: infection vs neurological vs metabolic  EEG on admit remarkable for encephalopathy, no other significant findings  UCx and BCx positive for ESBL e coli sensitive to only meropenem and ertapenem.      Although she has been treated for a few days with the right antibiotics, her mental status has been slow to improve  She remains alert, tracks with her eyes, and seems to understand what being are signing to her but will not sign herself  Pt able to squeeze hands BL and move her legs   CT head showing chronic microvascular changes, no acute changes  CK normal, rhabdo or myositis less likely   Cortisol normal, weakness from adrenal insufficiency less likely latoya since BPs have been relatively normal      Plan:  - continue home Clobazam and keppra for seizures  - continue lactulose and rifaximin  - Ammonia level pending   - continue lasix and aldactone  - continue thiamine and folic acid  - F/u MRI c-spine to exclude mass or infective process in spinal canal accounting for decrease motor function in arms   - repeat EEG

## 2022-08-24 NOTE — PROGRESS NOTES
Pharmacokinetic Initial Assessment: IV Vancomycin    Assessment/Plan:    Initiate intravenous vancomycin with loading dose of 1250 mg once with subsequent doses when random concentrations are less than 20 mcg/mL Pulse dosing due to PITO (Cr 1.2->1.8)  Desired empiric serum trough concentration is 10 to 20 mcg/mL  Draw vancomycin random level on 08/25/22 at 0430 with morning labs..  Pharmacy will continue to follow and monitor vancomycin.      Please contact pharmacy at extension [phone] with any questions regarding this assessment.     Thank you for the consult,   Kayla Ontiveros       Patient brief summary:  Aleyda Floyd is a 67 y.o. female initiated on antimicrobial therapy with IV Vancomycin for treatment of suspected bacteremia    Drug Allergies:   Review of patient's allergies indicates:  No Known Allergies    Actual Body Weight:   57 kg    Renal Function:   Estimated Creatinine Clearance: 24 mL/min (A) (based on SCr of 1.8 mg/dL (H)).,     Dialysis Method (if applicable):  N/A    CBC (last 72 hours):  Recent Labs   Lab Result Units 08/23/22  0638   WBC K/uL 6.99   Hemoglobin g/dL 9.8*   Hematocrit % 29.6*   Platelets K/uL 128*   Gran % % 44.2   Lymph % % 28.8   Mono % % 18.0*   Eosinophil % % 8.0   Basophil % % 0.7   Differential Method  Automated       Metabolic Panel (last 72 hours):  Recent Labs   Lab Result Units 08/21/22  0707 08/22/22  1415 08/22/22  1734 08/23/22  0638 08/23/22  0755 08/24/22  0601   Sodium mmol/L 135*  --   --  135*  --  139   Potassium mmol/L 3.7  --   --  3.7  --  3.8   Chloride mmol/L 102  --   --  100  --  106   CO2 mmol/L 27  --   --  28  --  22*   Glucose mg/dL 114*  --   --  109  --  127*   BUN mg/dL 18  --   --  20  --  25*   Creatinine mg/dL 1.1  --   --  1.2  --  1.8*   Albumin g/dL  --   --   --  2.2*  --  1.7*   Total Bilirubin mg/dL  --   --   --  1.4*  --  1.5*   Alkaline Phosphatase U/L  --   --   --  73  --  62   AST U/L  --   --   --  47*  --  46*   ALT U/L   --   --   --  24  --  21   Magnesium mg/dL  --  1.4* 1.4*  --  2.7*  --        Drug levels (last 3 results):  No results for input(s): VANCOMYCINRA, VANCORANDOM, VANCOMYCINPE, VANCOPEAK, VANCOMYCINTR, VANCOTROUGH in the last 72 hours.    Microbiologic Results:  Microbiology Results (last 7 days)     Procedure Component Value Units Date/Time    Blood culture [[id number]] Collected: 08/24/22 0601    Order Status: Sent Specimen: Blood Updated: 08/24/22 0606    Blood culture [[id number]] Collected: 08/24/22 0602    Order Status: Sent Specimen: Blood Updated: 08/24/22 0606    Blood culture [[id number]] Collected: 08/20/22 1413    Order Status: Completed Specimen: Blood Updated: 08/23/22 1612     Blood Culture, Routine No Growth to date      No Growth to date      No Growth to date      No Growth to date    Blood culture [[id number]] Collected: 08/20/22 1414    Order Status: Completed Specimen: Blood Updated: 08/23/22 1612     Blood Culture, Routine No Growth to date      No Growth to date      No Growth to date      No Growth to date    Urine culture [[id number]]  (Abnormal)  (Susceptibility) Collected: 08/19/22 1708    Order Status: Completed Specimen: Urine Updated: 08/22/22 1349     Urine Culture, Routine ESCHERICHIA COLI ESBL  > 100,000 cfu/ml      Narrative:      Specimen Source->Urine    Blood culture [[id number]]  (Abnormal) Collected: 08/18/22 1044    Order Status: Completed Specimen: Blood Updated: 08/21/22 1156     Blood Culture, Routine Gram stain luisa bottle: Gram negative rods       Positive results previously called 08/19/2022  00:06      Gram stain aer bottle: Gram negative rods       Positive results previously called 08/19/2022  00:42      ESCHERICHIA COLI ESBL  For susceptibility see order # [id number]      Blood culture [[id number]]  (Abnormal)  (Susceptibility) Collected: 08/18/22 1044    Order Status: Completed Specimen: Blood Updated: 08/20/22 0934     Blood Culture, Routine Gram stain luisa bottle: Gram  negative rods       Results called to and read back by:Kitty Carreon RN 08/18/2022  21:56      Gram stain aer bottle: Gram negative rods       Positive results previously called 08/19/2022  00:08      ESCHERICHIA COLI ESBL    Aerobic culture [[id number]]     Order Status: Canceled Specimen: Ascites     Culture, Anaerobic [[id number]]     Order Status: Canceled Specimen: Ascites     Gram stain [[id number]]     Order Status: Canceled Specimen: Ascites

## 2022-08-24 NOTE — CODE/ RAPID DOCUMENTATION
RAPID RESPONSE NURSE NOTE        Admit Date: 2022  LOS: 5  Code Status: Full Code   Date of Consult: 2022  : 1955  Age: 67 y.o.  Weight:   Wt Readings from Last 1 Encounters:   22 81.6 kg (180 lb)     Sex: female  Race: White   Bed: [address] [place] [address] A:   MRN: [id number]  Time Rapid Response Team page Received: 519  Time Rapid Response Team at Bedside: 521  Time Rapid Response Team left Bedside: 630  Was the patient discharged from an ICU this admission? No   Was the patient discharged from a PACU within last 24 hours? No   Did the patient receive conscious sedation/general anesthesia in last 24 hours? No  Was the patient in the ED within the past 24 hours? No  Was the patient on NIPPV within the past 24 hours? No   Did this progress into an ARC or CPA: no  Attending Physician: Lia Anthony MD  Primary Service: [facility]ITICAL CARE MEDICINE TEAM 2       SITUATION    Notified by bedside RN via phone call.  Reason for alert: hypotension  Called to evaluate the patient for Circulatory    BACKGROUND     Why is the patient in the hospital?: Septic shock    Patient has a past medical history of PITO (acute kidney injury), Alcoholic cirrhosis of liver, Hypercholesterolemia, and Hypertension.    Last Vitals:  Temp: 100.9 °F (38.3 °C) (626)  Pulse: 98 (626)  Resp: 23 (626)  BP: 78/40 (626)  SpO2: 95 % (626)    24 Hours Vitals Range:  Temp:  [97.1 °F (36.2 °C)-100.9 °F (38.3 °C)]   Pulse:  []   Resp:  [17-]   BP: ()/(36-63)   SpO2:  [95 %-100 %]     Labs:  Recent Labs     22  0638   WBC 6.99   HGB 9.8*   HCT 29.6*   *       Recent Labs     22  0707 22  1415 22  1734 22  0638 22  0755   *  --   --  135*  --    K 3.7  --   --  3.7  --      --   --  100  --    CO2 27  --   --  28  --    CREATININE 1.1  --   --  1.2  --    *  --   --  109  --    MG  --  1.4* 1.4*  --  2.7*        No results for input(s):  PH, PCO2, PO2, HCO3, POCSATURATED, BE in the last 72 hours.     ASSESSMENT    Physical Exam  Constitutional:       Appearance: She is diaphoretic.   Abdominal:      General: Abdomen is flat.   Neurological:      Mental Status: She is lethargic.         INTERVENTIONS    The patient was seen for Cardiac problem. Staff concerns included hypotension. The following interventions were performed: CBC, CMP, Magnesium, portable chest x-ray, continuous cardiac monitoring, continuous pulse ox monitoring , normal saline 1000 ml IV bolus  and critical care consult. Blood cultures x2, ammonia, lactic, BG. D10 125cc bolus for BG 63, tylenol suppository ordered for temp 100.9.     RECOMMENDATIONS    We recommend: Transfer to ICU for vasopressor support and further workup     PROVIDER ESCALATION    Orders received and case discussed with Dr. Rios.    Primary team arrival time: 0530    Disposition: Tx in ICU bed 6093.    FOLLOW UP    Charge RNSilvana updated on plan of care. Instructed to call the Rapid Response Nurse, Selena Clark at [phone] for additional questions or concerns.

## 2022-08-24 NOTE — ASSESSMENT & PLAN NOTE
Febrile to 100.9 F and without leukocytosis. Presumptive septic shock in the setting of recent bacteremia. Blood cultures repeated on 8/24.   · Continue meropenem and vancomycin for now.  · PharmD service to manage vancomycin dosing and therapeutic drug monitoring.  · Will follow up culture results.   · Discussed with ICU team and attending.

## 2022-08-25 NOTE — PROGRESS NOTES
[facility]  Infectious Disease  Progress Note    Patient Name: Aleyda Floyd  MRN: [id number]  Admission Date: 8/17/2022  Length of Stay: 6 days  Attending Physician: Lia Anthony MD  Primary Care Provider: Elvie Fernandes MD    Isolation Status: Contact  Assessment/Plan:      * Septic shock  Afebrile and with leukocytosis. Presumptive septic shock in the setting of recent bacteremia. Blood cultures repeated on 8/24. Repeat blood cultures NGTD. Urine at beside purulent looking. Urinalysis with pyuria and cultures in process.   · Continue meropenem and vancomycin for now.  · PharmD service to manage vancomycin dosing and therapeutic drug monitoring.  · Can consider addition of tobramycin for synergistic effect with meropenem.   · Will follow up culture results.   · If unable to find infectious source via culture then may need to consider panscanning to assess for occult infection.     Acute encephalopathy  Etiology unclear however suspect this will be multifactorial. If secondary to ertapenem then would expect improvement over the next 2 days.   · Defer management to primary service.     Infection due to ESBL-producing Escherichia coli  Bacteremia and UTI. Repeat blood cultures on 8/20 NGTD.   · Antibiotics as above.       Anticipated Disposition: per primary    Thank you for your consult. I will follow-up with patient. Please contact us if you have any additional questions.    Roma Galeana MD  Infectious Disease  [facility] - Cardiac [facility]    Subjective:     Principal Problem:Septic shock    HPI: A 67-year-old woman with HTN, ETOH cirrhosis (recent admission for decompensated liver failure, not currently transplant candidate), seizure disorder (on Keppra and Onfi), deaf/mute, reported hx of recurrent UTIs, who presented with AMS and fatigue X 1-2 weeks.  CT imaging and EEG negative for acute IC pathology.  Admission blood cx  3/4 + for ESBL E coli susceptible to only carbapenems.   " U/A >100 wbc, urine cx pending. Currently on meropenem. ID consulted for meropenem and ESBL E coli bacteremia.     Afebrile, no leukocytosis.  Reported dysuria per medical record.  At time of visit,  (who is also hearing impaired is in room).   Patient is lethargic, opens eyes, but will not respond to his signing/communication. Unable to obtain a ROS  Spoke to sister on the phone.  Unable to tell me much about patient symptoms/complaints.    Patient was seen by the Infectious Diseases [facility] service. Work up revealed ESBL E coli bacteremia. Her antibiotic therapy was optimized to Ertapenem. Blood cultures repeated on 8/20 remain NGTD and recommendations for a 14 day course of therapy were give prior to sign off.     Her hospital course was complicated by hypotension and bilateral hand weakness. She was given volume replacement however her shock failed to improve which prompted transfer to the ICU.     Infectious Diseases consulted for "ESBL bacteremia and UTI, stepped up to ICU for septic shock"            Interval History: "". Patient remains lethargic and unresponsive. Repeat blood cultures remain NGTD.     Review of Systems   Unable to perform ROS: Patient nonverbal   Objective:     Vital Signs (Most Recent):  Temp: 97.2 °F (36.2 °C) (08/25/22 1300)  Pulse: 70 (08/25/22 1800)  Resp: 15 (08/25/22 1800)  BP: 118/67 (08/24/22 2000)  SpO2: 100 % (08/25/22 1800) Vital Signs (24h Range):  Temp:  [97.2 °F (36.2 °C)-97.6 °F (36.4 °C)] 97.2 °F (36.2 °C)  Pulse:  [64-83] 70  Resp:  [8-28] 15  SpO2:  [84 %-100 %] 100 %  BP: (118)/(67) 118/67  Arterial Line BP: ()/(36-58) 127/42     Weight: 56.7 kg (125 lb)  Body mass index is 23.62 kg/m².    Estimated Creatinine Clearance: 41.2 mL/min (based on SCr of 1 mg/dL).    Physical Exam  Vitals and nursing note reviewed.   Constitutional:       Appearance: She is well-developed. She is ill-appearing.   HENT:      Head: Normocephalic and atraumatic.      Right Ear: " External ear normal.      Left Ear: External ear normal.   Eyes:      General:         Right eye: No discharge.         Left eye: No discharge.   Cardiovascular:      Rate and Rhythm: Normal rate and regular rhythm.      Heart sounds: Normal heart sounds. No murmur heard.    No friction rub. No gallop.   Pulmonary:      Effort: Pulmonary effort is normal. No respiratory distress.      Breath sounds: Normal breath sounds. No stridor. No wheezing or rales.   Musculoskeletal:         General: No tenderness. Normal range of motion.      Right lower leg: Edema present.      Left lower leg: Edema present.   Skin:     General: Skin is warm and dry.   Neurological:      Mental Status: She is lethargic.       Significant Labs: Blood Culture:   Recent Labs   Lab 08/18/22  1044 08/20/22  1413 08/20/22  1414 08/24/22  0601 08/24/22  0602   LABBLOO Gram stain luisa bottle: Gram negative rods   Positive results previously called 08/19/2022  00:06  Gram stain aer bottle: Gram negative rods   Positive results previously called 08/19/2022  00:42  ESCHERICHIA COLI ESBL  For susceptibility see order # [id number]  *  Gram stain luisa bottle: Gram negative rods   Results called to and read back by:Kitty Carreon RN 08/18/2022  21:56  Gram stain aer bottle: Gram negative rods   Positive results previously called 08/19/2022  00:08  ESCHERICHIA COLI ESBL* No growth after 5 days. No growth after 5 days. No Growth to date  No Growth to date No Growth to date  No Growth to date     BMP:   Recent Labs   Lab 08/25/22  0233   GLU 75      K 2.8*   *   CO2 12*   BUN 23   CREATININE 1.0   CALCIUM 6.2*   MG 1.3*     CBC:   Recent Labs   Lab 08/24/22  0601 08/25/22  0233 08/25/22  1149   WBC 8.16 32.42* 35.45*   HGB 9.6* 9.6* 8.9*   HCT 30.5* 28.7* 28.0*   PLT 80* 65* 55*     Urine Culture:   Recent Labs   Lab 06/28/22  0210 07/10/22  1104 07/25/22  1820 08/19/22  1708   LABURIN LACTOBACILLUS SPECIES  > 100,000 cfu/ml  No other  significant isolate  * ENTEROCOCCUS FAECALIS  >100,000 cfu/ml  * CANDIDA ALBICANS  > 100,000 cfu/ml  Treatment of asymptomatic candiduria is not recommended (except for   specific populations). Candida isolated in the urine typically   represents colonization. If an indwelling urinary catheter is present  it should be removed or replaced.  * ESCHERICHIA COLI ESBL  > 100,000 cfu/ml  *     Urine Studies:   Recent Labs   Lab 06/28/22  0210 07/10/22  1104 08/24/22  1714   COLORU Yellow   < > Yellow   APPEARANCEUA Hazy*   < > Hazy*   PHUR 5.0   < > 5.0   SPECGRAV 1.015   < > 1.025   PROTEINUA Negative   < > 1+*   GLUCUA Negative   < > Negative   KETONESU Negative   < > Trace*   BILIRUBINUA Negative   < > Negative   OCCULTUA Negative   < > 1+*   NITRITE Negative   < > Negative   LEUKOCYTESUR 1+*   < > 3+*   RBCUA 1   < > 17*   WBCUA 20*   < > >100*   BACTERIA Many*   < > Many*   SQUAMEPITHEL 6  --   --    HYALINECASTS 49*   < > 916*    < > = values in this interval not displayed.     Wound Culture:   Recent Labs   Lab 07/27/22  1411   LABAERO No growth       Significant Imaging: I have reviewed all pertinent imaging results/findings within the past 24 hours.

## 2022-08-25 NOTE — SUBJECTIVE & OBJECTIVE
Interval History: Overnight she desaturated so she was placed on comfort flow that is being titrated down. Vasopressors have been titrated down. Now she is off vaso completely and only on levophed. Hypovolemic with electrolyte abnormalities. Will start tube feeds. Lactate trended up to 6.6 from 3.5. 2 BM yesterday. MAPs between 67-80.    Review of Systems   Unable to perform ROS: Patient nonverbal     Objective:     Vital Signs (Most Recent):  Temp: 97.6 °F (36.4 °C) (08/24/22 2300)  Pulse: 74 (08/25/22 0738)  Resp: (!) 9 (08/25/22 0738)  BP: 118/67 (08/24/22 2000)  SpO2: 100 % (08/25/22 0738)   Vital Signs (24h Range):  Temp:  [97.1 °F (36.2 °C)-98.2 °F (36.8 °C)] 97.6 °F (36.4 °C)  Pulse:  [70-91] 74  Resp:  [9-28] 9  SpO2:  [84 %-100 %] 100 %  BP: ()/(38-67) 118/67  Arterial Line BP: ()/(40-49) 134/41   Weight: 56.7 kg (125 lb)  Body mass index is 23.62 kg/m².      Intake/Output Summary (Last 24 hours) at 8/25/2022 0938  Last data filed at 8/25/2022 0600  Gross per 24 hour   Intake 2064.3 ml   Output 285 ml   Net 1779.3 ml         Physical Exam  Vitals and nursing note reviewed.   Constitutional:       General: She is not in acute distress.     Appearance: Normal appearance. She is not ill-appearing.   HENT:      Head: Normocephalic and atraumatic.      Nose: Nose normal.      Mouth/Throat:      Mouth: Mucous membranes are moist.   Eyes:      General: No scleral icterus.     Extraocular Movements: Extraocular movements intact.      Conjunctiva/sclera: Conjunctivae normal.      Pupils: Pupils are equal, round, and reactive to light.   Cardiovascular:      Rate and Rhythm: Normal rate and regular rhythm.      Pulses: Normal pulses.      Heart sounds: Normal heart sounds. No murmur heard.  Pulmonary:      Effort: Pulmonary effort is normal. No respiratory distress.      Breath sounds: Normal breath sounds. No wheezing, rhonchi or rales.   Abdominal:      General: Bowel sounds are normal.      Palpations:  Abdomen is soft.      Tenderness: There is no abdominal tenderness. There is no guarding or rebound.   Musculoskeletal:         General: No swelling or tenderness. Normal range of motion.      Right lower leg: Edema (trace at ankles) present.      Left lower leg: Edema (trace at ankles) present.   Skin:     General: Skin is warm and dry.      Capillary Refill: Capillary refill takes less than 2 seconds.      Coloration: Skin is pale. Skin is not jaundiced.      Findings: Lesion (tips of fingers where BG checks are performed) present. No erythema.   Neurological:      Mental Status: She is alert. She is disoriented.      GCS: GCS eye subscore is 4. GCS verbal subscore is 1. GCS motor subscore is 6.      Comments: Follows commands and squeeze hands BL   Psychiatric:      Comments: Flat affect       Vents:  Oxygen Concentration (%): 70 (08/25/22 0738)  Lines/Drains/Airways       Central Venous Catheter Line  Duration             Percutaneous Central Line Insertion/Assessment - Triple Lumen  08/24/22 1000 left internal jugular <1 day              Drain  Duration                  Urethral Catheter 08/24/22 0900 1 day         NG/OG Tube 08/24/22 1800 Left nostril <1 day              Arterial Line  Duration             Arterial Line 08/24/22 1644 Left Femoral <1 day              Peripheral Intravenous Line  Duration                  Peripheral IV - Single Lumen 08/17/22 2056 22 G Left Hand 7 days         Peripheral IV - Single Lumen 08/18/22 0629 20 G Right Forearm 7 days         Peripheral IV - Single Lumen 08/24/22 0653 20 G Left Forearm 1 day                  Significant Labs:    CBC/Anemia Profile:  Recent Labs   Lab 08/24/22  0601 08/25/22  0233   WBC 8.16 32.42*   HGB 9.6* 9.6*   HCT 30.5* 28.7*   PLT 80* 65*   MCV 97 94   RDW 17.8* 17.7*          Chemistries:  Recent Labs   Lab 08/24/22  0601 08/25/22  0233    137   K 3.8 2.8*    115*   CO2 22* 12*   BUN 25* 23   CREATININE 1.8* 1.0   CALCIUM 9.0 6.2*    ALBUMIN 1.7* 1.4*   PROT 5.4* 4.5*   BILITOT 1.5* 0.8   ALKPHOS 62 45*   ALT 21 23   AST 46* 62*   MG  --  1.3*   PHOS  --  2.9         All pertinent labs within the past 24 hours have been reviewed.    Significant Imaging: I have reviewed all pertinent imaging results/findings within the past 24 hours.

## 2022-08-25 NOTE — PROGRESS NOTES
Pharmacokinetic Initial Assessment: Tobramycin    Tobramycin Regimen Assessment & Plan  - Administer tobramycin 335 mg (5 mg/kg * IBW) IV x1 for extended interval dosing.  - Draw tobramycin level on 8/26 @0000, ~10h after this dose. Pharmacy to plot level on [place] nomogram to determine subsequent dosing regimen.    Pharmacy will continue to monitor.    Please contact pharmacy at extension [phone] with any questions regarding this assessment.    Thank you for the consult,    Logan Garcia     Patient brief summary:  Aleyda Floyd is a 67 y.o. female initiated on aminoglycoside therapy for treatment of suspected bacteremia    Drug Allergies:   Review of patient's allergies indicates:  No Known Allergies    Actual Body Weight:   56.7 kg    Adjust Body Weight:   51.4 kg    Ideal Body Weight:  47.8 kg    Renal Function:   Estimated Creatinine Clearance: 41.2 mL/min (based on SCr of 1 mg/dL).,     Dialysis Method (if applicable):  N/A

## 2022-08-25 NOTE — PLAN OF CARE
GENA spoke with [facility]asion at [phone]. Patient is not medically ready to discharge due to stepping up to MICU yesterday 8/24/2022. CM to follow for patient when patient is ready to discharge to [facility].      Joselyn Bedoya RN     [phone]

## 2022-08-25 NOTE — ASSESSMENT & PLAN NOTE
67 yoF with cirrhosis with ascites and seizure disorder presents with AMS and fatigue  Labs showed ammonia of 36  CT head showed no acute intracranial abnormality  Possible cause of encephalopathy is multifactorial: infection vs neurological vs metabolic  EEG on admit remarkable for encephalopathy, no other significant findings  UCx and BCx positive for ESBL e coli sensitive to only meropenem and ertapenem.      Although she has been treated for a few days with the right antibiotics, her mental status has been slow to improve  She remains alert, tracks with her eyes, and seems to understand what being are signing to her but will not sign herself  Pt able to squeeze hands BL and move her legs   CT head showing chronic microvascular changes, no acute changes  CK normal, rhabdo or myositis less likely   Cortisol normal, weakness from adrenal insufficiency less likely latoya since BPs have been relatively normal   EEG showed mild generalized cerebral dysfunction as is commonly seen in a variety of states of toxic and metabolic derangement     Plan:  - D/C home Clobazam. Continue keppra for seizures  - continue lactulose and rifaximin   - Discontinue lasix and aldactone for BP and volume down status  - continue thiamine and folic acid  - F/u MRI c-spine to exclude mass or infective process in spinal canal accounting for decrease motor function in arms

## 2022-08-25 NOTE — PT/OT/SLP PROGRESS
Physical Therapy      Patient Name:  Aleyda Floyd   MRN:  [id number]    Patient not seen today secondary to Bowel/bladder accident (and sleeping/unarousable with stimulation to sternum). RN notified. Will follow-up tomorrow as appropriate.

## 2022-08-25 NOTE — PROCEDURES
"Aleyda Floyd is a 67 y.o. female patient.    Temp: 97.1 °F (36.2 °C) (08/24/22 1501)  Pulse: 77 (08/24/22 1903)  Resp: 19 (08/24/22 1903)  BP: (!) 114/56 (08/24/22 1903)  SpO2: 97 % (08/24/22 1903)  Weight: 56.7 kg (125 lb) (08/24/22 1401)  Height: 5' 1" (154.9 cm) (08/24/22 1401)       Arterial Line    Date/Time: 8/24/2022 7:30 PM  Location procedure was performed: [facility] CARD[facility] ICU ([facility])  Performed by: Chata Strange MD  Authorized by: Chata Strange MD   Assisting provider: Lia Anthony MD  Pre-op Diagnosis: Shock  Post-operative diagnosis: Shock  Consent Done: Yes  Preparation: Patient was prepped and draped in the usual sterile fashion.  Indications: multiple ABGs and hemodynamic monitoring  Location: left femoral  Anesthesia: local infiltration    Anesthesia:  Local Anesthetic: lidocaine 1% without epinephrine  Seldinger technique: Seldinger technique used  Number of attempts: 3  Post-procedure: line sutured and dressing applied  Comments: Due to unsuccessful radial a line earlier placed a L femoral a line with seldinger technique. No complications noted. Dr Anthony present         Chata Strange MD  Pulmonary and Critical Care Fellow  08/24/2022  7:31 PM      "

## 2022-08-25 NOTE — PLAN OF CARE
Recommendations     1. TF recommendations: Isosource 1.5 @ 45 mL/hr to provide 1620 kcals, 73 g of protein, 825 mL fluid.  2. RD to monitor & follow-up.     Goals: Meet % EEN, EPN by RD f/u date  Nutrition Goal Status: new  Communication of RD Recs: reviewed with RN

## 2022-08-25 NOTE — PHYSICIAN QUERY
PT Name: Aleyda Floyd  MR #: [id number]    DOCUMENTATION CLARIFICATION     CDS/: Callie Otoole RN CDI          Contact information: [email or web address]  This form is a permanent document in the medical record.     Query Date: August 25, 2022    By submitting this query, we are merely seeking further clarification of documentation. Please utilize your independent clinical judgment when addressing the question(s) below.    The Medical Record contains the following:   Indicators   Supporting Clinical Findings Location in Medical Record   X AMS, Confusion,  LOC, etc.  seizure disorder presents with AMS and fatigue  Possible cause of encephalopathy is multifactorial: infection given history of UTIs and SBP, hepatic encephalopathy given elevated ammonia, or seizures.       Admitted to hospital medicine for encephalopathy. EEG on admission unremarkable for seizure activity, consistent with encephalopathy that could be infectious vs metabolic vs primary neuronal in nature  Possible cause of encephalopathy is multifactorial: infection vs neurological vs metabolic   H&P 8/18 ALLEGRA Castro MD/ EVAN Tomas MD          CC med 8/25 V Josiah ENRIQUEZ   X Acute/Chronic Illness Septic shock     Source urine, blood Gm neg bacteremia  Thrombocytopenia  Suspected deep tissue injury  Deaf    CC med 8/25 V Josiah ENRIQUEZ   X Radiology Findings CT head showed no acute intracranial abnormality    EEG showed mild generalized cerebral dysfunction as is commonly seen in a variety of states of toxic and metabolic derangement   CC med 8/25 V Josiah ENRIQUEZ   X Electrolyte Imbalance   8/19  BUN 25  Cr 1.8  8/24  Lactic 6.0 8/24  6.6 8/25    Amonia level 8/17 = 58   8/20 = 43    8/24 = 31 Labs   X Medication Vancomycin IV  Meropenem IV   Rifaxamin PO    Piperacillin IV  Norepinephrine IV   CC med 8/25 V Josiah ENRIQUEZ      Med sheets       X Treatment         - D/C home Clobazam. Continue keppra for seizures  - continue lactulose and rifaximin   -  Discontinue lasix and aldactone for BP and volume down status  - continue thiamine and folic acid  - F/u MRI c-spine to exclude mass or infective process in spinal canal accounting for decrease motor function in arms    CC med 8/25 V Josiah ENRIQUEZ   X Other UCx and BCx positive for ESBL e coli sensitive to only meropenem and ertapenem.  CC med 8/25 V Josiah ENRIQUEZ     The noted clinical guidelines are only system guidelines and do not replace the providers clinical judgment.    The National [place] of Neurologic Disorders and Stroke (NINDS) of the NIH describes encephalopathy as any diffuse disease of the brain that alters brain function or structure.    Provider, please specify the type of encephalopathy associated with above clinical findings.         Missael All that apply;    [ x  ] Metabolic Encephalopathy - Due to electrolyte imbalance, metabolic derangements, or infectious processes, includes Septic Encephalopathy, Uremic Encephalopathy     [   ] Toxic Encephalopathy - Due to drugs, chemicals, or other toxic substances     [   ] Hepatic Encephalopathy - Due to liver disease/failure     [   ] Encephalopathy, unspecified        [   ] Other Encephalopathy (please specify): ____________________     [   ] Other neurological condition- Includes Post-ictal altered mental status (please specify condition): __________     [   ]  Clinically Undetermined     Please document in your progress notes daily for the duration of treatment until resolved, and include in your discharge summary.    References:  SINGH Haines RN, CCDS. (2018, June 9). Notes from the Instructor: Encephalopathy tips. Retrieved October 22, 2020, from [email or web address]/articles/[email or web address]-instructor-encephalopathy-tips    ICD-9-CM Coding Clinic First Quarter 2013, Effective with discharges: October 21, 2013 [facility] § Seizure with encephalopathy due to postictal state (2013).    ICD-10-CM/PCS Assembly Integrated Codebook (Version V.20.8.10.0) [Computer  software]. (2020). Retrieved October 21, 2020.    National [place] of Neurological Disorders and Stroke. (2019, March 27). Retrieved October 22, 2020, from https://www.ninds.nih.gov/Disorders/All-Disorders/Tpxpekqlsqcgyp-Ognogmhvhiv-Zgme    Form No. 10303

## 2022-08-25 NOTE — PROGRESS NOTES
Pharmacokinetic Assessment Follow Up: IV Vancomycin    Vancomycin Regimen Assessment & Plan  - Vancomycin level drawn with morning labs today resulted as 10.9 mcg/mL.  - PITO appears to be resolving. Down trending SCr and appears to be eliminating vancomycin adequately.  - Changing from pulse dosing by levels to scheduled vancomycin 750 mg IV q24h. Draw trough on 8/27 @0800 prior to 3rd dose of this regimen. Goal trough 10-20 mcg/mL.    Drug levels (last 3 results):  Recent Labs   Lab Result Units 08/25/22  0233   Vancomycin, Random ug/mL 10.9     Pharmacy will continue to follow and monitor vancomycin.    Please contact pharmacy at extension [phone] for questions regarding this assessment.    Thank you for the consult,   Logan Garcia     Patient brief summary:  Aleyda Floyd is a 67 y.o. female initiated on antimicrobial therapy with IV Vancomycin for treatment of bacteremia    Drug Allergies:   Review of patient's allergies indicates:  No Known Allergies    Actual Body Weight:   56.7 kg    Renal Function:   Estimated Creatinine Clearance: 41.2 mL/min (based on SCr of 1 mg/dL).,     Dialysis Method (if applicable):  N/A

## 2022-08-25 NOTE — ASSESSMENT & PLAN NOTE
"This patient does have evidence of infective focus  My overall impression is septic shock.  Source: Urine, Blood  Antibiotics given-   Antibiotics (From admission, onward)            Start     Stop Route Frequency Ordered    08/25/22 0900  vancomycin 750 mg in dextrose 5 % 250 mL IVPB (ready to mix system)         -- IV Every 24 hours (non-standard times) 08/25/22 0736    08/24/22 2100  mupirocin 2 % ointment         08/29 2059 Nasl 2 times daily 08/24/22 1226    08/24/22 0745  meropenem-0.9% sodium chloride 1 g/50 mL IVPB         -- IV Every 12 hours (non-standard times) 08/24/22 0628    08/24/22 0726  vancomycin - pharmacy to dose  (vancomycin IVPB)        "And" Linked Group Details    -- IV pharmacy to manage frequency 08/24/22 0626    08/18/22 0900  rifAXIMin tablet 550 mg         -- Oral 2 times daily 08/18/22 0138        Latest lactate reviewed-  Recent Labs   Lab 08/24/22  1359 08/24/22  1922 08/25/22  0233   LACTATE 6.0* 3.5* 6.6*     Organ dysfunction indicated by encephalopathy    Shock with decreased perfusion noted, Fluid challenge was not given at 30cc/kg due to decreased urine output    Post- resuscitation assessment- (Done after fluids given for shock)  Vital signs post fluid administrations were-  Temp Readings from Last 1 Encounters:   08/24/22 97.6 °F (36.4 °C) (Oral)     BP Readings from Last 1 Encounters:   08/24/22 118/67     Pulse Readings from Last 1 Encounters:   08/25/22 74       Perfusion exam was performed within 6 hours of septic shock presentation after bolus shows Inadequate tissue perfusion assessed by non-invasive monitoring  Will Start Pressors- Levophed for MAP of 65    - F/U blood cultures, urine culture  - Continue meropenem and Vanc  - Continue stress dose steroids   - Re-consult ID for worsening shock  - Levophed and vaso for MAP >65     "

## 2022-08-25 NOTE — PT/OT/SLP PROGRESS
Occupational Therapy      Patient Name:  Aleyda Floyd   MRN:  [id number]    Patient not seen today secondary to pt unarousable to max verbal/tactile stimuli including sternal rub  . Will follow-up 8/26.    8/25/2022

## 2022-08-25 NOTE — PROGRESS NOTES
[facility]  Critical Care Medicine  Progress Note    Patient Name: Aleyda Floyd  MRN: [id number]  Admission Date: 8/17/2022  Hospital Length of Stay: 6 days  Code Status: Full Code  Attending Provider: Lia Anthony MD  Primary Care Provider: Elvie Fernandes MD   Principal Problem: Septic shock    Subjective:     HPI:  67 year old lady with alcoholic cirrhosis on lactulose and seizure disorder who presented to the ED for AMS and fatigue. 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 Admitted to hospital medicine for encephalopathy. EEG on admission unremarkable for seizure activity, consistent with encephalopathy that could be infectious vs metabolic vs primary neuronal in nature. Blood and urine cultures growing ESBL e coli. She was initially started on meropenem, however consulted ID who recommended de-escalating to ertapenem and to complete two week course in total. CT head on 8/22 was unremarkable for acute change. Given bilateral hand weakness, MRI was ordered to r/o spinal cord compression. On 8/24 AM, pt was rapid response 2/2 to hypotension. She received 1 L of NS and remains in refractory hypotension. Pt is transferred to MICU for shock.       Hospital/ICU Course:  No notes on file    Interval History: Overnight she desaturated so she was placed on comfort flow that is being titrated down. Vasopressors have been titrated down. Now she is off vaso completely and only on levophed. Hypovolemic with electrolyte abnormalities. Will start tube feeds. Lactate trended up to 6.6 from 3.5. 2 BM yesterday. MAPs between 67-80.    Review of Systems   Unable to perform ROS: Patient nonverbal     Objective:     Vital Signs (Most Recent):  Temp: 97.6 °F (36.4 °C) (08/24/22 2300)  Pulse: 74 (08/25/22 0738)  Resp: (!) 9 (08/25/22 0738)  BP: 118/67 (08/24/22 2000)  SpO2: 100 % (08/25/22 0738)   Vital Signs (24h Range):  Temp:  [97.1 °F (36.2 °C)-98.2 °F (36.8 °C)] 97.6 °F (36.4 °C)  Pulse:  [70-91] 74  Resp:   [9-28] 9  SpO2:  [84 %-100 %] 100 %  BP: ()/(38-67) 118/67  Arterial Line BP: ()/(40-49) 134/41   Weight: 56.7 kg (125 lb)  Body mass index is 23.62 kg/m².      Intake/Output Summary (Last 24 hours) at 8/25/2022 0938  Last data filed at 8/25/2022 0600  Gross per 24 hour   Intake 2064.3 ml   Output 285 ml   Net 1779.3 ml         Physical Exam  Vitals and nursing note reviewed.   Constitutional:       General: She is not in acute distress.     Appearance: Normal appearance. She is not ill-appearing.   HENT:      Head: Normocephalic and atraumatic.      Nose: Nose normal.      Mouth/Throat:      Mouth: Mucous membranes are moist.   Eyes:      General: No scleral icterus.     Extraocular Movements: Extraocular movements intact.      Conjunctiva/sclera: Conjunctivae normal.      Pupils: Pupils are equal, round, and reactive to light.   Cardiovascular:      Rate and Rhythm: Normal rate and regular rhythm.      Pulses: Normal pulses.      Heart sounds: Normal heart sounds. No murmur heard.  Pulmonary:      Effort: Pulmonary effort is normal. No respiratory distress.      Breath sounds: Normal breath sounds. No wheezing, rhonchi or rales.   Abdominal:      General: Bowel sounds are normal.      Palpations: Abdomen is soft.      Tenderness: There is no abdominal tenderness. There is no guarding or rebound.   Musculoskeletal:         General: No swelling or tenderness. Normal range of motion.      Right lower leg: Edema (trace at ankles) present.      Left lower leg: Edema (trace at ankles) present.   Skin:     General: Skin is warm and dry.      Capillary Refill: Capillary refill takes less than 2 seconds.      Coloration: Skin is pale. Skin is not jaundiced.      Findings: Lesion (tips of fingers where BG checks are performed) present. No erythema.   Neurological:      Mental Status: She is alert. She is disoriented.      GCS: GCS eye subscore is 4. GCS verbal subscore is 1. GCS motor subscore is 6.       Comments: Follows commands and squeeze hands BL   Psychiatric:      Comments: Flat affect       Vents:  Oxygen Concentration (%): 70 (08/25/22 0738)  Lines/Drains/Airways       Central Venous Catheter Line  Duration             Percutaneous Central Line Insertion/Assessment - Triple Lumen  08/24/22 1000 left internal jugular <1 day              Drain  Duration                  Urethral Catheter 08/24/22 0900 1 day         NG/OG Tube 08/24/22 1800 Left nostril <1 day              Arterial Line  Duration             Arterial Line 08/24/22 1644 Left Femoral <1 day              Peripheral Intravenous Line  Duration                  Peripheral IV - Single Lumen 08/17/22 2056 22 G Left Hand 7 days         Peripheral IV - Single Lumen 08/18/22 0629 20 G Right Forearm 7 days         Peripheral IV - Single Lumen 08/24/22 0653 20 G Left Forearm 1 day                  Significant Labs:    CBC/Anemia Profile:  Recent Labs   Lab 08/24/22  0601 08/25/22  0233   WBC 8.16 32.42*   HGB 9.6* 9.6*   HCT 30.5* 28.7*   PLT 80* 65*   MCV 97 94   RDW 17.8* 17.7*          Chemistries:  Recent Labs   Lab 08/24/22  0601 08/25/22  0233    137   K 3.8 2.8*    115*   CO2 22* 12*   BUN 25* 23   CREATININE 1.8* 1.0   CALCIUM 9.0 6.2*   ALBUMIN 1.7* 1.4*   PROT 5.4* 4.5*   BILITOT 1.5* 0.8   ALKPHOS 62 45*   ALT 21 23   AST 46* 62*   MG  --  1.3*   PHOS  --  2.9         All pertinent labs within the past 24 hours have been reviewed.    Significant Imaging: I have reviewed all pertinent imaging results/findings within the past 24 hours.      ABG  Recent Labs   Lab 08/25/22  0046   PH 7.356   PO2 59*   PCO2 39.1   HCO3 21.9*   BE -4     Assessment/Plan:     Neuro  Acute encephalopathy  67 yoF with cirrhosis with ascites and seizure disorder presents with AMS and fatigue  Labs showed ammonia of 36  CT head showed no acute intracranial abnormality  Possible cause of encephalopathy is multifactorial: infection vs neurological vs  metabolic  EEG on admit remarkable for encephalopathy, no other significant findings  UCx and BCx positive for ESBL e coli sensitive to only meropenem and ertapenem.      Although she has been treated for a few days with the right antibiotics, her mental status has been slow to improve  She remains alert, tracks with her eyes, and seems to understand what being are signing to her but will not sign herself  Pt able to squeeze hands BL and move her legs   CT head showing chronic microvascular changes, no acute changes  CK normal, rhabdo or myositis less likely   Cortisol normal, weakness from adrenal insufficiency less likely latoya since BPs have been relatively normal   EEG showed mild generalized cerebral dysfunction as is commonly seen in a variety of states of toxic and metabolic derangement     Plan:  - D/C home Clobazam. Continue keppra for seizures  - continue lactulose and rifaximin   - Discontinue lasix and aldactone for BP and volume down status  - continue thiamine and folic acid  - F/u MRI c-spine to exclude mass or infective process in spinal canal accounting for decrease motor function in arms     ENT  Deaf- written communication   used; fatigue or current mood still too significant for any meaningful interaction    ID  * Septic shock  This patient does have evidence of infective focus  My overall impression is septic shock.  Source: Urine, Blood  Antibiotics given-   Antibiotics (From admission, onward)            Start     Stop Route Frequency Ordered    08/25/22 0900  vancomycin 750 mg in dextrose 5 % 250 mL IVPB (ready to mix system)         -- IV Every 24 hours (non-standard times) 08/25/22 0736    08/24/22 2100  mupirocin 2 % ointment         08/29 2059 Nasl 2 times daily 08/24/22 1226    08/24/22 0745  meropenem-0.9% sodium chloride 1 g/50 mL IVPB         -- IV Every 12 hours (non-standard times) 08/24/22 0628    08/24/22 0726  vancomycin - pharmacy to dose  (vancomycin IVPB)       "  "And" Linked Group Details    -- IV pharmacy to manage frequency 08/24/22 0626    08/18/22 0900  rifAXIMin tablet 550 mg         -- Oral 2 times daily 08/18/22 0138        Latest lactate reviewed-  Recent Labs   Lab 08/24/22  1359 08/24/22  1922 08/25/22  0233   LACTATE 6.0* 3.5* 6.6*     Organ dysfunction indicated by encephalopathy    Shock with decreased perfusion noted, Fluid challenge was not given at 30cc/kg due to decreased urine output    Post- resuscitation assessment- (Done after fluids given for shock)  Vital signs post fluid administrations were-  Temp Readings from Last 1 Encounters:   08/24/22 97.6 °F (36.4 °C) (Oral)     BP Readings from Last 1 Encounters:   08/24/22 118/67     Pulse Readings from Last 1 Encounters:   08/25/22 74       Perfusion exam was performed within 6 hours of septic shock presentation after bolus shows Inadequate tissue perfusion assessed by non-invasive monitoring  Will Start Pressors- Levophed for MAP of 65    - F/U blood cultures, urine culture  - Continue meropenem and Vanc  - Continue stress dose steroids   - Re-consult ID for worsening shock  - Levophed and vaso for MAP >65       Gram-negative bacteremia  Has UCx and BCx positive for ESBL e coli sensitive only to meropenem and ertapenem    Hematology  Thrombocytopenia  Hgb on admission 7.9 and platelets of 117  Hgb baseline around 7.5-8.5 and platelets around 50s-60s. Current Hgb 8.6 platelets 112.     Plan:  - trend Hb and Plt daily   - continue to monitor for signs of bleeding    Other  Suspected deep tissue injury  Red/maroon/purple discoloration and blistering noted to sacral region.    - Wound care consulted         Critical Care Daily Checklist:    A: Awake: RASS Goal/Actual Goal:    Actual: [place]a Agitation Sedation Scale (RASS): Deep sedation (patient is deaf- unable to arouse to voice. brief eye opening to touch)   B: Spontaneous Breathing Trial Performed?     C: SAT & SBT Coordinated?  n/a                    "   D: Delirium: CAM-ICU     E: Early Mobility Performed? Yes   F: Feeding Goal:    Status:     Current Diet Order   No orders of the defined types were placed in this encounter.      AS: Analgesia/Sedation n/a   T: Thromboembolic Prophylaxis lovenox   H: HOB > 300 Yes   U: Stress Ulcer Prophylaxis (if needed) n/a   G: Glucose Control yes   B: Bowel Function Stool Occurrence: 2   I: Indwelling Catheter (Lines & Beckham) Necessity 3 PIV, purewick, CVC, a line   D: De-escalation of Antimicrobials/Pharmacotherapies yes    Plan for the day/ETD Wean vasopressor, f/u lactate & UC    Code Status:  Family/Goals of Care: Full Code       CritiCritical secondary to Patient has a condition that poses threat to life and bodily function: Septic shock      Critical care was time spent personally by me on the following activities: development of treatment plan with patient or surrogate and bedside caregivers, discussions with consultants, evaluation of patient's response to treatment, examination of patient, ordering and performing treatments and interventions, ordering and review of laboratory studies, ordering and review of radiographic studies, pulse oximetry, re-evaluation of patient's condition. This critical care time did not overlap with that of any other provider or involve time for any procedures.     Yasmin Rahman MD  Critical Care Medicine  [facility] - Cardiac Medical ICU

## 2022-08-25 NOTE — CONSULTS
"  [facility]  Adult Nutrition  Consult Note    SUMMARY     Recommendations    1. TF recommendations: Isosource 1.5 @ 45 mL/hr to provide 1620 kcals, 73 g of protein, 825 mL fluid.  2. RD to monitor & follow-up.    Goals: Meet % EEN, EPN by RD f/u date  Nutrition Goal Status: new  Communication of RD Recs: reviewed with RN    Assessment and Plan    Nutrition Problem:  Inadequate energy intake     Related to (etiology):   Inability to consume sufficient energy     Signs and Symptoms (as evidenced by):   NPO    Interventions(treatment strategy):  Collaboration of nutrition care w/ other providers  EN    Nutrition Diagnosis Status:   New    Reason for Assessment    Reason For Assessment: consult  Diagnosis: other (see comments) (Septic shock)  Relevant Medical History: Cirrhosis, Etoh abuse  Interdisciplinary Rounds: attended    General Information Comments: NPO, NGT present & TFs to be initiated. Unable to assess pt this AM, team at bedside. Unsure of PO intake PTA; UBW: 180# per chart review. RD unable to assess for malnutrition.   Nutrition Discharge Planning: Pending clinical course    Nutrition/Diet History    Spiritual, Cultural Beliefs, Gnosticism Practices, Values that Affect Care: no  Factors Affecting Nutritional Intake: NPO    Anthropometrics    Temp: 97.2 °F (36.2 °C)  Height Method: Stated  Height: 5' 1" (154.9 cm)  Height (inches): 61 in  Weight Method: Bed Scale  Weight: 56.7 kg (125 lb)  Weight (lb): 125 lb  Ideal Body Weight (IBW), Female: 105 lb  % Ideal Body Weight, Female (lb): 119.05 %  BMI (Calculated): 23.6  BMI Grade: 18.5-24.9 - normal    Lab/Procedures/Meds    Pertinent Labs Reviewed: reviewed  Pertinent Medications Reviewed: reviewed  Pertinent Medications Comments: Levophed, Vasopressin    Estimated/Assessed Needs    Weight Used For Calorie Calculations: 56.7 kg (125 lb)     Energy Calorie Requirements (kcal): 2401-2928 kcal/d  Energy Need Method: Kcal/kg (25-30 " kcal/kg)     Protein Requirements: 57-74 g/d (1-1.3 g/kg)  Weight Used For Protein Calculations: 56.7 kg (125 lb)     Estimated Fluid Requirement Method: other (see comments) (Per MD or 1 mL/kcal)  RDA Method (mL): 1418    Nutrition Prescription Ordered    Current Diet Order: NPO  Current Nutrition Support Formula Ordered: Impact Peptide 1.5    Evaluation of Received Nutrient/Fluid Intake    I/O: +2.3L since admit    Comments: LBM: 8/24    Nutrition Risk    Level of Risk/Frequency of Follow-up:  (1x/week)     Monitor and Evaluation    Food and Nutrient Intake: energy intake, food and beverage intake, enteral nutrition intake  Food and Nutrient Adminstration: diet order, enteral and parenteral nutrition administration  Physical Activity and Function: nutrition-related ADLs and IADLs  Anthropometric Measurements: weight, weight change  Biochemical Data, Medical Tests and Procedures: inflammatory profile, lipid profile, glucose/endocrine profile, gastrointestinal profile, electrolyte and renal panel  Nutrition-Focused Physical Findings: overall appearance     Nutrition Follow-Up    RD Follow-up?: Yes

## 2022-08-26 NOTE — PLAN OF CARE
Patient seen with housestaff team on morning rounds and then plan of care was discussed in detail.     67 year old woman with history of EtOH cirrhosis and seizure disorder transferred to ICU for worsening encephalopathy and hypotension.         · Shock: concerned for possible septic shock:    currently on norepi@0.07 and vasopressin.    Continue stress dose steroids. Leukocytosis continues to climb despite excellent antibiotic coverage.  Plan for additional imaging.   · E. Coli UTI/Bacteremia: continue meropenem; ID following.  · Strep viridans bacteremia: continue meropenem.   · Encephalopathy: likely multifactorial: septic vs. Toxic vs hepatic.  No change in mental status.  Continue lactulose.  CT Head unrevealing. Ertapenem could have been a contributing factor. Ammonia level unremarkable.  EEG negative for seizure.      · PITO: Monitor urine output.  Renally dose all medications.    · Anemia: Continue to monitor and transfuse as necessary maintain hgb>7.           Critical Care Time: 32 minutes   Critical care was time spent personally by me on the following activities: evaluating this patient's organ dysfunction, development of treatment plan, discussing treatment plan with patient or surrogate and bedside caregivers, discussions with consultants, evaluation of patient's response to treatment, examination of patient, ordering and performing treatments and interventions, ordering and review of laboratory studies, ordering and review of radiographic studies, re-evaluation of patient's condition. This critical care time did not overlap with that of any other provider or involve time for any procedures.

## 2022-08-26 NOTE — NURSING
Pt O2 had been titrated down to room air, with O2 sats in 100%. However, pt O2 sats dropped suddenly as low as 84. BP also dropped, requiring increased vasopressor requirements. Levophed titrated up, O2 placed back on pt and turned up to 10L. CCS2 called and notified; fellow came to bedside to assess. Orders placed for stat CXR, pt O2 sats currently at 95.

## 2022-08-26 NOTE — ASSESSMENT & PLAN NOTE
Afebrile and with ongoing leukocytosis. Presumptive septic shock in the setting of recent bacteremia. Blood cultures repeated on 8/24. Repeat blood cultures NGTD. Urine culture now with S viridans.   · Continue meropenem.  · Discontinue vancomycin and tobramycin.  · Will continue to follow blood cultures.

## 2022-08-26 NOTE — ASSESSMENT & PLAN NOTE
67 yoF with cirrhosis with ascites and seizure disorder presents with AMS and fatigue  Labs showed ammonia of 36  CT head showed no acute intracranial abnormality  Possible cause of encephalopathy is multifactorial: infection vs neurological vs metabolic  EEG on admit remarkable for encephalopathy, no other significant findings  UCx and BCx positive for ESBL e coli sensitive to only meropenem and ertapenem.      Although she has been treated for a few days with the right antibiotics, her mental status has been slow to improve  She remains alert, tracks with her eyes, and seems to understand what being are signing to her but will not sign herself  Pt able to squeeze hands BL and move her legs   CT head showing chronic microvascular changes, no acute changes  CK normal, rhabdo or myositis less likely   Cortisol normal, weakness from adrenal insufficiency less likely latoya since BPs have been relatively normal   EEG showed mild generalized cerebral dysfunction as is commonly seen in a variety of states of toxic and metabolic derangement     Plan:  - D/C home Clobazam. Continue keppra for seizures  - continue lactulose and rifaximin   - Discontinue lasix and aldactone for BP and volume down status  - continue thiamine and folic acid  - F/u with CT head   - Consider lumbar puncture if no improvement in mental status

## 2022-08-26 NOTE — SUBJECTIVE & OBJECTIVE
Interval History: Overnight she desaturated so she was placed on 10L NC that is being titrated down. She is still on levophed at varying doses. Lactate trended down to 2.8 from 3.3. MAPs between 69-84. WBC jumped up to 50 today with minimal change to mental status.    Review of Systems   Unable to perform ROS: Patient nonverbal     Objective:     Vital Signs (Most Recent):  Temp: 97.5 °F (36.4 °C) (08/26/22 1500)  Pulse: 66 (08/26/22 1600)  Resp: (!) 8 (08/26/22 1600)  BP: 118/67 (08/24/22 2000)  SpO2: 100 % (08/26/22 1600)   Vital Signs (24h Range):  Temp:  [94.3 °F (34.6 °C)-97.8 °F (36.6 °C)] 97.5 °F (36.4 °C)  Pulse:  [63-81] 66  Resp:  [8-22] 8  SpO2:  [95 %-100 %] 100 %  Arterial Line BP: (112-158)/(38-55) 130/46   Weight: 56.7 kg (125 lb)  Body mass index is 23.62 kg/m².      Intake/Output Summary (Last 24 hours) at 8/26/2022 1730  Last data filed at 8/26/2022 1600  Gross per 24 hour   Intake 1908.45 ml   Output 605 ml   Net 1303.45 ml         Physical Exam  Vitals and nursing note reviewed.   Constitutional:       General: She is not in acute distress.     Appearance: Normal appearance. She is not ill-appearing.   HENT:      Head: Normocephalic and atraumatic.      Nose: Nose normal.      Mouth/Throat:      Mouth: Mucous membranes are moist.   Eyes:      General: No scleral icterus.     Extraocular Movements: Extraocular movements intact.      Conjunctiva/sclera: Conjunctivae normal.      Pupils: Pupils are equal, round, and reactive to light.   Cardiovascular:      Rate and Rhythm: Normal rate and regular rhythm.      Pulses: Normal pulses.      Heart sounds: Normal heart sounds. No murmur heard.  Pulmonary:      Effort: Pulmonary effort is normal. No respiratory distress.      Breath sounds: Normal breath sounds. No wheezing, rhonchi or rales.   Abdominal:      General: Bowel sounds are normal.      Palpations: Abdomen is soft.      Tenderness: There is no abdominal tenderness. There is no guarding or  rebound.   Musculoskeletal:         General: No swelling or tenderness. Normal range of motion.      Right lower leg: Edema (trace at ankles) present.      Left lower leg: Edema (trace at ankles) present.   Skin:     General: Skin is warm and dry.      Capillary Refill: Capillary refill takes less than 2 seconds.      Coloration: Skin is pale. Skin is not jaundiced.      Findings: Lesion (tips of fingers where BG checks are performed) present. No erythema.   Neurological:      Mental Status: She is alert. She is disoriented.      GCS: GCS eye subscore is 4. GCS verbal subscore is 1. GCS motor subscore is 6.      Comments: Follows commands and squeeze hands BL   Psychiatric:      Comments: Flat affect       Vents:  Oxygen Concentration (%): 35 (08/25/22 1100)  Lines/Drains/Airways       Central Venous Catheter Line  Duration             Percutaneous Central Line Insertion/Assessment - Triple Lumen  08/24/22 1000 left internal jugular 2 days              Drain  Duration                  Urethral Catheter 08/24/22 0900 2 days         NG/OG Tube 08/24/22 1800 Left nostril 1 day         Rectal Tube 08/26/22 1629 fecal management system <1 day              Arterial Line  Duration             Arterial Line 08/24/22 1644 Left Femoral 2 days              Peripheral Intravenous Line  Duration                  Peripheral IV - Single Lumen 08/24/22 0653 20 G Left Forearm 2 days                  Significant Labs:    CBC/Anemia Profile:  Recent Labs   Lab 08/25/22  0233 08/25/22  1149 08/26/22  0234   WBC 32.42* 35.45* 50.45*   HGB 9.6* 8.9* 8.9*   HCT 28.7* 28.0* 26.5*   PLT 65* 55* 79*   MCV 94 95 93   RDW 17.7* 17.5* 17.6*          Chemistries:  Recent Labs   Lab 08/25/22  0233 08/26/22  0234    137   K 2.8* 5.1   * 103   CO2 12* 27   BUN 23 38*   CREATININE 1.0 1.3   CALCIUM 6.2* 9.1   ALBUMIN 1.4* 2.0*   PROT 4.5* 6.5   BILITOT 0.8 1.6*   ALKPHOS 45* 74   ALT 23 32   AST 62* 73*   MG 1.3* 2.3   PHOS 2.9 2.7          All pertinent labs within the past 24 hours have been reviewed.    Significant Imaging: I have reviewed all pertinent imaging results/findings within the past 24 hours.

## 2022-08-26 NOTE — ASSESSMENT & PLAN NOTE
Bacteremia and UTI. Repeat blood cultures on 8/20 NGTD. Anticipated end date: 9/2/22.  · Antibiotics as above.

## 2022-08-26 NOTE — PROGRESS NOTES
[facility]  Infectious Disease  Progress Note    Patient Name: Aleyda Floyd  MRN: [id number]  Admission Date: 8/17/2022  Length of Stay: 7 days  Attending Physician: Lia Anthony MD  Primary Care Provider: Elvie Fernandes MD    Isolation Status: Contact  Assessment/Plan:      * Septic shock  Afebrile and with ongoing leukocytosis. Presumptive septic shock in the setting of recent bacteremia. Blood cultures repeated on 8/24. Repeat blood cultures NGTD. Urine culture now with S viridans.   · Continue meropenem.  · Discontinue vancomycin and tobramycin.  · Will continue to follow blood cultures.    Acute encephalopathy  Etiology unclear however suspect this will be multifactorial. If secondary to ertapenem then would expect improvement over the next day. Nursing at bedside reports she is more responsive today.   · Defer management to primary service.   · If she remains unimproved then would consider diagnostic lumbar puncture.     Infection due to ESBL-producing Escherichia coli  Bacteremia and UTI. Repeat blood cultures on 8/20 NGTD. Anticipated end date: 9/2/22.  · Antibiotics as above.       Anticipated Disposition: per primary    Thank you for your consult. I will follow-up with patient. Please contact us if you have any additional questions.    Roma Galeana MD  Infectious Disease  [facility]    Subjective:     Principal Problem:Septic shock    HPI: A 67-year-old woman with HTN, ETOH cirrhosis (recent admission for decompensated liver failure, not currently transplant candidate), seizure disorder (on Keppra and Onfi), deaf/mute, reported hx of recurrent UTIs, who presented with AMS and fatigue X 1-2 weeks.  CT imaging and EEG negative for acute IC pathology.  Admission blood cx  3/4 + for ESBL E coli susceptible to only carbapenems.   U/A >100 wbc, urine cx pending. Currently on meropenem. ID consulted for meropenem and ESBL E coli bacteremia.     Afebrile, no  "leukocytosis.  Reported dysuria per medical record.  At time of visit,  (who is also hearing impaired is in room).   Patient is lethargic, opens eyes, but will not respond to his signing/communication. Unable to obtain a ROS  Spoke to sister on the phone.  Unable to tell me much about patient symptoms/complaints.    Patient was seen by the Infectious Diseases [facility] service. Work up revealed ESBL E coli bacteremia. Her antibiotic therapy was optimized to Ertapenem. Blood cultures repeated on 8/20 remain NGTD and recommendations for a 14 day course of therapy were give prior to sign off.     Her hospital course was complicated by hypotension and bilateral hand weakness. She was given volume replacement however her shock failed to improve which prompted transfer to the ICU.     Infectious Diseases consulted for "ESBL bacteremia and UTI, stepped up to ICU for septic shock"            Interval History: "". Patient remains lethargic and unresponsive. Repeat blood cultures remain NGTD. Repeat urine culture with S viridans. Nursing reports more responsive this morning however remains lethargic.     Review of Systems   Unable to perform ROS: Patient nonverbal   Objective:     Vital Signs (Most Recent):  Temp: 97.7 °F (36.5 °C) (08/26/22 0715)  Pulse: 66 (08/26/22 0900)  Resp: (!) 9 (08/26/22 0900)  BP: 118/67 (08/24/22 2000)  SpO2: 100 % (08/26/22 0900) Vital Signs (24h Range):  Temp:  [94.3 °F (34.6 °C)-97.8 °F (36.6 °C)] 97.7 °F (36.5 °C)  Pulse:  [63-81] 66  Resp:  [8-20] 9  SpO2:  [99 %-100 %] 100 %  Arterial Line BP: (112-171)/(38-58) 143/48     Weight: 56.7 kg (125 lb)  Body mass index is 23.62 kg/m².    Estimated Creatinine Clearance: 31.7 mL/min (based on SCr of 1.3 mg/dL).    Physical Exam  Vitals and nursing note reviewed.   Constitutional:       Appearance: She is well-developed. She is ill-appearing.   HENT:      Head: Normocephalic and atraumatic.      Right Ear: External ear normal.      Left Ear: External " ear normal.   Eyes:      General:         Right eye: No discharge.         Left eye: No discharge.   Cardiovascular:      Rate and Rhythm: Normal rate and regular rhythm.      Heart sounds: Normal heart sounds. No murmur heard.    No friction rub. No gallop.   Pulmonary:      Effort: Pulmonary effort is normal. No respiratory distress.      Breath sounds: Normal breath sounds. No stridor. No wheezing or rales.   Musculoskeletal:         General: No tenderness. Normal range of motion.      Right lower leg: Edema present.      Left lower leg: Edema present.   Skin:     General: Skin is warm and dry.   Neurological:      Mental Status: She is lethargic.       Significant Labs: Blood Culture:   Recent Labs   Lab 08/20/22  1414 08/24/22  0601 08/24/22  0602 08/25/22  1615 08/25/22  1616   LABBLOO No growth after 5 days. No Growth to date  No Growth to date  No Growth to date No Growth to date  No Growth to date  No Growth to date No Growth to date No Growth to date       BMP:   Recent Labs   Lab 08/26/22  0234   GLU 98      K 5.1      CO2 27   BUN 38*   CREATININE 1.3   CALCIUM 9.1   MG 2.3       CBC:   Recent Labs   Lab 08/25/22  0233 08/25/22  1149 08/26/22  0234   WBC 32.42* 35.45* 50.45*   HGB 9.6* 8.9* 8.9*   HCT 28.7* 28.0* 26.5*   PLT 65* 55* 79*       Urine Culture:   Recent Labs   Lab 06/28/22  0210 07/10/22  1104 07/25/22  1820 08/19/22  1708 08/24/22  1714   LABURIN LACTOBACILLUS SPECIES  > 100,000 cfu/ml  No other significant isolate  * ENTEROCOCCUS FAECALIS  >100,000 cfu/ml  * CANDIDA ALBICANS  > 100,000 cfu/ml  Treatment of asymptomatic candiduria is not recommended (except for   specific populations). Candida isolated in the urine typically   represents colonization. If an indwelling urinary catheter is present  it should be removed or replaced.  * ESCHERICHIA COLI ESBL  > 100,000 cfu/ml  * VIRIDANS STREPTOCOCCUS GROUP  > 100,000 cfu/ml  Susceptibility testing not routinely performed  *        Urine Studies:   Recent Labs   Lab 06/28/22  0210 07/10/22  1104 08/24/22  1714   COLORU Yellow   < > Yellow   APPEARANCEUA Hazy*   < > Hazy*   PHUR 5.0   < > 5.0   SPECGRAV 1.015   < > 1.025   PROTEINUA Negative   < > 1+*   GLUCUA Negative   < > Negative   KETONESU Negative   < > Trace*   BILIRUBINUA Negative   < > Negative   OCCULTUA Negative   < > 1+*   NITRITE Negative   < > Negative   LEUKOCYTESUR 1+*   < > 3+*   RBCUA 1   < > 17*   WBCUA 20*   < > >100*   BACTERIA Many*   < > Many*   SQUAMEPITHEL 6  --   --    HYALINECASTS 49*   < > 916*    < > = values in this interval not displayed.       Wound Culture:   Recent Labs   Lab 07/27/22  1411   LABAERO No growth         Significant Imaging: I have reviewed all pertinent imaging results/findings within the past 24 hours.

## 2022-08-26 NOTE — ASSESSMENT & PLAN NOTE
Etiology unclear however suspect this will be multifactorial. If secondary to ertapenem then would expect improvement over the next 2 days.   · Defer management to primary service.

## 2022-08-26 NOTE — PROGRESS NOTES
Pharmacokinetic Sign-off: IV Vancomycin    Therapy with vancomycin complete and/or consult discontinued by provider.  Pharmacy will sign off, please re-consult as needed.    Marcelle Jiménez, PharmD, BCPS  [phone]

## 2022-08-26 NOTE — SUBJECTIVE & OBJECTIVE
Interval History: ". Patient remains lethargic and unresponsive. Repeat blood cultures remain NGTD.     Review of Systems   Unable to perform ROS: Patient nonverbal   Objective:     Vital Signs (Most Recent):  Temp: 97.2 °F (36.2 °C) (08/25/22 1300)  Pulse: 70 (08/25/22 1800)  Resp: 15 (08/25/22 1800)  BP: 118/67 (08/24/22 2000)  SpO2: 100 % (08/25/22 1800) Vital Signs (24h Range):  Temp:  [97.2 °F (36.2 °C)-97.6 °F (36.4 °C)] 97.2 °F (36.2 °C)  Pulse:  [64-83] 70  Resp:  [8-28] 15  SpO2:  [84 %-100 %] 100 %  BP: (118)/(67) 118/67  Arterial Line BP: ()/(36-58) 127/42     Weight: 56.7 kg (125 lb)  Body mass index is 23.62 kg/m².    Estimated Creatinine Clearance: 41.2 mL/min (based on SCr of 1 mg/dL).    Physical Exam  Vitals and nursing note reviewed.   Constitutional:       Appearance: She is well-developed. She is ill-appearing.   HENT:      Head: Normocephalic and atraumatic.      Right Ear: External ear normal.      Left Ear: External ear normal.   Eyes:      General:         Right eye: No discharge.         Left eye: No discharge.   Cardiovascular:      Rate and Rhythm: Normal rate and regular rhythm.      Heart sounds: Normal heart sounds. No murmur heard.    No friction rub. No gallop.   Pulmonary:      Effort: Pulmonary effort is normal. No respiratory distress.      Breath sounds: Normal breath sounds. No stridor. No wheezing or rales.   Musculoskeletal:         General: No tenderness. Normal range of motion.      Right lower leg: Edema present.      Left lower leg: Edema present.   Skin:     General: Skin is warm and dry.   Neurological:      Mental Status: She is lethargic.       Significant Labs: Blood Culture:   Recent Labs   Lab 08/18/22  1044 08/20/22  1413 08/20/22  1414 08/24/22  0601 08/24/22  0602   LABBLOO Gram stain luisa bottle: Gram negative rods   Positive results previously called 08/19/2022  00:06  Gram stain aer bottle: Gram negative rods   Positive results previously called  08/19/2022  00:42  ESCHERICHIA COLI ESBL  For susceptibility see order # [id number]  *  Gram stain luisa bottle: Gram negative rods   Results called to and read back by:Kitty Carreon RN 08/18/2022  21:56  Gram stain aer bottle: Gram negative rods   Positive results previously called 08/19/2022  00:08  ESCHERICHIA COLI ESBL* No growth after 5 days. No growth after 5 days. No Growth to date  No Growth to date No Growth to date  No Growth to date     BMP:   Recent Labs   Lab 08/25/22  0233   GLU 75      K 2.8*   *   CO2 12*   BUN 23   CREATININE 1.0   CALCIUM 6.2*   MG 1.3*     CBC:   Recent Labs   Lab 08/24/22  0601 08/25/22  0233 08/25/22  1149   WBC 8.16 32.42* 35.45*   HGB 9.6* 9.6* 8.9*   HCT 30.5* 28.7* 28.0*   PLT 80* 65* 55*     Urine Culture:   Recent Labs   Lab 06/28/22  0210 07/10/22  1104 07/25/22  1820 08/19/22  1708   LABURIN LACTOBACILLUS SPECIES  > 100,000 cfu/ml  No other significant isolate  * ENTEROCOCCUS FAECALIS  >100,000 cfu/ml  * CANDIDA ALBICANS  > 100,000 cfu/ml  Treatment of asymptomatic candiduria is not recommended (except for   specific populations). Candida isolated in the urine typically   represents colonization. If an indwelling urinary catheter is present  it should be removed or replaced.  * ESCHERICHIA COLI ESBL  > 100,000 cfu/ml  *     Urine Studies:   Recent Labs   Lab 06/28/22  0210 07/10/22  1104 08/24/22  1714   COLORU Yellow   < > Yellow   APPEARANCEUA Hazy*   < > Hazy*   PHUR 5.0   < > 5.0   SPECGRAV 1.015   < > 1.025   PROTEINUA Negative   < > 1+*   GLUCUA Negative   < > Negative   KETONESU Negative   < > Trace*   BILIRUBINUA Negative   < > Negative   OCCULTUA Negative   < > 1+*   NITRITE Negative   < > Negative   LEUKOCYTESUR 1+*   < > 3+*   RBCUA 1   < > 17*   WBCUA 20*   < > >100*   BACTERIA Many*   < > Many*   SQUAMEPITHEL 6  --   --    HYALINECASTS 49*   < > 916*    < > = values in this interval not displayed.     Wound Culture:   Recent Labs   Lab  07/27/22  1411   LABAERO No growth       Significant Imaging: I have reviewed all pertinent imaging results/findings within the past 24 hours.

## 2022-08-26 NOTE — ASSESSMENT & PLAN NOTE
Afebrile and with leukocytosis. Presumptive septic shock in the setting of recent bacteremia. Blood cultures repeated on 8/24. Repeat blood cultures NGTD. Urine at beside purulent looking. Urinalysis with pyuria and cultures in process.   · Continue meropenem and vancomycin for now.  · PharmD service to manage vancomycin dosing and therapeutic drug monitoring.  · Can consider addition of tobramycin for synergistic effect with meropenem.   · Will follow up culture results.   · If unable to find infectious source via culture then may need to consider panscanning to assess for occult infection.

## 2022-08-26 NOTE — ASSESSMENT & PLAN NOTE
Etiology unclear however suspect this will be multifactorial. If secondary to ertapenem then would expect improvement over the next day. Nursing at bedside reports she is more responsive today.   · Defer management to primary service.   · If she remains unimproved then would consider diagnostic lumbar puncture.

## 2022-08-26 NOTE — PLAN OF CARE
CM[facility] DAILY GOALS       A: Awake    RASS: Goal -    Actual - RASS (Brizuela Agitation-Sedation Scale): -1-->drowsy   Restraint necessity:    B: Breathe   SBT: Not intubated   C: Coordinate A & B, analgesics/sedatives   Pain: managed    SAT: Not intubated  D: Delirium   CAM-ICU: Overall CAM-ICU: Positive  E: Early(intubated/ Progressive (non-intubated) Mobility   MOVE Screen: Fail   Activity: Activity Management: Patient unable to perform activities  FAS: Feeding/Nutrition   Diet order: Diet/Nutrition Received: NPO, tube feeding,    T: Thrombus   DVT prophylaxis: VTE Required Core Measure: Pharmacological prophylaxis initiated/maintained  H: HOB Elevation   Head of Bed (HOB) Positioning: HOB at 30-45 degrees  U: Ulcer Prophylaxis   GI: N/A  G: Glucose control   managed Glycemic Management: blood glucose monitored  S: Skin   Bathing/Skin Care: bath, complete, dressed/undressed, linen changed, incontinence care  Device Skin Pressure Protection: absorbent pad utilized/changed, adhesive use limited, skin-to-device areas padded, skin-to-skin areas padded, pressure points protected  Pressure Reduction Devices: heel offloading device utilized, specialty bed utilized  Pressure Reduction Techniques: frequent weight shift encouraged, weight shift assistance provided, pressure points protected  Skin Protection: adhesive use limited, incontinence pads utilized, skin-to-skin areas padded, transparent dressing maintained, tubing/devices free from skin contact  B: Bowel Function   diarrhea   I: Indwelling Catheters   Beckham necessity:      Urethral Catheter 08/24/22 0900-Reason for Continuing Urinary Catheterization: Critically ill in ICU and requiring hourly monitoring of intake/output   CVC necessity: Yes  D: De-escalation Antibiotics   No    Family/Goals of care/Code Status   Code Status: Full Code    24H Vital Sign Range  Temp:  [94.3 °F (34.6 °C)-97.8 °F (36.6 °C)]   Pulse:  [64-81]   Resp:  [8-20]   SpO2:  [97 %-100 %]    Arterial Line BP: ()/(36-58)      Shift Events   Vasopressor requirements increased throughout shift to maintain MAP>65. Pt had episode of desaturation this morning -- see nursing note. TF increased up to 30 cc/hr; pt tolerating well. No acute events throughout shift.     VS and assessment per flow sheet, patient progressing towards goals as tolerated, plan of care reviewed with  Aelyda Floyd , all concerns addressed, will continue to monitor.    Verona Younger

## 2022-08-26 NOTE — ASSESSMENT & PLAN NOTE
Had UCx and BCx positive for ESBL e coli sensitive only to meropenem and ertapenem. Most recent UCx is positive for strep viridans which will be treated with meropenem. WBC went up to 50 today so will trend.  - Meropenem as per ID recommendations  - F/u CT Chest  - F/u CT Abdomen/pelvis with contrast

## 2022-08-26 NOTE — SUBJECTIVE & OBJECTIVE
Interval History: ". Patient remains lethargic and unresponsive. Repeat blood cultures remain NGTD. Repeat urine culture with S viridans. Nursing reports more responsive this morning however remains lethargic.     Review of Systems   Unable to perform ROS: Patient nonverbal   Objective:     Vital Signs (Most Recent):  Temp: 97.7 °F (36.5 °C) (08/26/22 0715)  Pulse: 66 (08/26/22 0900)  Resp: (!) 9 (08/26/22 0900)  BP: 118/67 (08/24/22 2000)  SpO2: 100 % (08/26/22 0900) Vital Signs (24h Range):  Temp:  [94.3 °F (34.6 °C)-97.8 °F (36.6 °C)] 97.7 °F (36.5 °C)  Pulse:  [63-81] 66  Resp:  [8-20] 9  SpO2:  [99 %-100 %] 100 %  Arterial Line BP: (112-171)/(38-58) 143/48     Weight: 56.7 kg (125 lb)  Body mass index is 23.62 kg/m².    Estimated Creatinine Clearance: 31.7 mL/min (based on SCr of 1.3 mg/dL).    Physical Exam  Vitals and nursing note reviewed.   Constitutional:       Appearance: She is well-developed. She is ill-appearing.   HENT:      Head: Normocephalic and atraumatic.      Right Ear: External ear normal.      Left Ear: External ear normal.   Eyes:      General:         Right eye: No discharge.         Left eye: No discharge.   Cardiovascular:      Rate and Rhythm: Normal rate and regular rhythm.      Heart sounds: Normal heart sounds. No murmur heard.    No friction rub. No gallop.   Pulmonary:      Effort: Pulmonary effort is normal. No respiratory distress.      Breath sounds: Normal breath sounds. No stridor. No wheezing or rales.   Musculoskeletal:         General: No tenderness. Normal range of motion.      Right lower leg: Edema present.      Left lower leg: Edema present.   Skin:     General: Skin is warm and dry.   Neurological:      Mental Status: She is lethargic.       Significant Labs: Blood Culture:   Recent Labs   Lab 08/20/22  1414 08/24/22  0601 08/24/22  0602 08/25/22  1615 08/25/22  1616   LABBLOO No growth after 5 days. No Growth to date  No Growth to date  No Growth to date No Growth to  date  No Growth to date  No Growth to date No Growth to date No Growth to date       BMP:   Recent Labs   Lab 08/26/22  0234   GLU 98      K 5.1      CO2 27   BUN 38*   CREATININE 1.3   CALCIUM 9.1   MG 2.3       CBC:   Recent Labs   Lab 08/25/22  0233 08/25/22  1149 08/26/22  0234   WBC 32.42* 35.45* 50.45*   HGB 9.6* 8.9* 8.9*   HCT 28.7* 28.0* 26.5*   PLT 65* 55* 79*       Urine Culture:   Recent Labs   Lab 06/28/22  0210 07/10/22  1104 07/25/22  1820 08/19/22  1708 08/24/22  1714   LABURIN LACTOBACILLUS SPECIES  > 100,000 cfu/ml  No other significant isolate  * ENTEROCOCCUS FAECALIS  >100,000 cfu/ml  * CANDIDA ALBICANS  > 100,000 cfu/ml  Treatment of asymptomatic candiduria is not recommended (except for   specific populations). Candida isolated in the urine typically   represents colonization. If an indwelling urinary catheter is present  it should be removed or replaced.  * ESCHERICHIA COLI ESBL  > 100,000 cfu/ml  * VIRIDANS STREPTOCOCCUS GROUP  > 100,000 cfu/ml  Susceptibility testing not routinely performed  *       Urine Studies:   Recent Labs   Lab 06/28/22  0210 07/10/22  1104 08/24/22  1714   COLORU Yellow   < > Yellow   APPEARANCEUA Hazy*   < > Hazy*   PHUR 5.0   < > 5.0   SPECGRAV 1.015   < > 1.025   PROTEINUA Negative   < > 1+*   GLUCUA Negative   < > Negative   KETONESU Negative   < > Trace*   BILIRUBINUA Negative   < > Negative   OCCULTUA Negative   < > 1+*   NITRITE Negative   < > Negative   LEUKOCYTESUR 1+*   < > 3+*   RBCUA 1   < > 17*   WBCUA 20*   < > >100*   BACTERIA Many*   < > Many*   SQUAMEPITHEL 6  --   --    HYALINECASTS 49*   < > 916*    < > = values in this interval not displayed.       Wound Culture:   Recent Labs   Lab 07/27/22  1411   LABAERO No growth         Significant Imaging: I have reviewed all pertinent imaging results/findings within the past 24 hours.

## 2022-08-26 NOTE — PROGRESS NOTES
[facility] - [facility]  Critical Care Medicine  Progress Note    Patient Name: Aleyda Floyd  MRN: [id number]  Admission Date: 8/17/2022  Hospital Length of Stay: 7 days  Code Status: Full Code  Attending Provider: Lia Anthony MD  Primary Care Provider: Elvie Fernandes MD   Principal Problem: Septic shock    Subjective:     HPI:  67 year old lady with alcoholic cirrhosis on lactulose and seizure disorder who presented to the ED for AMS and fatigue. 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 Admitted to hospital medicine for encephalopathy. EEG on admission unremarkable for seizure activity, consistent with encephalopathy that could be infectious vs metabolic vs primary neuronal in nature. Blood and urine cultures growing ESBL e coli. She was initially started on meropenem, however consulted ID who recommended de-escalating to ertapenem and to complete two week course in total. CT head on 8/22 was unremarkable for acute change. Given bilateral hand weakness, MRI was ordered to r/o spinal cord compression. On 8/24 AM, pt was rapid response 2/2 to hypotension. She received 1 L of NS and remains in refractory hypotension. Pt is transferred to MICU for shock.       Hospital/ICU Course:  No notes on file    Interval History: Overnight she desaturated so she was placed on 10L NC that is being titrated down. She is still on levophed at varying doses. Lactate trended down to 2.8 from 3.3. MAPs between 69-84. WBC jumped up to 50 today with minimal change to mental status.    Review of Systems   Unable to perform ROS: Patient nonverbal     Objective:     Vital Signs (Most Recent):  Temp: 97.5 °F (36.4 °C) (08/26/22 1500)  Pulse: 66 (08/26/22 1600)  Resp: (!) 8 (08/26/22 1600)  BP: 118/67 (08/24/22 2000)  SpO2: 100 % (08/26/22 1600)   Vital Signs (24h Range):  Temp:  [94.3 °F (34.6 °C)-97.8 °F (36.6 °C)] 97.5 °F (36.4 °C)  Pulse:  [63-81] 66  Resp:  [8-22] 8  SpO2:  [95 %-100 %] 100 %  Arterial Line BP:  (112-158)/(38-55) 130/46   Weight: 56.7 kg (125 lb)  Body mass index is 23.62 kg/m².      Intake/Output Summary (Last 24 hours) at 8/26/2022 1730  Last data filed at 8/26/2022 1600  Gross per 24 hour   Intake 1908.45 ml   Output 605 ml   Net 1303.45 ml         Physical Exam  Vitals and nursing note reviewed.   Constitutional:       General: She is not in acute distress.     Appearance: Normal appearance. She is not ill-appearing.   HENT:      Head: Normocephalic and atraumatic.      Nose: Nose normal.      Mouth/Throat:      Mouth: Mucous membranes are moist.   Eyes:      General: No scleral icterus.     Extraocular Movements: Extraocular movements intact.      Conjunctiva/sclera: Conjunctivae normal.      Pupils: Pupils are equal, round, and reactive to light.   Cardiovascular:      Rate and Rhythm: Normal rate and regular rhythm.      Pulses: Normal pulses.      Heart sounds: Normal heart sounds. No murmur heard.  Pulmonary:      Effort: Pulmonary effort is normal. No respiratory distress.      Breath sounds: Normal breath sounds. No wheezing, rhonchi or rales.   Abdominal:      General: Bowel sounds are normal.      Palpations: Abdomen is soft.      Tenderness: There is no abdominal tenderness. There is no guarding or rebound.   Musculoskeletal:         General: No swelling or tenderness. Normal range of motion.      Right lower leg: Edema (trace at ankles) present.      Left lower leg: Edema (trace at ankles) present.   Skin:     General: Skin is warm and dry.      Capillary Refill: Capillary refill takes less than 2 seconds.      Coloration: Skin is pale. Skin is not jaundiced.      Findings: Lesion (tips of fingers where BG checks are performed) present. No erythema.   Neurological:      Mental Status: She is alert. She is disoriented.      GCS: GCS eye subscore is 4. GCS verbal subscore is 1. GCS motor subscore is 6.      Comments: Follows commands and squeeze hands BL   Psychiatric:      Comments: Flat  affect       Vents:  Oxygen Concentration (%): 35 (08/25/22 1100)  Lines/Drains/Airways       Central Venous Catheter Line  Duration             Percutaneous Central Line Insertion/Assessment - Triple Lumen  08/24/22 1000 left internal jugular 2 days              Drain  Duration                  Urethral Catheter 08/24/22 0900 2 days         NG/OG Tube 08/24/22 1800 Left nostril 1 day         Rectal Tube 08/26/22 1629 fecal management system <1 day              Arterial Line  Duration             Arterial Line 08/24/22 1644 Left Femoral 2 days              Peripheral Intravenous Line  Duration                  Peripheral IV - Single Lumen 08/24/22 0653 20 G Left Forearm 2 days                  Significant Labs:    CBC/Anemia Profile:  Recent Labs   Lab 08/25/22  0233 08/25/22  1149 08/26/22  0234   WBC 32.42* 35.45* 50.45*   HGB 9.6* 8.9* 8.9*   HCT 28.7* 28.0* 26.5*   PLT 65* 55* 79*   MCV 94 95 93   RDW 17.7* 17.5* 17.6*          Chemistries:  Recent Labs   Lab 08/25/22  0233 08/26/22  0234    137   K 2.8* 5.1   * 103   CO2 12* 27   BUN 23 38*   CREATININE 1.0 1.3   CALCIUM 6.2* 9.1   ALBUMIN 1.4* 2.0*   PROT 4.5* 6.5   BILITOT 0.8 1.6*   ALKPHOS 45* 74   ALT 23 32   AST 62* 73*   MG 1.3* 2.3   PHOS 2.9 2.7         All pertinent labs within the past 24 hours have been reviewed.    Significant Imaging: I have reviewed all pertinent imaging results/findings within the past 24 hours.      ABG  Recent Labs   Lab 08/26/22  0747   PH 7.399   PO2 102*   PCO2 31.2*   HCO3 19.3*   BE -6     Assessment/Plan:     Neuro  Acute encephalopathy  67 yoF with cirrhosis with ascites and seizure disorder presents with AMS and fatigue  Labs showed ammonia of 36  CT head showed no acute intracranial abnormality  Possible cause of encephalopathy is multifactorial: infection vs neurological vs metabolic  EEG on admit remarkable for encephalopathy, no other significant findings  UCx and BCx positive for ESBL e coli sensitive  "to only meropenem and ertapenem.      Although she has been treated for a few days with the right antibiotics, her mental status has been slow to improve  She remains alert, tracks with her eyes, and seems to understand what being are signing to her but will not sign herself  Pt able to squeeze hands BL and move her legs   CT head showing chronic microvascular changes, no acute changes  CK normal, rhabdo or myositis less likely   Cortisol normal, weakness from adrenal insufficiency less likely latoya since BPs have been relatively normal   EEG showed mild generalized cerebral dysfunction as is commonly seen in a variety of states of toxic and metabolic derangement     Plan:  - D/C home Clobazam. Continue keppra for seizures  - continue lactulose and rifaximin   - Discontinue lasix and aldactone for BP and volume down status  - continue thiamine and folic acid  - F/u with CT head   - Consider lumbar puncture if no improvement in mental status    ENT  Deaf- written communication   used; fatigue or current mood still too significant for any meaningful interaction    Renal/  UTI (urinary tract infection)  See gram negative bacteremia    ID  * Septic shock  This patient does have evidence of infective focus  My overall impression is septic shock.  Source: Urine, Blood  Antibiotics given-   Antibiotics (From admission, onward)            Start     Stop Route Frequency Ordered    08/25/22 0900  vancomycin 750 mg in dextrose 5 % 250 mL IVPB (ready to mix system)         -- IV Every 24 hours (non-standard times) 08/25/22 0736    08/24/22 2100  mupirocin 2 % ointment         08/29 2059 Nasl 2 times daily 08/24/22 1226    08/24/22 0745  meropenem-0.9% sodium chloride 1 g/50 mL IVPB         -- IV Every 12 hours (non-standard times) 08/24/22 0628    08/24/22 0726  vancomycin - pharmacy to dose  (vancomycin IVPB)        "And" Linked Group Details    -- IV pharmacy to manage frequency 08/24/22 0626    08/18/22 0900  " rifAXIMin tablet 550 mg         -- Oral 2 times daily 08/18/22 0138        Latest lactate reviewed-  Recent Labs   Lab 08/24/22  1359 08/24/22  1922 08/25/22  0233   LACTATE 6.0* 3.5* 6.6*     Organ dysfunction indicated by encephalopathy    Shock with decreased perfusion noted, Fluid challenge was not given at 30cc/kg due to decreased urine output    Post- resuscitation assessment- (Done after fluids given for shock)  Vital signs post fluid administrations were-  Temp Readings from Last 1 Encounters:   08/24/22 97.6 °F (36.4 °C) (Oral)     BP Readings from Last 1 Encounters:   08/24/22 118/67     Pulse Readings from Last 1 Encounters:   08/25/22 74       Perfusion exam was performed within 6 hours of septic shock presentation after bolus shows Inadequate tissue perfusion assessed by non-invasive monitoring  Will Start Pressors- Levophed for MAP of 65    - F/U blood cultures, urine culture  - Continue meropenem and Vanc  - Continue stress dose steroids   - Re-consult ID for worsening shock  - Levophed and vaso for MAP >65       Gram-negative bacteremia  Had UCx and BCx positive for ESBL e coli sensitive only to meropenem and ertapenem. Most recent UCx is positive for strep viridans which will be treated with meropenem. WBC went up to 50 today so will trend.  - Meropenem as per ID recommendations  - F/u CT Chest  - F/u CT Abdomen/pelvis with contrast      Hematology  Thrombocytopenia  Hgb on admission 7.9 and platelets of 117  Hgb baseline around 7.5-8.5 and platelets around 50s-60s. Current Hgb 8.6 platelets 112.     Plan:  - trend Hb and Plt daily   - continue to monitor for signs of bleeding    Other  Suspected deep tissue injury  Red/maroon/purple discoloration and blistering noted to sacral region.    - Wound care consulted         Critical Care Daily Checklist:    A: Awake: RASS Goal/Actual Goal:    Actual: Brizuela Agitation Sedation Scale (RASS): Drowsy   B: Spontaneous Breathing Trial Performed?     C: SAT &  SBT Coordinated?  n/a                      D: Delirium: CAM-ICU Overall CAM-ICU: Negative   E: Early Mobility Performed? Yes   F: Feeding Goal: Goals: Meet % EEN, EPN by RD f/u date  Status: Nutrition Goal Status: new   Current Diet Order   No orders of the defined types were placed in this encounter.      AS: Analgesia/Sedation n/a   T: Thromboembolic Prophylaxis lovenox   H: HOB > 300 Yes   U: Stress Ulcer Prophylaxis (if needed) n/a   G: Glucose Control yes   B: Bowel Function Stool Occurrence: 1   I: Indwelling Catheter (Lines & Beckham) Necessity 3 PIV, purewick, CVC, a line   D: De-escalation of Antimicrobials/Pharmacotherapies yes    Plan for the day/ETD Wean vasopressor, f/u imaging, continue antibiotics    Code Status:  Family/Goals of Care: Full Code     Critical secondary to Patient has a condition that poses threat to life and bodily function: Septic shock     Critical care was time spent personally by me on the following activities: development of treatment plan with patient or surrogate and bedside caregivers, discussions with consultants, evaluation of patient's response to treatment, examination of patient, ordering and performing treatments and interventions, ordering and review of laboratory studies, ordering and review of radiographic studies, pulse oximetry, re-evaluation of patient's condition. This critical care time did not overlap with that of any other provider or involve time for any procedures.     Yasmin Rahman MD  Critical Care Medicine  [facility] - Cardiac Medical ICU

## 2022-08-27 NOTE — PT/OT/SLP PROGRESS
Physical Therapy      Patient Name:  Aleyda Floyd   MRN:  [id number]    Patient not seen today secondary to Other (Comment). Pt found sleeping in bed and was unarousable to vigorous tactile simulation.  Will follow-up when appropriate.

## 2022-08-27 NOTE — PLAN OF CARE
Patient seen with housestaff team on morning rounds and then plan of care was discussed in detail.     67 year old woman with history of EtOH cirrhosis and seizure disorder transferred to ICU for worsening encephalopathy and hypotension.         Shock: concerned for possible septic shock:    currently on norepi@0.1 and vasopressin.    Continue stress dose steroids. Leukocytosis persists. Concerned for possible c. Diff.  Assay pending.   E. Coli UTI/Bacteremia: continue meropenem; ID following.  Strep viridans bacteremia: continue meropenem.   Encephalopathy: likely multifactorial: septic vs. Toxic vs hepatic.  No change in mental status.  Continue lactulose.  CT Head unrevealing. Ertapenem could have been a contributing factor. Ammonia level unremarkable.  EEG negative for seizure.    Plan for LP today.  PITO: Monitor urine output.  Renally dose all medications.    Anemia: Continue to monitor and transfuse as necessary maintain hgb>7.           Critical Care Time: 34 minutes   Critical care was time spent personally by me on the following activities: evaluating this patient's organ dysfunction, development of treatment plan, discussing treatment plan with patient or surrogate and bedside caregivers, discussions with consultants, evaluation of patient's response to treatment, examination of patient, ordering and performing treatments and interventions, ordering and review of laboratory studies, ordering and review of radiographic studies, re-evaluation of patient's condition. This critical care time did not overlap with that of any other provider or involve time for any procedures.

## 2022-08-27 NOTE — PROGRESS NOTES
[facility]  Infectious Disease  Progress Note    Patient Name: Aleyda Floyd  MRN: [id number]  Admission Date: 8/17/2022  Length of Stay: 8 days  Attending Physician: Lia Anthony MD  Primary Care Provider: Elvie Fernandes MD    Isolation Status: Special Contact  Assessment/Plan:      Acute encephalopathy  67F hx deafness, ETOH cirrhosis, seizure disorder seen previously or ESBL E coli bacteremia due to UTI being treated with ertapenem, who developed progressive worsening AMS and hypotension requiring ICU transfer. Urine cx + s. viridans. Blood cx 1/4 + GPC resembling staph, ID pending. Currently on meropenem and vanc. Worsening hypotension, requiring vaso and levo this morning. Liquid stool via rectal tube per RN, pt had received lactulose for possible HE. CT C/A/P w/ diffuse enterocolitis. WBC increasing to 55 today.     Recommendations:   - Continue vanc and meropenem  - follow up blood cx results  - recommend sending c.diff given imaging findings, increasing WBC and increasing pressor requirement  - plan for LP today to assess cause of encephalopathy - please send cell count, protein, glucose, gram stain, culture, HSV PCR, freeze and hold for additional testing as needed.     ID will follow        Anticipated Disposition: TBD    Thank you for your consult. I will follow-up with patient. Please contact us if you have any additional questions.    Radha Parekh DO  Critical Care Infectious Disease    Critical care time: 35 minutes   I personally spent critical care time on the following: evaluating this patient's organ dysfunction, development of treatment plan, discussing treatment plan with patient or surrogate and bedside caregivers, discussions with critical care service and/or consultants, evaluation of patient's response to treatment, physical examination of patient, ordering and review of treatments interventions, laboratory studies, and radiographic studies, re-evaluation of patient's  "condition. This critical care time did not overlap with that of any other provider of the same specialty or involve time for procedures.         Subjective:     Principal Problem:Septic shock    HPI: A 67-year-old woman with HTN, ETOH cirrhosis (recent admission for decompensated liver failure, not currently transplant candidate), seizure disorder (on Keppra and Onfi), deaf/mute, reported hx of recurrent UTIs, who presented with AMS and fatigue X 1-2 weeks.  CT imaging and EEG negative for acute IC pathology.  Admission blood cx  3/4 + for ESBL E coli susceptible to only carbapenems.   U/A >100 wbc, urine cx pending. Currently on meropenem. ID consulted for meropenem and ESBL E coli bacteremia.     Afebrile, no leukocytosis.  Reported dysuria per medical record.  At time of visit,  (who is also hearing impaired is in room).   Patient is lethargic, opens eyes, but will not respond to his signing/communication. Unable to obtain a ROS  Spoke to sister on the phone.  Unable to tell me much about patient symptoms/complaints.    Patient was seen by the Infectious Diseases [facility] service. Work up revealed ESBL E coli bacteremia. Her antibiotic therapy was optimized to Ertapenem. Blood cultures repeated on 8/20 remain NGTD and recommendations for a 14 day course of therapy were give prior to sign off.     Her hospital course was complicated by hypotension and bilateral hand weakness. She was given volume replacement however her shock failed to improve which prompted transfer to the ICU.     Infectious Diseases consulted for "ESBL bacteremia and UTI, stepped up to ICU for septic shock"            Interval History: worsening clinical status, now on increasing pressors w/ addition of vaso, CT A/P concerning for diffuse colitis. Pt does not respond to questions or follow commands. RN notes liquid stool via rectal tube. WBC is increasing, 55 today.     Review of Systems   Unable to perform ROS: Acuity of condition "   Objective:     Vital Signs (Most Recent):  Temp: 99.5 °F (37.5 °C) (turned bear hugger off) (08/27/22 1105)  Pulse: 89 (08/27/22 1400)  Resp: 10 (08/27/22 1400)  BP: (!) 122/50 (08/27/22 0705)  SpO2: 99 % (08/27/22 1400)   Vital Signs (24h Range):  Temp:  [94 °F (34.4 °C)-99.5 °F (37.5 °C)] 99.5 °F (37.5 °C)  Pulse:  [63-90] 89  Resp:  [7-23] 10  SpO2:  [93 %-100 %] 99 %  BP: (122)/(50) 122/50  Arterial Line BP: (113-159)/(36-52) 159/50     Weight: 56.7 kg (125 lb)  Body mass index is 23.62 kg/m².    Estimated Creatinine Clearance: 34.3 mL/min (based on SCr of 1.2 mg/dL).    Physical Exam  Constitutional:       General: She is not in acute distress.     Appearance: She is well-developed. She is ill-appearing. She is not diaphoretic.   HENT:      Head: Normocephalic and atraumatic.      Right Ear: External ear normal.      Left Ear: External ear normal.      Nose: Nose normal.   Eyes:      General: No scleral icterus.        Right eye: No discharge.         Left eye: No discharge.      Extraocular Movements: Extraocular movements intact.      Conjunctiva/sclera: Conjunctivae normal.   Cardiovascular:      Rate and Rhythm: Normal rate and regular rhythm.      Heart sounds: Normal heart sounds.   Pulmonary:      Effort: Pulmonary effort is normal. No respiratory distress.      Breath sounds: No stridor.   Abdominal:      General: Abdomen is flat.      Palpations: Abdomen is soft.      Comments: Liquid stool in rectal tube   Musculoskeletal:         General: Normal range of motion.   Skin:     Findings: No erythema or rash.      Comments: Skin tear LUE   Neurological:      Comments: Does not respond        Significant Labs: CBC:   Recent Labs   Lab 08/26/22  0234 08/27/22  0047   WBC 50.45* 55.16*   HGB 8.9* 8.4*   HCT 26.5* 24.8*   PLT 79* 79*     CMP:   Recent Labs   Lab 08/26/22 0234 08/26/22 2056 08/27/22  0047    134* 132*   K 5.1 4.0 3.8    103 103   CO2 27 26 26   GLU 98 155* 144*   BUN 38* 40*  41*   CREATININE 1.3 1.1 1.2   CALCIUM 9.1 8.8 8.7   PROT 6.5  --  5.8*   ALBUMIN 2.0*  --  1.8*   BILITOT 1.6*  --  1.2*   ALKPHOS 74  --  109   AST 73*  --  64*   ALT 32  --  29   ANIONGAP 7* 5* 3*     Microbiology Results (last 7 days)       Procedure Component Value Units Date/Time    CSF culture [[id number]]     Order Status: No result Specimen: CSF (Spinal Fluid)     Gram stain [[id number]]     Order Status: No result Specimen: CSF (Spinal Fluid)     Clostridium difficile EIA [[id number]]     Order Status: No result Specimen: Stool     Blood culture [[id number]] Collected: 08/25/22 1616    Order Status: Completed Specimen: Blood from Peripheral, Antecubital, Right Updated: 08/27/22 0841     Blood Culture, Routine Gram stain aer bottle: Gram positive cocci in clusters resembling Staph      Results called to and read back by: Verona Younger RN 08/26/2022  21:36    Blood culture [[id number]] Collected: 08/24/22 0601    Order Status: Completed Specimen: Blood Updated: 08/27/22 0812     Blood Culture, Routine No Growth to date      No Growth to date      No Growth to date      No Growth to date    Blood culture [[id number]] Collected: 08/24/22 0602    Order Status: Completed Specimen: Blood Updated: 08/27/22 0812     Blood Culture, Routine No Growth to date      No Growth to date      No Growth to date      No Growth to date    Blood culture [[id number]] Collected: 08/26/22 2213    Order Status: Completed Specimen: Blood from Peripheral, Antecubital, Left Updated: 08/27/22 0545     Blood Culture, Routine No Growth to date    Blood culture [[id number]] Collected: 08/26/22 2202    Order Status: Completed Specimen: Blood from Peripheral, Antecubital, Right Updated: 08/27/22 0545     Blood Culture, Routine No Growth to date    Blood culture [[id number]] Collected: 08/25/22 1615    Order Status: Completed Specimen: Blood from Peripheral, Antecubital, Left Updated: 08/26/22 1812     Blood Culture, Routine No Growth to date      No  Growth to date    Urine culture [[id number]]  (Abnormal) Collected: 08/24/22 1714    Order Status: Completed Specimen: Urine Updated: 08/25/22 1949     Urine Culture, Routine VIRIDANS STREPTOCOCCUS GROUP  > 100,000 cfu/ml  Susceptibility testing not routinely performed      Narrative:      Specimen Source->Urine    Blood culture [[id number]] Collected: 08/20/22 1413    Order Status: Completed Specimen: Blood Updated: 08/25/22 1612     Blood Culture, Routine No growth after 5 days.    Blood culture [[id number]] Collected: 08/20/22 1414    Order Status: Completed Specimen: Blood Updated: 08/25/22 1612     Blood Culture, Routine No growth after 5 days.    Urine culture [[id number]]  (Abnormal)  (Susceptibility) Collected: 08/19/22 1708    Order Status: Completed Specimen: Urine Updated: 08/22/22 1349     Urine Culture, Routine ESCHERICHIA COLI ESBL  > 100,000 cfu/ml      Narrative:      Specimen Source->Urine    Blood culture [[id number]]  (Abnormal) Collected: 08/18/22 1044    Order Status: Completed Specimen: Blood Updated: 08/21/22 1156     Blood Culture, Routine Gram stain luisa bottle: Gram negative rods      Positive results previously called 08/19/2022  00:06      Gram stain aer bottle: Gram negative rods      Positive results previously called 08/19/2022  00:42      ESCHERICHIA COLI ESBL  For susceptibility see order # [id number]              Significant Imaging: I have reviewed all pertinent imaging results/findings within the past 24 hours.

## 2022-08-27 NOTE — ASSESSMENT & PLAN NOTE
67F hx deafness, ETOH cirrhosis, seizure disorder seen previously or ESBL E coli bacteremia due to UTI being treated with ertapenem, who developed progressive worsening AMS and hypotension requiring ICU transfer. Urine cx + s. viridans. Blood cx 1/4 + GPC resembling staph, ID pending. Currently on meropenem and vanc. Worsening hypotension, requiring vaso and levo this morning. Liquid stool via rectal tube per RN, pt had received lactulose for possible HE. CT C/A/P w/ diffuse enterocolitis. WBC increasing to 55 today.     Recommendations:   - Continue vanc and meropenem  - follow up blood cx results  - recommend sending c.diff given imaging findings, increasing WBC and increasing pressor requirement  - plan for LP today to assess cause of encephalopathy - please send cell count, protein, glucose, gram stain, culture, HSV PCR, freeze and hold for additional testing as needed.     ID will follow

## 2022-08-27 NOTE — SUBJECTIVE & OBJECTIVE
Interval History: Continues to be very volatile. She is still on levophed at varying doses. Lactate trended down to 1.6 from 2.8. MAPs between 66-78. WBC increased to 55 today with minimal change to mental status.    Review of Systems   Unable to perform ROS: Patient nonverbal     Objective:     Vital Signs (Most Recent):  Temp: 97 °F (36.1 °C) (08/27/22 0705)  Pulse: 72 (08/27/22 0900)  Resp: 15 (08/27/22 0900)  BP: (!) 122/50 (08/27/22 0705)  SpO2: 98 % (08/27/22 0900)   Vital Signs (24h Range):  Temp:  [94 °F (34.4 °C)-97.8 °F (36.6 °C)] 97 °F (36.1 °C)  Pulse:  [63-79] 72  Resp:  [7-22] 15  SpO2:  [93 %-100 %] 98 %  BP: (122)/(50) 122/50  Arterial Line BP: (113-142)/(36-52) 125/36   Weight: 56.7 kg (125 lb)  Body mass index is 23.62 kg/m².      Intake/Output Summary (Last 24 hours) at 8/27/2022 0943  Last data filed at 8/27/2022 0900  Gross per 24 hour   Intake 2486.39 ml   Output 1710 ml   Net 776.39 ml         Physical Exam  Vitals and nursing note reviewed.   Constitutional:       General: She is not in acute distress.     Appearance: Normal appearance. She is not ill-appearing.   HENT:      Head: Normocephalic and atraumatic.      Nose: Nose normal.      Mouth/Throat:      Mouth: Mucous membranes are moist.   Eyes:      General: No scleral icterus.     Extraocular Movements: Extraocular movements intact.      Conjunctiva/sclera: Conjunctivae normal.      Pupils: Pupils are equal, round, and reactive to light.   Cardiovascular:      Rate and Rhythm: Normal rate and regular rhythm.      Pulses: Normal pulses.      Heart sounds: Normal heart sounds. No murmur heard.  Pulmonary:      Effort: Pulmonary effort is normal. No respiratory distress.      Breath sounds: Normal breath sounds. No wheezing, rhonchi or rales.   Abdominal:      General: Bowel sounds are normal.      Palpations: Abdomen is soft.      Tenderness: There is no abdominal tenderness. There is no guarding or rebound.   Musculoskeletal:          General: No swelling or tenderness. Normal range of motion.      Right lower leg: Edema (trace at ankles) present.      Left lower leg: Edema (trace at ankles) present.   Skin:     General: Skin is warm and dry.      Capillary Refill: Capillary refill takes less than 2 seconds.      Coloration: Skin is pale. Skin is not jaundiced.      Findings: Lesion (tips of fingers where BG checks are performed) present. No erythema.   Neurological:      Mental Status: She is alert. She is disoriented.      GCS: GCS eye subscore is 4. GCS verbal subscore is 1. GCS motor subscore is 6.      Comments: Follows commands and squeeze hands BL   Psychiatric:      Comments: Flat affect       Vents:  Oxygen Concentration (%): 35 (08/25/22 1100)  Lines/Drains/Airways       Central Venous Catheter Line  Duration             Percutaneous Central Line Insertion/Assessment - Triple Lumen  08/24/22 1000 left internal jugular 2 days              Drain  Duration                  Urethral Catheter 08/24/22 0900 3 days         NG/OG Tube 08/24/22 1800 Left nostril 2 days         Rectal Tube 08/26/22 1629 fecal management system <1 day              Arterial Line  Duration             Arterial Line 08/24/22 1644 Left Femoral 2 days              Peripheral Intravenous Line  Duration                  Peripheral IV - Single Lumen 08/24/22 0653 20 G Left Forearm 3 days                  Significant Labs:    CBC/Anemia Profile:  Recent Labs   Lab 08/25/22  1149 08/26/22  0234 08/27/22  0047   WBC 35.45* 50.45* 55.16*   HGB 8.9* 8.9* 8.4*   HCT 28.0* 26.5* 24.8*   PLT 55* 79* 79*   MCV 95 93 93   RDW 17.5* 17.6* 17.5*          Chemistries:  Recent Labs   Lab 08/26/22  0234 08/26/22  2056 08/27/22  0047    134* 132*   K 5.1 4.0 3.8    103 103   CO2 27 26 26   BUN 38* 40* 41*   CREATININE 1.3 1.1 1.2   CALCIUM 9.1 8.8 8.7   ALBUMIN 2.0*  --  1.8*   PROT 6.5  --  5.8*   BILITOT 1.6*  --  1.2*   ALKPHOS 74  --  109   ALT 32  --  29   AST 73*  --   64*   MG 2.3 2.4 2.3   PHOS 2.7  --  2.1*         All pertinent labs within the past 24 hours have been reviewed.    Significant Imaging: I have reviewed all pertinent imaging results/findings within the past 24 hours.

## 2022-08-27 NOTE — PROGRESS NOTES
[facility] - Cardiac Medical ICU  Critical Care Medicine  Progress Note    Patient Name: Aleyda Floyd  MRN: [id number]  Admission Date: 8/17/2022  Hospital Length of Stay: 8 days  Code Status: Full Code  Attending Provider: Lia Anthony MD  Primary Care Provider: Elvie Fernandes MD   Principal Problem: Septic shock    Subjective:     HPI:  67 year old lady with alcoholic cirrhosis on lactulose and seizure disorder who presented to the ED for AMS and fatigue. 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 Admitted to hospital medicine for encephalopathy. EEG on admission unremarkable for seizure activity, consistent with encephalopathy that could be infectious vs metabolic vs primary neuronal in nature. Blood and urine cultures growing ESBL e coli. She was initially started on meropenem, however consulted ID who recommended de-escalating to ertapenem and to complete two week course in total. CT head on 8/22 was unremarkable for acute change. Given bilateral hand weakness, MRI was ordered to r/o spinal cord compression. On 8/24 AM, pt was rapid response 2/2 to hypotension. She received 1 L of NS and remains in refractory hypotension. Pt is transferred to MICU for shock.       Hospital/ICU Course:  No notes on file    Interval History: Continues to be very volatile. She is still on levophed at varying doses. Lactate trended down to 1.6 from 2.8. MAPs between 66-78. WBC increased to 55 today with minimal change to mental status.    Review of Systems   Unable to perform ROS: Patient nonverbal     Objective:     Vital Signs (Most Recent):  Temp: 97 °F (36.1 °C) (08/27/22 0705)  Pulse: 72 (08/27/22 0900)  Resp: 15 (08/27/22 0900)  BP: (!) 122/50 (08/27/22 0705)  SpO2: 98 % (08/27/22 0900)   Vital Signs (24h Range):  Temp:  [94 °F (34.4 °C)-97.8 °F (36.6 °C)] 97 °F (36.1 °C)  Pulse:  [63-79] 72  Resp:  [7-22] 15  SpO2:  [93 %-100 %] 98 %  BP: (122)/(50) 122/50  Arterial Line BP: (113-142)/(36-52) 125/36   Weight: 56.7 kg (125  lb)  Body mass index is 23.62 kg/m².      Intake/Output Summary (Last 24 hours) at 8/27/2022 0943  Last data filed at 8/27/2022 0900  Gross per 24 hour   Intake 2486.39 ml   Output 1710 ml   Net 776.39 ml         Physical Exam  Vitals and nursing note reviewed.   Constitutional:       General: She is not in acute distress.     Appearance: Normal appearance. She is not ill-appearing.   HENT:      Head: Normocephalic and atraumatic.      Nose: Nose normal.      Mouth/Throat:      Mouth: Mucous membranes are moist.   Eyes:      General: No scleral icterus.     Extraocular Movements: Extraocular movements intact.      Conjunctiva/sclera: Conjunctivae normal.      Pupils: Pupils are equal, round, and reactive to light.   Cardiovascular:      Rate and Rhythm: Normal rate and regular rhythm.      Pulses: Normal pulses.      Heart sounds: Normal heart sounds. No murmur heard.  Pulmonary:      Effort: Pulmonary effort is normal. No respiratory distress.      Breath sounds: Normal breath sounds. No wheezing, rhonchi or rales.   Abdominal:      General: Bowel sounds are normal.      Palpations: Abdomen is soft.      Tenderness: There is no abdominal tenderness. There is no guarding or rebound.   Musculoskeletal:         General: No swelling or tenderness. Normal range of motion.      Right lower leg: Edema (trace at ankles) present.      Left lower leg: Edema (trace at ankles) present.   Skin:     General: Skin is warm and dry.      Capillary Refill: Capillary refill takes less than 2 seconds.      Coloration: Skin is pale. Skin is not jaundiced.      Findings: Lesion (tips of fingers where BG checks are performed) present. No erythema.   Neurological:      Mental Status: She is alert. She is disoriented.      GCS: GCS eye subscore is 4. GCS verbal subscore is 1. GCS motor subscore is 6.      Comments: Follows commands and squeeze hands BL   Psychiatric:      Comments: Flat affect       Vents:  Oxygen Concentration (%): 35  (08/25/22 1100)  Lines/Drains/Airways       Central Venous Catheter Line  Duration             Percutaneous Central Line Insertion/Assessment - Triple Lumen  08/24/22 1000 left internal jugular 2 days              Drain  Duration                  Urethral Catheter 08/24/22 0900 3 days         NG/OG Tube 08/24/22 1800 Left nostril 2 days         Rectal Tube 08/26/22 1629 fecal management system <1 day              Arterial Line  Duration             Arterial Line 08/24/22 1644 Left Femoral 2 days              Peripheral Intravenous Line  Duration                  Peripheral IV - Single Lumen 08/24/22 0653 20 G Left Forearm 3 days                  Significant Labs:    CBC/Anemia Profile:  Recent Labs   Lab 08/25/22  1149 08/26/22  0234 08/27/22  0047   WBC 35.45* 50.45* 55.16*   HGB 8.9* 8.9* 8.4*   HCT 28.0* 26.5* 24.8*   PLT 55* 79* 79*   MCV 95 93 93   RDW 17.5* 17.6* 17.5*          Chemistries:  Recent Labs   Lab 08/26/22  0234 08/26/22 2056 08/27/22 0047    134* 132*   K 5.1 4.0 3.8    103 103   CO2 27 26 26   BUN 38* 40* 41*   CREATININE 1.3 1.1 1.2   CALCIUM 9.1 8.8 8.7   ALBUMIN 2.0*  --  1.8*   PROT 6.5  --  5.8*   BILITOT 1.6*  --  1.2*   ALKPHOS 74  --  109   ALT 32  --  29   AST 73*  --  64*   MG 2.3 2.4 2.3   PHOS 2.7  --  2.1*         All pertinent labs within the past 24 hours have been reviewed.    Significant Imaging: I have reviewed all pertinent imaging results/findings within the past 24 hours.      ABG  Recent Labs   Lab 08/26/22  0747   PH 7.399   PO2 102*   PCO2 31.2*   HCO3 19.3*   BE -6     Assessment/Plan:     Neuro  Acute encephalopathy  67 yoF with cirrhosis with ascites and seizure disorder presents with AMS and fatigue  Labs showed ammonia of 36  CT head showed no acute intracranial abnormality  Possible cause of encephalopathy is multifactorial: infection vs neurological vs metabolic  EEG on admit remarkable for encephalopathy, no other significant findings  UCx and BCx  positive for ESBL e coli sensitive to only meropenem and ertapenem.      Although she has been treated for a few days with the right antibiotics, her mental status has been slow to improve  She remains alert, tracks with her eyes, and seems to understand what being are signing to her but will not sign herself  Pt able to squeeze hands BL and move her legs   CT head showing chronic microvascular changes, no acute changes  CK normal, rhabdo or myositis less likely   Cortisol normal, weakness from adrenal insufficiency less likely latoya since BPs have been relatively normal   EEG showed mild generalized cerebral dysfunction as is commonly seen in a variety of states of toxic and metabolic derangement     Plan:  - D/C home Clobazam. Continue keppra for seizures  - continue lactulose and rifaximin   - Discontinue lasix and aldactone for BP and volume down status  - continue thiamine and folic acid  - F/u with CT head   - Consider lumbar puncture if no improvement in mental status    ENT  Deaf- written communication   used; fatigue or current mood still too significant for any meaningful interaction    Renal/  UTI (urinary tract infection)  See gram negative bacteremia    ID  * Septic shock  This patient does have evidence of infective focus  My overall impression is septic shock.  Source: Urine, Blood  Antibiotics given-   Antibiotics (From admission, onward)    Start     Stop Route Frequency Ordered    08/27/22 0345  vancomycin - pharmacy to dose  (vancomycin IVPB)        See [facility] for full Linked Orders Report.    -- IV pharmacy to manage frequency 08/27/22 0245    08/26/22 2030  meropenem (MERREM) 2 g in sodium chloride 0.9% 100 mL IVPB         -- IV Every 12 hours (non-standard times) 08/26/22 0949    08/25/22 2100  rifAXIMin tablet 550 mg         -- PER NG TUBE 2 times daily 08/25/22 1136    08/24/22 2100  mupirocin 2 % ointment         08/29 2059 Nasl 2 times daily 08/24/22 1226        Latest  lactate reviewed-  Recent Labs   Lab 08/26/22  1610 08/26/22  2225 08/27/22  0425   LACTATE 1.9 1.9 1.6     Organ dysfunction indicated by encephalopathy    Shock with decreased perfusion noted, Fluid challenge was not given at 30cc/kg due to decreased urine output    Post- resuscitation assessment- (Done after fluids given for shock)  Vital signs post fluid administrations were-  Temp Readings from Last 1 Encounters:   08/27/22 97 °F (36.1 °C) (Axillary)     BP Readings from Last 1 Encounters:   08/27/22 (!) 122/50     Pulse Readings from Last 1 Encounters:   08/27/22 72       Perfusion exam was performed within 6 hours of septic shock presentation after bolus shows Inadequate tissue perfusion assessed by non-invasive monitoring  Will Start Pressors- Levophed for MAP of 65    - F/U blood cultures, urine culture  - Continue meropenem only as per ID recommendations  - Continue stress dose steroids   - Re-consult ID for worsening shock  - Levophed and vaso for MAP >65       Gram-negative bacteremia  Had UCx and BCx positive for ESBL e coli sensitive only to meropenem and ertapenem. Most recent UCx is positive for strep viridans which will be treated with meropenem. WBC went up to 50 today so will trend.  - Meropenem as per ID recommendations  - CT Chest showed possible superimposed aspiration or PNA in left lower lobe  - CT abdomen showed thickening of bladder and colon. Liver has a hypodensity which we will consider further evaluating with US or MRI of abdomen      Hematology  Thrombocytopenia  Hgb on admission 7.9 and platelets of 117  Hgb baseline around 7.5-8.5 and platelets around 50s-60s. Current Hgb 8.6 platelets 112.     Plan:  - trend Hb and Plt daily   - continue to monitor for signs of bleeding    Other  Suspected deep tissue injury  Red/maroon/purple discoloration and blistering noted to sacral region.    - Wound care consulted         Critical Care Daily Checklist:    A: Awake: RASS Goal/Actual Goal:     Actual: Brizuela Agitation Sedation Scale (RASS): Drowsy   B: Spontaneous Breathing Trial Performed?     C: SAT & SBT Coordinated?  n/a                      D: Delirium: CAM-ICU Overall CAM-ICU: Positive   E: Early Mobility Performed? Yes   F: Feeding Goal: Goals: Meet % EEN, EPN by RD f/u date  Status: Nutrition Goal Status: new   Current Diet Order   No orders of the defined types were placed in this encounter.      AS: Analgesia/Sedation n/a   T: Thromboembolic Prophylaxis lovenox   H: HOB > 300 Yes   U: Stress Ulcer Prophylaxis (if needed) N/a   G: Glucose Control yes   B: Bowel Function Stool Occurrence: 1   I: Indwelling Catheter (Lines & Beckham) Necessity 3 PIV, purewick, CVC, a line   D: De-escalation of Antimicrobials/Pharmacotherapies yes    Plan for the day/ETD Wean vasopressor, consider LP    Code Status:  Family/Goals of Care: Full Code     Critical secondary to Patient has a condition that poses threat to life and bodily function: Septic shock     Critical care was time spent personally by me on the following activities: development of treatment plan with patient or surrogate and bedside caregivers, discussions with consultants, evaluation of patient's response to treatment, examination of patient, ordering and performing treatments and interventions, ordering and review of laboratory studies, ordering and review of radiographic studies, pulse oximetry, re-evaluation of patient's condition. This critical care time did not overlap with that of any other provider or involve time for any procedures.     Yasmin Rahman MD  Critical Care Medicine  [facility] - Cardiac Medical ICU

## 2022-08-27 NOTE — NURSING
CMICU DAILY GOALS       A: Awake    RASS: Goal -    Actual - RASS (Brizuela Agitation-Sedation Scale): -1-->drowsy   Restraint necessity:    B: Breathe   SBT: Not intubated   C: Coordinate A & B, analgesics/sedatives   Pain: managed    SAT: Not intubated  D: Delirium   CAM-ICU: Overall CAM-ICU: Positive  E: Early(intubated/ Progressive (non-intubated) Mobility   MOVE Screen: Pass   Activity: Activity Management: Patient unable to perform activities  FAS: Feeding/Nutrition   Diet order: Diet/Nutrition Received: NPO, tube feeding,    T: Thrombus   DVT prophylaxis: VTE Required Core Measure: Pharmacological prophylaxis initiated/maintained  H: HOB Elevation   Head of Bed (HOB) Positioning: HOB at 30-45 degrees  U: Ulcer Prophylaxis   GI: yes  G: Glucose control   managed Glycemic Management: blood glucose monitored  S: Skin   Bathing/Skin Care: bath, complete, dressed/undressed, incontinence care  Device Skin Pressure Protection: absorbent pad utilized/changed, adhesive use limited, positioning supports utilized, skin-to-device areas padded, pressure points protected, skin-to-skin areas padded  Pressure Reduction Devices: specialty bed utilized, pressure-redistributing mattress utilized, positioning supports utilized, heel offloading device utilized  Pressure Reduction Techniques: weight shift assistance provided, pressure points protected, positioned off wounds, heels elevated off bed  Skin Protection: adhesive use limited, drying agents applied, incontinence pads utilized, pectin skin barriers applied, silicone foam dressing in place, skin sealant/moisture barrier applied, skin-to-device areas padded, skin-to-skin areas padded, transparent dressing maintained, tubing/devices free from skin contact  B: Bowel Function   diarrhea   I: Indwelling Catheters   Beckham necessity:      Urethral Catheter 08/24/22 0900-Reason for Continuing Urinary Catheterization: Critically ill in ICU and requiring hourly monitoring of  intake/output   CVC necessity: Yes  D: De-escalation Antibiotics   No    Family/Goals of care/Code Status   Code Status: Full Code    24H Vital Sign Range  Temp:  [94 °F (34.4 °C)-99.5 °F (37.5 °C)]   Pulse:  [67-90]   Resp:  [7-23]   BP: (122)/(50)   SpO2:  [93 %-100 %]   Arterial Line BP: (113-159)/(35-52)      Shift Events   No acute events throughout shift. Today vaso was add as a second pressor to maintain a map goal of greater than 65. Patient also had a lumber puncture performed today.    VS and assessment per flow sheet, patient progressing towards goals as tolerated, plan of care reviewed with family, all concerns addressed, will continue to monitor.    Ashly Schmidt

## 2022-08-27 NOTE — PROGRESS NOTES
Pharmacokinetic Assessment Follow Up: IV Vancomycin    Vancomycin serum concentration assessment/plan:  The trough level was drawn correctly and can be used to guide therapy at this time. The measurement is within the desired definitive target range of 15 to 20 mcg/mL.  Renal function near baseline SCR ~1.1-1.2  Bcx 8/25: GPCs 1/4  Continue Vancomycin 750 mg IV every 24 hours with next serum trough concentration measured at 1330 prior to 3rd dose on 8/29.     Drug levels (last 3 results):  Recent Labs   Lab Result Units 08/25/22  0233 08/27/22  1007   Vancomycin, Random ug/mL 10.9  --    Vancomycin-Trough ug/mL  --  18.6       Pharmacy will continue to follow and monitor vancomycin.    Please contact pharmacy at extension [phone] for questions regarding this assessment.    Thank you for the consult,   Arnold Dillard PharmD       Patient brief summary:  Aleyda Floyd is a 67 y.o. female initiated on antimicrobial therapy with IV Vancomycin for treatment of bacteremia.    Actual Body Weight:   56.7 kg    Renal Function:   Estimated Creatinine Clearance: 34.3 mL/min (based on SCr of 1.2 mg/dL).    Dialysis Method (if applicable):  N/A    Drug Allergies:   Review of patient's allergies indicates:  No Known Allergies    CBC (last 72 hours):  Recent Labs   Lab Result Units 08/25/22  0233 08/25/22  1149 08/26/22  0234 08/27/22  0047   WBC K/uL 32.42* 35.45* 50.45* 55.16*   Hemoglobin g/dL 9.6* 8.9* 8.9* 8.4*   Hematocrit % 28.7* 28.0* 26.5* 24.8*   Platelets K/uL 65* 55* 79* 79*       Metabolic Panel (last 72 hours):  Recent Labs   Lab Result Units 08/24/22  1714 08/25/22  0233 08/26/22  0234 08/26/22 2056 08/27/22  0047   Sodium mmol/L  --  137 137 134* 132*   Potassium mmol/L  --  2.8* 5.1 4.0 3.8   Chloride mmol/L  --  115* 103 103 103   CO2 mmol/L  --  12* 27 26 26   Glucose mg/dL  --  75 98 155* 144*   Glucose, UA  Negative  --   --   --   --    BUN mg/dL  --  23 38* 40* 41*   Creatinine mg/dL  --  1.0 1.3 1.1 1.2    Albumin g/dL  --  1.4* 2.0*  --  1.8*   Total Bilirubin mg/dL  --  0.8 1.6*  --  1.2*   Alkaline Phosphatase U/L  --  45* 74  --  109   AST U/L  --  62* 73*  --  64*   ALT U/L  --  23 32  --  29   Magnesium mg/dL  --  1.3* 2.3 2.4 2.3   Phosphorus mg/dL  --  2.9 2.7  --  2.1*       Vancomycin Administrations:  vancomycin given in the last 96 hours                     vancomycin 750 mg in dextrose 5 % 250 mL IVPB (ready to mix system) (mg) 750 mg New Bag 08/26/22 0838     750 mg New Bag 08/25/22 0841    vancomycin 1.25 g in dextrose 5% 250 mL IVPB (ready to mix) (mg) 1,250 mg New Bag 08/24/22 0804                    Microbiologic Results:  Microbiology Results (last 7 days)       Procedure Component Value Units Date/Time    Clostridium difficile EIA [[id number]]     Order Status: No result Specimen: Stool     Blood culture [[id number]] Collected: 08/25/22 1616    Order Status: Completed Specimen: Blood from Peripheral, Antecubital, Right Updated: 08/27/22 0841     Blood Culture, Routine Gram stain aer bottle: Gram positive cocci in clusters resembling Staph      Results called to and read back by: Verona Younger RN 08/26/2022  21:36    Blood culture [[id number]] Collected: 08/24/22 0601    Order Status: Completed Specimen: Blood Updated: 08/27/22 0812     Blood Culture, Routine No Growth to date      No Growth to date      No Growth to date      No Growth to date    Blood culture [[id number]] Collected: 08/24/22 0602    Order Status: Completed Specimen: Blood Updated: 08/27/22 0812     Blood Culture, Routine No Growth to date      No Growth to date      No Growth to date      No Growth to date    Blood culture [[id number]] Collected: 08/26/22 2213    Order Status: Completed Specimen: Blood from Peripheral, Antecubital, Left Updated: 08/27/22 0545     Blood Culture, Routine No Growth to date    Blood culture [[id number]] Collected: 08/26/22 2202    Order Status: Completed Specimen: Blood from Peripheral, Antecubital, Right  Updated: 08/27/22 0545     Blood Culture, Routine No Growth to date    Blood culture [[id number]] Collected: 08/25/22 1615    Order Status: Completed Specimen: Blood from Peripheral, Antecubital, Left Updated: 08/26/22 1812     Blood Culture, Routine No Growth to date      No Growth to date    Urine culture [[id number]]  (Abnormal) Collected: 08/24/22 1714    Order Status: Completed Specimen: Urine Updated: 08/25/22 1949     Urine Culture, Routine VIRIDANS STREPTOCOCCUS GROUP  > 100,000 cfu/ml  Susceptibility testing not routinely performed      Narrative:      Specimen Source->Urine    Blood culture [[id number]] Collected: 08/20/22 1413    Order Status: Completed Specimen: Blood Updated: 08/25/22 1612     Blood Culture, Routine No growth after 5 days.    Blood culture [[id number]] Collected: 08/20/22 1414    Order Status: Completed Specimen: Blood Updated: 08/25/22 1612     Blood Culture, Routine No growth after 5 days.    Urine culture [[id number]]  (Abnormal)  (Susceptibility) Collected: 08/19/22 1708    Order Status: Completed Specimen: Urine Updated: 08/22/22 1349     Urine Culture, Routine ESCHERICHIA COLI ESBL  > 100,000 cfu/ml      Narrative:      Specimen Source->Urine    Blood culture [[id number]]  (Abnormal) Collected: 08/18/22 1044    Order Status: Completed Specimen: Blood Updated: 08/21/22 1156     Blood Culture, Routine Gram stain luisa bottle: Gram negative rods      Positive results previously called 08/19/2022  00:06      Gram stain aer bottle: Gram negative rods      Positive results previously called 08/19/2022  00:42      ESCHERICHIA COLI ESBL  For susceptibility see order # [id number]

## 2022-08-27 NOTE — PLAN OF CARE
[facility] DAILY GOALS       A: Awake    RASS: Goal -    Actual - RASS (Brizuela Agitation-Sedation Scale): 0-->alert and calm   Restraint necessity:    B: Breathe   SBT: Not intubated   C: Coordinate A & B, analgesics/sedatives   Pain: managed    SAT: Not intubated  D: Delirium   CAM-ICU: Overall CAM-ICU: Positive  E: Early(intubated/ Progressive (non-intubated) Mobility   MOVE Screen: Fail   Activity: Activity Management: Patient unable to perform activities  FAS: Feeding/Nutrition   Diet order: Diet/Nutrition Received: NPO, tube feeding,    T: Thrombus   DVT prophylaxis: VTE Required Core Measure: Pharmacological prophylaxis initiated/maintained  H: HOB Elevation   Head of Bed (HOB) Positioning: HOB at 30-45 degrees  U: Ulcer Prophylaxis   GI: no  G: Glucose control   managed Glycemic Management: blood glucose monitored  S: Skin   Bathing/Skin Care: bath, complete, dressed/undressed, incontinence care  Device Skin Pressure Protection: absorbent pad utilized/changed, adhesive use limited, skin-to-skin areas padded, skin-to-device areas padded, pressure points protected  Pressure Reduction Devices: specialty bed utilized  Pressure Reduction Techniques: weight shift assistance provided, pressure points protected, positioned off wounds  Skin Protection: adhesive use limited, incontinence pads utilized, tubing/devices free from skin contact, transparent dressing maintained, skin-to-skin areas padded, skin-to-device areas padded  B: Bowel Function   diarrhea   I: Indwelling Catheters   Beckham necessity:      Urethral Catheter 08/24/22 0900-Reason for Continuing Urinary Catheterization: Critically ill in ICU and requiring hourly monitoring of intake/output   CVC necessity: Yes  D: De-escalation Antibiotics   No    Family/Goals of care/Code Status   Code Status: Full Code    24H Vital Sign Range  Temp:  [94 °F (34.4 °C)-97.8 °F (36.6 °C)]   Pulse:  [63-78]   Resp:  [7-22]   SpO2:  [93 %-100 %]   Arterial Line BP:  (113-158)/(37-55)      Shift Events   Pt hypothermic throughout shift and required bear hugger to increase temp. Pt also remains on levophed to maintain MAP>65 -- currently infusing at 0.1 mcg/kg/min. Pt is generally more alert, opening her eyes more and reading messages when written down. No acute events throughout shift.     VS and assessment per flow sheet, patient progressing towards goals as tolerated, plan of care reviewed with  Aleyda Floyd , all concerns addressed, will continue to monitor.    Verona Younger

## 2022-08-27 NOTE — PROGRESS NOTES
Pharmacokinetic Initial Assessment: IV Vancomycin    Assessment/Plan:    Patient was on vancomycin 750 mg IV q24h from 8/25-8/26  Desired empiric serum trough concentration is 10 to 20 mcg/mL  Draw vancomycin trough level 60 min prior to third dose on 8/28 at approximately 0800  Pharmacy will continue to follow and monitor vancomycin.      Please contact pharmacy at extension [phone] with any questions regarding this assessment.     Thank you for the consult,   Rubens Price       Patient brief summary:  Aleyda Floyd is a 67 y.o. female initiated on antimicrobial therapy with IV Vancomycin for treatment of suspected bacteremia    Drug Allergies:   Review of patient's allergies indicates:  No Known Allergies    Actual Body Weight:   56.7kg    Renal Function:   Estimated Creatinine Clearance: 34.3 mL/min (based on SCr of 1.2 mg/dL).,     Dialysis Method (if applicable):  N/A    CBC (last 72 hours):  Recent Labs   Lab Result Units 08/24/22  0601 08/25/22  0233 08/25/22  1149 08/26/22  0234 08/27/22  0047   WBC K/uL 8.16 32.42* 35.45* 50.45* 55.16*   Hemoglobin g/dL 9.6* 9.6* 8.9* 8.9* 8.4*   Hematocrit % 30.5* 28.7* 28.0* 26.5* 24.8*   Platelets K/uL 80* 65* 55* 79* 79*   Gran % % 96.0*  --   --   --   --    Lymph % % 2.0*  --   --   --   --    Mono % % 0.2*  --   --   --   --    Eosinophil % % 1.0  --   --   --   --    Basophil % % 0.2  --   --   --   --    Differential Method  Automated  --   --   --   --        Metabolic Panel (last 72 hours):  Recent Labs   Lab Result Units 08/24/22  0601 08/24/22  1714 08/25/22  0233 08/26/22  0234 08/26/22 2056 08/27/22  0047   Sodium mmol/L 139  --  137 137 134* 132*   Potassium mmol/L 3.8  --  2.8* 5.1 4.0 3.8   Chloride mmol/L 106  --  115* 103 103 103   CO2 mmol/L 22*  --  12* 27 26 26   Glucose mg/dL 127*  --  75 98 155* 144*   Glucose, UA   --  Negative  --   --   --   --    BUN mg/dL 25*  --  23 38* 40* 41*   Creatinine mg/dL 1.8*  --  1.0 1.3 1.1 1.2   Albumin g/dL 1.7*   --  1.4* 2.0*  --  1.8*   Total Bilirubin mg/dL 1.5*  --  0.8 1.6*  --  1.2*   Alkaline Phosphatase U/L 62  --  45* 74  --  109   AST U/L 46*  --  62* 73*  --  64*   ALT U/L 21  --  23 32  --  29   Magnesium mg/dL  --   --  1.3* 2.3 2.4 2.3   Phosphorus mg/dL  --   --  2.9 2.7  --  2.1*       Drug levels (last 3 results):  Recent Labs   Lab Result Units 08/25/22  0233   Vancomycin, Random ug/mL 10.9       Microbiologic Results:  Microbiology Results (last 7 days)       Procedure Component Value Units Date/Time    Blood culture [[id number]] Collected: 08/26/22 2213    Order Status: Sent Specimen: Blood from Peripheral, Antecubital, Left Updated: 08/26/22 2235    Blood culture [[id number]] Collected: 08/26/22 2202    Order Status: Sent Specimen: Blood from Peripheral, Antecubital, Right Updated: 08/26/22 2235    Blood culture [[id number]] Collected: 08/25/22 1616    Order Status: Completed Specimen: Blood from Peripheral, Antecubital, Right Updated: 08/26/22 2137     Blood Culture, Routine Gram stain aer bottle: Gram positive cocci in clusters resembling Staph      Results called to and read back by: Verona Younger RN 08/26/2022  21:36    Blood culture [[id number]] Collected: 08/25/22 1615    Order Status: Completed Specimen: Blood from Peripheral, Antecubital, Left Updated: 08/26/22 1812     Blood Culture, Routine No Growth to date      No Growth to date    Blood culture [[id number]] Collected: 08/24/22 0601    Order Status: Completed Specimen: Blood Updated: 08/26/22 0812     Blood Culture, Routine No Growth to date      No Growth to date      No Growth to date    Blood culture [[id number]] Collected: 08/24/22 0602    Order Status: Completed Specimen: Blood Updated: 08/26/22 0812     Blood Culture, Routine No Growth to date      No Growth to date      No Growth to date    Urine culture [[id number]]  (Abnormal) Collected: 08/24/22 1714    Order Status: Completed Specimen: Urine Updated: 08/25/22 1949     Urine Culture,  Routine VIRIDANS STREPTOCOCCUS GROUP  > 100,000 cfu/ml  Susceptibility testing not routinely performed      Narrative:      Specimen Source->Urine    Blood culture [[id number]] Collected: 08/20/22 1413    Order Status: Completed Specimen: Blood Updated: 08/25/22 1612     Blood Culture, Routine No growth after 5 days.    Blood culture [[id number]] Collected: 08/20/22 1414    Order Status: Completed Specimen: Blood Updated: 08/25/22 1612     Blood Culture, Routine No growth after 5 days.    Urine culture [[id number]]  (Abnormal)  (Susceptibility) Collected: 08/19/22 1708    Order Status: Completed Specimen: Urine Updated: 08/22/22 1349     Urine Culture, Routine ESCHERICHIA COLI ESBL  > 100,000 cfu/ml      Narrative:      Specimen Source->Urine    Blood culture [[id number]]  (Abnormal) Collected: 08/18/22 1044    Order Status: Completed Specimen: Blood Updated: 08/21/22 1156     Blood Culture, Routine Gram stain luisa bottle: Gram negative rods      Positive results previously called 08/19/2022  00:06      Gram stain aer bottle: Gram negative rods      Positive results previously called 08/19/2022  00:42      ESCHERICHIA COLI ESBL  For susceptibility see order # [id number]      Blood culture [[id number]]  (Abnormal)  (Susceptibility) Collected: 08/18/22 1044    Order Status: Completed Specimen: Blood Updated: 08/20/22 0934     Blood Culture, Routine Gram stain luisa bottle: Gram negative rods      Results called to and read back by:Kitty Carreon RN 08/18/2022  21:56      Gram stain aer bottle: Gram negative rods      Positive results previously called 08/19/2022  00:08      ESCHERICHIA COLI ESBL

## 2022-08-27 NOTE — ASSESSMENT & PLAN NOTE
Had UCx and BCx positive for ESBL e coli sensitive only to meropenem and ertapenem. Most recent UCx is positive for strep viridans which will be treated with meropenem. WBC went up to 50 today so will trend.  - Meropenem as per ID recommendations  - CT Chest showed possible superimposed aspiration or PNA in left lower lobe  - CT abdomen showed thickening of bladder and colon. Liver has a hypodensity which we will consider further evaluating with US or MRI of abdomen

## 2022-08-27 NOTE — PROCEDURES
"Aleyda Floyd is a 67 y.o. female patient.    Temp: 99.5 °F (37.5 °C) (turned bear hugger off) (08/27/22 1105)  Pulse: 89 (08/27/22 1400)  Resp: 10 (08/27/22 1400)  BP: (!) 122/50 (08/27/22 0705)  SpO2: 99 % (08/27/22 1400)  Weight: 56.7 kg (125 lb) (08/25/22 1400)  Height: 5' 1" (154.9 cm) (08/25/22 1400)       Lumbar Puncture    Date/Time: 8/27/2022 2:36 PM  Location procedure was performed: [facility]  Performed by: Yasimn Rahman MD  Authorized by: Yasmin Rahman MD   Assisting provider: Lia Anthony MD  Pre-operative diagnosis:  Septic shock  Post-operative diagnosis: Septic shock  Consent Done: Yes  Indications: evaluation for infection and evaluation for altered mental status  Anesthesia: local infiltration    Anesthesia:  Local Anesthetic: lidocaine 1% without epinephrine    Patient sedated: no  Preparation: Patient was prepped and draped in the usual sterile fashion.  Lumbar space: L4-L5 interspace  Patient's position: left lateral decubitus  Needle gauge: 18  Needle type: spinal needle - Quincke tip  Needle length: 3.5 in  Number of attempts: 1  Opening pressure: 12 cm H2O  Fluid appearance: clear  Tubes of fluid: 4  Total volume: 18.5 ml  Post-procedure: site cleaned, pressure dressing applied and adhesive bandage applied  Complications: No  Patient tolerance: Patient tolerated the procedure well with no immediate complications      Yasmin Rahman MD  PGY1 Internal Medicine  8/27/2022  "

## 2022-08-27 NOTE — EICU
Rounding (Video Assessment):  N/A    Intervention Initiated From:  Bedside    Roly Communicated with Bedside Nurse regarding:  Time-Out    Comments:  call received for time out prior to LP.  Dr Rahman and Dr Anthony @ bs for procedure.  Time out completed.  Pt in L lateral position.  LP perfomed, csf appears clear, bandaid applied to site.    Procedure end 1590

## 2022-08-27 NOTE — SUBJECTIVE & OBJECTIVE
Interval History: worsening clinical status, now on increasing pressors w/ addition of vaso, CT A/P concerning for diffuse colitis. Pt does not respond to questions or follow commands. RN notes liquid stool via rectal tube. WBC is increasing, 55 today.     Review of Systems   Unable to perform ROS: Acuity of condition   Objective:     Vital Signs (Most Recent):  Temp: 99.5 °F (37.5 °C) (turned bear hugger off) (08/27/22 1105)  Pulse: 89 (08/27/22 1400)  Resp: 10 (08/27/22 1400)  BP: (!) 122/50 (08/27/22 0705)  SpO2: 99 % (08/27/22 1400)   Vital Signs (24h Range):  Temp:  [94 °F (34.4 °C)-99.5 °F (37.5 °C)] 99.5 °F (37.5 °C)  Pulse:  [63-90] 89  Resp:  [7-23] 10  SpO2:  [93 %-100 %] 99 %  BP: (122)/(50) 122/50  Arterial Line BP: (113-159)/(36-52) 159/50     Weight: 56.7 kg (125 lb)  Body mass index is 23.62 kg/m².    Estimated Creatinine Clearance: 34.3 mL/min (based on SCr of 1.2 mg/dL).    Physical Exam  Constitutional:       General: She is not in acute distress.     Appearance: She is well-developed. She is ill-appearing. She is not diaphoretic.   HENT:      Head: Normocephalic and atraumatic.      Right Ear: External ear normal.      Left Ear: External ear normal.      Nose: Nose normal.   Eyes:      General: No scleral icterus.        Right eye: No discharge.         Left eye: No discharge.      Extraocular Movements: Extraocular movements intact.      Conjunctiva/sclera: Conjunctivae normal.   Cardiovascular:      Rate and Rhythm: Normal rate and regular rhythm.      Heart sounds: Normal heart sounds.   Pulmonary:      Effort: Pulmonary effort is normal. No respiratory distress.      Breath sounds: No stridor.   Abdominal:      General: Abdomen is flat.      Palpations: Abdomen is soft.      Comments: Liquid stool in rectal tube   Musculoskeletal:         General: Normal range of motion.   Skin:     Findings: No erythema or rash.      Comments: Skin tear LUE   Neurological:      Comments: Does not respond         Significant Labs: CBC:   Recent Labs   Lab 08/26/22 0234 08/27/22  0047   WBC 50.45* 55.16*   HGB 8.9* 8.4*   HCT 26.5* 24.8*   PLT 79* 79*     CMP:   Recent Labs   Lab 08/26/22 0234 08/26/22 2056 08/27/22 0047    134* 132*   K 5.1 4.0 3.8    103 103   CO2 27 26 26   GLU 98 155* 144*   BUN 38* 40* 41*   CREATININE 1.3 1.1 1.2   CALCIUM 9.1 8.8 8.7   PROT 6.5  --  5.8*   ALBUMIN 2.0*  --  1.8*   BILITOT 1.6*  --  1.2*   ALKPHOS 74  --  109   AST 73*  --  64*   ALT 32  --  29   ANIONGAP 7* 5* 3*     Microbiology Results (last 7 days)       Procedure Component Value Units Date/Time    CSF culture [[id number]]     Order Status: No result Specimen: CSF (Spinal Fluid)     Gram stain [[id number]]     Order Status: No result Specimen: CSF (Spinal Fluid)     Clostridium difficile EIA [[id number]]     Order Status: No result Specimen: Stool     Blood culture [[id number]] Collected: 08/25/22 1616    Order Status: Completed Specimen: Blood from Peripheral, Antecubital, Right Updated: 08/27/22 0841     Blood Culture, Routine Gram stain aer bottle: Gram positive cocci in clusters resembling Staph      Results called to and read back by: Verona Younger RN 08/26/2022  21:36    Blood culture [[id number]] Collected: 08/24/22 0601    Order Status: Completed Specimen: Blood Updated: 08/27/22 0812     Blood Culture, Routine No Growth to date      No Growth to date      No Growth to date      No Growth to date    Blood culture [[id number]] Collected: 08/24/22 0602    Order Status: Completed Specimen: Blood Updated: 08/27/22 0812     Blood Culture, Routine No Growth to date      No Growth to date      No Growth to date      No Growth to date    Blood culture [[id number]] Collected: 08/26/22 2213    Order Status: Completed Specimen: Blood from Peripheral, Antecubital, Left Updated: 08/27/22 0545     Blood Culture, Routine No Growth to date    Blood culture [[id number]] Collected: 08/26/22 2202    Order Status: Completed  Specimen: Blood from Peripheral, Antecubital, Right Updated: 08/27/22 0545     Blood Culture, Routine No Growth to date    Blood culture [[id number]] Collected: 08/25/22 1615    Order Status: Completed Specimen: Blood from Peripheral, Antecubital, Left Updated: 08/26/22 1812     Blood Culture, Routine No Growth to date      No Growth to date    Urine culture [[id number]]  (Abnormal) Collected: 08/24/22 1714    Order Status: Completed Specimen: Urine Updated: 08/25/22 1949     Urine Culture, Routine VIRIDANS STREPTOCOCCUS GROUP  > 100,000 cfu/ml  Susceptibility testing not routinely performed      Narrative:      Specimen Source->Urine    Blood culture [[id number]] Collected: 08/20/22 1413    Order Status: Completed Specimen: Blood Updated: 08/25/22 1612     Blood Culture, Routine No growth after 5 days.    Blood culture [[id number]] Collected: 08/20/22 1414    Order Status: Completed Specimen: Blood Updated: 08/25/22 1612     Blood Culture, Routine No growth after 5 days.    Urine culture [[id number]]  (Abnormal)  (Susceptibility) Collected: 08/19/22 1708    Order Status: Completed Specimen: Urine Updated: 08/22/22 1349     Urine Culture, Routine ESCHERICHIA COLI ESBL  > 100,000 cfu/ml      Narrative:      Specimen Source->Urine    Blood culture [[id number]]  (Abnormal) Collected: 08/18/22 1044    Order Status: Completed Specimen: Blood Updated: 08/21/22 1156     Blood Culture, Routine Gram stain luisa bottle: Gram negative rods      Positive results previously called 08/19/2022  00:06      Gram stain aer bottle: Gram negative rods      Positive results previously called 08/19/2022  00:42      ESCHERICHIA COLI ESBL  For susceptibility see order # [id number]              Significant Imaging: I have reviewed all pertinent imaging results/findings within the past 24 hours.

## 2022-08-27 NOTE — ASSESSMENT & PLAN NOTE
This patient does have evidence of infective focus  My overall impression is septic shock.  Source: Urine, Blood  Antibiotics given-   Antibiotics (From admission, onward)    Start     Stop Route Frequency Ordered    08/27/22 0345  vancomycin - pharmacy to dose  (vancomycin IVPB)        See [facility] for full Linked Orders Report.    -- IV pharmacy to manage frequency 08/27/22 0245    08/26/22 2030  meropenem (MERREM) 2 g in sodium chloride 0.9% 100 mL IVPB         -- IV Every 12 hours (non-standard times) 08/26/22 0949    08/25/22 2100  rifAXIMin tablet 550 mg         -- PER NG TUBE 2 times daily 08/25/22 1136    08/24/22 2100  mupirocin 2 % ointment         08/29 2059 Nasl 2 times daily 08/24/22 1226        Latest lactate reviewed-  Recent Labs   Lab 08/26/22  1610 08/26/22  2225 08/27/22  0425   LACTATE 1.9 1.9 1.6     Organ dysfunction indicated by encephalopathy    Shock with decreased perfusion noted, Fluid challenge was not given at 30cc/kg due to decreased urine output    Post- resuscitation assessment- (Done after fluids given for shock)  Vital signs post fluid administrations were-  Temp Readings from Last 1 Encounters:   08/27/22 97 °F (36.1 °C) (Axillary)     BP Readings from Last 1 Encounters:   08/27/22 (!) 122/50     Pulse Readings from Last 1 Encounters:   08/27/22 72       Perfusion exam was performed within 6 hours of septic shock presentation after bolus shows Inadequate tissue perfusion assessed by non-invasive monitoring  Will Start Pressors- Levophed for MAP of 65    - F/U blood cultures, urine culture  - Continue meropenem only as per ID recommendations  - Continue stress dose steroids   - Re-consult ID for worsening shock  - Levophed and vaso for MAP >65

## 2022-08-28 NOTE — PLAN OF CARE
Patient seen with housestaff team on morning rounds and then plan of care was discussed in detail.     67 year old woman with history of EtOH cirrhosis and seizure disorder transferred to ICU for worsening encephalopathy and hypotension.         Shock: concerned for possible septic shock:    currently on norepi@0.1 and vasopressin.    Continue stress dose steroids. Leukocytosis improving.  C. Diff negative.  E. Coli UTI/Bacteremia: continue meropenem; ID following.  Strep viridans bacteremia: continue meropenem.   Encephalopathy: likely multifactorial: septic vs. Toxic vs hepatic. Patient remains unresponsive. Continue lactulose.  CT Head unrevealing. Ertapenem could have been a contributing factor. Ammonia level unremarkable.  EEG negative for seizure.    LP unremarkable, but viral assays pending. Consult neuro.  PITO: Monitor urine output.  Renally dose all medications.    Anemia: Continue to monitor and transfuse as necessary maintain hgb>7.    Thrombocytopenia: likely 2/2 cirrhosis.  Continue to monitor  Unstagable sacral decub (present upon transfer to ICU)  Palliative care consult         Critical Care Time: 40 minutes   Critical care was time spent personally by me on the following activities: evaluating this patient's organ dysfunction, development of treatment plan, discussing treatment plan with patient or surrogate and bedside caregivers, discussions with consultants, evaluation of patient's response to treatment, examination of patient, ordering and performing treatments and interventions, ordering and review of laboratory studies, ordering and review of radiographic studies, re-evaluation of patient's condition. This critical care time did not overlap with that of any other provider or involve time for any procedures.

## 2022-08-28 NOTE — CONSULTS
Consult received, chart reviewed, 67F hx ETOH cirrhosis, end-stage liver disease, seizure disorder transferred to ICU for worsening encephalopathy and hypotension.  Now with colitis vs congestion related to her liver disease and portal hypertension in the setting of hypoalbuminemia. Currently undergoing workup. Palliative care consulted for overall GOC.       Discussed with team. Patient can be seen tomorrow.     Full consult to follow next available date.       Thank you for the opportunity to care for this patient and family.     Please call with questions.     Trish Lynn MD  Palliative Care Department  [facility]

## 2022-08-28 NOTE — ASSESSMENT & PLAN NOTE
This patient does have evidence of infective focus  My overall impression is septic shock.  Source: Urine, Blood  Antibiotics given-   Antibiotics (From admission, onward)    Start     Stop Route Frequency Ordered    08/27/22 1430  vancomycin 750 mg in dextrose 5 % 250 mL IVPB (ready to mix system)         -- IV Every 24 hours (non-standard times) 08/27/22 1320    08/27/22 0345  vancomycin - pharmacy to dose  (vancomycin IVPB)        See [facility] for full Linked Orders Report.    -- IV pharmacy to manage frequency 08/27/22 0245    08/26/22 2030  meropenem (MERREM) 2 g in sodium chloride 0.9% 100 mL IVPB         -- IV Every 12 hours (non-standard times) 08/26/22 0949    08/25/22 2100  rifAXIMin tablet 550 mg         -- PER NG TUBE 2 times daily 08/25/22 1136    08/24/22 2100  mupirocin 2 % ointment         08/29 2059 Nasl 2 times daily 08/24/22 1226        Latest lactate reviewed-  Recent Labs   Lab 08/26/22  1610 08/26/22  2225 08/27/22  0425   LACTATE 1.9 1.9 1.6     Organ dysfunction indicated by encephalopathy    Shock with decreased perfusion noted, Fluid challenge was not given at 30cc/kg due to decreased urine output    Post- resuscitation assessment- (Done after fluids given for shock)  Vital signs post fluid administrations were-  Temp Readings from Last 1 Encounters:   08/28/22 97.8 °F (36.6 °C) (Axillary)     BP Readings from Last 1 Encounters:   08/27/22 (!) 122/50     Pulse Readings from Last 1 Encounters:   08/28/22 66       Perfusion exam was performed within 6 hours of septic shock presentation after bolus shows Inadequate tissue perfusion assessed by non-invasive monitoring  Will Start Pressors- Levophed for MAP of 65    - F/U blood cultures, urine culture  - Continue meropenem only as per ID recommendations  - Continue stress dose steroids   - Re-consult ID for worsening shock  - Levophed and vaso for MAP >65

## 2022-08-28 NOTE — PT/OT/SLP PROGRESS
Physical Therapy      Patient Name:  Aleyda Floyd   MRN:  [id number]    Patient not seen today secondary to Unarousable per RN. Will follow-up as appropriate.

## 2022-08-28 NOTE — ASSESSMENT & PLAN NOTE
67F hx deafness, ETOH cirrhosis, seizure disorder seen previously or ESBL E coli bacteremia due to UTI being treated with ertapenem, who developed progressive worsening AMS and hypotension requiring ICU transfer. Urine cx + s. viridans. Blood cx 1/4 + GPC resembling staph, CONS, likely contaminate. Currently on meropenem and vanc. Worsening hypotension, requiring vaso and levo. Liquid stool via rectal tube per RN, pt had received lactulose for possible HE. CT C/A/P w/ diffuse enterocolitis. Significant leukocytosis, now downtrending. LP done 8/27 not consistent w/ infection.    Recommendations:   - Continue vanc and meropenem  - follow up blood cx results  - will send additional stool studies, GI consult pending  - consider brain MRI if mental status does not improve    ID will follow

## 2022-08-28 NOTE — ASSESSMENT & PLAN NOTE
Red/maroon/purple discoloration and blistering noted to sacral region.    - Wound care consulted  -CT scan shows no evidence of infection

## 2022-08-28 NOTE — ASSESSMENT & PLAN NOTE
67 yoF with cirrhosis with ascites and seizure disorder presents with AMS and fatigue  Labs showed ammonia of 36  CT head showed no acute intracranial abnormality  Possible cause of encephalopathy is multifactorial: infection vs neurological vs metabolic  EEG on admit remarkable for encephalopathy, no other significant findings  UCx and BCx positive for ESBL e coli sensitive to only meropenem and ertapenem.      Although she has been treated for a few days with the right antibiotics, her mental status has been slow to improve  She is no longer alert and does not open eyes or awaken  CT head showing chronic microvascular changes, no acute changes  CK normal, rhabdo or myositis less likely   Cortisol normal, weakness from adrenal insufficiency less likely latoya since BPs have been relatively normal   EEG showed mild generalized cerebral dysfunction as is commonly seen in a variety of states of toxic and metabolic derangement     Plan:  - D/C home Clobazam. Continue keppra for seizures  - continue lactulose and rifaximin   - Discontinue lasix and aldactone for BP and volume down status  - continue thiamine and folic acid  - lumbar puncture non-infectious, unremarkable  -consulted neuro, appreciate recs

## 2022-08-28 NOTE — PT/OT/SLP PROGRESS
Occupational Therapy      Patient Name:  Aleyda Floyd   MRN:  [id number]    Patient not seen today secondary to increased lethargy. Will follow-up.    8/28/2022

## 2022-08-28 NOTE — PROGRESS NOTES
[facility] - Cardiac Medical ICU  Critical Care Medicine  Progress Note    Patient Name: Aleyda Floyd  MRN: [id number]  Admission Date: 8/17/2022  Hospital Length of Stay: 9 days  Code Status: Full Code  Attending Provider: Lia Anthony MD  Primary Care Provider: Elvie Fernandes MD   Principal Problem: Septic shock    Subjective:     HPI:  67 year old lady with alcoholic cirrhosis on lactulose and seizure disorder who presented to the ED for AMS and fatigue. 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 Admitted to hospital medicine for encephalopathy. EEG on admission unremarkable for seizure activity, consistent with encephalopathy that could be infectious vs metabolic vs primary neuronal in nature. Blood and urine cultures growing ESBL e coli. She was initially started on meropenem, however consulted ID who recommended de-escalating to ertapenem and to complete two week course in total. CT head on 8/22 was unremarkable for acute change. Given bilateral hand weakness, MRI was ordered to r/o spinal cord compression. On 8/24 AM, pt was rapid response 2/2 to hypotension. She received 1 L of NS and remains in refractory hypotension. Pt is transferred to MICU for shock.       Hospital/ICU Course:  No notes on file    Interval History: Patient does not awake to voice or touch. She only withdraws to pain. Levo decreased to 0.08, vaso off.    Review of Systems   Unable to perform ROS: Patient nonverbal     Objective:     Vital Signs (Most Recent):  Temp: 97.8 °F (36.6 °C) (08/28/22 1105)  Pulse: 66 (08/28/22 1300)  Resp: 18 (08/28/22 1300)  BP: (!) 122/50 (08/27/22 0705)  SpO2: 100 % (08/28/22 1300)   Vital Signs (24h Range):  Temp:  [97.6 °F (36.4 °C)-99.2 °F (37.3 °C)] 97.8 °F (36.6 °C)  Pulse:  [66-89] 66  Resp:  [7-24] 18  SpO2:  [93 %-100 %] 100 %  Arterial Line BP: (105-159)/(35-98) 109/39   Weight: 56.7 kg (125 lb)  Body mass index is 23.62 kg/m².      Intake/Output Summary (Last 24 hours) at 8/28/2022 1352  Last data  filed at 8/28/2022 1300  Gross per 24 hour   Intake 2226.98 ml   Output 1405 ml   Net 821.98 ml         Physical Exam  Vitals and nursing note reviewed.   Constitutional:       General: She is not in acute distress.     Appearance: Normal appearance. She is not ill-appearing.   HENT:      Head: Normocephalic and atraumatic.      Nose: Nose normal.      Mouth/Throat:      Mouth: Mucous membranes are moist.   Eyes:      General: No scleral icterus.     Extraocular Movements: Extraocular movements intact.      Conjunctiva/sclera: Conjunctivae normal.      Pupils: Pupils are equal, round, and reactive to light.   Cardiovascular:      Rate and Rhythm: Normal rate and regular rhythm.      Pulses: Normal pulses.      Heart sounds: Normal heart sounds. No murmur heard.  Pulmonary:      Effort: Pulmonary effort is normal. No respiratory distress.      Breath sounds: Normal breath sounds. No wheezing, rhonchi or rales.   Abdominal:      General: Bowel sounds are normal.      Palpations: Abdomen is soft.      Tenderness: There is no abdominal tenderness. There is no guarding or rebound.   Musculoskeletal:         General: No swelling or tenderness. Normal range of motion.      Right lower leg: Edema (trace at ankles) present.      Left lower leg: Edema (trace at ankles) present.   Skin:     General: Skin is warm and dry.      Capillary Refill: Capillary refill takes less than 2 seconds.      Coloration: Skin is pale. Skin is not jaundiced.      Findings: Lesion (tips of fingers where BG checks are performed) present. No erythema.   Neurological:      Mental Status: She is alert.      GCS: GCS eye subscore is 4. GCS verbal subscore is 1. GCS motor subscore is 6.      Comments: Obtunded; withdraws to pain       Vents:  Oxygen Concentration (%): 35 (08/25/22 1100)  Lines/Drains/Airways       Central Venous Catheter Line  Duration             Percutaneous Central Line Insertion/Assessment - Triple Lumen  08/24/22 1000 left  internal jugular 4 days              Drain  Duration                  Urethral Catheter 08/24/22 0900 4 days         NG/OG Tube 08/24/22 1800 Left nostril 3 days         Rectal Tube 08/26/22 1629 fecal management system 1 day              Arterial Line  Duration             Arterial Line 08/24/22 1644 Left Femoral 3 days              Peripheral Intravenous Line  Duration                  Peripheral IV - Single Lumen 08/24/22 0653 20 G Left Forearm 4 days                  Significant Labs:    CBC/Anemia Profile:  Recent Labs   Lab 08/27/22 0047 08/28/22  0406   WBC 55.16* 32.55*   HGB 8.4* 8.2*   HCT 24.8* 24.2*   PLT 79* 74*   MCV 93 90   RDW 17.5* 17.5*          Chemistries:  Recent Labs   Lab 08/26/22 2056 08/27/22 0047 08/28/22  0406   * 132* 137   K 4.0 3.8 4.2    103 104   CO2 26 26 27   BUN 40* 41* 58*   CREATININE 1.1 1.2 1.4   CALCIUM 8.8 8.7 8.7   ALBUMIN  --  1.8* 1.9*   PROT  --  5.8* 6.2   BILITOT  --  1.2* 1.2*   ALKPHOS  --  109 132   ALT  --  29 44   AST  --  64* 82*   MG 2.4 2.3 2.5   PHOS  --  2.1* 2.0*         All pertinent labs within the past 24 hours have been reviewed.    Significant Imaging: I have reviewed all pertinent imaging results/findings within the past 24 hours.      ABG  Recent Labs   Lab 08/26/22  0747   PH 7.399   PO2 102*   PCO2 31.2*   HCO3 19.3*   BE -6     Assessment/Plan:     Neuro  Acute encephalopathy  67 yoF with cirrhosis with ascites and seizure disorder presents with AMS and fatigue  Labs showed ammonia of 36  CT head showed no acute intracranial abnormality  Possible cause of encephalopathy is multifactorial: infection vs neurological vs metabolic  EEG on admit remarkable for encephalopathy, no other significant findings  UCx and BCx positive for ESBL e coli sensitive to only meropenem and ertapenem.      Although she has been treated for a few days with the right antibiotics, her mental status has been slow to improve  She is no longer alert and does not  open eyes or awaken  CT head showing chronic microvascular changes, no acute changes  CK normal, rhabdo or myositis less likely   Cortisol normal, weakness from adrenal insufficiency less likely latoya since BPs have been relatively normal   EEG showed mild generalized cerebral dysfunction as is commonly seen in a variety of states of toxic and metabolic derangement     Plan:  - D/C home Clobazam. Continue keppra for seizures  - continue lactulose and rifaximin   - Discontinue lasix and aldactone for BP and volume down status  - continue thiamine and folic acid  - lumbar puncture non-infectious, unremarkable  -consulted neuro, appreciate recs    ENT  Deaf- written communication   used when able to communicate    Renal/  UTI (urinary tract infection)  See gram negative bacteremia    ID  * Septic shock  This patient does have evidence of infective focus  My overall impression is septic shock.  Source: Urine, Blood  Antibiotics given-   Antibiotics (From admission, onward)    Start     Stop Route Frequency Ordered    08/27/22 1430  vancomycin 750 mg in dextrose 5 % 250 mL IVPB (ready to mix system)         -- IV Every 24 hours (non-standard times) 08/27/22 1320    08/27/22 0345  vancomycin - pharmacy to dose  (vancomycin IVPB)        See [facility] for full Linked Orders Report.    -- IV pharmacy to manage frequency 08/27/22 0245    08/26/22 2030  meropenem (MERREM) 2 g in sodium chloride 0.9% 100 mL IVPB         -- IV Every 12 hours (non-standard times) 08/26/22 0949    08/25/22 2100  rifAXIMin tablet 550 mg         -- PER NG TUBE 2 times daily 08/25/22 1136    08/24/22 2100  mupirocin 2 % ointment         08/29 2059 Nasl 2 times daily 08/24/22 1226        Latest lactate reviewed-  Recent Labs   Lab 08/26/22  1610 08/26/22  2225 08/27/22  0425   LACTATE 1.9 1.9 1.6     Organ dysfunction indicated by encephalopathy    Shock with decreased perfusion noted, Fluid challenge was not given at 30cc/kg due to  decreased urine output    Post- resuscitation assessment- (Done after fluids given for shock)  Vital signs post fluid administrations were-  Temp Readings from Last 1 Encounters:   08/28/22 97.8 °F (36.6 °C) (Axillary)     BP Readings from Last 1 Encounters:   08/27/22 (!) 122/50     Pulse Readings from Last 1 Encounters:   08/28/22 66       Perfusion exam was performed within 6 hours of septic shock presentation after bolus shows Inadequate tissue perfusion assessed by non-invasive monitoring  Will Start Pressors- Levophed for MAP of 65    - F/U blood cultures, urine culture  - Continue meropenem only as per ID recommendations  - Continue stress dose steroids   - Re-consult ID for worsening shock  - Levophed and vaso for MAP >65       Gram-negative bacteremia  Had UCx and BCx positive for ESBL e coli sensitive only to meropenem and ertapenem. Most recent UCx is positive for strep viridans which will be treated with meropenem. WBC went up to 50 today so will trend.  - Meropenem as per ID recommendations  - CT Chest showed possible superimposed aspiration or PNA in left lower lobe  - CT abdomen showed thickening of bladder and colon. Liver has a hypodensity which we will consider further evaluating with US or MRI of abdomen      Hematology  Thrombocytopenia  Hgb on admission 7.9 and platelets of 117  Hgb baseline around 7.5-8.5 and platelets around 50s-60s. Current Hgb 8.6 platelets 112.     Plan:  - trend Hb and Plt daily   - continue to monitor for signs of bleeding    Other  Suspected deep tissue injury  Red/maroon/purple discoloration and blistering noted to sacral region.    - Wound care consulted  -CT scan shows no evidence of infection         Critical Care Daily Checklist:    A: Awake: RASS Goal/Actual Goal:    Actual: Brizuela Agitation Sedation Scale (RASS): Drowsy   B: Spontaneous Breathing Trial Performed?     C: SAT & SBT Coordinated?  n/a                      D: Delirium: CAM-ICU Overall CAM-ICU:  Positive   E: Early Mobility Performed? No   F: Feeding Goal: Goals: Meet % EEN, EPN by RD f/u date  Status: Nutrition Goal Status: new   Current Diet Order   No orders of the defined types were placed in this encounter.      AS: Analgesia/Sedation off   T: Thromboembolic Prophylaxis yes   H: HOB > 300 Yes   U: Stress Ulcer Prophylaxis (if needed) yes   G: Glucose Control yes   B: Bowel Function Stool Occurrence: 1   I: Indwelling Catheter (Lines & Beckham) Necessity Beckham, PIV   D: De-escalation of Antimicrobials/Pharmacotherapies no    Plan for the day/ETD Neuro recs, palliative care recs    Code Status:  Family/Goals of Care: Full Code         Critical secondary to Patient has a condition that poses threat to life and bodily function: severe encephalopathy      Critical care was time spent personally by me on the following activities: development of treatment plan with patient or surrogate and bedside caregivers, discussions with consultants, evaluation of patient's response to treatment, examination of patient, ordering and performing treatments and interventions, ordering and review of laboratory studies, ordering and review of radiographic studies, pulse oximetry, re-evaluation of patient's condition. This critical care time did not overlap with that of any other provider or involve time for any procedures.     ALINE OVALLES MD  Critical Care Medicine  [facility] - Cardiac Medical ICU

## 2022-08-28 NOTE — PLAN OF CARE
CMICU DAILY GOALS       A: Awake    RASS: Goal -    Actual - RASS (Brizuela Agitation-Sedation Scale): -1-->drowsy   Restraint necessity:    B: Breathe   SBT: Not intubated   C: Coordinate A & B, analgesics/sedatives   Pain: managed    SAT: Not intubated  D: Delirium   CAM-ICU: Overall CAM-ICU: Positive  E: Early(intubated/ Progressive (non-intubated) Mobility   MOVE Screen: Fail   Activity: Activity Management: Patient unable to perform activities  FAS: Feeding/Nutrition   Diet order: Diet/Nutrition Received: NPO, tube feeding,    T: Thrombus   DVT prophylaxis: VTE Required Core Measure: Pharmacological prophylaxis initiated/maintained  H: HOB Elevation   Head of Bed (HOB) Positioning: HOB at 30-45 degrees  U: Ulcer Prophylaxis   GI: no  G: Glucose control   managed Glycemic Management: blood glucose monitored  S: Skin   Bathing/Skin Care: bath, complete, dressed/undressed, incontinence care, linen changed, shampoo  Device Skin Pressure Protection: absorbent pad utilized/changed, adhesive use limited, pressure points protected, skin-to-device areas padded, skin-to-skin areas padded  Pressure Reduction Devices: specialty bed utilized  Pressure Reduction Techniques: weight shift assistance provided, positioned off wounds, heels elevated off bed, pressure points protected  Skin Protection: adhesive use limited, incontinence pads utilized, transparent dressing maintained, tubing/devices free from skin contact, skin-to-skin areas padded, skin-to-device areas padded  B: Bowel Function   diarrhea   I: Indwelling Catheters   Beckham necessity:      Urethral Catheter 08/24/22 0900-Reason for Continuing Urinary Catheterization: Critically ill in ICU and requiring hourly monitoring of intake/output   CVC necessity: No  D: De-escalation Antibiotics   No    Family/Goals of care/Code Status   Code Status: Full Code    24H Vital Sign Range  Temp:  [98 °F (36.7 °C)-99.5 °F (37.5 °C)]   Pulse:  [70-90]   Resp:  [9-24]   SpO2:  [93  %-100 %]   Arterial Line BP: (105-159)/(35-98)      Shift Events   No acute events throughout shift    VS and assessment per flow sheet, patient progressing towards goals as tolerated, plan of care reviewed with  Aleyda Floyd , all concerns addressed, will continue to monitor.    Verona Younger

## 2022-08-28 NOTE — HOSPITAL COURSE
Admitted to MICU for septic shock despite what would have been adequate treatment on the floor. Central line and A line placed for shock, levo and vaso started, meropenem continued and ID consulted. Patient's blood cutltures ngtd (coag negative staph in 1 bottle) but urine with strep viridans. Despite abx, patient remained in shock, and remained altered and mostly unresponsive though protecting her airway. Pan-scan was mostly unrevealing for infective foci. GI consulted for assistance with portal enteropathy vs colitis on CT - leaning towards portal disease, no further plans from their perspective. LP performed for AMS was not consistent with infection. Neurology consulted for AMS, recommending repeat extended EEG and MRI. Due to extended period of illness and overall poor prognosis, palliative care was involved.

## 2022-08-28 NOTE — SUBJECTIVE & OBJECTIVE
Interval History: c. Diff negative, WBC downtrending, remains unresponsive, LP not consistent w/ infection. Etiology of intra-abd pathology, sepsis and mental status changes remains unclear.     Review of Systems   Unable to perform ROS: Acuity of condition   Objective:     Vital Signs (Most Recent):  Temp: 97.4 °F (36.3 °C) (08/28/22 1505)  Pulse: 71 (08/28/22 1600)  Resp: 16 (08/28/22 1600)  BP: (!) 122/50 (08/27/22 0705)  SpO2: 100 % (08/28/22 1600)   Vital Signs (24h Range):  Temp:  [97.4 °F (36.3 °C)-98.9 °F (37.2 °C)] 97.4 °F (36.3 °C)  Pulse:  [66-84] 71  Resp:  [7-24] 16  SpO2:  [93 %-100 %] 100 %  Arterial Line BP: (105-150)/(36-98) 119/42     Weight: 56.7 kg (125 lb)  Body mass index is 23.62 kg/m².    Estimated Creatinine Clearance: 29.4 mL/min (based on SCr of 1.4 mg/dL).    Physical Exam  Constitutional:       General: She is not in acute distress.     Appearance: She is well-developed. She is ill-appearing. She is not diaphoretic.   HENT:      Head: Normocephalic and atraumatic.      Right Ear: External ear normal.      Left Ear: External ear normal.      Nose: Nose normal.   Eyes:      General: No scleral icterus.        Right eye: No discharge.         Left eye: No discharge.      Extraocular Movements: Extraocular movements intact.      Conjunctiva/sclera: Conjunctivae normal.   Cardiovascular:      Rate and Rhythm: Normal rate and regular rhythm.      Heart sounds: Normal heart sounds.   Pulmonary:      Effort: Pulmonary effort is normal. No respiratory distress.      Breath sounds: No stridor.   Abdominal:      General: Abdomen is flat.      Palpations: Abdomen is soft.      Comments: Liquid stool in rectal tube   Musculoskeletal:         General: Normal range of motion.   Skin:     Findings: No erythema or rash.      Comments: Skin tear LUE   Neurological:      Comments: Does not respond    Psychiatric:      Comments: Unable to assess       Significant Labs: CBC:   Recent Labs   Lab  08/27/22 0047 08/28/22  0406   WBC 55.16* 32.55*   HGB 8.4* 8.2*   HCT 24.8* 24.2*   PLT 79* 74*     CMP:   Recent Labs   Lab 08/26/22 2056 08/27/22 0047 08/28/22 0406   * 132* 137   K 4.0 3.8 4.2    103 104   CO2 26 26 27   * 144* 152*   BUN 40* 41* 58*   CREATININE 1.1 1.2 1.4   CALCIUM 8.8 8.7 8.7   PROT  --  5.8* 6.2   ALBUMIN  --  1.8* 1.9*   BILITOT  --  1.2* 1.2*   ALKPHOS  --  109 132   AST  --  64* 82*   ALT  --  29 44   ANIONGAP 5* 3* 6*     Microbiology Results (last 7 days)       Procedure Component Value Units Date/Time    Stool culture [[id number]] Collected: 08/28/22 1507    Order Status: Sent Specimen: Stool Updated: 08/28/22 1552    E. coli 0157 antigen [[id number]] Collected: 08/28/22 1507    Order Status: No result Specimen: Stool Updated: 08/28/22 1552    Blood culture [[id number]]  (Abnormal) Collected: 08/25/22 1616    Order Status: Completed Specimen: Blood from Peripheral, Antecubital, Right Updated: 08/28/22 1226     Blood Culture, Routine Gram stain aer bottle: Gram positive cocci in clusters resembling Staph      Results called to and read back by: Verona Younger RN 08/26/2022  21:36      COAGULASE-NEGATIVE STAPHYLOCOCCUS SPECIES  Organism is a probable contaminant      Blood culture [[id number]] Collected: 08/24/22 0601    Order Status: Completed Specimen: Blood Updated: 08/28/22 0812     Blood Culture, Routine No Growth to date      No Growth to date      No Growth to date      No Growth to date      No Growth to date    Blood culture [[id number]] Collected: 08/24/22 0602    Order Status: Completed Specimen: Blood Updated: 08/28/22 0812     Blood Culture, Routine No Growth to date      No Growth to date      No Growth to date      No Growth to date      No Growth to date    CSF culture [[id number]] Collected: 08/27/22 1443    Order Status: Completed Specimen: CSF (Spinal Fluid) from CSF Tap, Tube 1 Updated: 08/28/22 0722     CSF CULTURE No Growth to date     Gram Stain  Result Cytospin indicates:      Rare WBC's      No organisms seen    Blood culture [[id number]] Collected: 08/26/22 2213    Order Status: Completed Specimen: Blood from Peripheral, Antecubital, Left Updated: 08/28/22 0613     Blood Culture, Routine No Growth to date      No Growth to date    Blood culture [[id number]] Collected: 08/26/22 2202    Order Status: Completed Specimen: Blood from Peripheral, Antecubital, Right Updated: 08/28/22 0613     Blood Culture, Routine No Growth to date      No Growth to date    Clostridium difficile EIA [[id number]] Collected: 08/27/22 1501    Order Status: Completed Specimen: Stool Updated: 08/28/22 0020     C. diff Antigen Negative     C difficile Toxins A+B, EIA Negative     Comment: Testing not recommended for children <24 months old.       Blood culture [[id number]] Collected: 08/25/22 1615    Order Status: Completed Specimen: Blood from Peripheral, Antecubital, Left Updated: 08/27/22 1812     Blood Culture, Routine No Growth to date      No Growth to date      No Growth to date    Gram stain [[id number]] Collected: 08/27/22 1443    Order Status: Canceled Specimen: CSF (Spinal Fluid) from CSF Tap, Tube 1     Urine culture [[id number]]  (Abnormal) Collected: 08/24/22 1714    Order Status: Completed Specimen: Urine Updated: 08/25/22 1949     Urine Culture, Routine VIRIDANS STREPTOCOCCUS GROUP  > 100,000 cfu/ml  Susceptibility testing not routinely performed      Narrative:      Specimen Source->Urine    Blood culture [[id number]] Collected: 08/20/22 1413    Order Status: Completed Specimen: Blood Updated: 08/25/22 1612     Blood Culture, Routine No growth after 5 days.    Blood culture [[id number]] Collected: 08/20/22 1414    Order Status: Completed Specimen: Blood Updated: 08/25/22 1612     Blood Culture, Routine No growth after 5 days.    Urine culture [[id number]]  (Abnormal)  (Susceptibility) Collected: 08/19/22 1708    Order Status: Completed Specimen: Urine Updated: 08/22/22 1349      Urine Culture, Routine ESCHERICHIA COLI ESBL  > 100,000 cfu/ml      Narrative:      Specimen Source->Urine            Significant Imaging: I have reviewed all pertinent imaging results/findings within the past 24 hours.

## 2022-08-28 NOTE — SUBJECTIVE & OBJECTIVE
Interval History: Patient does not awake to voice or touch. She only withdraws to pain. Levo decreased to 0.08, vaso off.    Review of Systems   Unable to perform ROS: Patient nonverbal     Objective:     Vital Signs (Most Recent):  Temp: 97.8 °F (36.6 °C) (08/28/22 1105)  Pulse: 66 (08/28/22 1300)  Resp: 18 (08/28/22 1300)  BP: (!) 122/50 (08/27/22 0705)  SpO2: 100 % (08/28/22 1300)   Vital Signs (24h Range):  Temp:  [97.6 °F (36.4 °C)-99.2 °F (37.3 °C)] 97.8 °F (36.6 °C)  Pulse:  [66-89] 66  Resp:  [7-24] 18  SpO2:  [93 %-100 %] 100 %  Arterial Line BP: (105-159)/(35-98) 109/39   Weight: 56.7 kg (125 lb)  Body mass index is 23.62 kg/m².      Intake/Output Summary (Last 24 hours) at 8/28/2022 1352  Last data filed at 8/28/2022 1300  Gross per 24 hour   Intake 2226.98 ml   Output 1405 ml   Net 821.98 ml         Physical Exam  Vitals and nursing note reviewed.   Constitutional:       General: She is not in acute distress.     Appearance: Normal appearance. She is not ill-appearing.   HENT:      Head: Normocephalic and atraumatic.      Nose: Nose normal.      Mouth/Throat:      Mouth: Mucous membranes are moist.   Eyes:      General: No scleral icterus.     Extraocular Movements: Extraocular movements intact.      Conjunctiva/sclera: Conjunctivae normal.      Pupils: Pupils are equal, round, and reactive to light.   Cardiovascular:      Rate and Rhythm: Normal rate and regular rhythm.      Pulses: Normal pulses.      Heart sounds: Normal heart sounds. No murmur heard.  Pulmonary:      Effort: Pulmonary effort is normal. No respiratory distress.      Breath sounds: Normal breath sounds. No wheezing, rhonchi or rales.   Abdominal:      General: Bowel sounds are normal.      Palpations: Abdomen is soft.      Tenderness: There is no abdominal tenderness. There is no guarding or rebound.   Musculoskeletal:         General: No swelling or tenderness. Normal range of motion.      Right lower leg: Edema (trace at ankles)  present.      Left lower leg: Edema (trace at ankles) present.   Skin:     General: Skin is warm and dry.      Capillary Refill: Capillary refill takes less than 2 seconds.      Coloration: Skin is pale. Skin is not jaundiced.      Findings: Lesion (tips of fingers where BG checks are performed) present. No erythema.   Neurological:      Mental Status: She is alert.      GCS: GCS eye subscore is 4. GCS verbal subscore is 1. GCS motor subscore is 6.      Comments: Obtunded; withdraws to pain       Vents:  Oxygen Concentration (%): 35 (08/25/22 1100)  Lines/Drains/Airways       Central Venous Catheter Line  Duration             Percutaneous Central Line Insertion/Assessment - Triple Lumen  08/24/22 1000 left internal jugular 4 days              Drain  Duration                  Urethral Catheter 08/24/22 0900 4 days         NG/OG Tube 08/24/22 1800 Left nostril 3 days         Rectal Tube 08/26/22 1629 fecal management system 1 day              Arterial Line  Duration             Arterial Line 08/24/22 1644 Left Femoral 3 days              Peripheral Intravenous Line  Duration                  Peripheral IV - Single Lumen 08/24/22 0653 20 G Left Forearm 4 days                  Significant Labs:    CBC/Anemia Profile:  Recent Labs   Lab 08/27/22  0047 08/28/22  0406   WBC 55.16* 32.55*   HGB 8.4* 8.2*   HCT 24.8* 24.2*   PLT 79* 74*   MCV 93 90   RDW 17.5* 17.5*          Chemistries:  Recent Labs   Lab 08/26/22  2056 08/27/22  0047 08/28/22  0406   * 132* 137   K 4.0 3.8 4.2    103 104   CO2 26 26 27   BUN 40* 41* 58*   CREATININE 1.1 1.2 1.4   CALCIUM 8.8 8.7 8.7   ALBUMIN  --  1.8* 1.9*   PROT  --  5.8* 6.2   BILITOT  --  1.2* 1.2*   ALKPHOS  --  109 132   ALT  --  29 44   AST  --  64* 82*   MG 2.4 2.3 2.5   PHOS  --  2.1* 2.0*         All pertinent labs within the past 24 hours have been reviewed.    Significant Imaging: I have reviewed all pertinent imaging results/findings within the past 24 hours.

## 2022-08-28 NOTE — NURSING
CMICU DAILY GOALS       A: Awake    RASS: Goal -    Actual - RASS (Brizuela Agitation-Sedation Scale): -1-->drowsy   Restraint necessity:    B: Breathe   SBT: Not intubated   C: Coordinate A & B, analgesics/sedatives   Pain: managed    SAT: Not intubated  D: Delirium   CAM-ICU: Overall CAM-ICU: Positive  E: Early(intubated/ Progressive (non-intubated) Mobility   MOVE Screen: Pass   Activity: Activity Management: Patient unable to perform activities  FAS: Feeding/Nutrition   Diet order: Diet/Nutrition Received: NPO, tube feeding,    T: Thrombus   DVT prophylaxis: VTE Required Core Measure: Pharmacological prophylaxis initiated/maintained  H: HOB Elevation   Head of Bed (HOB) Positioning: HOB at 30-45 degrees  U: Ulcer Prophylaxis   GI: yes  G: Glucose control   managed Glycemic Management: blood glucose monitored  S: Skin   Bathing/Skin Care: bath, complete, dressed/undressed, incontinence care, linen changed, shampoo  Device Skin Pressure Protection: absorbent pad utilized/changed, adhesive use limited, positioning supports utilized, pressure points protected, skin-to-device areas padded, skin-to-skin areas padded  Pressure Reduction Devices: specialty bed utilized, pressure-redistributing mattress utilized, positioning supports utilized  Pressure Reduction Techniques: weight shift assistance provided, pressure points protected, positioned off wounds, heels elevated off bed  Skin Protection: adhesive use limited, drying agents applied, incontinence pads utilized, pectin skin barriers applied, silicone foam dressing in place, skin sealant/moisture barrier applied, skin-to-device areas padded, skin-to-skin areas padded, transparent dressing maintained, tubing/devices free from skin contact  B: Bowel Function   no issues   I: Indwelling Catheters   Beckham necessity:      Urethral Catheter 08/24/22 0900-Reason for Continuing Urinary Catheterization: Critically ill in ICU and requiring hourly monitoring of  intake/output   CVC necessity: Yes  D: De-escalation Antibiotics   No    Family/Goals of care/Code Status   Code Status: Full Code    24H Vital Sign Range  Temp:  [97.4 °F (36.3 °C)-98.9 °F (37.2 °C)]   Pulse:  [66-84]   Resp:  [7-24]   SpO2:  [93 %-100 %]   Arterial Line BP: (105-150)/(36-98)      Shift Events   No acute events throughout shift    VS and assessment per flow sheet, patient progressing towards goals as tolerated, plan of care reviewed with family, all concerns addressed, will continue to monitor.    Ashly Schmidt

## 2022-08-28 NOTE — CONSULTS
[facility]  Gastroenterology  Consult Note    Patient Name: Aleyda Floyd  MRN: [id number]  Admission Date: 8/17/2022  Hospital Length of Stay: 9 days  Code Status: Full Code   Attending Provider: Lia Anthony MD   Consulting Provider: Pietro Bojorquez MD  Primary Care Physician: Elvie Fernandes MD  Principal Problem:Septic shock    Inpatient consult to Gastroenterology  Consult performed by: Pietro Bojorquez MD  Consult ordered by: Reinier Alston MD      Subjective:     HPI: Aleyda Floyd is a 67 y.o. female with history of decompensated EtOH cirrhosis, seizure disorder, deafness who presented on 8/24 for AMS and fatigue. Found to have ESBL E. Coli bacteremia on admission. Positive in urine cultures as well. Started on meropenem, de-escalated to ertapenem per ID recs. Had episode of hypotension on 8/24 prompting rapid response and transfer to MICU for refractory shock. Now on multiple pressors and stress dose steroids. CT C/A/P shows diffuse wall thickening of colon and majority of small bowel, favored to be portal colopathy/enteropathy although infectious/inflammatory not excluded.  Had mild lactic acidosis at 3.3, resolved 2 days ago, normal yesterday. WBC 35 > 50 > 55 >  32 over past few days.  Cdiff negative yesterday. CSF studies overall unremarkable. Has slowly progressive PITO. Upon evaluation, not interactive. Not reliably withdrawing from pain.        Past Medical History:   Diagnosis Date    PITO (acute kidney injury)     Alcoholic cirrhosis of liver     Hypercholesterolemia     Hypertension        Past Surgical History:   Procedure Laterality Date    DILATION AND CURETTAGE OF UTERUS      ESOPHAGOGASTRODUODENOSCOPY N/A 6/23/2022    Procedure: EGD (ESOPHAGOGASTRODUODENOSCOPY);  Surgeon: Sean Perry MD;  Location: [address]);  Service: Endoscopy;  Laterality: N/A;  cirrhosis, variceal screening  labs prior  -request sent 6/14  fully  vaccinated, instructions emailed to [email or web address]       Family History   Problem Relation Age of Onset    Colon cancer Neg Hx     Ovarian cancer Neg Hx     Breast cancer Maternal Grandmother        Social History     Socioeconomic History    Marital status:    Tobacco Use    Smoking status: Former     Packs/day: 1.00     Years: 15.00     Pack years: 15.00     Types: Cigarettes     Quit date: 1998     Years since quittin.7    Smokeless tobacco: Never   Substance and Sexual Activity    Alcohol use: Not Currently    Drug use: Not Currently       No current facility-administered medications on file prior to encounter.     Current Outpatient Medications on File Prior to Encounter   Medication Sig Dispense Refill    acetaminophen (TYLENOL) 325 MG tablet Take 650 mg by mouth daily as needed for Pain. NOT TO EXCEED 4 GRAMS IN 24 HOUR PERIOD      cholecalciferol, vitamin D3, (VITAMIN D3) 50 mcg (2,000 unit) Cap capsule Take 1 capsule by mouth once daily.      cholestyramine-aspartame (QUESTRAN LIGHT) 4 gram PwPk Take 4 g by mouth once daily.      folic acid (FOLVITE) 1 MG tablet Take 1 mg by mouth once daily.       furosemide (LASIX) 40 MG tablet Take 1 tablet (40 mg total) by mouth 2 (two) times daily. 60 tablet 2    lactulose (CHRONULAC) 20 gram/30 mL Soln Take 30 mLs (20 g total) by mouth 2 (two) times daily. (Patient taking differently: ADMINISTER 30 ML RECTALLY TWICE DAILY. TO BE ADDED INTO CLEANSING ENEMA SET WITH 700 ML NORMAL SALINE)      levETIRAcetam (KEPPRA) 750 MG Tab Take 1 tablet (750 mg total) by mouth 2 (two) times daily. 60 tablet 2    nystatin (MYCOSTATIN) powder Apply to skin folds beneath breasts as needed      omeprazole (PRILOSEC) 40 MG capsule Take 40 mg by mouth once daily.      rifAXIMin (XIFAXAN) 550 mg Tab Take 1 tablet (550 mg total) by mouth 2 (two) times daily. 60 tablet 2    spironolactone (ALDACTONE) 100 MG tablet Take 1 tablet (100 mg total) by mouth once  daily. 30 tablet 2    thiamine 100 MG tablet Take 100 mg by mouth once daily.      [DISCONTINUED] bumetanide (BUMEX) 1 MG tablet Take 1 tablet (1 mg total) by mouth 2 (two) times a day. 60 tablet 11    aspirin 81 MG Chew Take 81 mg by mouth once daily.      cloBAZam (ONFI) 10 mg Tab Take 1 each (10 mg total) by mouth once daily. (Patient not taking: Reported on 8/18/2022) 30 each 5    [DISCONTINUED] losartan (COZAAR) 50 MG tablet Take 50 mg by mouth once daily.       [DISCONTINUED] simvastatin (ZOCOR) 10 MG tablet Take 10 mg by mouth once daily.          Review of patient's allergies indicates:  No Known Allergies    Review of Systems   Unable to perform ROS: Critical illness      Objective:     Vitals:    08/28/22 1300   BP:    Pulse: 66   Resp: 18   Temp:          Constitutional:  not in acute distress and well developed  HENT: Head: Normocephalic, no lesions, without obvious abnormality.  Eyes: sclera icteric  Cardiovascular: regular rate and rhythm and no murmur  Respiratory: unlabored breathing, course upper airway sounds intermittently  GI: soft and nondistended  Musculoskeletal: not examined  Skin: normal color  Neurological: obtunded, not reliably withdrawing from pain      Significant Labs:  Recent Labs   Lab 08/26/22  0234 08/27/22  0047 08/28/22  0406   HGB 8.9* 8.4* 8.2*       Lab Results   Component Value Date    WBC 32.55 (H) 08/28/2022    HGB 8.2 (L) 08/28/2022    HCT 24.2 (L) 08/28/2022    MCV 90 08/28/2022    PLT 74 (L) 08/28/2022       Lab Results   Component Value Date     08/28/2022    K 4.2 08/28/2022     08/28/2022    CO2 27 08/28/2022    BUN 58 (H) 08/28/2022    CREATININE 1.4 08/28/2022    CALCIUM 8.7 08/28/2022    ANIONGAP 6 (L) 08/28/2022    ESTGFRAFRICA >60.0 07/31/2022    EGFRNONAA 52.1 (A) 07/31/2022       Lab Results   Component Value Date    ALT 44 08/28/2022    AST 82 (H) 08/28/2022    ALKPHOS 132 08/28/2022    BILITOT 1.2 (H) 08/28/2022       Lab Results   Component Value  Date    INR 1.6 (H) 08/05/2022    INR 1.7 (H) 08/04/2022    INR 1.8 (H) 08/03/2022       Significant Imaging:  Reviewed pertinent radiology findings.       Assessment/Plan:     Aleyda Floyd is a 67 y.o. female with history of decompensated EtOH cirrhosis, seizure disorder, deafness who initially presented with AMS, found to have ESBL E. Coli urosepsis. Escalated to ICU for septic shock, has persistent pressor requriement and encephalopathy. GI consulted for pancolitis on imaging. Most concerned for portal hypertensive colopathy given diffuse distribution in setting of cirrhosis and anasarca on exam. Low suspicion for ischemic colitis, not in watershed area, no hematochezia, lactate wnl. Agree with infectious stool workup, cdiff ruled out.     Problem List:  Encephalopathy  Diffuse bowel wall thickening, suspected portal hypertensive colopathy/enteropathy  ESBL E. Coli urosepsis    Recommendations:  - Suspect portal hypertensive colopathy as source of imaging findings  - Agree with stool cultures. Cdiff ruled out  - Would hold off on colonoscopy to further investigate in setting of refractory shock and encephalopathy  - Supportive care per ICU team    Thank you for involving us in the care of Aleyda Floyd. Please call with any additional questions, concerns or changes in the patient's clinical status. We will sign off.    Pietro Bojorquez MD  Gastroenterology Fellow PGY V  [facility]

## 2022-08-28 NOTE — PROGRESS NOTES
[facility] - Cardiac Medical ICU  Infectious Disease  Progress Note    Patient Name: Aleyda Floyd  MRN: [id number]  Admission Date: 8/17/2022  Length of Stay: 9 days  Attending Physician: Lia Anthony MD  Primary Care Provider: Elvie Fernandes MD    Isolation Status: Special Contact  Assessment/Plan:      Acute encephalopathy  67F hx deafness, ETOH cirrhosis, seizure disorder seen previously or ESBL E coli bacteremia due to UTI being treated with ertapenem, who developed progressive worsening AMS and hypotension requiring ICU transfer. Urine cx + s. viridans. Blood cx 1/4 + GPC resembling staph, CONS, likely contaminate. Currently on meropenem and vanc. Worsening hypotension, requiring vaso and levo. Liquid stool via rectal tube per RN, pt had received lactulose for possible HE. CT C/A/P w/ diffuse enterocolitis. Significant leukocytosis, now downtrending. LP done 8/27 not consistent w/ infection.    Recommendations:   - Continue vanc and meropenem  - follow up blood cx results  - will send additional stool studies, GI consult pending  - consider brain MRI if mental status does not improve    ID will follow      Anticipated Disposition: TBD    Thank you for your consult. I will follow-up with patient. Please contact us if you have any additional questions.    Radha Parekh DO  Critical Care Infectious Disease    Time: 35 minutes   50% of time spent on face-to-face counseling and coordination of care. Counseling included review of test results, diagnosis, and treatment plan with patient and/or family.        Subjective:     Principal Problem:Septic shock    HPI: A 67-year-old woman with HTN, ETOH cirrhosis (recent admission for decompensated liver failure, not currently transplant candidate), seizure disorder (on Keppra and Onfi), deaf/mute, reported hx of recurrent UTIs, who presented with AMS and fatigue X 1-2 weeks.  CT imaging and EEG negative for acute IC pathology.  Admission blood cx  3/4 + for ESBL E coli  "susceptible to only carbapenems.   U/A >100 wbc, urine cx pending. Currently on meropenem. ID consulted for meropenem and ESBL E coli bacteremia.     Afebrile, no leukocytosis.  Reported dysuria per medical record.  At time of visit,  (who is also hearing impaired is in room).   Patient is lethargic, opens eyes, but will not respond to his signing/communication. Unable to obtain a ROS  Spoke to sister on the phone.  Unable to tell me much about patient symptoms/complaints.    Patient was seen by the Infectious Diseases [facility] service. Work up revealed ESBL E coli bacteremia. Her antibiotic therapy was optimized to Ertapenem. Blood cultures repeated on 8/20 remain NGTD and recommendations for a 14 day course of therapy were give prior to sign off.     Her hospital course was complicated by hypotension and bilateral hand weakness. She was given volume replacement however her shock failed to improve which prompted transfer to the ICU.     Infectious Diseases consulted for "ESBL bacteremia and UTI, stepped up to ICU for septic shock"            Interval History: c. Diff negative, WBC downtrending, remains unresponsive, LP not consistent w/ infection. Etiology of intra-abd pathology, sepsis and mental status changes remains unclear.     Review of Systems   Unable to perform ROS: Acuity of condition   Objective:     Vital Signs (Most Recent):  Temp: 97.4 °F (36.3 °C) (08/28/22 1505)  Pulse: 71 (08/28/22 1600)  Resp: 16 (08/28/22 1600)  BP: (!) 122/50 (08/27/22 0705)  SpO2: 100 % (08/28/22 1600)   Vital Signs (24h Range):  Temp:  [97.4 °F (36.3 °C)-98.9 °F (37.2 °C)] 97.4 °F (36.3 °C)  Pulse:  [66-84] 71  Resp:  [7-24] 16  SpO2:  [93 %-100 %] 100 %  Arterial Line BP: (105-150)/(36-98) 119/42     Weight: 56.7 kg (125 lb)  Body mass index is 23.62 kg/m².    Estimated Creatinine Clearance: 29.4 mL/min (based on SCr of 1.4 mg/dL).    Physical Exam  Constitutional:       General: She is not in acute distress.     " Appearance: She is well-developed. She is ill-appearing. She is not diaphoretic.   HENT:      Head: Normocephalic and atraumatic.      Right Ear: External ear normal.      Left Ear: External ear normal.      Nose: Nose normal.   Eyes:      General: No scleral icterus.        Right eye: No discharge.         Left eye: No discharge.      Extraocular Movements: Extraocular movements intact.      Conjunctiva/sclera: Conjunctivae normal.   Cardiovascular:      Rate and Rhythm: Normal rate and regular rhythm.      Heart sounds: Normal heart sounds.   Pulmonary:      Effort: Pulmonary effort is normal. No respiratory distress.      Breath sounds: No stridor.   Abdominal:      General: Abdomen is flat.      Palpations: Abdomen is soft.      Comments: Liquid stool in rectal tube   Musculoskeletal:         General: Normal range of motion.   Skin:     Findings: No erythema or rash.      Comments: Skin tear LUE   Neurological:      Comments: Does not respond    Psychiatric:      Comments: Unable to assess       Significant Labs: CBC:   Recent Labs   Lab 08/27/22  0047 08/28/22  0406   WBC 55.16* 32.55*   HGB 8.4* 8.2*   HCT 24.8* 24.2*   PLT 79* 74*     CMP:   Recent Labs   Lab 08/26/22 2056 08/27/22  0047 08/28/22  0406   * 132* 137   K 4.0 3.8 4.2    103 104   CO2 26 26 27   * 144* 152*   BUN 40* 41* 58*   CREATININE 1.1 1.2 1.4   CALCIUM 8.8 8.7 8.7   PROT  --  5.8* 6.2   ALBUMIN  --  1.8* 1.9*   BILITOT  --  1.2* 1.2*   ALKPHOS  --  109 132   AST  --  64* 82*   ALT  --  29 44   ANIONGAP 5* 3* 6*     Microbiology Results (last 7 days)       Procedure Component Value Units Date/Time    Stool culture [[id number]] Collected: 08/28/22 1507    Order Status: Sent Specimen: Stool Updated: 08/28/22 1552    E. coli 0157 antigen [[id number]] Collected: 08/28/22 1507    Order Status: No result Specimen: Stool Updated: 08/28/22 1552    Blood culture [[id number]]  (Abnormal) Collected: 08/25/22 1616    Order Status:  Completed Specimen: Blood from Peripheral, Antecubital, Right Updated: 08/28/22 1226     Blood Culture, Routine Gram stain aer bottle: Gram positive cocci in clusters resembling Staph      Results called to and read back by: Verona Younger RN 08/26/2022  21:36      COAGULASE-NEGATIVE STAPHYLOCOCCUS SPECIES  Organism is a probable contaminant      Blood culture [[id number]] Collected: 08/24/22 0601    Order Status: Completed Specimen: Blood Updated: 08/28/22 0812     Blood Culture, Routine No Growth to date      No Growth to date      No Growth to date      No Growth to date      No Growth to date    Blood culture [[id number]] Collected: 08/24/22 0602    Order Status: Completed Specimen: Blood Updated: 08/28/22 0812     Blood Culture, Routine No Growth to date      No Growth to date      No Growth to date      No Growth to date      No Growth to date    CSF culture [[id number]] Collected: 08/27/22 1443    Order Status: Completed Specimen: CSF (Spinal Fluid) from CSF Tap, Tube 1 Updated: 08/28/22 0722     CSF CULTURE No Growth to date     Gram Stain Result Cytospin indicates:      Rare WBC's      No organisms seen    Blood culture [[id number]] Collected: 08/26/22 2213    Order Status: Completed Specimen: Blood from Peripheral, Antecubital, Left Updated: 08/28/22 0613     Blood Culture, Routine No Growth to date      No Growth to date    Blood culture [[id number]] Collected: 08/26/22 2202    Order Status: Completed Specimen: Blood from Peripheral, Antecubital, Right Updated: 08/28/22 0613     Blood Culture, Routine No Growth to date      No Growth to date    Clostridium difficile EIA [[id number]] Collected: 08/27/22 1501    Order Status: Completed Specimen: Stool Updated: 08/28/22 0020     C. diff Antigen Negative     C difficile Toxins A+B, EIA Negative     Comment: Testing not recommended for children <24 months old.       Blood culture [[id number]] Collected: 08/25/22 1615    Order Status: Completed Specimen: Blood from  Peripheral, Antecubital, Left Updated: 08/27/22 1812     Blood Culture, Routine No Growth to date      No Growth to date      No Growth to date    Gram stain [[id number]] Collected: 08/27/22 1443    Order Status: Canceled Specimen: CSF (Spinal Fluid) from CSF Tap, Tube 1     Urine culture [[id number]]  (Abnormal) Collected: 08/24/22 1714    Order Status: Completed Specimen: Urine Updated: 08/25/22 1949     Urine Culture, Routine VIRIDANS STREPTOCOCCUS GROUP  > 100,000 cfu/ml  Susceptibility testing not routinely performed      Narrative:      Specimen Source->Urine    Blood culture [[id number]] Collected: 08/20/22 1413    Order Status: Completed Specimen: Blood Updated: 08/25/22 1612     Blood Culture, Routine No growth after 5 days.    Blood culture [[id number]] Collected: 08/20/22 1414    Order Status: Completed Specimen: Blood Updated: 08/25/22 1612     Blood Culture, Routine No growth after 5 days.    Urine culture [[id number]]  (Abnormal)  (Susceptibility) Collected: 08/19/22 1708    Order Status: Completed Specimen: Urine Updated: 08/22/22 1349     Urine Culture, Routine ESCHERICHIA COLI ESBL  > 100,000 cfu/ml      Narrative:      Specimen Source->Urine            Significant Imaging: I have reviewed all pertinent imaging results/findings within the past 24 hours.

## 2022-08-29 PROBLEM — Z51.5 PALLIATIVE CARE ENCOUNTER: Status: ACTIVE | Noted: 2022-01-01

## 2022-08-29 NOTE — HPI
67 year old lady with alcoholic cirrhosis on lactulose and seizure disorder who presented to the ED for AMS and fatigue. 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 Admitted to hospital medicine for encephalopathy. EEG on admission unremarkable for seizure activity, consistent with encephalopathy that could be infectious vs metabolic vs primary neuronal in nature. Blood and urine cultures growing ESBL e coli. She was initially started on meropenem, however consulted ID who recommended de-escalating to ertapenem and to complete two week course in total. CT head on 8/22 was unremarkable for acute change. Given bilateral hand weakness, MRI was ordered to r/o spinal cord compression. On 8/24 AM, pt was rapid response 2/2 to hypotension. She received 1 L of NS and remains in refractory hypotension. Pt is transferred to MICU for shock.      Hospital/ICU Course:  Admitted to MICU for septic shock despite what would have been adequate treatment on the floor. Central line and A line placed for shock, levo and vaso started, meropenem continued and ID consulted. Patient's blood cutltures ngtd (coag negative staph in 1 bottle) but urine with strep viridans. Despite abx, patient remained in shock, and remained altered and mostly unresponsive though protecting her airway. Pan-scan was mostly unrevealing for infective foci. GI consulted for assistance with portal enteropathy vs colitis on CT - leaning towards portal disease, no further plans from their perspective. LP performed for AMS was not consistent with infection. Neurology consulted for AMS, recommending repeat extended EEG and MRI. Due to extended period of illness and overall poor prognosis, palliative care was involved.      Interval History: Patient does not awake to voice or touch. She only withdraws to pain. On levo 0.12. Continues to be very volatile requiring varying doses of vasopressors and O2.    In this setting, palliative medicine was consulted to help with symptom  management, medical decision making, and aiding in the formation of goals of care.

## 2022-08-29 NOTE — CONSULTS
[facility] - Cardiac Medical ICU  Palliative Medicine  Consult Note    Patient Name: Aleyda Floyd  MRN: [id number]  Admission Date: 8/17/2022  Hospital Length of Stay: 10 days  Code Status: Full Code   Attending Provider: Osmel Daigle MD  Consulting Provider: Orlando Yeager MD  Primary Care Physician: Elvie Fernandes MD  Principal Problem:Septic shock    Patient information was obtained from relative(s), past medical records and primary team.      Consults  Assessment/Plan:     Palliative care encounter  Assessment: 67F deaf and mute woman with hx ETOH cirrhosis, end-stage liver disease, seizure disorder transferred to ICU for worsening encephalopathy and hypotension requiring ongoing pressors.  Now with colitis vs congestion related to her liver disease and portal hypertension in the setting of hypoalbuminemia. Currently undergoing workup including impending EEG and MRI.     In this setting, palliative medicine was consulted to help with symptom management, medical decision making, and aiding in the formation of goals of care.     1) Symptoms: ongoing encephalopathy without non verbal signs of pain/distress    2) Code status: FC; did not specifically discuss today    3) Psychosocial: ;  Jaime and son Steve are both also deaf and mute;   Sister is able to speak and hear, Lay Brennanalphonsogilda, [phone]    4) Medicolegal: no ACP/LW; legally ;  is surrogate DM    5) Spiritual: Yazidi;  visiting this evening    6) Goals of care/discussion:  Discussion by phone/voice recognition phone software and utilizing the pt's sister via [employer] DX to introduce the role of palliative medicine and supportive care in the ICU in the setting of a serious diagnosis of multisystem organ dysfunction and failure. The family was able to demonstrate an excellent understanding of the diagnosis, current care plan, hope for improvement and concern for worsening of the pt's condition.    Experience with  critical illness: pt has been ill for the past few months to years but never this sick per family;      Understanding of illness: they understand she is critically ill    Prognosis: concerning for days to weeks    Present for discussion:  and son; sister by phone    Goals of care:  -planing for follow up conversations with family in the days to come for GOC once additional information obtained  -attempted to use video interpretor to no avail and communicated through voice recognition translation to text and pt's sister by Face Time; iPad/Jose  not functioning  -family has a good understanding that she is critically ill and her life is threatened without going into further detail  -they have asked questions about autopsy and post mortem care signifying their worries about her death but conveying hope that she can still recover  -shared my hopes and worries about her outcomes  -family woul dbery much appreciate planned ASL inperson interpretors for daily updates and discussions about expectations look ahead    7) Disposition plan: TBD    8)Summary of recommendations and follow up plan:  -Most important goals at this time are curative/life-prolongation ifi mprovement in condition is possible  -Code status: *FC; did not discuss non initial visit todaty  -Palliative care to provide emotional support and assist with communication regarding disease expectations and trajectory, and with medical decision-making, at the appropriate time  -Most appropriate disposition: continue with the current LOC for now  -arranging to have ASL medical interpretor present each day between 2-4pm for updates and planned family discussions    Discussed with pulm/cc, neuro, ID, and nursing.      Thank you for the opportunity to care for this patient and family.     Please call with questions.     Orlando Yeager MD  Palliative Medicine   [facility]  [phone] (cell)            Thank you for your consult. I will  follow-up with patient. Please contact us if you have any additional questions.    Subjective:     HPI:   67 year old lady with alcoholic cirrhosis on lactulose and seizure disorder who presented to the ED for AMS and fatigue. 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 Admitted to hospital medicine for encephalopathy. EEG on admission unremarkable for seizure activity, consistent with encephalopathy that could be infectious vs metabolic vs primary neuronal in nature. Blood and urine cultures growing ESBL e coli. She was initially started on meropenem, however consulted ID who recommended de-escalating to ertapenem and to complete two week course in total. CT head on 8/22 was unremarkable for acute change. Given bilateral hand weakness, MRI was ordered to r/o spinal cord compression. On 8/24 AM, pt was rapid response 2/2 to hypotension. She received 1 L of NS and remains in refractory hypotension. Pt is transferred to MICU for shock.      Hospital/ICU Course:  Admitted to MICU for septic shock despite what would have been adequate treatment on the floor. Central line and A line placed for shock, levo and vaso started, meropenem continued and ID consulted. Patient's blood cutltures ngtd (coag negative staph in 1 bottle) but urine with strep viridans. Despite abx, patient remained in shock, and remained altered and mostly unresponsive though protecting her airway. Pan-scan was mostly unrevealing for infective foci. GI consulted for assistance with portal enteropathy vs colitis on CT - leaning towards portal disease, no further plans from their perspective. LP performed for AMS was not consistent with infection. Neurology consulted for AMS, recommending repeat extended EEG and MRI. Due to extended period of illness and overall poor prognosis, palliative care was involved.      Interval History: Patient does not awake to voice or touch. She only withdraws to pain. On levo 0.12. Continues to be very volatile requiring varying  doses of vasopressors and O2.    In this setting, palliative medicine was consulted to help with symptom management, medical decision making, and aiding in the formation of goals of care.         Hospital Course:  No notes on file    Interval History: pt seen at bedside;  and son at bedside and communicate with sign language    Past Medical History:   Diagnosis Date    PITO (acute kidney injury)     Alcoholic cirrhosis of liver     Hypercholesterolemia     Hypertension        Past Surgical History:   Procedure Laterality Date    DILATION AND CURETTAGE OF UTERUS      ESOPHAGOGASTRODUODENOSCOPY N/A 6/23/2022    Procedure: EGD (ESOPHAGOGASTRODUODENOSCOPY);  Surgeon: Sean Perry MD;  Location: [address]);  Service: Endoscopy;  Laterality: N/A;  cirrhosis, variceal screening  labs prior  -request sent 6/14  fully vaccinated, instructions emailed to [email or web address]       Review of patient's allergies indicates:  No Known Allergies    Medications:  Continuous Infusions:   NORepinephrine bitartrate-D5W 0.14 mcg/kg/min (08/29/22 1200)    vasopressin 0.04 Units/min (08/27/22 1309)     Scheduled Meds:   fludrocortisone  100 mcg Per NG tube Daily    folic acid  1 mg Per NG tube Daily    hydrocortisone sodium succinate  100 mg Intravenous Q8H    lactulose  20 g Per NG tube TID    levetiracetam  750 mg Per NG tube BID    meropenem (MERREM) IVPB  2 g Intravenous Q12H    NORepinephrine        NORepinephrine        pantoprazole  40 mg Per NG tube Daily    rifAXIMin  550 mg Per NG tube BID    thiamine  100 mg Per NG tube Daily    vancomycin (VANCOCIN) IVPB  750 mg Intravenous Q24H     PRN Meds:acetaminophen, albuterol-ipratropium, dextrose 10%, dextrose 10%, glucagon (human recombinant), glucose, glucose, insulin aspart U-100, melatonin, naloxone, ondansetron, prochlorperazine, sodium chloride 0.9%, sodium chloride 0.9%, Pharmacy to dose Vancomycin consult  **AND** vancomycin - pharmacy to dose    Family History       Problem Relation (Age of Onset)    Breast cancer Maternal Grandmother          Tobacco Use    Smoking status: Former     Packs/day: 1.00     Years: 15.00     Pack years: 15.00     Types: Cigarettes     Quit date: 1998     Years since quittin.7    Smokeless tobacco: Never   Substance and Sexual Activity    Alcohol use: Not Currently    Drug use: Not Currently    Sexual activity: Not on file       Review of Systems   Unable to perform ROS: Mental status change   Objective:     Vital Signs (Most Recent):  Temp: 97.5 °F (36.4 °C) (22 1105)  Pulse: 69 (22 1200)  Resp: 11 (22 1200)  BP: (!) 122/50 (22 0705)  SpO2: 100 % (22 1200)   Vital Signs (24h Range):  Temp:  [97.5 °F (36.4 °C)-98.1 °F (36.7 °C)] 97.5 °F (36.4 °C)  Pulse:  [62-78] 69  Resp:  [9-22] 11  SpO2:  [97 %-100 %] 100 %  Arterial Line BP: (111-142)/(37-50) 138/42     Weight: 56.7 kg (125 lb)  Body mass index is 23.62 kg/m².    Physical Exam  Vitals and nursing note reviewed.   Constitutional:       General: She is not in acute distress.     Appearance: Normal appearance. She is not ill-appearing.   HENT:      Head: Normocephalic and atraumatic.      Nose: Nose normal.      Mouth/Throat:      Mouth: Mucous membranes are moist.   Eyes:      General: No scleral icterus.     Extraocular Movements: Extraocular movements intact.      Conjunctiva/sclera: Conjunctivae normal.      Pupils: Pupils are equal, round, and reactive to light.   Cardiovascular:      Rate and Rhythm: Normal rate and regular rhythm.      Pulses: Normal pulses.      Heart sounds: Normal heart sounds. No murmur heard.  Pulmonary:      Effort: Pulmonary effort is normal. No respiratory distress.      Breath sounds: Normal breath sounds. No wheezing, rhonchi or rales.   Abdominal:      General: Bowel sounds are normal.      Palpations: Abdomen is soft.      Tenderness: There is no abdominal  tenderness. There is no guarding or rebound.   Musculoskeletal:         General: No swelling or tenderness. Normal range of motion.      Right lower leg: Edema (trace at ankles) present.      Left lower leg: Edema (trace at ankles) present.   Skin:     General: Skin is warm and dry.      Capillary Refill: Capillary refill takes less than 2 seconds.      Coloration: Skin is pale. Skin is not jaundiced.      Findings: Lesion (tips of fingers where BG checks are performed) present. No erythema.   Neurological:      Mental Status: She is alert.      GCS: GCS eye subscore is 4. GCS verbal subscore is 1. GCS motor subscore is 6.      Comments: Obtunded; withdraws to pain     Review of Symptoms      Symptom Assessment (ESAS 0-10 Scale)  Unable to complete assessment due to Mental status change     CAM / Delirium:  Negative  Constipation:  Negative  Diarrhea:  Negative      Pain Assessment in Advanced Demential Scale (PAINAD)   Breathing - Independent of vocalization:  0  Negative vocalization:  0  Facial expression:  0  Body language:  0  Consolability:  0  Total:  0    Living Arrangements:  Lives with spouse and Lives with family    Psychosocial/Cultural:  to  Jaime Edouard son Steve (adult)  both deaf and mute; relies on sign language for communication    Sister Lay [phone]  able to talk on phone (hearing abled)    Spiritual:  F - Char and Belief:  Baptist  I - Importance:  Yes  A - Address in Care:  [address]      Advance Care Planning   Advance Directives:   Living Will: No    LaPOST: No    Do Not Resuscitate Status: No    Medical Power of : No      Decision Making:  Family answered questions and Patient unable to communicate due to disease severity/cognitive impairment       Significant Labs: All pertinent labs within the past 24 hours have been reviewed.  CBC:   Recent Labs   Lab 08/29/22  0250   WBC 26.31*   HGB 8.4*   HCT 25.6*   MCV 92   PLT 89*     BMP:  Recent Labs  "  Lab 08/29/22  0250   *      K 4.1      CO2 25   BUN 72*   CREATININE 1.3   CALCIUM 8.7   MG 2.7*     LFT:  Lab Results   Component Value Date    AST 94 (H) 08/29/2022    ALKPHOS 147 (H) 08/29/2022    BILITOT 1.1 (H) 08/29/2022     Albumin:   Albumin   Date Value Ref Range Status   08/29/2022 1.9 (L) 3.5 - 5.2 g/dL Final     Protein:   Total Protein   Date Value Ref Range Status   08/29/2022 6.3 6.0 - 8.4 g/dL Final     Lactic acid:   Lab Results   Component Value Date    LACTATE 1.6 08/27/2022    LACTATE 1.9 08/26/2022       Significant Imaging: I have reviewed all pertinent imaging results/findings within the past 24 hours.      Imaging Results              X-Ray Chest AP Portable (Final result)  Result time 08/18/22 03:02:23      Final result by Matthew Navarrete MD (08/18/22 03:02:23)                   Impression:      Low lung volumes with probable bibasilar subsegmental atelectasis and mild enlargement of the cardiac silhouette.  No large focal consolidation or detrimental change when compared with 06/28/2022.      Electronically signed by: Matthew Navarrete MD  Date:    08/18/2022  Time:    03:02               Narrative:    EXAMINATION:  XR CHEST AP PORTABLE    CLINICAL HISTORY:  Provided history is "?pneumonia;  ".    TECHNIQUE:  One view of the chest.    COMPARISON:  06/28/2022.    FINDINGS:  Cardiac silhouette is enlarged and similar to the prior study.  Atherosclerotic calcifications overlie the aortic arch.  Lung volumes are relatively low.  There are coarsened interstitial lung markings but no large focal area of consolidation.  Bibasilar subsegmental atelectasis is again suspected.  No large pleural effusion.  No pneumothorax.                                       CT Head Without Contrast (Final result)  Result time 08/18/22 00:27:26      Final result by Arjun Chavez MD (08/18/22 00:27:26)                   Impression:      No CT evidence of acute intracranial abnormality. " Clinical correlation and further evaluation as warranted.    Chronic senescent and microvascular ischemic changes.      Electronically signed by: Arjun Chavez MD  Date:    08/18/2022  Time:    00:27               Narrative:    EXAMINATION:  CT HEAD WITHOUT CONTRAST    CLINICAL HISTORY:  Mental status change, unknown cause;unable to move arms to communicate with ASL;    TECHNIQUE:  Low dose axial images were obtained through the head.  Coronal and sagittal reformations were also performed. Contrast was not administered.    COMPARISON:  CTA 07/10/2022, MRI brain 07/10/2022    FINDINGS:  There is generalized cerebral volume loss with compensatory sulcal widening and ventricular enlargement.  There is patchy periventricular white matter hypoattenuation which is nonspecific although may relate to sequelae of chronic microvascular ischemic change.  There is no evidence of acute intracranial hemorrhage or midline shift.  No extra-axial collections identified.  The basal cisterns are patent. There is trace opacification of the left mastoid air cells.  The remaining paranasal sinuses appear relatively well aerated.  The visualized bones of the calvarium demonstrate no acute osseous abnormality.                                          > 50% of 112 min visit spent in chart review, face to face discussion of goals of care,  symptom assessment, coordination of care and emotional support.    Orlando Yeager MD  Palliative Medicine  [facility] - Cardiac Medical ICU

## 2022-08-29 NOTE — PROGRESS NOTES
[facility] - [facility]  Critical Care Medicine  Progress Note    Patient Name: Aleyda Floyd  MRN: [id number]  Admission Date: 8/17/2022  Hospital Length of Stay: 10 days  Code Status: Full Code  Attending Provider: Osmel Daigle MD  Primary Care Provider: Elvie Fernandes MD   Principal Problem: Septic shock    Subjective:     HPI:  67 year old lady with alcoholic cirrhosis on lactulose and seizure disorder who presented to the ED for AMS and fatigue. 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 Admitted to hospital medicine for encephalopathy. EEG on admission unremarkable for seizure activity, consistent with encephalopathy that could be infectious vs metabolic vs primary neuronal in nature. Blood and urine cultures growing ESBL e coli. She was initially started on meropenem, however consulted ID who recommended de-escalating to ertapenem and to complete two week course in total. CT head on 8/22 was unremarkable for acute change. Given bilateral hand weakness, MRI was ordered to r/o spinal cord compression. On 8/24 AM, pt was rapid response 2/2 to hypotension. She received 1 L of NS and remains in refractory hypotension. Pt is transferred to MICU for shock.     Hospital/ICU Course:  Admitted to MICU for septic shock despite what would have been adequate treatment on the floor. Central line and A line placed for shock, levo and vaso started, meropenem continued and ID consulted. Patient's blood cutltures ngtd (coag negative staph in 1 bottle) but urine with strep viridans. Despite abx, patient remained in shock, and remained altered and mostly unresponsive though protecting her airway. Pan-scan was mostly unrevealing for infective foci. GI consulted for assistance with portal enteropathy vs colitis on CT - leaning towards portal disease, no further plans from their perspective. LP performed for AMS was not consistent with infection. Neurology consulted for AMS, recommending repeat extended EEG and MRI. Due  to extended period of illness and overall poor prognosis, palliative care was involved.     Interval History: Patient does not awake to voice or touch. She only withdraws to pain. On levo 0.12. Continues to be very volatile requiring varying doses of vasopressors and O2.    Review of Systems   Unable to perform ROS: Patient nonverbal     Objective:     Vital Signs (Most Recent):  Temp: 97.5 °F (36.4 °C) (08/29/22 1105)  Pulse: 69 (08/29/22 1200)  Resp: 11 (08/29/22 1200)  BP: (!) 122/50 (08/27/22 0705)  SpO2: 100 % (08/29/22 1200)   Vital Signs (24h Range):  Temp:  [97.4 °F (36.3 °C)-98.1 °F (36.7 °C)] 97.5 °F (36.4 °C)  Pulse:  [62-78] 69  Resp:  [9-22] 11  SpO2:  [97 %-100 %] 100 %  Arterial Line BP: (111-142)/(37-50) 138/42   Weight: 56.7 kg (125 lb)  Body mass index is 23.62 kg/m².      Intake/Output Summary (Last 24 hours) at 8/29/2022 1319  Last data filed at 8/29/2022 1200  Gross per 24 hour   Intake 1640.01 ml   Output 1490 ml   Net 150.01 ml       Physical Exam  Vitals and nursing note reviewed.   Constitutional:       General: She is not in acute distress.     Appearance: Normal appearance. She is not ill-appearing.   HENT:      Head: Normocephalic and atraumatic.      Nose: Nose normal.      Mouth/Throat:      Mouth: Mucous membranes are moist.   Eyes:      General: No scleral icterus.     Extraocular Movements: Extraocular movements intact.      Conjunctiva/sclera: Conjunctivae normal.      Pupils: Pupils are equal, round, and reactive to light.   Cardiovascular:      Rate and Rhythm: Normal rate and regular rhythm.      Pulses: Normal pulses.      Heart sounds: Normal heart sounds. No murmur heard.  Pulmonary:      Effort: Pulmonary effort is normal. No respiratory distress.      Breath sounds: Normal breath sounds. No wheezing, rhonchi or rales.   Abdominal:      General: Bowel sounds are normal.      Palpations: Abdomen is soft.      Tenderness: There is no abdominal tenderness. There is no guarding  or rebound.   Musculoskeletal:         General: No swelling or tenderness. Normal range of motion.      Right lower leg: Edema (trace at ankles) present.      Left lower leg: Edema (trace at ankles) present.   Skin:     General: Skin is warm and dry.      Capillary Refill: Capillary refill takes less than 2 seconds.      Coloration: Skin is pale. Skin is not jaundiced.      Findings: Lesion (tips of fingers where BG checks are performed) present. No erythema.   Neurological:      Mental Status: She is alert.      GCS: GCS eye subscore is 4. GCS verbal subscore is 1. GCS motor subscore is 6.      Comments: Obtunded; withdraws to pain       Vents:  Oxygen Concentration (%): 35 (08/25/22 1100)  Lines/Drains/Airways       Central Venous Catheter Line  Duration             Percutaneous Central Line Insertion/Assessment - Triple Lumen  08/24/22 1000 left internal jugular 5 days              Drain  Duration                  Urethral Catheter 08/24/22 0900 5 days         NG/OG Tube 08/24/22 1800 Left nostril 4 days         Rectal Tube 08/26/22 1629 fecal management system 2 days              Arterial Line  Duration             Arterial Line 08/24/22 1644 Left Femoral 4 days              Peripheral Intravenous Line  Duration                  Peripheral IV - Single Lumen 08/24/22 0653 20 G Left Forearm 5 days                  Significant Labs:    CBC/Anemia Profile:  Recent Labs   Lab 08/28/22  0406 08/29/22  0250   WBC 32.55* 26.31*   HGB 8.2* 8.4*   HCT 24.2* 25.6*   PLT 74* 89*   MCV 90 92   RDW 17.5* 17.6*          Chemistries:  Recent Labs   Lab 08/28/22  0406 08/29/22  0250    138   K 4.2 4.1    106   CO2 27 25   BUN 58* 72*   CREATININE 1.4 1.3   CALCIUM 8.7 8.7   ALBUMIN 1.9* 1.9*   PROT 6.2 6.3   BILITOT 1.2* 1.1*   ALKPHOS 132 147*   ALT 44 57*   AST 82* 94*   MG 2.5 2.7*   PHOS 2.0* 2.3*         All pertinent labs within the past 24 hours have been reviewed.    Significant Imaging: I have reviewed all  pertinent imaging results/findings within the past 24 hours.      ABG  Recent Labs   Lab 08/26/22  0747   PH 7.399   PO2 102*   PCO2 31.2*   HCO3 19.3*   BE -6     Assessment/Plan:     Neuro  Acute encephalopathy  67 yoF with cirrhosis with ascites and seizure disorder presents with AMS and fatigue  Labs showed ammonia of 36  CT head showed no acute intracranial abnormality  Possible cause of encephalopathy is multifactorial: infection vs neurological vs metabolic  EEG on admit remarkable for encephalopathy, no other significant findings  UCx and BCx positive for ESBL e coli sensitive to only meropenem and ertapenem.      Although she has been treated for a few days with the right antibiotics, her mental status has been slow to improve  She is no longer alert and does not open eyes or awaken  CT head showing chronic microvascular changes, no acute changes  CK normal, rhabdo or myositis less likely   Cortisol normal, weakness from adrenal insufficiency less likely latoya since BPs have been relatively normal   EEG showed mild generalized cerebral dysfunction as is commonly seen in a variety of states of toxic and metabolic derangement     Plan:  - D/C home Clobazam. Continue keppra for seizures  - continue lactulose and rifaximin   - Discontinue lasix and aldactone for BP and volume down status  - continue thiamine and folic acid  - lumbar puncture non-infectious, unremarkable  - Neurology recommended an MRI, EEG, and to continue Keppra  - Discuss with family and consider palliative care and hospice moving forward    ENT  Deaf- written communication   used when able to communicate    Renal/  UTI (urinary tract infection)  See gram negative bacteremia    ID  * Septic shock  This patient does have evidence of infective focus  My overall impression is septic shock.  Source: Urine, Blood  Antibiotics given-   Antibiotics (From admission, onward)    Start     Stop Route Frequency Ordered    08/27/22 1430   vancomycin 750 mg in dextrose 5 % 250 mL IVPB (ready to mix system)         -- IV Every 24 hours (non-standard times) 08/27/22 1320    08/27/22 0345  vancomycin - pharmacy to dose  (vancomycin IVPB)        See [facility] for full Linked Orders Report.    -- IV pharmacy to manage frequency 08/27/22 0245    08/26/22 2030  meropenem (MERREM) 2 g in sodium chloride 0.9% 100 mL IVPB         -- IV Every 12 hours (non-standard times) 08/26/22 0949    08/25/22 2100  rifAXIMin tablet 550 mg         -- PER NG TUBE 2 times daily 08/25/22 1136    08/24/22 2100  mupirocin 2 % ointment         08/29 2059 Nasl 2 times daily 08/24/22 1226        Latest lactate reviewed-  Recent Labs   Lab 08/26/22  1610 08/26/22  2225 08/27/22  0425   LACTATE 1.9 1.9 1.6     Organ dysfunction indicated by encephalopathy    Shock with decreased perfusion noted, Fluid challenge was not given at 30cc/kg due to decreased urine output    Post- resuscitation assessment- (Done after fluids given for shock)  Vital signs post fluid administrations were-  Temp Readings from Last 1 Encounters:   08/28/22 97.8 °F (36.6 °C) (Axillary)     BP Readings from Last 1 Encounters:   08/27/22 (!) 122/50     Pulse Readings from Last 1 Encounters:   08/28/22 66       Perfusion exam was performed within 6 hours of septic shock presentation after bolus shows Inadequate tissue perfusion assessed by non-invasive monitoring  Will Start Pressors- Levophed for MAP of 65    - F/U blood cultures, urine culture  - Continue meropenem only as per ID recommendations  - Continue stress dose steroids   - Re-consult ID for worsening shock  - Levophed and vaso for MAP >65       Gram-negative bacteremia  Had UCx and BCx positive for ESBL e coli sensitive only to meropenem and ertapenem. Most recent UCx is positive for strep viridans which will be treated with meropenem. WBC went up to 50 today so will trend.  - Meropenem as per ID recommendations  - CT Chest showed possible superimposed  aspiration or PNA in left lower lobe  - CT abdomen showed thickening of bladder and colon. Liver has a hypodensity which we will consider further evaluating with US or MRI of abdomen      Hematology  Thrombocytopenia  Hgb on admission 7.9 and platelets of 117  Hgb baseline around 7.5-8.5 and platelets around 50s-60s. Current Hgb 8.6 platelets 112.     Plan:  - trend Hb and Plt daily   - continue to monitor for signs of bleeding    Other  Suspected deep tissue injury  Red/maroon/purple discoloration and blistering noted to sacral region.    - Wound care consulted  -CT scan shows no evidence of infection         Critical Care Daily Checklist:    A: Awake: RASS Goal/Actual Goal:    Actual: [place] Agitation Sedation Scale (RASS): Drowsy   B: Spontaneous Breathing Trial Performed?     C: SAT & SBT Coordinated?  n/a                      D: Delirium: CAM-ICU Overall CAM-ICU: Positive   E: Early Mobility Performed? No   F: Feeding Goal: Goals: Meet % EEN, EPN by RD f/u date  Status: Nutrition Goal Status: new   Current Diet Order   No orders of the defined types were placed in this encounter.      AS: Analgesia/Sedation off   T: Thromboembolic Prophylaxis yes   H: HOB > 300 Yes   U: Stress Ulcer Prophylaxis (if needed) yes   G: Glucose Control yes   B: Bowel Function Stool Occurrence: 1   I: Indwelling Catheter (Lines & Beckham) Necessity Beckham, PIV   D: De-escalation of Antimicrobials/Pharmacotherapies no    Plan for the day/ETD F/u MRI, EEG    Code Status:  Family/Goals of Care: Full Code     Critical secondary to Patient has a condition that poses threat to life and bodily function: severe encephalopathy      Critical care was time spent personally by me on the following activities: development of treatment plan with patient or surrogate and bedside caregivers, discussions with consultants, evaluation of patient's response to treatment, examination of patient, ordering and performing treatments and interventions,  ordering and review of laboratory studies, ordering and review of radiographic studies, pulse oximetry, re-evaluation of patient's condition. This critical care time did not overlap with that of any other provider or involve time for any procedures.     Yasmin Rahman MD  Critical Care Medicine  [facility] - Cardiac Medical ICU

## 2022-08-29 NOTE — SUBJECTIVE & OBJECTIVE
Interval History: increased levo dose, remains unresponsive, plans for MRI brain today, palliative care consulted to assist w/ goals of care    Review of Systems   Unable to perform ROS: Acuity of condition   Objective:     Vital Signs (Most Recent):  Temp: 97.5 °F (36.4 °C) (08/29/22 1105)  Pulse: 69 (08/29/22 1200)  Resp: 11 (08/29/22 1200)  BP: (!) 122/50 (08/27/22 0705)  SpO2: 100 % (08/29/22 1200)   Vital Signs (24h Range):  Temp:  [97.4 °F (36.3 °C)-98.1 °F (36.7 °C)] 97.5 °F (36.4 °C)  Pulse:  [62-78] 69  Resp:  [9-22] 11  SpO2:  [97 %-100 %] 100 %  Arterial Line BP: (111-142)/(37-50) 138/42     Weight: 56.7 kg (125 lb)  Body mass index is 23.62 kg/m².    Estimated Creatinine Clearance: 31.7 mL/min (based on SCr of 1.3 mg/dL).    Physical Exam  Constitutional:       General: She is not in acute distress.     Appearance: She is well-developed. She is ill-appearing. She is not diaphoretic.   HENT:      Head: Normocephalic and atraumatic.      Right Ear: External ear normal.      Left Ear: External ear normal.      Nose: Nose normal.   Eyes:      General: No scleral icterus.        Right eye: No discharge.         Left eye: No discharge.      Extraocular Movements: Extraocular movements intact.      Conjunctiva/sclera: Conjunctivae normal.   Cardiovascular:      Rate and Rhythm: Normal rate and regular rhythm.      Heart sounds: Normal heart sounds.   Pulmonary:      Effort: Pulmonary effort is normal. No respiratory distress.      Breath sounds: No stridor.   Abdominal:      General: Abdomen is flat.      Palpations: Abdomen is soft.      Comments: Liquid stool in rectal tube   Musculoskeletal:         General: Normal range of motion.   Skin:     Findings: No erythema or rash.      Comments: Skin tear LUE   Neurological:      Comments: Does not respond    Psychiatric:      Comments: Unable to assess     Significant Labs: CBC:   Recent Labs   Lab 08/28/22  0406 08/29/22  0250   WBC 32.55* 26.31*   HGB 8.2* 8.4*    HCT 24.2* 25.6*   PLT 74* 89*     CMP:   Recent Labs   Lab 08/28/22  0406 08/29/22  0250    138   K 4.2 4.1    106   CO2 27 25   * 159*   BUN 58* 72*   CREATININE 1.4 1.3   CALCIUM 8.7 8.7   PROT 6.2 6.3   ALBUMIN 1.9* 1.9*   BILITOT 1.2* 1.1*   ALKPHOS 132 147*   AST 82* 94*   ALT 44 57*   ANIONGAP 6* 7*     Microbiology Results (last 7 days)       Procedure Component Value Units Date/Time    Blood culture [[id number]] Collected: 08/24/22 0602    Order Status: Completed Specimen: Blood Updated: 08/29/22 0812     Blood Culture, Routine No growth after 5 days.    Blood culture [[id number]] Collected: 08/24/22 0601    Order Status: Completed Specimen: Blood Updated: 08/29/22 0812     Blood Culture, Routine No growth after 5 days.    CSF culture [[id number]] Collected: 08/27/22 1443    Order Status: Completed Specimen: CSF (Spinal Fluid) from CSF Tap, Tube 1 Updated: 08/29/22 0733     CSF CULTURE No Growth to date     Gram Stain Result Cytospin indicates:      Rare WBC's      No organisms seen    Blood culture [[id number]] Collected: 08/26/22 2213    Order Status: Completed Specimen: Blood from Peripheral, Antecubital, Left Updated: 08/29/22 0612     Blood Culture, Routine No Growth to date      No Growth to date      No Growth to date    Blood culture [[id number]] Collected: 08/26/22 2202    Order Status: Completed Specimen: Blood from Peripheral, Antecubital, Right Updated: 08/29/22 0612     Blood Culture, Routine No Growth to date      No Growth to date      No Growth to date    Blood culture [[id number]] Collected: 08/25/22 1615    Order Status: Completed Specimen: Blood from Peripheral, Antecubital, Left Updated: 08/28/22 1812     Blood Culture, Routine No Growth to date      No Growth to date      No Growth to date      No Growth to date    Stool culture [[id number]] Collected: 08/28/22 1507    Order Status: Sent Specimen: Stool Updated: 08/28/22 1552    E. coli 0157 antigen [[id number]] Collected:  08/28/22 1507    Order Status: No result Specimen: Stool Updated: 08/28/22 1552    Blood culture [[id number]]  (Abnormal) Collected: 08/25/22 1616    Order Status: Completed Specimen: Blood from Peripheral, Antecubital, Right Updated: 08/28/22 1226     Blood Culture, Routine Gram stain aer bottle: Gram positive cocci in clusters resembling Staph      Results called to and read back by: Verona Younger RN 08/26/2022  21:36      COAGULASE-NEGATIVE STAPHYLOCOCCUS SPECIES  Organism is a probable contaminant      Clostridium difficile EIA [[id number]] Collected: 08/27/22 1501    Order Status: Completed Specimen: Stool Updated: 08/28/22 0020     C. diff Antigen Negative     C difficile Toxins A+B, EIA Negative     Comment: Testing not recommended for children <24 months old.       Gram stain [[id number]] Collected: 08/27/22 1443    Order Status: Canceled Specimen: CSF (Spinal Fluid) from CSF Tap, Tube 1     Urine culture [[id number]]  (Abnormal) Collected: 08/24/22 1714    Order Status: Completed Specimen: Urine Updated: 08/25/22 1949     Urine Culture, Routine VIRIDANS STREPTOCOCCUS GROUP  > 100,000 cfu/ml  Susceptibility testing not routinely performed      Narrative:      Specimen Source->Urine    Blood culture [[id number]] Collected: 08/20/22 1413    Order Status: Completed Specimen: Blood Updated: 08/25/22 1612     Blood Culture, Routine No growth after 5 days.    Blood culture [[id number]] Collected: 08/20/22 1414    Order Status: Completed Specimen: Blood Updated: 08/25/22 1612     Blood Culture, Routine No growth after 5 days.            Significant Imaging: I have reviewed all pertinent imaging results/findings within the past 24 hours.

## 2022-08-29 NOTE — PT/OT/SLP DISCHARGE
Physical Therapy Discharge Summary    Name: Aleyda Floyd  MRN: [id number]   Principal Problem: Septic shock     Patient Discharged from acute Physical Therapy on 22.  Please refer to prior PT noted date on 22 for functional status.     Assessment:     Pt has been unarrousable for the last several attempts and per RN is having palliative care consult today to begin discussing GOC. Please reorder PT if pt becomes appropriate for therapy.    Objective:     GOALS:   Multidisciplinary Problems       Physical Therapy Goals          Problem: Physical Therapy    Goal Priority Disciplines Outcome Goal Variances Interventions   Physical Therapy Goal     PT, PT/OT Ongoing, Progressing     Description: Goals to be met by: 2022    Patient will increase functional independence with mobility by performin. Supine to sit with Maximum Assistance  2. Rolling to Left and Right with Maximum Assistance.  3. Sit to stand transfer with Maximum Assistance using LRAD  4. Gait  x 10 feet with Maximum Assistance using LRAD  5. Stand for 3 minutes with Maximum Assistance using LRAD  6. Lower extremity exercise program x10 reps per handout, with assistance as needed                         Reasons for Discontinuation of Therapy Services  Patient is unable to continue work toward goals because of medical or psychosocial complications.      Plan:     Patient Discharged to: Palliative Care/Hospice.      2022

## 2022-08-29 NOTE — SUBJECTIVE & OBJECTIVE
Past Medical History:   Diagnosis Date    PITO (acute kidney injury)     Alcoholic cirrhosis of liver     Hypercholesterolemia     Hypertension        Past Surgical History:   Procedure Laterality Date    DILATION AND CURETTAGE OF UTERUS      ESOPHAGOGASTRODUODENOSCOPY N/A 6/23/2022    Procedure: EGD (ESOPHAGOGASTRODUODENOSCOPY);  Surgeon: Sean Perry MD;  Location: [address]);  Service: Endoscopy;  Laterality: N/A;  cirrhosis, variceal screening  labs prior  -request sent 6/14  fully vaccinated, instructions emailed to [email or web address]       Review of patient's allergies indicates:  No Known Allergies    Current Neurological Medications: Keppra 750 BID    No current facility-administered medications on file prior to encounter.     Current Outpatient Medications on File Prior to Encounter   Medication Sig    acetaminophen (TYLENOL) 325 MG tablet Take 650 mg by mouth daily as needed for Pain. NOT TO EXCEED 4 GRAMS IN 24 HOUR PERIOD    cholecalciferol, vitamin D3, (VITAMIN D3) 50 mcg (2,000 unit) Cap capsule Take 1 capsule by mouth once daily.    cholestyramine-aspartame (QUESTRAN LIGHT) 4 gram PwPk Take 4 g by mouth once daily.    folic acid (FOLVITE) 1 MG tablet Take 1 mg by mouth once daily.     furosemide (LASIX) 40 MG tablet Take 1 tablet (40 mg total) by mouth 2 (two) times daily.    lactulose (CHRONULAC) 20 gram/30 mL Soln Take 30 mLs (20 g total) by mouth 2 (two) times daily. (Patient taking differently: ADMINISTER 30 ML RECTALLY TWICE DAILY. TO BE ADDED INTO CLEANSING ENEMA SET WITH 700 ML NORMAL SALINE)    levETIRAcetam (KEPPRA) 750 MG Tab Take 1 tablet (750 mg total) by mouth 2 (two) times daily.    nystatin (MYCOSTATIN) powder Apply to skin folds beneath breasts as needed    omeprazole (PRILOSEC) 40 MG capsule Take 40 mg by mouth once daily.    rifAXIMin (XIFAXAN) 550 mg Tab Take 1 tablet (550 mg total) by mouth 2 (two) times daily.    spironolactone  (ALDACTONE) 100 MG tablet Take 1 tablet (100 mg total) by mouth once daily.    thiamine 100 MG tablet Take 100 mg by mouth once daily.    [DISCONTINUED] bumetanide (BUMEX) 1 MG tablet Take 1 tablet (1 mg total) by mouth 2 (two) times a day.    aspirin 81 MG Chew Take 81 mg by mouth once daily.    cloBAZam (ONFI) 10 mg Tab Take 1 each (10 mg total) by mouth once daily. (Patient not taking: Reported on 2022)    [DISCONTINUED] losartan (COZAAR) 50 MG tablet Take 50 mg by mouth once daily.     [DISCONTINUED] simvastatin (ZOCOR) 10 MG tablet Take 10 mg by mouth once daily.      Family History       Problem Relation (Age of Onset)    Breast cancer Maternal Grandmother          Tobacco Use    Smoking status: Former     Packs/day: 1.00     Years: 15.00     Pack years: 15.00     Types: Cigarettes     Quit date: 1998     Years since quittin.7    Smokeless tobacco: Never   Substance and Sexual Activity    Alcohol use: Not Currently    Drug use: Not Currently    Sexual activity: Not on file     Review of Systems   Unable to perform ROS: Mental status change   Objective:     Vital Signs (Most Recent):  Temp: 97.4 °F (36.3 °C) (22 1505)  Pulse: 72 (22 210)  Resp: 18 (22)  BP: (!) 122/50 (22 0705)  SpO2: 100 % (22)   Vital Signs (24h Range):  Temp:  [97.4 °F (36.3 °C)-98 °F (36.7 °C)] 97.4 °F (36.3 °C)  Pulse:  [66-84] 72  Resp:  [7-24] 18  SpO2:  [98 %-100 %] 100 %  Arterial Line BP: (105-144)/(38-98) 127/44     Weight: 56.7 kg (125 lb)  Body mass index is 23.62 kg/m².    Physical Exam  Vitals and nursing note reviewed.   Constitutional:       General: She is not in acute distress.     Appearance: She is ill-appearing and toxic-appearing. She is not diaphoretic.   HENT:      Head: Normocephalic and atraumatic.      Comments: Sparse eyebrows  Poor dentition     Right Ear: External ear normal.      Left Ear: External ear normal.      Nose: Nose normal. No congestion or  rhinorrhea.      Mouth/Throat:      Mouth: Mucous membranes are moist.      Pharynx: Oropharynx is clear.   Eyes:      General: No scleral icterus.     Conjunctiva/sclera:      Right eye: Exudate present.      Left eye: Exudate present.      Pupils: Pupils are equal, round, and reactive to light.   Pulmonary:      Effort: Pulmonary effort is normal.   Abdominal:      Palpations: Abdomen is soft.   Musculoskeletal:         General: Normal range of motion.      Cervical back: Normal range of motion and neck supple.      Right lower leg: No edema.      Left lower leg: No edema.   Skin:     General: Skin is warm and dry.   Neurological:      Deep Tendon Reflexes:      Reflex Scores:       Tricep reflexes are 2+ on the right side and 2+ on the left side.       Bicep reflexes are 2+ on the right side and 2+ on the left side.       Brachioradialis reflexes are 2+ on the right side and 2+ on the left side.       Patellar reflexes are 1+ on the right side and 1+ on the left side.       Achilles reflexes are 2+ on the right side and 2+ on the left side.  Psychiatric:         Mood and Affect: Mood normal.         Behavior: Behavior normal.         Thought Content: Thought content normal.         Judgment: Judgment normal.       NEUROLOGICAL EXAMINATION:     MENTAL STATUS   Attention: decreased. Concentration: decreased.   Speech: mute   Level of consciousness: responsive to painful stimuli ,  drowsy       Moans to noxious stimulus     CRANIAL NERVES     CN III, IV, VI   Pupils are equal, round, and reactive to light.  Vestibulo-ocular reflex: present       Brainstem reflexes intact corneal gag and cough  Closes the eyelids against resistance     MOTOR EXAM   Muscle bulk: decreased  Overall muscle tone: increased  Right arm tone: increased  Left arm tone: increased  Right leg tone: normal  Left leg tone: normal       No movement of limbs to noxious stimuli, only moans and temporary eye opening     REFLEXES     Reflexes   Right  brachioradialis: 2+  Left brachioradialis: 2+  Right biceps: 2+  Left biceps: 2+  Right triceps: 2+  Left triceps: 2+  Right patellar: 1+  Left patellar: 1+  Right achilles: 2+  Left achilles: 2+  Right plantar: upgoing  Left plantar: upgoing  Right Kerr: absent  Left Kerr: absent  Right ankle clonus: absent  Left ankle clonus: absent    Significant Labs: All pertinent lab results from the past 24 hours have been reviewed.    Significant Imaging: I have reviewed all pertinent imaging results/findings within the past 24 hours.

## 2022-08-29 NOTE — ASSESSMENT & PLAN NOTE
67 yoF with cirrhosis with ascites and seizure disorder presents with AMS and fatigue  Labs showed ammonia of 36  CT head showed no acute intracranial abnormality  Possible cause of encephalopathy is multifactorial: infection vs neurological vs metabolic  EEG on admit remarkable for encephalopathy, no other significant findings  UCx and BCx positive for ESBL e coli sensitive to only meropenem and ertapenem.      Although she has been treated for a few days with the right antibiotics, her mental status has been slow to improve  She is no longer alert and does not open eyes or awaken  CT head showing chronic microvascular changes, no acute changes  CK normal, rhabdo or myositis less likely   Cortisol normal, weakness from adrenal insufficiency less likely latoya since BPs have been relatively normal   EEG showed mild generalized cerebral dysfunction as is commonly seen in a variety of states of toxic and metabolic derangement     Plan:  - D/C home Clobazam. Continue keppra for seizures  - continue lactulose and rifaximin   - Discontinue lasix and aldactone for BP and volume down status  - continue thiamine and folic acid  - lumbar puncture non-infectious, unremarkable  - Neurology recommended an MRI, EEG, and to continue Keppra  - Discuss with family and consider palliative care and hospice moving forward

## 2022-08-29 NOTE — ASSESSMENT & PLAN NOTE
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  Now with colitis vs congestion related to her liver disease and portal hypertension in the setting of hypoalbuminemia. Currently undergoing workup including impending EEG and MRI.     In this setting, palliative medicine was consulted to help with symptom management, medical decision making, and aiding in the formation of goals of care.     1) Symptoms: ongoing encephalopathy without non verbal signs of pain/distress    2) Code status: FC; did not specifically discuss today    3) Psychosocial: ;  Jaime and son Steve are both also deaf and mute;   Sister is able to speak and hear, Lay Lopezdeborah, [phone]    4) Medicolegal: no ACP/LW; legally ;  is surrogate DM    5) Spiritual: [facility];  visiting this evening    6) Goals of care/discussion:  Discussion by phone/voice recognition phone software and utilizing the pt's sister via [employer] to introduce the role of palliative medicine and supportive care in the ICU in the setting of a serious diagnosis of multisystem organ dysfunction and failure. The family was able to demonstrate an excellent understanding of the diagnosis, current care plan, hope for improvement and concern for worsening of the pt's condition.    Experience with critical illness: pt has been ill for the past few months to years but never this sick per family;      Understanding of illness: they understand she is critically ill    Prognosis: concerning for days to weeks    Present for discussion:  and son; sister by phone    Goals of care:  -planing for follow up conversations with family in the days to come for GOC once additional information obtained  -attempted to use video interpretor to no avail and communicated through voice recognition translation to text  and pt's sister by Face Time; iPad/Jose  not functioning  -family has a good understanding that she is critically ill and her life is threatened without going into further detail  -they have asked questions about autopsy and post mortem care signifying their worries about her death but conveying hope that she can still recover  -shared my hopes and worries about her outcomes  -family woul dbery much appreciate planned ASL inperson interpretors for daily updates and discussions about expectations look ahead    7) Disposition plan: TBD    8)Summary of recommendations and follow up plan:  -Most important goals at this time are curative/life-prolongation ifi mprovement in condition is possible  -Code status: *FC; did not discuss non initial visit todaty  -Palliative care to provide emotional support and assist with communication regarding disease expectations and trajectory, and with medical decision-making, at the appropriate time  -Most appropriate disposition: continue with the current LOC for now  -arranging to have ASL medical interpretor present each day between 2-4pm for updates and planned family discussions    Discussed with pulm/cc, neuro, ID, and nursing.      Thank you for the opportunity to care for this patient and family.     Please call with questions.     Orlando Yeager MD  Palliative Medicine   [facility]  [phone] (cell)

## 2022-08-29 NOTE — PLAN OF CARE
CMICU DAILY GOALS       A: Awake    RASS: Goal -    Actual - RASS (Brizuela Agitation-Sedation Scale): -1-->drowsy   Restraint necessity:    B: Breathe   SBT: Not intubated   C: Coordinate A & B, analgesics/sedatives   Pain: managed    SAT: Not intubated  D: Delirium   CAM-ICU: Overall CAM-ICU: Positive  E: Early(intubated/ Progressive (non-intubated) Mobility   MOVE Screen: Fail   Activity: Activity Management: Patient unable to perform activities  FAS: Feeding/Nutrition   Diet order: Diet/Nutrition Received: NPO, tube feeding,    T: Thrombus   DVT prophylaxis: VTE Required Core Measure: Pharmacological prophylaxis initiated/maintained  H: HOB Elevation   Head of Bed (HOB) Positioning: HOB at 30-45 degrees  U: Ulcer Prophylaxis   GI: no  G: Glucose control   managed Glycemic Management: blood glucose monitored  S: Skin   Bathing/Skin Care: bath, complete, dressed/undressed, incontinence care, linen changed  Device Skin Pressure Protection: absorbent pad utilized/changed, adhesive use limited, skin-to-device areas padded, skin-to-skin areas padded, pressure points protected  Pressure Reduction Devices: heel offloading device utilized, specialty bed utilized, foam padding utilized  Pressure Reduction Techniques: heels elevated off bed, pressure points protected, weight shift assistance provided  Skin Protection: adhesive use limited, incontinence pads utilized, transparent dressing maintained, tubing/devices free from skin contact, skin-to-skin areas padded, skin-to-device areas padded  B: Bowel Function   diarrhea   I: Indwelling Catheters   Beckham necessity:      Urethral Catheter 08/24/22 0900-Reason for Continuing Urinary Catheterization: Critically ill in ICU and requiring hourly monitoring of intake/output   CVC necessity: Yes  D: De-escalation Antibiotics   No    Family/Goals of care/Code Status   Code Status: Full Code    24H Vital Sign Range  Temp:  [97.4 °F (36.3 °C)-98.1 °F (36.7 °C)]   Pulse:  [62-78]    Resp:  [7-22]   SpO2:  [99 %-100 %]   Arterial Line BP: (109-142)/(39-50)      Shift Events   No acute events throughout shift    VS and assessment per flow sheet, patient progressing towards goals as tolerated, plan of care reviewed with  Aleyda Floyd , all concerns addressed, will continue to monitor.    Verona Younger

## 2022-08-29 NOTE — HPI
Aleyda Floyd is a 67 y.o. female with deafness, mutism, EtOH cirrhosis on lactulose+rifaximin, seizure disorder (keppra 750 bid) who presented to the ED for AMS and fatigue. Admitted for encephalopathy. EEG on admission unremarkable for seizure activity, consistent with encephalopathy that could be infectious vs metabolic vs primary neuronal in nature. Blood and urine cultures growing ESBL e coli on meropenem and vancomycin. CT head on 8/22 was stable. Given bilateral hand weakness, evaluation with cervical MRI shows multifocal spondylolysis with most prominent finding of severe central canal stenosis and severe left neural foraminal narrowing at C3/4. On 8/24 AM, pt was rapid response 2/2 to hypotension. She received 1 L of NS and remains in refractory hypotension. Pt is transferred to MICU for shock and is on 2 continuous infusions for pressor support. Neurology was consulted for evaluation of encephalopathy.  History is obtained from chart review and primary team since family was not present and patient is unresponsive.

## 2022-08-29 NOTE — SUBJECTIVE & OBJECTIVE
Interval History: Patient does not awake to voice or touch. She only withdraws to pain. On levo 0.12. Continues to be very volatile requiring varying doses of vasopressors and O2.    Review of Systems   Unable to perform ROS: Patient nonverbal     Objective:     Vital Signs (Most Recent):  Temp: 97.5 °F (36.4 °C) (08/29/22 1105)  Pulse: 69 (08/29/22 1200)  Resp: 11 (08/29/22 1200)  BP: (!) 122/50 (08/27/22 0705)  SpO2: 100 % (08/29/22 1200)   Vital Signs (24h Range):  Temp:  [97.4 °F (36.3 °C)-98.1 °F (36.7 °C)] 97.5 °F (36.4 °C)  Pulse:  [62-78] 69  Resp:  [9-22] 11  SpO2:  [97 %-100 %] 100 %  Arterial Line BP: (111-142)/(37-50) 138/42   Weight: 56.7 kg (125 lb)  Body mass index is 23.62 kg/m².      Intake/Output Summary (Last 24 hours) at 8/29/2022 1319  Last data filed at 8/29/2022 1200  Gross per 24 hour   Intake 1640.01 ml   Output 1490 ml   Net 150.01 ml       Physical Exam  Vitals and nursing note reviewed.   Constitutional:       General: She is not in acute distress.     Appearance: Normal appearance. She is not ill-appearing.   HENT:      Head: Normocephalic and atraumatic.      Nose: Nose normal.      Mouth/Throat:      Mouth: Mucous membranes are moist.   Eyes:      General: No scleral icterus.     Extraocular Movements: Extraocular movements intact.      Conjunctiva/sclera: Conjunctivae normal.      Pupils: Pupils are equal, round, and reactive to light.   Cardiovascular:      Rate and Rhythm: Normal rate and regular rhythm.      Pulses: Normal pulses.      Heart sounds: Normal heart sounds. No murmur heard.  Pulmonary:      Effort: Pulmonary effort is normal. No respiratory distress.      Breath sounds: Normal breath sounds. No wheezing, rhonchi or rales.   Abdominal:      General: Bowel sounds are normal.      Palpations: Abdomen is soft.      Tenderness: There is no abdominal tenderness. There is no guarding or rebound.   Musculoskeletal:         General: No swelling or tenderness. Normal range of  motion.      Right lower leg: Edema (trace at ankles) present.      Left lower leg: Edema (trace at ankles) present.   Skin:     General: Skin is warm and dry.      Capillary Refill: Capillary refill takes less than 2 seconds.      Coloration: Skin is pale. Skin is not jaundiced.      Findings: Lesion (tips of fingers where BG checks are performed) present. No erythema.   Neurological:      Mental Status: She is alert.      GCS: GCS eye subscore is 4. GCS verbal subscore is 1. GCS motor subscore is 6.      Comments: Obtunded; withdraws to pain       Vents:  Oxygen Concentration (%): 35 (08/25/22 1100)  Lines/Drains/Airways       Central Venous Catheter Line  Duration             Percutaneous Central Line Insertion/Assessment - Triple Lumen  08/24/22 1000 left internal jugular 5 days              Drain  Duration                  Urethral Catheter 08/24/22 0900 5 days         NG/OG Tube 08/24/22 1800 Left nostril 4 days         Rectal Tube 08/26/22 1629 fecal management system 2 days              Arterial Line  Duration             Arterial Line 08/24/22 1644 Left Femoral 4 days              Peripheral Intravenous Line  Duration                  Peripheral IV - Single Lumen 08/24/22 0653 20 G Left Forearm 5 days                  Significant Labs:    CBC/Anemia Profile:  Recent Labs   Lab 08/28/22  0406 08/29/22  0250   WBC 32.55* 26.31*   HGB 8.2* 8.4*   HCT 24.2* 25.6*   PLT 74* 89*   MCV 90 92   RDW 17.5* 17.6*          Chemistries:  Recent Labs   Lab 08/28/22  0406 08/29/22  0250    138   K 4.2 4.1    106   CO2 27 25   BUN 58* 72*   CREATININE 1.4 1.3   CALCIUM 8.7 8.7   ALBUMIN 1.9* 1.9*   PROT 6.2 6.3   BILITOT 1.2* 1.1*   ALKPHOS 132 147*   ALT 44 57*   AST 82* 94*   MG 2.5 2.7*   PHOS 2.0* 2.3*         All pertinent labs within the past 24 hours have been reviewed.    Significant Imaging: I have reviewed all pertinent imaging results/findings within the past 24 hours.

## 2022-08-29 NOTE — ASSESSMENT & PLAN NOTE
Aleyda Floyd is a 67 y.o. female with mutism, deafness, EtOH cirrhosis, seizure disorder with altered mental status.  During interview, it was noted that she used to be on Onfi.  This was discontinued recently and she remains on Keppra 750 mg b.i.d..  Is difficult to ascertain from exam whether this is epileptic or toxic or metabolic induced encephalopathy.  Given her critical illnesses and seizure history we recommend the following:    --continue Keppra  --recheck Keppra levels to help establish a baseline  --place continuous EEG, please consider placing this after MRI if possible   --MRI epilepsy protocol with and without contrast of the brain, when stable if there are no contraindications    Thank you for the consult and including us in the care of this patient.  Please do not hesitate to reach out to us with any questions or concerns at [phone].

## 2022-08-29 NOTE — PT/OT/SLP DISCHARGE
Occupational Therapy Discharge Summary    Aleyda Floyd  MRN: [id number]   Principal Problem: Septic shock      Patient Discharged from acute Occupational Therapy on 8/29 2* continued lethargy and somnolence. Pt not responsive to commands or stimuli, only pain response. OT orders discharged, to be reconsulted if pt presentation improves.     Assessment:      Patient was discharged unexpectedly.  Information required to complete an accurate discharge summary is unknown.  Refer to therapy initial evaluation and last progress note for initial and most recent functional status and goal achievement.  Recommendations made may be found in medical record.    Objective:     GOALS:   Multidisciplinary Problems       Occupational Therapy Goals          Problem: Occupational Therapy    Goal Priority Disciplines Outcome Interventions   Occupational Therapy Goal     OT, PT/OT Ongoing, Progressing    Description: Goals to be met by: 9/12/22     Patient will increase functional independence with ADLs by performing:    UE Dressing with Maximum Assistance.  LE Dressing with Maximum Assistance.  Grooming while seated with Maximum Assistance.  Sitting at edge of bed x10 minutes with Maximum Assistance.  Supine to sit with Maximum Assistance.                         Reasons for Discontinuation of Therapy Services  Pt not appropriate for OT services at this time.     Plan:     Patient Discharged to:  ICU    8/29/2022

## 2022-08-29 NOTE — SUBJECTIVE & OBJECTIVE
Interval History: pt seen at bedside;  and son at bedside and communicate with sign language    Past Medical History:   Diagnosis Date    PITO (acute kidney injury)     Alcoholic cirrhosis of liver     Hypercholesterolemia     Hypertension        Past Surgical History:   Procedure Laterality Date    DILATION AND CURETTAGE OF UTERUS      ESOPHAGOGASTRODUODENOSCOPY N/A 6/23/2022    Procedure: EGD (ESOPHAGOGASTRODUODENOSCOPY);  Surgeon: Sean Perry MD;  Location: [address]);  Service: Endoscopy;  Laterality: N/A;  cirrhosis, variceal screening  labs prior  -request sent 6/14  fully vaccinated, instructions emailed to [email or web address]       Review of patient's allergies indicates:  No Known Allergies    Medications:  Continuous Infusions:   NORepinephrine bitartrate-D5W 0.14 mcg/kg/min (08/29/22 1200)    vasopressin 0.04 Units/min (08/27/22 1309)     Scheduled Meds:   fludrocortisone  100 mcg Per NG tube Daily    folic acid  1 mg Per NG tube Daily    hydrocortisone sodium succinate  100 mg Intravenous Q8H    lactulose  20 g Per NG tube TID    levetiracetam  750 mg Per NG tube BID    meropenem (MERREM) IVPB  2 g Intravenous Q12H    NORepinephrine        NORepinephrine        pantoprazole  40 mg Per NG tube Daily    rifAXIMin  550 mg Per NG tube BID    thiamine  100 mg Per NG tube Daily    vancomycin (VANCOCIN) IVPB  750 mg Intravenous Q24H     PRN Meds:acetaminophen, albuterol-ipratropium, dextrose 10%, dextrose 10%, glucagon (human recombinant), glucose, glucose, insulin aspart U-100, melatonin, naloxone, ondansetron, prochlorperazine, sodium chloride 0.9%, sodium chloride 0.9%, Pharmacy to dose Vancomycin consult **AND** vancomycin - pharmacy to dose    Family History       Problem Relation (Age of Onset)    Breast cancer Maternal Grandmother          Tobacco Use    Smoking status: Former     Packs/day: 1.00     Years: 15.00     Pack years: 15.00     Types:  Cigarettes     Quit date: 1998     Years since quittin.7    Smokeless tobacco: Never   Substance and Sexual Activity    Alcohol use: Not Currently    Drug use: Not Currently    Sexual activity: Not on file       Review of Systems   Unable to perform ROS: Mental status change   Objective:     Vital Signs (Most Recent):  Temp: 97.5 °F (36.4 °C) (22 1105)  Pulse: 69 (22 1200)  Resp: 11 (22 1200)  BP: (!) 122/50 (22 0705)  SpO2: 100 % (22 1200)   Vital Signs (24h Range):  Temp:  [97.5 °F (36.4 °C)-98.1 °F (36.7 °C)] 97.5 °F (36.4 °C)  Pulse:  [62-78] 69  Resp:  [9-22] 11  SpO2:  [97 %-100 %] 100 %  Arterial Line BP: (111-142)/(37-50) 138/42     Weight: 56.7 kg (125 lb)  Body mass index is 23.62 kg/m².    Physical Exam  Vitals and nursing note reviewed.   Constitutional:       General: She is not in acute distress.     Appearance: Normal appearance. She is not ill-appearing.   HENT:      Head: Normocephalic and atraumatic.      Nose: Nose normal.      Mouth/Throat:      Mouth: Mucous membranes are moist.   Eyes:      General: No scleral icterus.     Extraocular Movements: Extraocular movements intact.      Conjunctiva/sclera: Conjunctivae normal.      Pupils: Pupils are equal, round, and reactive to light.   Cardiovascular:      Rate and Rhythm: Normal rate and regular rhythm.      Pulses: Normal pulses.      Heart sounds: Normal heart sounds. No murmur heard.  Pulmonary:      Effort: Pulmonary effort is normal. No respiratory distress.      Breath sounds: Normal breath sounds. No wheezing, rhonchi or rales.   Abdominal:      General: Bowel sounds are normal.      Palpations: Abdomen is soft.      Tenderness: There is no abdominal tenderness. There is no guarding or rebound.   Musculoskeletal:         General: No swelling or tenderness. Normal range of motion.      Right lower leg: Edema (trace at ankles) present.      Left lower leg: Edema (trace at ankles) present.   Skin:      General: Skin is warm and dry.      Capillary Refill: Capillary refill takes less than 2 seconds.      Coloration: Skin is pale. Skin is not jaundiced.      Findings: Lesion (tips of fingers where BG checks are performed) present. No erythema.   Neurological:      Mental Status: She is alert.      GCS: GCS eye subscore is 4. GCS verbal subscore is 1. GCS motor subscore is 6.      Comments: Obtunded; withdraws to pain     Review of Symptoms      Symptom Assessment (ESAS 0-10 Scale)  Unable to complete assessment due to Mental status change     CAM / Delirium:  Negative  Constipation:  Negative  Diarrhea:  Negative      Pain Assessment in Advanced Demential Scale (PAINAD)   Breathing - Independent of vocalization:  0  Negative vocalization:  0  Facial expression:  0  Body language:  0  Consolability:  0  Total:  0    Living Arrangements:  Lives with spouse and Lives with family    Psychosocial/Cultural:  to  Jaime Edouard son Steve (adult)  both deaf and mute; relies on sign language for communication    Sister Lay [phone]  able to talk on phone (hearing abled)    Spiritual:  F - Char and Belief:  Orthodox  I - Importance:  Yes  A - Address in Care[facility]  [address] [facility]      Advance Care Planning   Advance Directives:   Living Will: No    LaPOST: No    Do Not Resuscitate Status: No    Medical Power of : No      Decision Making:  Family answered questions and Patient unable to communicate due to disease severity/cognitive impairment       Significant Labs: All pertinent labs within the past 24 hours have been reviewed.  CBC:   Recent Labs   Lab 08/29/22  0250   WBC 26.31*   HGB 8.4*   HCT 25.6*   MCV 92   PLT 89*     BMP:  Recent Labs   Lab 08/29/22  0250   *      K 4.1      CO2 25   BUN 72*   CREATININE 1.3   CALCIUM 8.7   MG 2.7*     LFT:  Lab Results   Component Value Date    AST 94 (H) 08/29/2022    ALKPHOS 147 (H) 08/29/2022    BILITOT 1.1 (H) 08/29/2022  "    Albumin:   Albumin   Date Value Ref Range Status   08/29/2022 1.9 (L) 3.5 - 5.2 g/dL Final     Protein:   Total Protein   Date Value Ref Range Status   08/29/2022 6.3 6.0 - 8.4 g/dL Final     Lactic acid:   Lab Results   Component Value Date    LACTATE 1.6 08/27/2022    LACTATE 1.9 08/26/2022       Significant Imaging: I have reviewed all pertinent imaging results/findings within the past 24 hours.      Imaging Results              X-Ray Chest AP Portable (Final result)  Result time 08/18/22 03:02:23      Final result by Matthew Navarrete MD (08/18/22 03:02:23)                   Impression:      Low lung volumes with probable bibasilar subsegmental atelectasis and mild enlargement of the cardiac silhouette.  No large focal consolidation or detrimental change when compared with 06/28/2022.      Electronically signed by: Matthew Navarrete MD  Date:    08/18/2022  Time:    03:02               Narrative:    EXAMINATION:  XR CHEST AP PORTABLE    CLINICAL HISTORY:  Provided history is "?pneumonia;  ".    TECHNIQUE:  One view of the chest.    COMPARISON:  06/28/2022.    FINDINGS:  Cardiac silhouette is enlarged and similar to the prior study.  Atherosclerotic calcifications overlie the aortic arch.  Lung volumes are relatively low.  There are coarsened interstitial lung markings but no large focal area of consolidation.  Bibasilar subsegmental atelectasis is again suspected.  No large pleural effusion.  No pneumothorax.                                       CT Head Without Contrast (Final result)  Result time 08/18/22 00:27:26      Final result by Arjun Chavez MD (08/18/22 00:27:26)                   Impression:      No CT evidence of acute intracranial abnormality. Clinical correlation and further evaluation as warranted.    Chronic senescent and microvascular ischemic changes.      Electronically signed by: Arjun Chavez MD  Date:    08/18/2022  Time:    00:27               Narrative:    EXAMINATION:  CT HEAD " WITHOUT CONTRAST    CLINICAL HISTORY:  Mental status change, unknown cause;unable to move arms to communicate with ASL;    TECHNIQUE:  Low dose axial images were obtained through the head.  Coronal and sagittal reformations were also performed. Contrast was not administered.    COMPARISON:  CTA 07/10/2022, MRI brain 07/10/2022    FINDINGS:  There is generalized cerebral volume loss with compensatory sulcal widening and ventricular enlargement.  There is patchy periventricular white matter hypoattenuation which is nonspecific although may relate to sequelae of chronic microvascular ischemic change.  There is no evidence of acute intracranial hemorrhage or midline shift.  No extra-axial collections identified.  The basal cisterns are patent. There is trace opacification of the left mastoid air cells.  The remaining paranasal sinuses appear relatively well aerated.  The visualized bones of the calvarium demonstrate no acute osseous abnormality.

## 2022-08-29 NOTE — ASSESSMENT & PLAN NOTE
67F hx deafness, ETOH cirrhosis, seizure disorder seen previously or ESBL E coli bacteremia due to UTI being treated with ertapenem, who developed progressive worsening AMS and hypotension requiring ICU transfer. Urine cx + s. viridans. Blood cx 1/4 + GPC resembling staph, CONS, likely contaminate. Currently on meropenem and vanc. Worsening hypotension, requiring vaso and levo. Liquid stool via rectal tube per RN, pt had received lactulose for possible HE. CT C/A/P w/ diffuse enterocolitis. C. Diff negative. ? Portal colopathy vs. Transient bowel ischemia related to hypotension. Significant leukocytosis, now downtrending. LP done 8/27 not consistent w/ infection. Remains on pressors, palliative care consulted. Plans for MRI today. Additional stool studies are pending .    Recommendations:   - Continue vanc and meropenem  - follow up blood cx - NGTD  - LP not consistent w/ infectious process  - Follow up stool cx, OP and strongy  - MRI brain today    ID will follow

## 2022-08-29 NOTE — CONSULTS
[facility] - Cardiac Medical ICU  Neurology  Consult Note    Patient Name: Aleyda Floyd  MRN: [id number]  Admission Date: 8/17/2022  Hospital Length of Stay: 9 days  Code Status: Full Code   Attending Provider: Lia Anthony MD   Consulting Provider: Ced Johnson MD  Primary Care Physician: Elvie Fernandes MD  Principal Problem:Septic shock    Inpatient consult to Neurology  Consult performed by: Ced Johnson MD  Consult ordered by: Charissa Adames MD         Subjective:     Chief Complaint:  AMS     HPI:   Aleyda Floyd is a 67 y.o. female with deafness, mutism, EtOH cirrhosis on lactulose+rifaximin, seizure disorder (keppra 750 bid) who presented to the ED for AMS and fatigue. Admitted for encephalopathy. EEG on admission unremarkable for seizure activity, consistent with encephalopathy that could be infectious vs metabolic vs primary neuronal in nature. Blood and urine cultures growing ESBL e coli on meropenem and vancomycin. CT head on 8/22 was stable. Given bilateral hand weakness, evaluation with cervical MRI shows multifocal spondylolysis with most prominent finding of severe central canal stenosis and severe left neural foraminal narrowing at C3/4. On 8/24 AM, pt was rapid response 2/2 to hypotension. She received 1 L of NS and remains in refractory hypotension. Pt is transferred to MICU for shock and is on 2 continuous infusions for pressor support. Neurology was consulted for evaluation of encephalopathy.  History is obtained from chart review and primary team since family was not present and patient is unresponsive.       Past Medical History:   Diagnosis Date    PITO (acute kidney injury)     Alcoholic cirrhosis of liver     Hypercholesterolemia     Hypertension        Past Surgical History:   Procedure Laterality Date    DILATION AND CURETTAGE OF UTERUS      ESOPHAGOGASTRODUODENOSCOPY N/A 6/23/2022    Procedure: EGD (ESOPHAGOGASTRODUODENOSCOPY);  Surgeon: Sean Perry MD;   Location: [facility] ([address] [place] [address]);  Service: Endoscopy;  Laterality: N/A;  cirrhosis, variceal screening  labs prior  -request sent 6/14  fully vaccinated, instructions emailed to [email or web address]       Review of patient's allergies indicates:  No Known Allergies    Current Neurological Medications: Keppra 750 BID    No current facility-administered medications on file prior to encounter.     Current Outpatient Medications on File Prior to Encounter   Medication Sig    acetaminophen (TYLENOL) 325 MG tablet Take 650 mg by mouth daily as needed for Pain. NOT TO EXCEED 4 GRAMS IN 24 HOUR PERIOD    cholecalciferol, vitamin D3, (VITAMIN D3) 50 mcg (2,000 unit) Cap capsule Take 1 capsule by mouth once daily.    cholestyramine-aspartame (QUESTRAN LIGHT) 4 gram PwPk Take 4 g by mouth once daily.    folic acid (FOLVITE) 1 MG tablet Take 1 mg by mouth once daily.     furosemide (LASIX) 40 MG tablet Take 1 tablet (40 mg total) by mouth 2 (two) times daily.    lactulose (CHRONULAC) 20 gram/30 mL Soln Take 30 mLs (20 g total) by mouth 2 (two) times daily. (Patient taking differently: ADMINISTER 30 ML RECTALLY TWICE DAILY. TO BE ADDED INTO CLEANSING ENEMA SET WITH 700 ML NORMAL SALINE)    levETIRAcetam (KEPPRA) 750 MG Tab Take 1 tablet (750 mg total) by mouth 2 (two) times daily.    nystatin (MYCOSTATIN) powder Apply to skin folds beneath breasts as needed    omeprazole (PRILOSEC) 40 MG capsule Take 40 mg by mouth once daily.    rifAXIMin (XIFAXAN) 550 mg Tab Take 1 tablet (550 mg total) by mouth 2 (two) times daily.    spironolactone (ALDACTONE) 100 MG tablet Take 1 tablet (100 mg total) by mouth once daily.    thiamine 100 MG tablet Take 100 mg by mouth once daily.    [DISCONTINUED] bumetanide (BUMEX) 1 MG tablet Take 1 tablet (1 mg total) by mouth 2 (two) times a day.    aspirin 81 MG Chew Take 81 mg by mouth once daily.    cloBAZam (ONFI) 10 mg Tab Take 1 each (10 mg total) by  mouth once daily. (Patient not taking: Reported on 2022)    [DISCONTINUED] losartan (COZAAR) 50 MG tablet Take 50 mg by mouth once daily.     [DISCONTINUED] simvastatin (ZOCOR) 10 MG tablet Take 10 mg by mouth once daily.      Family History       Problem Relation (Age of Onset)    Breast cancer Maternal Grandmother          Tobacco Use    Smoking status: Former     Packs/day: 1.00     Years: 15.00     Pack years: 15.00     Types: Cigarettes     Quit date: 1998     Years since quittin.7    Smokeless tobacco: Never   Substance and Sexual Activity    Alcohol use: Not Currently    Drug use: Not Currently    Sexual activity: Not on file     Review of Systems   Unable to perform ROS: Mental status change   Objective:     Vital Signs (Most Recent):  Temp: 97.4 °F (36.3 °C) (22 1505)  Pulse: 72 (22 2105)  Resp: 18 (22)  BP: (!) 122/50 (22 0705)  SpO2: 100 % (22)   Vital Signs (24h Range):  Temp:  [97.4 °F (36.3 °C)-98 °F (36.7 °C)] 97.4 °F (36.3 °C)  Pulse:  [66-84] 72  Resp:  [7-24] 18  SpO2:  [98 %-100 %] 100 %  Arterial Line BP: (105-144)/(38-98) 127/44     Weight: 56.7 kg (125 lb)  Body mass index is 23.62 kg/m².    Physical Exam  Vitals and nursing note reviewed.   Constitutional:       General: She is not in acute distress.     Appearance: She is ill-appearing and toxic-appearing. She is not diaphoretic.   HENT:      Head: Normocephalic and atraumatic.      Comments: Sparse eyebrows  Poor dentition     Right Ear: External ear normal.      Left Ear: External ear normal.      Nose: Nose normal. No congestion or rhinorrhea.      Mouth/Throat:      Mouth: Mucous membranes are moist.      Pharynx: Oropharynx is clear.   Eyes:      General: No scleral icterus.     Conjunctiva/sclera:      Right eye: Exudate present.      Left eye: Exudate present.      Pupils: Pupils are equal, round, and reactive to light.   Pulmonary:      Effort: Pulmonary effort is normal.    Abdominal:      Palpations: Abdomen is soft.   Musculoskeletal:         General: Normal range of motion.      Cervical back: Normal range of motion and neck supple.      Right lower leg: No edema.      Left lower leg: No edema.   Skin:     General: Skin is warm and dry.   Neurological:      Deep Tendon Reflexes:      Reflex Scores:       Tricep reflexes are 2+ on the right side and 2+ on the left side.       Bicep reflexes are 2+ on the right side and 2+ on the left side.       Brachioradialis reflexes are 2+ on the right side and 2+ on the left side.       Patellar reflexes are 1+ on the right side and 1+ on the left side.       Achilles reflexes are 2+ on the right side and 2+ on the left side.  Psychiatric:         Mood and Affect: Mood normal.         Behavior: Behavior normal.         Thought Content: Thought content normal.         Judgment: Judgment normal.       NEUROLOGICAL EXAMINATION:     MENTAL STATUS   Attention: decreased. Concentration: decreased.   Speech: mute   Level of consciousness: responsive to painful stimuli ,  drowsy       Moans to noxious stimulus     CRANIAL NERVES     CN III, IV, VI   Pupils are equal, round, and reactive to light.  Vestibulo-ocular reflex: present       Brainstem reflexes intact corneal gag and cough  Closes the eyelids against resistance     MOTOR EXAM   Muscle bulk: decreased  Overall muscle tone: increased  Right arm tone: increased  Left arm tone: increased  Right leg tone: normal  Left leg tone: normal       No movement of limbs to noxious stimuli, only moans and temporary eye opening     REFLEXES     Reflexes   Right brachioradialis: 2+  Left brachioradialis: 2+  Right biceps: 2+  Left biceps: 2+  Right triceps: 2+  Left triceps: 2+  Right patellar: 1+  Left patellar: 1+  Right achilles: 2+  Left achilles: 2+  Right plantar: upgoing  Left plantar: upgoing  Right Kerr: absent  Left Kerr: absent  Right ankle clonus: absent  Left ankle clonus:  absent    Significant Labs: All pertinent lab results from the past 24 hours have been reviewed.    Significant Imaging: I have reviewed all pertinent imaging results/findings within the past 24 hours.    Assessment and Plan:     Acute encephalopathy  Aleyda Floyd is a 67 y.o. female with mutism, deafness, EtOH cirrhosis, seizure disorder with altered mental status.  During interview, it was noted that she used to be on Onfi.  This was discontinued recently and she remains on Keppra 750 mg b.i.d..  Is difficult to ascertain from exam whether this is epileptic or toxic or metabolic induced encephalopathy.  Given her critical illnesses and seizure history we recommend the following:    --continue Keppra  --recheck Keppra levels to help establish a baseline  --place continuous EEG followed by MRI epilepsy protocol with and without contrast of the brain, when stable if there are no contraindications    Thank you for the consult and including us in the care of this patient.  Please do not hesitate to reach out to us with any questions or concerns at [phone].        VTE Risk Mitigation (From admission, onward)         Ordered     IP VTE HIGH RISK PATIENT  Once         08/18/22 0145     Place sequential compression device  Until discontinued         08/18/22 0138                Thank you for your consult. I will follow-up with patient. Please contact us if you have any additional questions.    Ced Johnson MD  Neurology  [facility] - Cardiac Medical ICU

## 2022-08-29 NOTE — NURSING
CMICU DAILY GOALS       A: Awake    RASS: Goal -    Actual - RASS (Brizuela Agitation-Sedation Scale): -1-->drowsy   Restraint necessity:    B: Breathe   SBT: Not intubated   C: Coordinate A & B, analgesics/sedatives   Pain: managed    SAT: Not intubated  D: Delirium   CAM-ICU: Overall CAM-ICU: Positive  E: Early(intubated/ Progressive (non-intubated) Mobility   MOVE Screen: Pass   Activity: Activity Management: Patient unable to perform activities  FAS: Feeding/Nutrition   Diet order: Diet/Nutrition Received: NPO, tube feeding,    T: Thrombus   DVT prophylaxis: VTE Required Core Measure: Pharmacological prophylaxis initiated/maintained  H: HOB Elevation   Head of Bed (HOB) Positioning: HOB at 30-45 degrees  U: Ulcer Prophylaxis   GI: yes  G: Glucose control   managed Glycemic Management: blood glucose monitored  S: Skin   Bathing/Skin Care: bath, complete, dressed/undressed, incontinence care, linen changed  Device Skin Pressure Protection: absorbent pad utilized/changed, adhesive use limited, positioning supports utilized, pressure points protected, skin-to-device areas padded, skin-to-skin areas padded  Pressure Reduction Devices: specialty bed utilized, positioning supports utilized, pressure-redistributing mattress utilized, heel offloading device utilized  Pressure Reduction Techniques: weight shift assistance provided, pressure points protected, positioned off wounds, heels elevated off bed  Skin Protection: adhesive use limited, drying agents applied, incontinence pads utilized, pectin skin barriers applied, silicone foam dressing in place, skin-to-device areas padded, skin-to-skin areas padded, transparent dressing maintained, tubing/devices free from skin contact, skin sealant/moisture barrier applied  B: Bowel Function   no issues   I: Indwelling Catheters   Beckham necessity:      Urethral Catheter 08/24/22 0900-Reason for Continuing Urinary Catheterization: Critically ill in ICU and requiring hourly  monitoring of intake/output   CVC necessity: Yes  D: De-escalation Antibiotics   No    Family/Goals of care/Code Status   Code Status: Full Code    24H Vital Sign Range  Temp:  [97.5 °F (36.4 °C)-98.1 °F (36.7 °C)]   Pulse:  [62-78]   Resp:  [7-22]   SpO2:  [97 %-100 %]   Arterial Line BP: (111-148)/(37-50)      Shift Events   No acute events throughout shift    VS and assessment per flow sheet, patient progressing towards goals as tolerated, plan of care reviewed with family, all concerns addressed, will continue to monitor.    Ashly Becerril

## 2022-08-29 NOTE — PROGRESS NOTES
[facility]  Infectious Disease  Progress Note    Patient Name: Aleyda Floyd  MRN: [id number]  Admission Date: 8/17/2022  Length of Stay: 10 days  Attending Physician: Osmel Daigle MD  Primary Care Provider: Elvie Fernandes MD    Isolation Status: Special Contact  Assessment/Plan:      Acute encephalopathy  67F hx deafness, ETOH cirrhosis, seizure disorder seen previously or ESBL E coli bacteremia due to UTI being treated with ertapenem, who developed progressive worsening AMS and hypotension requiring ICU transfer. Urine cx + s. viridans. Blood cx 1/4 + GPC resembling staph, CONS, likely contaminate. Currently on meropenem and vanc. Worsening hypotension, requiring vaso and levo. Liquid stool via rectal tube per RN, pt had received lactulose for possible HE. CT C/A/P w/ diffuse enterocolitis. C. Diff negative. ? Portal colopathy vs. Transient bowel ischemia related to hypotension. Significant leukocytosis, now downtrending. LP done 8/27 not consistent w/ infection. Remains on pressors, palliative care consulted. Plans for MRI today. Additional stool studies are pending .    Recommendations:   - Continue vanc and meropenem  - follow up blood cx - NGTD  - LP not consistent w/ infectious process  - Follow up stool cx, OP and strongy  - MRI brain today    ID will follow      Anticipated Disposition: TBD    Thank you for your consult. I will follow-up with patient. Please contact us if you have any additional questions.    Radha Parekh DO  Critical Care Infectious Disease    Critical care time: 35 minutes   I personally spent critical care time on the following: evaluating this patient's organ dysfunction, development of treatment plan, discussing treatment plan with patient or surrogate and bedside caregivers, discussions with critical care service and/or consultants, evaluation of patient's response to treatment, physical examination of patient, ordering and review of treatments interventions,  "laboratory studies, and radiographic studies, re-evaluation of patient's condition. This critical care time did not overlap with that of any other provider of the same specialty or involve time for procedures.       Subjective:     Principal Problem:Septic shock    HPI: A 67-year-old woman with HTN, ETOH cirrhosis (recent admission for decompensated liver failure, not currently transplant candidate), seizure disorder (on Keppra and Onfi), deaf/mute, reported hx of recurrent UTIs, who presented with AMS and fatigue X 1-2 weeks.  CT imaging and EEG negative for acute IC pathology.  Admission blood cx  3/4 + for ESBL E coli susceptible to only carbapenems.   U/A >100 wbc, urine cx pending. Currently on meropenem. ID consulted for meropenem and ESBL E coli bacteremia.     Afebrile, no leukocytosis.  Reported dysuria per medical record.  At time of visit,  (who is also hearing impaired is in room).   Patient is lethargic, opens eyes, but will not respond to his signing/communication. Unable to obtain a ROS  Spoke to sister on the phone.  Unable to tell me much about patient symptoms/complaints.    Patient was seen by the Infectious Diseases [facility] service. Work up revealed ESBL E coli bacteremia. Her antibiotic therapy was optimized to Ertapenem. Blood cultures repeated on 8/20 remain NGTD and recommendations for a 14 day course of therapy were give prior to sign off.     Her hospital course was complicated by hypotension and bilateral hand weakness. She was given volume replacement however her shock failed to improve which prompted transfer to the ICU.     Infectious Diseases consulted for "ESBL bacteremia and UTI, stepped up to ICU for septic shock"            Interval History: increased levo dose, remains unresponsive, plans for MRI brain today, palliative care consulted to assist w/ goals of care    Review of Systems   Unable to perform ROS: Acuity of condition   Objective:     Vital Signs (Most Recent):  Temp: " 97.5 °F (36.4 °C) (08/29/22 1105)  Pulse: 69 (08/29/22 1200)  Resp: 11 (08/29/22 1200)  BP: (!) 122/50 (08/27/22 0705)  SpO2: 100 % (08/29/22 1200)   Vital Signs (24h Range):  Temp:  [97.4 °F (36.3 °C)-98.1 °F (36.7 °C)] 97.5 °F (36.4 °C)  Pulse:  [62-78] 69  Resp:  [9-22] 11  SpO2:  [97 %-100 %] 100 %  Arterial Line BP: (111-142)/(37-50) 138/42     Weight: 56.7 kg (125 lb)  Body mass index is 23.62 kg/m².    Estimated Creatinine Clearance: 31.7 mL/min (based on SCr of 1.3 mg/dL).    Physical Exam  Constitutional:       General: She is not in acute distress.     Appearance: She is well-developed. She is ill-appearing. She is not diaphoretic.   HENT:      Head: Normocephalic and atraumatic.      Right Ear: External ear normal.      Left Ear: External ear normal.      Nose: Nose normal.   Eyes:      General: No scleral icterus.        Right eye: No discharge.         Left eye: No discharge.      Extraocular Movements: Extraocular movements intact.      Conjunctiva/sclera: Conjunctivae normal.   Cardiovascular:      Rate and Rhythm: Normal rate and regular rhythm.      Heart sounds: Normal heart sounds.   Pulmonary:      Effort: Pulmonary effort is normal. No respiratory distress.      Breath sounds: No stridor.   Abdominal:      General: Abdomen is flat.      Palpations: Abdomen is soft.      Comments: Liquid stool in rectal tube   Musculoskeletal:         General: Normal range of motion.   Skin:     Findings: No erythema or rash.      Comments: Skin tear LUE   Neurological:      Comments: Does not respond    Psychiatric:      Comments: Unable to assess     Significant Labs: CBC:   Recent Labs   Lab 08/28/22  0406 08/29/22  0250   WBC 32.55* 26.31*   HGB 8.2* 8.4*   HCT 24.2* 25.6*   PLT 74* 89*     CMP:   Recent Labs   Lab 08/28/22  0406 08/29/22  0250    138   K 4.2 4.1    106   CO2 27 25   * 159*   BUN 58* 72*   CREATININE 1.4 1.3   CALCIUM 8.7 8.7   PROT 6.2 6.3   ALBUMIN 1.9* 1.9*   BILITOT  1.2* 1.1*   ALKPHOS 132 147*   AST 82* 94*   ALT 44 57*   ANIONGAP 6* 7*     Microbiology Results (last 7 days)       Procedure Component Value Units Date/Time    Blood culture [[id number]] Collected: 08/24/22 0602    Order Status: Completed Specimen: Blood Updated: 08/29/22 0812     Blood Culture, Routine No growth after 5 days.    Blood culture [[id number]] Collected: 08/24/22 0601    Order Status: Completed Specimen: Blood Updated: 08/29/22 0812     Blood Culture, Routine No growth after 5 days.    CSF culture [[id number]] Collected: 08/27/22 1443    Order Status: Completed Specimen: CSF (Spinal Fluid) from CSF Tap, Tube 1 Updated: 08/29/22 0733     CSF CULTURE No Growth to date     Gram Stain Result Cytospin indicates:      Rare WBC's      No organisms seen    Blood culture [[id number]] Collected: 08/26/22 2213    Order Status: Completed Specimen: Blood from Peripheral, Antecubital, Left Updated: 08/29/22 0612     Blood Culture, Routine No Growth to date      No Growth to date      No Growth to date    Blood culture [[id number]] Collected: 08/26/22 2202    Order Status: Completed Specimen: Blood from Peripheral, Antecubital, Right Updated: 08/29/22 0612     Blood Culture, Routine No Growth to date      No Growth to date      No Growth to date    Blood culture [[id number]] Collected: 08/25/22 1615    Order Status: Completed Specimen: Blood from Peripheral, Antecubital, Left Updated: 08/28/22 1812     Blood Culture, Routine No Growth to date      No Growth to date      No Growth to date      No Growth to date    Stool culture [[id number]] Collected: 08/28/22 1507    Order Status: Sent Specimen: Stool Updated: 08/28/22 1552    E. coli 0157 antigen [[id number]] Collected: 08/28/22 1507    Order Status: No result Specimen: Stool Updated: 08/28/22 1552    Blood culture [[id number]]  (Abnormal) Collected: 08/25/22 1616    Order Status: Completed Specimen: Blood from Peripheral, Antecubital, Right Updated: 08/28/22 1226      Blood Culture, Routine Gram stain aer bottle: Gram positive cocci in clusters resembling Staph      Results called to and read back by: Verona Younger RN 08/26/2022  21:36      COAGULASE-NEGATIVE STAPHYLOCOCCUS SPECIES  Organism is a probable contaminant      Clostridium difficile EIA [[id number]] Collected: 08/27/22 1501    Order Status: Completed Specimen: Stool Updated: 08/28/22 0020     C. diff Antigen Negative     C difficile Toxins A+B, EIA Negative     Comment: Testing not recommended for children <24 months old.       Gram stain [[id number]] Collected: 08/27/22 1443    Order Status: Canceled Specimen: CSF (Spinal Fluid) from CSF Tap, Tube 1     Urine culture [[id number]]  (Abnormal) Collected: 08/24/22 1714    Order Status: Completed Specimen: Urine Updated: 08/25/22 1949     Urine Culture, Routine VIRIDANS STREPTOCOCCUS GROUP  > 100,000 cfu/ml  Susceptibility testing not routinely performed      Narrative:      Specimen Source->Urine    Blood culture [[id number]] Collected: 08/20/22 1413    Order Status: Completed Specimen: Blood Updated: 08/25/22 1612     Blood Culture, Routine No growth after 5 days.    Blood culture [[id number]] Collected: 08/20/22 1414    Order Status: Completed Specimen: Blood Updated: 08/25/22 1612     Blood Culture, Routine No growth after 5 days.            Significant Imaging: I have reviewed all pertinent imaging results/findings within the past 24 hours.

## 2022-08-30 PROBLEM — Z51.5 COMFORT MEASURES ONLY STATUS: Status: ACTIVE | Noted: 2022-01-01

## 2022-08-30 NOTE — ASSESSMENT & PLAN NOTE
67 yoF with cirrhosis with ascites and seizure disorder presents with AMS and fatigue  Labs showed ammonia of 36  CT head showed no acute intracranial abnormality  Possible cause of encephalopathy is multifactorial: infection vs neurological vs metabolic  EEG on admit remarkable for encephalopathy, no other significant findings  UCx and BCx positive for ESBL e coli sensitive to only meropenem and ertapenem.      Although she has been treated for a few days with the right antibiotics, her mental status has been slow to improve  She is no longer alert and does not open eyes or awaken  CT head showing chronic microvascular changes, no acute changes  CK normal, rhabdo or myositis less likely   Cortisol normal, weakness from adrenal insufficiency less likely latoya since BPs have been relatively normal   EEG showed mild generalized cerebral dysfunction as is commonly seen in a variety of states of toxic and metabolic derangement  MRI negative for any causes of current mental status    Plan:  - D/C home Clobazam. Continue keppra for seizures  - continue lactulose and rifaximin   - Discontinue lasix and aldactone for BP and volume down status  - continue thiamine and folic acid  - lumbar puncture non-infectious, unremarkable  - MRI negative  - F/u with neurology for EEG results  - Continue Keppra  - Continue discussions with family and consider palliative care and hospice moving forward

## 2022-08-30 NOTE — PLAN OF CARE
[facility] DAILY GOALS       A: Awake    RASS: Goal -    Actual - RASS (Brizuela Agitation-Sedation Scale): -1-->drowsy   Restraint necessity:    B: Breathe   SBT: Not intubated   C: Coordinate A & B, analgesics/sedatives   Pain: managed    SAT: Not intubated  D: Delirium   CAM-ICU: Overall CAM-ICU: Positive  E: Early(intubated/ Progressive (non-intubated) Mobility   MOVE Screen: Fail   Activity: Activity Management: Patient unable to perform activities  FAS: Feeding/Nutrition   Diet order: Diet/Nutrition Received: NPO, tube feeding,    T: Thrombus   DVT prophylaxis: VTE Required Core Measure: Pharmacological prophylaxis initiated/maintained  H: HOB Elevation   Head of Bed (HOB) Positioning: HOB at 30-45 degrees  U: Ulcer Prophylaxis   GI: no  G: Glucose control   managed Glycemic Management: blood glucose monitored  S: Skin   Bathing/Skin Care: bath, complete, dressed/undressed, incontinence care, linen changed, shampoo  Device Skin Pressure Protection: absorbent pad utilized/changed, adhesive use limited, skin-to-skin areas padded, skin-to-device areas padded, pressure points protected, positioning supports utilized  Pressure Reduction Devices: heel offloading device utilized  Pressure Reduction Techniques: weight shift assistance provided  Skin Protection: adhesive use limited, incontinence pads utilized, tubing/devices free from skin contact, transparent dressing maintained, skin-to-skin areas padded, skin-to-device areas padded  B: Bowel Function   diarrhea   I: Indwelling Catheters   Beckham necessity:      Urethral Catheter 08/24/22 0900-Reason for Continuing Urinary Catheterization: Critically ill in ICU and requiring hourly monitoring of intake/output   CVC necessity: Yes  D: De-escalation Antibiotics   No    Family/Goals of care/Code Status   Code Status: Full Code    24H Vital Sign Range  Temp:  [97.5 °F (36.4 °C)-97.8 °F (36.6 °C)]   Pulse:  [64-79]   Resp:  [7-16]   SpO2:  [97 %-100 %]   Arterial Line BP:  (111-148)/(37-48)      Shift Events   MRI brain obtained overnight. Pt remains on levophed to maintain MAP>65. No acute events throughout shift    VS and assessment per flow sheet, patient progressing towards goals as tolerated, plan of care reviewed with  Aleyda Hill , all concerns addressed, will continue to monitor.    Verona Younger

## 2022-08-30 NOTE — SUBJECTIVE & OBJECTIVE
Interval History: There hasn't been any change to mental status. She only moans to pain. On levo 0.06. Continues to be very volatile requiring varying doses of vasopressors and O2. MRI didn't give any more clarity about her condition. EEG results pending. Palliative care following and will discuss goals of care with family moving forwards.    Review of Systems   Unable to perform ROS: Patient nonverbal     Objective:     Vital Signs (Most Recent):  Temp: 98.5 °F (36.9 °C) (08/30/22 0800)  Pulse: 64 (08/30/22 1000)  Resp: (!) 6 (08/30/22 1000)  BP: (!) 120/42 (08/30/22 0800)  SpO2: 99 % (08/30/22 1000)   Vital Signs (24h Range):  Temp:  [97.6 °F (36.4 °C)-98.5 °F (36.9 °C)] 98.5 °F (36.9 °C)  Pulse:  [64-79] 64  Resp:  [6-16] 6  SpO2:  [98 %-100 %] 99 %  BP: (120)/(42) 120/42  Arterial Line BP: (106-148)/(39-48) 106/39   Weight: 56.7 kg (125 lb)  Body mass index is 23.62 kg/m².      Intake/Output Summary (Last 24 hours) at 8/30/2022 1225  Last data filed at 8/30/2022 0805  Gross per 24 hour   Intake 1494.64 ml   Output 1660 ml   Net -165.36 ml       Physical Exam  Vitals and nursing note reviewed.   Constitutional:       General: She is not in acute distress.     Appearance: Normal appearance. She is not ill-appearing.   HENT:      Head: Normocephalic and atraumatic.      Nose: Nose normal.      Mouth/Throat:      Mouth: Mucous membranes are moist.   Eyes:      General: No scleral icterus.     Extraocular Movements: Extraocular movements intact.      Conjunctiva/sclera: Conjunctivae normal.      Pupils: Pupils are equal, round, and reactive to light.   Cardiovascular:      Rate and Rhythm: Normal rate and regular rhythm.      Pulses: Normal pulses.      Heart sounds: Normal heart sounds. No murmur heard.  Pulmonary:      Effort: Pulmonary effort is normal. No respiratory distress.      Breath sounds: Normal breath sounds. No wheezing, rhonchi or rales.   Abdominal:      General: Bowel sounds are normal.       Palpations: Abdomen is soft.      Tenderness: There is no abdominal tenderness. There is no guarding or rebound.   Musculoskeletal:         General: No swelling or tenderness. Normal range of motion.      Right lower leg: Edema (trace at ankles) present.      Left lower leg: Edema (trace at ankles) present.   Skin:     General: Skin is warm and dry.      Capillary Refill: Capillary refill takes less than 2 seconds.      Coloration: Skin is pale. Skin is not jaundiced.      Findings: Lesion (tips of fingers where BG checks are performed) present. No erythema.   Neurological:      Mental Status: She is alert.      GCS: GCS eye subscore is 4. GCS verbal subscore is 1. GCS motor subscore is 6.      Comments: Obtunded; withdraws to pain       Vents:  Oxygen Concentration (%): 35 (08/25/22 1100)  Lines/Drains/Airways       Central Venous Catheter Line  Duration             Percutaneous Central Line Insertion/Assessment - Triple Lumen  08/24/22 1000 left internal jugular 6 days              Drain  Duration                  Urethral Catheter 08/24/22 0900 6 days         NG/OG Tube 08/24/22 1800 Left nostril 5 days         Rectal Tube 08/26/22 1629 fecal management system 3 days              Arterial Line  Duration             Arterial Line 08/24/22 1644 Left Femoral 5 days              Peripheral Intravenous Line  Duration                  Peripheral IV - Single Lumen 08/24/22 0653 20 G Left Forearm 6 days                  Significant Labs:    CBC/Anemia Profile:  Recent Labs   Lab 08/29/22  0250 08/30/22  0401   WBC 26.31* 21.02*   HGB 8.4* 8.5*   HCT 25.6* 25.6*   PLT 89* 87*   MCV 92 92   RDW 17.6* 17.2*          Chemistries:  Recent Labs   Lab 08/29/22  0250 08/30/22  0401    138   K 4.1 3.7    107   CO2 25 24   BUN 72* 91*   CREATININE 1.3 1.5*   CALCIUM 8.7 8.7   ALBUMIN 1.9* 1.9*   PROT 6.3 6.2   BILITOT 1.1* 1.3*   ALKPHOS 147* 160*   ALT 57* 69*   AST 94* 99*   MG 2.7* 3.0*   PHOS 2.3* 2.9       All  pertinent labs within the past 24 hours have been reviewed.    Significant Imaging: I have reviewed all pertinent imaging results/findings within the past 24 hours.

## 2022-08-30 NOTE — SUBJECTIVE & OBJECTIVE
Interval History: Afebrile.  On norepinephrine, no vasopressin.  Repeat Bcx remain negative.    Review of Systems   Unable to perform ROS: Acuity of condition   Objective:     Vital Signs (Most Recent):  Temp: 97.7 °F (36.5 °C) (08/30/22 1600)  Pulse: 69 (08/30/22 1600)  Resp: (!) 9 (08/30/22 1600)  BP: (!) 116/45 (08/30/22 1600)  SpO2: 99 % (08/30/22 1600)   Vital Signs (24h Range):  Temp:  [97.6 °F (36.4 °C)-98.5 °F (36.9 °C)] 97.7 °F (36.5 °C)  Pulse:  [64-79] 69  Resp:  [6-16] 9  SpO2:  [97 %-100 %] 99 %  BP: (116-131)/(42-48) 116/45  Arterial Line BP: (106-144)/(39-48) 116/45     Weight: 56.7 kg (125 lb)  Body mass index is 23.62 kg/m².    Estimated Creatinine Clearance: 27.5 mL/min (A) (based on SCr of 1.5 mg/dL (H)).    Physical Exam  Constitutional:       General: She is not in acute distress.     Appearance: She is well-developed. She is ill-appearing. She is not diaphoretic.   HENT:      Head: Normocephalic and atraumatic.      Right Ear: External ear normal.      Left Ear: External ear normal.      Nose: Nose normal.   Eyes:      General: No scleral icterus.        Right eye: No discharge.         Left eye: No discharge.      Extraocular Movements: Extraocular movements intact.      Conjunctiva/sclera: Conjunctivae normal.   Cardiovascular:      Rate and Rhythm: Normal rate and regular rhythm.      Heart sounds: Normal heart sounds.   Pulmonary:      Effort: Pulmonary effort is normal. No respiratory distress.      Breath sounds: No stridor.   Abdominal:      General: Abdomen is flat.      Palpations: Abdomen is soft.      Comments: Liquid stool in rectal tube   Musculoskeletal:         General: Normal range of motion.   Skin:     Findings: No erythema or rash.      Comments: Skin tear LUE.  LIJ c/d/I.   Neurological:      Comments: Does not respond    Psychiatric:      Comments: Unable to assess       Significant Labs: All pertinent labs within the past 24 hours have been reviewed.    Significant  Imaging: I have reviewed all pertinent imaging results/findings within the past 24 hours.   25

## 2022-08-30 NOTE — SUBJECTIVE & OBJECTIVE
Interval history:  No acute events overnight.   and son are at bedside.  Discussed physical exam findings, critical illness, and declining cognitive functioning.      Current Neurological Medications: Keppra 750 BID      Review of Systems   Unable to perform ROS: Mental status change   Objective:     Vital Signs (Most Recent):  Temp: 97.8 °F (36.6 °C) (08/29/22 1905)  Pulse: 78 (08/29/22 2005)  Resp: 16 (08/29/22 2005)  BP: (!) 122/50 (08/27/22 0705)  SpO2: 99 % (08/29/22 2005)   Vital Signs (24h Range):  Temp:  [97.5 °F (36.4 °C)-98.1 °F (36.7 °C)] 97.8 °F (36.6 °C)  Pulse:  [62-78] 78  Resp:  [7-16] 16  SpO2:  [97 %-100 %] 99 %  Arterial Line BP: (111-148)/(37-50) 120/40     Weight: 56.7 kg (125 lb)  Body mass index is 23.62 kg/m².    Physical Exam  Vitals and nursing note reviewed.   Constitutional:       General: She is not in acute distress.     Appearance: She is ill-appearing and toxic-appearing. She is not diaphoretic.      Interventions: Nasal cannula in place.      Comments: NGT   HENT:      Head: Normocephalic and atraumatic.      Comments: Sparse eyebrows  Poor dentition     Right Ear: External ear normal.      Left Ear: External ear normal.      Nose: Nose normal. No congestion or rhinorrhea.      Mouth/Throat:      Mouth: Mucous membranes are moist.      Pharynx: Oropharynx is clear.   Eyes:      General: No scleral icterus.     Conjunctiva/sclera:      Right eye: Exudate present.      Left eye: Exudate present.      Pupils: Pupils are equal, round, and reactive to light.   Pulmonary:      Comments: Sonorous  Abdominal:      Palpations: Abdomen is soft.   Musculoskeletal:         General: Normal range of motion.      Cervical back: Normal range of motion and neck supple.      Right lower leg: No edema.      Left lower leg: No edema.   Skin:     General: Skin is cool and dry.      Coloration: Skin is pale.      Findings: Petechiae and wound (Left index finger) present.      Nails: There is  clubbing.   Neurological:      Deep Tendon Reflexes:      Reflex Scores:       Tricep reflexes are 2+ on the right side and 2+ on the left side.       Bicep reflexes are 2+ on the right side and 2+ on the left side.       Brachioradialis reflexes are 2+ on the right side and 2+ on the left side.       Patellar reflexes are 1+ on the right side and 1+ on the left side.       Achilles reflexes are 2+ on the right side and 2+ on the left side.      NEUROLOGICAL EXAMINATION:     MENTAL STATUS   Attention: decreased. Concentration: decreased.   Speech: mute   Level of consciousness: responsive to painful stimuli ,  drowsy       Moans to noxious stimulus     CRANIAL NERVES     CN III, IV, VI   Pupils are equal, round, and reactive to light.  Conjugate gaze: present  Vestibulo-ocular reflex: present       Brainstem reflexes intact corneal gag and cough  Closes the eyelids against resistance     MOTOR EXAM   Muscle bulk: decreased  Overall muscle tone: increased  Right arm tone: increased  Left arm tone: increased  Right leg tone: normal  Left leg tone: normal       No movement of limbs to noxious stimuli, only moans and temporary eye opening     REFLEXES     Reflexes   Right brachioradialis: 2+  Left brachioradialis: 2+  Right biceps: 2+  Left biceps: 2+  Right triceps: 2+  Left triceps: 2+  Right patellar: 1+  Left patellar: 1+  Right achilles: 2+  Left achilles: 2+  Right plantar: equivocal  Left plantar: upgoing  Right Kerr: absent  Left Kerr: absent  Right ankle clonus: absent  Left ankle clonus: absent    Significant Labs: All pertinent lab results from the past 24 hours have been reviewed.    Significant Imaging: I have reviewed all pertinent imaging results/findings within the past 24 hours.

## 2022-08-30 NOTE — ASSESSMENT & PLAN NOTE
67F hx deafness, ETOH cirrhosis, seizure disorder seen previously or ESBL E coli bacteremia due to UTI being treated with ertapenem, who developed progressive worsening AMS and hypotension requiring ICU transfer. Urine cx + s. viridans. Blood cx 1/4 + GPC resembling staph, CONS, likely contaminate. Currently on meropenem and vanc. Worsening hypotension, requiring vaso and levo. Liquid stool via rectal tube per RN, pt on lactulose for possible HE. CT C/A/P w/ diffuse enterocolitis. C. Diff negative. ? Portal colopathy vs. Transient bowel ischemia related to hypotension.  LP done 8/27 not consistent w/ infection.  MRI nonspecific b/l basal ganglia hyperintensity that is nonspecific.  Plans for patient to transition to hospice care.    Recommendations:   -Discontinue vancomycin  -Given ESBL E. Coli bacteremia, can continue meropenem until discharge

## 2022-08-30 NOTE — ASSESSMENT & PLAN NOTE
Aleyda Floyd is a 67 y.o. female with mutism, deafness, EtOH cirrhosis, seizure disorder with altered mental status.  During interview, it was noted that she used to be on Onfi.  This was discontinued recently and she remains on Keppra 750 mg b.i.d..  Is difficult to ascertain from exam whether this is epileptic or toxic or metabolic induced encephalopathy.  Given her critical illnesses and seizure history we recommend the following:    Today, we were able to elicit some history from family.  The son reported that as her encephalopathy had progressively worsened she had initially complained about pain in her limbs and then she became progressively more plegic.  This is concerning for a possible vasculitis/myopathy.  Can consider evaluation with inflammatory markers. However, serum testing may not be as specific in this instance due to the chronic debility and wasting.    --continue Keppra  --recheck Keppra levels to help establish a baseline  --spot EEG could be helpful  --MRI epilepsy protocol with and without contrast of the brain, when stable if there are no contraindications  --Consider autoimmune evaluation for hypothyroid; TSH, T4  --Consider autoimmune vasculitis; angiography, RASHARD, ANCA, C4  --Consider autoimmune myositis; MyoMarker 3 Profile may be available. Biopsy would be the most definitive.    Neurology will sign off.  We will follow along peripherally for the results of an MRI or EEG.  Please let us know if a finding needs extra attention.  Thank you for the consult and including us in the care of this patient.  Please do not hesitate to reach out to us with any questions or concerns at [phone].

## 2022-08-30 NOTE — CARE UPDATE
Care Update     Had family meeting with patient's  (Jaime) and her two sons (vikas and sanya) using an ASL . We discussed the patient's hospital course and her likely poor prognosis given she has not improved after many days in the ICU on antibiotics. MRI brain and EEG were not revealing for a cause of her altered mental status. The family states that she has been decompensating over the past 6 months and that they do not wish for her to suffer any longer. They agree that they would like to focus on her comfort. We discussed comfort care measures including stopping medications that are not aiding her including the blood pressure medications. Comfort care orders including medications for dyspnea ordered. Case management is meeting with the family now to discuss inpatient hospice which is the family's preference.     DNR filed.     Demetria Be MD  Emergency Medicine Staff   Critical Care Fellow  3:25 PM

## 2022-08-30 NOTE — PROCEDURES
24 hr. Video EEG Monitoring    Date/Time: 8/17/2022 8:01 PM  Performed by: Omari Donahue MD  Authorized by: Charissa Adames MD     ELECTROENCEPHALOGRAM REPORT    DATE OF SERVICE: 8/29/22  EEG NUMBER: FH   REQUESTED BY: Zacarias  LOCATION OF SERVICE: [facility] [place]    METHODOLOGY   Electroencephalographic (EEG) recording is with electrodes placed according to the International 10-20 placement system.  Thirty two (32) channels of digital signal (sampling rate of 512/sec) including T1 and T2 was simultaneously recorded from the scalp and may include  EKG, EMG, and/or eye monitors.  Recording band pass was 0.1 to 512 hz.  Digital video recording of the patient is simultaneously recorded with the EEG.  The patient is instructed report clinical symptoms which may occur during the recording session.  EEG and video recording is stored and archived in digital format. Activation procedures which include photic stimulation, hyperventilation and instructing patients to perform simple task are done in selected patients.    The EEG is displayed on a monitor screen and can be reviewed using different montages.  Computer assisted analysis is employed to detect spike and electrographic seizure activity.   The entire record is submitted for computer analysis.  The entire recording is visually reviewed and the times identified by computer analysis as being spikes or seizures are reviewed again.  Compresses spectral analysis (CSA) is also performed on the activity recorded from each individual channel.  This is displayed as a power display of frequencies from 0 to 30 Hz over time.   The CSA is reviewed looking for asymmetries in power between homologous areas of the scalp and then compared with the original EEG recording.     C[employer] software was also utilized in the review of this study.  This software suite analyzes the EEG recording in multiple domains.  Coherence and rhythmicity is computed to identify EEG sections which may contain  organized seizures.  Each channel undergoes analysis to detect presence of spike and sharp waves which have special and morphological characteristic of epileptic activity.  The routine EEG recording is converted from spacial into frequency domain.  This is then displayed comparing homologous areas to identify areas of significant asymmetry.  Algorithm to identify non-cortically generated artifact is used to separate eye movement, EMG and other artifact from the EEG    EEG FINDINGS  Physiological states present  The record shows a poor organization at rest, with no discernible posterior dominant rhythm with poor reactivity. There is mild bilateral beta activity. Background is largely diffusely attenuated and predominated by delta range slowing. There are intermittent, poorly formed generalized periodic delta range discharges with pseudotriphasic morphology.    State changes are not seen.    Provocative maneuvers including hyperventilation and photic stimulation were not performed.     EKG recording shows a regular rhythm.    There is no push button or clinical event.    IMPRESSION:  Abnormal study due to severe  diffuse background slowing consistent with diffuse cerebral dysfunction and encephalopathy which may be on the basis of toxic, metabolic, or primary neuronal disorder. Poorly formed generalized periodic discharges are suggestive of underlying diffuse cortical irritability. Triphasic morphology is suggestive of underlying hepatic or renal encephalopathy.     Omari Donahue MD

## 2022-08-30 NOTE — PROGRESS NOTES
Pharmacokinetic Assessment Follow Up: IV Vancomycin      Therapy with Vancomycin complete and/or consult discontinued by provider.  Pharmacy will sign off, please re-consult as needed.    Thank you for the consult,   Beryl Mjuica. PharmD

## 2022-08-30 NOTE — PLAN OF CARE
CM spoke Mihaela with the [facility]. Clinicals sent via [facility]. Possible transfer to inpatient hospice first thing in the morning.      Joselyn Bedoya RN     [phone]

## 2022-08-30 NOTE — ASSESSMENT & PLAN NOTE
This patient does have evidence of infective focus  My overall impression is septic shock.  Source: Urine, Blood  Antibiotics given-   Antibiotics (From admission, onward)    Start     Stop Route Frequency Ordered    08/30/22 1200  meropenem-0.9% sodium chloride 1 g/50 mL IVPB         -- IV Every 12 hours (non-standard times) 08/30/22 1111    08/27/22 0345  vancomycin - pharmacy to dose  (vancomycin IVPB)        See [facility] for full Linked Orders Report.    -- IV pharmacy to manage frequency 08/27/22 0245    08/25/22 2100  rifAXIMin tablet 550 mg         -- PER NG TUBE 2 times daily 08/25/22 1136        Latest lactate reviewed-  No results for input(s): LACTATE in the last 72 hours.  Organ dysfunction indicated by encephalopathy    Shock with decreased perfusion noted, Fluid challenge was not given at 30cc/kg due to decreased urine output    Post- resuscitation assessment- (Done after fluids given for shock)  Vital signs post fluid administrations were-  Temp Readings from Last 1 Encounters:   08/30/22 98.5 °F (36.9 °C) (Axillary)     BP Readings from Last 1 Encounters:   08/30/22 (!) 120/42     Pulse Readings from Last 1 Encounters:   08/30/22 64       Perfusion exam was performed within 6 hours of septic shock presentation after bolus shows Inadequate tissue perfusion assessed by non-invasive monitoring  Will Start Pressors- Levophed for MAP of 65    - F/U blood cultures, urine culture  - Continue meropenem only as per ID recommendations  - Continue stress dose steroids   - Re-consult ID for worsening shock  - Levophed and vaso for MAP >65  - Renal function worsened to Cr of 1.5 today. She is also fluid net positive 4.8L. This could be first sign of HRS. Consider HRS protocol.

## 2022-08-30 NOTE — PLAN OF CARE
CM sent inpatient hospice referral to [facility] via [facility]. CM to follow for acceptance.      Joselyn Bedoya RN     [phone]

## 2022-08-30 NOTE — PROGRESS NOTES
[facility] - Cardiac Medical ICU  Critical Care Medicine  Progress Note    Patient Name: Aleyda Floyd  MRN: [id number]  Admission Date: 8/17/2022  Hospital Length of Stay: 11 days  Code Status: Full Code  Attending Provider: Osmel Daigle MD  Primary Care Provider: Elvie Fernandes MD   Principal Problem: Septic shock    Subjective:     HPI:  67 year old lady with alcoholic cirrhosis on lactulose and seizure disorder who presented to the ED for AMS and fatigue. 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 Admitted to hospital medicine for encephalopathy. EEG on admission unremarkable for seizure activity, consistent with encephalopathy that could be infectious vs metabolic vs primary neuronal in nature. Blood and urine cultures growing ESBL e coli. She was initially started on meropenem, however consulted ID who recommended de-escalating to ertapenem and to complete two week course in total. CT head on 8/22 was unremarkable for acute change. Given bilateral hand weakness, MRI was ordered to r/o spinal cord compression. On 8/24 AM, pt was rapid response 2/2 to hypotension. She received 1 L of NS and remains in refractory hypotension. Pt is transferred to MICU for shock.       Hospital/ICU Course:  Admitted to MICU for septic shock despite what would have been adequate treatment on the floor. Central line and A line placed for shock, levo and vaso started, meropenem continued and ID consulted. Patient's blood cutltures ngtd (coag negative staph in 1 bottle) but urine with strep viridans. Despite abx, patient remained in shock, and remained altered and mostly unresponsive though protecting her airway. Pan-scan was mostly unrevealing for infective foci. GI consulted for assistance with portal enteropathy vs colitis on CT - leaning towards portal disease, no further plans from their perspective. LP performed for AMS was not consistent with infection. Neurology consulted for AMS, recommending repeat extended EEG and MRI. Due  to extended period of illness and overall poor prognosis, palliative care was involved.       Interval History: There hasn't been any change to mental status. She only moans to pain. On levo 0.06. Continues to be very volatile requiring varying doses of vasopressors and O2. MRI didn't give any more clarity about her condition. EEG results pending. Palliative care following and will discuss goals of care with family moving forwards.    Review of Systems   Unable to perform ROS: Patient nonverbal     Objective:     Vital Signs (Most Recent):  Temp: 98.5 °F (36.9 °C) (08/30/22 0800)  Pulse: 64 (08/30/22 1000)  Resp: (!) 6 (08/30/22 1000)  BP: (!) 120/42 (08/30/22 0800)  SpO2: 99 % (08/30/22 1000)   Vital Signs (24h Range):  Temp:  [97.6 °F (36.4 °C)-98.5 °F (36.9 °C)] 98.5 °F (36.9 °C)  Pulse:  [64-79] 64  Resp:  [6-16] 6  SpO2:  [98 %-100 %] 99 %  BP: (120)/(42) 120/42  Arterial Line BP: (106-148)/(39-48) 106/39   Weight: 56.7 kg (125 lb)  Body mass index is 23.62 kg/m².      Intake/Output Summary (Last 24 hours) at 8/30/2022 1225  Last data filed at 8/30/2022 0805  Gross per 24 hour   Intake 1494.64 ml   Output 1660 ml   Net -165.36 ml       Physical Exam  Vitals and nursing note reviewed.   Constitutional:       General: She is not in acute distress.     Appearance: Normal appearance. She is not ill-appearing.   HENT:      Head: Normocephalic and atraumatic.      Nose: Nose normal.      Mouth/Throat:      Mouth: Mucous membranes are moist.   Eyes:      General: No scleral icterus.     Extraocular Movements: Extraocular movements intact.      Conjunctiva/sclera: Conjunctivae normal.      Pupils: Pupils are equal, round, and reactive to light.   Cardiovascular:      Rate and Rhythm: Normal rate and regular rhythm.      Pulses: Normal pulses.      Heart sounds: Normal heart sounds. No murmur heard.  Pulmonary:      Effort: Pulmonary effort is normal. No respiratory distress.      Breath sounds: Normal breath sounds. No  wheezing, rhonchi or rales.   Abdominal:      General: Bowel sounds are normal.      Palpations: Abdomen is soft.      Tenderness: There is no abdominal tenderness. There is no guarding or rebound.   Musculoskeletal:         General: No swelling or tenderness. Normal range of motion.      Right lower leg: Edema (trace at ankles) present.      Left lower leg: Edema (trace at ankles) present.   Skin:     General: Skin is warm and dry.      Capillary Refill: Capillary refill takes less than 2 seconds.      Coloration: Skin is pale. Skin is not jaundiced.      Findings: Lesion (tips of fingers where BG checks are performed) present. No erythema.   Neurological:      Mental Status: She is alert.      GCS: GCS eye subscore is 4. GCS verbal subscore is 1. GCS motor subscore is 6.      Comments: Obtunded; withdraws to pain       Vents:  Oxygen Concentration (%): 35 (08/25/22 1100)  Lines/Drains/Airways       Central Venous Catheter Line  Duration             Percutaneous Central Line Insertion/Assessment - Triple Lumen  08/24/22 1000 left internal jugular 6 days              Drain  Duration                  Urethral Catheter 08/24/22 0900 6 days         NG/OG Tube 08/24/22 1800 Left nostril 5 days         Rectal Tube 08/26/22 1629 fecal management system 3 days              Arterial Line  Duration             Arterial Line 08/24/22 1644 Left Femoral 5 days              Peripheral Intravenous Line  Duration                  Peripheral IV - Single Lumen 08/24/22 0653 20 G Left Forearm 6 days                  Significant Labs:    CBC/Anemia Profile:  Recent Labs   Lab 08/29/22  0250 08/30/22  0401   WBC 26.31* 21.02*   HGB 8.4* 8.5*   HCT 25.6* 25.6*   PLT 89* 87*   MCV 92 92   RDW 17.6* 17.2*          Chemistries:  Recent Labs   Lab 08/29/22  0250 08/30/22  0401    138   K 4.1 3.7    107   CO2 25 24   BUN 72* 91*   CREATININE 1.3 1.5*   CALCIUM 8.7 8.7   ALBUMIN 1.9* 1.9*   PROT 6.3 6.2   BILITOT 1.1* 1.3*    ALKPHOS 147* 160*   ALT 57* 69*   AST 94* 99*   MG 2.7* 3.0*   PHOS 2.3* 2.9       All pertinent labs within the past 24 hours have been reviewed.    Significant Imaging: I have reviewed all pertinent imaging results/findings within the past 24 hours.      ABG  Recent Labs   Lab 08/26/22  0747   PH 7.399   PO2 102*   PCO2 31.2*   HCO3 19.3*   BE -6     Assessment/Plan:     Neuro  Acute encephalopathy  67 yoF with cirrhosis with ascites and seizure disorder presents with AMS and fatigue  Labs showed ammonia of 36  CT head showed no acute intracranial abnormality  Possible cause of encephalopathy is multifactorial: infection vs neurological vs metabolic  EEG on admit remarkable for encephalopathy, no other significant findings  UCx and BCx positive for ESBL e coli sensitive to only meropenem and ertapenem.      Although she has been treated for a few days with the right antibiotics, her mental status has been slow to improve  She is no longer alert and does not open eyes or awaken  CT head showing chronic microvascular changes, no acute changes  CK normal, rhabdo or myositis less likely   Cortisol normal, weakness from adrenal insufficiency less likely latoya since BPs have been relatively normal   EEG showed mild generalized cerebral dysfunction as is commonly seen in a variety of states of toxic and metabolic derangement  MRI negative for any causes of current mental status    Plan:  - D/C home Clobazam. Continue keppra for seizures  - continue lactulose and rifaximin   - Discontinue lasix and aldactone for BP and volume down status  - continue thiamine and folic acid  - lumbar puncture non-infectious, unremarkable  - MRI negative  - F/u with neurology for EEG results  - Continue Keppra  - Continue discussions with family and consider palliative care and hospice moving forward    ENT  Deaf- written communication   used when able to communicate    Renal/  UTI (urinary tract infection)  See gram  negative bacteremia    ID  * Septic shock  This patient does have evidence of infective focus  My overall impression is septic shock.  Source: Urine, Blood  Antibiotics given-   Antibiotics (From admission, onward)    Start     Stop Route Frequency Ordered    08/30/22 1200  meropenem-0.9% sodium chloride 1 g/50 mL IVPB         -- IV Every 12 hours (non-standard times) 08/30/22 1111    08/27/22 0345  vancomycin - pharmacy to dose  (vancomycin IVPB)        See [facility] for full Linked Orders Report.    -- IV pharmacy to manage frequency 08/27/22 0245    08/25/22 2100  rifAXIMin tablet 550 mg         -- PER NG TUBE 2 times daily 08/25/22 1136        Latest lactate reviewed-  No results for input(s): LACTATE in the last 72 hours.  Organ dysfunction indicated by encephalopathy    Shock with decreased perfusion noted, Fluid challenge was not given at 30cc/kg due to decreased urine output    Post- resuscitation assessment- (Done after fluids given for shock)  Vital signs post fluid administrations were-  Temp Readings from Last 1 Encounters:   08/30/22 98.5 °F (36.9 °C) (Axillary)     BP Readings from Last 1 Encounters:   08/30/22 (!) 120/42     Pulse Readings from Last 1 Encounters:   08/30/22 64       Perfusion exam was performed within 6 hours of septic shock presentation after bolus shows Inadequate tissue perfusion assessed by non-invasive monitoring  Will Start Pressors- Levophed for MAP of 65    - F/U blood cultures, urine culture  - Continue meropenem only as per ID recommendations  - Continue stress dose steroids   - Re-consult ID for worsening shock  - Levophed and vaso for MAP >65  - Renal function worsened to Cr of 1.5 today. She is also fluid net positive 4.8L. This could be first sign of HRS. Consider HRS protocol.       Gram-negative bacteremia  Had UCx and BCx positive for ESBL e coli sensitive only to meropenem and ertapenem. Most recent UCx is positive for strep viridans which will be treated with  meropenem. WBC went up to 50 today so will trend.  - Meropenem as per ID recommendations  - CT Chest showed possible superimposed aspiration or PNA in left lower lobe  - CT abdomen showed thickening of bladder and colon. Liver has a hypodensity which we will consider further evaluating with US or MRI of abdomen      Hematology  Thrombocytopenia  Hgb on admission 7.9 and platelets of 117  Hgb baseline around 7.5-8.5 and platelets around 50s-60s. Current Hgb 8.6 platelets 112.     Plan:  - trend Hb and Plt daily   - continue to monitor for signs of bleeding    Other  Suspected deep tissue injury  Red/maroon/purple discoloration and blistering noted to sacral region.    - Wound care consulted  -CT scan shows no evidence of infection         Critical Care Daily Checklist:    A: Awake: RASS Goal/Actual Goal:    Actual: [place] Agitation Sedation Scale (RASS): Drowsy   B: Spontaneous Breathing Trial Performed?     C: SAT & SBT Coordinated?  n/a                      D: Delirium: CAM-ICU Overall CAM-ICU: Positive   E: Early Mobility Performed? No   F: Feeding Goal: Goals: Meet % EEN, EPN by RD f/u date  Status: Nutrition Goal Status: new   Current Diet Order   No orders of the defined types were placed in this encounter.      AS: Analgesia/Sedation off   T: Thromboembolic Prophylaxis yes   H: HOB > 300 Yes   U: Stress Ulcer Prophylaxis (if needed) yes   G: Glucose Control yes   B: Bowel Function Stool Occurrence: 1   I: Indwelling Catheter (Lines & Beckham) Necessity Beckham, PIV   D: De-escalation of Antimicrobials/Pharmacotherapies no    Plan for the day/ETD F/u EEG, Discuss goals of care    Code Status:  Family/Goals of Care: Full Code     Critical secondary to Patient has a condition that poses threat to life and bodily function: severe encephalopathy      Critical care was time spent personally by me on the following activities: development of treatment plan with patient or surrogate and bedside caregivers,  discussions with consultants, evaluation of patient's response to treatment, examination of patient, ordering and performing treatments and interventions, ordering and review of laboratory studies, ordering and review of radiographic studies, pulse oximetry, re-evaluation of patient's condition. This critical care time did not overlap with that of any other provider or involve time for any procedures.     Yasmin Rahman MD  Critical Care Medicine  [facility] - Cardiac Medical [facility]

## 2022-08-30 NOTE — PROGRESS NOTES
[facility] - Card[facility] Medical [facility]  Neurology  Progress Note    Patient Name: Aleyda Floyd  MRN: [id number]  Admission Date: 8/17/2022  Hospital Length of Stay: 10 days  Code Status: Full Code   Attending Provider: Osmel Daigle MD  Primary Care Physician: Elvie Fernandes MD   Principal Problem:Septic shock    HPI:   Aleyda Floyd is a 67 y.o. female with deafness, mutism, EtOH cirrhosis on lactulose+rifaximin, seizure disorder (keppra 750 bid) who presented to the ED for AMS and fatigue. Admitted for encephalopathy. EEG on admission unremarkable for seizure activity, consistent with encephalopathy that could be infectious vs metabolic vs primary neuronal in nature. Blood and urine cultures growing ESBL e coli on meropenem and vancomycin. CT head on 8/22 was stable. Given bilateral hand weakness, evaluation with cervical MRI shows multifocal spondylolysis with most prominent finding of severe central canal stenosis and severe left neural foraminal narrowing at C3/4. On 8/24 AM, pt was rapid response 2/2 to hypotension. She received 1 L of NS and remains in refractory hypotension. Pt is transferred to MICU for shock and is on 2 continuous infusions for pressor support. Neurology was consulted for evaluation of encephalopathy.  History is obtained from chart review and primary team since family was not present and patient is unresponsive.      Overview/Hospital Course:  No notes on file      Interval history:  No acute events overnight.   and son are at bedside.  Discussed physical exam findings, critical illness, and declining cognitive functioning.      Current Neurological Medications: Keppra 750 BID      Review of Systems   Unable to perform ROS: Mental status change   Objective:     Vital Signs (Most Recent):  Temp: 97.8 °F (36.6 °C) (08/29/22 1905)  Pulse: 78 (08/29/22 2005)  Resp: 16 (08/29/22 2005)  BP: (!) 122/50 (08/27/22 0705)  SpO2: 99 % (08/29/22 2005)   Vital Signs (24h Range):  Temp:   [97.5 °F (36.4 °C)-98.1 °F (36.7 °C)] 97.8 °F (36.6 °C)  Pulse:  [62-78] 78  Resp:  [7-16] 16  SpO2:  [97 %-100 %] 99 %  Arterial Line BP: (111-148)/(37-50) 120/40     Weight: 56.7 kg (125 lb)  Body mass index is 23.62 kg/m².    Physical Exam  Vitals and nursing note reviewed.   Constitutional:       General: She is not in acute distress.     Appearance: She is ill-appearing and toxic-appearing. She is not diaphoretic.      Interventions: Nasal cannula in place.      Comments: NGT   HENT:      Head: Normocephalic and atraumatic.      Comments: Sparse eyebrows  Poor dentition     Right Ear: External ear normal.      Left Ear: External ear normal.      Nose: Nose normal. No congestion or rhinorrhea.      Mouth/Throat:      Mouth: Mucous membranes are moist.      Pharynx: Oropharynx is clear.   Eyes:      General: No scleral icterus.     Conjunctiva/sclera:      Right eye: Exudate present.      Left eye: Exudate present.      Pupils: Pupils are equal, round, and reactive to light.   Pulmonary:      Comments: Sonorous  Abdominal:      Palpations: Abdomen is soft.   Musculoskeletal:         General: Normal range of motion.      Cervical back: Normal range of motion and neck supple.      Right lower leg: No edema.      Left lower leg: No edema.   Skin:     General: Skin is cool and dry.      Coloration: Skin is pale.      Findings: Petechiae and wound (Left index finger) present.      Nails: There is clubbing.   Neurological:      Deep Tendon Reflexes:      Reflex Scores:       Tricep reflexes are 2+ on the right side and 2+ on the left side.       Bicep reflexes are 2+ on the right side and 2+ on the left side.       Brachioradialis reflexes are 2+ on the right side and 2+ on the left side.       Patellar reflexes are 1+ on the right side and 1+ on the left side.       Achilles reflexes are 2+ on the right side and 2+ on the left side.      NEUROLOGICAL EXAMINATION:     MENTAL STATUS   Attention: decreased.  Concentration: decreased.   Speech: mute   Level of consciousness: responsive to painful stimuli ,  drowsy       Moans to noxious stimulus     CRANIAL NERVES     CN III, IV, VI   Pupils are equal, round, and reactive to light.  Conjugate gaze: present  Vestibulo-ocular reflex: present       Brainstem reflexes intact corneal gag and cough  Closes the eyelids against resistance     MOTOR EXAM   Muscle bulk: decreased  Overall muscle tone: increased  Right arm tone: increased  Left arm tone: increased  Right leg tone: normal  Left leg tone: normal       No movement of limbs to noxious stimuli, only moans and temporary eye opening     REFLEXES     Reflexes   Right brachioradialis: 2+  Left brachioradialis: 2+  Right biceps: 2+  Left biceps: 2+  Right triceps: 2+  Left triceps: 2+  Right patellar: 1+  Left patellar: 1+  Right achilles: 2+  Left achilles: 2+  Right plantar: equivocal  Left plantar: upgoing  Right Kerr: absent  Left Kerr: absent  Right ankle clonus: absent  Left ankle clonus: absent    Significant Labs: All pertinent lab results from the past 24 hours have been reviewed.    Significant Imaging: I have reviewed all pertinent imaging results/findings within the past 24 hours.    Assessment and Plan:     Acute encephalopathy  Aleyda Floyd is a 67 y.o. female with mutism, deafness, EtOH cirrhosis, seizure disorder with altered mental status.  During interview, it was noted that she used to be on Onfi.  This was discontinued recently and she remains on Keppra 750 mg b.i.d..  Is difficult to ascertain from exam whether this is epileptic or toxic or metabolic induced encephalopathy.  Given her critical illnesses and seizure history we recommend the following:    Today, we were able to elicit some history from family.  The son reported that as her encephalopathy had progressively worsened she had initially complained about pain in her limbs and then she became progressively more plegic.  This is  concerning for a possible vasculitis/myopathy.  Can consider evaluation with inflammatory markers. However, serum testing may not be as specific in this instance due to the chronic debility and wasting.    --continue Keppra  --recheck Keppra levels to help establish a baseline  --spot EEG could be helpful  --MRI epilepsy protocol with and without contrast of the brain, when stable if there are no contraindications  --Consider autoimmune evaluation for hypothyroid; TSH, T4  --Consider autoimmune vasculitis; angiography, RASHARD, ANCA, C4  --Consider autoimmune myositis; MyoMarker 3 Profile may be available. Biopsy would be the most definitive.    Neurology will sign off.  We will follow along peripherally for the results of an MRI or EEG.  Please let us know if a finding needs extra attention.  Thank you for the consult and including us in the care of this patient.  Please do not hesitate to reach out to us with any questions or concerns at [phone].        VTE Risk Mitigation (From admission, onward)         Ordered     IP VTE HIGH RISK PATIENT  Once         08/18/22 0145     Place sequential compression device  Until discontinued         08/18/22 0138                Ced Johnson MD  Neurology  [facility] - Cardiac Medical ICU

## 2022-08-30 NOTE — ASSESSMENT & PLAN NOTE
67F hx deafness, ETOH cirrhosis, seizure disorder seen previously or ESBL E coli bacteremia due to UTI being treated with ertapenem, who developed progressive worsening AMS and hypotension requiring ICU transfer. Urine cx + s. viridans. Blood cx 1/4 + GPC resembling staph, CONS, likely contaminate. Currently on meropenem and vanc. Worsening hypotension, requiring vaso and levo. Liquid stool via rectal tube per RN, pt on lactulose for possible HE. CT C/A/P w/ diffuse enterocolitis. C. Diff negative. ? Portal colopathy vs. Transient bowel ischemia related to hypotension.  LP done 8/27 not consistent w/ infection.  MRI nonspecific b/l basal ganglia hyperintensity that is nonspecific.  Plans for patient to transition to hospice care.    Recommendations:   -Discontinue vancomycin  -Given ESBL E. Coli bacteremia, initial plan was for 2-week course ending 9/3/22.  Plans for hospice, therefore can continue meropenem until discharge.

## 2022-08-30 NOTE — PROGRESS NOTES
[facility] - [facility]  Infectious Disease  Progress Note    Patient Name: Aleyda Floyd  MRN: [id number]  Admission Date: 8/17/2022  Length of Stay: 11 days  Attending Physician: Osmel Daigle MD  Primary Care Provider: Elvie Fernandes MD    Isolation Status: Special Contact  Assessment/Plan:      Gram-negative bacteremia  67F hx deafness, ETOH cirrhosis, seizure disorder seen previously or ESBL E coli bacteremia due to UTI being treated with ertapenem, who developed progressive worsening AMS and hypotension requiring ICU transfer. Urine cx + s. viridans. Blood cx 1/4 + GPC resembling staph, CONS, likely contaminate. Currently on meropenem and vanc. Worsening hypotension, requiring vaso and levo. Liquid stool via rectal tube per RN, pt on lactulose for possible HE. CT C/A/P w/ diffuse enterocolitis. C. Diff negative. ? Portal colopathy vs. Transient bowel ischemia related to hypotension.  LP done 8/27 not consistent w/ infection.  MRI nonspecific b/l basal ganglia hyperintensity that is nonspecific.  Plans for patient to transition to hospice care.    Recommendations:   -Discontinue vancomycin  -Given ESBL E. Coli bacteremia, initial plan was for 2-week course ending 9/3/22.  Plans for hospice, therefore can continue meropenem until discharge.      Acute encephalopathy  67F hx deafness, ETOH cirrhosis, seizure disorder seen previously or ESBL E coli bacteremia due to UTI being treated with ertapenem, who developed progressive worsening AMS and hypotension requiring ICU transfer. Urine cx + s. viridans. Blood cx 1/4 + GPC resembling staph, CONS, likely contaminate. Currently on meropenem and vanc. Worsening hypotension, requiring vaso and levo. Liquid stool via rectal tube per RN, pt on lactulose for possible HE. CT C/A/P w/ diffuse enterocolitis. C. Diff negative. ? Portal colopathy vs. Transient bowel ischemia related to hypotension.  LP done 8/27 not consistent w/ infection.  MRI nonspecific b/l  "basal ganglia hyperintensity that is nonspecific.  Plans for patient to transition to hospice care.    Recommendations:   -Discontinue vancomycin  -Given ESBL E. Coli bacteremia, can continue meropenem until discharge        Thank you for your consult. I will sign off. Please contact us if you have any additional questions.    Eduardo Olmstead MD  Infectious Disease  [facility] - Cardiac Medical ICU    Subjective:     Principal Problem:Septic shock    HPI: A 67-year-old woman with HTN, ETOH cirrhosis (recent admission for decompensated liver failure, not currently transplant candidate), seizure disorder (on Keppra and Onfi), deaf/mute, reported hx of recurrent UTIs, who presented with AMS and fatigue X 1-2 weeks.  CT imaging and EEG negative for acute IC pathology.  Admission blood cx  3/4 + for ESBL E coli susceptible to only carbapenems.   U/A >100 wbc, urine cx pending. Currently on meropenem. ID consulted for meropenem and ESBL E coli bacteremia.     Afebrile, no leukocytosis.  Reported dysuria per medical record.  At time of visit,  (who is also hearing impaired is in room).   Patient is lethargic, opens eyes, but will not respond to his signing/communication. Unable to obtain a ROS  Spoke to sister on the phone.  Unable to tell me much about patient symptoms/complaints.    Patient was seen by the Infectious Diseases [facility] service. Work up revealed ESBL E coli bacteremia. Her antibiotic therapy was optimized to Ertapenem. Blood cultures repeated on 8/20 remain NGTD and recommendations for a 14 day course of therapy were give prior to sign off.     Her hospital course was complicated by hypotension and bilateral hand weakness. She was given volume replacement however her shock failed to improve which prompted transfer to the ICU.     Infectious Diseases consulted for "ESBL bacteremia and UTI, stepped up to ICU for septic shock"            Interval History: Afebrile.  On norepinephrine, no vasopressin.  Repeat Bcx " remain negative.    Review of Systems   Unable to perform ROS: Acuity of condition   Objective:     Vital Signs (Most Recent):  Temp: 97.7 °F (36.5 °C) (08/30/22 1600)  Pulse: 69 (08/30/22 1600)  Resp: (!) 9 (08/30/22 1600)  BP: (!) 116/45 (08/30/22 1600)  SpO2: 99 % (08/30/22 1600)   Vital Signs (24h Range):  Temp:  [97.6 °F (36.4 °C)-98.5 °F (36.9 °C)] 97.7 °F (36.5 °C)  Pulse:  [64-79] 69  Resp:  [6-16] 9  SpO2:  [97 %-100 %] 99 %  BP: (116-131)/(42-48) 116/45  Arterial Line BP: (106-144)/(39-48) 116/45     Weight: 56.7 kg (125 lb)  Body mass index is 23.62 kg/m².    Estimated Creatinine Clearance: 27.5 mL/min (A) (based on SCr of 1.5 mg/dL (H)).    Physical Exam  Constitutional:       General: She is not in acute distress.     Appearance: She is well-developed. She is ill-appearing. She is not diaphoretic.   HENT:      Head: Normocephalic and atraumatic.      Right Ear: External ear normal.      Left Ear: External ear normal.      Nose: Nose normal.   Eyes:      General: No scleral icterus.        Right eye: No discharge.         Left eye: No discharge.      Extraocular Movements: Extraocular movements intact.      Conjunctiva/sclera: Conjunctivae normal.   Cardiovascular:      Rate and Rhythm: Normal rate and regular rhythm.      Heart sounds: Normal heart sounds.   Pulmonary:      Effort: Pulmonary effort is normal. No respiratory distress.      Breath sounds: No stridor.   Abdominal:      General: Abdomen is flat.      Palpations: Abdomen is soft.      Comments: Liquid stool in rectal tube   Musculoskeletal:         General: Normal range of motion.   Skin:     Findings: No erythema or rash.      Comments: Skin tear LUE.  LIJ c/d/I.   Neurological:      Comments: Does not respond    Psychiatric:      Comments: Unable to assess       Significant Labs: All pertinent labs within the past 24 hours have been reviewed.    Significant Imaging: I have reviewed all pertinent imaging results/findings within the past 24  hours.

## 2022-08-31 NOTE — ASSESSMENT & PLAN NOTE
Hgb on admission 7.9 and platelets of 117  Hgb baseline around 7.5-8.5 and platelets around 50s-60s. Current Hgb 8.6 platelets 112.     Plan:  -On comfort care

## 2022-08-31 NOTE — SUBJECTIVE & OBJECTIVE
Subjective:     HPI:  Aleyda Floyd is a 67 year old female with alcoholic cirrhosis on lactulose and seizure disorder who presented to the ED for AMS and fatigue. 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 History primarily obtained from ED and prior notes. Patient has felt weak and has not been using her arms to communicate via ASL with her family for the past 1-2 weeks. Patient also complained of generalized fatigue in which the patient is sleeping more and is hard to arouse. Of note, patient had a paracentesis 3 weeks ago. Additionally, it was noted that the patient has a history of UTIs.       Upon chart review, patient was recently discharged for decompensated liver failure. At that visit, patient had seizure like acitivity and patient was started on Onfi and keppra.      In the ED, patient was VSS. Labs showed Hgb of 7.9 and an ammonia of 58. CT head showed no acute intracranial abnormality. [facility] consulted for evaluation of sacral skin injury.    Hospital Course:   No notes on file          Scheduled Meds:   meropenem (MERREM) IVPB  1 g Intravenous Q12H    scopolamine  1 patch Transdermal Q3 Days     Continuous Infusions:  PRN Meds:acetaminophen, albuterol-ipratropium, lorazepam, morphine, ondansetron, prochlorperazine    Review of Systems   Skin:  Positive for wound.   Objective:     Vital Signs (Most Recent):  Temp: 98 °F (36.7 °C) (08/31/22 0745)  Pulse: 60 (08/31/22 1301)  Resp: 13 (08/31/22 1301)  BP: (!) 116/45 (08/30/22 1600)  SpO2: 98 % (08/31/22 1301)   Vital Signs (24h Range):  Temp:  [97.7 °F (36.5 °C)-98 °F (36.7 °C)] 98 °F (36.7 °C)  Pulse:  [59-81] 60  Resp:  [9-20] 13  SpO2:  [98 %-100 %] 98 %  BP: (116)/(45) 116/45  Arterial Line BP: ()/(28-51) 84/28     Weight: 56.7 kg (125 lb)  Body mass index is 23.62 kg/m².    Physical  Exam  Constitutional:       Appearance: Normal appearance.   Skin:     General: Skin is warm and dry.      Findings: Lesion present.   Neurological:      Mental Status: She is alert.       Laboratory:  All pertinent labs reviewed within the last 24 hours.    Diagnostic Results:  None

## 2022-08-31 NOTE — ASSESSMENT & PLAN NOTE
This patient does have evidence of infective focus  My overall impression is septic shock.  Source: Urine, Blood  Antibiotics given-   Antibiotics (From admission, onward)    Start     Stop Route Frequency Ordered    08/31/22 0000  meropenem-0.9% sodium chloride 1 g/50 mL IVPB         -- IV Every 12 hours (non-standard times) 08/30/22 1703        Latest lactate reviewed-  No results for input(s): LACTATE in the last 72 hours.  Organ dysfunction indicated by encephalopathy    Shock with decreased perfusion noted, Fluid challenge was not given at 30cc/kg due to decreased urine output    Post- resuscitation assessment- (Done after fluids given for shock)  Vital signs post fluid administrations were-  Temp Readings from Last 1 Encounters:   08/31/22 98 °F (36.7 °C) (Axillary)     BP Readings from Last 1 Encounters:   08/30/22 (!) 116/45     Pulse Readings from Last 1 Encounters:   08/31/22 60   - Transitioned to comfort care.  - All other labs and medications have been discontinued  - Continue meropenem only as per ID recommendations  - D/C levophed. On comfort care.

## 2022-08-31 NOTE — ASSESSMENT & PLAN NOTE
- follow up evaluation of sacral skin injury.  - pt presented to the ED for AMS and fatigue.   - red/maroon/purple discoloration and blistering noted to sacral region.  - suspected deep tissue injury over bony prominence.  - continue Triad bid/prn.  - Pt transferred to a different unit now on Marleen bed. Will reorder Immerse surface.   - seen wearing a brief. Please avoid briefs if possible.  - continue q2h turning.  - nursing to maintain pressure injury prevention measures and continue wound care per orders.

## 2022-08-31 NOTE — PROGRESS NOTES
Discussed with primary team regarding current care plan and goals for focus on intensive symptom management and planning for hospice in senior care setting.     Family well supported.     Will sign off for now.  Communicated with primary team and will remain available should the need arise.     Thank you for the opportunity to care for this patient and family.     Please call with questions.     Orlando Yeager MD  Palliative Medicine   [facility]  [phone] (cell)

## 2022-08-31 NOTE — PROGRESS NOTES
[facility] - [facility] Medical [facility]  Critical Care Medicine  Progress Note    Patient Name: Aleyda Floyd  MRN: [id number]  Admission Date: 8/17/2022  Hospital Length of Stay: 12 days  Code Status: DNR  Attending Provider: Osmel Daigle MD  Primary Care Provider: Elvie Fernandes MD   Principal Problem: Septic shock    Subjective:     HPI:  67 year old lady with alcoholic cirrhosis on lactulose and seizure disorder who presented to the ED for AMS and fatigue. 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 Admitted to hospital medicine for encephalopathy. EEG on admission unremarkable for seizure activity, consistent with encephalopathy that could be infectious vs metabolic vs primary neuronal in nature. Blood and urine cultures growing ESBL e coli. She was initially started on meropenem, however consulted ID who recommended de-escalating to ertapenem and to complete two week course in total. CT head on 8/22 was unremarkable for acute change. Given bilateral hand weakness, MRI was ordered to r/o spinal cord compression. On 8/24 AM, pt was rapid response 2/2 to hypotension. She received 1 L of NS and remains in refractory hypotension. Pt is transferred to MICU for shock.     Hospital/ICU Course:  Admitted to MICU for septic shock despite what would have been adequate treatment on the floor. Central line and A line placed for shock, levo and vaso started, meropenem continued and ID consulted. Patient's blood cutltures ngtd (coag negative staph in 1 bottle) but urine with strep viridans. Despite abx, patient remained in shock, and remained altered and mostly unresponsive though protecting her airway. Pan-scan was mostly unrevealing for infective foci. GI consulted for assistance with portal enteropathy vs colitis on CT - leaning towards portal disease, no further plans from their perspective. LP performed for AMS was not consistent with infection. Neurology consulted for AMS, recommending repeat extended EEG and MRI. Due to  extended period of illness and overall poor prognosis, palliative care was involved.     Interval History: HDS. On comfort care. Awaiting placement for end of life care.    Review of Systems   Unable to perform ROS: Patient nonverbal     Objective:     Vital Signs (Most Recent):  Temp: 98 °F (36.7 °C) (08/31/22 0745)  Pulse: 60 (08/31/22 1301)  Resp: 13 (08/31/22 1301)  BP: (!) 116/45 (08/30/22 1600)  SpO2: 98 % (08/31/22 1301)   Vital Signs (24h Range):  Temp:  [97.7 °F (36.5 °C)-98 °F (36.7 °C)] 98 °F (36.7 °C)  Pulse:  [59-81] 60  Resp:  [9-20] 13  SpO2:  [98 %-100 %] 98 %  BP: (116)/(45) 116/45  Arterial Line BP: ()/(28-51) 84/28   Weight: 56.7 kg (125 lb)  Body mass index is 23.62 kg/m².      Intake/Output Summary (Last 24 hours) at 8/31/2022 1437  Last data filed at 8/31/2022 1100  Gross per 24 hour   Intake 26.42 ml   Output 650 ml   Net -623.58 ml       Physical Exam  Vitals and nursing note reviewed.   Constitutional:       General: She is not in acute distress.     Appearance: Normal appearance. She is not ill-appearing.   HENT:      Head: Normocephalic and atraumatic.      Nose: Nose normal.      Mouth/Throat:      Mouth: Mucous membranes are moist.   Eyes:      General: No scleral icterus.     Extraocular Movements: Extraocular movements intact.      Conjunctiva/sclera: Conjunctivae normal.      Pupils: Pupils are equal, round, and reactive to light.   Cardiovascular:      Rate and Rhythm: Normal rate and regular rhythm.      Pulses: Normal pulses.      Heart sounds: Normal heart sounds. No murmur heard.  Pulmonary:      Effort: Pulmonary effort is normal. No respiratory distress.      Breath sounds: Normal breath sounds. No wheezing, rhonchi or rales.   Abdominal:      General: Bowel sounds are normal.      Palpations: Abdomen is soft.      Tenderness: There is no abdominal tenderness. There is no guarding or rebound.   Musculoskeletal:         General: No swelling or tenderness. Normal range of  motion.      Right lower leg: Edema (trace at ankles) present.      Left lower leg: Edema (trace at ankles) present.   Skin:     General: Skin is warm and dry.      Capillary Refill: Capillary refill takes less than 2 seconds.      Coloration: Skin is pale. Skin is not jaundiced.      Findings: Lesion (tips of fingers where BG checks are performed) present. No erythema.   Neurological:      Mental Status: She is alert.      GCS: GCS eye subscore is 4. GCS verbal subscore is 1. GCS motor subscore is 6.      Comments: Obtunded; withdraws to pain       Vents:  Oxygen Concentration (%): 35 (08/25/22 1100)  Lines/Drains/Airways       Central Venous Catheter Line  Duration             Percutaneous Central Line Insertion/Assessment - Triple Lumen  08/24/22 1000 left internal jugular 7 days              Drain  Duration                  Urethral Catheter 08/24/22 0900 7 days         Rectal Tube 08/26/22 1629 fecal management system 4 days              Arterial Line  Duration             Arterial Line 08/24/22 1644 Left Femoral 6 days              Peripheral Intravenous Line  Duration                  Peripheral IV - Single Lumen 08/24/22 0653 20 G Left Forearm 7 days                  Significant Labs:    CBC/Anemia Profile:  Recent Labs   Lab 08/30/22  0401   WBC 21.02*   HGB 8.5*   HCT 25.6*   PLT 87*   MCV 92   RDW 17.2*          Chemistries:  Recent Labs   Lab 08/30/22  0401      K 3.7      CO2 24   BUN 91*   CREATININE 1.5*   CALCIUM 8.7   ALBUMIN 1.9*   PROT 6.2   BILITOT 1.3*   ALKPHOS 160*   ALT 69*   AST 99*   MG 3.0*   PHOS 2.9       All pertinent labs within the past 24 hours have been reviewed.    Significant Imaging: I have reviewed all pertinent imaging results/findings within the past 24 hours.      ABG  Recent Labs   Lab 08/26/22  0747   PH 7.399   PO2 102*   PCO2 31.2*   HCO3 19.3*   BE -6     Assessment/Plan:     Neuro  Acute encephalopathy  67 yoF with cirrhosis with ascites and seizure disorder  presents with severe encephalopathy. Despite extensive workup unable to find underlying cause.    Plan:  - On comfort care. Awaiting placement.    ENT  Deaf- written communication   used when able to communicate    Renal/  UTI (urinary tract infection)  See gram negative bacteremia    ID  * Septic shock  This patient does have evidence of infective focus  My overall impression is septic shock.  Source: Urine, Blood  Antibiotics given-   Antibiotics (From admission, onward)    Start     Stop Route Frequency Ordered    08/31/22 0000  meropenem-0.9% sodium chloride 1 g/50 mL IVPB         -- IV Every 12 hours (non-standard times) 08/30/22 1703        Latest lactate reviewed-  No results for input(s): LACTATE in the last 72 hours.  Organ dysfunction indicated by encephalopathy    Shock with decreased perfusion noted, Fluid challenge was not given at 30cc/kg due to decreased urine output    Post- resuscitation assessment- (Done after fluids given for shock)  Vital signs post fluid administrations were-  Temp Readings from Last 1 Encounters:   08/31/22 98 °F (36.7 °C) (Axillary)     BP Readings from Last 1 Encounters:   08/30/22 (!) 116/45     Pulse Readings from Last 1 Encounters:   08/31/22 60   - Transitioned to comfort care.  - All other labs and medications have been discontinued  - Continue meropenem only as per ID recommendations  - D/C levophed. On comfort care.       Gram-negative bacteremia  On comfort care      Hematology  Thrombocytopenia  Hgb on admission 7.9 and platelets of 117  Hgb baseline around 7.5-8.5 and platelets around 50s-60s. Current Hgb 8.6 platelets 112.     Plan:  -On comfort care    Other  Suspected deep tissue injury  Red/maroon/purple discoloration and blistering noted to sacral region.    -On comfort care     Critical Care Daily Checklist:    A: Awake: RASS Goal/Actual Goal:    Actual: Brizuela Agitation Sedation Scale (RASS): Deep sedation   B: Spontaneous Breathing Trial  Performed?     C: SAT & SBT Coordinated?  n/a                      D: Delirium: CAM-ICU Overall CAM-ICU: Positive   E: Early Mobility Performed? No   F: Feeding Goal: Goals: Meet % EEN, EPN by RD f/u date  Status: Nutrition Goal Status: new   Current Diet Order   No orders of the defined types were placed in this encounter.      AS: Analgesia/Sedation off   T: Thromboembolic Prophylaxis yes   H: HOB > 300 Yes   U: Stress Ulcer Prophylaxis (if needed) yes   G: Glucose Control yes   B: Bowel Function Stool Occurrence: 1   I: Indwelling Catheter (Lines & Beckham) Necessity Beckham   D: De-escalation of Antimicrobials/Pharmacotherapies no    Plan for the day/ETD Comfort care awaiting placement    Code Status:  Family/Goals of Care: DNR     Critical secondary to Patient has a condition that poses threat to life and bodily function: severe encephalopathy      Critical care was time spent personally by me on the following activities: development of treatment plan with patient or surrogate and bedside caregivers, discussions with consultants, evaluation of patient's response to treatment, examination of patient, ordering and performing treatments and interventions, ordering and review of laboratory studies, ordering and review of radiographic studies, pulse oximetry, re-evaluation of patient's condition. This critical care time did not overlap with that of any other provider or involve time for any procedures.     Yasmin Rahman MD  Critical Care Medicine  [facility] - Cardiac Medical ICU   no cyanosis of extremity/fingers/toes warm to touch/no swelling/capillary refill time < 2 seconds/no paresthesia

## 2022-08-31 NOTE — PLAN OF CARE
Incomplete        [facility]  Department of Hospital Medicine  [address] [place] [address]  [phone] after hours  [phone] fax     correction NURSING W/ HOSPICE  ORDERS     08/31/2022     Admit to Nursing Home w/ Hospice:  Outpatient Service     Diagnoses:         Active Hospital Problems     Diagnosis   POA    *Septic shock [A41.9, R65.21]   No    Comfort measures only status [Z51.5]   Not Applicable    Palliative care encounter [Z51.5]   Not Applicable    Suspected deep tissue injury [R68.89]   Yes    Gram-negative bacteremia [R78.81]   Unknown    Infection due to ESBL-producing Escherichia coli [A49.8, Z16.12]   Unknown    Altered mental status [R41.82]   Unknown    UTI (urinary tract infection) [N39.0]   Yes    Acute encephalopathy [G93.40]   Yes    Debility [R53.81]   Yes    Thrombocytopenia [D69.6]   Yes    Deaf- written communication [H91.90]   Yes       Resolved Hospital Problems   No resolved problems to display.         Hospice Qualifying Diagnoses:        Patient has a life expectancy < 6 months due to:  Primary Hospice Diagnosis: Severe encephalopathy due to unknown etiology  Comorbid Conditions Contributing to Decline: Decompensated cirrhosis     Vital Signs: Routine per Hospice Protocol.     Code Status: DNR     Allergies: Review of patient's allergies indicates:  No Known Allergies     Diet: NPO. Pleasure feeding at discretion of family.     Activities: As tolerated     Goals of Care Treatment Preferences:  Code Status: DNR     Nursing: Per Hospice Routine     Routine Skin for Bedridden Patients: Apply moisture barrier cream to all skin folds and   wet areas in perineal area daily and after baths and all bowel movements.     Oxygen: She has no oxygen requirements.     Medications:   morphine injection 2 mg   [[id number]]           Ordered Dose: 2 mg Route: Intravenous Frequency: Every 15 min PRN for dyspnea   Admin Dose: 2 mg          Scheduled Start Date/Time: 08/30/22 1621 End Date/Time: --         Order Status: Active   Ordering User: Demetria Be MD Ordering Date/Time: Tue Aug 30, 2022 1522   Ordering Provider: Demetria Be MD Authorizing Provider: Demetria Be MD             Order part of Order Set:  IP PAL COMFORT FOCUSED CARE ORDERS      lorazepam injection 0.5 mg   [[id number]]           Ordered Dose: 0.5 mg Route: Intravenous Frequency: Every 30 min PRN for Anxiety, agitation/delirium   Admin Dose: 0.5 mg         Scheduled Start Date/Time: 08/30/22 1621 End Date/Time: --         Order Status: Active   Ordering User: Demetria Be MD Ordering Date/Time: Tue Aug 30, 2022 1522   Ordering Provider: Demetria Be MD Authorizing Provider: Demetria Be MD             Order part of Order Set:  IP PAL COMFORT FOCUSED CARE ORDERS             Medication List          START taking these medications       ondansetron 4 mg/2 mL Soln  Inject 8 mg into the vein every 8 (eight) hours as needed.      prochlorperazine 10 mg/2 mL (5 mg/mL) Soln injection  Commonly known as: COMPAZINE  Inject 2 mLs (10 mg total) into the vein every 6 (six) hours as needed.      scopolamine 1.3-1.5 mg (1 mg over 3 days)  Commonly known as: TRANSDERM-SCOP  Place 1 patch onto the skin Every 3 (three) days.  Start taking on: September 2, 2022                STOP taking these medications       acetaminophen 325 MG tablet  Commonly known as: TYLENOL      aspirin 81 MG Chew      bumetanide 1 MG tablet  Commonly known as: BUMEX      cholecalciferol (vitamin D3) 50 mcg (2,000 unit) Cap capsule  Commonly known as: VITAMIN D3      cholestyramine-aspartame 4 gram Pwpk  Commonly known as: QUESTRAN LIGHT      cloBAZam 10 mg Tab  Commonly known as: ONFI      folic acid 1 MG tablet  Commonly known as: FOLVITE      furosemide 40 MG tablet  Commonly known as: LASIX      lactulose 20 gram/30 mL Soln  Commonly known as: CHRONULAC      levETIRAcetam  750 MG Tab  Commonly known as: KEPPRA      losartan 50 MG tablet  Commonly known as: COZAAR      nystatin powder  Commonly known as: MYCOSTATIN      omeprazole 40 MG capsule  Commonly known as: PRILOSEC      simvastatin 10 MG tablet  Commonly known as: ZOCOR      spironolactone 100 MG tablet  Commonly known as: ALDACTONE      thiamine 100 MG tablet      XIFAXAN 550 mg Tab  Generic drug: rifAXIMin                   Future Orders:  Hospice Medical Director may dictate new orders for comfortable care measures & sign death certificate.     _________________________________  Yasmin Rahman MD  08/30/2022                  This patient has had both covid vaccinations    Some patients may experience side effects after vaccination.  These may include fever, headache, muscle or joint aches.  Most symptoms resolve with 24-48 hours and do not require urgent medical evaluation unless they persist for more than 72 hours or symptoms are concerning for an unrelated medical condition.          _________________________________  Amy Olmstead MD  08/31/2022

## 2022-08-31 NOTE — SUBJECTIVE & OBJECTIVE
Interval History: HDS. On comfort care. Awaiting placement for end of life care.    Review of Systems   Unable to perform ROS: Patient nonverbal     Objective:     Vital Signs (Most Recent):  Temp: 98 °F (36.7 °C) (08/31/22 0745)  Pulse: 60 (08/31/22 1301)  Resp: 13 (08/31/22 1301)  BP: (!) 116/45 (08/30/22 1600)  SpO2: 98 % (08/31/22 1301)   Vital Signs (24h Range):  Temp:  [97.7 °F (36.5 °C)-98 °F (36.7 °C)] 98 °F (36.7 °C)  Pulse:  [59-81] 60  Resp:  [9-20] 13  SpO2:  [98 %-100 %] 98 %  BP: (116)/(45) 116/45  Arterial Line BP: ()/(28-51) 84/28   Weight: 56.7 kg (125 lb)  Body mass index is 23.62 kg/m².      Intake/Output Summary (Last 24 hours) at 8/31/2022 1437  Last data filed at 8/31/2022 1100  Gross per 24 hour   Intake 26.42 ml   Output 650 ml   Net -623.58 ml       Physical Exam  Vitals and nursing note reviewed.   Constitutional:       General: She is not in acute distress.     Appearance: Normal appearance. She is not ill-appearing.   HENT:      Head: Normocephalic and atraumatic.      Nose: Nose normal.      Mouth/Throat:      Mouth: Mucous membranes are moist.   Eyes:      General: No scleral icterus.     Extraocular Movements: Extraocular movements intact.      Conjunctiva/sclera: Conjunctivae normal.      Pupils: Pupils are equal, round, and reactive to light.   Cardiovascular:      Rate and Rhythm: Normal rate and regular rhythm.      Pulses: Normal pulses.      Heart sounds: Normal heart sounds. No murmur heard.  Pulmonary:      Effort: Pulmonary effort is normal. No respiratory distress.      Breath sounds: Normal breath sounds. No wheezing, rhonchi or rales.   Abdominal:      General: Bowel sounds are normal.      Palpations: Abdomen is soft.      Tenderness: There is no abdominal tenderness. There is no guarding or rebound.   Musculoskeletal:         General: No swelling or tenderness. Normal range of motion.      Right lower leg: Edema (trace at ankles) present.      Left lower leg: Edema  (trace at ankles) present.   Skin:     General: Skin is warm and dry.      Capillary Refill: Capillary refill takes less than 2 seconds.      Coloration: Skin is pale. Skin is not jaundiced.      Findings: Lesion (tips of fingers where BG checks are performed) present. No erythema.   Neurological:      Mental Status: She is alert.      GCS: GCS eye subscore is 4. GCS verbal subscore is 1. GCS motor subscore is 6.      Comments: Obtunded; withdraws to pain       Vents:  Oxygen Concentration (%): 35 (08/25/22 1100)  Lines/Drains/Airways       Central Venous Catheter Line  Duration             Percutaneous Central Line Insertion/Assessment - Triple Lumen  08/24/22 1000 left internal jugular 7 days              Drain  Duration                  Urethral Catheter 08/24/22 0900 7 days         Rectal Tube 08/26/22 1629 fecal management system 4 days              Arterial Line  Duration             Arterial Line 08/24/22 1644 Left Femoral 6 days              Peripheral Intravenous Line  Duration                  Peripheral IV - Single Lumen 08/24/22 0653 20 G Left Forearm 7 days                  Significant Labs:    CBC/Anemia Profile:  Recent Labs   Lab 08/30/22  0401   WBC 21.02*   HGB 8.5*   HCT 25.6*   PLT 87*   MCV 92   RDW 17.2*          Chemistries:  Recent Labs   Lab 08/30/22  0401      K 3.7      CO2 24   BUN 91*   CREATININE 1.5*   CALCIUM 8.7   ALBUMIN 1.9*   PROT 6.2   BILITOT 1.3*   ALKPHOS 160*   ALT 69*   AST 99*   MG 3.0*   PHOS 2.9       All pertinent labs within the past 24 hours have been reviewed.    Significant Imaging: I have reviewed all pertinent imaging results/findings within the past 24 hours.

## 2022-08-31 NOTE — PLAN OF CARE
GE[facility]. GENA spoke with [place] at [facility] and she advised she will review referral  and get back with GENA.    Joselyn Bedoya RN     [phone]

## 2022-08-31 NOTE — PROGRESS NOTES
[facility] - Cardiac Medical [facility]  Skin Integrity TAO  Progress Note    Patient Name: Aleyda Floyd  MRN: [id number]  Admission Date: 8/17/2022  Hospital Length of Stay: 12 days  Attending Physician: Osmel Daigle MD  Primary Care Provider: Elvie Fernandes MD         Subjective:     HPI:  Aleyda Floyd is a 67 year old female with alcoholic cirrhosis on lactulose and seizure disorder who presented to the ED for AMS and fatigue. 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 History primarily obtained from ED and prior notes. Patient has felt weak and has not been using her arms to communicate via ASL with her family for the past 1-2 weeks. Patient also complained of generalized fatigue in which the patient is sleeping more and is hard to arouse. Of note, patient had a paracentesis 3 weeks ago. Additionally, it was noted that the patient has a history of UTIs.       Upon chart review, patient was recently discharged for decompensated liver failure. At that visit, patient had seizure like acitivity and patient was started on Onfi and keppra.      In the ED, patient was VSS. Labs showed Hgb of 7.9 and an ammonia of 58. CT head showed no acute intracranial abnormality. Skin Integrity TAO consulted for evaluation of sacral skin injury.    Hospital Course:   No notes on file          Scheduled Meds:   meropenem (MERREM) IVPB  1 g Intravenous Q12H    scopolamine  1 patch Transdermal Q3 Days     Continuous Infusions:  PRN Meds:acetaminophen, albuterol-ipratropium, lorazepam, morphine, ondansetron, prochlorperazine    Review of Systems   Skin:  Positive for wound.   Objective:     Vital Signs (Most Recent):  Temp: 98 °F (36.7 °C) (08/31/22 0745)  Pulse: 60 (08/31/22 1301)  Resp: 13 (08/31/22 1301)  BP: (!) 116/45 (08/30/22 1600)  SpO2: 98 % (08/31/22 1301)   Vital Signs (24h Range):  Temp:   [97.7 °F (36.5 °C)-98 °F (36.7 °C)] 98 °F (36.7 °C)  Pulse:  [59-81] 60  Resp:  [9-20] 13  SpO2:  [98 %-100 %] 98 %  BP: (116)/(45) 116/45  Arterial Line BP: ()/(28-51) 84/28     Weight: 56.7 kg (125 lb)  Body mass index is 23.62 kg/m².    Physical Exam  Constitutional:       Appearance: Normal appearance.   Skin:     General: Skin is warm and dry.      Findings: Lesion present.   Neurological:      Mental Status: She is alert.       Laboratory:  All pertinent labs reviewed within the last 24 hours.    Diagnostic Results:  None      Assessment/Plan:          TAO Skin Integrity Evaluation    Skin Integrity TAO evaluation of patient as part of the comprehensive skin care team.   She has been admitted for 12 days. Skin injury was noted on 8/19/22. POA yes.        (media file unavailable)    Suspected deep tissue injury  - follow up evaluation of sacral skin injury.  - pt presented to the ED for AMS and fatigue.   - red/maroon/purple discoloration and blistering noted to sacral region.  - suspected deep tissue injury over bony prominence.  - continue Triad bid/prn.  - Pt transferred to a different unit now on Marleen bed. Will reorder Immerse surface.   - seen wearing a brief. Please avoid briefs if possible.  - continue q2h turning.  - nursing to maintain pressure injury prevention measures and continue wound care per orders.       Pt will be followed weekly for wound progression.     Yazmin Carolina NP  Skin Integrity TAO  [facility] - Cardiac Medical ICU

## 2022-08-31 NOTE — PLAN OF CARE
CM spoke to Rafael with [facility]. They advised that patient does not meet criteria for inpatient hospice. But they were going reach out to sister Lay about going home on home hospice and then take patient into respit care. CM to follow for call back.    Joselyn Bedoya RN     [phone]

## 2022-08-31 NOTE — ASSESSMENT & PLAN NOTE
67 yoF with cirrhosis with ascites and seizure disorder presents with severe encephalopathy. Despite extensive workup unable to find underlying cause.    Plan:  - On comfort care. Awaiting placement.

## 2022-09-01 NOTE — PROGRESS NOTES
[facility]  Critical Care Medicine  Progress Note    Patient Name: Aleyda Floyd  MRN: [id number]  Admission Date: 8/17/2022  Hospital Length of Stay: 13 days  Code Status: DNR  Attending Provider: Osmel Daigle MD  Primary Care Provider: Elvie Fernandes MD   Principal Problem: Septic shock    Subjective:     HPI:  67 year old lady with alcoholic cirrhosis on lactulose and seizure disorder who presented to the ED for AMS and fatigue. 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 Admitted to hospital medicine for encephalopathy. EEG on admission unremarkable for seizure activity, consistent with encephalopathy that could be infectious vs metabolic vs primary neuronal in nature. Blood and urine cultures growing ESBL e coli. She was initially started on meropenem, however consulted ID who recommended de-escalating to ertapenem and to complete two week course in total. CT head on 8/22 was unremarkable for acute change. Given bilateral hand weakness, MRI was ordered to r/o spinal cord compression. On 8/24 AM, pt was rapid response 2/2 to hypotension. She received 1 L of NS and remains in refractory hypotension. Pt is transferred to MICU for shock.       Hospital/ICU Course:  Admitted to MICU for septic shock despite what would have been adequate treatment on the floor. Central line and A line placed for shock, levo and vaso started, meropenem continued and ID consulted. Patient's blood cutltures ngtd (coag negative staph in 1 bottle) but urine with strep viridans. Despite abx, patient remained in shock, and remained altered and mostly unresponsive though protecting her airway. Pan-scan was mostly unrevealing for infective foci. GI consulted for assistance with portal enteropathy vs colitis on CT - leaning towards portal disease, no further plans from their perspective. LP performed for AMS was not consistent with infection. Neurology consulted for AMS, recommending repeat extended EEG and MRI. Due to  extended period of illness and overall poor prognosis, palliative care was involved.       Interval History: HDS. On comfort care. Awaiting placement for end of life care.    Review of Systems   Unable to perform ROS: Patient nonverbal     Objective:     Vital Signs (Most Recent):  Temp: 98 °F (36.7 °C) (09/01/22 0700)  Pulse: 66 (09/01/22 0946)  Resp: (!) 9 (09/01/22 0946)  BP: (!) 116/45 (08/30/22 1600)  SpO2: 100 % (09/01/22 0946)   Vital Signs (24h Range):  Temp:  [97.8 °F (36.6 °C)-98 °F (36.7 °C)] 98 °F (36.7 °C)  Pulse:  [56-74] 66  Resp:  [7-16] 9  SpO2:  [95 %-100 %] 100 %  Arterial Line BP: ()/(28-45) 120/38   Weight: 56.7 kg (125 lb)  Body mass index is 23.62 kg/m².      Intake/Output Summary (Last 24 hours) at 9/1/2022 1145  Last data filed at 9/1/2022 0701  Gross per 24 hour   Intake 248.07 ml   Output 1124 ml   Net -875.93 ml       Physical Exam  Vitals and nursing note reviewed.   Constitutional:       General: She is not in acute distress.     Appearance: Normal appearance. She is not ill-appearing.   HENT:      Head: Normocephalic and atraumatic.      Nose: Nose normal.      Mouth/Throat:      Mouth: Mucous membranes are moist.   Eyes:      General: No scleral icterus.     Extraocular Movements: Extraocular movements intact.      Conjunctiva/sclera: Conjunctivae normal.      Pupils: Pupils are equal, round, and reactive to light.   Cardiovascular:      Rate and Rhythm: Normal rate and regular rhythm.      Pulses: Normal pulses.      Heart sounds: Normal heart sounds. No murmur heard.  Pulmonary:      Effort: Pulmonary effort is normal. No respiratory distress.      Breath sounds: Normal breath sounds. No wheezing, rhonchi or rales.   Abdominal:      General: Bowel sounds are normal.      Palpations: Abdomen is soft.      Tenderness: There is no abdominal tenderness. There is no guarding or rebound.   Musculoskeletal:         General: No swelling or tenderness. Normal range of motion.       Right lower leg: Edema (trace at ankles) present.      Left lower leg: Edema (trace at ankles) present.   Skin:     General: Skin is warm and dry.      Capillary Refill: Capillary refill takes less than 2 seconds.      Coloration: Skin is pale. Skin is not jaundiced.      Findings: Lesion (tips of fingers where BG checks are performed) present. No erythema.   Neurological:      Mental Status: She is alert.      GCS: GCS eye subscore is 4. GCS verbal subscore is 1. GCS motor subscore is 6.      Comments: Obtunded; withdraws to pain       Vents:  Oxygen Concentration (%): 35 (08/25/22 1100)  Lines/Drains/Airways       Central Venous Catheter Line  Duration             Percutaneous Central Line Insertion/Assessment - Triple Lumen  08/24/22 1000 left internal jugular 8 days              Drain  Duration                  Urethral Catheter 08/24/22 0900 8 days         Rectal Tube 08/26/22 1629 fecal management system 5 days              Arterial Line  Duration             Arterial Line 08/24/22 1644 Left Femoral 7 days                  Significant Labs:    All pertinent labs within the past 24 hours have been reviewed.    Significant Imaging: I have reviewed all pertinent imaging results/findings within the past 24 hours.      ABG  Recent Labs   Lab 08/26/22  0747   PH 7.399   PO2 102*   PCO2 31.2*   HCO3 19.3*   BE -6     Assessment/Plan:     Neuro  Acute encephalopathy  67 yoF with cirrhosis with ascites and seizure disorder presents with severe encephalopathy. Despite extensive workup unable to find underlying cause.    Plan:  - On comfort care. Awaiting placement.    ENT  Deaf- written communication   used when able to communicate    Renal/  UTI (urinary tract infection)  See gram negative bacteremia    ID  * Septic shock  This patient does have evidence of infective focus  My overall impression is septic shock.  Source: Urine, Blood  Antibiotics given-   Antibiotics (From admission, onward)    Start      Stop Route Frequency Ordered    08/31/22 0000  meropenem-0.9% sodium chloride 1 g/50 mL IVPB         -- IV Every 12 hours (non-standard times) 08/30/22 1703        Latest lactate reviewed-  No results for input(s): LACTATE in the last 72 hours.  Organ dysfunction indicated by encephalopathy    Shock with decreased perfusion noted, Fluid challenge was not given at 30cc/kg due to decreased urine output    Post- resuscitation assessment- (Done after fluids given for shock)  Vital signs post fluid administrations were-  Temp Readings from Last 1 Encounters:   08/31/22 98 °F (36.7 °C) (Axillary)     BP Readings from Last 1 Encounters:   08/30/22 (!) 116/45     Pulse Readings from Last 1 Encounters:   08/31/22 60   - Transitioned to comfort care.  - All other labs and medications have been discontinued  - Continue meropenem only as per ID recommendations  - D/C levophed. On comfort care.       Gram-negative bacteremia  On comfort care      Hematology  Thrombocytopenia  Hgb on admission 7.9 and platelets of 117  Hgb baseline around 7.5-8.5 and platelets around 50s-60s. Current Hgb 8.6 platelets 112.     Plan:  -On comfort care    Other  Suspected deep tissue injury  Red/maroon/purple discoloration and blistering noted to sacral region.    -On comfort care       Critical Care Daily Checklist:    A: Awake: RASS Goal/Actual Goal:    Actual: Brizuela Agitation Sedation Scale (RASS): Moderate sedation   B: Spontaneous Breathing Trial Performed?     C: SAT & SBT Coordinated?  n/a                      D: Delirium: CAM-ICU Overall CAM-ICU: Positive   E: Early Mobility Performed? No   F: Feeding Goal: Goals: Meet % EEN, EPN by RD f/u date  Status: Nutrition Goal Status: new   Current Diet Order   No orders of the defined types were placed in this encounter.      AS: Analgesia/Sedation off   T: Thromboembolic Prophylaxis yes   H: HOB > 300 Yes   U: Stress Ulcer Prophylaxis (if needed) yes   G: Glucose Control yes   B: Bowel  Function Stool Occurrence: 1   I: Indwelling Catheter (Lines & Kim) Necessity kim   D: De-escalation of Antimicrobials/Pharmacotherapies no    Plan for the day/ETD Comfort care awaiting placement    Code Status:  Family/Goals of Care: DNR     Critical secondary to Patient has a condition that poses threat to life and bodily function: severe encephalopathy       Critical care was time spent personally by me on the following activities: development of treatment plan with patient or surrogate and bedside caregivers, discussions with consultants, evaluation of patient's response to treatment, examination of patient, ordering and performing treatments and interventions, ordering and review of laboratory studies, ordering and review of radiographic studies, pulse oximetry, re-evaluation of patient's condition. This critical care time did not overlap with that of any other provider or involve time for any procedures.     Yasmin Rahman MD  Critical Care Medicine  [facility] - Cardiac Medical ICU

## 2022-09-01 NOTE — SUBJECTIVE & OBJECTIVE
Interval History: HDS. On comfort care. Awaiting placement for end of life care.    Review of Systems   Unable to perform ROS: Patient nonverbal     Objective:     Vital Signs (Most Recent):  Temp: 98 °F (36.7 °C) (09/01/22 0700)  Pulse: 66 (09/01/22 0946)  Resp: (!) 9 (09/01/22 0946)  BP: (!) 116/45 (08/30/22 1600)  SpO2: 100 % (09/01/22 0946)   Vital Signs (24h Range):  Temp:  [97.8 °F (36.6 °C)-98 °F (36.7 °C)] 98 °F (36.7 °C)  Pulse:  [56-74] 66  Resp:  [7-16] 9  SpO2:  [95 %-100 %] 100 %  Arterial Line BP: ()/(28-45) 120/38   Weight: 56.7 kg (125 lb)  Body mass index is 23.62 kg/m².      Intake/Output Summary (Last 24 hours) at 9/1/2022 1145  Last data filed at 9/1/2022 0701  Gross per 24 hour   Intake 248.07 ml   Output 1124 ml   Net -875.93 ml       Physical Exam  Vitals and nursing note reviewed.   Constitutional:       General: She is not in acute distress.     Appearance: Normal appearance. She is not ill-appearing.   HENT:      Head: Normocephalic and atraumatic.      Nose: Nose normal.      Mouth/Throat:      Mouth: Mucous membranes are moist.   Eyes:      General: No scleral icterus.     Extraocular Movements: Extraocular movements intact.      Conjunctiva/sclera: Conjunctivae normal.      Pupils: Pupils are equal, round, and reactive to light.   Cardiovascular:      Rate and Rhythm: Normal rate and regular rhythm.      Pulses: Normal pulses.      Heart sounds: Normal heart sounds. No murmur heard.  Pulmonary:      Effort: Pulmonary effort is normal. No respiratory distress.      Breath sounds: Normal breath sounds. No wheezing, rhonchi or rales.   Abdominal:      General: Bowel sounds are normal.      Palpations: Abdomen is soft.      Tenderness: There is no abdominal tenderness. There is no guarding or rebound.   Musculoskeletal:         General: No swelling or tenderness. Normal range of motion.      Right lower leg: Edema (trace at ankles) present.      Left lower leg: Edema (trace at ankles)  present.   Skin:     General: Skin is warm and dry.      Capillary Refill: Capillary refill takes less than 2 seconds.      Coloration: Skin is pale. Skin is not jaundiced.      Findings: Lesion (tips of fingers where BG checks are performed) present. No erythema.   Neurological:      Mental Status: She is alert.      GCS: GCS eye subscore is 4. GCS verbal subscore is 1. GCS motor subscore is 6.      Comments: Obtunded; withdraws to pain       Vents:  Oxygen Concentration (%): 35 (08/25/22 1100)  Lines/Drains/Airways       Central Venous Catheter Line  Duration             Percutaneous Central Line Insertion/Assessment - Triple Lumen  08/24/22 1000 left internal jugular 8 days              Drain  Duration                  Urethral Catheter 08/24/22 0900 8 days         Rectal Tube 08/26/22 1629 fecal management system 5 days              Arterial Line  Duration             Arterial Line 08/24/22 1644 Left Femoral 7 days                  Significant Labs:    All pertinent labs within the past 24 hours have been reviewed.    Significant Imaging: I have reviewed all pertinent imaging results/findings within the past 24 hours.

## 2022-09-01 NOTE — PROGRESS NOTES
[facility] Card[facility] Medical [facility]  Adult Nutrition  Consult Note    SUMMARY     Reason for Assessment    Reason For Assessment: RD follow-up  Diagnosis: other (see comments) (Septic shock)  Relevant Medical History: Cirrhosis, Etoh abuse  Interdisciplinary Rounds: did not attend    General Information Comments: Pt transitioned to [facility] care; awaiting NH w/ hospice placement. NPO & TFs discontinued. If pt's situation changes & further nutrition intervention desired, please re-consult.   Nutrition Discharge Planning: Pending clinical course    Nutrition Follow-Up    RD Follow-up?: No

## 2022-09-01 NOTE — PLAN OF CARE
[facility] has denied patient, due to patient too complex for them. CM spoke with patient's sister Lay. CM advised that more referrals will be sent out today. MD notified.    CM sent 7 [facility] referrals via [facility] to :    1) [facility]  2) [facility]  3) [facility]  [facility]  5) [facility]  (faxed referral-not on [employer])  6) [facility]  7) [facility] to follow for acceptance.     Joselyn Bedoya RN     [phone]

## 2022-09-02 NOTE — PLAN OF CARE
CMICU DAILY GOALS       A: Awake    RASS: Goal -    Actual - RASS (Brizuela Agitation-Sedation Scale): -1-->drowsy   Restraint necessity:    B: Breathe   SBT: Not intubated   C: Coordinate A & B, analgesics/sedatives   Pain: managed    SAT: Not intubated  D: Delirium   CAM-ICU: Overall CAM-ICU: Positive  E: Early(intubated/ Progressive (non-intubated) Mobility   MOVE Screen: Fail   Activity: Activity Management: Rolling - L1  FAS: Feeding/Nutrition   Diet order: Diet/Nutrition Received: NPO,    T: Thrombus   DVT prophylaxis: VTE Required Core Measure: Per order contraindicated for SCDs/Anticoagulants  H: HOB Elevation   Head of Bed (HOB) Positioning: HOB at 20-30 degrees  U: Ulcer Prophylaxis   GI: no  G: Glucose control   managed Glycemic Management: blood glucose monitored  S: Skin   Bathing/Skin Care: bath, complete, linen changed, dressed/undressed  Device Skin Pressure Protection: absorbent pad utilized/changed, adhesive use limited  Pressure Reduction Devices: foam padding utilized, positioning supports utilized  Pressure Reduction Techniques: pressure points protected  Skin Protection: adhesive use limited, incontinence pads utilized  B: Bowel Function   diarrhea   I: Indwelling Catheters   Beckham necessity:      Urethral Catheter 08/24/22 0900-Reason for Continuing Urinary Catheterization: Hospice/Comfort Care/Palliative Care   CVC necessity: Yes  D: De-escalation Antibiotics   Yes    Family/Goals of care/Code Status   Code Status: DNR    24H Vital Sign Range  Temp:  [97.2 °F (36.2 °C)-98 °F (36.7 °C)]   Pulse:  [58-75]   Resp:  [7-16]   SpO2:  [95 %-100 %]   Arterial Line BP: ()/(21-45)      Shift Events   No acute events throughout shift. Comfort measures provided as needed.    VS and assessment per flow sheet, patient progressing towards goals as tolerated, plan of care reviewed with  Mrs. Floyd , all concerns addressed, will continue to monitor.    Belle Ibarra

## 2022-09-02 NOTE — PROGRESS NOTES
[facility]  Critical Care Medicine  Progress Note    Patient Name: Aleyda Floyd  MRN: [id number]  Admission Date: 8/17/2022  Hospital Length of Stay: 14 days  Code Status: DNR  Attending Provider: Osmel Daigle MD  Primary Care Provider: Elvie Fernandes MD   Principal Problem: Septic shock    Subjective:     HPI:  67 year old lady with alcoholic cirrhosis on lactulose and seizure disorder who presented to the ED for AMS and fatigue. 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 Admitted to hospital medicine for encephalopathy. EEG on admission unremarkable for seizure activity, consistent with encephalopathy that could be infectious vs metabolic vs primary neuronal in nature. Blood and urine cultures growing ESBL e coli. She was initially started on meropenem, however consulted ID who recommended de-escalating to ertapenem and to complete two week course in total. CT head on 8/22 was unremarkable for acute change. Given bilateral hand weakness, MRI was ordered to r/o spinal cord compression. On 8/24 AM, pt was rapid response 2/2 to hypotension. She received 1 L of NS and remains in refractory hypotension. Pt is transferred to MICU for shock.     Hospital/ICU Course:  Admitted to MICU for septic shock despite what would have been adequate treatment on the floor. Central line and A line placed for shock, levo and vaso started, meropenem continued and ID consulted. Patient's blood cutltures ngtd (coag negative staph in 1 bottle) but urine with strep viridans. Despite abx, patient remained in shock, and remained altered and mostly unresponsive though protecting her airway. Pan-scan was mostly unrevealing for infective foci. GI consulted for assistance with portal enteropathy vs colitis on CT - leaning towards portal disease, no further plans from their perspective. LP performed for AMS was not consistent with infection. Neurology consulted for AMS, recommending repeat extended EEG and MRI. Due to  extended period of illness and overall poor prognosis, palliative care was involved.     Interval History: Will step down to hospital medicine today.    Review of Systems   Unable to perform ROS: Patient nonverbal     Objective:     Vital Signs (Most Recent):  Temp: 98 °F (36.7 °C) (09/01/22 2300)  Pulse: 60 (09/02/22 0600)  Resp: (!) 9 (09/02/22 0600)  BP: (!) 116/45 (08/30/22 1600)  SpO2: (!) 91 % (09/02/22 0600)   Vital Signs (24h Range):  Temp:  [97.2 °F (36.2 °C)-98 °F (36.7 °C)] 98 °F (36.7 °C)  Pulse:  [58-75] 60  Resp:  [7-16] 9  SpO2:  [91 %-100 %] 91 %  Arterial Line BP: ()/(21-43) 102/29   Weight: 56.7 kg (125 lb)  Body mass index is 23.62 kg/m².      Intake/Output Summary (Last 24 hours) at 9/2/2022 0719  Last data filed at 9/2/2022 0200  Gross per 24 hour   Intake --   Output 1160 ml   Net -1160 ml       Physical Exam  Vitals and nursing note reviewed.   Constitutional:       General: She is not in acute distress.     Appearance: Normal appearance. She is not ill-appearing.   HENT:      Head: Normocephalic and atraumatic.      Nose: Nose normal.      Mouth/Throat:      Mouth: Mucous membranes are moist.   Eyes:      General: No scleral icterus.     Extraocular Movements: Extraocular movements intact.      Conjunctiva/sclera: Conjunctivae normal.      Pupils: Pupils are equal, round, and reactive to light.   Cardiovascular:      Rate and Rhythm: Normal rate and regular rhythm.      Pulses: Normal pulses.      Heart sounds: Normal heart sounds. No murmur heard.  Pulmonary:      Effort: Pulmonary effort is normal. No respiratory distress.      Breath sounds: Normal breath sounds. No wheezing, rhonchi or rales.   Abdominal:      General: Bowel sounds are normal.      Palpations: Abdomen is soft.      Tenderness: There is no abdominal tenderness. There is no guarding or rebound.   Musculoskeletal:         General: No swelling or tenderness. Normal range of motion.      Right lower leg: Edema (trace at  ankles) present.      Left lower leg: Edema (trace at ankles) present.   Skin:     General: Skin is warm and dry.      Capillary Refill: Capillary refill takes less than 2 seconds.      Coloration: Skin is pale. Skin is not jaundiced.      Findings: Lesion (tips of fingers where BG checks are performed) present. No erythema.   Neurological:      Mental Status: She is alert.      GCS: GCS eye subscore is 4. GCS verbal subscore is 1. GCS motor subscore is 6.      Comments: Obtunded; withdraws to pain       Vents:  Oxygen Concentration (%): 35 (08/25/22 1100)  Lines/Drains/Airways       Central Venous Catheter Line  Duration             Percutaneous Central Line Insertion/Assessment - Triple Lumen  08/24/22 1000 left internal jugular 8 days              Drain  Duration                  Urethral Catheter 08/24/22 0900 8 days         Rectal Tube 08/26/22 1629 fecal management system 6 days              Arterial Line  Duration             Arterial Line 08/24/22 1644 Left Femoral 8 days                  Significant Labs:    All pertinent labs within the past 24 hours have been reviewed.    Significant Imaging: I have reviewed all pertinent imaging results/findings within the past 24 hours.      ABG  Recent Labs   Lab 08/26/22  0747   PH 7.399   PO2 102*   PCO2 31.2*   HCO3 19.3*   BE -6     Assessment/Plan:     Neuro  Acute encephalopathy  67 yoF with cirrhosis with ascites and seizure disorder presents with severe encephalopathy. Despite extensive workup unable to find underlying cause.    Plan:  - On comfort care. Awaiting placement.    ENT  Deaf- written communication   used when able to communicate    Renal/  UTI (urinary tract infection)  See gram negative bacteremia    ID  * Septic shock  This patient does have evidence of infective focus  My overall impression is septic shock.  Source: Urine, Blood  Antibiotics given-   Antibiotics (From admission, onward)    Start     Stop Route Frequency Ordered     08/31/22 0000  meropenem-0.9% sodium chloride 1 g/50 mL IVPB         -- IV Every 12 hours (non-standard times) 08/30/22 1703        Latest lactate reviewed-  No results for input(s): LACTATE in the last 72 hours.  Organ dysfunction indicated by encephalopathy    Shock with decreased perfusion noted, Fluid challenge was not given at 30cc/kg due to decreased urine output    Post- resuscitation assessment- (Done after fluids given for shock)  Vital signs post fluid administrations were-  Temp Readings from Last 1 Encounters:   08/31/22 98 °F (36.7 °C) (Axillary)     BP Readings from Last 1 Encounters:   08/30/22 (!) 116/45     Pulse Readings from Last 1 Encounters:   08/31/22 60   - Transitioned to comfort care.  - All other labs and medications have been discontinued  - Continue meropenem only as per ID recommendations  - D/C levophed. On comfort care.       Gram-negative bacteremia  On comfort care      Hematology  Thrombocytopenia  Hgb on admission 7.9 and platelets of 117  Hgb baseline around 7.5-8.5 and platelets around 50s-60s. Current Hgb 8.6 platelets 112.     Plan:  -On comfort care    Other  Suspected deep tissue injury  Red/maroon/purple discoloration and blistering noted to sacral region.    -On comfort care       Critical Care Daily Checklist:    A: Awake: RASS Goal/Actual Goal:    Actual: Brizuela Agitation Sedation Scale (RASS): Drowsy   B: Spontaneous Breathing Trial Performed?     C: SAT & SBT Coordinated?  n/a                      D: Delirium: CAM-ICU Overall CAM-ICU: Positive   E: Early Mobility Performed? No   F: Feeding Goal: Goals: Meet % EEN, EPN by RD f/u date  Status: Nutrition Goal Status: new   Current Diet Order   No orders of the defined types were placed in this encounter.      AS: Analgesia/Sedation off   T: Thromboembolic Prophylaxis yes   H: HOB > 300 Yes   U: Stress Ulcer Prophylaxis (if needed) yes   G: Glucose Control yes   B: Bowel Function Stool Occurrence: 1   I:  Indwelling Catheter (Lines & Kim) Necessity kim   D: De-escalation of Antimicrobials/Pharmacotherapies no    Plan for the day/ETD Comfort care awaiting placement    Code Status:  Family/Goals of Care: DNR       Critical secondary to Patient has a condition that poses threat to life and bodily function: severe encephalopathy      Critical care was time spent personally by me on the following activities: development of treatment plan with patient or surrogate and bedside caregivers, discussions with consultants, evaluation of patient's response to treatment, examination of patient, ordering and performing treatments and interventions, ordering and review of laboratory studies, ordering and review of radiographic studies, pulse oximetry, re-evaluation of patient's condition. This critical care time did not overlap with that of any other provider or involve time for any procedures.     Yasmin Rahman MD  Critical Care Medicine  [facility] - Cardiac Medical ICU

## 2022-09-02 NOTE — NURSING
Patient transferred out to [facility] hospice. No personal belongings at bedside. Family at bedside when transferring out. @[facility]. [place] and Flexi inplace. 22g ERNESTO PIV intact.

## 2022-09-02 NOTE — SUBJECTIVE & OBJECTIVE
Interval History: Will step down to hospital medicine today.    Review of Systems   Unable to perform ROS: Patient nonverbal     Objective:     Vital Signs (Most Recent):  Temp: 98 °F (36.7 °C) (09/01/22 2300)  Pulse: 60 (09/02/22 0600)  Resp: (!) 9 (09/02/22 0600)  BP: (!) 116/45 (08/30/22 1600)  SpO2: (!) 91 % (09/02/22 0600)   Vital Signs (24h Range):  Temp:  [97.2 °F (36.2 °C)-98 °F (36.7 °C)] 98 °F (36.7 °C)  Pulse:  [58-75] 60  Resp:  [7-16] 9  SpO2:  [91 %-100 %] 91 %  Arterial Line BP: ()/(21-43) 102/29   Weight: 56.7 kg (125 lb)  Body mass index is 23.62 kg/m².      Intake/Output Summary (Last 24 hours) at 9/2/2022 0719  Last data filed at 9/2/2022 0200  Gross per 24 hour   Intake --   Output 1160 ml   Net -1160 ml       Physical Exam  Vitals and nursing note reviewed.   Constitutional:       General: She is not in acute distress.     Appearance: Normal appearance. She is not ill-appearing.   HENT:      Head: Normocephalic and atraumatic.      Nose: Nose normal.      Mouth/Throat:      Mouth: Mucous membranes are moist.   Eyes:      General: No scleral icterus.     Extraocular Movements: Extraocular movements intact.      Conjunctiva/sclera: Conjunctivae normal.      Pupils: Pupils are equal, round, and reactive to light.   Cardiovascular:      Rate and Rhythm: Normal rate and regular rhythm.      Pulses: Normal pulses.      Heart sounds: Normal heart sounds. No murmur heard.  Pulmonary:      Effort: Pulmonary effort is normal. No respiratory distress.      Breath sounds: Normal breath sounds. No wheezing, rhonchi or rales.   Abdominal:      General: Bowel sounds are normal.      Palpations: Abdomen is soft.      Tenderness: There is no abdominal tenderness. There is no guarding or rebound.   Musculoskeletal:         General: No swelling or tenderness. Normal range of motion.      Right lower leg: Edema (trace at ankles) present.      Left lower leg: Edema (trace at ankles) present.   Skin:      General: Skin is warm and dry.      Capillary Refill: Capillary refill takes less than 2 seconds.      Coloration: Skin is pale. Skin is not jaundiced.      Findings: Lesion (tips of fingers where BG checks are performed) present. No erythema.   Neurological:      Mental Status: She is alert.      GCS: GCS eye subscore is 4. GCS verbal subscore is 1. GCS motor subscore is 6.      Comments: Obtunded; withdraws to pain       Vents:  Oxygen Concentration (%): 35 (08/25/22 1100)  Lines/Drains/Airways       Central Venous Catheter Line  Duration             Percutaneous Central Line Insertion/Assessment - Triple Lumen  08/24/22 1000 left internal jugular 8 days              Drain  Duration                  Urethral Catheter 08/24/22 0900 8 days         Rectal Tube 08/26/22 1629 fecal management system 6 days              Arterial Line  Duration             Arterial Line 08/24/22 1644 Left Femoral 8 days                  Significant Labs:    All pertinent labs within the past 24 hours have been reviewed.    Significant Imaging: I have reviewed all pertinent imaging results/findings within the past 24 hours.

## 2022-09-02 NOTE — PLAN OF CARE
CM faxed [facility] with hosp[facility] referral to [facility] and [facility]. CM to follow for acceptance.    Joselyn Bedoya RN     [phone]

## 2022-09-02 NOTE — PLAN OF CARE
GENA set up ambulance transportation to [facility] for 3:30 pm. MD, family, and bedside RN notified.  Bedside RN to call report to [phone].      Joselyn Bedoya RN     [phone]

## 2022-09-02 NOTE — NURSING
Report called over to CARA Herrera at [facility]. All questions answered. Family at bedside and made aware of plan to transfer.

## 2022-09-02 NOTE — DISCHARGE SUMMARY
[facility] - Cardiac Medical ICU  Critical Care Medicine  Discharge Summary      Patient Name: Aleyda Floyd  MRN: [id number]  Admission Date: 8/17/2022  Hospital Length of Stay: 14 days  Discharge Date and Time:  09/02/2022 5:44 PM  Attending Physician: No att. providers found   Discharging Provider: Yasmin Rahman MD  Primary Care Provider: Elvie Fernandes MD  Reason for Admission: Decompensated cirrhosis and severe encephalopathy    HPI:   67 year old lady with alcoholic cirrhosis on lactulose and seizure disorder who presented to the ED for AMS and fatigue. 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 Admitted to hospital medicine for encephalopathy. EEG on admission unremarkable for seizure activity, consistent with encephalopathy that could be infectious vs metabolic vs primary neuronal in nature. Blood and urine cultures growing ESBL e coli. She was initially started on meropenem, however consulted ID who recommended de-escalating to ertapenem and to complete two week course in total. CT head on 8/22 was unremarkable for acute change. Given bilateral hand weakness, MRI was ordered to r/o spinal cord compression. On 8/24 AM, pt was rapid response 2/2 to hypotension. She received 1 L of NS and remains in refractory hypotension. Pt is transferred to MICU for shock.       * No surgery found *    Indwelling Lines/Drains at Time of Discharge:   Lines/Drains/Airways     Drain  Duration                Urethral Catheter 08/24/22 0900 9 days         Rectal Tube 08/26/22 1629 fecal management system 7 days              Hospital Course:   Admitted to MICU for septic shock despite what would have been adequate treatment on the floor. Central line and A line placed for shock, levo and vaso started, meropenem continued and ID consulted. Patient's blood cutltures ngtd (coag negative staph in 1 bottle) but urine with strep viridans. Despite abx, patient remained in shock, and remained altered and mostly unresponsive though  protecting her airway. Pan-scan was mostly unrevealing for infective foci. GI consulted for assistance with portal enteropathy vs colitis on CT - leaning towards portal disease, no further plans from their perspective. LP performed for AMS was not consistent with infection. Neurology consulted for AMS, recommending repeat extended EEG and MRI. Due to extended period of illness and overall poor prognosis, palliative care was involved.       Consults (From admission, onward)        Status Ordering Provider     Inpatient consult to Palliative Care  Once        Provider:  (Not yet assigned)    Completed ALINE OVALLES     Inpatient consult to Gastroenterology  Once        Provider:  (Not yet assigned)    Completed ALINE OVALLES     Inpatient consult to Neurology  Once        Provider:  (Not yet assigned)    Completed LILIAM BERRIOS     Inpatient consult to Registered Dietitian/Nutritionist  Once        Provider:  (Not yet assigned)    Completed LILIAM BERRIOS     Inpatient consult to Infectious Diseases  Once        Provider:  (Not yet assigned)    Completed OLU LAUREN     Inpatient consult to Critical Care Medicine  Once        Provider:  (Not yet assigned)    Completed MICHELINE MIRZA     Inpatient consult to Infectious Diseases  Once        Provider:  (Not yet assigned)    Completed SHEYLA HAMILTON        Significant Labs:  All pertinent labs within the past 24 hours have been reviewed.    Significant Imaging:  I have reviewed all pertinent imaging results/findings within the past 24 hours.    Pending Diagnostic Studies:     Procedure Component Value Units Date/Time    Freeze and Hold,  [[id number]] Collected: 08/27/22 1447    Order Status: Sent Lab Status: No result     Specimen: CSF (Spinal Fluid) from Cerebrospinal Fluid         Final Active Diagnoses:    Diagnosis Date Noted POA    PRINCIPAL PROBLEM:  Septic shock [A41.9, R65.21] 08/24/2022 No    Comfort measures only status [Z51.5] 08/30/2022 Not Applicable     Palliative care encounter [Z51.5] 08/29/2022 Not Applicable    Suspected deep tissue injury [R68.89] 08/23/2022 Yes    Gram-negative bacteremia [R78.81] 08/21/2022 Unknown    Infection due to ESBL-producing Escherichia coli [A49.8, Z16.12] 08/21/2022 Unknown    Altered mental status [R41.82]  Unknown    UTI (urinary tract infection) [N39.0] 07/11/2022 Yes    Acute encephalopathy [G93.40] 07/10/2022 Yes    Debility [R53.81] 07/03/2022 Yes    Thrombocytopenia [D69.6] 06/28/2022 Yes    Deaf- written communication [H91.90] 10/08/2013 Yes      Problems Resolved During this Admission:     No new Assessment & Plan notes have been filed under this hospital service since the last note was generated.  Service: Critical Care Medicine    Discharged Condition: stable    Disposition: Hospice/Medical Facility      Patient Instructions:   No discharge procedures on file.  Medications:  Reconciled Home Medications:      Medication List      START taking these medications    ondansetron 4 mg/2 mL Soln  Inject 8 mg into the vein every 8 (eight) hours as needed.     prochlorperazine 10 mg/2 mL (5 mg/mL) Soln injection  Commonly known as: COMPAZINE  Inject 2 mLs (10 mg total) into the vein every 6 (six) hours as needed.     scopolamine 1.3-1.5 mg (1 mg over 3 days)  Commonly known as: TRANSDERM-SCOP  Place 1 patch onto the skin Every 3 (three) days.        STOP taking these medications    acetaminophen 325 MG tablet  Commonly known as: TYLENOL     aspirin 81 MG Chew     bumetanide 1 MG tablet  Commonly known as: BUMEX     cholecalciferol (vitamin D3) 50 mcg (2,000 unit) Cap capsule  Commonly known as: VITAMIN D3     cholestyramine-aspartame 4 gram Pwpk  Commonly known as: QUESTRAN LIGHT     cloBAZam 10 mg Tab  Commonly known as: ONFI     folic acid 1 MG tablet  Commonly known as: FOLVITE     furosemide 40 MG tablet  Commonly known as: LASIX     lactulose 20 gram/30 mL Soln  Commonly known as: CHRONULAC     levETIRAcetam 750 MG  Tab  Commonly known as: KEPPRA     losartan 50 MG tablet  Commonly known as: COZAAR     nystatin powder  Commonly known as: MYCOSTATIN     omeprazole 40 MG capsule  Commonly known as: PRILOSEC     simvastatin 10 MG tablet  Commonly known as: ZOCOR     spironolactone 100 MG tablet  Commonly known as: ALDACTONE     thiamine 100 MG tablet     XIFAXAN 550 mg Tab  Generic drug: rifAXIMin             Yasmin Rahman MD  Critical Care Medicine  [facility] - Cardiac Medical ICU

## 2022-09-02 NOTE — PLAN OF CARE
"Patient's medical requirements changed during the night. Patient had to be given morphine for dyspnea. GENA reached out to Mihaela at [facility] with the change for patient. Mihaela advised that they will accept patient and "lets get her here and comfortable today". GENA faxed requested paperwork to Mihaela at [facility] [phone]. Fatimah with [facility] will reach out to family for consent signing. CM to follow for when to set up transport. MD and bedside RN notified. Family will be contacted by Fatimah with [facility].      Joselyn Bedoya RN     [phone]      "

## 2022-09-06 NOTE — PLAN OF CARE
[facility] - Cardiac Medical ICU  Discharge Final Note    Primary Care Provider: Elvie Fernandes MD    Expected Discharge Date: 9/2/2022    Patient discharged 9/2/2022 to [facility], via ambulance transport. Family present at bedside.    Final Discharge Note (most recent)       Final Note - 09/06/22 0946          Final Note    Assessment Type Final Discharge Note     Anticipated Discharge Disposition Hospice/Medical Facility        Post-Acute Status    Post-Acute Authorization Hospice     Hospice Status Set-up Complete/Auth obtained     Discharge Delays None known at this time                     Important Message from Medicare             Joselyn Bedoya RN     [phone]

## 2022-09-14 ENCOUNTER — POST MORTEM DOCUMENTATION (OUTPATIENT)
Dept: TRANSPLANT | Facility: CLINIC | Age: 67
End: 2022-09-14
Payer: MEDICARE

## 2024-02-19 NOTE — PROGRESS NOTES
[facility] Medicine  Progress Note    Patient Name: Aleyda Floyd  MRN: [id number]  Patient Class: IP- Inpatient   Admission Date: 6/27/2022  Length of Stay: 7 days  Attending Physician: Jessenia Foster MD  Primary Care Provider: Elvie Fernandes MD        Subjective:     Principal Problem:Anasarca        HPI:  This is a 67 year old deaf lady with alcoholic cirrhosis, and hypertension who presented with generalized swelling.  used to obtain history. Patient states that she had the swelling since her diagnosis of cirrhosis in February. Patient states that 2 days ago, she was able to ambulate but with some difficulty due to the swelling but now  worsening to the point where she is unable to ambulate by herself. Of note, patient had a recent EGD performed earlier last week where they had issues with her IV resulting is significant bruising. Patient also notes that whenever she scratches her skin with her nails, she notices clear fluid coming from the cuts. Patient denies fever, chills, cough, hemoptysis, nausea, vomiting, abdominal pain, yellowing of skin, constipation, dysuria, hematuria, and black/ bloody stools. Patient has baseline loose stools from her lactulose.  She reports compliance with all medications including her lactulose and Lasix. Patient and family declined any confusion or altered mental status. She denies any recent alcohol use with last use in February when she was diagnosed with alcoholic cirrhosis. Patient states that she used to weigh around 170s which she believes is her dry weight and is currently in the 200s.     Upon chart review, patient had an echo at an OSH on 2/25/22 which showed EF 60-65% and some diastolic dysfunction.    In the ED, patient was found to have Hgb of 8.5 and platelets of 113 around baseline. Patient had a UA which showed 1+ leuks and many bacteria. Patient did not complain of dysuria.       Overview/Hospital Course:  Admitted for  decompensated cirrhosis and anasarca. Began diuresing with IV Lasix and spironolactone. Hepatology involved in care. Paracentesis of -4.2L; ruled out SBP. Increasing diuresis with Diuril. D/c diuril but continue IV lasix. Repleated K and Mag. 1 BM 7/1 enema ordered 7/2. Prior to enema pt spontaneously had 1 BM with brown stool and bright red streaks of blood. Denied rectal pain. Inadequate response with lasix PO so started on Bumex 2 mg TID. Edema stable. Will attempt to deescalate Bumex to 2mg BID and increase spironolactone to 150mg po qd. OT/PT consulted and hope for d/c home soon      Interval History: Anasarca continues to improve. Good UOP. Keep Bumex 2 mg BID and spironolactone 150 mg daily. PT /OT recs SNF, placement pending.    Review of Systems   Constitutional:  Negative for chills, fatigue and fever.   Eyes:  Negative for photophobia, pain and visual disturbance.   Respiratory:  Negative for cough and shortness of breath.    Cardiovascular:  Negative for chest pain and leg swelling.   Gastrointestinal:  Positive for abdominal distention. Negative for abdominal pain, blood in stool, constipation, diarrhea, nausea, rectal pain and vomiting.   Endocrine: Negative for polyuria.   Genitourinary:  Negative for difficulty urinating, dysuria, flank pain and vaginal pain.   Musculoskeletal:  Negative for back pain and neck pain.        Swelling in all extremities   Skin:  Negative for color change and rash.   Neurological:  Negative for dizziness, weakness, light-headedness, numbness and headaches.   Psychiatric/Behavioral:  Negative for confusion and sleep disturbance.    Objective:     Vital Signs (Most Recent):  Temp: 98.2 °F (36.8 °C) (07/05/22 0836)  Pulse: 98 (07/05/22 0836)  Resp: 18 (07/05/22 0836)  BP: (!) 119/57 (07/05/22 0836)  SpO2: (!) 92 % (07/05/22 0836) Vital Signs (24h Range):  Temp:  [97.6 °F (36.4 °C)-98.2 °F (36.8 °C)] 98.2 °F (36.8 °C)  Pulse:  [83-98] 98  Resp:  [16-20] 18  SpO2:  [92 %-97  %] 92 %  BP: (119-150)/(57-72) 119/57     Weight: 89.6 kg (197 lb 8.5 oz)  Body mass index is 38.58 kg/m².    Intake/Output Summary (Last 24 hours) at 7/5/2022 1420  Last data filed at 7/5/2022 0700  Gross per 24 hour   Intake 300 ml   Output 1160 ml   Net -860 ml        Physical Exam  Vitals reviewed.   Constitutional:       General: She is not in acute distress.     Appearance: She is obese. She is not ill-appearing or diaphoretic.   HENT:      Head: Normocephalic and atraumatic.      Mouth/Throat:      Mouth: Mucous membranes are moist.      Pharynx: Oropharynx is clear. No oropharyngeal exudate or posterior oropharyngeal erythema.   Eyes:      General: No scleral icterus.     Extraocular Movements: Extraocular movements intact.   Neck:      Vascular: No carotid bruit.   Cardiovascular:      Rate and Rhythm: Normal rate and regular rhythm.      Pulses: Normal pulses.      Heart sounds: Normal heart sounds. No murmur heard.    No friction rub.   Pulmonary:      Effort: Pulmonary effort is normal. No respiratory distress.      Breath sounds: Normal breath sounds. No stridor. No wheezing.   Chest:      Chest wall: No tenderness.   Abdominal:      General: Bowel sounds are normal. There is distension.      Palpations: There is no mass.      Tenderness: There is no right CVA tenderness, left CVA tenderness or guarding.      Hernia: A hernia is present.      Comments: Distended. No tenderness to palpation  Small supraumbilical hernia   Musculoskeletal:         General: Swelling present. No tenderness or deformity. Normal range of motion.      Cervical back: Normal range of motion. No rigidity or tenderness.      Right lower leg: Edema present.      Left lower leg: Edema present.      Comments: Diffuse edema   L arm worse than right   Skin:     General: Skin is warm and dry.      Coloration: Skin is not jaundiced or pale.      Findings: No bruising or erythema.      Comments: Bruising noted on bilateral forearms    Neurological:      General: No focal deficit present.      Mental Status: She is alert and oriented to person, place, and time. Mental status is at baseline.      Motor: No weakness.      Coordination: Coordination normal.   Psychiatric:         Mood and Affect: Mood normal.         Behavior: Behavior normal.         Thought Content: Thought content normal.       Significant Labs: All pertinent labs within the past 24 hours have been reviewed.  CBC:   Recent Labs   Lab 07/04/22 0533 07/05/22  0511   WBC 8.40 8.25   HGB 8.3* 7.8*   HCT 23.9* 24.2*   PLT 94* 102*       CMP:   Recent Labs   Lab 07/04/22 0533 07/05/22 0511   * 134*   K 4.2 4.1    100   CO2 26 27    104   BUN 15 16   CREATININE 1.2 1.3   CALCIUM 8.2* 8.3*   PROT 6.1 6.0   ALBUMIN 1.7* 1.6*   BILITOT 2.0* 1.9*   ALKPHOS 89 86   AST 43* 36   ALT 24 21   ANIONGAP 5* 7*   EGFRNONAA 46.9* 42.6*       Recent Lab Results         07/05/22  0511        Albumin 1.6       Alkaline Phosphatase 86       ALT 21       Anion Gap 7       AST 36       Baso # 0.05       Basophil % 0.6       BILIRUBIN TOTAL 1.9  Comment: For infants and newborns, interpretation of results should be based  on gestational age, weight and in agreement with clinical  observations.    Premature Infant recommended reference ranges:  Up to 24 hours.............<8.0 mg/dL  Up to 48 hours............<12.0 mg/dL  3-5 days..................<15.0 mg/dL  6-29 days.................<15.0 mg/dL         BUN 16       Calcium 8.3       Chloride 100       CO2 27       Creatinine 1.3       Differential Method Automated       eGFR if African American 49.1       eGFR if non  42.6  Comment: Calculation used to obtain the estimated glomerular filtration  rate (eGFR) is the CKD-EPI equation.          Eos # 0.4       Eosinophil % 5.0       Glucose 104       Gran # (ANC) 4.2       Gran % 50.7       Hematocrit 24.2       Hemoglobin 7.8       Immature Grans (Abs) 0.02  Comment:  Detail Level: Detailed Mild elevation in immature granulocytes is non specific and   can be seen in a variety of conditions including stress response,   acute inflammation, trauma and pregnancy. Correlation with other   laboratory and clinical findings is essential.         Immature Granulocytes 0.2       Lymph # 2.0       Lymph % 23.9       Magnesium 1.6       MCH 29.8       MCHC 32.2       MCV 92       Mono # 1.6       Mono % 19.6       MPV 10.4       nRBC 0       Phosphorus 3.1       Platelets 102       Potassium 4.1       PROTEIN TOTAL 6.0       RBC 2.62       RDW 18.8       Sodium 134       WBC 8.25               Significant Imaging: I have reviewed all pertinent imaging results/findings within the past 24 hours.      Assessment/Plan:      * Anasarca  67 yoF with alcoholic liver cirrhosis and hypertension admitted for generalized anasarca. Patient's albumin on admission was 1.9. Likely edema related to poor intravascular oncotic pressure 2/2 alcoholic cirrhosis. Patient adherent to home medication. Given history and exam, patient would benefit from diuresis. Dry weight reported as 170s. Patient is currently 198 lbs (down from 212 lbs).     - Better response. d/c Diuril 500 mg IV   - f/u echo unremarkable  - strict I/Os  - daily weights  - low sodium diet with 1500 ml fluid restriction  - Lasix 40 mg IV TID switched to 80 mg PO TID on 7/2 however inadequate response, so dc'd lasix and started Bumex 2 mg TID  - Anasarca continues to improve. Good UOP. Keep Bumex 2 mg BID and spironolactone 150 mg daily.  - Plan for f/u with her hepatologist /transplant and addiction psych on d/c    Debility  -Pt non-mobile for some time in hospital, so PT/OT order placed  -Pt advised to move around in bed but to only get up with help  - PT /OT recs SNF, placement pending.      Thrombocytopenia  Chronic, patient at baseline    Anemia, chronic disease  Patient with Hgb of 8.5 on admit. Upon review of care everywhere, patient had Hgb of 8.4 in all of  Quality 130: Documentation Of Current Medications In The Medical Record: Current Medications Documented 2022. Likely due to chronic disease and stable. Currently Hgb of 8.3    -Bloody Bm 7/2 but not too concerned about GI bleed  - continue to monitor    Alcoholic cirrhosis of liver with ascites  Patient was diagnosed in February. Previous history of chronic alcohol abuse and states she drank about 4-5 glasses of wine per night for over 20 years. Patient is seeing hepatology outpatient. Labs shown in care everywhere.     MELD-Na score: 18 at 6/30/2022  3:30 AM  MELD score: 15 at 6/30/2022  3:30 AM  Calculated from:  Serum Creatinine: 1.2 mg/dL at 6/30/2022  3:30 AM  Serum Sodium: 133 mmol/L at 6/30/2022  3:30 AM  Total Bilirubin: 1.2 mg/dL at 6/30/2022  3:30 AM  INR(ratio): 1.7 at 6/28/2022 12:42 AM  Age: 67 years    - consulted hepatology, appreciate recommendations for diuresis, labs, f/u outpatient with her hepatologist  - continue lactulose, thiamine, and folate  - S/p paracentesis with 4.2L removed  - continue to monitor for signs of liver failure  - f/u daily CMPs    Primary hypertension  Patient was discontinued from losartan per hepatologist.     - continue to monitor and can add when clinically indicated    Deaf- written communication  ASL  used for interaction as well as written communication      VTE Risk Mitigation (From admission, onward)         Ordered     enoxaparin injection 40 mg  Daily         07/02/22 1613     IP VTE HIGH RISK PATIENT  Once         06/28/22 0419     Place sequential compression device  Until discontinued         06/28/22 0419     Place sequential compression device  Until discontinued         06/28/22 0415                Discharge Planning   REMA: 7/5/2022     Code Status: Full Code   Is the patient medically ready for discharge?:     Reason for patient still in hospital (select all that apply): Pending disposition  Discharge Plan A: Skilled Nursing Facility   Discharge Delays: None known at this time              Elly Mendez DO  Department of Hospital Medicine   [facility]  Quality 431: Preventive Care And Screening: Unhealthy Alcohol Use - Screening: Patient not identified as an unhealthy alcohol user when screened for unhealthy alcohol use using a systematic screening method [address] - Med Surg     Quality 226: Preventive Care And Screening: Tobacco Use: Screening And Cessation Intervention: Patient screened for tobacco use and is an ex/non-smoker

## 2024-03-02 NOTE — PT/OT/SLP PROGRESS
Physical Therapy      Patient Name:  Aleyda Floyd   MRN:  [id number]    Patient not seen today secondary to Nurse/ TAO hold (Increased needs of Levo and pt remains lethargic.). Will follow-up tomorrow as appropriate.         Route for mechanical ventilation

## 2024-09-06 NOTE — PLAN OF CARE
[facility]  Cardiology progress Note    Patient Name: Leon Silver  : 1949    CHIEF COMPLAINT  CAD        Subjective   Subjective     HISTORY OF PRESENT ILLNESS    Leon Silver is a 75 y.o. male with CAD status post CABG.  No chest pain.    REVIEW OF SYSTEMS    Constitutional:    No fever, no weight loss  Skin:     No rash  Otolaryngeal:    No difficulty swallowing  Cardiovascular: See HPI.  Pulmonary:    No cough, no sputum production    Personal History     Social History:    reports that he has quit smoking. He uses smokeless tobacco. He reports current alcohol use. He reports that he does not use drugs.    Home Medications:  Current Outpatient Medications on File Prior to Visit   Medication Sig    ascorbic acid (VITAMIN C) 250 MG tablet Take 1 tablet by mouth Daily.    aspirin 81 MG EC tablet Take 1 tablet by mouth Daily.    atorvastatin (LIPITOR) 20 MG tablet Take 1 tablet by mouth Daily.    carvedilol (COREG) 25 MG tablet Take 1 tablet by mouth 2 (Two) Times a Day With Meals.    cholecalciferol (VITAMIN D3) 25 MCG (1000 UT) tablet Take 0.5 tablets by mouth Daily.    cyanocobalamin (VITAMIN B-12) 1000 MCG tablet Take 1 tablet by mouth Daily.    Eliquis 5 MG tablet tablet Take 1 tablet by mouth Every 12 (Twelve) Hours.    fenofibrate 160 MG tablet Take 1 tablet by mouth Daily.    finasteride (PROSCAR) 5 MG tablet     fluticasone-salmeterol (ADVAIR) 100-50 MCG/DOSE DISKUS INHALE ONE PUFF TWICE DAILY RINSE MOUTH AFTER USE    furosemide (LASIX) 20 MG tablet Take 1 tablet by mouth 2 (Two) Times a Day.    levothyroxine (SYNTHROID, LEVOTHROID) 50 MCG tablet Take 1 tablet by mouth Daily.    losartan (COZAAR) 100 MG tablet Take 1 tablet by mouth Daily.    omeprazole (priLOSEC) 20 MG capsule Take 1 capsule by mouth Daily.    potassium chloride (K-DUR,KLOR-CON) 10 MEQ CR tablet Take 1 tablet by mouth 3 (Three) Times a Day.    rOPINIRole (REQUIP) 0.5 MG tablet     tamsulosin (FLOMAX) 0.4 MG capsule 24 hr  Alert with company in room.  at bedside. No sign of distress. Safety measures in place. Turning from side to side.   capsule TAKE ONE CAPSULE BY MOUTH EACH NIGHT AT BEDTIME    traMADol (ULTRAM) 50 MG tablet TAKE ONE TABLET BY MOUTH THREE TIMES DAILY AS NEEDED FOR arthritis joint pain    traZODone (DESYREL) 50 MG tablet TAKE ONE TABLET BY MOUTH EACH NIGHT AT BEDTIME AS NEEDED FOR SLEEP     Current Facility-Administered Medications on File Prior to Visit   Medication    cloNIDine (CATAPRES) tablet 0.2 mg       Past Medical History:   Diagnosis Date    Coronary artery disease     Hyperlipidemia     Hypertension        Allergies:  Allergies   Allergen Reactions    Morphine Rash       Objective    Objective       Vitals:   Heart Rate:  [73] 73  BP: (146-148)/(75-76) 148/75  Body mass index is 30.29 kg/m².     PHYSICAL EXAM:    General Appearance:   well developed  well nourished  HENT:   oropharynx moist  lips not cyanotic  Neck:  thyroid not enlarged  supple  Respiratory:  no respiratory distress  normal breath sounds  no rales  Cardiovascular:  no jugular venous distention  regular rhythm  apical impulse normal  S1 normal, S2 normal  no S3, no S4   no murmur  no rub, no thrill  carotid pulses normal; no bruit  pedal pulses normal  lower extremity edema: none    Skin:   warm, dry  Psychiatric:  judgement and insight appropriate  normal mood and affect        Result Review:  I have personally reviewed the available results from  [x]  Laboratory  [x]  EKG  [x]  Cardiology  [x]  Medications  [x]  Old records  []  Other:     Procedures    Results for orders placed in visit on 05/15/23    Adult Transthoracic Echo Complete W/ Cont if Necessary Per Protocol    Interpretation Summary  Fibrocalcific mitral and aortic valves.  Normal left-ventricular systolic function.  Mild AI.  Mild MR.  Trace TR.     Impression/Plan:  1. Coronary disease s/p CABG stable: Continue aspirin 81 mg once a day.  Continue carvedilol 25 mg twice a day.  No chest pain.  Sestamibi stress test in 2021 negative for any ischemia.  2.  Essential hypertension controlled:   Continue carvedilol 25 mg twice a day.  Continue losartan 100 mg once a day.  Continue clonidine 0.2 mg twice a day.  Monitor blood pressure regularly.  3.  Mixed hyperlipidemia: Continue Lipitor 20 mg once a day.  Continue fenofibrate 160 mg once a day.  Monitor lipid and hepatic profile.  4.  Paroxysmal atrial fibrillation: Continue Eliquis 5 mg twice a day for stroke prevention.  Continue carvedilol 25 mg twice a day for rate control.              Brian Hdez MD   09/09/24   13:28 EDT

## 2024-10-06 NOTE — PLAN OF CARE
CM received call from Mary Beth ([phone]) with [facility] who is following patient for return to facility.   [facility] is anticipating accepting patient back to their LTAC facility when medically ready and then transitioning to their rehab unit.   None
